# Patient Record
Sex: FEMALE | Race: WHITE | Employment: OTHER | URBAN - METROPOLITAN AREA
[De-identification: names, ages, dates, MRNs, and addresses within clinical notes are randomized per-mention and may not be internally consistent; named-entity substitution may affect disease eponyms.]

---

## 2017-01-06 ENCOUNTER — HOSPITAL ENCOUNTER (OUTPATIENT)
Dept: LAB | Age: 77
Discharge: HOME | End: 2017-01-06
Payer: MEDICARE

## 2017-01-06 ENCOUNTER — OFFICE VISIT (OUTPATIENT)
Dept: FAMILY MEDICINE CLINIC | Age: 77
End: 2017-01-06

## 2017-01-06 VITALS
HEIGHT: 62 IN | SYSTOLIC BLOOD PRESSURE: 139 MMHG | RESPIRATION RATE: 16 BRPM | BODY MASS INDEX: 38.83 KG/M2 | TEMPERATURE: 97.5 F | WEIGHT: 211 LBS | OXYGEN SATURATION: 95 % | DIASTOLIC BLOOD PRESSURE: 65 MMHG | HEART RATE: 66 BPM

## 2017-01-06 DIAGNOSIS — E11.9 TYPE 2 DIABETES MELLITUS WITHOUT COMPLICATION, WITHOUT LONG-TERM CURRENT USE OF INSULIN (HCC): ICD-10-CM

## 2017-01-06 DIAGNOSIS — E78.1 PURE HYPERGLYCERIDEMIA: ICD-10-CM

## 2017-01-06 DIAGNOSIS — K21.9 GASTROESOPHAGEAL REFLUX DISEASE, ESOPHAGITIS PRESENCE NOT SPECIFIED: Primary | ICD-10-CM

## 2017-01-06 DIAGNOSIS — Z86.19 HISTORY OF HELICOBACTER PYLORI INFECTION: ICD-10-CM

## 2017-01-06 DIAGNOSIS — F41.9 ANXIETY: ICD-10-CM

## 2017-01-06 PROCEDURE — 83036 HEMOGLOBIN GLYCOSYLATED A1C: CPT

## 2017-01-06 PROCEDURE — 80053 COMPREHEN METABOLIC PANEL: CPT

## 2017-01-06 PROCEDURE — 80061 LIPID PANEL: CPT

## 2017-01-06 PROCEDURE — 85025 COMPLETE CBC W/AUTO DIFF WBC: CPT

## 2017-01-06 PROCEDURE — 36415 COLL VENOUS BLD VENIPUNCTURE: CPT

## 2017-01-06 PROCEDURE — 87338 HPYLORI STOOL AG IA: CPT

## 2017-01-06 PROCEDURE — 86141 C-REACTIVE PROTEIN HS: CPT

## 2017-01-06 NOTE — MR AVS SNAPSHOT
Visit Information Date & Time Provider Department Dept. Phone Encounter #  
 1/6/2017 10:30 AM Reuben Burr [phone] Follow-up Instructions Return in about 6 months (around 7/17/2017), or if symptoms worsen or fail to improve, for Medicare wellness . Your Appointments 2/20/2017  2:40 PM  
Follow Up with Marisabel Norman MD  
[facility]) Appt Note: 2 mo numbness f/u. ..[facility] [address] [facility] [address] [phone]  
  
   
 [address] [facility] [address]  
  
    
 3/2/2017 11:00 AM  
ESTABLISHED PATIENT with Demetrice Oneal MD  
Arthritis and [address] [facility] [address]) Appt Note: fu 3 mo  
 [address] [facility] [address]  
[phone]  
  
   
 [address] [address] Upcoming Health Maintenance Date Due  
 EYE EXAM RETINAL OR DILATED Q1 10/19/2016 FOOT EXAM Q1 5/31/2017 HEMOGLOBIN A1C Q6M 7/6/2017 MICROALBUMIN Q1 7/15/2017 MEDICARE YEARLY EXAM 7/16/2017 GLAUCOMA SCREENING Q2Y 10/19/2017 LIPID PANEL Q1 1/6/2018 DTaP/Tdap/Td series (2 - Td) 12/5/2023 Allergies as of 1/6/2017  Review Complete On: 1/6/2017 By: Topher Hooks LPN Severity Noted Reaction Type Reactions Cephalosporins High 12/30/2009    Nausea and Vomiting Severe vomiting Current Immunizations  Reviewed on 10/18/2016 Name Date Influenza Vaccine 12/5/2013 Influenza Vaccine Hu Ordonez) 9/6/2016 Influenza Vaccine (Quad) PF 11/10/2014 Influenza Vaccine PF 10/20/2015 Influenza Vaccine Split 10/10/2012  4:30 PM, 11/14/2011, 10/25/2010 Pneumococcal Polysaccharide (PPSV-23) 12/5/2013 Pneumococcal Vaccine (Unspecified Type) 6/5/2008 Tdap 12/5/2013 Not reviewed this visit You Were Diagnosed With   
  
 Codes Comments Gastroesophageal reflux disease, esophagitis presence not specified    -  Primary ICD-10-CM: K21.9 ICD-9-CM: 530.81 Anxiety     ICD-10-CM: F41.9 ICD-9-CM: 300.00 History of Helicobacter pylori infection     ICD-10-CM: Z86.19 ICD-9-CM: V12.09 Type 2 diabetes mellitus without complication, without long-term current use of insulin (HCC)     ICD-10-CM: E11.9 ICD-9-CM: 250.00 Pure hyperglyceridemia     ICD-10-CM: E78.1 ICD-9-CM: 272.1 Vitals BP Pulse Temp Resp Height(growth percentile) Weight(growth percentile) 139/65 (BP 1 Location: Right arm, BP Patient Position: Sitting) 66 97.5 °F (36.4 °C) (Oral) 16 5' 2\" (1.575 m) 211 lb (95.7 kg) SpO2 BMI OB Status Smoking Status 95% 38.59 kg/m2 Postmenopausal Former Smoker Vitals History BMI and BSA Data Body Mass Index Body Surface Area 38.59 kg/m 2 2.05 m 2 Preferred Pharmacy Pharmacy Name Phone 310 [facility] 53 91 [address] ([address] [phone] Your Updated Medication List  
  
   
This list is accurate as of: 1/6/17 11:06 AM.  Always use your most recent med list.  
  
  
  
  
 acetaminophen 650 mg CR tablet Commonly known as:  TYLENOL Take 1,300 mg by mouth daily as needed for Pain. aspirin 81 mg tablet Take 81 mg by mouth daily. atorvastatin 10 mg tablet Commonly known as:  LIPITOR  
TAKE 1 TABLET BY MOUTH EVERY DAY  
  
 B.infantis-B.ani-B.long-B.bifi 10-15 mg Tbec Take 1 Tab by mouth daily. BD TUBERCULIN SYRINGE 1 mL 25 gauge x 5/8\" Syrg Generic drug:  Syringe with Needle (Disp) USE ONCE A WEEK WITH METHOTREXATE Blood-Glucose Meter monitoring kit Commonly known as:  CONTOUR METER Dx: 250. Check blood sugars daily. busPIRone 5 mg tablet Commonly known as:  BUSPAR Take 1 Tab by mouth two (2) times a day for 30 days.  Start with daily x 1 week, then increase to 2 x per day CALCIUM 500+D 500 mg(1,250mg) -200 unit per tablet Generic drug:  calcium-vitamin D Take 3 Tabs by mouth daily. cholecalciferol 1,000 unit tablet Commonly known as:  VITAMIN D3 Take 2,000 Units by mouth daily. cyanocobalamin 1,000 mcg/mL injection Commonly known as:  VITAMIN B-12 Start: 1000 mcg SC/IM qd x1wk, then qwk x1mo, then every month DULoxetine 30 mg capsule Commonly known as:  CYMBALTA TAKE 1 CAPSULE BY MOUTH EVERY DAY  
  
 ESTRACE 0.01 % (0.1 mg/gram) vaginal cream  
Generic drug:  estradiol Take 42.5 mg by mouth once over twenty-four (24) hours. ferrous sulfate 325 mg (65 mg iron) tablet TAKE 1 TABLET BY MOUTH TWICE DAILY  
  
 fluocinoNIDE 0.05 % topical cream  
Commonly known as:  LIDEX Apply  to affected area two (2) times a day. folic acid 1 mg tablet Commonly known as:  Google Take 1 Tab by mouth daily for 90 days. * gabapentin 300 mg capsule Commonly known as:  NEURONTIN  
TAKE 1 CAPSULE BY MOUTH FOUR TIMES DAILY * gabapentin 600 mg tablet Commonly known as:  NEURONTIN Take 1 Tab by mouth two (2) times a day. glipiZIDE SR 2.5 mg CR tablet Commonly known as:  GLUCOTROL XL  
TAKE 1 TABLET BY MOUTH TWICE DAILY  
  
 glucose blood VI test strips strip Commonly known as:  Ascensia CONTOUR Glucometer for 250.00 Pt. test blood sugar once daily (in morning) HYDROcodone-acetaminophen 5-325 mg per tablet Commonly known as:  Iliana Arts Take 1 Tab by mouth every six (6) hours as needed for Pain.  
  
 hydroxychloroquine 200 mg tablet Commonly known as:  PLAQUENIL  
TAKE 1 TABLET BY MOUTH TWICE DAILY OR AS DIRECTED  
  
 metFORMIN 500 mg tablet Commonly known as:  GLUCOPHAGE  
TAKE 1 TABLET BY MOUTH TWICE DAILY WITH MEALS  
  
 metoprolol succinate 25 mg XL tablet Commonly known as:  TOPROL-XL Take 1 Tab by mouth daily for 90 days.   
  
 MULTIVITAMIN PO  
 Take 1 Tab by mouth daily. nitrofurantoin (macrocrystal-monohydrate) 100 mg capsule Commonly known as:  MACROBID Take 1 Cap by mouth two (2) times daily as needed. nortriptyline 10 mg capsule Commonly known as:  PAMELOR  
TAKE 1 CAPSULE BY MOUTH EVERY NIGHT AT BEDTIME  
  
 pantoprazole 40 mg tablet Commonly known as:  PROTONIX Take 1 Tab by mouth daily. PEPCID 20 mg tablet Generic drug:  famotidine Take 20 mg by mouth nightly. Syringe with Needle, Safety 3 mL 25 gauge x 5/8\" Syrg Commonly known as:  3cc Safety Syringe 25Gx5/8\" To be used with B12 injections Chacha Cuevas Rolling walker. Patient with spinal stenosis and stopped posture. Pain with limited ambulation. ZINC PO Take 50 mg by mouth daily. * Notice: This list has 2 medication(s) that are the same as other medications prescribed for you. Read the directions carefully, and ask your doctor or other care provider to review them with you. We Performed the Following CBC WITH AUTOMATED DIFF [83041 CPT(R)] CRP, HIGH SENSITIVITY [05654 CPT(R)] [place], [place] [34687 CPT(R)] HEMOGLOBIN A1C WITH EAG [03719 CPT(R)] LIPID PANEL [63909 CPT(R)] METABOLIC PANEL, COMPREHENSIVE [79590 CPT(R)] REFERRAL TO GASTROENTEROLOGY [RSP34 Custom] Comments:  
 76F with recurrence of GERD and previous h/o H. Pylori. Belching, stomach tightness, frequent gas. Has burning sensation in her stomach. Follow-up Instructions Return in about 6 months (around 7/17/2017), or if symptoms worsen or fail to improve, for Medicare wellness . Referral Information Referral ID Referred By Referred To  
  
 [id number] Tao Chawla, [address] [place] [address], MD   
   [facility] [address] [place] [address], [address] [facility] Phone: [phone] Fax: [phone] Visits Status Start Date End Date 1 New Request 1/6/17 1/6/18 If your referral has a status of pending review or denied, additional information will be sent to support the outcome of this decision. Introducing \[facility] [place] [facility]\"" & [facility] SERVICES! Dear Lisa Mireles: Thank you for requesting a [employer] account. Our records indicate that you have previously registered for a [employer] account but its currently inactive. Please call our [employer] support line at [phone]. Additional Information If you have questions, please visit the Frequently Asked Questions section of the [employer] website at [email or web address] Remember, [employer] is NOT to be used for urgent needs. For medical emergencies, dial 911. Now available from your iPhone and Android! Please provide this summary of care documentation to your next provider. Your primary care clinician is listed as [address]. If you have any questions after today's visit, please call [phone].

## 2017-01-06 NOTE — PROGRESS NOTES
Identified pt with two pt identifiers(name and ). Chief Complaint   Patient presents with    Anxiety     follow up from 16 office visit        Health Maintenance Due   Topic    EYE EXAM RETINAL OR DILATED Q1     HEMOGLOBIN A1C Q6M     LIPID PANEL Q1        Wt Readings from Last 3 Encounters:   17 211 lb (95.7 kg)   16 211 lb 12.8 oz (96.1 kg)   16 205 lb (93 kg)     Temp Readings from Last 3 Encounters:   17 97.5 °F (36.4 °C) (Oral)   16 98.8 °F (37.1 °C) (Oral)   16 98.4 °F (36.9 °C) (Oral)     BP Readings from Last 3 Encounters:   17 139/65   16 110/68   16 118/66     Pulse Readings from Last 3 Encounters:   17 66   16 72   16 60         Learning Assessment:  :     Learning Assessment 2014   PRIMARY LEARNER Patient Patient Patient Patient   HIGHEST LEVEL OF EDUCATION - PRIMARY LEARNER  - - 4 YEARS OF COLLEGE 2 YEARS OF COLLEGE   BARRIERS PRIMARY LEARNER COGNITIVE - NONE NONE   CO-LEARNER CAREGIVER - - No No   PRIMARY LANGUAGE ENGLISH ENGLISH ENGLISH ENGLISH   LEARNER PREFERENCE PRIMARY DEMONSTRATION READING VIDEOS DEMONSTRATION   ANSWERED BY patient Oscar Sanon Patient self   RELATIONSHIP SELF SELF SELF SELF       Depression Screening:  :     PHQ 2 / 9, over the last two weeks 2016   Little interest or pleasure in doing things Not at all   Feeling down, depressed or hopeless Not at all   Total Score PHQ 2 0       Fall Risk Assessment:  :     Fall Risk Assessment, last 12 mths 2016   Able to walk? Yes   Fall in past 12 months? -       Abuse Screening:  :     No flowsheet data found. Coordination of Care Questionnaire:  :     1) Have you been to an emergency room, urgent care clinic since your last visit? no   Hospitalized since your last visit? no             2) Have you seen or consulted any other health care providers outside of [address] [place] [address] since your last visit?  yes the urologist/Cathryn Greene, MS ANP-BC and neurologist    3) Do you have an Advance Directive on file? Yes on file at home  Are you interested in receiving information about Advance Directives? no    Patient is accompanied by self I have received verbal consent from Daniel Morales to discuss any/all medical information while they are present in the room. Reviewed record in preparation for visit and have obtained necessary documentation. Medication reconciliation up to date and corrected with patient at this time.

## 2017-01-06 NOTE — PROGRESS NOTES
CC:  Chief Complaint   Patient presents with    Anxiety     follow up from 12/07/16 office visit     [address] [facility] Anand is a 67 y.o. female. HPI Comments: Who presents today for follow up of anxiety and current use of Buspar. This seems to be working very well for her and her anxiety and mood lability is improved. She also has had follow up with neurology since her last visit. MRI, EEG and other studies were normal. She did have still some neuropathic tingling pain in the legs and so Neurontin was continued at 300 mgs 2 x per day and 600 mgs at supper/bedtime. This seems to be improving her symptoms and they are not as readily concerning. She is now back to driving. Still has some of the struggles with her , but in general, she is handling it better. Pain is under control for the most part. The weather has been a factor. In addition to the above, the patient has a recurrence of GERD symptoms with belching, frequent passing of gas. I recall that years ago, she had been diagnosed and treated for H. Pylori. However, she feels like some of the same symptoms have returned. We discussed that her blood work serology would always be positive, so she does need to see a GI for possible endoscopy and biopsy to determine recurrence or if she has a new gastritis. She is currently on Prolia for osteoporosis. She is not taking any oral bisphosphonates. Anxiety         ROS:  Review of Systems   Gastrointestinal: Positive for heartburn. All other systems reviewed and are negative. OBJECTIVE:  Visit Vitals    /65 (BP 1 Location: Right arm, BP Patient Position: Sitting)    Pulse 66    Temp 97.5 °F (36.4 °C) (Oral)    Resp 16    Ht 5' 2\" (1.575 m)    Wt 211 lb (95.7 kg)    SpO2 95%    BMI 38.59 kg/m2   Physical Exam   Cardiovascular: Normal rate and regular rhythm.     Pulmonary/Chest: Effort normal and breath sounds normal.   Nursing note and vitals reviewed. ASSESSMENT and PLAN    ICD-10-CM ICD-9-CM    1. Gastroesophageal reflux disease, esophagitis presence not specified K21.9 530.81 REFERRAL TO GASTROENTEROLOGY   2. Anxiety F41.9 300.00    3. History of Helicobacter pylori infection Z86.19 V12.09 H PYLORI AG, STOOL   4. Type 2 diabetes mellitus without complication, without long-term current use of insulin (HCC) E11.9 250.00 HEMOGLOBIN A1C WITH EAG   5. Pure hyperglyceridemia E78.1 272.1 LIPID PANEL      CRP, HIGH SENSITIVITY      METABOLIC PANEL, COMPREHENSIVE      CBC WITH AUTOMATED DIFF     Given the ongoing symptoms of GERD, will give referral to GI and also will send stool for h. Pylori antigen to see if the infection is recurrent or cleared. ALso, needs fasting labs today, including HBA1C. Will advise when the results return. No change in medications at this time. Pt verbalizes understanding of plan of care and denies further questions or concerns at this time. Follow-up Disposition:  Return in about 6 months (around 7/17/2017), or if symptoms worsen or fail to improve, for Medicare wellness .

## 2017-01-10 ENCOUNTER — HOSPITAL ENCOUNTER (OUTPATIENT)
Dept: LAB | Age: 77
Discharge: HOME | End: 2017-01-10
Payer: MEDICARE

## 2017-01-10 PROCEDURE — 87338 HPYLORI STOOL AG IA: CPT

## 2017-01-11 LAB
ALBUMIN SERPL-MCNC: 4.1 G/DL (ref 3.5–4.8)
ALBUMIN/GLOB SERPL: 1.6 {RATIO} (ref 1.1–2.5)
ALP SERPL-CCNC: 64 IU/L (ref 39–117)
ALT SERPL-CCNC: 16 IU/L (ref 0–32)
AST SERPL-CCNC: 28 IU/L (ref 0–40)
BASOPHILS # BLD AUTO: 0 X10E3/UL (ref 0–0.2)
BASOPHILS NFR BLD AUTO: 1 %
BILIRUB SERPL-MCNC: 0.2 MG/DL (ref 0–1.2)
BUN SERPL-MCNC: 19 MG/DL (ref 8–27)
BUN/CREAT SERPL: 21 (ref 11–26)
CALCIUM SERPL-MCNC: 8.7 MG/DL (ref 8.7–10.3)
CHLORIDE SERPL-SCNC: 103 MMOL/L (ref 96–106)
CHOLEST SERPL-MCNC: 140 MG/DL (ref 100–199)
CO2 SERPL-SCNC: 24 MMOL/L (ref 18–29)
CREAT SERPL-MCNC: 0.9 MG/DL (ref 0.57–1)
CRP SERPL HS-MCNC: 0.36 MG/L (ref 0–3)
EOSINOPHIL # BLD AUTO: 0.1 X10E3/UL (ref 0–0.4)
EOSINOPHIL NFR BLD AUTO: 2 %
ERYTHROCYTE [DISTWIDTH] IN BLOOD BY AUTOMATED COUNT: 13.2 % (ref 12.3–15.4)
EST. AVERAGE GLUCOSE BLD GHB EST-MCNC: 120 MG/DL
GLOBULIN SER CALC-MCNC: 2.5 G/DL (ref 1.5–4.5)
GLUCOSE SERPL-MCNC: 79 MG/DL (ref 65–99)
H PYLORI AG STL QL IA: NORMAL
HBA1C MFR BLD: 5.8 % (ref 4.8–5.6)
HCT VFR BLD AUTO: 38.9 % (ref 34–46.6)
HDLC SERPL-MCNC: 56 MG/DL
HGB BLD-MCNC: 12.5 G/DL (ref 11.1–15.9)
IMM GRANULOCYTES # BLD: 0 X10E3/UL (ref 0–0.1)
IMM GRANULOCYTES NFR BLD: 0 %
INTERPRETATION, 910389: NORMAL
LDLC SERPL CALC-MCNC: 64 MG/DL (ref 0–99)
LYMPHOCYTES # BLD AUTO: 2.2 X10E3/UL (ref 0.7–3.1)
LYMPHOCYTES NFR BLD AUTO: 38 %
MCH RBC QN AUTO: 31.6 PG (ref 26.6–33)
MCHC RBC AUTO-ENTMCNC: 32.1 G/DL (ref 31.5–35.7)
MCV RBC AUTO: 98 FL (ref 79–97)
MONOCYTES # BLD AUTO: 0.6 X10E3/UL (ref 0.1–0.9)
MONOCYTES NFR BLD AUTO: 10 %
NEUTROPHILS # BLD AUTO: 2.9 X10E3/UL (ref 1.4–7)
NEUTROPHILS NFR BLD AUTO: 49 %
PLATELET # BLD AUTO: 245 X10E3/UL (ref 150–379)
POTASSIUM SERPL-SCNC: 4.8 MMOL/L (ref 3.5–5.2)
PROT SERPL-MCNC: 6.6 G/DL (ref 6–8.5)
RBC # BLD AUTO: 3.96 X10E6/UL (ref 3.77–5.28)
REQUEST PROBLEM, 015255: NORMAL
SODIUM SERPL-SCNC: 142 MMOL/L (ref 134–144)
TRIGL SERPL-MCNC: 99 MG/DL (ref 0–149)
VLDLC SERPL CALC-MCNC: 20 MG/DL (ref 5–40)
WBC # BLD AUTO: 5.9 X10E3/UL (ref 3.4–10.8)

## 2017-01-12 LAB — H PYLORI AG STL QL IA: NEGATIVE

## 2017-01-12 NOTE — PROGRESS NOTES
H. Pylori stool antigen was negative. So she does not seem to have active disease. Lab letter with other results already sent. Ignore the piece about the H. Pylori. Just received results today.

## 2017-01-21 DIAGNOSIS — I10 ESSENTIAL HYPERTENSION WITH GOAL BLOOD PRESSURE LESS THAN 130/80: ICD-10-CM

## 2017-01-21 DIAGNOSIS — D64.89 ANEMIA DUE TO OTHER CAUSE: ICD-10-CM

## 2017-01-23 RX ORDER — METOPROLOL SUCCINATE 25 MG/1
TABLET, EXTENDED RELEASE ORAL
Qty: 90 TAB | Refills: 0 | Status: SHIPPED | OUTPATIENT
Start: 2017-01-23 | End: 2017-07-26 | Stop reason: SDUPTHER

## 2017-01-23 RX ORDER — FOLIC ACID 1 MG/1
TABLET ORAL
Qty: 90 TAB | Refills: 0 | Status: SHIPPED | OUTPATIENT
Start: 2017-01-23 | End: 2017-07-26 | Stop reason: SDUPTHER

## 2017-01-23 RX ORDER — LANOLIN ALCOHOL/MO/W.PET/CERES
CREAM (GRAM) TOPICAL
Qty: 60 TAB | Refills: 0 | Status: SHIPPED | OUTPATIENT
Start: 2017-01-23 | End: 2017-03-01 | Stop reason: SDUPTHER

## 2017-01-31 RX ORDER — GLIPIZIDE 2.5 MG/1
TABLET, EXTENDED RELEASE ORAL
Qty: 180 TAB | Refills: 0 | Status: SHIPPED | OUTPATIENT
Start: 2017-01-31 | End: 2017-04-27 | Stop reason: SDUPTHER

## 2017-01-31 RX ORDER — METFORMIN HYDROCHLORIDE 500 MG/1
TABLET ORAL
Qty: 180 TAB | Refills: 0 | Status: SHIPPED | OUTPATIENT
Start: 2017-01-31 | End: 2017-04-24 | Stop reason: SDUPTHER

## 2017-02-13 RX ORDER — ATORVASTATIN CALCIUM 10 MG/1
TABLET, FILM COATED ORAL
Qty: 90 TAB | Refills: 0 | Status: SHIPPED | OUTPATIENT
Start: 2017-02-13 | End: 2017-05-11 | Stop reason: SDUPTHER

## 2017-02-20 ENCOUNTER — OFFICE VISIT (OUTPATIENT)
Dept: NEUROLOGY | Age: 77
End: 2017-02-20

## 2017-02-20 VITALS
DIASTOLIC BLOOD PRESSURE: 74 MMHG | BODY MASS INDEX: 39.31 KG/M2 | SYSTOLIC BLOOD PRESSURE: 122 MMHG | OXYGEN SATURATION: 98 % | WEIGHT: 213.6 LBS | RESPIRATION RATE: 18 BRPM | HEIGHT: 62 IN | HEART RATE: 75 BPM | TEMPERATURE: 98.5 F

## 2017-02-20 DIAGNOSIS — E11.40 NEUROPATHY DUE TO TYPE 2 DIABETES MELLITUS (HCC): Primary | ICD-10-CM

## 2017-02-20 RX ORDER — NORTRIPTYLINE HYDROCHLORIDE 10 MG/1
10 CAPSULE ORAL
Qty: 30 CAP | Refills: 3 | Status: SHIPPED | OUTPATIENT
Start: 2017-02-20 | End: 2017-06-24 | Stop reason: SDUPTHER

## 2017-02-20 NOTE — MR AVS SNAPSHOT
Visit Information Date & Time Provider Department Dept. Phone Encounter #  
 2/20/2017  2:40 PM Steven Hernandez MD [facility]rology [facility] [phone] [id number] Follow-up Instructions Return in about 6 months (around 8/20/2017). Your Appointments 3/2/2017 11:00 AM  
ESTABLISHED PATIENT with Mariangel Pavon MD  
Arthritis and [facility]) Appt Note: fu 3 mo  
 [address] [facility] [address]  
[phone]  
  
   
 [address] [address]  
  
    
 7/14/2017 10:30 AM  
ROUTINE CARE with Latasha Ibarra MD  
[facility]) Appt Note: 6 [facility] [address] [facility]  
  
   
 [address] [facility] [address] Upcoming Health Maintenance Date Due  
 EYE EXAM RETINAL OR DILATED Q1 10/19/2016 FOOT EXAM Q1 5/31/2017 HEMOGLOBIN A1C Q6M 7/6/2017 MICROALBUMIN Q1 7/15/2017 MEDICARE YEARLY EXAM 7/16/2017 GLAUCOMA SCREENING Q2Y 10/19/2017 LIPID PANEL Q1 1/6/2018 DTaP/Tdap/Td series (2 - Td) 12/5/2023 Allergies as of 2/20/2017  Review Complete On: 2/20/2017 By: Steven Hernandez MD  
  
 Severity Noted Reaction Type Reactions Cephalosporins High 12/30/2009    Nausea and Vomiting Severe vomiting Current Immunizations  Reviewed on 10/18/2016 Name Date Influenza Vaccine 12/5/2013 Influenza Vaccine Radha Lori) 9/6/2016 Influenza Vaccine (Quad) PF 11/10/2014 Influenza Vaccine PF 10/20/2015 Influenza Vaccine Split 10/10/2012  4:30 PM, 11/14/2011, 10/25/2010 Pneumococcal Polysaccharide (PPSV-23) 12/5/2013 Pneumococcal Vaccine (Unspecified Type) 6/5/2008 Tdap 12/5/2013 Not reviewed this visit You Were Diagnosed With   
  
 Codes Comments Neuropathy due to type 2 diabetes mellitus ([facility] [address]    -  Primary ICD-10-CM: E11.40 ICD-9-CM: 250.60, 357.2 Vitals BP Pulse Temp Resp Height(growth percentile) Weight(growth percentile) 122/74 75 98.5 °F (36.9 °C) (Oral) 18 5' 2\" (1.575 m) 213 lb 9.6 oz (96.9 kg) SpO2 BMI OB Status Smoking Status 98% 39.07 kg/m2 Postmenopausal Former Smoker Vitals History BMI and BSA Data Body Mass Index Body Surface Area 39.07 kg/m 2 2.06 m 2 Preferred Pharmacy Pharmacy Name Phone 310 [facility], [facility] Avin[facility] 53 91 [address] ([address] [phone] Your Updated Medication List  
  
   
This list is accurate as of: 2/20/17  3:06 PM.  Always use your most recent med list.  
  
  
  
  
 acetaminophen 650 mg CR tablet Commonly known as:  TYLENOL Take 1,300 mg by mouth daily as needed for Pain. aspirin 81 mg tablet Take 81 mg by mouth daily. atorvastatin 10 mg tablet Commonly known as:  LIPITOR  
TAKE 1 TABLET BY MOUTH EVERY DAY  
  
 B.infantis-B.ani-B.long-B.bifi 10-15 mg Tbec Take 1 Tab by mouth daily. BD TUBERCULIN SYRINGE 1 mL 25 gauge x 5/8\" Syrg Generic drug:  Syringe with Needle (Disp) USE ONCE A WEEK WITH METHOTREXATE Blood-Glucose Meter monitoring kit Commonly known as:  CONTOUR METER Dx: 250. Check blood sugars daily. CALCIUM 500+D 500 mg(1,250mg) -200 unit per tablet Generic drug:  calcium-vitamin D Take 3 Tabs by mouth daily. cholecalciferol 1,000 unit tablet Commonly known as:  VITAMIN D3 Take 2,000 Units by mouth daily. cyanocobalamin 1,000 mcg/mL injection Commonly known as:  VITAMIN B-12 Start: 1000 mcg SC/IM qd x1wk, then qwk x1mo, then every month DULoxetine 30 mg capsule Commonly known as:  CYMBALTA TAKE 1 CAPSULE BY MOUTH EVERY DAY  
  
 ESTRACE 0.01 % (0.1 mg/gram) vaginal cream  
Generic drug:  estradiol Take 42.5 mg by mouth once over twenty-four (24) hours. * ferrous sulfate 325 mg (65 mg iron) tablet TAKE 1 TABLET BY MOUTH TWICE DAILY * ferrous sulfate 325 mg (65 mg iron) tablet TAKE 1 TABLET BY MOUTH TWICE DAILY  
  
 fluocinoNIDE 0.05 % topical cream  
Commonly known as:  LIDEX Apply  to affected area two (2) times a day. folic acid 1 mg tablet Commonly known as:  FOLVITE  
TAKE 1 TABLET BY MOUTH DAILY * gabapentin 300 mg capsule Commonly known as:  NEURONTIN  
TAKE 1 CAPSULE BY MOUTH FOUR TIMES DAILY * gabapentin 600 mg tablet Commonly known as:  NEURONTIN Take 1 Tab by mouth two (2) times a day. glipiZIDE SR 2.5 mg CR tablet Commonly known as:  GLUCOTROL XL  
TAKE 1 TABLET BY MOUTH TWICE DAILY  
  
 glucose blood VI test strips strip Commonly known as:  [employer] CONTOUR Glucometer for 250.00 Pt. test blood sugar once daily (in morning) HYDROcodone-acetaminophen 5-325 mg per tablet Commonly known as:  Tessie Jamie Take 1 Tab by mouth every six (6) hours as needed for Pain.  
  
 hydroxychloroquine 200 mg tablet Commonly known as:  PLAQUENIL  
TAKE 1 TABLET BY MOUTH TWICE DAILY OR AS DIRECTED  
  
 metFORMIN 500 mg tablet Commonly known as:  GLUCOPHAGE  
TAKE 1 TABLET BY MOUTH TWICE DAILY WITH MEALS  
  
 metoprolol succinate 25 mg XL tablet Commonly known as:  TOPROL-XL  
TAKE 1 TABLET BY MOUTH DAILY MULTIVITAMIN PO Take 1 Tab by mouth daily. nitrofurantoin (macrocrystal-monohydrate) 100 mg capsule Commonly known as:  MACROBID Take 1 Cap by mouth two (2) times daily as needed. nortriptyline 10 mg capsule Commonly known as:  PAMELOR Take 1 Cap by mouth nightly for 30 days. pantoprazole 40 mg tablet Commonly known as:  PROTONIX Take 1 Tab by mouth daily. Syringe with Needle, Safety 3 mL 25 gauge x 5/8\" Syrg Commonly known as:  3cc Safety Syringe 25Gx5/8\" To be used with B12 injections Ahsan jones. Patient with spinal stenosis and stopped posture. Pain with limited ambulation. ZINC PO Take 50 mg by mouth daily. * Notice: This list has 4 medication(s) that are the same as other medications prescribed for you. Read the directions carefully, and ask your doctor or other care provider to review them with you. Follow-up Instructions Return in about 6 months (around 8/20/2017). Patient Instructions I am glad to hear that the blood sugars have improved. Continue gabapentin and stay as safely busy as possible. Revisit in 6 months. Introducing [facility] & [facility] SERVICES! Dear [place]: Thank you for requesting a [employer] account. Our records indicate that you have previously registered for a [employer] account but its currently inactive. Please call our [employer] support line at [phone]. Additional Information If you have questions, please visit the Frequently Asked Questions section of the [employer] website at [email or web address] Remember, [employer] is NOT to be used for urgent needs. For medical emergencies, dial 911. Now available from your iPhone and Android! Please provide this summary of care documentation to your next provider. Your primary care clinician is listed as [address]. If you have any questions after today's visit, please call [phone].

## 2017-02-20 NOTE — PATIENT INSTRUCTIONS
I am glad to hear that the blood sugars have improved. Continue gabapentin and stay as safely busy as possible. Revisit in 6 months.

## 2017-02-20 NOTE — PROGRESS NOTES
Neurology Consult      Subjective: Gildardo Rajan is a 67 y.o. female Who returns with type 2 diabetes and affiliated distal sensory neuropathy. Currently pain is checked adequately with gabapentin 600 mg-600 mg-300 mg-300 mg daily. Does notice some fluctuations in the level of tingling and sensory changes and I told her that was a normal feature of peripheral neuropathy including diabetic. Says her hemoglobin A1c has improved and I congratulated her. Is not a smoker and has not had any falls or other inconvenient features. 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 For that particular situation she feels more comfortable when it is cold as opposed to hot. Continue gabapentin and keep blood sugars as reasonably tight as possible and stay as reasonably busy as possible with revisit in 6 months. Current Outpatient Prescriptions   Medication Sig Dispense Refill    nortriptyline (PAMELOR) 10 mg capsule Take 1 Cap by mouth nightly for 30 days. 30 Cap 3    glucose blood VI test strips (ASCENSIA CONTOUR) strip Glucometer for 250.00 Pt. test blood sugar once daily (in morning) 50 Strip 11    atorvastatin (LIPITOR) 10 mg tablet TAKE 1 TABLET BY MOUTH EVERY DAY 90 Tab 0    metFORMIN (GLUCOPHAGE) 500 mg tablet TAKE 1 TABLET BY MOUTH TWICE DAILY WITH MEALS 180 Tab 0    glipiZIDE SR (GLUCOTROL XL) 2.5 mg CR tablet TAKE 1 TABLET BY MOUTH TWICE DAILY 180 Tab 0    DULoxetine (CYMBALTA) 30 mg capsule TAKE 1 CAPSULE BY MOUTH EVERY DAY 30 Cap 3    metoprolol succinate (TOPROL-XL) 25 mg XL tablet TAKE 1 TABLET BY MOUTH DAILY 90 Tab 0    folic acid (FOLVITE) 1 mg tablet TAKE 1 TABLET BY MOUTH DAILY 90 Tab 0    gabapentin (NEURONTIN) 600 mg tablet Take 1 Tab by mouth two (2) times a day. 60 Tab 5    pantoprazole (PROTONIX) 40 mg tablet Take 1 Tab by mouth daily.  30 Tab 3    HYDROcodone-acetaminophen (NORCO) 5-325 mg per tablet Take 1 Tab by mouth every six (6) hours as needed for Pain. 60 Tab 0    gabapentin (NEURONTIN) 300 mg capsule TAKE 1 CAPSULE BY MOUTH FOUR TIMES DAILY (Patient taking differently: TAKE 1 CAPSULE BY MOUTH TWICE DAILY) 120 Cap 6    ESTRACE 0.01 % (0.1 mg/gram) vaginal cream Take 42.5 mg by mouth once over twenty-four (24) hours. 1    nitrofurantoin, macrocrystal-monohydrate, (MACROBID) 100 mg capsule Take 1 Cap by mouth two (2) times daily as needed. 0    hydroxychloroquine (PLAQUENIL) 200 mg tablet TAKE 1 TABLET BY MOUTH TWICE DAILY OR AS DIRECTED 60 Tab 5    ferrous sulfate 325 mg (65 mg iron) tablet TAKE 1 TABLET BY MOUTH TWICE DAILY 60 Tab 0    B.infantis-B.ani-B.long-B.bifi 10-15 mg TbEC Take 1 Tab by mouth daily.  acetaminophen (TYLENOL) 650 mg CR tablet Take 1,300 mg by mouth daily as needed for Pain.  BD TUBERCULIN SYRINGE 1 mL 25 x 5/8\" syrg USE ONCE A WEEK WITH METHOTREXATE 13 Syringe 3    cyanocobalamin (VITAMIN B-12) 1,000 mcg/mL injection Start: 1000 mcg SC/IM qd x1wk, then qwk x1mo, then every month 1 Vial 3    Syringe with Needle, Safety (3CC SAFETY SYRINGE 25GX5/8\") 3 mL 25 x 5/8\" syrg To be used with B12 injections 100 Each 1    Blood-Glucose Meter (CONTOUR METER) monitoring kit Dx: 250. Check blood sugars daily. 1 kit 11    fluocinoNIDE (LIDEX) 0.05 % topical cream Apply  to affected area two (2) times a day. 15 g 0    ZINC PO Take 50 mg by mouth daily.  cholecalciferol, vitamin d3, (VITAMIN D3) 1,000 unit tablet Take 2,000 Units by mouth daily.  calcium-vitamin D (CALCIUM 500+D) 500 mg(1,250mg) -200 unit per tablet Take 3 Tabs by mouth daily.  aspirin 81 mg Tab Take 81 mg by mouth daily.  MULTIVITAMINS (MULTIVITAMIN PO) Take 1 Tab by mouth daily.  ferrous sulfate 325 mg (65 mg iron) tablet TAKE 1 TABLET BY MOUTH TWICE DAILY 60 Tab 0    Walker [employer] walker. Patient with spinal stenosis and stopped posture. Pain with limited ambulation.  1 Each 0      Allergies   Allergen Reactions    Cephalosporins Nausea and Vomiting     Severe vomiting     Past Medical History   Diagnosis Date    Anemia     Arthritis     CAD (coronary artery disease)     Degenerative joint disease of right hip 09/14/2015      [facility]/Dr. Kusum Aguilar    Diabetes ([facility]     Fracture      right shoulder; T6 compression    Gastritis     Hypercholesterolemia      Hyperlipidemia    Hypertension     Lupus ([facility] 1/9/2015    Osteoporosis, post-menopausal       Past Surgical History   Procedure Laterality Date    Hx coronary stent placement      Pr abdomen surgery proc unlisted       gastric bypass. . hernia    Hx cholecystectomy      Hx hernia repair      Hx orthopaedic       bilateral knees    Hx orthopaedic       r shoulder    Hx orthopaedic       Laminectomy      Social History     Social History    Marital status:      Spouse name: N/A    Number of children: N/A    Years of education: N/A     Occupational History    Not on file. Social History Main Topics    Smoking status: Former Smoker     Quit date: 12/30/1989    Smokeless tobacco: Never Used    Alcohol use No    Drug use: No    Sexual activity: Not Currently     Other Topics Concern    Not on file     Social History Narrative      Family History   Problem Relation Age of Onset    Diabetes Mother     Hypertension Mother     Heart Disease Mother     Diabetes Father     Hypertension Father     Heart Disease Father    [facility] Arthritis-rheumatoid Sister     Elevated Lipids Sister     Arthritis-rheumatoid Sister     Hypertension Sister     Diabetes Sister     Thyroid Disease Sister     Arthritis-rheumatoid Other       Visit Vitals    /74    Pulse 75    Temp 98.5 °F (36.9 °C) (Oral)    Resp 18    Ht 5' 2\" (1.575 m)    Wt 96.9 kg (213 lb 9.6 oz)    SpO2 98%    BMI 39.07 kg/m2        Review of Systems:   A comprehensive review of systems was negative except for that written in the HPI. Neuro Exam:     Appearance:   The patient is well developed, well nourished, provides a coherent history and is in no acute distress. Mental Status: Oriented to time, place and person. Mood and affect appropriate. Cranial Nerves:   Intact visual fields. Fundi are benign. GARY, EOM's full, no nystagmus, no ptosis. Facial sensation is normal. Corneal reflexes are intact. Facial movement is symmetric. Hearing is normal bilaterally. Palate is midline with normal sternocleidomastoid and trapezius muscles are normal. Tongue is midline. Motor:  5/5 strength in upper and lower proximal and distal muscles. Normal bulk and tone. No fasciculations. Reflexes:   Deep tendon reflexes trace to 0/4 and symmetrical.   Sensory:    diminished distally to touch, pinprick and vibration. Gait:  Normal gait. Tremor:   No tremor noted. Cerebellar:  No cerebellar signs present. Neurovascular:  Normal heart sounds and regular rhythm, peripheral pulses intact, and no carotid bruits. Has distal color changes of Robert's disease. Assessment:   Peripheral neuropathy due to type 2 diabetes. Exam looks baseline and very happy to hear that blood sugars have improved. Continue gabapentin and revisit in 6 months. Plan:   Revisit 6 months.   Signed by :  Nabil Hall MD

## 2017-02-27 ENCOUNTER — ANESTHESIA EVENT (OUTPATIENT)
Dept: ENDOSCOPY | Age: 77
End: 2017-02-27
Payer: MEDICARE

## 2017-02-27 ENCOUNTER — SURGERY (OUTPATIENT)
Age: 77
End: 2017-02-27

## 2017-02-27 ENCOUNTER — ANESTHESIA (OUTPATIENT)
Dept: ENDOSCOPY | Age: 77
End: 2017-02-27
Payer: MEDICARE

## 2017-02-27 ENCOUNTER — HOSPITAL ENCOUNTER (OUTPATIENT)
Age: 77
Setting detail: OUTPATIENT SURGERY
Discharge: HOME | End: 2017-02-27
Attending: INTERNAL MEDICINE | Admitting: INTERNAL MEDICINE
Payer: MEDICARE

## 2017-02-27 VITALS
WEIGHT: 210 LBS | HEART RATE: 63 BPM | OXYGEN SATURATION: 99 % | HEIGHT: 62 IN | SYSTOLIC BLOOD PRESSURE: 140 MMHG | BODY MASS INDEX: 38.64 KG/M2 | DIASTOLIC BLOOD PRESSURE: 64 MMHG | TEMPERATURE: 97.7 F | RESPIRATION RATE: 17 BRPM

## 2017-02-27 LAB
GLUCOSE BLD STRIP.AUTO-MCNC: 83 MG/DL (ref 65–100)
H PYLORI FROM TISSUE: NEGATIVE
KIT LOT NO., HCLOLOT: NORMAL
NEGATIVE CONTROL: NEGATIVE
POSITIVE CONTROL: POSITIVE
SERVICE CMNT-IMP: NORMAL

## 2017-02-27 PROCEDURE — 74011000250 HC RX REV CODE- 250

## 2017-02-27 PROCEDURE — 76040000019: Performed by: INTERNAL MEDICINE

## 2017-02-27 PROCEDURE — 88305 TISSUE EXAM BY PATHOLOGIST: CPT | Performed by: INTERNAL MEDICINE

## 2017-02-27 PROCEDURE — 87077 CULTURE AEROBIC IDENTIFY: CPT | Performed by: INTERNAL MEDICINE

## 2017-02-27 PROCEDURE — 76060000031 HC ANESTHESIA FIRST 0.5 HR: Performed by: INTERNAL MEDICINE

## 2017-02-27 PROCEDURE — 74011250636 HC RX REV CODE- 250/636: Performed by: INTERNAL MEDICINE

## 2017-02-27 PROCEDURE — 82962 GLUCOSE BLOOD TEST: CPT

## 2017-02-27 PROCEDURE — 74011250636 HC RX REV CODE- 250/636

## 2017-02-27 PROCEDURE — 77030009426 HC FCPS BIOP ENDOSC BSC -B: Performed by: INTERNAL MEDICINE

## 2017-02-27 RX ORDER — LIDOCAINE HYDROCHLORIDE 20 MG/ML
INJECTION, SOLUTION EPIDURAL; INFILTRATION; INTRACAUDAL; PERINEURAL AS NEEDED
Status: DISCONTINUED | OUTPATIENT
Start: 2017-02-27 | End: 2017-02-27 | Stop reason: HOSPADM

## 2017-02-27 RX ORDER — SODIUM CHLORIDE 9 MG/ML
50 INJECTION, SOLUTION INTRAVENOUS CONTINUOUS
Status: DISCONTINUED | OUTPATIENT
Start: 2017-02-27 | End: 2017-02-27 | Stop reason: HOSPADM

## 2017-02-27 RX ORDER — ATROPINE SULFATE 0.1 MG/ML
0.4 INJECTION INTRAVENOUS
Status: DISCONTINUED | OUTPATIENT
Start: 2017-02-27 | End: 2017-02-27 | Stop reason: HOSPADM

## 2017-02-27 RX ORDER — EPINEPHRINE 0.1 MG/ML
1 INJECTION INTRACARDIAC; INTRAVENOUS
Status: DISCONTINUED | OUTPATIENT
Start: 2017-02-27 | End: 2017-02-27 | Stop reason: HOSPADM

## 2017-02-27 RX ORDER — PROPOFOL 10 MG/ML
INJECTION, EMULSION INTRAVENOUS AS NEEDED
Status: DISCONTINUED | OUTPATIENT
Start: 2017-02-27 | End: 2017-02-27 | Stop reason: HOSPADM

## 2017-02-27 RX ORDER — NALOXONE HYDROCHLORIDE 0.4 MG/ML
0.4 INJECTION, SOLUTION INTRAMUSCULAR; INTRAVENOUS; SUBCUTANEOUS
Status: DISCONTINUED | OUTPATIENT
Start: 2017-02-27 | End: 2017-02-27 | Stop reason: HOSPADM

## 2017-02-27 RX ORDER — MIDAZOLAM HYDROCHLORIDE 1 MG/ML
.25-5 INJECTION, SOLUTION INTRAMUSCULAR; INTRAVENOUS
Status: DISCONTINUED | OUTPATIENT
Start: 2017-02-27 | End: 2017-02-27 | Stop reason: HOSPADM

## 2017-02-27 RX ORDER — DEXTROMETHORPHAN/PSEUDOEPHED 2.5-7.5/.8
1.2 DROPS ORAL
Status: DISCONTINUED | OUTPATIENT
Start: 2017-02-27 | End: 2017-02-27 | Stop reason: HOSPADM

## 2017-02-27 RX ORDER — FLUMAZENIL 0.1 MG/ML
0.2 INJECTION INTRAVENOUS
Status: DISCONTINUED | OUTPATIENT
Start: 2017-02-27 | End: 2017-02-27 | Stop reason: HOSPADM

## 2017-02-27 RX ADMIN — PROPOFOL 80 MG: 10 INJECTION, EMULSION INTRAVENOUS at 15:08

## 2017-02-27 RX ADMIN — SODIUM CHLORIDE 50 ML/HR: 900 INJECTION, SOLUTION INTRAVENOUS at 14:39

## 2017-02-27 RX ADMIN — LIDOCAINE HYDROCHLORIDE 80 MG: 20 INJECTION, SOLUTION EPIDURAL; INFILTRATION; INTRACAUDAL; PERINEURAL at 15:08

## 2017-02-27 RX ADMIN — PROPOFOL 40 MG: 10 INJECTION, EMULSION INTRAVENOUS at 15:11

## 2017-02-27 NOTE — ANESTHESIA POSTPROCEDURE EVALUATION
Post-Anesthesia Evaluation and Assessment    Patient: Lindsey Lee MRN: [id number]  SSN: [id number]    YOB: 1940  Age: 67 y.o. Sex: female       Cardiovascular Function/Vital Signs  Visit Vitals    /64    Pulse 63    Temp 36.5 °C (97.7 °F)    Resp 17    Ht 5' 2\" (1.575 m)    Wt 95.3 kg (210 lb)    SpO2 99%    Breastfeeding No    BMI 38.41 kg/m2       Patient is status post MAC anesthesia for Procedure(s):  ESOPHAGOGASTRODUODENOSCOPY (EGD)  ESOPHAGOGASTRODUODENAL (EGD) BIOPSY. Nausea/Vomiting: None    Postoperative hydration reviewed and adequate. Pain:  Pain Scale 1: Numeric (0 - 10) (02/27/17 1548)  Pain Intensity 1: 0 (02/27/17 1548)   Managed    Neurological Status: At baseline    Mental Status and Level of Consciousness: Arousable    Pulmonary Status:   O2 Device: Room air (02/27/17 1548)   Adequate oxygenation and airway patent    Complications related to anesthesia: None    Post-anesthesia assessment completed.  No concerns    Signed By: Merlyn Simon MD     February 27, 2017

## 2017-02-27 NOTE — IP AVS SNAPSHOT
[address] [facility] 104 70 [facility] 
[phone] Patient: Reyes Villa MRN: [id number] DPW:[id number]/79/2550 You are allergic to the following Allergen Reactions Cephalosporins Nausea and Vomiting Severe vomiting Recent Documentation Height  
  
  
  
  
  
 1.575 m Emergency Contacts Name Discharge Info Relation Home Work Mobile Morteza Rueda DISCHARGE CAREGIVER [3] Spouse [3] [phone] DISCHARGE CAREGIVER [3] About your hospitalization You were admitted on:  February 27, 2017 You last received care in the:  [facility] ENDOSCOPY You were discharged on:  February 27, 2017 Unit phone number:  [phone] Why you were hospitalized Your primary diagnosis was:  Not on File Providers Seen During Your Hospitalizations Provider Role Specialty Primary office phone Cassandra Combs MD Attending Provider Gastroenterology [phone] Your Primary Care Physician (PCP) Primary Care Physician Office Phone Office Fax Quentin Martin [phone] Follow-up Information None Your Appointments Thursday March 02, 2017 11:00 AM EST  
ESTABLISHED PATIENT with Josefina Smith MD  
Arthritis and [address]  
[phone] Current Discharge Medication List  
  
CONTINUE these medications which have NOT CHANGED Dose & Instructions Dispensing Information Comments Morning Noon Evening Bedtime  
 acetaminophen 650 mg CR tablet Commonly known as:  TYLENOL Your next dose is: Today, Tomorrow Other:  _________ Dose:  1300 mg Take 1,300 mg by mouth daily as needed for Pain. Refills:  0  
     
   
   
   
  
 aspirin 81 mg tablet Your next dose is: Today, Tomorrow Other:  _________ Dose:  81 mg Take 81 mg by mouth daily. Refills:  0  
     
   
   
   
  
 atorvastatin 10 mg tablet Commonly known as:  LIPITOR Your next dose is: Today, Tomorrow Other:  _________ TAKE 1 TABLET BY MOUTH EVERY DAY Quantity:  90 Tab Refills:  0 B.infantis-B.ani-B.long-B.bifi 10-15 mg Tbec Your next dose is: Today, Tomorrow Other:  _________ Dose:  1 Tab Take 1 Tab by mouth daily. Refills:  0 BD TUBERCULIN SYRINGE 1 mL 25 gauge x 5/8\" Syrg Generic drug:  Syringe with Needle (Disp) Your next dose is: Today, Tomorrow Other:  _________  
   
   
 USE ONCE A WEEK WITH METHOTREXATE Quantity:  13 Syringe Refills:  3 Blood-Glucose Meter monitoring kit Commonly known as:  CONTOUR METER Your next dose is: Today, Tomorrow Other:  _________ Dx: 250. Check blood sugars daily. Quantity:  1 kit Refills:  11 CALCIUM 500+D 500 mg(1,250mg) -200 unit per tablet Generic drug:  calcium-vitamin D Your next dose is: Today, Tomorrow Other:  _________ Dose:  3 Tab Take 3 Tabs by mouth daily. Refills:  0  
     
   
   
   
  
 cholecalciferol 1,000 unit tablet Commonly known as:  VITAMIN D3 Your next dose is: Today, Tomorrow Other:  _________ Dose:  2000 Units Take 2,000 Units by mouth daily. Refills:  0  
     
   
   
   
  
 cyanocobalamin 1,000 mcg/mL injection Commonly known as:  VITAMIN B-12 Your next dose is: Today, Tomorrow Other:  _________ Start: 1000 mcg SC/IM qd x1wk, then qwk x1mo, then every month Quantity:  1 Vial  
Refills:  3 DULoxetine 30 mg capsule Commonly known as:  CYMBALTA Your next dose is: Today, Tomorrow Other:  _________  TAKE 1 CAPSULE BY MOUTH EVERY DAY  
 Quantity:  30 Cap Refills:  3 ESTRACE 0.01 % (0.1 mg/gram) vaginal cream  
Generic drug:  estradiol Your next dose is: Today, Tomorrow Other:  _________ Dose:  42.5 mg Take 42.5 mg by mouth once over twenty-four (24) hours. Refills:  1  
     
   
   
   
  
 * ferrous sulfate 325 mg (65 mg iron) tablet Your next dose is: Today, Tomorrow Other:  _________ TAKE 1 TABLET BY MOUTH TWICE DAILY Quantity:  60 Tab Refills:  0  
     
   
   
   
  
 * ferrous sulfate 325 mg (65 mg iron) tablet Your next dose is: Today, Tomorrow Other:  _________ TAKE 1 TABLET BY MOUTH TWICE DAILY Quantity:  60 Tab Refills:  0  
     
   
   
   
  
 fluocinoNIDE 0.05 % topical cream  
Commonly known as:  LIDEX Your next dose is: Today, Tomorrow Other:  _________ Apply  to affected area two (2) times a day. Quantity:  15 g Refills:  0  
     
   
   
   
  
 folic acid 1 mg tablet Commonly known as:  [employer] Your next dose is: Today, Tomorrow Other:  _________ TAKE 1 TABLET BY MOUTH DAILY Quantity:  90 Tab Refills:  0  
     
   
   
   
  
 * gabapentin 600 mg tablet Commonly known as:  NEURONTIN Your next dose is: Today, Tomorrow Other:  _________ Dose:  600 mg Take 1 Tab by mouth two (2) times a day. Quantity:  60 Tab Refills:  5  
     
   
   
   
  
 * gabapentin 300 mg capsule Commonly known as:  NEURONTIN Your next dose is: Today, Tomorrow Other:  _________ TAKE ONE CAPSULE BY MOUTH FOUR TIMES DAILY Quantity:  120 Cap Refills:  6  
     
   
   
   
  
 glipiZIDE SR 2.5 mg CR tablet Commonly known as:  GLUCOTROL XL Your next dose is: Today, Tomorrow Other:  _________ TAKE 1 TABLET BY MOUTH TWICE DAILY Quantity:  180 Tab Refills:  0 glucose blood VI test strips strip Commonly known as:  Ascensia CONTOUR Your next dose is: Today, Tomorrow Other:  _________ Glucometer for 250.00 Pt. test blood sugar once daily (in morning) Quantity:  50 Strip Refills:  11 HYDROcodone-acetaminophen 5-325 mg per tablet Commonly known as:  Eulogio Ramonita Your next dose is: Today, Tomorrow Other:  _________ Dose:  1 Tab Take 1 Tab by mouth every six (6) hours as needed for Pain. Quantity:  60 Tab Refills:  0  
     
   
   
   
  
 hydroxychloroquine 200 mg tablet Commonly known as:  PLAQUENIL Your next dose is: Today, Tomorrow Other:  _________ TAKE 1 TABLET BY MOUTH TWICE DAILY OR AS DIRECTED Quantity:  60 Tab Refills:  5  
     
   
   
   
  
 metFORMIN 500 mg tablet Commonly known as:  GLUCOPHAGE Your next dose is: Today, Tomorrow Other:  _________ TAKE 1 TABLET BY MOUTH TWICE DAILY WITH MEALS Quantity:  180 Tab Refills:  0  
     
   
   
   
  
 metoprolol succinate 25 mg XL tablet Commonly known as:  TOPROL-XL Your next dose is: Today, Tomorrow Other:  _________ TAKE 1 TABLET BY MOUTH DAILY Quantity:  90 Tab Refills:  0 MULTIVITAMIN PO Your next dose is: Today, Tomorrow Other:  _________ Dose:  1 Tab Take 1 Tab by mouth daily. Refills:  0  
     
   
   
   
  
 nitrofurantoin (macrocrystal-monohydrate) 100 mg capsule Commonly known as:  MACROBID Your next dose is: Today, Tomorrow Other:  _________ Dose:  1 Cap Take 1 Cap by mouth two (2) times daily as needed. Refills:  0  
     
   
   
   
  
 nortriptyline 10 mg capsule Commonly known as:  PAMELOR Your next dose is: Today, Tomorrow Other:  _________ Dose:  10 mg Take 1 Cap by mouth nightly for 30 days. Quantity:  30 Cap Refills:  3  
     
   
   
   
  
 pantoprazole 40 mg tablet Commonly known as:  PROTONIX Your next dose is: Today, Tomorrow Other:  _________ Dose:  40 mg Take 1 Tab by mouth daily. Quantity:  30 Tab Refills:  3 Syringe with Needle, Safety 3 mL 25 gauge x 5/8\" Syrg Commonly known as:  3cc Safety Syringe 25Gx5/8\" Your next dose is: Today, Tomorrow Other:  _________ To be used with B12 injections Quantity:  100 Each Refills:  1 Nova Poplin Your next dose is: Today, Tomorrow Other:  _________ Rolling walker. Patient with spinal stenosis and stopped posture. Pain with limited ambulation. Quantity:  1 Each Refills:  0 ZINC PO Your next dose is: Today, Tomorrow Other:  _________ Dose:  50 mg Take 50 mg by mouth daily. Refills:  0  
     
   
   
   
  
 * Notice: This list has 4 medication(s) that are the same as other medications prescribed for you. Read the directions carefully, and ask your doctor or other care provider to review them with you. Discharge Instructions Melly Montenegro M.D. 
[phone] 
        
2017 Carri Catalan :  1940 [address] [place] [address] Record Number:  [id number] ENDOSCOPY FINDINGS: 
 Your endoscopy showed small size hiatal hernia, evidence of gastric bypass with normal mucosa in the stomach. Small nodule in the jejunum was biopsied. EGD DISCHARGE INSTRUCTIONS DISCOMFORT: 
Sore throat- throat lozenges or warm salt water gargle 
redness at IV site- apply warm compress to area; if redness or soreness persist- contact your physician Gaseous discomfort- walking, belching will help relieve any discomfort You may not operate a vehicle for 12 hours You may not engage in an occupation involving machinery or appliances for rest of today You may not drink alcoholic beverages for at least 12 hours Avoid making any critical decisions for at least 24 hour DIET: 
 You may resume your regular diet. ACTIVITY Spend the remainder of the day resting -  avoid any strenuous activity. Avoid driving or operating machinery. CALL M.D. ANY SIGN OF Increasing pain, nausea, vomiting Abdominal distension (swelling) New increased bleeding (oral or rectal) Fever (chills) Pain in chest area Bloody discharge from nose or mouth Shortness of breath Follow-up Instructions: 
 Call Dr. Enoch Lane for any questions or problems. Telephone # [phone] Biopsies were obtained, the results will be available  in  5 to 7 days. We will call you to notify you of these results. Continue same medications. Follow up in the office. Discharge Orders None Introducing [facility]! Dear Juanita Alfonso: Thank you for requesting a [employer] account. Our records indicate that you have previously registered for a [employer] account but its currently inactive. Please call our [employer] support line at [phone]. Additional Information If you have questions, please visit the Frequently Asked Questions section of the [employer] website at [email or web address] Remember, [employer] is NOT to be used for urgent needs. For medical emergencies, dial 911. Now available from your iPhone and Android! General Information Please provide this summary of care documentation to your next provider. Patient Signature:  ____________________________________________________________ Date:  ____________________________________________________________  
  
Veronica Garcia Provider Signature:  ____________________________________________________________ Date:  ____________________________________________________________

## 2017-02-27 NOTE — PROCEDURES
Rosi Henry M.D.  [phone]           2017                EGD Operative Report  Fito Thomas  :  1940  [facility] Medical Record Number:  [id number]      Indication:  Abdominal pain, RUQ     : Senia Quiles MD    Referring Provider:  Anna Lechuga MD      Anesthesia/Sedation:  MAC anesthesia    Airway assessment: No airway problems anticipated    Pre-Procedural Exam:      Airway: clear, no airway problems anticipated  Heart: RRR, without gallops or rubs  Lungs: clear bilaterally without wheezes, crackles, or rhonchi  Abdomen: soft, nontender, nondistended, bowel sounds present  Mental Status: awake, alert and oriented to person, place and time       Procedure Details     After infomed consent was obtained for the procedure, with all risks and benefits of procedure explained the patient was taken to the endoscopy suite and placed in the left lateral decubitus position. Following sequential administration of sedation as per above, the endoscope was inserted into the mouth and advanced under direct vision to second portion of the duodenum. A careful inspection was made as the gastroscope was withdrawn, including a retroflexed view of the proximal stomach; findings and interventions are described below. Findings:   Esophagus:normal  Stomach: Small size hiatal hernia, evidence of previous gastric bypass surgery. Minimal erythema seen in the gastric pouch. Adequately patent gastro-jejunal anastomosis with one staple seen. Jejunum: Mucosa within normal, one pale nodule about 5 mm in size was seen about 10 cm from the anastomosis. Biopsies were obtained. Therapies:  none    Specimens: Pyloritek and jejunum nodule           Complications:   None; patient tolerated the procedure well. EBL:  None.            Impression:    Small size hiatal hernia seen and S/P gastric bypass surgery                           Small nodule in the proximal jejunum    Recommendations:    -Continue acid suppression.  -Await pathology.  -Await CIPRIANO test result and treat for Helicobacter pylori if positive.  -Follow-up in the office if symptoms are recurrent.     Kaleigh Freedman MD

## 2017-02-27 NOTE — ROUTINE PROCESS
Marielle Hortonl  1940  [id number]    Situation:  Verbal report received from: Spike Hutchinson  Procedure: Procedure(s):  ESOPHAGOGASTRODUODENOSCOPY (EGD)  ESOPHAGOGASTRODUODENAL (EGD) BIOPSY    Background:    Preoperative diagnosis: RIGHT UPPER QUAD PAIN  Postoperative diagnosis: EGD- gastric bypass    :  Dr. Kohler [place]  Assistant(s): Endoscopy Technician-1: Maria C House  Endoscopy RN-1: Phil Tran RN    Specimens:   ID Type Source Tests Collected by Time Destination   1 : Chandan Cody MD 2/27/2017 1513 Pathology     H. Pylori  yes    Assessment:  Intra-procedure medications       Anesthesia gave intra-procedure sedation and medications, see anesthesia flow sheet yes    Intravenous fluids: NS@ KVO     Vital signs stable  yes    Abdominal assessment: round and soft  yes    Recommendation:  Discharge patient per MD order yes.   Return to floor n/a  Family or Friend  yes  Permission to share finding with family or friend yes

## 2017-02-27 NOTE — ANESTHESIA PREPROCEDURE EVALUATION
Anesthetic History   No history of anesthetic complications            Review of Systems / Medical History  Patient summary reviewed    Pulmonary  Within defined limits                 Neuro/Psych   Within defined limits           Cardiovascular              Past MI, CAD and cardiac stents    Exercise tolerance: >4 METS     GI/Hepatic/Renal  Within defined limits              Endo/Other    Diabetes: type 2    Arthritis     Other Findings              Physical Exam    Airway  Mallampati: II  TM Distance: 4 - 6 cm  Neck ROM: normal range of motion   Mouth opening: Normal     Cardiovascular    Rhythm: regular  Rate: normal         Dental    Dentition: Full lower dentures and Full upper dentures     Pulmonary  Breath sounds clear to auscultation               Abdominal         Other Findings            Anesthetic Plan    ASA: 3  Anesthesia type: MAC            Anesthetic plan and risks discussed with: Patient

## 2017-02-27 NOTE — DISCHARGE INSTRUCTIONS
Danette Saint, M.D.  [phone]           2017  Zeny Infante  :  1940  [facility] Medical Record Number:  [id number]        ENDOSCOPY FINDINGS:   Your endoscopy showed small size hiatal hernia, evidence of gastric bypass with normal mucosa in the stomach. Small nodule in the jejunum was biopsied. EGD DISCHARGE INSTRUCTIONS    DISCOMFORT:  Sore throat- throat lozenges or warm salt water gargle  redness at IV site- apply warm compress to area; if redness or soreness persist- contact your physician  Gaseous discomfort- walking, belching will help relieve any discomfort  You may not operate a vehicle for 12 hours  You may not engage in an occupation involving machinery or appliances for rest of today  You may not drink alcoholic beverages for at least 12 hours  Avoid making any critical decisions for at least 24 hour    DIET:   You may resume your regular diet. ACTIVITY  Spend the remainder of the day resting -  avoid any strenuous activity. Avoid driving or operating machinery. CALL M.D. ANY SIGN OF   Increasing pain, nausea, vomiting  Abdominal distension (swelling)  New increased bleeding (oral or rectal)  Fever (chills)  Pain in chest area  Bloody discharge from nose or mouth  Shortness of breath    Follow-up Instructions:   Call Dr. Donnie Fernandez for any questions or problems. Telephone # [phone]  Biopsies were obtained, the results will be available  in  5 to 7 days. We will call you to notify you of these results. Continue same medications. Follow up in the office.

## 2017-02-27 NOTE — H&P
The patient is a 67year old female who presents with a complaint of Abdominal Pain. The patient presents for consultation at the request of Dr. Evelyn Jasmine). The onset of the abdominal pain has been sudden and has been occurring in a persistent pattern for 5 months. The abdominal pain is described as burning .  The patient has been using pantoprazole (Protonix) and PPI is once a day. Current medication use:  compliant with dosing regimen, considered effective by patient and experiencing no side effects. The abdominal pain is characterized as improved. Note for \"Abdominal Pain\": She had two nights of being up with RUQ pain in October and presented to [facility] and underwent CT shiwch showed some fat stranding around duodenal sweep and pancreatic head. She was put on Protonix and got better on it but was on for 30 days and then ran out. She has previously been on Pepcid since her gastric bypass in 2000 in [place]. She saw Dr Jo Dias but was not continued on Protonix at that time, but RUQ pain returned and called back and asked to go back on it in January. At that point, she was referred back to us for further evaluation. She was alternating Aleve and Ibuprofen prn for spinal stenosis and arthritis but she stopped this in October. Denies any melena. She is on iron, calcium, vitamin D, folic acid, V17 injections. She has history of MTX injections but had to stop these due to rash ( Dr Carson Nails). H pylori stool antigen was negative in January. Problem List/Past Medical Judy [place]; 2/16/2017 12:43 PM)  Colonic Polyps   History of colon polyps (V12.72  Z86.010)   Abdominal pain, epigastric (789.06  R10.13)   Bronchitis   Hiatal Hernia   Diabetes Mellitus, Type II   Arthritis   Heart KGLTBM 9505    Past Surgical History Judy [place]; 2/16/2017 12:43 PM)  Hernia Surgery  repair mesh  Polypectomy   Laminectomy   Stent Placement  heart  Shoulder [id number] 8891 Right.   Gastric QAAIPV 1169  Basal-cell removed 2001 nose  Skin Graft 2001 nose  Knee Surgery 2009 Left, Right. replacement  Female Reconstruction 2010    Allergies Pearlene Re; 2/16/2017 12:43 PM)  Oleg Kalyn    Medication History Pearlene Re; 2/16/2017 12:43 PM)  Alyce Loan (500MG Capsule, Oral two times daily) Active. Pantoprazole Sodium  (40MG Tablet DR, Oral daily) Active. Folic Acid  (1MG Tablet, Oral daily) Active. Vitamin E  (1000UNIT Capsule, 2 Oral daily) Active. BuSpar  (5MG Tablet, Oral as needed) Active. GlipiZIDE XL  (2.5MG Tablet ER 24HR, Oral) Active. Glucophage  (500MG Tablet, Oral) Active. Aspirin Childrens  (81MG Tablet Chewable, Oral daily) Active. Lipitor  (10MG Tablet, Oral at bedtime) Active. Calcium-Vitamin D  (Oral daily) Specific dose unknown - Active. Cymbalta  (30MG Capsule DR Part, Oral daily) Active. Multivitamins  (Oral daily) Active. Vitamin D3  (2000UNIT Tablet Chewable, Oral daily) Active. Metoprolol Succinate ER  (25MG Tablet ER 24HR, Oral at bedtime) Active. Zinc Gluconate  (50MG Tablet, Oral daily) Active. Medications Reconciled     Family History Pearlene Re; 2/16/2017 12:43 PM)  Heart attack (410.90  I21.3)  Father. Heart Disease  Mother. Hypertension  Father, Mother. Lupus (710.0  M32.9)  Daughter. Fibromyalgia (729.1  M79.7)  Daughter. Social History Pearlene Re; 2/16/2017 12:43 PM)  Blood Transfusion  No.  Employment status  Retired. Marital status  . Tobacco Use  Never smoker. Never drank Alcohol     Diagnostic Studies History Pearlene Re; 2/16/2017 12:43 PM)  Colonoscopy     Health Maintenance History Pearlene Re; 2/16/2017 12:43 PM)  Pneumovax 10/2013  Flu Vaccine 08/2016    Review of Systems [facility] Records JENNIFER Jones Chol; 2/16/2017 12:43 PM)  General Present- Fatigue, Poor Appetite and Weight Loss. Not Present- Chronic Fatigue and Weight Gain. Skin Not Present- Itching, Rash and Skin Color Changes. HEENT Not Present- Hearing Loss and Vertigo.   Respiratory Not Present- Difficulty Breathing and TB exposure. Cardiovascular Not Present- Chest Pain, Use of Antibiotics before Dental Procedures and Use of Blood Thinners. Gastrointestinal Present- Abdominal Pain, Nausea and Vomiting. Not Present- Black, Tarry Stool, Bloody Stool, Cirrhosis, Colon Cancer, Constipation, Crohns disease, Diarrhea, Difficulty Swallowing, Dysphagia, Esophageal Cancer, Gallbladder Disease, Heartburn, Hepatitis, Hiatal Hernia, History of Previous Colonoscopy, History of Previous Endoscopy, History of Previous GI X-rays, Indigestion, Jaundice, Polyps, Rectal Bleeding, Stomach Cancer, Ulcer and Ulcerative Colitis. Female Genitourinary Present- Urinary Tract Infection. Musculoskeletal Present- Arthritis. Not Present- Hip Replacement Surgery and Knee Replacement Surgery. Neurological Not Present- Weakness. Psychiatric Not Present- Depression. Endocrine Present- Diabetes (II). Not Present- Thyroid Problems. Hematology Present- Bruising. Not Present- Anemia. Vitals Estela Gonzalez; 2/16/2017 12:42 PM)  2/16/2017 12:38 PM  Weight: 212 lb   Height: 61 in    Height was reported by patient. Body Surface Area: 2.03 m²   Body Mass Index: 40.06 kg/m²  Temp.: 98.4° F     BP: 130/64 (Sitting, Left Arm, Standard)        Physical Exam [facility] BYRON Orta Kaylyn FNP; 2/16/2017 2:47 PM)  General  Mental Status - Alert. General Appearance - Cooperative, Pleasant, Not in acute distress. Build & Nutrition - Well nourished and Well developed. Gait - Use of assistive device (walker). Voice - Normal.    Eye  Eyeball - Right - No Exophthalmos. Sclera/Conjunctiva - Right - No Jaundice. Chest and Lung Exam  Chest and lung exam reveals  - normal excursion with symmetric chest walls, quiet, even and easy respiratory effort with no use of accessory muscles and on auscultation, normal breath sounds, no adventitious sounds and normal vocal resonance.     Cardiovascular  Cardiovascular examination reveals  - no digital clubbing, cyanosis, edema, increased warmth or tenderness. Auscultation  Heart Sounds - S1 WNL and S2 WNL. Murmurs & Other Heart Sounds - Auscultation of the heart reveals - No Murmurs. Abdomen  Inspection  Inspection of the abdomen reveals - Non-distended. Incisional scars - No incisional scars. Palpation/Percussion  Tenderness - Non-Tender. Rebound tenderness - No rebound. Liver - Normal size palpable at right costal margin. Spleen - Other Characteristics - Non Palpable. Abdominal Mass Palpable - No masses. Other Characteristics - No Ascites. Organomegally - None. Auscultation  Auscultation of the abdomen reveals - Bowel sounds normal.        Assessment & Plan [facility] BYRON Brandt  FNP; 2/16/2017 2:47 PM)  Right upper quadrant pain (789.01  R10.11)  Impression: History of Gastric bypass. Concern for irritation/ulceration at anastomosis given NSAID use prior to pain starting. Proceed with endoscopy to evaluate further. Continue Protonix for now. Current Plans    Endoscopy (62149) (Discussed risks and benefits with the patient to include: perforation, post polypectomy, or post biopsy bleeding, missed lesions, and sedation reactions.)  Pt Education - How to access health information online: discussed with patient and provided information. Patient is to call me for any questions or concerns. Discussed no NSAIDs  Type 2 diabetes mellitus (250.00  E11.9)  Impression: Instructed to hold diabetic medications the morning of the procedure. History of gastric bypass (V45.86  Z98.890)  Impression: done in 1999 or 2000 in [place].

## 2017-02-28 RX ORDER — NORTRIPTYLINE HYDROCHLORIDE 10 MG/1
CAPSULE ORAL
Qty: 30 CAP | Refills: 0 | Status: SHIPPED | OUTPATIENT
Start: 2017-02-28 | End: 2018-06-14 | Stop reason: SDUPTHER

## 2017-03-02 ENCOUNTER — OFFICE VISIT (OUTPATIENT)
Dept: RHEUMATOLOGY | Age: 77
End: 2017-03-02

## 2017-03-02 VITALS
BODY MASS INDEX: 39.05 KG/M2 | TEMPERATURE: 96.5 F | HEART RATE: 57 BPM | DIASTOLIC BLOOD PRESSURE: 56 MMHG | HEIGHT: 62 IN | RESPIRATION RATE: 16 BRPM | SYSTOLIC BLOOD PRESSURE: 122 MMHG | WEIGHT: 212.2 LBS

## 2017-03-02 DIAGNOSIS — Z79.60 LONG-TERM USE OF IMMUNOSUPPRESSANT MEDICATION: ICD-10-CM

## 2017-03-02 DIAGNOSIS — M32.9 LUPUS (HCC): Primary | ICD-10-CM

## 2017-03-02 DIAGNOSIS — M81.0 OSTEOPOROSIS, POSTMENOPAUSAL: ICD-10-CM

## 2017-03-02 DIAGNOSIS — M79.604 RIGHT LEG PAIN: ICD-10-CM

## 2017-03-02 RX ORDER — PREDNISONE 5 MG/1
TABLET ORAL
Qty: 30 TAB | Refills: 1 | Status: SHIPPED | OUTPATIENT
Start: 2017-03-02 | End: 2017-06-29 | Stop reason: ALTCHOICE

## 2017-03-02 RX ORDER — BUSPIRONE HYDROCHLORIDE 5 MG/1
5 TABLET ORAL 2 TIMES DAILY
Refills: 5 | COMMUNITY
Start: 2017-01-28 | End: 2018-11-20

## 2017-03-02 NOTE — MR AVS SNAPSHOT
Visit Information Date & Time Provider Department Dept. Phone Encounter #  
 3/2/2017 11:00 AM Ainsley Harrington MD [facility] [id number] Follow-up Instructions Return in about 10 weeks (around 5/11/2017). Your Appointments 7/14/2017 10:30 AM  
ROUTINE CARE with Miguelangel Araya MD  
[address] [facility]) Appt Note: 6 Month check [facility] 13 [address]  
  
   
 [facility] [address] [place] [address]  
  
    
 8/21/2017  1:40 PM  
Follow Up with Talon Simon MD  
[facility]) Appt Note: follow  up neuropathy  $0CP  alicia  2/20/17 [place] [address] [facility] [address] [facility] [id number] [phone]  
  
   
 [place] [address] [id number] [address] Upcoming Health Maintenance Date Due  
 EYE EXAM RETINAL OR DILATED Q1 10/19/2016 FOOT EXAM Q1 5/31/2017 HEMOGLOBIN A1C Q6M 7/6/2017 MICROALBUMIN Q1 7/15/2017 MEDICARE YEARLY EXAM 7/16/2017 GLAUCOMA SCREENING Q2Y 10/19/2017 LIPID PANEL Q1 1/6/2018 DTaP/Tdap/Td series (2 - Td) 12/5/2023 Allergies as of 3/2/2017  Review Complete On: 3/2/2017 By: Ainsley Harrington MD  
  
 Severity Noted Reaction Type Reactions Cephalosporins High 12/30/2009    Nausea and Vomiting Severe vomiting Current Immunizations  Reviewed on 10/18/2016 Name Date Influenza Vaccine 12/5/2013 Influenza Vaccine Laura Haley) 9/6/2016 Influenza Vaccine (Quad) PF 11/10/2014 Influenza Vaccine PF 10/20/2015 Influenza Vaccine Split 10/10/2012  4:30 PM, 11/14/2011, 10/25/2010 Pneumococcal Polysaccharide (PPSV-23) 12/5/2013 Pneumococcal Vaccine (Unspecified Type) 6/5/2008 Tdap 12/5/2013 Not reviewed this visit You Were Diagnosed With   
  
 Codes Comments Lupus ([facility] [address]    -  Primary ICD-10-CM: M32.9 ICD-9-CM: 710.0 Osteoporosis, postmenopausal     ICD-10-CM: M81.0 ICD-9-CM: 733.01 Long-term use of immunosuppressant medication     ICD-10-CM: Z79.899 ICD-9-CM: V58.69 Right leg pain     ICD-10-CM: M79.604 ICD-9-CM: 729.5 Vitals BP  
  
  
  
  
  
 122/56 (BP 1 Location: Left arm, BP Patient Position: Sitting) Vitals History BMI and BSA Data Body Mass Index Body Surface Area  
 38.81 kg/m 2 2.05 m 2 Preferred Pharmacy Pharmacy Name Phone 310 [facility] Avinash [facility] [address] [facility] OF [address] [place] [address] ([address] [phone] Your Updated Medication List  
  
   
This list is accurate as of: 3/2/17 11:42 AM.  Always use your most recent med list.  
  
  
  
  
 acetaminophen 650 mg CR tablet Commonly known as:  TYLENOL Take 1,300 mg by mouth daily as needed for Pain. aspirin 81 mg tablet Take 81 mg by mouth daily. atorvastatin 10 mg tablet Commonly known as:  LIPITOR  
TAKE 1 TABLET BY MOUTH EVERY DAY  
  
 B.infantis-B.ani-B.long-B.bifi 10-15 mg Tbec Take 1 Tab by mouth daily. BD TUBERCULIN SYRINGE 1 mL 25 gauge x 5/8\" Syrg Generic drug:  Syringe with Needle (Disp) USE ONCE A WEEK WITH METHOTREXATE Blood-Glucose Meter monitoring kit Commonly known as:  CONTOUR METER Dx: 250. Check blood sugars daily. busPIRone 5 mg tablet Commonly known as:  BUSPAR Take 5 mg by mouth two (2) times a day. CALCIUM 500+D 500 mg(1,250mg) -200 unit per tablet Generic drug:  calcium-vitamin D Take 3 Tabs by mouth daily. cholecalciferol 1,000 unit tablet Commonly known as:  VITAMIN D3 Take 2,000 Units by mouth daily. cyanocobalamin 1,000 mcg/mL injection Commonly known as:  VITAMIN B-12 Start: 1000 mcg SC/IM qd x1wk, then qwk x1mo, then every month DULoxetine 30 mg capsule Commonly known as:  CYMBALTA TAKE 1 CAPSULE BY MOUTH EVERY DAY  
  
 ESTRACE 0.01 % (0.1 mg/gram) vaginal cream  
Generic drug:  estradiol Take 42.5 mg by mouth once over twenty-four (24) hours. * ferrous sulfate 325 mg (65 mg iron) tablet TAKE 1 TABLET BY MOUTH TWICE DAILY * ferrous sulfate 325 mg (65 mg iron) tablet TAKE 1 TABLET BY MOUTH TWICE DAILY  
  
 fluocinoNIDE 0.05 % topical cream  
Commonly known as:  LIDEX Apply  to affected area two (2) times a day. folic acid 1 mg tablet Commonly known as:  FOLVITE  
TAKE 1 TABLET BY MOUTH DAILY * gabapentin 600 mg tablet Commonly known as:  NEURONTIN Take 1 Tab by mouth two (2) times a day. * gabapentin 300 mg capsule Commonly known as:  NEURONTIN  
TAKE ONE CAPSULE BY MOUTH FOUR TIMES DAILY  
  
 glipiZIDE SR 2.5 mg CR tablet Commonly known as:  GLUCOTROL XL  
TAKE 1 TABLET BY MOUTH TWICE DAILY  
  
 glucose blood VI test strips strip Commonly known as:  Swopboardia CONTOUR Glucometer for 250.00 Pt. test blood sugar once daily (in morning) HYDROcodone-acetaminophen 5-325 mg per tablet Commonly known as:  Paula Barrs Take 1 Tab by mouth every six (6) hours as needed for Pain.  
  
 hydroxychloroquine 200 mg tablet Commonly known as:  PLAQUENIL  
TAKE 1 TABLET BY MOUTH TWICE DAILY OR AS DIRECTED  
  
 metFORMIN 500 mg tablet Commonly known as:  GLUCOPHAGE  
TAKE 1 TABLET BY MOUTH TWICE DAILY WITH MEALS  
  
 metoprolol succinate 25 mg XL tablet Commonly known as:  TOPROL-XL  
TAKE 1 TABLET BY MOUTH DAILY MULTIVITAMIN PO Take 1 Tab by mouth daily. nitrofurantoin (macrocrystal-monohydrate) 100 mg capsule Commonly known as:  MACROBID Take 1 Cap by mouth two (2) times daily as needed. * nortriptyline 10 mg capsule Commonly known as:  PAMELOR Take 1 Cap by mouth nightly for 30 days. * nortriptyline 10 mg capsule Commonly known as:  PAMELOR  
TAKE 1 CAPSULE BY MOUTH EVERY NIGHT AT BEDTIME  
  
 pantoprazole 40 mg tablet Commonly known as:  PROTONIX Take 1 Tab by mouth daily. predniSONE 5 mg tablet Commonly known as:  DELTASONE  
10 mg once daily. Taper by 2.5 mg every 3 days Syringe with Needle, Safety 3 mL 25 gauge x 5/8\" Syrg Commonly known as:  3cc Safety Syringe 25Gx5/8\" To be used with B12 injections Lauri Joaquinseng jones. Patient with spinal stenosis and stopped posture. Pain with limited ambulation. ZINC PO Take 50 mg by mouth daily. * Notice: This list has 6 medication(s) that are the same as other medications prescribed for you. Read the directions carefully, and ask your doctor or other care provider to review them with you. Prescriptions Sent to Pharmacy Refills  
 predniSONE (DELTASONE) 5 mg tablet 1 Sig: 10 mg once daily. Taper by 2.5 mg every 3 days Class: Normal  
 Pharmacy: [facility] [address] #: [phone] We Performed the Following REFERRAL TO PHYSICAL THERAPY [SJE Custom] Comments:  
 Right low back and leg pain. Gentle topical analgesia over affected area(s). Gentle passive range of motion maneuvers as tolerated. Advance to active range of motion maneuvers as tolerated. Provide patient with a home education program.  
  
Follow-up Instructions Return in about 10 weeks (around 5/11/2017). Referral Information Referral ID Referred By Referred To  
  
 [id number] Yenni Vital Not Available Visits Status Start Date End Date 12 New Request 3/2/17 3/2/18 If your referral has a status of pending review or denied, additional information will be sent to support the outcome of this decision. Patient Instructions Prednisone taper Call if flare (or not improving) Please forward recent labs PT for back/ legs Introducing [facility]! Dear Lisa Mireles: Thank you for requesting a [employer] account. Our records indicate that you have previously registered for a [employer] account but its currently inactive. Please call our [employer] support line at [phone]. Additional Information If you have questions, please visit the Frequently Asked Questions section of the [employer] website at [email or web address] Remember, [employer] is NOT to be used for urgent needs. For medical emergencies, dial 911. Now available from your iPhone and Android! Please provide this summary of care documentation to your next provider. Your primary care clinician is listed as [address]. If you have any questions after today's visit, please call [phone].

## 2017-03-02 NOTE — PATIENT INSTRUCTIONS
Prednisone taper    Call if flare (or not improving)    Please forward recent labs    PT for back/ legs

## 2017-03-02 NOTE — PROGRESS NOTES
HISTORY OF PRESENT ILLNESS  Belinda Seo is a 67 y.o. female. HPI Patient presents for follow up of lupus. Since last visit, she had to go to the ER for stomach pain and \"dry heaves. \" She had an EGD on Monday and was noted to have some gastritis and a small bowel nodule. Symptoms are improved with Protonix. She has pain in the hands and the right leg. She notes swelling of her hands. She has noted an intermittent malar rash. She has a dry mouth: biotene spray gives some relief. Raynaud's symptoms are noted frequently. She has been taking Cymbalta 30 mg daily. She is taking gabapentin four times daily with some relief of neuropathy. She has continued Plaquenil, 200 mg AM and 100 mg PM. She is taking calcium and D supplements each day. She had her first Prolia on 12-28 and tolerated this. Current Outpatient Prescriptions   Medication Sig Dispense Refill    busPIRone (BUSPAR) 5 mg tablet Take 5 mg by mouth two (2) times a day. 5    ferrous sulfate 325 mg (65 mg iron) tablet TAKE 1 TABLET BY MOUTH TWICE DAILY 60 Tab 5    nortriptyline (PAMELOR) 10 mg capsule TAKE 1 CAPSULE BY MOUTH EVERY NIGHT AT BEDTIME 30 Cap 0    gabapentin (NEURONTIN) 300 mg capsule TAKE ONE CAPSULE BY MOUTH FOUR TIMES DAILY 120 Cap 6    nortriptyline (PAMELOR) 10 mg capsule Take 1 Cap by mouth nightly for 30 days.  30 Cap 3    glucose blood VI test strips (ASCENSIA CONTOUR) strip Glucometer for 250.00 Pt. test blood sugar once daily (in morning) 50 Strip 11    atorvastatin (LIPITOR) 10 mg tablet TAKE 1 TABLET BY MOUTH EVERY DAY 90 Tab 0    metFORMIN (GLUCOPHAGE) 500 mg tablet TAKE 1 TABLET BY MOUTH TWICE DAILY WITH MEALS 180 Tab 0    glipiZIDE SR (GLUCOTROL XL) 2.5 mg CR tablet TAKE 1 TABLET BY MOUTH TWICE DAILY 180 Tab 0    DULoxetine (CYMBALTA) 30 mg capsule TAKE 1 CAPSULE BY MOUTH EVERY DAY 30 Cap 3    metoprolol succinate (TOPROL-XL) 25 mg XL tablet TAKE 1 TABLET BY MOUTH DAILY 90 Tab 0    folic acid (FOLVITE) 1 mg tablet TAKE 1 TABLET BY MOUTH DAILY 90 Tab 0    gabapentin (NEURONTIN) 600 mg tablet Take 1 Tab by mouth two (2) times a day. 60 Tab 5    pantoprazole (PROTONIX) 40 mg tablet Take 1 Tab by mouth daily. 30 Tab 3    HYDROcodone-acetaminophen (NORCO) 5-325 mg per tablet Take 1 Tab by mouth every six (6) hours as needed for Pain. 60 Tab 0    ESTRACE 0.01 % (0.1 mg/gram) vaginal cream Take 42.5 mg by mouth once over twenty-four (24) hours. 1    nitrofurantoin, macrocrystal-monohydrate, (MACROBID) 100 mg capsule Take 1 Cap by mouth two (2) times daily as needed. 0    hydroxychloroquine (PLAQUENIL) 200 mg tablet TAKE 1 TABLET BY MOUTH TWICE DAILY OR AS DIRECTED 60 Tab 5    ferrous sulfate 325 mg (65 mg iron) tablet TAKE 1 TABLET BY MOUTH TWICE DAILY 60 Tab 0    B.infantis-B.ani-B.long-B.bifi 10-15 mg TbEC Take 1 Tab by mouth daily.  acetaminophen (TYLENOL) 650 mg CR tablet Take 1,300 mg by mouth daily as needed for Pain.  BD TUBERCULIN SYRINGE 1 mL 25 x 5/8\" syrg USE ONCE A WEEK WITH METHOTREXATE 13 Syringe 3    cyanocobalamin (VITAMIN B-12) 1,000 mcg/mL injection Start: 1000 mcg SC/IM qd x1wk, then qwk x1mo, then every month 1 Vial 3    Syringe with Needle, Safety (3CC SAFETY SYRINGE 25GX5/8\") 3 mL 25 x 5/8\" syrg To be used with B12 injections 100 Each 1    Blood-Glucose Meter (CONTOUR METER) monitoring kit Dx: 250. Check blood sugars daily. 1 kit 11    fluocinoNIDE (LIDEX) 0.05 % topical cream Apply  to affected area two (2) times a day. 15 g 0    Walker [employer] walker. Patient with spinal stenosis and stopped posture. Pain with limited ambulation. 1 Each 0    ZINC PO Take 50 mg by mouth daily.  cholecalciferol, vitamin d3, (VITAMIN D3) 1,000 unit tablet Take 2,000 Units by mouth daily.  calcium-vitamin D (CALCIUM 500+D) 500 mg(1,250mg) -200 unit per tablet Take 3 Tabs by mouth daily.  aspirin 81 mg Tab Take 81 mg by mouth daily.       MULTIVITAMINS (MULTIVITAMIN PO) Take 1 Tab by mouth daily. Allergies   Allergen Reactions    Cephalosporins Nausea and Vomiting     Severe vomiting         Review of Systems   Constitutional: Negative for fever. Eyes: Negative for blurred vision. Cardiovascular: Positive for leg swelling. Gastrointestinal: Negative for abdominal pain. Endo/Heme/Allergies: Bruises/bleeds easily. Physical Exam   Vitals reviewed. GENERAL: well developed, well nourished, in no apparent distress    O/P: upper dentures; clear; no areas exposed jaw bone; dry mucosa; no mandibular teeth  EYES: conjunctiva without injection    NECK: neck supple; moderately reduced right lateral rotation  RESPIRATORY: lungs clear with BS=B/L. Good air movement  CARDIOVASCULAR: no pedal edema; RR; no cyanosis of fingers    GASTROINTESTINAL: nontender; no organomegaly    MUSCULOSKELETAL: digits/nails: Heberden's nodes (at DIPs); Squaring of each first Aia 16 (R>L); decreased ROM with bilateral knee flexion (90 degrees). Trace synovitis right 2-3 mcps and pips; tenderness right 2nd DIP and 5th PIP; left 2-3 DIPS. LS flexion near full. Neutral and painful extension. Tenderness R>L sacral paraspinals. Tenderness right ITB. Tenderness over lateral right upper leg with flexion and IR of hip.  Gait, antalgic and slightly stooped    DERM: no malar rash;    PSYCHIATRIC: mental status: alert and oriented x 3;   Lab Results   Component Value Date/Time    WBC 5.9 01/06/2017 11:09 AM    WBC 7.8 06/25/2012 10:44 AM    HGB 12.5 01/06/2017 11:09 AM    HCT 38.9 01/06/2017 11:09 AM    PLATELET 061 92/61/8606 11:09 AM    MCV 98 01/06/2017 11:09 AM     Lab Results   Component Value Date/Time    Sodium 142 01/06/2017 11:09 AM    Potassium 4.8 01/06/2017 11:09 AM    Chloride 103 01/06/2017 11:09 AM    CO2 24 01/06/2017 11:09 AM    Anion gap 9 08/03/2010 12:10 PM    Glucose 79 01/06/2017 11:09 AM    BUN 19 01/06/2017 11:09 AM    Creatinine 0.90 01/06/2017 11:09 AM    BUN/Creatinine ratio 21 01/06/2017 11:09 AM GFR est AA 71 01/06/2017 11:09 AM    GFR est non-AA 62 01/06/2017 11:09 AM    Calcium 8.7 01/06/2017 11:09 AM    Bilirubin, total 0.2 01/06/2017 11:09 AM    AST (SGOT) 28 01/06/2017 11:09 AM    Alk. phosphatase 64 01/06/2017 11:09 AM    Protein, total 6.6 01/06/2017 11:09 AM    Albumin 4.1 01/06/2017 11:09 AM    Globulin 2.6 08/03/2010 12:10 PM    A-G Ratio 1.6 01/06/2017 11:09 AM    ALT (SGPT) 16 01/06/2017 11:09 AM     Lab Results   Component Value Date/Time    Sed rate (ESR) 6 11/29/2016 09:19 AM   ASSESSMENT and PLAN    ICD-10-CM ICD-9-CM    1. Lupus ([facility] [address]): DEXTER low titer (malar rash, inflammatory arthritis, and Raynaud's syndrome). RF and CCP are negative. She has had relief with Plaquenil 200 mg AM and 100 mg PM. She had been taking methotrexate 12.5 mg subcutaneous weekly (lowered to 7.5 mg weekly after elevated liver enzymes). She has been off methotrexate since May last year. Mild flare at the hands today. M32.9 710.0 -obtain recent ER labs for review  -Plaquenil 200 mg AM and 100 mg PM  -prednisone 10 mg once daily. Taper by 2.5 mg every 3 days (short term potential adverse effects reviewed). -call if flare off prednisone or persistent symptoms   2. Osteoporosis, postmenopausal: reclast #4 3/2015, DXA 2/16 T-score 1/3 forearm -3.6, with normal hip (though decline from previous). History of compression fractures X2. She is taking calcium and D.  Recent vitamin D 50 with PTH high normal. She tolerated Prolia 60 mg #1 at the end of December. M81.0 733.01 -calcium 1200 mg-1500 mg daily total  -vitamin D3 2000 units daily  -next Prolia injection end of June-early July   3. Long-term use of immunosuppressant medication Z79.899 V58.69 Plaquenil eye monitoring   4. Right leg pain: chronic low back concerns likely contributing with some right ITB related pain as well.  M79.604 729.5 REFERRAL TO PHYSICAL THERAPY-call if not improving

## 2017-03-14 ENCOUNTER — OP HISTORICAL/CONVERTED ENCOUNTER (OUTPATIENT)
Dept: OTHER | Age: 77
End: 2017-03-14

## 2017-03-22 DIAGNOSIS — E53.8 B12 DEFICIENCY: ICD-10-CM

## 2017-03-23 RX ORDER — CYANOCOBALAMIN 1000 UG/ML
INJECTION, SOLUTION INTRAMUSCULAR; SUBCUTANEOUS
Qty: 1 VIAL | Refills: 3
Start: 2017-03-23 | End: 2017-04-03 | Stop reason: SDUPTHER

## 2017-04-03 DIAGNOSIS — E53.8 B12 DEFICIENCY: ICD-10-CM

## 2017-04-03 RX ORDER — CYANOCOBALAMIN 1000 UG/ML
INJECTION, SOLUTION INTRAMUSCULAR; SUBCUTANEOUS
Qty: 1 VIAL | Refills: 3 | Status: SHIPPED | OUTPATIENT
Start: 2017-04-03 | End: 2018-02-07 | Stop reason: SDUPTHER

## 2017-04-04 ENCOUNTER — OP HISTORICAL/CONVERTED ENCOUNTER (OUTPATIENT)
Dept: OTHER | Age: 77
End: 2017-04-04

## 2017-04-12 RX ORDER — PANTOPRAZOLE SODIUM 40 MG/1
TABLET, DELAYED RELEASE ORAL
Qty: 30 TAB | Refills: 0 | Status: SHIPPED | OUTPATIENT
Start: 2017-04-12 | End: 2017-05-11 | Stop reason: SDUPTHER

## 2017-04-25 RX ORDER — METFORMIN HYDROCHLORIDE 500 MG/1
TABLET ORAL
Qty: 180 TAB | Refills: 0 | Status: SHIPPED | OUTPATIENT
Start: 2017-04-25 | End: 2017-07-23 | Stop reason: SDUPTHER

## 2017-04-26 DIAGNOSIS — I10 ESSENTIAL HYPERTENSION WITH GOAL BLOOD PRESSURE LESS THAN 130/80: ICD-10-CM

## 2017-04-26 RX ORDER — FOLIC ACID 1 MG/1
TABLET ORAL
Qty: 90 TAB | Refills: 0 | Status: SHIPPED | OUTPATIENT
Start: 2017-04-26 | End: 2017-06-29 | Stop reason: SDUPTHER

## 2017-04-26 RX ORDER — METOPROLOL SUCCINATE 25 MG/1
TABLET, EXTENDED RELEASE ORAL
Qty: 90 TAB | Refills: 0 | Status: SHIPPED | OUTPATIENT
Start: 2017-04-26 | End: 2017-06-29 | Stop reason: SDUPTHER

## 2017-04-27 RX ORDER — GLIPIZIDE 2.5 MG/1
TABLET, EXTENDED RELEASE ORAL
Qty: 180 TAB | Refills: 0 | Status: SHIPPED | OUTPATIENT
Start: 2017-04-27 | End: 2017-07-29 | Stop reason: SDUPTHER

## 2017-05-11 RX ORDER — ATORVASTATIN CALCIUM 10 MG/1
TABLET, FILM COATED ORAL
Qty: 90 TAB | Refills: 0 | Status: SHIPPED | OUTPATIENT
Start: 2017-05-11 | End: 2017-08-10 | Stop reason: SDUPTHER

## 2017-05-11 RX ORDER — PANTOPRAZOLE SODIUM 40 MG/1
TABLET, DELAYED RELEASE ORAL
Qty: 30 TAB | Refills: 0 | Status: SHIPPED | OUTPATIENT
Start: 2017-05-11 | End: 2017-06-15 | Stop reason: SDUPTHER

## 2017-06-14 ENCOUNTER — TELEPHONE (OUTPATIENT)
Dept: RHEUMATOLOGY | Age: 77
End: 2017-06-14

## 2017-06-14 NOTE — LETTER
6/14/2017 9:35 AM 
 
Ms. Pieter Lagunas [address] [place] [address] Dear Pieter Gallegoses Enclosed you will find a copy of your Prolia insurance investigation. Please review this form. To schedule your Prolia injection after June 29,2017, call our office at [phone]. Please ask the  to have the nurse order the Prolia for your injection. Thank you. Sincerely, Trey Bone MD

## 2017-06-15 RX ORDER — GABAPENTIN 600 MG/1
TABLET ORAL
Qty: 60 TAB | Refills: 0 | Status: SHIPPED | OUTPATIENT
Start: 2017-06-15 | End: 2017-07-14 | Stop reason: SDUPTHER

## 2017-06-19 ENCOUNTER — TELEPHONE (OUTPATIENT)
Dept: RHEUMATOLOGY | Age: 77
End: 2017-06-19

## 2017-06-19 NOTE — TELEPHONE ENCOUNTER
Patient would like a return call she has an appt on June 29th with dr Yasir Colon and to get a prolia injection she has already spoken to nelida but then said the letter states should be after 6/29 would like this clarified [phone]

## 2017-06-29 ENCOUNTER — OFFICE VISIT (OUTPATIENT)
Dept: RHEUMATOLOGY | Age: 77
End: 2017-06-29

## 2017-06-29 VITALS
BODY MASS INDEX: 39.01 KG/M2 | TEMPERATURE: 98.6 F | RESPIRATION RATE: 18 BRPM | SYSTOLIC BLOOD PRESSURE: 128 MMHG | HEIGHT: 62 IN | HEART RATE: 74 BPM | DIASTOLIC BLOOD PRESSURE: 64 MMHG | WEIGHT: 212 LBS | OXYGEN SATURATION: 96 %

## 2017-06-29 DIAGNOSIS — M81.0 OSTEOPOROSIS, POSTMENOPAUSAL: ICD-10-CM

## 2017-06-29 DIAGNOSIS — Z79.899 LONG-TERM USE OF PLAQUENIL: ICD-10-CM

## 2017-06-29 DIAGNOSIS — M32.9 SYSTEMIC LUPUS ERYTHEMATOSUS, UNSPECIFIED SLE TYPE, UNSPECIFIED ORGAN INVOLVEMENT STATUS (HCC): Primary | ICD-10-CM

## 2017-06-29 RX ORDER — PREDNISONE 5 MG/1
TABLET ORAL
Qty: 30 TAB | Refills: 0 | Status: SHIPPED | OUTPATIENT
Start: 2017-06-29 | End: 2018-02-07 | Stop reason: ALTCHOICE

## 2017-06-29 NOTE — MR AVS SNAPSHOT
Visit Information Date & Time Provider Department Dept. Phone Encounter #  
 6/29/2017 10:30 AM Bobo Riley MD [facility] [id number] Follow-up Instructions Return in about 3 months (around 9/29/2017). Your Appointments 7/26/2017 10:30 AM  
Medicare Physical with Leeann Angel MD  
[address] [place] [address]) Appt Note: 6 Month check; Medicare wellness; check up; 6 month check up and is due for medicare wellness visit as well [employer] [address] [place] [email or web address] [address]  
  
   
 [address] [place] [address]  
  
    
 8/21/2017  1:40 PM  
Follow Up with Kendal Watts MD  
[facility]) Appt Note: follow  up neuropathy  $0CP  alicia  2/20/17 [place] [address] [facility] [address] [phone]  
  
   
 [place] [address] [facility] [phone] [id number] [address] Upcoming Health Maintenance Date Due  
 EYE EXAM RETINAL OR DILATED Q1 10/19/2016 FOOT EXAM Q1 5/31/2017 HEMOGLOBIN A1C Q6M 7/6/2017 MICROALBUMIN Q1 7/15/2017 MEDICARE YEARLY EXAM 7/16/2017 INFLUENZA AGE 9 TO ADULT 8/1/2017 GLAUCOMA SCREENING Q2Y 10/19/2017 LIPID PANEL Q1 1/6/2018 DTaP/Tdap/Td series (2 - Td) 12/5/2023 Allergies as of 6/29/2017  Review Complete On: 6/29/2017 By: Bobo Riley MD  
  
 Severity Noted Reaction Type Reactions Cephalosporins High 12/30/2009    Nausea and Vomiting Severe vomiting Current Immunizations  Reviewed on 6/29/2017 Name Date Influenza Vaccine 12/5/2013 Influenza Vaccine Darius Bachelor) 9/6/2016 Influenza Vaccine (Quad) PF 11/10/2014 Influenza Vaccine PF 10/20/2015 Influenza Vaccine Split 10/10/2012  4:30 PM, 11/14/2011, 10/25/2010 Pneumococcal Polysaccharide (PPSV-23) 12/5/2013 Pneumococcal Vaccine (Unspecified Type) 6/5/2008 Tdap 12/5/2013 Reviewed by Espinoza Weiss LPN on 0/06/8143 at 48:58 AM  
 Reviewed by Espinoza Weiss LPN on 0/74/2250 at 49:21 AM  
You Were Diagnosed With   
  
 Codes Comments Systemic lupus erythematosus, unspecified SLE type, unspecified organ involvement status    -  Primary ICD-10-CM: M32.9 ICD-9-CM: 710.0 Osteoporosis, postmenopausal     ICD-10-CM: M81.0 ICD-9-CM: 733.01 Long-term use of Plaquenil     ICD-10-CM: Z79.899 ICD-9-CM: V58.69 Vitals BP Pulse Temp Resp Height(growth percentile) Weight(growth percentile) 128/64 74 98.6 °F (37 °C) (Oral) 18 5' 2\" (1.575 m) 212 lb (96.2 kg) SpO2 BMI OB Status Smoking Status 96% 38.78 kg/m2 Postmenopausal Former Smoker Vitals History BMI and BSA Data Body Mass Index Body Surface Area 38.78 kg/m 2 2.05 m 2 Preferred Pharmacy Pharmacy Name Phone 310 [facility], [facility] [address] ([phone] Your Updated Medication List  
  
   
This list is accurate as of: 6/29/17 11:12 AM.  Always use your most recent med list.  
  
  
  
  
 acetaminophen 650 mg CR tablet Commonly known as:  TYLENOL Take 1,300 mg by mouth daily as needed for Pain. aspirin 81 mg tablet Take 81 mg by mouth daily. atorvastatin 10 mg tablet Commonly known as:  LIPITOR  
TAKE 1 TABLET BY MOUTH EVERY DAY  
  
 B.infantis-B.ani-B.long-B.bifi 10-15 mg Tbec Take 1 Tab by mouth daily. BD TUBERCULIN SYRINGE 1 mL 25 gauge x 5/8\" Syrg Generic drug:  Syringe with Needle (Disp) USE ONCE A WEEK WITH METHOTREXATE Blood-Glucose Meter monitoring kit Commonly known as:  CONTOUR METER Dx: 250. Check blood sugars daily. busPIRone 5 mg tablet Commonly known as:  BUSPAR Take 5 mg by mouth two (2) times a day. CALCIUM 500+D 500 mg(1,250mg) -200 unit per tablet Generic drug:  calcium-vitamin D Take 3 Tabs by mouth daily. cholecalciferol 1,000 unit tablet Commonly known as:  VITAMIN D3 Take 2,000 Units by mouth daily. cyanocobalamin 1,000 mcg/mL injection Commonly known as:  VITAMIN B-12 Start: 1000 mcg SC/IM qd x1wk, then qwk x1mo, then every month DULoxetine 30 mg capsule Commonly known as:  CYMBALTA TAKE 1 CAPSULE BY MOUTH EVERY DAY  
  
 ESTRACE 0.01 % (0.1 mg/gram) vaginal cream  
Generic drug:  estradiol Take 42.5 mg by mouth once over twenty-four (24) hours. ferrous sulfate 325 mg (65 mg iron) tablet TAKE 1 TABLET BY MOUTH TWICE DAILY  
  
 fluocinoNIDE 0.05 % topical cream  
Commonly known as:  LIDEX Apply  to affected area two (2) times a day. folic acid 1 mg tablet Commonly known as:  FOLVITE  
TAKE 1 TABLET BY MOUTH DAILY * gabapentin 300 mg capsule Commonly known as:  NEURONTIN  
TAKE ONE CAPSULE BY MOUTH FOUR TIMES DAILY * gabapentin 600 mg tablet Commonly known as:  NEURONTIN  
TAKE 1 TABLET BY MOUTH TWICE DAILY  
  
 glipiZIDE SR 2.5 mg CR tablet Commonly known as:  GLUCOTROL XL  
TAKE 1 TABLET BY MOUTH TWICE DAILY  
  
 glucose blood VI test strips strip Commonly known as:  [employer] CONTOUR Glucometer for 250.00 Pt. test blood sugar once daily (in morning) HYDROcodone-acetaminophen 5-325 mg per tablet Commonly known as:  Johanna Rook Take 1 Tab by mouth every six (6) hours as needed for Pain.  
  
 hydroxychloroquine 200 mg tablet Commonly known as:  PLAQUENIL  
TAKE 1 TABLET BY MOUTH TWICE DAILY OR AS DIRECTED  
  
 metFORMIN 500 mg tablet Commonly known as:  GLUCOPHAGE  
TAKE 1 TABLET BY MOUTH TWICE DAILY WITH MEALS  
  
 metoprolol succinate 25 mg XL tablet Commonly known as:  TOPROL-XL  
TAKE 1 TABLET BY MOUTH DAILY MULTIVITAMIN PO Take 1 Tab by mouth daily. nortriptyline 10 mg capsule Commonly known as:  PAMELOR  
 TAKE 1 CAPSULE BY MOUTH EVERY NIGHT AT BEDTIME  
  
 pantoprazole 40 mg tablet Commonly known as:  PROTONIX  
TAKE 1 TABLET BY MOUTH DAILY predniSONE 5 mg tablet Commonly known as:  DELTASONE  
10 mg once daily. Lower by 2.5 mg every 4 days Syringe with Needle, Safety 3 mL 25 gauge x 5/8\" Syrg Commonly known as:  3cc Safety Syringe 25Gx5/8\" To be used with B12 injections Christobal Pipes Rolling walker. Patient with spinal stenosis and stopped posture. Pain with limited ambulation. ZINC PO Take 50 mg by mouth daily. * Notice: This list has 2 medication(s) that are the same as other medications prescribed for you. Read the directions carefully, and ask your doctor or other care provider to review them with you. Prescriptions Sent to Pharmacy Refills  
 predniSONE (DELTASONE) 5 mg tablet 0 Sig: 10 mg once daily. Lower by 2.5 mg every 4 days Class: Normal  
 Pharmacy: [facility] [facility] [address] #: [phone] We Performed the Following CBC WITH AUTOMATED DIFF [35100 CPT(R)] CK C108507 CPT(R)] COMPLEMENT, C3 S970554 CPT(R)] COMPLEMENT, C4 M886177 CPT(R)] METABOLIC PANEL, COMPREHENSIVE [26624 CPT(R)] SED RATE (ESR) [id number] CPT(R)] URINALYSIS W/ RFLX MICROSCOPIC [26441 CPT(R)] Follow-up Instructions Return in about 3 months (around 9/29/2017). Patient Instructions Schedule Prolia after review of labs Prednisone taper Introducing [facility]! Dear Elliot Mayo: Thank you for requesting a [employer] account. Our records indicate that you have previously registered for a [employer] account but its currently inactive. Please call our [employer] support line at [phone]. Additional Information If you have questions, please visit the Frequently Asked Questions section of the [employer] website at [email or web address] Remember, [employer] is NOT to be used for urgent needs. For medical emergencies, dial 911. Now available from your iPhone and Android! Please provide this summary of care documentation to your next provider. Your primary care clinician is listed as [address]. If you have any questions after today's visit, please call [phone].

## 2017-06-29 NOTE — PROGRESS NOTES
HISTORY OF PRESENT ILLNESS  Bucky Linares is a 65 y.o. female. HPI Patient presents for follow up of lupus. She went to PT for right leg pain and noted some relief. However, she did note a flare of the malar rash since beginning PT. She has pain in the right shoulder (when driving), the legs (diffuse right leg pain with driving). She notes intermittent swelling of her hands. She has a dry mouth: biotene spray gives some relief. Raynaud's symptoms are noted intermittently. She has been taking Cymbalta 30 mg daily. She is taking gabapentin four times daily with some relief of neuropathy. She has continued Plaquenil, 200 mg AM and 100 mg PM. She is taking calcium and D supplements each day. She had her first Prolia on 12-28 and tolerated this. Regarding her blood pressure, she has not related any recent dizziness. Current Outpatient Prescriptions   Medication Sig Dispense Refill    pantoprazole (PROTONIX) 40 mg tablet TAKE 1 TABLET BY MOUTH DAILY 30 Tab 3    gabapentin (NEURONTIN) 600 mg tablet TAKE 1 TABLET BY MOUTH TWICE DAILY 60 Tab 0    atorvastatin (LIPITOR) 10 mg tablet TAKE 1 TABLET BY MOUTH EVERY DAY 90 Tab 0    glipiZIDE SR (GLUCOTROL XL) 2.5 mg CR tablet TAKE 1 TABLET BY MOUTH TWICE DAILY 180 Tab 0    metFORMIN (GLUCOPHAGE) 500 mg tablet TAKE 1 TABLET BY MOUTH TWICE DAILY WITH MEALS 180 Tab 0    hydroxychloroquine (PLAQUENIL) 200 mg tablet TAKE 1 TABLET BY MOUTH TWICE DAILY OR AS DIRECTED 60 Tab 5    cyanocobalamin (VITAMIN B-12) 1,000 mcg/mL injection Start: 1000 mcg SC/IM qd x1wk, then qwk x1mo, then every month 1 Vial 3    busPIRone (BUSPAR) 5 mg tablet Take 5 mg by mouth two (2) times a day.   5    nortriptyline (PAMELOR) 10 mg capsule TAKE 1 CAPSULE BY MOUTH EVERY NIGHT AT BEDTIME 30 Cap 0    gabapentin (NEURONTIN) 300 mg capsule TAKE ONE CAPSULE BY MOUTH FOUR TIMES DAILY 120 Cap 6    glucose blood VI test strips (ASCENSIA CONTOUR) strip Glucometer for 250.00 Pt. test blood sugar once daily (in morning) 50 Strip 11    DULoxetine (CYMBALTA) 30 mg capsule TAKE 1 CAPSULE BY MOUTH EVERY DAY 30 Cap 3    metoprolol succinate (TOPROL-XL) 25 mg XL tablet TAKE 1 TABLET BY MOUTH DAILY 90 Tab 0    folic acid (FOLVITE) 1 mg tablet TAKE 1 TABLET BY MOUTH DAILY 90 Tab 0    HYDROcodone-acetaminophen (NORCO) 5-325 mg per tablet Take 1 Tab by mouth every six (6) hours as needed for Pain. 60 Tab 0    ESTRACE 0.01 % (0.1 mg/gram) vaginal cream Take 42.5 mg by mouth once over twenty-four (24) hours. 1    ferrous sulfate 325 mg (65 mg iron) tablet TAKE 1 TABLET BY MOUTH TWICE DAILY 60 Tab 0    B.infantis-B.ani-B.long-B.bifi 10-15 mg TbEC Take 1 Tab by mouth daily.  BD TUBERCULIN SYRINGE 1 mL 25 x 5/8\" syrg USE ONCE A WEEK WITH METHOTREXATE 13 Syringe 3    Syringe with Needle, Safety (3CC SAFETY SYRINGE 25GX5/8\") 3 mL 25 x 5/8\" syrg To be used with B12 injections 100 Each 1    Blood-Glucose Meter (CONTOUR METER) monitoring kit Dx: 250. Check blood sugars daily. 1 kit 11    Walker Misc Rolling walker. Patient with spinal stenosis and stopped posture. Pain with limited ambulation. 1 Each 0    ZINC PO Take 50 mg by mouth daily.  cholecalciferol, vitamin d3, (VITAMIN D3) 1,000 unit tablet Take 2,000 Units by mouth daily.  calcium-vitamin D (CALCIUM 500+D) 500 mg(1,250mg) -200 unit per tablet Take 3 Tabs by mouth daily.  aspirin 81 mg Tab Take 81 mg by mouth daily.  MULTIVITAMINS (MULTIVITAMIN PO) Take 1 Tab by mouth daily.  acetaminophen (TYLENOL) 650 mg CR tablet Take 1,300 mg by mouth daily as needed for Pain.  fluocinoNIDE (LIDEX) 0.05 % topical cream Apply  to affected area two (2) times a day. 15 g 0     Allergies   Allergen Reactions    Cephalosporins Nausea and Vomiting     Severe vomiting       Review of Systems   Constitutional: Negative for fever. Eyes: Negative for blurred vision. Cardiovascular: Positive for leg swelling.    Gastrointestinal: Negative for abdominal pain. Endo/Heme/Allergies: Bruises/bleeds easily. Physical Exam   Vitals reviewed. GENERAL: well developed, well nourished, in no apparent distress    O/P: upper dentures; clear; no areas exposed jaw bone; dry mucosa; no mandibular teeth  EYES: conjunctiva without injection    NECK: neck supple; mildly reduced right lateral rotation  RESPIRATORY: lungs clear with BS=B/L. Good air movement  CARDIOVASCULAR: +1 pedal edema; RR; no cyanosis of fingers    GASTROINTESTINAL: nontender; no organomegaly    MUSCULOSKELETAL: digits/nails: Heberden's nodes (at DIPs); Squaring of each first Aia 16 (R>L); decreased ROM with bilateral knee flexion (90 degrees). Right shoulder 90 degrees abduction and 100 degrees flexion (IR L5)  -synovitis left hand 2-4 pips and left hand 3rd DIP  -tenderness over right deltoid and left anserine bursa. Gait, antalgic and slightly stooped    DERM: no malar rash;    PSYCHIATRIC: mental status: alert and oriented x 3    Lab Results   Component Value Date/Time    Sodium 142 01/06/2017 11:09 AM    Potassium 4.8 01/06/2017 11:09 AM    Chloride 103 01/06/2017 11:09 AM    CO2 24 01/06/2017 11:09 AM    Anion gap 9 08/03/2010 12:10 PM    Glucose 79 01/06/2017 11:09 AM    BUN 19 01/06/2017 11:09 AM    Creatinine 0.90 01/06/2017 11:09 AM    BUN/Creatinine ratio 21 01/06/2017 11:09 AM    GFR est AA 71 01/06/2017 11:09 AM    GFR est non-AA 62 01/06/2017 11:09 AM    Calcium 8.7 01/06/2017 11:09 AM    Bilirubin, total 0.2 01/06/2017 11:09 AM    AST (SGOT) 28 01/06/2017 11:09 AM    Alk.  phosphatase 64 01/06/2017 11:09 AM    Protein, total 6.6 01/06/2017 11:09 AM    Albumin 4.1 01/06/2017 11:09 AM    Globulin 2.6 08/03/2010 12:10 PM    A-G Ratio 1.6 01/06/2017 11:09 AM    ALT (SGPT) 16 01/06/2017 11:09 AM     Lab Results   Component Value Date/Time    WBC 5.9 01/06/2017 11:09 AM    WBC 7.8 06/25/2012 10:44 AM    HGB 12.5 01/06/2017 11:09 AM    HCT 38.9 01/06/2017 11:09 AM    PLATELET 876 01/06/2017 11:09 AM    MCV 98 01/06/2017 11:09 AM     Lab Results   Component Value Date/Time    Sed rate (ESR) 6 11/29/2016 09:19 AM         ASSESSMENT and PLAN    ICD-10-CM ICD-9-CM    1. Systemic lupus erythematosus, unspecified SLE type, unspecified organ involvement status: DEXTER low titer (malar rash, inflammatory arthritis, and Raynaud's syndrome). RF and CCP are negative. She has had relief with Plaquenil 200 mg AM and 100 mg PM. She had been taking methotrexate 12.5 mg subcutaneous weekly (lowered to 7.5 mg weekly after elevated liver enzymes). She has been off methotrexate since May 2016. Flare of malar rash since last visit with synovitis in multiple finger joints on exam today. M32.9 710.0 COMPLEMENT, C3  Caution in the sun reviewed  Comfortable, safe, low impact exercise  Plaquenil 200 mg AM and 100 mg PM; if flare off prednisone taper, consider returning to 200 mg BID  Prednisone 10 mg once daily. Lower by 2.5 mg every 4 days      COMPLEMENT, C4      CK      METABOLIC PANEL, COMPREHENSIVE      CBC WITH AUTOMATED DIFF      URINALYSIS W/ RFLX MICROSCOPIC      SED RATE (ESR)   2. Osteoporosis, postmenopausal: reclast #4 3/2015, DXA 2/16 T-score 1/3 forearm -3.6, with normal hip (though decline from previous). History of compression fractures X2. She is taking calcium and D.  Recent vitamin D 50 with PTH high normal. She tolerated Prolia 60 mg #1 at the end of December. M81.0 733.01 -calcium 1200 mg daily total  -vitamin D3 2000 units daily  -as most recent calcium low normal, I will obtain updated calcium prior to next Prolia injection (if normal, schedule Prolia 60 mg injection for sometime in July)   3.  Long-term use of Plaquenil Z79.899 V58.69 Eye monitoring

## 2017-06-30 LAB
ALBUMIN SERPL-MCNC: 4.1 G/DL (ref 3.5–4.8)
ALBUMIN/GLOB SERPL: 1.8 {RATIO} (ref 1.2–2.2)
ALP SERPL-CCNC: 67 IU/L (ref 39–117)
ALT SERPL-CCNC: 17 IU/L (ref 0–32)
APPEARANCE UR: CLEAR
AST SERPL-CCNC: 37 IU/L (ref 0–40)
BACTERIA #/AREA URNS HPF: ABNORMAL /[HPF]
BASOPHILS # BLD AUTO: 0 X10E3/UL (ref 0–0.2)
BASOPHILS NFR BLD AUTO: 1 %
BILIRUB SERPL-MCNC: 0.3 MG/DL (ref 0–1.2)
BILIRUB UR QL STRIP: NEGATIVE
BUN SERPL-MCNC: 15 MG/DL (ref 8–27)
BUN/CREAT SERPL: 19 (ref 12–28)
C3 SERPL-MCNC: 146 MG/DL (ref 82–167)
C4 SERPL-MCNC: 35 MG/DL (ref 14–44)
CALCIUM SERPL-MCNC: 8.7 MG/DL (ref 8.7–10.3)
CASTS URNS QL MICRO: ABNORMAL /LPF
CHLORIDE SERPL-SCNC: 107 MMOL/L (ref 96–106)
CK SERPL-CCNC: 208 U/L (ref 24–173)
CO2 SERPL-SCNC: 21 MMOL/L (ref 18–29)
COLOR UR: YELLOW
CREAT SERPL-MCNC: 0.78 MG/DL (ref 0.57–1)
EOSINOPHIL # BLD AUTO: 0.1 X10E3/UL (ref 0–0.4)
EOSINOPHIL NFR BLD AUTO: 2 %
EPI CELLS #/AREA URNS HPF: ABNORMAL /HPF
ERYTHROCYTE [DISTWIDTH] IN BLOOD BY AUTOMATED COUNT: 13.1 % (ref 12.3–15.4)
ERYTHROCYTE [SEDIMENTATION RATE] IN BLOOD BY WESTERGREN METHOD: 2 MM/HR (ref 0–40)
GLOBULIN SER CALC-MCNC: 2.3 G/DL (ref 1.5–4.5)
GLUCOSE SERPL-MCNC: 65 MG/DL (ref 65–99)
GLUCOSE UR QL: NEGATIVE
HCT VFR BLD AUTO: 40 % (ref 34–46.6)
HGB BLD-MCNC: 12.7 G/DL (ref 11.1–15.9)
HGB UR QL STRIP: NEGATIVE
IMM GRANULOCYTES # BLD: 0.1 X10E3/UL (ref 0–0.1)
IMM GRANULOCYTES NFR BLD: 1 %
KETONES UR QL STRIP: NEGATIVE
LEUKOCYTE ESTERASE UR QL STRIP: ABNORMAL
LYMPHOCYTES # BLD AUTO: 2.1 X10E3/UL (ref 0.7–3.1)
LYMPHOCYTES NFR BLD AUTO: 32 %
MCH RBC QN AUTO: 31.7 PG (ref 26.6–33)
MCHC RBC AUTO-ENTMCNC: 31.8 G/DL (ref 31.5–35.7)
MCV RBC AUTO: 100 FL (ref 79–97)
MICRO URNS: ABNORMAL
MONOCYTES # BLD AUTO: 0.5 X10E3/UL (ref 0.1–0.9)
MONOCYTES NFR BLD AUTO: 8 %
MUCOUS THREADS URNS QL MICRO: PRESENT
NEUTROPHILS # BLD AUTO: 3.8 X10E3/UL (ref 1.4–7)
NEUTROPHILS NFR BLD AUTO: 56 %
NITRITE UR QL STRIP: NEGATIVE
PH UR STRIP: 5.5 [PH] (ref 5–7.5)
PLATELET # BLD AUTO: 249 X10E3/UL (ref 150–379)
POTASSIUM SERPL-SCNC: 4.5 MMOL/L (ref 3.5–5.2)
PROT SERPL-MCNC: 6.4 G/DL (ref 6–8.5)
PROT UR QL STRIP: ABNORMAL
RBC # BLD AUTO: 4.01 X10E6/UL (ref 3.77–5.28)
RBC #/AREA URNS HPF: ABNORMAL /HPF
SODIUM SERPL-SCNC: 147 MMOL/L (ref 134–144)
SP GR UR: 1.02 (ref 1–1.03)
UROBILINOGEN UR STRIP-MCNC: 0.2 MG/DL (ref 0.2–1)
WBC # BLD AUTO: 6.6 X10E3/UL (ref 3.4–10.8)
WBC #/AREA URNS HPF: ABNORMAL /HPF

## 2017-07-03 NOTE — PROGRESS NOTES
Inflammation in urine. If no active symptoms, please repeat urine with culture in 2-4 weeks. If active UTI symptoms, please review with Dr. Ema Ricci as well. Sodium mildly elevated (may be related to collection of blood). Drink plenty of water each day. If otherwise okay, please repeat sodium/potassium with UA.  Mildly increased muscle test (no additional recommendations at present)

## 2017-07-05 DIAGNOSIS — M51.36 DEGENERATIVE LUMBAR DISC: ICD-10-CM

## 2017-07-05 DIAGNOSIS — M79.18 MYOFACIAL MUSCLE PAIN: ICD-10-CM

## 2017-07-05 DIAGNOSIS — M12.9 ARTHROPATHY: ICD-10-CM

## 2017-07-05 NOTE — PROGRESS NOTES
Patient informed of labs per Dr. Darwin Sarmiento note. Patient understood. Patient given instructions per Dr. Darwin Sarmiento note. Patient understood. Patient states she sees her urologist in 2 weeks, and will have repeat urine done, and also sees her PCP,and will get repeat labs.

## 2017-07-06 ENCOUNTER — ANESTHESIA EVENT (OUTPATIENT)
Dept: ENDOSCOPY | Age: 77
End: 2017-07-06
Payer: MEDICARE

## 2017-07-06 ENCOUNTER — ANESTHESIA (OUTPATIENT)
Dept: ENDOSCOPY | Age: 77
End: 2017-07-06
Payer: MEDICARE

## 2017-07-06 ENCOUNTER — HOSPITAL ENCOUNTER (OUTPATIENT)
Age: 77
Setting detail: OUTPATIENT SURGERY
Discharge: HOME | End: 2017-07-06
Attending: INTERNAL MEDICINE | Admitting: INTERNAL MEDICINE
Payer: MEDICARE

## 2017-07-06 VITALS
TEMPERATURE: 97.6 F | DIASTOLIC BLOOD PRESSURE: 63 MMHG | WEIGHT: 210 LBS | HEART RATE: 62 BPM | HEIGHT: 62 IN | RESPIRATION RATE: 15 BRPM | BODY MASS INDEX: 38.64 KG/M2 | SYSTOLIC BLOOD PRESSURE: 115 MMHG | OXYGEN SATURATION: 99 %

## 2017-07-06 LAB
GLUCOSE BLD STRIP.AUTO-MCNC: 106 MG/DL (ref 65–100)
GLUCOSE BLD STRIP.AUTO-MCNC: 69 MG/DL (ref 65–100)
SERVICE CMNT-IMP: ABNORMAL
SERVICE CMNT-IMP: NORMAL

## 2017-07-06 PROCEDURE — 74011000250 HC RX REV CODE- 250

## 2017-07-06 PROCEDURE — 77030011640 HC PAD GRND REM COVD -A: Performed by: INTERNAL MEDICINE

## 2017-07-06 PROCEDURE — 88305 TISSUE EXAM BY PATHOLOGIST: CPT | Performed by: INTERNAL MEDICINE

## 2017-07-06 PROCEDURE — 76060000031 HC ANESTHESIA FIRST 0.5 HR: Performed by: INTERNAL MEDICINE

## 2017-07-06 PROCEDURE — 74011250636 HC RX REV CODE- 250/636

## 2017-07-06 PROCEDURE — 76040000019: Performed by: INTERNAL MEDICINE

## 2017-07-06 PROCEDURE — 77030013992 HC SNR POLYP ENDOSC BSC -B: Performed by: INTERNAL MEDICINE

## 2017-07-06 PROCEDURE — 74011000250 HC RX REV CODE- 250: Performed by: ANESTHESIOLOGY

## 2017-07-06 PROCEDURE — 82962 GLUCOSE BLOOD TEST: CPT

## 2017-07-06 RX ORDER — EPINEPHRINE 0.1 MG/ML
1 INJECTION INTRACARDIAC; INTRAVENOUS
Status: DISCONTINUED | OUTPATIENT
Start: 2017-07-06 | End: 2017-07-06 | Stop reason: HOSPADM

## 2017-07-06 RX ORDER — ATROPINE SULFATE 0.1 MG/ML
0.4 INJECTION INTRAVENOUS
Status: DISCONTINUED | OUTPATIENT
Start: 2017-07-06 | End: 2017-07-06 | Stop reason: HOSPADM

## 2017-07-06 RX ORDER — NALOXONE HYDROCHLORIDE 0.4 MG/ML
0.4 INJECTION, SOLUTION INTRAMUSCULAR; INTRAVENOUS; SUBCUTANEOUS
Status: DISCONTINUED | OUTPATIENT
Start: 2017-07-06 | End: 2017-07-06 | Stop reason: HOSPADM

## 2017-07-06 RX ORDER — MIDAZOLAM HYDROCHLORIDE 1 MG/ML
.25-5 INJECTION, SOLUTION INTRAMUSCULAR; INTRAVENOUS
Status: DISCONTINUED | OUTPATIENT
Start: 2017-07-06 | End: 2017-07-06 | Stop reason: HOSPADM

## 2017-07-06 RX ORDER — SODIUM CHLORIDE 9 MG/ML
INJECTION, SOLUTION INTRAVENOUS
Status: DISCONTINUED | OUTPATIENT
Start: 2017-07-06 | End: 2017-07-06 | Stop reason: HOSPADM

## 2017-07-06 RX ORDER — SODIUM CHLORIDE 9 MG/ML
50 INJECTION, SOLUTION INTRAVENOUS CONTINUOUS
Status: DISCONTINUED | OUTPATIENT
Start: 2017-07-06 | End: 2017-07-06 | Stop reason: HOSPADM

## 2017-07-06 RX ORDER — DEXTROMETHORPHAN/PSEUDOEPHED 2.5-7.5/.8
1.2 DROPS ORAL
Status: DISCONTINUED | OUTPATIENT
Start: 2017-07-06 | End: 2017-07-06 | Stop reason: HOSPADM

## 2017-07-06 RX ORDER — LIDOCAINE HYDROCHLORIDE 20 MG/ML
INJECTION, SOLUTION EPIDURAL; INFILTRATION; INTRACAUDAL; PERINEURAL AS NEEDED
Status: DISCONTINUED | OUTPATIENT
Start: 2017-07-06 | End: 2017-07-06 | Stop reason: HOSPADM

## 2017-07-06 RX ORDER — PROPOFOL 10 MG/ML
INJECTION, EMULSION INTRAVENOUS AS NEEDED
Status: DISCONTINUED | OUTPATIENT
Start: 2017-07-06 | End: 2017-07-06 | Stop reason: HOSPADM

## 2017-07-06 RX ORDER — DEXTROSE 50 % IN WATER (D50W) INTRAVENOUS SYRINGE
25
Status: COMPLETED | OUTPATIENT
Start: 2017-07-06 | End: 2017-07-06

## 2017-07-06 RX ORDER — FLUMAZENIL 0.1 MG/ML
0.2 INJECTION INTRAVENOUS
Status: DISCONTINUED | OUTPATIENT
Start: 2017-07-06 | End: 2017-07-06 | Stop reason: HOSPADM

## 2017-07-06 RX ADMIN — LIDOCAINE HYDROCHLORIDE 30 MG: 20 INJECTION, SOLUTION EPIDURAL; INFILTRATION; INTRACAUDAL; PERINEURAL at 08:47

## 2017-07-06 RX ADMIN — PROPOFOL 70 MG: 10 INJECTION, EMULSION INTRAVENOUS at 08:47

## 2017-07-06 RX ADMIN — DEXTROSE MONOHYDRATE 12.5 G: 25 INJECTION, SOLUTION INTRAVENOUS at 08:09

## 2017-07-06 RX ADMIN — PROPOFOL 30 MG: 10 INJECTION, EMULSION INTRAVENOUS at 08:49

## 2017-07-06 RX ADMIN — SODIUM CHLORIDE: 9 INJECTION, SOLUTION INTRAVENOUS at 07:19

## 2017-07-06 RX ADMIN — PROPOFOL 30 MG: 10 INJECTION, EMULSION INTRAVENOUS at 08:52

## 2017-07-06 NOTE — ROUTINE PROCESS
Baltazar Pedersen  1940  [id number]    Situation:  Verbal report received from: Jennifer Beltrán RN  Procedure: Procedure(s):  ESOPHAGOGASTRODUODENOSCOPY (EGD)  ENDOSCOPIC POLYPECTOMY    Background:    Preoperative diagnosis: HIATAL HERNIA   Postoperative diagnosis: EGD-gastric bypass    :  Dr. Diaz Neely  Assistant(s): Endoscopy Technician-1: Cristy Cueva  Endoscopy RN-1: Link Popper, RN    Specimens:   ID Type Source Tests Collected by Time Destination   1 : polyp jejunum Preservative   Georgina Delvalle MD 7/6/2017 1250 Pathology     H. Pylori  no    Assessment:  Intra-procedure medications     Anesthesia gave intra-procedure sedation and medications, see anesthesia flow sheet yes    Intravenous fluids: NS@ KVO     Vital signs stable     Abdominal assessment: round and soft     Recommendation:  Discharge patient per MD order  Family or Friend   Permission to share finding with family or friend yes    Endoscopy discharge instructions have been reviewed and given to patient and spouse. The patient and spouse verbalized understanding and acceptance of instructions.

## 2017-07-06 NOTE — DISCHARGE INSTRUCTIONS
Joana Zapata M.D.  [phone]           2017  Jorge Marin  :  1940  [facility] Medical Record Number:  [id number]        ENDOSCOPY FINDINGS:   Your endoscopy showed one small polyp that was removed and sent to pathology, otherwise looked good. EGD DISCHARGE INSTRUCTIONS    DISCOMFORT:  Sore throat- throat lozenges or warm salt water gargle  redness at IV site- apply warm compress to area; if redness or soreness persist- contact your physician  Gaseous discomfort- walking, belching will help relieve any discomfort  You may not operate a vehicle for 12 hours  You may not engage in an occupation involving machinery or appliances for rest of today  You may not drink alcoholic beverages for at least 12 hours  Avoid making any critical decisions for at least 24 hour    DIET:   You may resume your regular diet. ACTIVITY  Spend the remainder of the day resting -  avoid any strenuous activity. Avoid driving or operating machinery. CALL M.D. ANY SIGN OF   Increasing pain, nausea, vomiting  Abdominal distension (swelling)  New increased bleeding (oral or rectal)  Fever (chills)  Pain in chest area  Bloody discharge from nose or mouth  Shortness of breath    Follow-up Instructions:   Call Dr. Azucena Perales for any questions or problems. Telephone # [phone]  Biopsies were obtained, the results will be available  in  5 to 7 days. We will call you to notify you of these results. Continue same medications.

## 2017-07-06 NOTE — ANESTHESIA POSTPROCEDURE EVALUATION
Post-Anesthesia Evaluation and Assessment    Patient: Buck Hernandez MRN: [id number]  SSN: [id number]    YOB: 1940  Age: 65 y.o. Sex: female       Cardiovascular Function/Vital Signs  Visit Vitals    /54    Pulse 65    Temp 36.4 °C (97.6 °F)    Resp 18    Ht 5' 2\" (1.575 m)    Wt 95.3 kg (210 lb)    SpO2 100%    Breastfeeding No    BMI 38.41 kg/m2       Patient is status post MAC anesthesia for Procedure(s):  ESOPHAGOGASTRODUODENOSCOPY (EGD)  ENDOSCOPIC POLYPECTOMY. Nausea/Vomiting: None    Postoperative hydration reviewed and adequate. Pain:  Pain Scale 1: Numeric (0 - 10) (07/06/17 0900)  Pain Intensity 1: 0 (07/06/17 0900)   Managed    Neurological Status: At baseline    Mental Status and Level of Consciousness: Arousable    Pulmonary Status:   O2 Device: Nasal cannula (07/06/17 0900)   Adequate oxygenation and airway patent    Complications related to anesthesia: None    Post-anesthesia assessment completed.  No concerns    Signed By: Sanjeev Azevedo MD     July 6, 2017

## 2017-07-06 NOTE — H&P
Manas Whittaker M.D.  [phone]            History and Physical       NAME:  Jovon Quach   :   1940   MRN:   [id number]       Referring Physician:  Dennis Mata    Consult Date: 2017 8:45 AM    Chief Complaint:  Jejunal nodule    History of Present Illness:  Patient is a 65 y.o. 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 Here today for complete resection. Denies any ongoing GI complaints. PMH:  Past Medical History:   Diagnosis Date    Anemia     Arthritis     CAD (coronary artery disease)     MI    Degenerative joint disease of right hip 2015     [facility]/Dr. Aaron Fernandez    Diabetes ([facility] [address]     Fracture     right shoulder; T6 compression    Gastritis     Hypercholesterolemia     Hyperlipidemia    Hypertension     Lupus ([facility] [address] 2015    Osteoporosis, post-menopausal        PSH:  Past Surgical History:   Procedure Laterality Date    ABDOMEN SURGERY PROC UNLISTED      gastric bypass. . hernia    CARDIAC SURG PROCEDURE UNLIST      HX CHOLECYSTECTOMY      HX CORONARY STENT PLACEMENT      HX HERNIA REPAIR      HX ORTHOPAEDIC      bilateral knees    HX ORTHOPAEDIC      r shoulder    HX ORTHOPAEDIC      Laminectomy    DC INC IMPLTJ NEUROSTIMULATOR ELTRD SACRAL NERVE         Allergies: Allergies   Allergen Reactions    Cephalosporins Nausea and Vomiting     Severe vomiting       Home Medications:  Prior to Admission Medications   Prescriptions Last Dose Informant Patient Reported? Taking? B.infantis-B.ani-B.long-B.bifi 10-15 mg TbEC 2017 at Unknown time  Yes Yes   Sig: Take 1 Tab by mouth daily. BD TUBERCULIN SYRINGE 1 mL 25 x 5/8\" syrg   No No   Sig: USE ONCE A WEEK WITH METHOTREXATE   Blood-Glucose Meter (CONTOUR METER) monitoring kit   No No   Sig: Dx: 250. Check blood sugars daily.    DULoxetine (CYMBALTA) 30 mg capsule 2017 at Unknown time  No Yes   Sig: TAKE 1 CAPSULE BY MOUTH EVERY DAY   ESTRACE 0.01 % (0.1 mg/gram) vaginal cream 6/29/2017 at Unknown time  Yes Yes   Sig: Take 42.5 mg by mouth once over twenty-four (24) hours. HYDROcodone-acetaminophen (NORCO) 5-325 mg per tablet 6/29/2017 at Unknown time  No Yes   Sig: Take 1 Tab by mouth every six (6) hours as needed for Pain. MULTIVITAMINS (MULTIVITAMIN PO) 7/5/2017 at Unknown time  Yes Yes   Sig: Take 1 Tab by mouth daily. Syringe with Needle, Safety (3CC SAFETY SYRINGE 25GX5/8\") 3 mL 25 x 5/8\" syrg   No No   Sig: To be used with B12 injections   Walker Misc   No No   Sig: Rolling walker. Patient with spinal stenosis and stopped posture. Pain with limited ambulation. ZINC PO 7/5/2017 at Unknown time  Yes Yes   Sig: Take 50 mg by mouth daily. acetaminophen (TYLENOL) 650 mg CR tablet 6/6/2017 at Unknown time  Yes Yes   Sig: Take 1,300 mg by mouth daily as needed for Pain. aspirin 81 mg Tab 6/30/2017  Yes No   Sig: Take 81 mg by mouth daily. atorvastatin (LIPITOR) 10 mg tablet 7/5/2017 at Unknown time  No Yes   Sig: TAKE 1 TABLET BY MOUTH EVERY DAY   busPIRone (BUSPAR) 5 mg tablet 7/5/2017 at Unknown time  Yes Yes   Sig: Take 5 mg by mouth two (2) times a day. calcium-vitamin D (CALCIUM 500+D) 500 mg(1,250mg) -200 unit per tablet 7/5/2017 at Unknown time  Yes Yes   Sig: Take 3 Tabs by mouth daily. cholecalciferol, vitamin d3, (VITAMIN D3) 1,000 unit tablet 7/5/2017 at Unknown time  Yes Yes   Sig: Take 2,000 Units by mouth daily. cyanocobalamin (VITAMIN B-12) 1,000 mcg/mL injection 6/6/2017 at Unknown time  No Yes   Sig: Start: 1000 mcg SC/IM qd x1wk, then qwk x1mo, then every month   ferrous sulfate 325 mg (65 mg iron) tablet 7/5/2017 at Unknown time  No Yes   Sig: TAKE 1 TABLET BY MOUTH TWICE DAILY   fluocinoNIDE (LIDEX) 0.05 % topical cream Unknown at Unknown time  No No   Sig: Apply  to affected area two (2) times a day.    folic acid (FOLVITE) 1 mg tablet 7/5/2017 at Unknown time  No Yes   Sig: TAKE 1 TABLET BY MOUTH DAILY   gabapentin (NEURONTIN) 300 mg capsule 7/5/2017 at Unknown time  No Yes   Sig: TAKE ONE CAPSULE BY MOUTH FOUR TIMES DAILY   gabapentin (NEURONTIN) 600 mg tablet 7/5/2017 at Unknown time  No Yes   Sig: TAKE 1 TABLET BY MOUTH TWICE DAILY   glipiZIDE SR (GLUCOTROL XL) 2.5 mg CR tablet 7/5/2017 at Unknown time  No Yes   Sig: TAKE 1 TABLET BY MOUTH TWICE DAILY   glucose blood VI test strips (ASCENSIA CONTOUR) strip   No No   Sig: Glucometer for 250.00 Pt. test blood sugar once daily (in morning)   hydroxychloroquine (PLAQUENIL) 200 mg tablet 7/5/2017 at Unknown time  No Yes   Sig: TAKE 1 TABLET BY MOUTH TWICE DAILY OR AS DIRECTED   metFORMIN (GLUCOPHAGE) 500 mg tablet 7/5/2017 at Unknown time  No Yes   Sig: TAKE 1 TABLET BY MOUTH TWICE DAILY WITH MEALS   metoprolol succinate (TOPROL-XL) 25 mg XL tablet 7/5/2017 at Unknown time  No Yes   Sig: TAKE 1 TABLET BY MOUTH DAILY   nortriptyline (PAMELOR) 10 mg capsule 7/5/2017 at Unknown time  No Yes   Sig: TAKE 1 CAPSULE BY MOUTH EVERY NIGHT AT BEDTIME   pantoprazole (PROTONIX) 40 mg tablet 7/5/2017 at Unknown time  No Yes   Sig: TAKE 1 TABLET BY MOUTH DAILY   predniSONE (DELTASONE) 5 mg tablet 7/5/2017 at Unknown time  No Yes   Sig: 10 mg once daily. Lower by 2.5 mg every 4 days      Facility-Administered Medications: None       Hospital Medications:  No current facility-administered medications for this encounter.       Facility-Administered Medications Ordered in Other Encounters   Medication Dose Route Frequency    0.9% sodium chloride infusion   IntraVENous CONTINUOUS       Social History:  Social History   Substance Use Topics    Smoking status: Former Smoker     Quit date: 12/30/1989    Smokeless tobacco: Never Used    Alcohol use No       Family History:  Family History   Problem Relation Age of Onset    Diabetes Mother     Hypertension Mother     Heart Disease Mother     Diabetes Father     Hypertension Father     Heart Disease Father    Kristie Mary Arthritis-rheumatoid Sister     Elevated Lipids Sister     Arthritis-rheumatoid Sister     Hypertension Sister     Diabetes Sister     Thyroid Disease Sister     Arthritis-rheumatoid Other              Review of Systems:      Constitutional: negative fever, negative chills, negative weight loss  Eyes:   negative visual changes  ENT:   negative sore throat, tongue or lip swelling  Respiratory:  negative cough, negative dyspnea  Cards:  negative for chest pain, palpitations, lower extremity edema  GI:   See HPI  :  negative for frequency, dysuria  Integument:  negative for rash and pruritus  Heme:  negative for easy bruising and gum/nose bleeding  Musculoskel: negative for myalgias,  back pain and muscle weakness  Neuro: negative for headaches, dizziness, vertigo  Psych:  negative for feelings of anxiety, depression       Objective:   Patient Vitals for the past 8 hrs:   BP Temp Pulse Resp SpO2 Height Weight   07/06/17 0716 109/64 97.6 °F (36.4 °C) 66 14 99 % 5' 2\" (1.575 m) 95.3 kg (210 lb)             EXAM:     NEURO-a&o   HEENT-wnl   LUNGS-clear    COR-regular rate and rhythym     ABD-soft , no tenderness, no rebound, good bs     EXT-no edema     Data Review     No results for input(s): WBC, HGB, HCT, PLT, HGBEXT, HCTEXT, PLTEXT in the last 72 hours. No results for input(s): NA, K, CL, CO2, BUN, CREA, GLU, PHOS, CA in the last 72 hours. No results for input(s): SGOT, GPT, AP, TBIL, TP, ALB, GLOB, GGT, AML, LPSE in the last 72 hours. No lab exists for component: AMYP, HLPSE  No results for input(s): INR, PTP, APTT in the last 72 hours.     No lab exists for component: INREXT    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    Long-term use of immunosuppressant medication Z79.899    Encounter for long-term (current) use of steroids [id number]    Hypocalcemia E83.51    Low back pain M54.5    Osteoporosis, postmenopausal M81.0    H/O Bell's palsy Z86.69    Diabetes mellitus (HCC) E11.9    Diabetic polyneuropathy (HCC) E11.42    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      Signed By: Timothy Galvan MD     7/6/2017  8:45 AM

## 2017-07-06 NOTE — IP AVS SNAPSHOT
Bryce Phillips 
 
 
 [facility] [address] [facility] 
[phone] Patient: Diana Miguel MRN: [id number] [id number] You are allergic to the following Allergen Reactions Cephalosporins Nausea and Vomiting Severe vomiting Recent Documentation Height Weight Breastfeeding? BMI OB Status Smoking Status 1.575 m 95.3 kg No 38.41 kg/m2 Postmenopausal Former Smoker Emergency Contacts Name Discharge Info Relation Home Work Mobile Morteza Rueda DISCHARGE CAREGIVER [3] Spouse [3] [phone] About your hospitalization You were admitted on:  July 6, 2017 You last received care in the:  [facility] ENDOSCOPY You were discharged on:  July 6, 2017 Unit phone number:  [phone] Why you were hospitalized Your primary diagnosis was:  Not on File Providers Seen During Your Hospitalizations Provider Role Specialty Primary office phone Danny Burger MD Attending Provider Gastroenterology [phone] Your Primary Care Physician (PCP) Primary Care Physician Office Phone Office Fax Jaquanleanna Ort [phone] Follow-up Information None Your Appointments Wednesday July 26, 2017 10:30 AM EDT Medicare Physical with Ana Maria Solano MD  
[address] [facility]) [address] [facility]  
[phone] Current Discharge Medication List  
  
CONTINUE these medications which have NOT CHANGED Dose & Instructions Dispensing Information Comments Morning Noon Evening Bedtime  
 acetaminophen 650 mg CR tablet Commonly known as:  TYLENOL Your last dose was: Your next dose is:    
   
   
 Dose:  1300 mg Take 1,300 mg by mouth daily as needed for Pain. Refills:  0  
     
   
   
   
  
 aspirin 81 mg tablet Your last dose was: Your next dose is: Dose:  81 mg Take 81 mg by mouth daily. Refills:  0  
     
   
   
   
  
 atorvastatin 10 mg tablet Commonly known as:  LIPITOR Your last dose was: Your next dose is: TAKE 1 TABLET BY MOUTH EVERY DAY Quantity:  90 Tab Refills:  0 B.infantis-B.ani-B.long-B.bifi 10-15 mg Tbec Your last dose was: Your next dose is:    
   
   
 Dose:  1 Tab Take 1 Tab by mouth daily. Refills:  0 BD TUBERCULIN SYRINGE 1 mL 25 gauge x 5/8\" Syrg Generic drug:  Syringe with Needle (Disp) Your last dose was: Your next dose is:    
   
   
 USE ONCE A WEEK WITH METHOTREXATE Quantity:  13 Syringe Refills:  3 Blood-Glucose Meter monitoring kit Commonly known as:  CONTOUR METER Your last dose was: Your next dose is: Dx: 250. Check blood sugars daily. Quantity:  1 kit Refills:  11  
     
   
   
   
  
 busPIRone 5 mg tablet Commonly known as:  BUSPAR Your last dose was: Your next dose is:    
   
   
 Dose:  5 mg Take 5 mg by mouth two (2) times a day. Refills:  5 CALCIUM 500+D 500 mg(1,250mg) -200 unit per tablet Generic drug:  calcium-vitamin D Your last dose was: Your next dose is:    
   
   
 Dose:  3 Tab Take 3 Tabs by mouth daily. Refills:  0  
     
   
   
   
  
 cholecalciferol 1,000 unit tablet Commonly known as:  VITAMIN D3 Your last dose was: Your next dose is:    
   
   
 Dose:  2000 Units Take 2,000 Units by mouth daily. Refills:  0  
     
   
   
   
  
 cyanocobalamin 1,000 mcg/mL injection Commonly known as:  VITAMIN B-12 Your last dose was: Your next dose is:    
   
   
 Start: 1000 mcg SC/IM qd x1wk, then qwk x1mo, then every month Quantity:  1 Vial  
Refills:  3 DULoxetine 30 mg capsule Commonly known as:  CYMBALTA Your last dose was: Your next dose is: TAKE 1 CAPSULE BY MOUTH EVERY DAY Quantity:  30 Cap Refills:  3 ESTRACE 0.01 % (0.1 mg/gram) vaginal cream  
Generic drug:  estradiol Your last dose was: Your next dose is:    
   
   
 Dose:  42.5 mg Take 42.5 mg by mouth once over twenty-four (24) hours. Refills:  1  
     
   
   
   
  
 ferrous sulfate 325 mg (65 mg iron) tablet Your last dose was: Your next dose is: TAKE 1 TABLET BY MOUTH TWICE DAILY Quantity:  60 Tab Refills:  0  
     
   
   
   
  
 fluocinoNIDE 0.05 % topical cream  
Commonly known as:  LIDEX Your last dose was: Your next dose is:    
   
   
 Apply  to affected area two (2) times a day. Quantity:  15 g Refills:  0  
     
   
   
   
  
 folic acid 1 mg tablet Commonly known as:  Google Your last dose was: Your next dose is: TAKE 1 TABLET BY MOUTH DAILY Quantity:  90 Tab Refills:  0  
     
   
   
   
  
 * gabapentin 300 mg capsule Commonly known as:  NEURONTIN Your last dose was: Your next dose is: TAKE ONE CAPSULE BY MOUTH FOUR TIMES DAILY Quantity:  120 Cap Refills:  6  
     
   
   
   
  
 * gabapentin 600 mg tablet Commonly known as:  NEURONTIN Your last dose was: Your next dose is: TAKE 1 TABLET BY MOUTH TWICE DAILY Quantity:  60 Tab Refills:  0  
     
   
   
   
  
 glipiZIDE SR 2.5 mg CR tablet Commonly known as:  GLUCOTROL XL Your last dose was: Your next dose is: TAKE 1 TABLET BY MOUTH TWICE DAILY Quantity:  180 Tab Refills:  0  
     
   
   
   
  
 glucose blood VI test strips strip Commonly known as:  Ascensia CONTOUR Your last dose was: Your next dose is: Glucometer for 250.00 Pt. test blood sugar once daily (in morning) Quantity:  50 Strip Refills:  11 HYDROcodone-acetaminophen 5-325 mg per tablet Commonly known as:  Anna Mcmillan Your last dose was: Your next dose is:    
   
   
 Dose:  1 Tab Take 1 Tab by mouth every six (6) hours as needed for Pain. Quantity:  60 Tab Refills:  0  
     
   
   
   
  
 hydroxychloroquine 200 mg tablet Commonly known as:  PLAQUENIL Your last dose was: Your next dose is: TAKE 1 TABLET BY MOUTH TWICE DAILY OR AS DIRECTED Quantity:  60 Tab Refills:  5  
     
   
   
   
  
 metFORMIN 500 mg tablet Commonly known as:  GLUCOPHAGE Your last dose was: Your next dose is: TAKE 1 TABLET BY MOUTH TWICE DAILY WITH MEALS Quantity:  180 Tab Refills:  0  
     
   
   
   
  
 metoprolol succinate 25 mg XL tablet Commonly known as:  TOPROL-XL Your last dose was: Your next dose is: TAKE 1 TABLET BY MOUTH DAILY Quantity:  90 Tab Refills:  0 MULTIVITAMIN PO Your last dose was: Your next dose is:    
   
   
 Dose:  1 Tab Take 1 Tab by mouth daily. Refills:  0  
     
   
   
   
  
 nortriptyline 10 mg capsule Commonly known as:  PAMELOR Your last dose was: Your next dose is: TAKE 1 CAPSULE BY MOUTH EVERY NIGHT AT BEDTIME Quantity:  30 Cap Refills:  0  
     
   
   
   
  
 pantoprazole 40 mg tablet Commonly known as:  PROTONIX Your last dose was: Your next dose is: TAKE 1 TABLET BY MOUTH DAILY Quantity:  30 Tab Refills:  3  
     
   
   
   
  
 predniSONE 5 mg tablet Commonly known as:  Nacho Cooper Your last dose was: Your next dose is:    
   
   
 10 mg once daily. Lower by 2.5 mg every 4 days Quantity:  30 Tab Refills:  0 Syringe with Needle, Safety 3 mL 25 gauge x 5/8\" Syrg Commonly known as:  3cc Safety Syringe 25Gx5/8\" Your last dose was: Your next dose is: To be used with B12 injections Quantity:  100 Each Refills:  1 Enrique Heather Your last dose was: Your next dose is:    
   
   
 Rolling walker. Patient with spinal stenosis and stopped posture. Pain with limited ambulation. Quantity:  1 Each Refills:  0 ZINC PO Your last dose was: Your next dose is:    
   
   
 Dose:  50 mg Take 50 mg by mouth daily. Refills:  0  
     
   
   
   
  
 * Notice: This list has 2 medication(s) that are the same as other medications prescribed for you. Read the directions carefully, and ask your doctor or other care provider to review them with you. Discharge Instructions Melly Conley M.D. 
[phone] 
        
2017 Quincy Pedersen :  1940 [address] [place] [address] Record Number:  [id number] ENDOSCOPY FINDINGS: 
 Your endoscopy showed one small polyp that was removed and sent to pathology, otherwise looked good. EGD DISCHARGE INSTRUCTIONS DISCOMFORT: 
Sore throat- throat lozenges or warm salt water gargle 
redness at IV site- apply warm compress to area; if redness or soreness persist- contact your physician Gaseous discomfort- walking, belching will help relieve any discomfort You may not operate a vehicle for 12 hours You may not engage in an occupation involving machinery or appliances for rest of today You may not drink alcoholic beverages for at least 12 hours Avoid making any critical decisions for at least 24 hour DIET: 
 You may resume your regular diet. ACTIVITY Spend the remainder of the day resting -  avoid any strenuous activity. Avoid driving or operating machinery. CALL M.D.   ANY SIGN OF  
 Increasing pain, nausea, vomiting Abdominal distension (swelling) New increased bleeding (oral or rectal) Fever (chills) Pain in chest area Bloody discharge from nose or mouth Shortness of breath Follow-up Instructions: 
 Call Dr. Azucena Perales for any questions or problems. Telephone # [phone] Biopsies were obtained, the results will be available  in  5 to 7 days. We will call you to notify you of these results. Continue same medications. Discharge Orders None ACO Transitions of Care Introducing [facility] [address] Cindi Graham offers a voluntary care coordination program to provide high quality service and care to [facility] fee-for-service beneficiaries. Trisha Martinez was designed to help you enhance your health and well-being through the following services: ? Transitions of Care  support for individuals who are transitioning from one care setting to another (example: Hospital to home). ? Chronic and Complex Care Coordination  support for individuals and caregivers of those with serious or chronic illnesses or with more than one chronic (ongoing) condition and those who take a number of different medications. If you meet specific medical criteria, a [address] [place] [address] Rd may call you directly to coordinate your care with your primary care physician and your other care providers. For questions about the [facility] programs, please, contact your physicians office. For general questions or additional information about Accountable Care Organizations: 
Please visit www.medicare.gov/acos. html or call 1-800-MEDICARE (6-819.737.9124) TTY users should call 8-768.802.3778. Introducing [facility]! Dear Herschel Moritz: Thank you for requesting a [employer] account.   Our records indicate that you have previously registered for a [employer] account but its currently inactive. Please call our [employer] support line at [phone]. Additional Information If you have questions, please visit the Frequently Asked Questions section of the [employer] website at [email or web address] Remember, MyChart is NOT to be used for urgent needs. For medical emergencies, dial 911. Now available from your iPhone and Android! General Information Please provide this summary of care documentation to your next provider. Patient Signature:  ____________________________________________________________ Date:  ____________________________________________________________  
  
Jaime Ala Provider Signature:  ____________________________________________________________ Date:  ____________________________________________________________

## 2017-07-06 NOTE — PROCEDURES
Marianne Hinds M.D.  [phone]           2017                EGD Operative Report  Toshia Rubio  :  1940  Jorge Perry Medical Record Number:  [id number]      Indication:  Adenomatous polyp of jejunum     : Ye Andre MD    Referring Provider:  Karine Wylie MD      Anesthesia/Sedation:  MAC anesthesia    Airway assessment: No airway problems anticipated    Pre-Procedural Exam:      Airway: clear, no airway problems anticipated  Heart: RRR, without gallops or rubs  Lungs: clear bilaterally without wheezes, crackles, or rhonchi  Abdomen: soft, nontender, nondistended, bowel sounds present  Mental Status: awake, alert and oriented to person, place and time       Procedure Details     After infomed consent was obtained for the procedure, with all risks and benefits of procedure explained the patient was taken to the endoscopy suite and placed in the left lateral decubitus position. Following sequential administration of sedation as per above, the endoscope was inserted into the mouth and advanced under direct vision to second portion of the duodenum. A careful inspection was made as the gastroscope was withdrawn, including a retroflexed view of the proximal stomach; findings and interventions are described below. Findings:   Esophagus:normal  Stomach: Evidence of gastric bypass, adequately patent gastro-jejunal anastomosis  Jejunum: one diminutive polyp about 5 mm seen about 10 cm distal to the anastomosis. It was removed by hot snare polypectomy. Therapies:  1 complete polypectomy were performed using hot snare  and the polyps were  retrieved    Specimens: Jejunum polyp           Complications:   None; patient tolerated the procedure well. EBL:  None.            Impression:    EGD-gastric bypass                             Jejunum polyp    Recommendations:    -Continue acid suppression.  -Await pathology.  -Follow-up in the office if any symptoms.     Juan Murguia MD

## 2017-07-06 NOTE — ANESTHESIA PREPROCEDURE EVALUATION
Anesthetic History   No history of anesthetic complications            Review of Systems / Medical History  Patient summary reviewed    Pulmonary  Within defined limits                 Neuro/Psych   Within defined limits           Cardiovascular              Past MI, CAD, cardiac stents (1997) and hyperlipidemia    Exercise tolerance: >4 METS     GI/Hepatic/Renal     GERD: well controlled           Endo/Other    Diabetes: well controlled, type 2    Arthritis and anemia     Other Findings   Comments: Lupus-on prednisone           Physical Exam    Airway  Mallampati: II  TM Distance: 4 - 6 cm  Neck ROM: normal range of motion   Mouth opening: Normal     Cardiovascular    Rhythm: regular  Rate: normal         Dental    Dentition: Full lower dentures and Full upper dentures     Pulmonary  Breath sounds clear to auscultation               Abdominal         Other Findings            Anesthetic Plan    ASA: 3  Anesthesia type: MAC            Anesthetic plan and risks discussed with: Patient

## 2017-07-10 RX ORDER — HYDROCODONE BITARTRATE AND ACETAMINOPHEN 5; 325 MG/1; MG/1
1 TABLET ORAL
Qty: 60 TAB | Refills: 0 | OUTPATIENT
Start: 2017-07-10

## 2017-07-14 RX ORDER — GABAPENTIN 600 MG/1
TABLET ORAL
Qty: 60 TAB | Refills: 0 | Status: SHIPPED | OUTPATIENT
Start: 2017-07-14 | End: 2017-08-20 | Stop reason: SDUPTHER

## 2017-07-23 DIAGNOSIS — I10 ESSENTIAL HYPERTENSION WITH GOAL BLOOD PRESSURE LESS THAN 130/80: ICD-10-CM

## 2017-07-26 ENCOUNTER — OFFICE VISIT (OUTPATIENT)
Dept: FAMILY MEDICINE CLINIC | Age: 77
End: 2017-07-26

## 2017-07-26 ENCOUNTER — HOSPITAL ENCOUNTER (OUTPATIENT)
Dept: LAB | Age: 77
Discharge: HOME | End: 2017-07-26
Payer: MEDICARE

## 2017-07-26 VITALS
WEIGHT: 211 LBS | TEMPERATURE: 98.5 F | RESPIRATION RATE: 16 BRPM | BODY MASS INDEX: 38.83 KG/M2 | HEART RATE: 75 BPM | DIASTOLIC BLOOD PRESSURE: 56 MMHG | OXYGEN SATURATION: 98 % | SYSTOLIC BLOOD PRESSURE: 115 MMHG | HEIGHT: 62 IN

## 2017-07-26 DIAGNOSIS — M51.36 DEGENERATIVE LUMBAR DISC: ICD-10-CM

## 2017-07-26 DIAGNOSIS — Z00.00 MEDICARE ANNUAL WELLNESS VISIT, SUBSEQUENT: Primary | ICD-10-CM

## 2017-07-26 DIAGNOSIS — R82.90 ABNORMAL FINDING ON URINALYSIS: ICD-10-CM

## 2017-07-26 DIAGNOSIS — M12.9 ARTHROPATHY: ICD-10-CM

## 2017-07-26 DIAGNOSIS — E11.9 TYPE 2 DIABETES MELLITUS WITHOUT COMPLICATION, WITHOUT LONG-TERM CURRENT USE OF INSULIN (HCC): ICD-10-CM

## 2017-07-26 DIAGNOSIS — I10 ESSENTIAL HYPERTENSION WITH GOAL BLOOD PRESSURE LESS THAN 130/80: ICD-10-CM

## 2017-07-26 DIAGNOSIS — Z13.820 SCREENING FOR OSTEOPOROSIS: ICD-10-CM

## 2017-07-26 DIAGNOSIS — E78.00 PURE HYPERCHOLESTEROLEMIA: ICD-10-CM

## 2017-07-26 DIAGNOSIS — E55.9 VITAMIN D DEFICIENCY: ICD-10-CM

## 2017-07-26 DIAGNOSIS — Z78.0 POST-MENOPAUSE: ICD-10-CM

## 2017-07-26 DIAGNOSIS — M79.18 MYOFACIAL MUSCLE PAIN: ICD-10-CM

## 2017-07-26 LAB
ALBUMIN UR QL STRIP: 80 MG/L
BILIRUB UR QL STRIP: NEGATIVE
CREATININE, URINE POC: 200 MG/DL
GLUCOSE UR-MCNC: NEGATIVE MG/DL
KETONES P FAST UR STRIP-MCNC: NEGATIVE MG/DL
MICROALBUMIN/CREAT RATIO POC: ABNORMAL MG/G
PH UR STRIP: 5.5 [PH] (ref 4.6–8)
PROT UR QL STRIP: ABNORMAL MG/DL
SP GR UR STRIP: 1.02 (ref 1–1.03)
UA UROBILINOGEN AMB POC: ABNORMAL (ref 0.2–1)
URINALYSIS CLARITY POC: CLEAR
URINALYSIS COLOR POC: ABNORMAL
URINE BLOOD POC: NEGATIVE
URINE LEUKOCYTES POC: ABNORMAL
URINE NITRITES POC: NEGATIVE

## 2017-07-26 PROCEDURE — 87088 URINE BACTERIA CULTURE: CPT

## 2017-07-26 RX ORDER — HYDROCODONE BITARTRATE AND ACETAMINOPHEN 5; 325 MG/1; MG/1
1 TABLET ORAL
Qty: 60 TAB | Refills: 0 | Status: SHIPPED | OUTPATIENT
Start: 2017-07-26 | End: 2017-12-13 | Stop reason: SDUPTHER

## 2017-07-26 RX ORDER — NITROFURANTOIN 25; 75 MG/1; MG/1
CAPSULE ORAL
Refills: 0 | COMMUNITY
Start: 2017-07-11 | End: 2018-02-07 | Stop reason: ALTCHOICE

## 2017-07-26 RX ORDER — METOPROLOL SUCCINATE 25 MG/1
25 TABLET, EXTENDED RELEASE ORAL DAILY
Qty: 90 TAB | Refills: 1 | Status: SHIPPED | OUTPATIENT
Start: 2017-07-26 | End: 2017-08-16 | Stop reason: SDUPTHER

## 2017-07-26 RX ORDER — FOLIC ACID 1 MG/1
1 TABLET ORAL DAILY
Qty: 90 TAB | Refills: 1 | Status: SHIPPED | OUTPATIENT
Start: 2017-07-26 | End: 2017-08-16 | Stop reason: SDUPTHER

## 2017-07-26 NOTE — PATIENT INSTRUCTIONS

## 2017-07-26 NOTE — PROGRESS NOTES
Sanjay Michael is a 65 y.o. female and presents for annual Medicare Wellness Visit. She continues to see Dr. Faby Alfred every 3-months for SLE and arthritis with joint pain. She has continued to require Percocet intermittently and is not over using the medication. Only uses it when her pain is so severe it prevents her from activities of daily living. Today, in addition to the Medicare wellness, we will review her chronic medical issues. She will need a diabetic foot exam, microalbumin urine and fasting labs. We will give her the fasting lab orders to take to a [facility] near her since she is not fasting today. Problem List: Reviewed with patient and discussed risk factors.     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    H/O Bell's palsy Z86.69    Diabetes mellitus ([address] E11.9    Diabetic polyneuropathy ([address] E11.42    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    Unspecified hereditary and idiopathic peripheral neuropathy G60.9    Type II or unspecified type diabetes mellitus with neurological manifestations, not stated as uncontrolled E11.49    Lupus ([address] M32.9    Raynauds syndrome I73.00    Hypertension I10       Current medical providers:  Patient Care Team:  Serena Weldon MD as PCP - General  Aby Burris MD as Physician (Rheumatology)  Serena Weldon MD as Physician (Pediatrics)  Jossue Rose MD (Neurology)    PSH: Reviewed with patient  Past Surgical History:   Procedure Laterality Date    ABDOMEN SURGERY PROC UNLISTED      gastric bypass. . hernia    CARDIAC SURG PROCEDURE UNLIST      HX CHOLECYSTECTOMY      HX CORONARY STENT PLACEMENT      HX HERNIA REPAIR      HX ORTHOPAEDIC      bilateral knees    HX ORTHOPAEDIC      r shoulder    HX ORTHOPAEDIC      Laminectomy    [employer] INC IMPLTJ NEUROSTIMULATOR ELTRD SACRAL NERVE          SH: Reviewed with patient  Social History   Substance Use Topics    Smoking status: Former Smoker     Quit date: 12/30/1989    Smokeless tobacco: Never Used    Alcohol use No       FH: Reviewed with patient  Family History   Problem Relation Age of Onset    Diabetes Mother     Hypertension Mother     Heart Disease Mother     Diabetes Father     Hypertension Father     Heart Disease Father     Arthritis-rheumatoid Sister     Elevated Lipids Sister    Priya Olson Arthritis-rheumatoid Sister     Hypertension Sister     Diabetes Sister     Thyroid Disease Sister     Arthritis-rheumatoid Other        Medications/Allergies: Reviewed with patient  Current Outpatient Prescriptions on File Prior to Visit   Medication Sig Dispense Refill    gabapentin (NEURONTIN) 600 mg tablet TAKE 1 TABLET BY MOUTH TWICE DAILY 60 Tab 0    pantoprazole (PROTONIX) 40 mg tablet TAKE 1 TABLET BY MOUTH DAILY 30 Tab 3    atorvastatin (LIPITOR) 10 mg tablet TAKE 1 TABLET BY MOUTH EVERY DAY 90 Tab 0    glipiZIDE SR (GLUCOTROL XL) 2.5 mg CR tablet TAKE 1 TABLET BY MOUTH TWICE DAILY 180 Tab 0    metFORMIN (GLUCOPHAGE) 500 mg tablet TAKE 1 TABLET BY MOUTH TWICE DAILY WITH MEALS 180 Tab 0    hydroxychloroquine (PLAQUENIL) 200 mg tablet TAKE 1 TABLET BY MOUTH TWICE DAILY OR AS DIRECTED 60 Tab 5    cyanocobalamin (VITAMIN B-12) 1,000 mcg/mL injection Start: 1000 mcg SC/IM qd x1wk, then qwk x1mo, then every month 1 Vial 3    busPIRone (BUSPAR) 5 mg tablet Take 5 mg by mouth two (2) times a day.   5    nortriptyline (PAMELOR) 10 mg capsule TAKE 1 CAPSULE BY MOUTH EVERY NIGHT AT BEDTIME 30 Cap 0    gabapentin (NEURONTIN) 300 mg capsule TAKE ONE CAPSULE BY MOUTH FOUR TIMES DAILY 120 Cap 6    glucose blood VI test strips (ASCENSIA CONTOUR) strip Glucometer for 250.00 Pt. test blood sugar once daily (in morning) 50 Strip 11    DULoxetine (CYMBALTA) 30 mg capsule TAKE 1 CAPSULE BY MOUTH EVERY DAY 30 Cap 3    metoprolol succinate (TOPROL-XL) 25 mg XL tablet TAKE 1 TABLET BY MOUTH DAILY 90 Tab 0    folic acid (FOLVITE) 1 mg tablet TAKE 1 TABLET BY MOUTH DAILY 90 Tab 0    HYDROcodone-acetaminophen (NORCO) 5-325 mg per tablet Take 1 Tab by mouth every six (6) hours as needed for Pain. 60 Tab 0    ESTRACE 0.01 % (0.1 mg/gram) vaginal cream Take 42.5 mg by mouth once over twenty-four (24) hours. 1    ferrous sulfate 325 mg (65 mg iron) tablet TAKE 1 TABLET BY MOUTH TWICE DAILY 60 Tab 0    B.infantis-B.ani-B.long-B.bifi 10-15 mg TbEC Take 1 Tab by mouth daily.  acetaminophen (TYLENOL) 650 mg CR tablet Take 1,300 mg by mouth daily as needed for Pain.  BD TUBERCULIN SYRINGE 1 mL 25 x 5/8\" syrg USE ONCE A WEEK WITH METHOTREXATE 13 Syringe 3    Syringe with Needle, Safety (3CC SAFETY SYRINGE 25GX5/8\") 3 mL 25 x 5/8\" syrg To be used with B12 injections 100 Each 1    Blood-Glucose Meter (CONTOUR METER) monitoring kit Dx: 250. Check blood sugars daily. 1 kit 11    Walker Misc Rolling walker. Patient with spinal stenosis and stopped posture. Pain with limited ambulation. 1 Each 0    ZINC PO Take 50 mg by mouth daily.  cholecalciferol, vitamin d3, (VITAMIN D3) 1,000 unit tablet Take 2,000 Units by mouth daily.  calcium-vitamin D (CALCIUM 500+D) 500 mg(1,250mg) -200 unit per tablet Take 3 Tabs by mouth daily.  aspirin 81 mg Tab Take 81 mg by mouth daily.  MULTIVITAMINS (MULTIVITAMIN PO) Take 1 Tab by mouth daily.  predniSONE (DELTASONE) 5 mg tablet 10 mg once daily. Lower by 2.5 mg every 4 days 30 Tab 0    fluocinoNIDE (LIDEX) 0.05 % topical cream Apply  to affected area two (2) times a day. 15 g 0     No current facility-administered medications on file prior to visit. Allergies   Allergen Reactions    Cephalosporins Nausea and Vomiting     Severe vomiting       Objective:  Visit Vitals    /56 (BP 1 Location: Right arm, BP Patient Position: Sitting)    Pulse 75    Temp 98.5 °F (36.9 °C) (Oral)    Resp 16    Ht 5' 2\" (1.575 m)    Wt 211 lb (95.7 kg)    SpO2 98%    BMI 38.59 kg/m2     General:  Alert, cooperative, no distress, appears stated age. Eyes:  Conjunctivae/corneas clear. PERRL, EOMs intact. Fundi benign   Ears:  Normal TMs and external ear canals both ears. Nose: Nares normal. Septum midline. Mucosa normal. No drainage or sinus tenderness. Mouth/Throat: Lips, mucosa, and tongue normal. Teeth and gums normal.   Neck: Supple, symmetrical, trachea midline, no adenopathy, thyroid: no enlargement/tenderness/nodules, no carotid bruit and no JVD. Back:   Symmetric, no curvature. ROM normal. No CVA tenderness. Lungs:   Clear to auscultation bilaterally. Heart:  Regular rate and rhythm, S1, S2 normal, no murmur, click, rub or gallop. Abdomen:   Soft, non-tender. Bowel sounds normal. No masses,  No organomegaly. Extremities: Extremities normal, atraumatic, no cyanosis or edema. Examination of the feet reveals warm, good capillary refill, no trophic changes or ulcerative lesions, normal monofilament exam and normal sensory exam.     Pulses: 2+ and symmetric all extremities. Skin: Skin color, texture, turgor normal. No rashes or lesions   Lymph nodes: Cervical, supraclavicular, and axillary nodes normal.   Neurologic: CNII-XII intact. Normal strength, sensation and reflexes throughout. Body mass index is 38.59 kg/(m^2).     Assessment of cognitive impairment: Alert and oriented x 3    Depression Screen:   PHQ over the last two weeks 7/26/2017   Little interest or pleasure in doing things Not at all   Feeling down, depressed or hopeless Not at all   Total Score PHQ 2 0       Fall Risk Assessment:    Fall Risk Assessment, last 12 mths 7/26/2017   Able to walk? Yes   Fall in past 12 months? No   Fall with injury? -   Number of falls in past 12 months -   Fall Risk Score -       Functional Ability:   Does the patient exhibit a steady gait? yes   How long did it take the patient to get up and walk from a sitting position? Few seconds   Is the patient self reliant?  (ie can do own laundry, meals, household chores)  yes     Does the patient handle his/her own medications? yes     Does the patient handle his/her own money? yes     Is the patients home safe (ie good lighting, handrails on stairs and bath, etc.)? yes     Did you notice or did patient express any hearing difficulties? no     Did you notice or did patient express any vision difficulties?   no     Were distance and reading eye charts used? no     Advance Care Planning:   Patient was offered the opportunity to discuss advance care planning:  yes    Does patient have an Advance Directive: yes   If no, did you provide information on [facility]?   N/A       Plan:      Orders Placed This Encounter    nitrofurantoin, macrocrystal-monohydrate, (MACROBID) 100 mg capsule       Health Maintenance   Topic Date Due    EYE EXAM RETINAL OR DILATED Q1  Addressed    INFLUENZA AGE 9 TO ADULT  Addressed    GLAUCOMA SCREENING Q2Y  Addressed    LIPID PANEL Q1  01/06/2018    HEMOGLOBIN A1C Q6M  01/26/2018    FOOT EXAM Q1  07/26/2018    MICROALBUMIN Q1  07/26/2018    MEDICARE YEARLY EXAM  07/27/2018    DTaP/Tdap/Td series (2 - Td) 12/05/2023    OSTEOPOROSIS SCREENING (DEXA)  Completed    ZOSTER VACCINE AGE 60>  Addressed    Pneumococcal 65+ Low/Medium Risk  Completed         LABS:  Results for orders placed or performed in visit on 07/26/17   AMB POC URINALYSIS DIP STICK AUTO W/ MICRO   Result Value Ref Range    Color (UA POC) Dark Yellow     Clarity (UA POC) Clear     Glucose (UA POC) Negative Negative    Bilirubin (UA POC) Negative Negative    Ketones (UA POC) Negative Negative    Specific gravity (UA POC) 1.020 1.001 - 1.035    Blood (UA POC) Negative Negative    pH (UA POC) 5.5 4.6 - 8.0    Protein (UA POC) Trace Negative mg/dL    Urobilinogen (UA POC) 0.2 mg/dL 0.2 - 1    Nitrites (UA POC) Negative Negative    Leukocyte esterase (UA POC) Trace Negative   AMB POC URINE, MICROALBUMIN, SEMIQUANT (3 RESULTS)   Result Value Ref Range    ALBUMIN, URINE POC 80 Negative mg/L    CREATININE, URINE  mg/dL    Microalbumin/creat ratio (POC)  MG/G         ASSESSMENT and PLAN:    ICD-10-CM ICD-9-CM    1. Medicare annual wellness visit, subsequent Z00.00 V70.0 Anticipatory guidance discussed. Immunizations reviewed  HM updated. 2. Essential hypertension with goal blood pressure less than 130/80 I10 401.9 metoprolol succinate (TOPROL-XL) 25 mg XL tablet      METABOLIC PANEL, COMPREHENSIVE   3. Arthropathy M12.9 716.90 HYDROcodone-acetaminophen (NORCO) 5-325 mg per tablet   4. Myofacial muscle pain M79.1 729.1 HYDROcodone-acetaminophen (NORCO) 5-325 mg per tablet   5. Degenerative lumbar disc M51.36 722.52 HYDROcodone-acetaminophen (NORCO) 5-325 mg per tablet   6. Vitamin D deficiency E55.9 268.9 VITAMIN D, 25 HYDROXY   7. Type 2 diabetes mellitus without complication, without long-term current use of insulin (HCC) E11.9 250.00 HEMOGLOBIN A1C WITH EAG       DIABETES FOOT EXAM      AMB POC URINE, MICROALBUMIN, SEMIQUANT (3 RESULTS)   8. Pure hypercholesterolemia E78.00 272.0 LIPID PANEL      CRP, HIGH SENSITIVITY      CBC WITH AUTOMATED DIFF   9. Screening for osteoporosis Z13.820 V82.81 DEXA BONE DENSITY STUDY AXIAL   10. Post-menopause Z78.0 V49.81 DEXA BONE DENSITY STUDY AXIAL   11. Abnormal finding on urinalysis R82.90 791.9 AMB POC URINALYSIS DIP STICK AUTO W/ MICRO      CULTURE, URINE     I have discussed the above diagnosis with the patient and the intended treatment plan as seen in the above orders.  The patient has received an after-visit summary and questions were answered concerning future plans. Asked to return should symptoms worsen or not improve with treatment. Any pending labs and studies will be relayed to patient when they become available. Pt verbalizes understanding of plan of care and denies further questions or concerns at this time. Follow-up Disposition:  Return in about 1 year (around 7/26/2018), or if symptoms worsen or fail to improve, for every 4-6 months for chronic medical problems.

## 2017-07-26 NOTE — MR AVS SNAPSHOT
Visit Information Date & Time Provider Department Dept. Phone Encounter #  
 7/26/2017 10:30 AM Danii MarieReuben [phone] Your Appointments 8/21/2017  1:40 PM  
Follow Up with Cruz Saleem MD  
[facility]) Appt Note: follow  up neuropathy  $0CP  alicia  2/20/17 [place] [address] [facility] [address] [facility] [address] [address] [phone]  
  
   
 [place] [id number] [address] [place] Upcoming Health Maintenance Date Due  
 EYE EXAM RETINAL OR DILATED Q1 10/19/2016 INFLUENZA AGE 9 TO ADULT 8/1/2017 GLAUCOMA SCREENING Q2Y 10/19/2017 LIPID PANEL Q1 1/6/2018 HEMOGLOBIN A1C Q6M 1/26/2018 FOOT EXAM Q1 7/26/2018 MICROALBUMIN Q1 7/26/2018 MEDICARE YEARLY EXAM 7/27/2018 DTaP/Tdap/Td series (2 - Td) 12/5/2023 Allergies as of 7/26/2017  Review Complete On: 7/26/2017 By: Danii Marie MD  
  
 Severity Noted Reaction Type Reactions Cephalosporins High 12/30/2009    Nausea and Vomiting Severe vomiting Current Immunizations  Reviewed on 6/29/2017 Name Date Influenza Vaccine 12/5/2013 Influenza Vaccine Petra Floyd) 9/6/2016 Influenza Vaccine (Quad) PF 11/10/2014 Influenza Vaccine PF 10/20/2015 Influenza Vaccine Split 10/10/2012  4:30 PM, 11/14/2011, 10/25/2010 Pneumococcal Polysaccharide (PPSV-23) 12/5/2013 Tdap 12/5/2013 ZZZ-RETIRED (DO NOT USE) Pneumococcal Vaccine (Unspecified Type) 6/5/2008 Not reviewed this visit You Were Diagnosed With   
  
 Codes Comments Medicare annual wellness visit, subsequent    -  Primary ICD-10-CM: Z00.00 ICD-9-CM: V70.0 Essential hypertension with goal blood pressure less than 130/80     ICD-10-CM: I10 
ICD-9-CM: 401.9 Arthropathy     ICD-10-CM: M12.9 ICD-9-CM: 716.90 Myofacial muscle pain     ICD-10-CM: M79.1 ICD-9-CM: 729.1 Degenerative lumbar disc     ICD-10-CM: M51.36 
ICD-9-CM: 722.52 Vitamin D deficiency     ICD-10-CM: E55.9 ICD-9-CM: 268.9 Type 2 diabetes mellitus without complication, without long-term current use of insulin (HCC)     ICD-10-CM: E11.9 ICD-9-CM: 250.00 Pure hypercholesterolemia     ICD-10-CM: E78.00 ICD-9-CM: 272.0 Screening for osteoporosis     ICD-10-CM: Z13.820 ICD-9-CM: V82.81 Post-menopause     ICD-10-CM: Z78.0 ICD-9-CM: V49.81 Abnormal finding on urinalysis     ICD-10-CM: R82.90 ICD-9-CM: 791.9 Vitals BP Pulse Temp Resp Height(growth percentile) Weight(growth percentile) 115/56 (BP 1 Location: Right arm, BP Patient Position: Sitting) 75 98.5 °F (36.9 °C) (Oral) 16 5' 2\" (1.575 m) 211 lb (95.7 kg) SpO2 BMI OB Status Smoking Status 98% 38.59 kg/m2 Postmenopausal Former Smoker BMI and BSA Data Body Mass Index Body Surface Area 38.59 kg/m 2 2.05 m 2 Preferred Pharmacy Pharmacy Name Phone [address] [facility] 53 [address] ([address] [phone] Your Updated Medication List  
  
   
This list is accurate as of: 7/26/17 11:40 AM.  Always use your most recent med list.  
  
  
  
  
 acetaminophen 650 mg CR tablet Commonly known as:  TYLENOL Take 1,300 mg by mouth daily as needed for Pain. aspirin 81 mg tablet Take 81 mg by mouth daily. atorvastatin 10 mg tablet Commonly known as:  LIPITOR  
TAKE 1 TABLET BY MOUTH EVERY DAY  
  
 B.infantis-B.ani-B.long-B.bifi 10-15 mg Tbec Take 1 Tab by mouth daily. BD TUBERCULIN SYRINGE 1 mL 25 gauge x 5/8\" Syrg Generic drug:  Syringe with Needle (Disp) USE ONCE A WEEK WITH METHOTREXATE Blood-Glucose Meter monitoring kit Commonly known as:  CONTOUR METER Dx: 250. Check blood sugars daily. busPIRone 5 mg tablet Commonly known as:  BUSPAR Take 5 mg by mouth two (2) times a day. CALCIUM 500+D 500 mg(1,250mg) -200 unit per tablet Generic drug:  calcium-vitamin D Take 3 Tabs by mouth daily. cholecalciferol 1,000 unit tablet Commonly known as:  VITAMIN D3 Take 2,000 Units by mouth daily. cyanocobalamin 1,000 mcg/mL injection Commonly known as:  VITAMIN B-12 Start: 1000 mcg SC/IM qd x1wk, then qwk x1mo, then every month DULoxetine 30 mg capsule Commonly known as:  CYMBALTA TAKE 1 CAPSULE BY MOUTH EVERY DAY  
  
 ESTRACE 0.01 % (0.1 mg/gram) vaginal cream  
Generic drug:  estradiol Take 42.5 mg by mouth once over twenty-four (24) hours. ferrous sulfate 325 mg (65 mg iron) tablet TAKE 1 TABLET BY MOUTH TWICE DAILY  
  
 fluocinoNIDE 0.05 % topical cream  
Commonly known as:  LIDEX Apply  to affected area two (2) times a day. folic acid 1 mg tablet Commonly known as:  Google Take 1 Tab by mouth daily for 90 days. * gabapentin 300 mg capsule Commonly known as:  NEURONTIN  
TAKE ONE CAPSULE BY MOUTH FOUR TIMES DAILY * gabapentin 600 mg tablet Commonly known as:  NEURONTIN  
TAKE 1 TABLET BY MOUTH TWICE DAILY  
  
 glipiZIDE SR 2.5 mg CR tablet Commonly known as:  GLUCOTROL XL  
TAKE 1 TABLET BY MOUTH TWICE DAILY  
  
 glucose blood VI test strips strip Commonly known as:  Ascensia CONTOUR Glucometer for 250.00 Pt. test blood sugar once daily (in morning) HYDROcodone-acetaminophen 5-325 mg per tablet Commonly known as:  Ruffus Homme Take 1 Tab by mouth every six (6) hours as needed for Pain.  
  
 hydroxychloroquine 200 mg tablet Commonly known as:  PLAQUENIL  
TAKE 1 TABLET BY MOUTH TWICE DAILY OR AS DIRECTED  
  
 metFORMIN 500 mg tablet Commonly known as:  GLUCOPHAGE  
TAKE 1 TABLET BY MOUTH TWICE DAILY WITH MEALS  
  
 metoprolol succinate 25 mg XL tablet Commonly known as:  TOPROL-XL Take 1 Tab by mouth daily. MULTIVITAMIN PO Take 1 Tab by mouth daily. nitrofurantoin (macrocrystal-monohydrate) 100 mg capsule Commonly known as:  MACROBID TK 1 C PO BID FOR 5 DAYS FOR ACUTE SYMPTOMS  
  
 nortriptyline 10 mg capsule Commonly known as:  PAMELOR  
TAKE 1 CAPSULE BY MOUTH EVERY NIGHT AT BEDTIME  
  
 pantoprazole 40 mg tablet Commonly known as:  PROTONIX  
TAKE 1 TABLET BY MOUTH DAILY predniSONE 5 mg tablet Commonly known as:  DELTASONE  
10 mg once daily. Lower by 2.5 mg every 4 days Syringe with Needle, Safety 3 mL 25 gauge x 5/8\" Syrg Commonly known as:  3cc Safety Syringe 25Gx5/8\" To be used with B12 injections Frutoso [employer] Rolling walker. Patient with spinal stenosis and stopped posture. Pain with limited ambulation. ZINC PO Take 50 mg by mouth daily. * Notice: This list has 2 medication(s) that are the same as other medications prescribed for you. Read the directions carefully, and ask your doctor or other care provider to review them with you. Prescriptions Printed Refills HYDROcodone-acetaminophen (NORCO) 5-325 mg per tablet 0 Sig: Take 1 Tab by mouth every six (6) hours as needed for Pain. Class: Print Route: Oral  
  
Prescriptions Sent to Pharmacy Refills  
 metoprolol succinate (TOPROL-XL) 25 mg XL tablet 1 Sig: Take 1 Tab by mouth daily. Class: Normal  
 Pharmacy: [address] Ph #: [phone] Route: Oral  
 folic acid (FOLVITE) 1 mg tablet 1 Sig: Take 1 Tab by mouth daily for 90 days. Class: Normal  
 Pharmacy: [address] Ph #: [phone] Route: Oral  
  
We Performed the Following AMB POC URINALYSIS DIP STICK AUTO W/ MICRO [35842 CPT(R)] CBC WITH AUTOMATED DIFF [15423 CPT(R)] CRP, HIGH SENSITIVITY [55410 CPT(R)] CULTURE, URINE [id number] CPT(R)] HEMOGLOBIN A1C WITH EAG [72287 CPT(R)]  DIABETES FOOT EXAM [HM7 Custom] LIPID PANEL [44042 CPT(R)] METABOLIC PANEL, COMPREHENSIVE [87888 CPT(R)] VITAMIN D, 25 HYDROXY [id number] CPT(R)] To-Do List   
 08/02/2017 Imaging:  DEXA BONE DENSITY STUDY AXIAL Patient Instructions Well Visit, Over 72: Care Instructions Your Care Instructions Physical exams can help you stay healthy. Your doctor has checked your overall health and may have suggested ways to take good care of yourself. He or she also may have recommended tests. At home, you can help prevent illness with healthy eating, regular exercise, and other steps. Follow-up care is a key part of your treatment and safety. Be sure to make and go to all appointments, and call your doctor if you are having problems. It's also a good idea to know your test results and keep a list of the medicines you take. How can you care for yourself at home? · Reach and stay at a healthy weight. This will lower your risk for many problems, such as obesity, diabetes, heart disease, and high blood pressure. · Get at least 30 minutes of exercise on most days of the week. Walking is a good choice. You also may want to do other activities, such as running, swimming, cycling, or playing tennis or team sports. · Do not smoke. Smoking can make health problems worse. If you need help quitting, talk to your doctor about stop-smoking programs and medicines. These can increase your chances of quitting for good. · Protect your skin from too much sun. When you're outdoors from 10 a.m. to 4 p.m., stay in the shade or cover up with clothing and a hat with a wide brim. Wear sunglasses that block UV rays. Even when it's cloudy, put broad-spectrum sunscreen (SPF 30 or higher) on any exposed skin. · See a dentist one or two times a year for checkups and to have your teeth cleaned. · Wear a seat belt in the car. · Limit alcohol to 2 drinks a day for men and 1 drink a day for women. Too much alcohol can cause health problems. Follow your doctor's advice about when to have certain tests. These tests can spot problems early. For men and women · Cholesterol. Your doctor will tell you how often to have this done based on your overall health and other things that can increase your risk for heart attack and stroke. · Blood pressure. Have your blood pressure checked during a routine doctor visit. Your doctor will tell you how often to check your blood pressure based on your age, your blood pressure results, and other factors. · Diabetes. Ask your doctor whether you should have tests for diabetes. · Vision. Experts recommend that you have yearly exams for glaucoma and other age-related eye problems. · Hearing. Tell your doctor if you notice any change in your hearing. You can have tests to find out how well you hear. · Colon cancer tests. Keep having colon cancer tests as your doctor recommends. You can have one of several types of tests. · Heart attack and stroke risk. At least every 4 to 6 years, you should have your risk for heart attack and stroke assessed. Your doctor uses factors such as your age, blood pressure, cholesterol, and whether you smoke or have diabetes to show what your risk for a heart attack or stroke is over the next 10 years. · Osteoporosis. Talk to your doctor about whether you should have a bone density test to find out whether you have thinning bones. Also ask your doctor about whether you should take calcium and vitamin D supplements. For women · Pap test and pelvic exam. You may no longer need a Pap test. Talk with your doctor about whether to stop or continue to have Pap tests. · Breast exam and mammogram. Ask how often you should have a mammogram, which is an X-ray of your breasts. A mammogram can spot breast cancer before it can be felt and when it is easiest to treat. · Thyroid disease. Talk to your doctor about whether to have your thyroid checked as part of a regular physical exam. Women have an increased chance of a thyroid problem. For men · Prostate exam. Talk to your doctor about whether you should have a blood test (called a PSA test) for prostate cancer. Experts disagree on whether men should have this test. Some experts recommend that you discuss the benefits and risks of the test with your doctor. · Abdominal aortic aneurysm. Ask your doctor whether you should have a test to check for an aneurysm. You may need a test if you ever smoked or if your parent, brother, sister, or child has had an aneurysm. When should you call for help? Watch closely for changes in your health, and be sure to contact your doctor if you have any problems or symptoms that concern you. Where can you learn more? Go to http://jennifer-vincent.info/. Enter D859 in the search box to learn more about \"Well Visit, Over 65: Care Instructions. \" Current as of: July 19, 2016 Content Version: 11.3 © 0226-8427 [employer]. Care instructions adapted under license by [employer] (which disclaims liability or warranty for this information). If you have questions about a medical condition or this instruction, always ask your healthcare professional. [place] [address] any warranty or liability for your use of this information. Introducing [facility]! Dear Gabby Quinteros: Thank you for requesting a [employer] account. Our records indicate that you have previously registered for a [employer] account but its currently inactive. Please call our [employer] support line at [phone]. Additional Information If you have questions, please visit the Frequently Asked Questions section of the [employer] website at [email or web address] Remember, [employer] is NOT to be used for urgent needs. For medical emergencies, dial 911. Now available from your iPhone and Android! Please provide this summary of care documentation to your next provider. Your primary care clinician is listed as [address]. If you have any questions after today's visit, please call [phone].

## 2017-07-27 RX ORDER — FOLIC ACID 1 MG/1
TABLET ORAL
Qty: 90 TAB | Refills: 0 | Status: SHIPPED | OUTPATIENT
Start: 2017-07-27 | End: 2018-02-07 | Stop reason: SDUPTHER

## 2017-07-27 RX ORDER — METOPROLOL SUCCINATE 25 MG/1
TABLET, EXTENDED RELEASE ORAL
Qty: 90 TAB | Refills: 0 | Status: SHIPPED | OUTPATIENT
Start: 2017-07-27 | End: 2018-02-07 | Stop reason: SDUPTHER

## 2017-07-27 RX ORDER — METFORMIN HYDROCHLORIDE 500 MG/1
TABLET ORAL
Qty: 180 TAB | Refills: 0 | Status: SHIPPED | OUTPATIENT
Start: 2017-07-27 | End: 2017-10-23 | Stop reason: SDUPTHER

## 2017-07-27 RX ORDER — METFORMIN HYDROCHLORIDE 500 MG/1
TABLET ORAL
Qty: 180 TAB | Refills: 0 | Status: SHIPPED | OUTPATIENT
Start: 2017-07-27 | End: 2017-08-16 | Stop reason: SDUPTHER

## 2017-07-28 LAB
BACTERIA UR CULT: NO GROWTH
BACTERIA UR CULT: NORMAL

## 2017-08-01 ENCOUNTER — HOSPITAL ENCOUNTER (OUTPATIENT)
Dept: LAB | Age: 77
Discharge: HOME | End: 2017-08-01
Payer: MEDICARE

## 2017-08-01 PROCEDURE — 83036 HEMOGLOBIN GLYCOSYLATED A1C: CPT

## 2017-08-01 PROCEDURE — 80053 COMPREHEN METABOLIC PANEL: CPT

## 2017-08-01 PROCEDURE — 36415 COLL VENOUS BLD VENIPUNCTURE: CPT

## 2017-08-01 PROCEDURE — 80061 LIPID PANEL: CPT

## 2017-08-01 PROCEDURE — 85025 COMPLETE CBC W/AUTO DIFF WBC: CPT

## 2017-08-01 PROCEDURE — 82306 VITAMIN D 25 HYDROXY: CPT

## 2017-08-01 PROCEDURE — 86141 C-REACTIVE PROTEIN HS: CPT

## 2017-08-02 LAB
25(OH)D3+25(OH)D2 SERPL-MCNC: 44.5 NG/ML (ref 30–100)
ALBUMIN SERPL-MCNC: 3.8 G/DL (ref 3.5–4.8)
ALBUMIN/GLOB SERPL: 1.6 {RATIO} (ref 1.2–2.2)
ALP SERPL-CCNC: 53 IU/L (ref 39–117)
ALT SERPL-CCNC: 17 IU/L (ref 0–32)
AST SERPL-CCNC: 32 IU/L (ref 0–40)
BASOPHILS # BLD AUTO: 0 X10E3/UL (ref 0–0.2)
BASOPHILS NFR BLD AUTO: 1 %
BILIRUB SERPL-MCNC: 0.4 MG/DL (ref 0–1.2)
BUN SERPL-MCNC: 19 MG/DL (ref 8–27)
BUN/CREAT SERPL: 24 (ref 12–28)
CALCIUM SERPL-MCNC: 8.7 MG/DL (ref 8.7–10.3)
CHLORIDE SERPL-SCNC: 104 MMOL/L (ref 96–106)
CHOLEST SERPL-MCNC: 136 MG/DL (ref 100–199)
CO2 SERPL-SCNC: 27 MMOL/L (ref 18–29)
CREAT SERPL-MCNC: 0.8 MG/DL (ref 0.57–1)
CRP SERPL HS-MCNC: 0.52 MG/L (ref 0–3)
EOSINOPHIL # BLD AUTO: 0.2 X10E3/UL (ref 0–0.4)
EOSINOPHIL NFR BLD AUTO: 3 %
ERYTHROCYTE [DISTWIDTH] IN BLOOD BY AUTOMATED COUNT: 13.1 % (ref 12.3–15.4)
EST. AVERAGE GLUCOSE BLD GHB EST-MCNC: 123 MG/DL
GLOBULIN SER CALC-MCNC: 2.4 G/DL (ref 1.5–4.5)
GLUCOSE SERPL-MCNC: 81 MG/DL (ref 65–99)
HBA1C MFR BLD: 5.9 % (ref 4.8–5.6)
HCT VFR BLD AUTO: 39.6 % (ref 34–46.6)
HDLC SERPL-MCNC: 48 MG/DL
HGB BLD-MCNC: 13.1 G/DL (ref 11.1–15.9)
IMM GRANULOCYTES # BLD: 0 X10E3/UL (ref 0–0.1)
IMM GRANULOCYTES NFR BLD: 1 %
INTERPRETATION, 910389: NORMAL
LDLC SERPL CALC-MCNC: 65 MG/DL (ref 0–99)
LYMPHOCYTES # BLD AUTO: 2.5 X10E3/UL (ref 0.7–3.1)
LYMPHOCYTES NFR BLD AUTO: 41 %
Lab: NORMAL
MCH RBC QN AUTO: 32.4 PG (ref 26.6–33)
MCHC RBC AUTO-ENTMCNC: 33.1 G/DL (ref 31.5–35.7)
MCV RBC AUTO: 98 FL (ref 79–97)
MONOCYTES # BLD AUTO: 0.6 X10E3/UL (ref 0.1–0.9)
MONOCYTES NFR BLD AUTO: 9 %
NEUTROPHILS # BLD AUTO: 2.8 X10E3/UL (ref 1.4–7)
NEUTROPHILS NFR BLD AUTO: 45 %
PLATELET # BLD AUTO: 220 X10E3/UL (ref 150–379)
POTASSIUM SERPL-SCNC: 4.6 MMOL/L (ref 3.5–5.2)
PROT SERPL-MCNC: 6.2 G/DL (ref 6–8.5)
RBC # BLD AUTO: 4.04 X10E6/UL (ref 3.77–5.28)
SODIUM SERPL-SCNC: 145 MMOL/L (ref 134–144)
TRIGL SERPL-MCNC: 113 MG/DL (ref 0–149)
VLDLC SERPL CALC-MCNC: 23 MG/DL (ref 5–40)
WBC # BLD AUTO: 6.1 X10E3/UL (ref 3.4–10.8)

## 2017-08-02 RX ORDER — GLIPIZIDE 2.5 MG/1
TABLET, EXTENDED RELEASE ORAL
Qty: 180 TAB | Refills: 0 | Status: SHIPPED | OUTPATIENT
Start: 2017-08-02 | End: 2017-10-27 | Stop reason: SDUPTHER

## 2017-08-03 NOTE — PROGRESS NOTES
Labs are stable/improved. Urine culture was negative with recheck. HBA1C down to 5.9  Recheck in 6-months.

## 2017-08-07 ENCOUNTER — TELEPHONE (OUTPATIENT)
Dept: RHEUMATOLOGY | Age: 77
End: 2017-08-07

## 2017-08-07 NOTE — TELEPHONE ENCOUNTER
Returned patients call and she made me aware that  her labs were done at Dr. Mee Larkin office, her calcium levels are at 8.7 mg/dL and she would like to know if you want her to restart the prolia injections.

## 2017-08-07 NOTE — TELEPHONE ENCOUNTER
Please call patient regarding she had her labs done and wanted to know about her prolia injecton and when she can have it.   [phone]

## 2017-08-07 NOTE — TELEPHONE ENCOUNTER
Nurse visit scheduled for 8/16/17 at 1000, patient states that she is already taking vitamin D3 2,000 units twice daily and will continue after injection.

## 2017-08-07 NOTE — TELEPHONE ENCOUNTER
As long as no acute infections/ illnesses, please arrange for Prolia if authorized by insurance.  Please take vitamin D3 2000 units twice daily for now and for 10 days after Prolia (along with current calcium)

## 2017-08-08 ENCOUNTER — HOSPITAL ENCOUNTER (OUTPATIENT)
Dept: MAMMOGRAPHY | Age: 77
Discharge: HOME | End: 2017-08-08
Attending: INTERNAL MEDICINE
Payer: MEDICARE

## 2017-08-08 DIAGNOSIS — Z13.820 SCREENING FOR OSTEOPOROSIS: ICD-10-CM

## 2017-08-08 DIAGNOSIS — Z78.0 POST-MENOPAUSE: ICD-10-CM

## 2017-08-08 PROCEDURE — 77080 DXA BONE DENSITY AXIAL: CPT

## 2017-08-10 RX ORDER — ATORVASTATIN CALCIUM 10 MG/1
TABLET, FILM COATED ORAL
Qty: 90 TAB | Refills: 0 | Status: SHIPPED | OUTPATIENT
Start: 2017-08-10 | End: 2017-11-10 | Stop reason: SDUPTHER

## 2017-08-10 RX ORDER — LANOLIN ALCOHOL/MO/W.PET/CERES
CREAM (GRAM) TOPICAL
Qty: 60 TAB | Refills: 0 | Status: SHIPPED | OUTPATIENT
Start: 2017-08-10 | End: 2017-08-16 | Stop reason: SDUPTHER

## 2017-08-14 NOTE — PROGRESS NOTES
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 Is she on Prolia? Is she seeing an endocrinologist for osteoporosis?     Thanks,   Dr. Brooke Gupta

## 2017-08-16 ENCOUNTER — CLINICAL SUPPORT (OUTPATIENT)
Dept: RHEUMATOLOGY | Age: 77
End: 2017-08-16

## 2017-08-16 VITALS
SYSTOLIC BLOOD PRESSURE: 110 MMHG | BODY MASS INDEX: 41.74 KG/M2 | RESPIRATION RATE: 18 BRPM | DIASTOLIC BLOOD PRESSURE: 67 MMHG | HEIGHT: 60 IN | WEIGHT: 212.6 LBS | TEMPERATURE: 98.2 F | OXYGEN SATURATION: 96 % | HEART RATE: 77 BPM

## 2017-08-16 DIAGNOSIS — E53.8 B12 DEFICIENCY: ICD-10-CM

## 2017-08-16 DIAGNOSIS — M81.0 OSTEOPOROSIS, POSTMENOPAUSAL: Primary | ICD-10-CM

## 2017-08-16 NOTE — PROGRESS NOTES
Chief Complaint   Patient presents with    Injection     Prolia Injection     Mahnaz Rowell is a 65 y.o. female that is here today for her Prolia injection,  Injected 1 mL of Prolia (denosumab) in patients right deltoid,  Patient tolerated well,  Patient waited 15 minutes for observation of injection site,  After 15 minutes no reaction noted at injection site,  Lot # [id number] exp: 10/19 [facility] [id number].

## 2017-08-18 ENCOUNTER — TELEPHONE (OUTPATIENT)
Dept: FAMILY MEDICINE CLINIC | Age: 77
End: 2017-08-18

## 2017-08-18 NOTE — TELEPHONE ENCOUNTER
Pt was advised of her BDT results and verbalized understanding. She reports that she had her 2nd injection of Prolia on Wednesday this week and she is seeing Dr. George Haji who is following her for osteoporosis.

## 2017-08-18 NOTE — PROGRESS NOTES
Pt was advised and verbalized understanding. She reports that she had her 2nd injection of Prolia on Wednesday this week and she is seeing Dr. Katiuska Bee who is following her for osteoporosis.

## 2017-08-21 ENCOUNTER — OFFICE VISIT (OUTPATIENT)
Dept: NEUROLOGY | Age: 77
End: 2017-08-21

## 2017-08-21 VITALS
SYSTOLIC BLOOD PRESSURE: 124 MMHG | DIASTOLIC BLOOD PRESSURE: 62 MMHG | WEIGHT: 212 LBS | HEIGHT: 60 IN | RESPIRATION RATE: 20 BRPM | BODY MASS INDEX: 41.62 KG/M2

## 2017-08-21 DIAGNOSIS — E11.40 NEUROPATHY DUE TO TYPE 2 DIABETES MELLITUS (HCC): Primary | ICD-10-CM

## 2017-08-21 RX ORDER — GABAPENTIN 600 MG/1
TABLET ORAL
Qty: 60 TAB | Refills: 0 | Status: SHIPPED | OUTPATIENT
Start: 2017-08-21 | End: 2017-09-17 | Stop reason: SDUPTHER

## 2017-08-21 NOTE — PROGRESS NOTES
Painful diabetic neuropathy. Neurology Consult      Subjective: Suleman Orozco is a 65 y.o. female who returns with baseline painful diabetic neuropathy. Takes very great pride in managing her sugars and says her more recent hemoglobin A1c was around 5.7. Has no current peripheral claudication history or other complications. Does get occasional cramping and posturing of the feet when she goes to bed but suggested she do some preliminary stretching active and passive to better contain this. This is an obvious feature of her neuropathy. Has numbness which is established but does know that pain registers in her feet if she would accidentally hit them. Currently on gabapentin to her satisfaction and no tolerability issues. Is a remote smoker but think goodness that is no longer the case. Revisit 6 months. Her exam looks baseline for depression of reflexes and length dependent sensory changes. Current Outpatient Prescriptions   Medication Sig Dispense Refill    gabapentin (NEURONTIN) 600 mg tablet TAKE 1 TABLET BY MOUTH TWICE DAILY 60 Tab 0    atorvastatin (LIPITOR) 10 mg tablet TAKE 1 TABLET BY MOUTH EVERY DAY 90 Tab 0    glipiZIDE SR (GLUCOTROL XL) 2.5 mg CR tablet TAKE 1 TABLET BY MOUTH TWICE DAILY 180 Tab 0    metFORMIN (GLUCOPHAGE) 500 mg tablet TAKE 1 TABLET BY MOUTH TWICE DAILY WITH MEALS 180 Tab 0    metoprolol succinate (TOPROL-XL) 25 mg XL tablet TAKE 1 TABLET BY MOUTH DAILY 90 Tab 0    folic acid (FOLVITE) 1 mg tablet TAKE 1 TABLET BY MOUTH DAILY 90 Tab 0    HYDROcodone-acetaminophen (NORCO) 5-325 mg per tablet Take 1 Tab by mouth every six (6) hours as needed for Pain.  60 Tab 0    pantoprazole (PROTONIX) 40 mg tablet TAKE 1 TABLET BY MOUTH DAILY 30 Tab 3    hydroxychloroquine (PLAQUENIL) 200 mg tablet TAKE 1 TABLET BY MOUTH TWICE DAILY OR AS DIRECTED 60 Tab 5    cyanocobalamin (VITAMIN B-12) 1,000 mcg/mL injection Start: 1000 mcg SC/IM qd x1wk, then qwk x1mo, then every month 1 Vial 3    busPIRone (BUSPAR) 5 mg tablet Take 5 mg by mouth two (2) times a day. 5    nortriptyline (PAMELOR) 10 mg capsule TAKE 1 CAPSULE BY MOUTH EVERY NIGHT AT BEDTIME 30 Cap 0    gabapentin (NEURONTIN) 300 mg capsule TAKE ONE CAPSULE BY MOUTH FOUR TIMES DAILY (Patient taking differently: TAKE ONE CAPSULE BY MOUTH twice daily) 120 Cap 6    glucose blood VI test strips (ASCENSIA CONTOUR) strip Glucometer for 250.00 Pt. test blood sugar once daily (in morning) 50 Strip 11    DULoxetine (CYMBALTA) 30 mg capsule TAKE 1 CAPSULE BY MOUTH EVERY DAY 30 Cap 3    ESTRACE 0.01 % (0.1 mg/gram) vaginal cream Take 42.5 mg by mouth once over twenty-four (24) hours. 1    ferrous sulfate 325 mg (65 mg iron) tablet TAKE 1 TABLET BY MOUTH TWICE DAILY 60 Tab 0    B.infantis-B.ani-B.long-B.bifi 10-15 mg TbEC Take 1 Tab by mouth daily.  acetaminophen (TYLENOL) 650 mg CR tablet Take 1,300 mg by mouth daily as needed for Pain.  BD TUBERCULIN SYRINGE 1 mL 25 x 5/8\" syrg USE ONCE A WEEK WITH METHOTREXATE 13 Syringe 3    Syringe with Needle, Safety (3CC SAFETY SYRINGE 25GX5/8\") 3 mL 25 x 5/8\" syrg To be used with B12 injections 100 Each 1    Blood-Glucose Meter (CONTOUR METER) monitoring kit Dx: 250. Check blood sugars daily. 1 kit 11    fluocinoNIDE (LIDEX) 0.05 % topical cream Apply  to affected area two (2) times a day. 15 g 0    Walker [employer] walker. Patient with spinal stenosis and stopped posture. Pain with limited ambulation. 1 Each 0    ZINC PO Take 50 mg by mouth daily.  cholecalciferol, vitamin d3, (VITAMIN D3) 1,000 unit tablet Take 2,000 Units by mouth daily.  calcium-vitamin D (CALCIUM 500+D) 500 mg(1,250mg) -200 unit per tablet Take 2 Tabs by mouth daily.  aspirin 81 mg Tab Take 81 mg by mouth daily.  MULTIVITAMINS (MULTIVITAMIN PO) Take 1 Tab by mouth daily.       nitrofurantoin, macrocrystal-monohydrate, (MACROBID) 100 mg capsule TK 1 C PO BID FOR 5 DAYS FOR ACUTE SYMPTOMS  0    predniSONE (DELTASONE) 5 mg tablet 10 mg once daily. Lower by 2.5 mg every 4 days 30 Tab 0      Allergies   Allergen Reactions    Cephalosporins Nausea and Vomiting     Severe vomiting     Past Medical History:   Diagnosis Date    Anemia     Arthritis     CAD (coronary artery disease) 1997    MI    Degenerative joint disease of right hip 09/14/2015     [facility] [place]/Dr. Bruno Batch    Diabetes [facility] [address]     Fracture     right shoulder; T6 compression    Gastritis     Hypercholesterolemia     Hyperlipidemia    Hypertension     Lupus ([facility] [address] 1/9/2015    Osteoporosis, post-menopausal       Past Surgical History:   Procedure Laterality Date    ABDOMEN SURGERY PROC UNLISTED      gastric bypass. . hernia    CARDIAC SURG PROCEDURE UNLIST      HX CHOLECYSTECTOMY      HX CORONARY STENT PLACEMENT      HX HERNIA REPAIR      HX ORTHOPAEDIC      bilateral knees    HX ORTHOPAEDIC      r shoulder    HX ORTHOPAEDIC      Laminectomy    NC INC IMPLTJ NEUROSTIMULATOR ELTRD SACRAL NERVE        Social History     Social History    Marital status:      Spouse name: N/A    Number of children: N/A    Years of education: N/A     Occupational History    Not on file.      Social History Main Topics    Smoking status: Former Smoker     Quit date: 12/30/1989    Smokeless tobacco: Never Used    Alcohol use No    Drug use: No    Sexual activity: Not Currently     Other Topics Concern    Not on file     Social History Narrative      Family History   Problem Relation Age of Onset    Diabetes Mother     Hypertension Mother     Heart Disease Mother     Diabetes Father     Hypertension Father     Heart Disease Father    Mammthang Muster Arthritis-rheumatoid Sister     Elevated Lipids Sister     Arthritis-rheumatoid Sister     Hypertension Sister     Diabetes Sister     Thyroid Disease Sister     Arthritis-rheumatoid Other       Visit Vitals    /62    Resp 20    Ht 4' 11.5\" (1.511 m)    Wt 96.2 kg (212 lb)    BMI 42.1 kg/m2        Review of Systems:   A comprehensive review of systems was negative except for that written in the HPI. Neuro Exam:     Appearance: The patient is well developed, well nourished, provides a coherent history and is in no acute distress. Mental Status: Oriented to time, place and person. Mood and affect appropriate. Cranial Nerves:   Intact visual fields. Fundi are benign. GARY, EOM's full, no nystagmus, no ptosis. Facial sensation is normal. Corneal reflexes are intact. Facial movement is symmetric. Hearing is normal bilaterally. Palate is midline with normal sternocleidomastoid and trapezius muscles are normal. Tongue is midline. Motor:  5/5 strength in upper and lower proximal and distal muscles. Normal bulk and tone. No fasciculations. Reflexes:   Deep tendon reflexes 0-1+/4 and symmetrical.   Sensory:    Diminished distally to touch, pinprick and vibration. Gait:  Normal gait. Tremor:   No tremor noted. Cerebellar:  No cerebellar signs present. Neurovascular:  Normal heart sounds and regular rhythm, peripheral pulses intact, and no carotid bruits. Assessment:   Painful diabetic neuropathy. Continue gabapentin as needed. Watch blood sugars and try to keep as safely and efficiently tight as possible. Passively stretching the feet and legs at night before retiring may help control some elements of cramping and posturing. Plan:   Revisit 6 months.   Signed by :  Rebeka Valencia MD

## 2017-08-21 NOTE — PATIENT INSTRUCTIONS
Learning About Living [place]  What is a living will? A living will is a legal form you use to write down the kind of care you want at the end of your life. It is used by the health professionals who will treat you if you aren't able to decide for yourself. If you put your wishes in writing, your loved ones and others will know what kind of care you want. They won't need to guess. This can ease your mind and be helpful to others. A living will is not the same as an estate or property will. An estate will explains what you want to happen with your money and property after you die. Is a living will a legal document? A living will is a legal document. Each state has its own laws about living [place]. If you move to another state, make sure that your living will is legal in the state where you now live. Or you might use a universal form that has been approved by many states. This kind of form can sometimes be completed and stored online. Your electronic copy will then be available wherever you have a connection to the Internet. In most cases, doctors will respect your wishes even if you have a form from a different state. · You don't need an  to complete a living will. But legal advice can be helpful if your state's laws are unclear, your health history is complicated, or your family can't agree on what should be in your living will. · You can change your living will at any time. Some people find that their wishes about end-of-life care change as their health changes. · In addition to making a living will, think about completing a medical power of  form. This form lets you name the person you want to make end-of-life treatment decisions for you (your \"health care agent\") if you're not able to. Many hospitals and nursing homes will give you the forms you need to complete a living will and a medical power of .   · Your living will is used only if you can't make or communicate decisions for yourself anymore. If you become able to make decisions again, you can accept or refuse any treatment, no matter what you wrote in your living will. · Your state may offer an online registry. This is a place where you can store your living will online so the doctors and nurses who need to treat you can find it right away. What should you think about when creating a living will? Talk about your end-of-life wishes with your family members and your doctor. Let them know what you want. That way the people making decisions for you won't be surprised by your choices. Think about these questions as you make your living will:  · Do you know enough about life support methods that might be used? If not, talk to your doctor so you know what might be done if you can't breathe on your own, your heart stops, or you're unable to swallow. · What things would you still want to be able to do after you receive life-support methods? Would you want to be able to walk? To speak? To eat on your own? To live without the help of machines? · If you have a choice, where do you want to be cared for? In your home? At a hospital or nursing home? · Do you want certain Restorationism practices performed if you become very ill? · If you have a choice at the end of your life, where would you prefer to die? At home? In a hospital or nursing home? Somewhere else? · Would you prefer to be buried or cremated? · Do you want your organs to be donated after you die? What should you do with your living will? · Make sure that your family members and your health care agent have copies of your living will. · Give your doctor a copy of your living will to keep in your medical record. If you have more than one doctor, make sure that each one has a copy. · You may want to put a copy of your living will where it can be easily found. Where can you learn more? Go to http://jennifer-vincent.info/.   Enter L813 in the search box to learn more about \"Learning About Living [place]. \"  Current as of: August 8, 2016  Content Version: 11.3  © 7897-8795 [employer]. Care instructions adapted under license by [employer] (which disclaims liability or warranty for this information). If you have questions about a medical condition or this instruction, always ask your healthcare professional. [address] any warranty or liability for your use of this information. Advance Directives: Care Instructions  Your Care Instructions  An advance directive is a legal way to state your wishes at the end of your life. It tells your family and your doctor what to do if you can no longer say what you want. There are two main types of advance directives. You can change them any time that your wishes change. · A living will tells your family and your doctor your wishes about life support and other treatment. · A durable power of  for health care lets you name a person to make treatment decisions for you when you can't speak for yourself. This person is called a health care agent. If you do not have an advance directive, decisions about your medical care may be made by a doctor or a  who doesn't know you. It may help to think of an advance directive as a gift to the people who care for you. If you have one, they won't have to make tough decisions by themselves. Follow-up care is a key part of your treatment and safety. Be sure to make and go to all appointments, and call your doctor if you are having problems. It's also a good idea to know your test results and keep a list of the medicines you take. How can you care for yourself at home? · Discuss your wishes with your loved ones and your doctor. This way, there are no surprises. · Many states have a unique form. Or you might use a universal form that has been approved by many states. This kind of form can sometimes be completed and stored online.  Your electronic copy will then be available wherever you have a connection to the Internet. In most cases, doctors will respect your wishes even if you have a form from a different state. · You don't need a  to do an advance directive. But you may want to get legal advice. · Think about these questions when you prepare an advance directive:  ¨ Who do you want to make decisions about your medical care if you are not able to? Many people choose a family member or close friend. ¨ Do you know enough about life support methods that might be used? If not, talk to your doctor so you understand. ¨ What are you most afraid of that might happen? You might be afraid of having pain, losing your independence, or being kept alive by machines. ¨ Where would you prefer to die? Choices include your home, a hospital, or a nursing home. ¨ Would you like to have information about hospice care to support you and your family? ¨ Do you want to donate organs when you die? ¨ Do you want certain Shinto practices performed before you die? If so, put your wishes in the advance directive. · Read your advance directive every year, and make changes as needed. When should you call for help? Be sure to contact your doctor if you have any questions. Where can you learn more? Go to http://jennifer-vincent.info/. Enter R264 in the search box to learn more about \"Advance Directives: Care Instructions. \"  Current as of: November 17, 2016  Content Version: 11.3  © 7754-5682 [employer]. Care instructions adapted under license by [employer] (which disclaims liability or warranty for this information). If you have questions about a medical condition or this instruction, always ask your healthcare professional. [place] [address] any warranty or liability for your use of this information.   [facility]   Statement to Patients  April 1, 2014      In an effort to ensure the large volume of patient prescription refills is processed in the most efficient and expeditious manner, we are asking our patients to assist us by calling your Pharmacy for all prescription refills, this will include also your  Mail Order Pharmacy. The pharmacy will contact our office electronically to continue the refill process. Please do not wait until the last minute to call your pharmacy. We need at least 48 hours (2days) to fill prescriptions. We also encourage you to call your pharmacy before going to  your prescription to make sure it is ready. With regard to controlled substance prescription refill requests (narcotic refills) that need to be picked up at our office, we ask your cooperation by providing us with at least 72 hours (3days) notice that you will need a refill. We will not refill narcotic prescription refill requests after 4:00pm on any weekday, Monday through Thursday, or after 2:00pm on Fridays, or on the weekends. We encourage everyone to explore another way of getting your prescription refill request processed using [facility], our patient web portal through our electronic medical record system. [facility] is an efficient and effective way to communicate your medication request directly to the office and  downloadable as an juli on your smart phone . [facility] also features a review functionality that allows you to view your medication list as well as leave messages for your physician. Are you ready to get connected? If so please review the attatched instructions or speak to any of our staff to get you set up right away! Thank you so much for your cooperation. Should you have any questions please contact our Practice Administrator. The Physicians and Staff,  [facility]     Patient doing well and continue the gabapentin as needed and keep a safe and tight control on blood sugars. Revisit 6 months.

## 2017-08-21 NOTE — MR AVS SNAPSHOT
Visit Information Date & Time Provider Department Dept. Phone Encounter #  
 8/21/2017  1:40 PM Marina Taylor MD [facility] [phone] [id number] Follow-up Instructions Return in about 6 months (around 2/21/2018). Upcoming Health Maintenance Date Due  
 EYE EXAM RETINAL OR DILATED Q1 10/19/2016 INFLUENZA AGE 9 TO ADULT 8/1/2017 GLAUCOMA SCREENING Q2Y 10/19/2017 HEMOGLOBIN A1C Q6M 2/1/2018 FOOT EXAM Q1 7/26/2018 MICROALBUMIN Q1 7/26/2018 MEDICARE YEARLY EXAM 7/27/2018 LIPID PANEL Q1 8/1/2018 DTaP/Tdap/Td series (2 - Td) 12/5/2023 Allergies as of 8/21/2017  Review Complete On: 8/21/2017 By: Marina Taylor MD  
  
 Severity Noted Reaction Type Reactions Cephalosporins High 12/30/2009    Nausea and Vomiting Severe vomiting Current Immunizations  Reviewed on 6/29/2017 Name Date Influenza Vaccine 12/5/2013 Influenza Vaccine Nafisa Vitaly) 9/6/2016 Influenza Vaccine (Quad) PF 11/10/2014 Influenza Vaccine PF 10/20/2015 Influenza Vaccine Split 10/10/2012  4:30 PM, 11/14/2011, 10/25/2010 Pneumococcal Polysaccharide (PPSV-23) 12/5/2013 Tdap 12/5/2013 ZZZ-RETIRED (DO NOT USE) Pneumococcal Vaccine (Unspecified Type) 6/5/2008 Not reviewed this visit You Were Diagnosed With   
  
 Codes Comments Neuropathy due to type 2 diabetes mellitus [facility]    -  Primary ICD-10-CM: E11.40 ICD-9-CM: 250.60, 357.2 Vitals BP Resp Height(growth percentile) Weight(growth percentile) BMI OB Status 124/62 20 4' 11.5\" (1.511 m) 212 lb (96.2 kg) 42.1 kg/m2 Postmenopausal  
 Smoking Status Former Smoker Vitals History BMI and BSA Data Body Mass Index Body Surface Area  
 42.1 kg/m 2 2.01 m 2 Preferred Pharmacy Pharmacy Name Phone [address] [facility] [address] ([address] [phone] Your Updated Medication List  
  
   
This list is accurate as of: 8/21/17  2:01 PM.  Always use your most recent med list.  
  
  
  
  
 acetaminophen 650 mg CR tablet Commonly known as:  TYLENOL Take 1,300 mg by mouth daily as needed for Pain. aspirin 81 mg tablet Take 81 mg by mouth daily. atorvastatin 10 mg tablet Commonly known as:  LIPITOR  
TAKE 1 TABLET BY MOUTH EVERY DAY  
  
 B.infantis-B.ani-B.long-B.bifi 10-15 mg Tbec Take 1 Tab by mouth daily. BD TUBERCULIN SYRINGE 1 mL 25 gauge x 5/8\" Syrg Generic drug:  Syringe with Needle (Disp) USE ONCE A WEEK WITH METHOTREXATE Blood-Glucose Meter monitoring kit Commonly known as:  CONTOUR METER Dx: 250. Check blood sugars daily. busPIRone 5 mg tablet Commonly known as:  BUSPAR Take 5 mg by mouth two (2) times a day. CALCIUM 500+D 500 mg(1,250mg) -200 unit per tablet Generic drug:  calcium-vitamin D Take 2 Tabs by mouth daily. cholecalciferol 1,000 unit tablet Commonly known as:  VITAMIN D3 Take 2,000 Units by mouth daily. cyanocobalamin 1,000 mcg/mL injection Commonly known as:  VITAMIN B-12 Start: 1000 mcg SC/IM qd x1wk, then qwk x1mo, then every month DULoxetine 30 mg capsule Commonly known as:  CYMBALTA TAKE 1 CAPSULE BY MOUTH EVERY DAY  
  
 ESTRACE 0.01 % (0.1 mg/gram) vaginal cream  
Generic drug:  estradiol Take 42.5 mg by mouth once over twenty-four (24) hours. ferrous sulfate 325 mg (65 mg iron) tablet TAKE 1 TABLET BY MOUTH TWICE DAILY  
  
 fluocinoNIDE 0.05 % topical cream  
Commonly known as:  LIDEX Apply  to affected area two (2) times a day. folic acid 1 mg tablet Commonly known as:  FOLVITE  
TAKE 1 TABLET BY MOUTH DAILY * gabapentin 300 mg capsule Commonly known as:  NEURONTIN  
TAKE ONE CAPSULE BY MOUTH FOUR TIMES DAILY * gabapentin 600 mg tablet Commonly known as:  NEURONTIN  
TAKE 1 TABLET BY MOUTH TWICE DAILY glipiZIDE SR 2.5 mg CR tablet Commonly known as:  GLUCOTROL XL  
TAKE 1 TABLET BY MOUTH TWICE DAILY  
  
 glucose blood VI test strips strip Commonly known as:  Ascensia CONTOUR Glucometer for 250.00 Pt. test blood sugar once daily (in morning) HYDROcodone-acetaminophen 5-325 mg per tablet Commonly known as:  Pepe Isak Take 1 Tab by mouth every six (6) hours as needed for Pain.  
  
 hydroxychloroquine 200 mg tablet Commonly known as:  PLAQUENIL  
TAKE 1 TABLET BY MOUTH TWICE DAILY OR AS DIRECTED  
  
 metFORMIN 500 mg tablet Commonly known as:  GLUCOPHAGE  
TAKE 1 TABLET BY MOUTH TWICE DAILY WITH MEALS  
  
 metoprolol succinate 25 mg XL tablet Commonly known as:  TOPROL-XL  
TAKE 1 TABLET BY MOUTH DAILY MULTIVITAMIN PO Take 1 Tab by mouth daily. nitrofurantoin (macrocrystal-monohydrate) 100 mg capsule Commonly known as:  MACROBID TK 1 C PO BID FOR 5 DAYS FOR ACUTE SYMPTOMS  
  
 nortriptyline 10 mg capsule Commonly known as:  PAMELOR  
TAKE 1 CAPSULE BY MOUTH EVERY NIGHT AT BEDTIME  
  
 pantoprazole 40 mg tablet Commonly known as:  PROTONIX  
TAKE 1 TABLET BY MOUTH DAILY predniSONE 5 mg tablet Commonly known as:  DELTASONE  
10 mg once daily. Lower by 2.5 mg every 4 days Syringe with Needle, Safety 3 mL 25 gauge x 5/8\" Syrg Commonly known as:  3cc Safety Syringe 25Gx5/8\" To be used with B12 injections Carolynn jones. Patient with spinal stenosis and stopped posture. Pain with limited ambulation. ZINC PO Take 50 mg by mouth daily. * Notice: This list has 2 medication(s) that are the same as other medications prescribed for you. Read the directions carefully, and ask your doctor or other care provider to review them with you. Follow-up Instructions Return in about 6 months (around 2/21/2018). Patient Instructions [address] What is a living will? A living will is a legal form you use to write down the kind of care you want at the end of your life. It is used by the health professionals who will treat you if you aren't able to decide for yourself. If you put your wishes in writing, your loved ones and others will know what kind of care you want. They won't need to guess. This can ease your mind and be helpful to others. A living will is not the same as an estate or property will. An estate will explains what you want to happen with your money and property after you die. Is a living will a legal document? A living will is a legal document. Each state has its own laws about living [place]. If you move to another state, make sure that your living will is legal in the state where you now live. Or you might use a universal form that has been approved by many states. This kind of form can sometimes be completed and stored online. Your electronic copy will then be available wherever you have a connection to the Internet. In most cases, doctors will respect your wishes even if you have a form from a different state. · You don't need an  to complete a living will. But legal advice can be helpful if your state's laws are unclear, your health history is complicated, or your family can't agree on what should be in your living will. · You can change your living will at any time. Some people find that their wishes about end-of-life care change as their health changes. · In addition to making a living will, think about completing a medical power of  form. This form lets you name the person you want to make end-of-life treatment decisions for you (your \"health care agent\") if you're not able to. Many hospitals and nursing homes will give you the forms you need to complete a living will and a medical power of .  
· Your living will is used only if you can't make or communicate decisions for yourself anymore. If you become able to make decisions again, you can accept or refuse any treatment, no matter what you wrote in your living will. · Your state may offer an online registry. This is a place where you can store your living will online so the doctors and nurses who need to treat you can find it right away. What should you think about when creating a living will? Talk about your end-of-life wishes with your family members and your doctor. Let them know what you want. That way the people making decisions for you won't be surprised by your choices. Think about these questions as you make your living will: · Do you know enough about life support methods that might be used? If not, talk to your doctor so you know what might be done if you can't breathe on your own, your heart stops, or you're unable to swallow. · What things would you still want to be able to do after you receive life-support methods? Would you want to be able to walk? To speak? To eat on your own? To live without the help of machines? · If you have a choice, where do you want to be cared for? In your home? At a hospital or nursing home? · Do you want certain Bahai practices performed if you become very ill? · If you have a choice at the end of your life, where would you prefer to die? At home? In a hospital or nursing home? Somewhere else? · Would you prefer to be buried or cremated? · Do you want your organs to be donated after you die? What should you do with your living will? · Make sure that your family members and your health care agent have copies of your living will. · Give your doctor a copy of your living will to keep in your medical record. If you have more than one doctor, make sure that each one has a copy. · You may want to put a copy of your living will where it can be easily found. Where can you learn more? Go to http://jennifer-vincent.info/. Enter H585 in the search box to learn more about \"Learning About Living Hildred Faster. \" Current as of: August 8, 2016 Content Version: 11.3 © 8252-7453 [employer]. Care instructions adapted under license by [employer] (which disclaims liability or warranty for this information). If you have questions about a medical condition or this instruction, always ask your healthcare professional. [place] [address] any warranty or liability for your use of this information. Advance Directives: Care Instructions Your Care Instructions An advance directive is a legal way to state your wishes at the end of your life. It tells your family and your doctor what to do if you can no longer say what you want. There are two main types of advance directives. You can change them any time that your wishes change. · A living will tells your family and your doctor your wishes about life support and other treatment. · A durable power of  for health care lets you name a person to make treatment decisions for you when you can't speak for yourself. This person is called a health care agent. If you do not have an advance directive, decisions about your medical care may be made by a doctor or a  who doesn't know you. It may help to think of an advance directive as a gift to the people who care for you. If you have one, they won't have to make tough decisions by themselves. Follow-up care is a key part of your treatment and safety. Be sure to make and go to all appointments, and call your doctor if you are having problems. It's also a good idea to know your test results and keep a list of the medicines you take. How can you care for yourself at home? · Discuss your wishes with your loved ones and your doctor. This way, there are no surprises. · Many states have a unique form. Or you might use a universal form that has been approved by many states.  This kind of form can sometimes be completed and stored online. Your electronic copy will then be available wherever you have a connection to the Internet. In most cases, doctors will respect your wishes even if you have a form from a different state. · You don't need a  to do an advance directive. But you may want to get legal advice. · Think about these questions when you prepare an advance directive: ¨ Who do you want to make decisions about your medical care if you are not able to? Many people choose a family member or close friend. ¨ Do you know enough about life support methods that might be used? If not, talk to your doctor so you understand. ¨ What are you most afraid of that might happen? You might be afraid of having pain, losing your independence, or being kept alive by machines. ¨ Where would you prefer to die? Choices include your home, a hospital, or a nursing home. ¨ Would you like to have information about hospice care to support you and your family? ¨ Do you want to donate organs when you die? ¨ Do you want certain Gnosticism practices performed before you die? If so, put your wishes in the advance directive. · Read your advance directive every year, and make changes as needed. When should you call for help? Be sure to contact your doctor if you have any questions. Where can you learn more? Go to http://jennifer-vincent.info/. Enter R264 in the search box to learn more about \"Advance Directives: Care Instructions. \" Current as of: November 17, 2016 Content Version: 11.3 © 5780-3018 [employer]. Care instructions adapted under license by [employer] (which disclaims liability or warranty for this information). If you have questions about a medical condition or this instruction, always ask your healthcare professional. [place] [address] any warranty or liability for your use of this information. PRESCRIPTION REFILL POLICY [facility] Statement to Patients April 1, 2014 In an effort to ensure the large volume of patient prescription refills is processed in the most efficient and expeditious manner, we are asking our patients to assist us by calling your Pharmacy for all prescription refills, this will include also your  Mail Order Pharmacy. The pharmacy will contact our office electronically to continue the refill process. Please do not wait until the last minute to call your pharmacy. We need at least 48 hours (2days) to fill prescriptions. We also encourage you to call your pharmacy before going to  your prescription to make sure it is ready. With regard to controlled substance prescription refill requests (narcotic refills) that need to be picked up at our office, we ask your cooperation by providing us with at least 72 hours (3days) notice that you will need a refill. We will not refill narcotic prescription refill requests after 4:00pm on any weekday, Monday through Thursday, or after 2:00pm on Fridays, or on the weekends. We encourage everyone to explore another way of getting your prescription refill request processed using Sanrad, our patient web portal through our electronic medical record system. Sanrad is an efficient and effective way to communicate your medication request directly to the office and  downloadable as an juli on your smart phone . Sanrad also features a review functionality that allows you to view your medication list as well as leave messages for your physician. Are you ready to get connected? If so please review the attatched instructions or speak to any of our staff to get you set up right away! Thank you so much for your cooperation. Should you have any questions please contact our Practice Administrator. The Physicians and Staff,  [facility] Patient doing well and continue the gabapentin as needed and keep a safe and tight control on blood sugars. Revisit 6 months. Introducing [facility]! Dear Dieter Rivero: Thank you for requesting a dateIITians account. Our records indicate that you already have an active dateIITians account. You can access your account anytime at [email or web address] Did you know that you can access your hospital and ER discharge instructions at any time in dateIITians? You can also review all of your test results from your hospital stay or ER visit. Additional Information If you have questions, please visit the Frequently Asked Questions section of the dateIITians website at [email or web address] Remember, dateIITians is NOT to be used for urgent needs. For medical emergencies, dial 911. Now available from your iPhone and Android! Please provide this summary of care documentation to your next provider. Your primary care clinician is listed as [address]. If you have any questions after today's visit, please call [phone].

## 2017-08-30 DIAGNOSIS — E53.8 B12 DEFICIENCY: ICD-10-CM

## 2017-08-30 RX ORDER — DULOXETIN HYDROCHLORIDE 30 MG/1
30 CAPSULE, DELAYED RELEASE ORAL DAILY
Qty: 30 CAP | Refills: 3 | Status: SHIPPED | OUTPATIENT
Start: 2017-08-30 | End: 2018-02-07

## 2017-08-30 NOTE — TELEPHONE ENCOUNTER
From: Itz Allison  To: Irlanda Head MD  Sent: 8/30/2017 12:51 PM EDT  Subject: Medication Renewal Request    Original authorizing provider: MD Itz Villa would like a refill of the following medications:  Syringe with Needle, Safety ([employer] 25GX5/8\") 3 mL 25 x 5/8\" syrg Irlanda Head MD]    Preferred pharmacy: [address] [place] [address] [facility] AT [address] [place] [address] OF [facility] (E[facility]W) & [facility]    Comment:      Medication renewals requested in this message routed to other providers:  DULoxetine (CYMBALTA) 30 mg capsule Lorna Corea MD]

## 2017-08-30 NOTE — TELEPHONE ENCOUNTER
From: Jessika Tran  To: Pa Carr MD  Sent: 8/30/2017 12:51 PM EDT  Subject: Medication Renewal Request    Original authorizing provider: MD Jessika Pollack would like a refill of the following medications:  DULoxetine (CYMBALTA) 30 mg capsule Pa Carr MD]    Preferred pharmacy: [address] AT [address] [place] [address] Rd OF [facility] (E/W) & [facility]    Comment:      Medication renewals requested in this message routed to other providers:  Syringe with Needle, Safety [facility] SYRINGE 25GX5/8\") 3 mL 25 x 5/8\" syrg Amy Garduno MD]

## 2017-09-14 RX ORDER — LANOLIN ALCOHOL/MO/W.PET/CERES
CREAM (GRAM) TOPICAL
Qty: 60 TAB | Refills: 0 | Status: CANCELLED | OUTPATIENT
Start: 2017-09-14

## 2017-09-14 RX ORDER — LANOLIN ALCOHOL/MO/W.PET/CERES
CREAM (GRAM) TOPICAL
Qty: 60 TAB | Refills: 0 | Status: SHIPPED | OUTPATIENT
Start: 2017-09-14 | End: 2017-11-21 | Stop reason: SDUPTHER

## 2017-09-18 RX ORDER — GABAPENTIN 600 MG/1
TABLET ORAL
Qty: 60 TAB | Refills: 0 | Status: SHIPPED | OUTPATIENT
Start: 2017-09-18 | End: 2017-10-19 | Stop reason: SDUPTHER

## 2017-10-19 RX ORDER — GABAPENTIN 600 MG/1
TABLET ORAL
Qty: 60 TAB | Refills: 0 | Status: SHIPPED | OUTPATIENT
Start: 2017-10-19 | End: 2017-11-21 | Stop reason: SDUPTHER

## 2017-10-23 RX ORDER — METFORMIN HYDROCHLORIDE 500 MG/1
TABLET ORAL
Qty: 180 TAB | Refills: 0 | Status: SHIPPED | OUTPATIENT
Start: 2017-10-23 | End: 2018-02-07 | Stop reason: SDUPTHER

## 2017-10-31 RX ORDER — GLIPIZIDE 2.5 MG/1
TABLET, EXTENDED RELEASE ORAL
Qty: 180 TAB | Refills: 0 | Status: SHIPPED | OUTPATIENT
Start: 2017-10-31 | End: 2018-02-05 | Stop reason: SDUPTHER

## 2017-11-10 RX ORDER — ATORVASTATIN CALCIUM 10 MG/1
TABLET, FILM COATED ORAL
Qty: 90 TAB | Refills: 0 | Status: SHIPPED | OUTPATIENT
Start: 2017-11-10 | End: 2018-02-05 | Stop reason: SDUPTHER

## 2017-11-21 RX ORDER — GABAPENTIN 600 MG/1
TABLET ORAL
Qty: 60 TAB | Refills: 0 | Status: SHIPPED | OUTPATIENT
Start: 2017-11-21 | End: 2018-01-15 | Stop reason: SDUPTHER

## 2017-11-21 RX ORDER — GABAPENTIN 600 MG/1
TABLET ORAL
Qty: 60 TAB | Refills: 0 | Status: SHIPPED | OUTPATIENT
Start: 2017-11-21 | End: 2018-02-07 | Stop reason: SDUPTHER

## 2017-12-13 ENCOUNTER — OFFICE VISIT (OUTPATIENT)
Dept: FAMILY MEDICINE CLINIC | Age: 77
End: 2017-12-13

## 2017-12-13 VITALS
DIASTOLIC BLOOD PRESSURE: 59 MMHG | OXYGEN SATURATION: 99 % | RESPIRATION RATE: 19 BRPM | BODY MASS INDEX: 41.65 KG/M2 | WEIGHT: 206.6 LBS | HEIGHT: 59 IN | SYSTOLIC BLOOD PRESSURE: 108 MMHG | TEMPERATURE: 96.9 F | HEART RATE: 62 BPM

## 2017-12-13 DIAGNOSIS — E11.49 TYPE II OR UNSPECIFIED TYPE DIABETES MELLITUS WITH NEUROLOGICAL MANIFESTATIONS, NOT STATED AS UNCONTROLLED(250.60) (HCC): ICD-10-CM

## 2017-12-13 DIAGNOSIS — M51.36 DEGENERATIVE LUMBAR DISC: ICD-10-CM

## 2017-12-13 DIAGNOSIS — M06.9 RHEUMATOID ARTHRITIS INVOLVING BOTH KNEES, UNSPECIFIED RHEUMATOID FACTOR PRESENCE: ICD-10-CM

## 2017-12-13 DIAGNOSIS — H61.21 CERUMINOSIS, RIGHT: ICD-10-CM

## 2017-12-13 DIAGNOSIS — E66.01 OBESITY, MORBID (HCC): ICD-10-CM

## 2017-12-13 DIAGNOSIS — E11.9 DIABETES MELLITUS TYPE 2 WITHOUT RETINOPATHY (HCC): ICD-10-CM

## 2017-12-13 DIAGNOSIS — E11.9 TYPE 2 DIABETES MELLITUS WITHOUT COMPLICATION, WITHOUT LONG-TERM CURRENT USE OF INSULIN (HCC): ICD-10-CM

## 2017-12-13 DIAGNOSIS — M79.18 MYOFACIAL MUSCLE PAIN: ICD-10-CM

## 2017-12-13 DIAGNOSIS — M12.9 ARTHROPATHY: ICD-10-CM

## 2017-12-13 DIAGNOSIS — H93.13 TINNITUS OF BOTH EARS: Primary | ICD-10-CM

## 2017-12-13 PROBLEM — Z96.659 H/O TOTAL KNEE REPLACEMENT: Status: ACTIVE | Noted: 2017-08-18

## 2017-12-13 PROBLEM — M17.12 PRIMARY LOCALIZED OSTEOARTHRITIS OF LEFT KNEE: Status: ACTIVE | Noted: 2017-08-18

## 2017-12-13 RX ORDER — HYDROCODONE BITARTRATE AND ACETAMINOPHEN 5; 325 MG/1; MG/1
1 TABLET ORAL
Qty: 60 TAB | Refills: 0 | Status: SHIPPED | OUTPATIENT
Start: 2017-12-13 | End: 2018-04-30 | Stop reason: SDUPTHER

## 2017-12-13 NOTE — PROGRESS NOTES
Chief Complaint   Patient presents with   Nicolas Peña in Ear     Pt her for check up with provider. Pt states she first noticed ringing in ears about 2 months ago.

## 2017-12-13 NOTE — MR AVS SNAPSHOT
Visit Information Date & Time Provider Department Dept. Phone Encounter #  
 12/13/2017  1:45 PM Callum OreillyReuben [phone] [id number] Your Appointments 2/13/2018  2:40 PM  
ACUTE CARE with Brant Angelo MD  
[address] ([facility]) Appt Note: dr. Osman Bound patient  
 [facility] [address]  
[phone]  
  
   
 [address] [place] [address]  
  
    
 2/19/2018  2:40 PM  
Follow Up with Bertha Perez MD  
[facility]) Appt Note: neuropathy [place] [address] [facility] [address] [id number] [phone]  
  
   
 [place] [address] [address] [address] Upcoming Health Maintenance Date Due  
 EYE EXAM RETINAL OR DILATED Q1 10/19/2016 GLAUCOMA SCREENING Q2Y 10/19/2017 HEMOGLOBIN A1C Q6M 2/1/2018 FOOT EXAM Q1 7/26/2018 MICROALBUMIN Q1 7/26/2018 MEDICARE YEARLY EXAM 7/27/2018 LIPID PANEL Q1 8/1/2018 DTaP/Tdap/Td series (2 - Td) 12/5/2023 Allergies as of 12/13/2017  Review Complete On: 12/13/2017 By: Callum Oreilly MD  
  
 Severity Noted Reaction Type Reactions Cephalosporins High 12/30/2009    Nausea and Vomiting Severe vomiting Current Immunizations  Reviewed on 12/13/2017 Name Date Influenza High Dose Vaccine PF 9/13/2017 12:00 AM, 8/5/2016 Influenza Vaccine 12/5/2013 Influenza Vaccine [place]) 9/6/2016 Influenza Vaccine (Quad) PF 11/10/2014 Influenza Vaccine PF 10/20/2015 Influenza Vaccine Split 10/10/2012  4:30 PM, 11/14/2011, 10/25/2010 Pneumococcal Conjugate (PCV-13) 9/13/2017 12:00 AM  
 Pneumococcal Polysaccharide (PPSV-23) 12/5/2013 Tdap 12/5/2013 ZZZ-RETIRED (DO NOT USE) Pneumococcal Vaccine (Unspecified Type) 6/5/2008  Reviewed by Callum Oreilly MD on 12/13/2017 at  2:17 PM  
 Reviewed by Jeet Segal MD on 12/13/2017 at  2:17 PM  
You Were Diagnosed With   
  
 Codes Comments Tinnitus of both ears    -  Primary ICD-10-CM: H93.13 
ICD-9-CM: 388.30 Ceruminosis, right     ICD-10-CM: H61.21 ICD-9-CM: 380.4 Arthropathy     ICD-10-CM: M12.9 ICD-9-CM: 716.90 Myofacial muscle pain     ICD-10-CM: M79.1 ICD-9-CM: 729.1 Degenerative lumbar disc     ICD-10-CM: M51.36 
ICD-9-CM: 722.52 Vitals BP Pulse Temp Resp Height(growth percentile) Weight(growth percentile) 108/59 62 96.9 °F (36.1 °C) (Temporal) 19 4' 11\" (1.499 m) 206 lb 9.6 oz (93.7 kg) SpO2 BMI OB Status Smoking Status 99% 41.73 kg/m2 Postmenopausal Former Smoker BMI and BSA Data Body Mass Index Body Surface Area 41.73 kg/m 2 1.97 m 2 Preferred Pharmacy Pharmacy Name Phone [address] [facility] 53 [address] ([address] [phone] Your Updated Medication List  
  
   
This list is accurate as of: 12/13/17  3:08 PM.  Always use your most recent med list.  
  
  
  
  
 acetaminophen 650 mg Tber Commonly known as:  TYLENOL Take 1,300 mg by mouth daily as needed for Pain. aspirin 81 mg tablet Take 81 mg by mouth daily. atorvastatin 10 mg tablet Commonly known as:  LIPITOR  
TAKE 1 TABLET BY MOUTH EVERY DAY  
  
 B.infantis-B.ani-B.long-B.bifi 10-15 mg Tbec Take 1 Tab by mouth daily. BD TUBERCULIN SYRINGE 1 mL 25 gauge x 5/8\" Syrg Generic drug:  Syringe with Needle (Disp) USE ONCE A WEEK WITH METHOTREXATE Blood-Glucose Meter monitoring kit Commonly known as:  CONTOUR METER Dx: 250. Check blood sugars daily. * busPIRone 5 mg tablet Commonly known as:  BUSPAR Take 5 mg by mouth two (2) times a day. * busPIRone 5 mg tablet Commonly known as:  BUSPAR  
TAKE 1 TABLET BY MOUTH TWICE DAILY. START WITH DAILY FOR 1 WEEK, THEN. INCREASE TO 2 TIMES PER DAY  
  
 CALCIUM 500+D 500 mg(1,250mg) -200 unit per tablet Generic drug:  calcium-vitamin D Take 2 Tabs by mouth daily. cholecalciferol 1,000 unit tablet Commonly known as:  VITAMIN D3 Take 2,000 Units by mouth daily. cyanocobalamin 1,000 mcg/mL injection Commonly known as:  VITAMIN B-12 Start: 1000 mcg SC/IM qd x1wk, then qwk x1mo, then every month DULoxetine 30 mg capsule Commonly known as:  CYMBALTA Take 1 Cap by mouth daily. ESTRACE 0.01 % (0.1 mg/gram) vaginal cream  
Generic drug:  estradiol Take 42.5 mg by mouth once over twenty-four (24) hours. * ferrous sulfate 325 mg (65 mg iron) tablet TAKE 1 TABLET BY MOUTH TWICE DAILY * ferrous sulfate 325 mg (65 mg iron) tablet TAKE 1 TABLET BY MOUTH TWICE DAILY  
  
 fluocinoNIDE 0.05 % topical cream  
Commonly known as:  LIDEX Apply  to affected area two (2) times a day. folic acid 1 mg tablet Commonly known as:  FOLVITE  
TAKE 1 TABLET BY MOUTH DAILY * gabapentin 300 mg capsule Commonly known as:  NEURONTIN  
TAKE ONE CAPSULE BY MOUTH FOUR TIMES DAILY * gabapentin 600 mg tablet Commonly known as:  NEURONTIN  
TAKE 1 TABLET BY MOUTH TWICE DAILY * gabapentin 600 mg tablet Commonly known as:  NEURONTIN  
TAKE 1 TABLET BY MOUTH TWICE DAILY  
  
 glipiZIDE SR 2.5 mg CR tablet Commonly known as:  GLUCOTROL XL  
TAKE 1 TABLET BY MOUTH TWICE DAILY  
  
 glucose blood VI test strips strip Commonly known as:  Ascensia CONTOUR Glucometer for 250.00 Pt. test blood sugar once daily (in morning) HYDROcodone-acetaminophen 5-325 mg per tablet Commonly known as:  Benetta Pock Take 1 Tab by mouth every six (6) hours as needed for Pain.  
  
 hydroxychloroquine 200 mg tablet Commonly known as:  PLAQUENIL  
TAKE 1 TABLET BY MOUTH TWICE DAILY OR AS DIRECTED  
  
 metFORMIN 500 mg tablet Commonly known as:  GLUCOPHAGE  
 TAKE 1 TABLET BY MOUTH TWICE DAILY WITH MEALS  
  
 metoprolol succinate 25 mg XL tablet Commonly known as:  TOPROL-XL  
TAKE 1 TABLET BY MOUTH DAILY MULTIVITAMIN PO Take 1 Tab by mouth daily. nitrofurantoin (macrocrystal-monohydrate) 100 mg capsule Commonly known as:  MACROBID TK 1 C PO BID FOR 5 DAYS FOR ACUTE SYMPTOMS  
  
 * nortriptyline 10 mg capsule Commonly known as:  PAMELOR  
TAKE 1 CAPSULE BY MOUTH EVERY NIGHT AT BEDTIME  
  
 * nortriptyline 10 mg capsule Commonly known as:  PAMELOR  
TAKE 1 CAPSULE BY MOUTH EVERY NIGHT  
  
 pantoprazole 40 mg tablet Commonly known as:  PROTONIX  
TAKE 1 TABLET BY MOUTH DAILY predniSONE 5 mg tablet Commonly known as:  DELTASONE  
10 mg once daily. Lower by 2.5 mg every 4 days Syringe with Needle, Safety 3 mL 25 gauge x 5/8\" Syrg Commonly known as:  3cc Safety Syringe 25Gx5/8\" To be used with B12 injections [employer] Rolling walker. Patient with spinal stenosis and stopped posture. Pain with limited ambulation. ZINC PO Take 50 mg by mouth daily. * Notice: This list has 9 medication(s) that are the same as other medications prescribed for you. Read the directions carefully, and ask your doctor or other care provider to review them with you. Prescriptions Printed Refills HYDROcodone-acetaminophen (NORCO) 5-325 mg per tablet 0 Sig: Take 1 Tab by mouth every six (6) hours as needed for Pain. Class: Print Route: Oral  
  
We Performed the Following REMOVE IMPACTED EAR WAX [34462 CPT(R)] Introducing [facility] SERVICES! Dear Nury Hillman: Thank you for requesting a [employer] account. Our records indicate that you already have an active [employer] account. You can access your account anytime at [email or web address] Did you know that you can access your hospital and ER discharge instructions at any time in [employer].?   You can also review all of your test results from your hospital stay or ER visit. Additional Information If you have questions, please visit the Frequently Asked Questions section of the DocDoc website at [email or web address] Remember, DocDoc is NOT to be used for urgent needs. For medical emergencies, dial 911. Now available from your iPhone and Android! Please provide this summary of care documentation to your next provider. Your primary care clinician is listed as [address]. If you have any questions after today's visit, please call [phone].

## 2017-12-13 NOTE — PROGRESS NOTES
CC:  Chief Complaint   Patient presents with   [facility]  Josefa Haley is a 65 y.o. female. HPI Comments: 73F with SLE, severe degenerative joint disease of hip and chronic T6 compression fractures. She also has SLE. Periodically, when she has severe flares of her joint disease, she does take a Hydrocodone-acetaminophen tablet 5-325. I have checked her  and she has a signed contract with us. She is getting this medication every 2-3 months. She does not use it on a daily basis. She is not amenable to any further surgery. In order to just function when her pain flares, she takes a tablet. She needs a refill today. Also, she is c/o a ringing sensation in her ears. Possible cerumen impaction. This has happened in the past.     Ringing in Ear        Past Medical History:  Past Medical History:   Diagnosis Date    Anemia     Arthritis     CAD (coronary artery disease) 1997    [place]    Degenerative joint disease of right hip 09/14/2015     [facility]/Dr. Coretta Grijalva    Diabetes ([facility] [address]     Fracture     right shoulder; T6 compression    Gastritis     Hypercholesterolemia     Hyperlipidemia    Hypertension     Lupus 1/9/2015    Osteoporosis, post-menopausal          Medications:  Current Outpatient Prescriptions   Medication Sig Dispense Refill    HYDROcodone-acetaminophen (NORCO) 5-325 mg per tablet Take 1 Tab by mouth every six (6) hours as needed for Pain.  60 Tab 0    nortriptyline (PAMELOR) 10 mg capsule TAKE 1 CAPSULE BY MOUTH EVERY NIGHT 30 Cap 5    ferrous sulfate 325 mg (65 mg iron) tablet TAKE 1 TABLET BY MOUTH TWICE DAILY 60 Tab 5    gabapentin (NEURONTIN) 600 mg tablet TAKE 1 TABLET BY MOUTH TWICE DAILY 60 Tab 0    gabapentin (NEURONTIN) 600 mg tablet TAKE 1 TABLET BY MOUTH TWICE DAILY 60 Tab 0    atorvastatin (LIPITOR) 10 mg tablet TAKE 1 TABLET BY MOUTH EVERY DAY 90 Tab 0    glipiZIDE SR (GLUCOTROL XL) 2.5 mg CR tablet TAKE 1 TABLET BY MOUTH TWICE DAILY TUBERCULIN SYRINGE 1 mL 25 x 5/8\" syrg USE ONCE A WEEK WITH METHOTREXATE 13 Syringe 3    Blood-Glucose Meter (CONTOUR METER) monitoring kit Dx: 250. Check blood sugars daily. 1 kit 11    fluocinoNIDE (LIDEX) 0.05 % topical cream Apply  to affected area two (2) times a day. 15 g 0    Walker Upplication walker. Patient with spinal stenosis and stopped posture. Pain with limited ambulation. 1 Each 0    ZINC PO Take 50 mg by mouth daily.  cholecalciferol, vitamin d3, (VITAMIN D3) 1,000 unit tablet Take 2,000 Units by mouth daily.  calcium-vitamin D (CALCIUM 500+D) 500 mg(1,250mg) -200 unit per tablet Take 2 Tabs by mouth daily.  aspirin 81 mg Tab Take 81 mg by mouth daily.  MULTIVITAMINS (MULTIVITAMIN PO) Take 1 Tab by mouth daily. ROS:  Review of Systems   HENT: Positive for tinnitus. OBJECTIVE:  /59  Pulse 62  Temp 96.9 °F (36.1 °C) (Temporal)   Resp 19  Ht 4' 11\" (1.499 m)  Wt 206 lb 9.6 oz (93.7 kg)  SpO2 99%  BMI 41.73 kg/m2  Physical Exam   Constitutional:   Obesity. HENT:   Head: Normocephalic and atraumatic. Eyes: Pupils are equal, round, and reactive to light. Neck: Normal range of motion. Cardiovascular: Normal rate and regular rhythm. Pulmonary/Chest: Effort normal and breath sounds normal.   Musculoskeletal:   Stooped posture, limping today. Skin: Skin is warm. Psychiatric: She has a normal mood and affect. Her behavior is normal.   Nursing note and vitals reviewed. ASSESSMENT and PLAN    ICD-10-CM ICD-9-CM    1. Tinnitus of both ears H93.13 388.30 Significant ceruminosis in both ears. Removed. 2. Ceruminosis, right H61.21 380.4 REMOVE IMPACTED EAR WAX   3. Arthropathy M12.9 716.90 HYDROcodone-acetaminophen (NORCO) 5-325 mg per tablet   4. Myofacial muscle pain M79.1 729.1 HYDROcodone-acetaminophen (NORCO) 5-325 mg per tablet   5.  Degenerative lumbar disc M51.36 722.52 HYDROcodone-acetaminophen (NORCO) 5-325 mg per tablet     77F who presents for follow up and needs a refill of Hydrocodone today. She was given #60 tablets which will last her 2-3 months. She does not take this medication on a daily basis. Wax was successfully removed from both ears and her ringing improved. I have discussed the diagnosis with the patient and the intended treatment plan as seen in the above orders. The patient has received an after-visit summary and questions were answered concerning future plans. Asked to return should symptoms worsen or not improve with treatment. Any pending labs and studies will be relayed to patient when they become available. Pt verbalizes understanding of plan of care and denies further questions or concerns at this time. Follow-up Disposition:  Return in about 3 months (around 3/13/2018), or if symptoms worsen or fail to improve. Chronic conditions. Chronic Conditions Addressed Today     1.  RESOLVED: Diabetes mellitus (HCC)     Current Assessment & Plan        Key Antihyperglycemic Medications             glipiZIDE SR (GLUCOTROL XL) 2.5 mg CR tablet  (Taking) TAKE 1 TABLET BY MOUTH TWICE DAILY    metFORMIN (GLUCOPHAGE) 500 mg tablet  (Taking) TAKE 1 TABLET BY MOUTH TWICE DAILY WITH MEALS        Other Key Diabetic Medications             gabapentin (NEURONTIN) 600 mg tablet  (Taking) TAKE 1 TABLET BY MOUTH TWICE DAILY    gabapentin (NEURONTIN) 600 mg tablet  (Taking) TAKE 1 TABLET BY MOUTH TWICE DAILY    atorvastatin (LIPITOR) 10 mg tablet  (Taking) TAKE 1 TABLET BY MOUTH EVERY DAY    gabapentin (NEURONTIN) 300 mg capsule  (Taking) TAKE ONE CAPSULE BY MOUTH FOUR TIMES DAILY        Lab Results   Component Value Date/Time    Hemoglobin A1c 5.9 08/01/2017 09:00 AM    Glucose 81 08/01/2017 09:00 AM    Creatinine 0.80 08/01/2017 09:00 AM    Microalbumin/creat ratio (POC)  07/26/2017 01:07 PM    Microalb/Creat ratio (ug/mg creat.) 10.7 07/15/2016 11:06 AM    Cholesterol, total 136 08/01/2017 09:00 AM    HDL Cholesterol 48 08/01/2017 09:00 AM    LDL, calculated 65 08/01/2017 09:00 AM    Triglyceride 113 08/01/2017 09:00 AM     Diabetic Foot and Eye Exam HM Status   Topic Date Due    Eye Exam  10/19/2016    Diabetic Foot Care  07/26/2018            2. RESOLVED: Diabetes mellitus type 2 without retinopathy (HCC)     Current Assessment & Plan        Key Antihyperglycemic Medications             glipiZIDE SR (GLUCOTROL XL) 2.5 mg CR tablet  (Taking) TAKE 1 TABLET BY MOUTH TWICE DAILY    metFORMIN (GLUCOPHAGE) 500 mg tablet  (Taking) TAKE 1 TABLET BY MOUTH TWICE DAILY WITH MEALS        Other Key Diabetic Medications             gabapentin (NEURONTIN) 600 mg tablet  (Taking) TAKE 1 TABLET BY MOUTH TWICE DAILY    gabapentin (NEURONTIN) 600 mg tablet  (Taking) TAKE 1 TABLET BY MOUTH TWICE DAILY    atorvastatin (LIPITOR) 10 mg tablet  (Taking) TAKE 1 TABLET BY MOUTH EVERY DAY    gabapentin (NEURONTIN) 300 mg capsule  (Taking) TAKE ONE CAPSULE BY MOUTH FOUR TIMES DAILY        Lab Results   Component Value Date/Time    Hemoglobin A1c 5.9 08/01/2017 09:00 AM    Glucose 81 08/01/2017 09:00 AM    Creatinine 0.80 08/01/2017 09:00 AM    Microalbumin/creat ratio (POC)  07/26/2017 01:07 PM    Microalb/Creat ratio (ug/mg creat.) 10.7 07/15/2016 11:06 AM    Cholesterol, total 136 08/01/2017 09:00 AM    HDL Cholesterol 48 08/01/2017 09:00 AM    LDL, calculated 65 08/01/2017 09:00 AM    Triglyceride 113 08/01/2017 09:00 AM     Diabetic Foot and Eye Exam HM Status   Topic Date Due    Eye Exam  10/19/2016    Diabetic Foot Care  07/26/2018            3. Obesity, morbid ([facility] [address]     Current Assessment & Plan      Stable, based on history, physical exam and review of pertinent labs, studies and medications; meds reconciled; lifestyle modifications recommended, medication compliance emphasized.   Key Obesity Meds             metFORMIN (GLUCOPHAGE) 500 mg tablet  (Taking) TAKE 1 TABLET BY MOUTH TWICE DAILY WITH MEALS        Lab Results   Component Value Date/Time    Hemoglobin A1c 5.9 08/01/2017 09:00 AM    Glucose 81 08/01/2017 09:00 AM    Cholesterol, total 136 08/01/2017 09:00 AM    HDL Cholesterol 48 08/01/2017 09:00 AM    LDL, calculated 65 08/01/2017 09:00 AM    Triglyceride 113 08/01/2017 09:00 AM    TSH 2.100 08/09/2016 10:30 AM    Sodium 145 08/01/2017 09:00 AM    Potassium 4.6 08/01/2017 09:00 AM    ALT (SGPT) 17 08/01/2017 09:00 AM    AST (SGOT) 32 08/01/2017 09:00 AM    VITAMIN D, 25-HYDROXY 44.5 08/01/2017 09:00 AM                4. Rheumatoid arthritis ([facility]     This is managed by her specialist.       This condition is managed by the patients Rheumatologists and stable. Lab Results   Component Value Date/Time    WBC 6.1 08/01/2017 09:00 AM    HGB 13.1 08/01/2017 09:00 AM    HCT 39.6 08/01/2017 09:00 AM    PLATELET 608 75/75/4871 09:00 AM    Creatinine 0.80 08/01/2017 09:00 AM    BUN 19 08/01/2017 09:00 AM    Potassium 4.6 08/01/2017 09:00 AM             Relevant Medications     HYDROcodone-acetaminophen (NORCO) 5-325 mg per tablet    5. Type II or unspecified type diabetes mellitus with neurological manifestations, not stated as uncontrolled(250.60) ([facility]     Current Assessment & Plan      Stable, based on history, physical exam and review of pertinent labs, studies and medications; meds reconciled; continue current treatment plan, lifestyle modifications recommended, medication compliance emphasized.   Key Antihyperglycemic Medications             glipiZIDE SR (GLUCOTROL XL) 2.5 mg CR tablet  (Taking) TAKE 1 TABLET BY MOUTH TWICE DAILY    metFORMIN (GLUCOPHAGE) 500 mg tablet  (Taking) TAKE 1 TABLET BY MOUTH TWICE DAILY WITH MEALS        Other Key Diabetic Medications             gabapentin (NEURONTIN) 600 mg tablet  (Taking) TAKE 1 TABLET BY MOUTH TWICE DAILY    gabapentin (NEURONTIN) 600 mg tablet  (Taking) TAKE 1 TABLET BY MOUTH TWICE DAILY    atorvastatin (LIPITOR) 10 mg tablet  (Taking) TAKE 1 TABLET BY MOUTH EVERY DAY    gabapentin (NEURONTIN) 300 mg capsule  (Taking) TAKE ONE CAPSULE BY MOUTH FOUR TIMES DAILY        Lab Results   Component Value Date/Time    Hemoglobin A1c 5.9 08/01/2017 09:00 AM    Glucose 81 08/01/2017 09:00 AM    Creatinine 0.80 08/01/2017 09:00 AM    Microalbumin/creat ratio (POC)  07/26/2017 01:07 PM    Microalb/Creat ratio (ug/mg creat.) 10.7 07/15/2016 11:06 AM    Cholesterol, total 136 08/01/2017 09:00 AM    HDL Cholesterol 48 08/01/2017 09:00 AM    LDL, calculated 65 08/01/2017 09:00 AM    Triglyceride 113 08/01/2017 09:00 AM     Diabetic Foot and Eye Exam HM Status   Topic Date Due    Eye Exam  Addressed    Diabetic Foot Care  07/26/2018

## 2017-12-29 NOTE — ASSESSMENT & PLAN NOTE
Stable, based on history, physical exam and review of pertinent labs, studies and medications; meds reconciled; continue current treatment plan, lifestyle modifications recommended, medication compliance emphasized.   Key Antihyperglycemic Medications             glipiZIDE SR (GLUCOTROL XL) 2.5 mg CR tablet  (Taking) TAKE 1 TABLET BY MOUTH TWICE DAILY    metFORMIN (GLUCOPHAGE) 500 mg tablet  (Taking) TAKE 1 TABLET BY MOUTH TWICE DAILY WITH MEALS        Other Key Diabetic Medications             gabapentin (NEURONTIN) 600 mg tablet  (Taking) TAKE 1 TABLET BY MOUTH TWICE DAILY    gabapentin (NEURONTIN) 600 mg tablet  (Taking) TAKE 1 TABLET BY MOUTH TWICE DAILY    atorvastatin (LIPITOR) 10 mg tablet  (Taking) TAKE 1 TABLET BY MOUTH EVERY DAY    gabapentin (NEURONTIN) 300 mg capsule  (Taking) TAKE ONE CAPSULE BY MOUTH FOUR TIMES DAILY        Lab Results   Component Value Date/Time    Hemoglobin A1c 5.9 08/01/2017 09:00 AM    Glucose 81 08/01/2017 09:00 AM    Creatinine 0.80 08/01/2017 09:00 AM    Microalbumin/creat ratio (POC)  07/26/2017 01:07 PM    Microalb/Creat ratio (ug/mg creat.) 10.7 07/15/2016 11:06 AM    Cholesterol, total 136 08/01/2017 09:00 AM    HDL Cholesterol 48 08/01/2017 09:00 AM    LDL, calculated 65 08/01/2017 09:00 AM    Triglyceride 113 08/01/2017 09:00 AM     Diabetic Foot and Eye Exam HM Status   Topic Date Due    Eye Exam  10/19/2016    Diabetic Foot Care  07/26/2018

## 2017-12-29 NOTE — ASSESSMENT & PLAN NOTE
Lab Results   Component Value Date/Time    WBC 6.1 08/01/2017 09:00 AM    HGB 13.1 08/01/2017 09:00 AM    HCT 39.6 08/01/2017 09:00 AM    PLATELET 926 38/86/5000 09:00 AM    Creatinine 0.80 08/01/2017 09:00 AM    BUN 19 08/01/2017 09:00 AM    Potassium 4.6 08/01/2017 09:00 AM

## 2017-12-29 NOTE — ASSESSMENT & PLAN NOTE
Key Antihyperglycemic Medications             glipiZIDE SR (GLUCOTROL XL) 2.5 mg CR tablet  (Taking) TAKE 1 TABLET BY MOUTH TWICE DAILY    metFORMIN (GLUCOPHAGE) 500 mg tablet  (Taking) TAKE 1 TABLET BY MOUTH TWICE DAILY WITH MEALS        Other Key Diabetic Medications             gabapentin (NEURONTIN) 600 mg tablet  (Taking) TAKE 1 TABLET BY MOUTH TWICE DAILY    gabapentin (NEURONTIN) 600 mg tablet  (Taking) TAKE 1 TABLET BY MOUTH TWICE DAILY    atorvastatin (LIPITOR) 10 mg tablet  (Taking) TAKE 1 TABLET BY MOUTH EVERY DAY    gabapentin (NEURONTIN) 300 mg capsule  (Taking) TAKE ONE CAPSULE BY MOUTH FOUR TIMES DAILY        Lab Results   Component Value Date/Time    Hemoglobin A1c 5.9 08/01/2017 09:00 AM    Glucose 81 08/01/2017 09:00 AM    Creatinine 0.80 08/01/2017 09:00 AM    Microalbumin/creat ratio (POC)  07/26/2017 01:07 PM    Microalb/Creat ratio (ug/mg creat.) 10.7 07/15/2016 11:06 AM    Cholesterol, total 136 08/01/2017 09:00 AM    HDL Cholesterol 48 08/01/2017 09:00 AM    LDL, calculated 65 08/01/2017 09:00 AM    Triglyceride 113 08/01/2017 09:00 AM     Diabetic Foot and Eye Exam HM Status   Topic Date Due    Eye Exam  10/19/2016    Diabetic Foot Care  07/26/2018

## 2017-12-29 NOTE — ASSESSMENT & PLAN NOTE
Stable, based on history, physical exam and review of pertinent labs, studies and medications; meds reconciled; lifestyle modifications recommended, medication compliance emphasized.   Key Obesity Meds             metFORMIN (GLUCOPHAGE) 500 mg tablet  (Taking) TAKE 1 TABLET BY MOUTH TWICE DAILY WITH MEALS        Lab Results   Component Value Date/Time    Hemoglobin A1c 5.9 08/01/2017 09:00 AM    Glucose 81 08/01/2017 09:00 AM    Cholesterol, total 136 08/01/2017 09:00 AM    HDL Cholesterol 48 08/01/2017 09:00 AM    LDL, calculated 65 08/01/2017 09:00 AM    Triglyceride 113 08/01/2017 09:00 AM    TSH 2.100 08/09/2016 10:30 AM    Sodium 145 08/01/2017 09:00 AM    Potassium 4.6 08/01/2017 09:00 AM    ALT (SGPT) 17 08/01/2017 09:00 AM    AST (SGOT) 32 08/01/2017 09:00 AM    VITAMIN D, 25-HYDROXY 44.5 08/01/2017 09:00 AM

## 2018-01-15 RX ORDER — GABAPENTIN 600 MG/1
TABLET ORAL
Qty: 60 TAB | Refills: 0 | Status: SHIPPED | OUTPATIENT
Start: 2018-01-15 | End: 2018-02-20 | Stop reason: SDUPTHER

## 2018-01-20 DIAGNOSIS — I10 ESSENTIAL HYPERTENSION WITH GOAL BLOOD PRESSURE LESS THAN 130/80: ICD-10-CM

## 2018-01-22 RX ORDER — METFORMIN HYDROCHLORIDE 500 MG/1
TABLET ORAL
Qty: 180 TAB | Refills: 0 | Status: SHIPPED | OUTPATIENT
Start: 2018-01-22 | End: 2018-04-23 | Stop reason: SDUPTHER

## 2018-01-22 RX ORDER — FOLIC ACID 1 MG/1
TABLET ORAL
Qty: 90 TAB | Refills: 0 | Status: SHIPPED | OUTPATIENT
Start: 2018-01-22 | End: 2018-03-27 | Stop reason: ALTCHOICE

## 2018-01-22 RX ORDER — METOPROLOL SUCCINATE 25 MG/1
TABLET, EXTENDED RELEASE ORAL
Qty: 90 TAB | Refills: 0 | Status: SHIPPED | OUTPATIENT
Start: 2018-01-22 | End: 2018-02-07 | Stop reason: SDUPTHER

## 2018-02-06 NOTE — TELEPHONE ENCOUNTER
From: Sarah Mendez  To: Benn Eisenmenger, MD  Sent: 2/6/2018 10:54 AM EST  Subject: Medication Renewal Request    Original authorizing provider: Benn Eisenmenger, MD Verba Caddy would like a refill of the following medications:  glipiZIDE SR (GLUCOTROL XL) 2.5 mg CR tablet Benn Eisenmenger, MD]  atorvastatin (LIPITOR) 10 mg tablet Benn Eisenmenger, MD]  ferrous sulfate 325 mg (65 mg iron) tablet Benn Eisenmenger, MD]    Preferred pharmacy: [address] [place], [place] [id number] [facility]    Comment:

## 2018-02-07 ENCOUNTER — OFFICE VISIT (OUTPATIENT)
Dept: FAMILY MEDICINE CLINIC | Age: 78
End: 2018-02-07

## 2018-02-07 VITALS
HEART RATE: 79 BPM | TEMPERATURE: 98.7 F | HEIGHT: 59 IN | DIASTOLIC BLOOD PRESSURE: 60 MMHG | WEIGHT: 209 LBS | BODY MASS INDEX: 42.13 KG/M2 | RESPIRATION RATE: 18 BRPM | SYSTOLIC BLOOD PRESSURE: 107 MMHG | OXYGEN SATURATION: 96 %

## 2018-02-07 DIAGNOSIS — I25.10 CORONARY ARTERY DISEASE INVOLVING NATIVE HEART WITHOUT ANGINA PECTORIS, UNSPECIFIED VESSEL OR LESION TYPE: Primary | ICD-10-CM

## 2018-02-07 DIAGNOSIS — E78.00 HYPERCHOLESTEROLEMIA: ICD-10-CM

## 2018-02-07 DIAGNOSIS — I73.00 RAYNAUD'S DISEASE WITHOUT GANGRENE: ICD-10-CM

## 2018-02-07 DIAGNOSIS — E53.8 B12 DEFICIENCY: ICD-10-CM

## 2018-02-07 DIAGNOSIS — N95.2 VAGINAL ATROPHY: ICD-10-CM

## 2018-02-07 DIAGNOSIS — M06.9 RHEUMATOID ARTHRITIS INVOLVING BOTH KNEES, UNSPECIFIED RHEUMATOID FACTOR PRESENCE: ICD-10-CM

## 2018-02-07 DIAGNOSIS — D51.0 PERNICIOUS ANEMIA: ICD-10-CM

## 2018-02-07 DIAGNOSIS — M81.0 OSTEOPOROSIS, POSTMENOPAUSAL: ICD-10-CM

## 2018-02-07 DIAGNOSIS — E11.42 DIABETIC POLYNEUROPATHY ASSOCIATED WITH TYPE 2 DIABETES MELLITUS (HCC): ICD-10-CM

## 2018-02-07 RX ORDER — LANOLIN ALCOHOL/MO/W.PET/CERES
325 CREAM (GRAM) TOPICAL 2 TIMES DAILY
Qty: 30 TAB | Refills: 5
Start: 2018-02-07 | End: 2018-04-16 | Stop reason: SDUPTHER

## 2018-02-07 RX ORDER — CYCLOBENZAPRINE HCL 10 MG
10 TABLET ORAL
COMMUNITY
Start: 2018-01-12 | End: 2018-05-08 | Stop reason: SDUPTHER

## 2018-02-07 RX ORDER — METOPROLOL SUCCINATE 25 MG/1
25 TABLET, EXTENDED RELEASE ORAL
Qty: 90 TAB | Refills: 0
Start: 2018-02-07 | End: 2018-04-23 | Stop reason: SDUPTHER

## 2018-02-07 RX ORDER — GLIPIZIDE 2.5 MG/1
2.5 TABLET, EXTENDED RELEASE ORAL DAILY
Qty: 180 TAB | Refills: 0 | Status: SHIPPED | OUTPATIENT
Start: 2018-02-07 | End: 2018-02-07 | Stop reason: SDUPTHER

## 2018-02-07 RX ORDER — LANOLIN ALCOHOL/MO/W.PET/CERES
325 CREAM (GRAM) TOPICAL
Qty: 30 TAB | Refills: 5 | Status: SHIPPED | OUTPATIENT
Start: 2018-02-07 | End: 2018-02-07 | Stop reason: SDUPTHER

## 2018-02-07 RX ORDER — GLIPIZIDE 2.5 MG/1
TABLET, EXTENDED RELEASE ORAL
Qty: 180 TAB | Refills: 0 | Status: SHIPPED | OUTPATIENT
Start: 2018-02-07 | End: 2018-05-07 | Stop reason: SDUPTHER

## 2018-02-07 RX ORDER — ESTRADIOL 0.1 MG/G
0.04 CREAM VAGINAL
Qty: 42.5 G | Refills: 1 | COMMUNITY
Start: 2018-02-07 | End: 2018-11-20

## 2018-02-07 RX ORDER — GABAPENTIN 300 MG/1
300 CAPSULE ORAL 2 TIMES DAILY
Qty: 120 CAP | Refills: 6
Start: 2018-02-07 | End: 2018-09-27 | Stop reason: DRUGHIGH

## 2018-02-07 RX ORDER — ATORVASTATIN CALCIUM 10 MG/1
TABLET, FILM COATED ORAL
Qty: 90 TAB | Refills: 0 | Status: SHIPPED | OUTPATIENT
Start: 2018-02-07 | End: 2018-02-07 | Stop reason: SDUPTHER

## 2018-02-07 RX ORDER — CYANOCOBALAMIN 1000 UG/ML
INJECTION, SOLUTION INTRAMUSCULAR; SUBCUTANEOUS
Qty: 1 VIAL | Refills: 3
Start: 2018-02-07 | End: 2018-05-29

## 2018-02-07 RX ORDER — ATORVASTATIN CALCIUM 10 MG/1
10 TABLET, FILM COATED ORAL DAILY
Qty: 90 TAB | Refills: 0 | Status: SHIPPED | OUTPATIENT
Start: 2018-02-07 | End: 2018-11-09 | Stop reason: SDUPTHER

## 2018-02-07 NOTE — PROGRESS NOTES
1. Have you been to the ER, urgent care clinic, or been hospitalized since your last visit? No     2. Have you seen or consulted any other health care providers outside of the [address] [place] [address] since your last visit?   No     Reviewed record in preparation for visit and have necessary documentation  opportunity was given for questions  Goals that were addressed and/or need to be completed during or after this appointment include     Health Maintenance Due   Topic Date Due    EYE EXAM RETINAL OR DILATED Q1  10/19/2016    GLAUCOMA SCREENING Q2Y  10/19/2017    HEMOGLOBIN A1C Q6M  02/01/2018

## 2018-02-07 NOTE — PATIENT INSTRUCTIONS

## 2018-02-07 NOTE — MR AVS SNAPSHOT
[address] [address] [address] [address] 
[phone] Patient: Krissy Scanlon MRN: [id number] ARX:[birth year] Visit Information Date & Time Provider Department Dept. Phone Encounter #  
 2/7/2018 11:00 AM Joseph Becerra  [facility] [phone] [id number] Follow-up Instructions Return in about 3 months (around 5/7/2018) for Routine. .  
  
Your Appointments 2/13/2018  2:40 PM  
ACUTE CARE with Rebeka Stout MD  
[address] ([facility]) Appt Note: dr. Gabriel Kelly patient  
 [address] [id number]  
[phone]  
  
   
 [address] [facility] [address]  
  
    
 2/19/2018  2:40 PM  
Follow Up with Prachi Carvajal MD  
[facility]) Appt Note: neuropathy [place] [address] [facility] [id number] [phone]  
  
   
 [place] [address] [address] [address] Upcoming Health Maintenance Date Due  
 EYE EXAM RETINAL OR DILATED Q1 10/19/2016 GLAUCOMA SCREENING Q2Y 10/19/2017 HEMOGLOBIN A1C Q6M 2/1/2018 FOOT EXAM Q1 7/26/2018 MICROALBUMIN Q1 7/26/2018 MEDICARE YEARLY EXAM 7/27/2018 LIPID PANEL Q1 8/1/2018 DTaP/Tdap/Td series (2 - Td) 12/5/2023 Allergies as of 2/7/2018  Review Complete On: 2/7/2018 By: Mago Quach LPN Severity Noted Reaction Type Reactions Cephalosporins High 12/30/2009    Nausea and Vomiting Severe vomiting Current Immunizations  Reviewed on 12/13/2017 Name Date Influenza High Dose Vaccine PF 9/13/2017 12:00 AM, 8/5/2016 Influenza Vaccine 12/5/2013 Influenza Vaccine Karen Pillow) 9/6/2016 Influenza Vaccine (Quad) PF 11/10/2014 Influenza Vaccine PF 10/20/2015 Influenza Vaccine Split 10/10/2012  4:30 PM, 11/14/2011, 10/25/2010 Pneumococcal Conjugate (PCV-13) 9/13/2017 12:00 AM  
 Pneumococcal Polysaccharide (PPSV-23) 12/5/2013 Tdap 12/5/2013 ZZZ-RETIRED (DO NOT USE) Pneumococcal Vaccine (Unspecified Type) 6/5/2008 Not reviewed this visit You Were Diagnosed With   
  
 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 
ICD-9-CM: 250.60, 357.2 Osteoporosis, postmenopausal     ICD-10-CM: M81.0 ICD-9-CM: 733.01 Raynaud's disease without gangrene     ICD-10-CM: I73.00 ICD-9-CM: 443.0 Vitals BP Pulse Temp Resp Height(growth percentile) Weight(growth percentile) 107/60 79 98.7 °F (37.1 °C) (Oral) 18 4' 11\" (1.499 m) 209 lb (94.8 kg) SpO2 BMI OB Status Smoking Status 96% 42.21 kg/m2 Postmenopausal Former Smoker Vitals History BMI and BSA Data Body Mass Index Body Surface Area  
 42.21 kg/m 2 1.99 m 2 Preferred Pharmacy Pharmacy Name Phone [address] [facility] [address] ([address] [phone] Your Updated Medication List  
  
   
This list is accurate as of: 2/7/18 11:45 AM.  Always use your most recent med list.  
  
  
  
  
 acetaminophen 650 mg Tber Commonly known as:  TYLENOL Take 1,300 mg by mouth daily as needed for Pain. aspirin 81 mg tablet Take 81 mg by mouth daily. atorvastatin 10 mg tablet Commonly known as:  LIPITOR Take 1 Tab by mouth daily. Blood-Glucose Meter monitoring kit Commonly known as:  CONTOUR METER Dx: 250. Check blood sugars daily. busPIRone 5 mg tablet Commonly known as:  BUSPAR Take 5 mg by mouth two (2) times a day. CALCIUM 500+D 500 mg(1,250mg) -200 unit per tablet Generic drug:  calcium-vitamin D Take 2 Tabs by mouth daily. cholecalciferol 1,000 unit tablet Commonly known as:  VITAMIN D3 Take 2,000 Units by mouth daily. cyanocobalamin 1,000 mcg/mL injection Commonly known as:  VITAMIN B-12  
1000 mcg SC/IM  every month  
  
 cyclobenzaprine 10 mg tablet Commonly known as:  FLEXERIL Take 10 mg by mouth nightly. denosumab 60 mg/mL injection Commonly known as:  Gerry People 1 mL by SubCUTAneous route every 6 months. ESTRACE 0.01 % (0.1 mg/gram) vaginal cream  
Generic drug:  estradiol Insert 0.1 g into vagina every Monday, Wednesday, Friday. ferrous sulfate 325 mg (65 mg iron) tablet Take 1 Tab by mouth two (2) times a day. Indications: Iron Deficiency Anemia  
  
 folic acid 1 mg tablet Commonly known as:  FOLVITE  
TAKE 1 TABLET BY MOUTH DAILY * gabapentin 600 mg tablet Commonly known as:  NEURONTIN  
TAKE 1 TABLET BY MOUTH TWICE DAILY * gabapentin 300 mg capsule Commonly known as:  NEURONTIN Take 1 Cap by mouth two (2) times a day. glipiZIDE SR 2.5 mg CR tablet Commonly known as:  GLUCOTROL XL  
TAKE 1 TABLET BY MOUTH TWICE DAILY  
  
 glucose blood VI test strips strip Commonly known as:  AscGozentia CONTOUR Glucometer for 250.00 Pt. test blood sugar once daily (in morning) HYDROcodone-acetaminophen 5-325 mg per tablet Commonly known as:  Mertie Fajardo Take 1 Tab by mouth every six (6) hours as needed for Pain.  
  
 hydroxychloroquine 200 mg tablet Commonly known as:  PLAQUENIL  
TAKE 1 TABLET BY MOUTH TWICE DAILY OR AS DIRECTED  
  
 metFORMIN 500 mg tablet Commonly known as:  GLUCOPHAGE  
TAKE 1 TABLET BY MOUTH TWICE DAILY WITH MEALS  
  
 metoprolol succinate 25 mg XL tablet Commonly known as:  TOPROL-XL Take 1 Tab by mouth nightly. MULTIVITAMIN PO Take 1 Tab by mouth daily. nortriptyline 10 mg capsule Commonly known as:  PAMELOR  
TAKE 1 CAPSULE BY MOUTH EVERY NIGHT AT BEDTIME Syringe with Needle, Safety 3 mL 25 gauge x 5/8\" Syrg Commonly known as:  3cc Safety Syringe 25Gx5/8\" To be used with B12 injections Lesly Holes Rolling walker. Patient with spinal stenosis and stopped posture. Pain with limited ambulation. ZINC PO Take 50 mg by mouth daily. * Notice: This list has 2 medication(s) that are the same as other medications prescribed for you. Read the directions carefully, and ask your doctor or other care provider to review them with you. We Performed the Following CBC WITH AUTOMATED DIFF [81069 CPT(R)] HEMOGLOBIN A1C WITH EAG [60737 CPT(R)] IRON PROFILE [id number] CPT(R)] LIPID PANEL [11107 CPT(R)] METABOLIC PANEL, BASIC [38948 CPT(R)] VITAMIN B12 [id number] CPT(R)] Follow-up Instructions Return in about 3 months (around 5/7/2018) for Routine. .  
  
  
Patient Instructions Learning About Diabetes Food Guidelines Your Care Instructions Meal planning is important to manage diabetes. It helps keep your blood sugar at a target level (which you set with your doctor). You don't have to eat special foods. You can eat what your family eats, including sweets once in a while. But you do have to pay attention to how often you eat and how much you eat of certain foods. You may want to work with a dietitian or a certified diabetes educator (CDE) to help you plan meals and snacks. A dietitian or CDE can also help you lose weight if that is one of your goals. What should you know about eating carbs?  
Managing the amount of carbohydrate (carbs) you eat is an important part of healthy meals when you have diabetes. Carbohydrate is found in many foods. · Learn which foods have carbs. And learn the amounts of carbs in different foods. ¨ Bread, cereal, pasta, and rice have about 15 grams of carbs in a serving. A serving is 1 slice of bread (1 ounce), ½ cup of cooked cereal, or 1/3 cup of cooked pasta or rice. ¨ Fruits have 15 grams of carbs in a serving. A serving is 1 small fresh fruit, such as an apple or orange; ½ of a banana; ½ cup of cooked or canned fruit; ½ cup of fruit juice; 1 cup of melon or raspberries; or 2 tablespoons of dried fruit. ¨ Milk and no-sugar-added yogurt have 15 grams of carbs in a serving. A serving is 1 cup of milk or 2/3 cup of no-sugar-added yogurt. ¨ Starchy vegetables have 15 grams of carbs in a serving. A serving is ½ cup of mashed potatoes or sweet potato; 1 cup winter squash; ½ of a small baked potato; ½ cup of cooked beans; or ½ cup cooked corn or green peas. · Learn how much carbs to eat each day and at each meal. A dietitian or CDE can teach you how to keep track of the amount of carbs you eat. This is called carbohydrate counting. · If you are not sure how to count carbohydrate grams, use the Plate Method to plan meals. It is a good, quick way to make sure that you have a balanced meal. It also helps you spread carbs throughout the day. ¨ Divide your plate by types of foods. Put non-starchy vegetables on half the plate, meat or other protein food on one-quarter of the plate, and a grain or starchy vegetable in the final quarter of the plate. To this you can add a small piece of fruit and 1 cup of milk or yogurt, depending on how many carbs you are supposed to eat at a meal. 
· Try to eat about the same amount of carbs at each meal. Do not \"save up\" your daily allowance of carbs to eat at one meal. 
· Proteins have very little or no carbs per serving.  Examples of proteins are beef, chicken, turkey, fish, eggs, tofu, cheese, cottage cheese, and peanut butter. A serving size of meat is 3 ounces, which is about the size of a deck of cards. Examples of meat substitute serving sizes (equal to 1 ounce of meat) are 1/4 cup of cottage cheese, 1 egg, 1 tablespoon of peanut butter, and ½ cup of tofu. How can you eat out and still eat healthy? · Learn to estimate the serving sizes of foods that have carbohydrate. If you measure food at home, it will be easier to estimate the amount in a serving of restaurant food. · If the meal you order has too much carbohydrate (such as potatoes, corn, or baked beans), ask to have a low-carbohydrate food instead. Ask for a salad or green vegetables. · If you use insulin, check your blood sugar before and after eating out to help you plan how much to eat in the future. · If you eat more carbohydrate at a meal than you had planned, take a walk or do other exercise. This will help lower your blood sugar. What else should you know? · Limit saturated fat, such as the fat from meat and dairy products. This is a healthy choice because people who have diabetes are at higher risk of heart disease. So choose lean cuts of meat and nonfat or low-fat dairy products. Use olive or canola oil instead of butter or shortening when cooking. · Don't skip meals. Your blood sugar may drop too low if you skip meals and take insulin or certain medicines for diabetes. · Check with your doctor before you drink alcohol. Alcohol can cause your blood sugar to drop too low. Alcohol can also cause a bad reaction if you take certain diabetes medicines. Follow-up care is a key part of your treatment and safety. Be sure to make and go to all appointments, and call your doctor if you are having problems. It's also a good idea to know your test results and keep a list of the medicines you take. Where can you learn more? Go to http://jennifer-vincetn.info/.  
Enter W781 in the search box to learn more about \"Learning About Diabetes Food Guidelines. \" Current as of: March 13, 2017 Content Version: 11.4 © 8913-2113 Healthwise, [employer]. Care instructions adapted under license by [employer] (which disclaims liability or warranty for this information). If you have questions about a medical condition or this instruction, always ask your healthcare professional. [address] any warranty or liability for your use of this information. Introducing [facility]! Dear Duong Ross: Thank you for requesting a [employer] account. Our records indicate that you already have an active [employer] account. You can access your account anytime at [email or web address] Did you know that you can access your hospital and ER discharge instructions at any time in [employer]? You can also review all of your test results from your hospital stay or ER visit. Additional Information If you have questions, please visit the Frequently Asked Questions section of the [employer] website at [email or web address] Remember, Me[employer] is NOT to be used for urgent needs. For medical emergencies, dial 911. Now available from your iPhone and Android! Please provide this summary of care documentation to your next provider. Your primary care clinician is listed as [address]. If you have any questions after today's visit, please call [phone].

## 2018-02-07 NOTE — PROGRESS NOTES
Adrian Manley  65 y.o. female  1940  [address] [place] [id number]  [id number]     [id number]: Progress Note       Encounter Date: 2/7/2018    Chief Complaint   Patient presents with    New Patient       History provided by patient and   History of Present Illness   Adrian Manley is a 65 y.o. female who presents to clinic today for    Adrian Manley is a 65 y.o. female new patient who presents to establish PCP care; she decided to move due to distance from clinic from home. I personally reviewed and updated the patient's medical, surgical, family and social history. - she states that she is currently feeling well with no complaints. Reports that she has an upcoming appt with rheum as her prior doctor has retired and she needs to establish for her lupus/RA and osteoporosis. PREVIOUS PRIMARY CARE PROVIDER and SPECIALISTS  Dr. Carlitos Brothers of [facility]  Provided by patient: in EMR only    SPECIALISTS  Patient Care Team:  Jackson Quinteros MD as PCP - General Lianna Kaur MD as Physician (Rheumatology)  Jackson Quinteros MD as Physician (Pediatrics)  Ximena Miramontes MD (Neurology)      Health Maintenance  Will request records of eye exam and do lab work today. Health Maintenance Due   Topic Date Due    EYE EXAM RETINAL OR DILATED Q1  10/19/2016    GLAUCOMA SCREENING Q2Y  10/19/2017    HEMOGLOBIN A1C Q6M  02/01/2018     Review of Systems   Review of Systems   Constitutional: Negative for chills and fever. Respiratory: Negative for cough, shortness of breath and wheezing. Cardiovascular: Negative for chest pain, palpitations and leg swelling. Gastrointestinal: Negative for abdominal pain, blood in stool, constipation, diarrhea, nausea and vomiting. Genitourinary: Negative for dysuria, frequency, hematuria and urgency. Musculoskeletal: Positive for back pain. Negative for falls, joint pain, myalgias and neck pain.    Skin: Negative for itching and rash.   Neurological: Negative for dizziness and headaches. Vitals/Objective:     Vitals:    02/07/18 1104   BP: 107/60   Pulse: 79   Resp: 18   Temp: 98.7 °F (37.1 °C)   TempSrc: Oral   SpO2: 96%   Weight: 209 lb (94.8 kg)   Height: 4' 11\" (1.499 m)     Body mass index is 42.21 kg/(m^2). Wt Readings from Last 3 Encounters:   02/07/18 209 lb (94.8 kg)   12/13/17 206 lb 9.6 oz (93.7 kg)   08/21/17 212 lb (96.2 kg)       Physical Exam   Constitutional: She is oriented to person, place, and time. She appears well-developed and well-nourished. No distress. HENT:   Head: Normocephalic and atraumatic. Right Ear: External ear normal.   Left Ear: External ear normal.   Eyes: Conjunctivae are normal. Pupils are equal, round, and reactive to light. Right eye exhibits no discharge. Left eye exhibits no discharge. Cardiovascular: Normal rate and regular rhythm. No murmur heard. Pulmonary/Chest: Effort normal and breath sounds normal. No respiratory distress. She has no wheezes. Abdominal: Soft. She exhibits no distension. There is no tenderness. Musculoskeletal: She exhibits no edema or tenderness. Neurological: She is alert and oriented to person, place, and time. Skin: Skin is warm and dry. She is not diaphoretic. No results found for this or any previous visit (from the past 24 hour(s)). Assessment and Plan:     Encounter Diagnoses     ICD-10-CM ICD-9-CM   1. Coronary artery disease involving native heart without angina pectoris, unspecified vessel or lesion type I25.10 414.01   2. Hypercholesterolemia E78.00 272.0   3. Vaginal atrophy N95.2 627.3   4. Pernicious anemia D51.0 281.0   5. B12 deficiency E53.8 266.2   6. Rheumatoid arthritis involving both knees, unspecified rheumatoid factor presence (HCC) M06.9 714.0   7. Diabetic polyneuropathy associated with type 2 diabetes mellitus (HCC) E11.42 250.60     357.2   8. Osteoporosis, postmenopausal M81.0 733.01   9.  Raynaud's disease without gangrene I73.00 443.0       1. Coronary artery disease involving native heart without angina pectoris, unspecified vessel or lesion type  No symptoms with exertion. Continue bb. Monitor lipids. - metoprolol succinate (TOPROL-XL) 25 mg XL tablet; Take 1 Tab by mouth nightly. Dispense: 90 Tab; Refill: 0    2. Hypercholesterolemia  - LIPID PANEL    3. Vaginal atrophy  Filled by urology.   - ESTRACE 0.01 % (0.1 mg/gram) vaginal cream; Insert 0.1 g into vagina every Monday, Wednesday, Friday. Dispense: 42.5 g; Refill: 1    4. Pernicious anemia  5. B12 deficiency  S/p gastric bypass. Will recheck levels. - cyanocobalamin (VITAMIN B-12) 1,000 mcg/mL injection; 1000 mcg SC/IM  every month  Dispense: 1 Vial; Refill: 3  - ferrous sulfate 325 mg (65 mg iron) tablet; Take 1 Tab by mouth two (2) times a day. Indications: Iron Deficiency Anemia  Dispense: 30 Tab; Refill: 5  - CBC WITH AUTOMATED DIFF  - IRON PROFILE    6. Rheumatoid arthritis involving both knees, unspecified rheumatoid factor presence ([facility] [address]  Is currently at baseline. Has appt with new rheumatologist later this month. 7. Diabetic polyneuropathy associated with type 2 diabetes mellitus (HCC)  - gabapentin (NEURONTIN) 300 mg capsule; Take 1 Cap by mouth two (2) times a day. Dispense: 120 Cap; Refill: 6  - METABOLIC PANEL, BASIC  - HEMOGLOBIN A1C WITH EAG    8. Osteoporosis, postmenopausal  Last DEXA 8/8/17. Followed by Rheumatology and receives Prolia injections at their office.   - denosumab (PROLIA) 60 mg/mL injection; 1 mL by SubCUTAneous route every 6 months. 9. Raynaud's disease without gangrene  No symptoms at encounter today. I have discussed the diagnosis with the patient and the intended plan as seen in the above orders. she has expressed understanding. The patient has received an after-visit summary and questions were answered concerning future plans.   I have discussed medication side effects and warnings with the patient as well.    Electronically Signed: Savana Chandra MD     History/Allergies   Patients past medical, surgical and family histories were reviewed and updated. Past Medical History:   Diagnosis Date    Anemia     Arthritis     CAD (coronary artery disease) 1997    MI    Degenerative joint disease of right hip 09/14/2015     Virgil Hernandez/Dr. Ramona Villagran    Diabetes ([facility] [address]     Fracture     right shoulder; T6 compression    Gastritis     Hypercholesterolemia     Hyperlipidemia    Lupus 1/9/2015    Osteoporosis, post-menopausal     vertebral fracture      Past Surgical History:   Procedure Laterality Date    CARDIAC SURG PROCEDURE UNLIST      HX CHOLECYSTECTOMY      HX CORONARY STENT PLACEMENT  02/19/1997    LAD    HX GASTRIC BYPASS  2000    HX HERNIA REPAIR  2005    HX KYPHOPLASTY      t5    HX KYPHOPLASTY      L4    HX ORTHOPAEDIC      bilateral knees    HX ORTHOPAEDIC      r shoulder    HX ORTHOPAEDIC      Laminectomy    FL INC IMPLTJ NEUROSTIMULATOR ELTRD SACRAL NERVE       Family History   Problem Relation Age of Onset    Diabetes Mother     Hypertension Mother     Heart Disease Mother     Diabetes Father     Hypertension Father     Heart Disease Father    [facility] Arthritis-rheumatoid Sister     Elevated Lipids Sister    [facility] Arthritis-rheumatoid Sister     Hypertension Sister     Diabetes Sister     Thyroid Disease Sister     Arthritis-rheumatoid Other      Social History     Social History    Marital status:      Spouse name: N/A    Number of children: N/A    Years of education: N/A     Occupational History    Not on file.      Social History Main Topics    Smoking status: Former Smoker     Quit date: 12/30/1989    Smokeless tobacco: Never Used    Alcohol use No    Drug use: No    Sexual activity: Not Currently     Other Topics Concern    Not on file     Social History Narrative         Allergies   Allergen Reactions    Cephalosporins Nausea and Vomiting     Severe vomiting Disposition     Follow-up Disposition:  Return in about 3 months (around 5/7/2018) for Routine. .    Future Appointments  Date Time Provider Yocasta Cisneros   2/13/2018 2:40 PM Pepe Centeno MD [address]   2/19/2018 2:40 PM Srinivas Hanna MD [address]            Current Medications after this visit     Current Outpatient Prescriptions   Medication Sig    glipiZIDE SR (GLUCOTROL XL) 2.5 mg CR tablet TAKE 1 TABLET BY MOUTH TWICE DAILY    atorvastatin (LIPITOR) 10 mg tablet Take 1 Tab by mouth daily.  cyclobenzaprine (FLEXERIL) 10 mg tablet Take 10 mg by mouth nightly.  cyanocobalamin (VITAMIN B-12) 1,000 mcg/mL injection 1000 mcg SC/IM  every month    ESTRACE 0.01 % (0.1 mg/gram) vaginal cream Insert 0.1 g into vagina every Monday, Wednesday, Friday.  ferrous sulfate 325 mg (65 mg iron) tablet Take 1 Tab by mouth two (2) times a day. Indications: Iron Deficiency Anemia    gabapentin (NEURONTIN) 300 mg capsule Take 1 Cap by mouth two (2) times a day.  metoprolol succinate (TOPROL-XL) 25 mg XL tablet Take 1 Tab by mouth nightly.  denosumab (PROLIA) 60 mg/mL injection 1 mL by SubCUTAneous route every 6 months.  folic acid (FOLVITE) 1 mg tablet TAKE 1 TABLET BY MOUTH DAILY    metFORMIN (GLUCOPHAGE) 500 mg tablet TAKE 1 TABLET BY MOUTH TWICE DAILY WITH MEALS    gabapentin (NEURONTIN) 600 mg tablet TAKE 1 TABLET BY MOUTH TWICE DAILY    HYDROcodone-acetaminophen (NORCO) 5-325 mg per tablet Take 1 Tab by mouth every six (6) hours as needed for Pain.  glucose blood VI test strips (ASCENSIA CONTOUR) strip Glucometer for 250.00 Pt. test blood sugar once daily (in morning)    Syringe with Needle, Safety (3CC SAFETY SYRINGE 25GX5/8\") 3 mL 25 gauge x 5/8\" syrg To be used with B12 injections    hydroxychloroquine (PLAQUENIL) 200 mg tablet TAKE 1 TABLET BY MOUTH TWICE DAILY OR AS DIRECTED    busPIRone (BUSPAR) 5 mg tablet Take 5 mg by mouth two (2) times a day.     nortriptyline (PAMELOR) 10 mg capsule TAKE 1 CAPSULE BY MOUTH EVERY NIGHT AT BEDTIME    acetaminophen (TYLENOL) 650 mg CR tablet Take 1,300 mg by mouth daily as needed for Pain.  Blood-Glucose Meter (CONTOUR METER) monitoring kit Dx: 250. Check blood sugars daily.  Walker [employer] walker. Patient with spinal stenosis and stopped posture. Pain with limited ambulation.  ZINC PO Take 50 mg by mouth daily.  cholecalciferol, vitamin d3, (VITAMIN D3) 1,000 unit tablet Take 2,000 Units by mouth daily.  calcium-vitamin D (CALCIUM 500+D) 500 mg(1,250mg) -200 unit per tablet Take 2 Tabs by mouth daily.  aspirin 81 mg Tab Take 81 mg by mouth daily.  MULTIVITAMINS (MULTIVITAMIN PO) Take 1 Tab by mouth daily. No current facility-administered medications for this visit.       Medications Discontinued During This Encounter   Medication Reason    metFORMIN (GLUCOPHAGE) 025 mg tablet Duplicate Order    metoprolol succinate (TOPROL-XL) 25 mg XL tablet Duplicate Order    nitrofurantoin, macrocrystal-monohydrate, (MACROBID) 100 mg capsule Therapy Completed    nortriptyline (PAMELOR) 10 mg capsule Duplicate Order    predniSONE (DELTASONE) 5 mg tablet Therapy Completed    pantoprazole (PROTONIX) 40 mg tablet Not A Current Medication    gabapentin (NEURONTIN) 861 mg tablet Duplicate Order    atorvastatin (LIPITOR) 10 mg tablet Duplicate Order    busPIRone (BUSPAR) 5 mg tablet Duplicate Order    DULoxetine (CYMBALTA) 30 mg capsule Not A Current Medication    ferrous sulfate 325 mg (65 mg iron) tablet Duplicate Order    folic acid (FOLVITE) 1 mg tablet Duplicate Order    glipiZIDE SR (GLUCOTROL XL) 2.5 mg CR tablet Duplicate Order    B.infantis-B.ani-B.long-B.bifi 10-15 mg TbEC Not A Current Medication    BD TUBERCULIN SYRINGE 1 mL 25 x 5/8\" syrg Not A Current Medication    fluocinoNIDE (LIDEX) 0.05 % topical cream Not A Current Medication    cyanocobalamin (VITAMIN B-12) 1,000 mcg/mL injection Reorder    ESTRACE 0.01 % (0.1 mg/gram) vaginal cream Reorder    ferrous sulfate 325 mg (65 mg iron) tablet Reorder    gabapentin (NEURONTIN) 300 mg capsule Dose Adjustment    metoprolol succinate (TOPROL-XL) 25 mg XL tablet Reorder

## 2018-02-09 LAB
BASOPHILS # BLD AUTO: 0 X10E3/UL (ref 0–0.2)
BASOPHILS NFR BLD AUTO: 1 %
BUN SERPL-MCNC: 20 MG/DL (ref 8–27)
BUN/CREAT SERPL: 23 (ref 12–28)
CALCIUM SERPL-MCNC: 9.1 MG/DL (ref 8.7–10.3)
CHLORIDE SERPL-SCNC: 101 MMOL/L (ref 96–106)
CHOLEST SERPL-MCNC: 125 MG/DL (ref 100–199)
CO2 SERPL-SCNC: 25 MMOL/L (ref 18–29)
CREAT SERPL-MCNC: 0.88 MG/DL (ref 0.57–1)
EOSINOPHIL # BLD AUTO: 0.1 X10E3/UL (ref 0–0.4)
EOSINOPHIL NFR BLD AUTO: 2 %
ERYTHROCYTE [DISTWIDTH] IN BLOOD BY AUTOMATED COUNT: 13.4 % (ref 12.3–15.4)
EST. AVERAGE GLUCOSE BLD GHB EST-MCNC: 120 MG/DL
GFR SERPLBLD CREATININE-BSD FMLA CKD-EPI: 63 ML/MIN/1.73
GFR SERPLBLD CREATININE-BSD FMLA CKD-EPI: 73 ML/MIN/1.73
GLUCOSE SERPL-MCNC: 87 MG/DL (ref 65–99)
HBA1C MFR BLD: 5.8 % (ref 4.8–5.6)
HCT VFR BLD AUTO: 38.2 % (ref 34–46.6)
HDLC SERPL-MCNC: 53 MG/DL
HGB BLD-MCNC: 12.4 G/DL (ref 11.1–15.9)
IMM GRANULOCYTES # BLD: 0 X10E3/UL (ref 0–0.1)
IMM GRANULOCYTES NFR BLD: 0 %
IRON SATN MFR SERPL: 24 % (ref 15–55)
IRON SERPL-MCNC: 81 UG/DL (ref 27–139)
LDLC SERPL CALC-MCNC: 52 MG/DL (ref 0–99)
LYMPHOCYTES # BLD AUTO: 2.2 X10E3/UL (ref 0.7–3.1)
LYMPHOCYTES NFR BLD AUTO: 29 %
MCH RBC QN AUTO: 31.4 PG (ref 26.6–33)
MCHC RBC AUTO-ENTMCNC: 32.5 G/DL (ref 31.5–35.7)
MCV RBC AUTO: 97 FL (ref 79–97)
MONOCYTES # BLD AUTO: 0.6 X10E3/UL (ref 0.1–0.9)
MONOCYTES NFR BLD AUTO: 9 %
NEUTROPHILS # BLD AUTO: 4.4 X10E3/UL (ref 1.4–7)
NEUTROPHILS NFR BLD AUTO: 59 %
PLATELET # BLD AUTO: 237 X10E3/UL (ref 150–379)
POTASSIUM SERPL-SCNC: 4.8 MMOL/L (ref 3.5–5.2)
RBC # BLD AUTO: 3.95 X10E6/UL (ref 3.77–5.28)
SODIUM SERPL-SCNC: 142 MMOL/L (ref 134–144)
TIBC SERPL-MCNC: 338 UG/DL (ref 250–450)
TRIGL SERPL-MCNC: 100 MG/DL (ref 0–149)
UIBC SERPL-MCNC: 257 UG/DL (ref 118–369)
VIT B12 SERPL-MCNC: 676 PG/ML (ref 232–1245)
VLDLC SERPL CALC-MCNC: 20 MG/DL (ref 5–40)
WBC # BLD AUTO: 7.4 X10E3/UL (ref 3.4–10.8)

## 2018-02-12 NOTE — PROGRESS NOTES
REASON FOR VISIT    This is a Dr. Rosario Innocent follow-up visit for Ms. Rueda for Seronegative Rheumatoid Arthritis and Osteoporosis.      Inflammatory arthritis phenotype includes:  Anti-CCP positive: no  Rheumatoid factor positive: no  Erosive disease: pending  Extra-articular manifestations include: Raynaud's    Immunosuppression Screening:  Quantiferon TB: pending  PPD:  Not performed  Hepatitis B: negative (9/17/2013)  Hepatitis C: negative (9/17/2013)    Therapy History includes:  Current DMARD therapy include: hydroxychloroquine 300 mg daily  Prior DMARD therapy include: methotrexate 12.5 mg subcutaneous    Discontinued DMARDs because of inefficacy: None  Discontinued DMARDs because of side effects: methotrexate (LFTs)    Osteoporosis Historical Synopsis    Height loss since age 26 (at least two inches): 5  Fracture history includes: yes (T5-T6)  Family history of hip fracture: no  Fall Risk: no    Daily calcium intake is 1800 mg  Daily vitamin D intake is 2000 IU    Smoking history: no  Alcohol consumption: no  Prednisone history: no    Exercise: no    Previous work-up for osteoporosis includes the following:  DEXA Scan: 8/08/2017  Vitamin 25OH D level: 44.5 (8/01/2017)  PTH: 49 (11/29/2016)  TSH: 2.100 (8/09/2016)    Therapy History includes:    Current osteoporosis therapy includes: Prolia  Prior osteoporosis therapy includes: Reclast (2 years)  The following osteoporosis therapy have been ineffective: Reclast  The following osteoporosis therapy were stopped because of side effects: none    Immunizations:   Immunization History   Administered Date(s) Administered    Influenza High Dose Vaccine PF 08/05/2016, 09/13/2017    Influenza Vaccine 12/05/2013    Influenza Vaccine (Quad) 09/06/2016    Influenza Vaccine (Quad) PF 11/10/2014    Influenza Vaccine PF 10/20/2015    Influenza Vaccine Split 10/25/2010, 11/14/2011, 10/10/2012    Pneumococcal Conjugate (PCV-13) 09/13/2017    Pneumococcal Polysaccharide (PPSV-23) 12/05/2013    Tdap 12/05/2013    ZZZ-RETIRED (DO NOT USE) Pneumococcal Vaccine (Unspecified Type) 06/05/2008       Active problems include:    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    Long-term use of immunosuppressant medication Z79.899    Long-term use of Plaquenil Z79.899    Hypocalcemia E83.51    Low back pain M54.5    Age-related osteoporosis without current pathological fracture M81.0    H/O Bell's palsy Z86.69    Diabetic polyneuropathy ([facility]) E11.42    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    Unspecified hereditary and idiopathic peripheral neuropathy G60.9    Type II or unspecified type diabetes mellitus with neurological manifestations, not stated as uncontrolled(250.60) ([facility]) E11.49    Raynauds syndrome I73.00    Blepharoconjunctivitis H10.509    Rheumatoid arthritis ([facility]) M06.9    Nuclear sclerotic cataract H25.10    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    Obesity, morbid ([facility]) E66.01    H/O total knee replacement Z96.659    Primary localized osteoarthritis of left knee M17.12    Hypercholesterolemia E78.00    Primary Raynaud's phenomenon I73.00    CKD (chronic kidney disease) stage 2, GFR 60-89 ml/min N18.2       HISTORY OF PRESENT ILLNESS    Ms. Joyce Almaguer returns for a follow-up. I reviewed her medical record, including Dr. Garcia Poster office notes, laboratories and imaging and summarized them in this note. On her visit with Dr. Shannon Theodore on 6/29/2017 \"for follow up of lupus. She went to PT for right leg pain and noted some relief. However, she did note a flare of the malar rash since beginning PT.  She has pain in the right shoulder (when driving), the legs (diffuse right leg pain with driving). She notes intermittent swelling of her hands. She has a dry mouth: biotene spray gives some relief. Raynaud's symptoms are noted intermittently. She has been taking Cymbalta 30 mg daily. She is taking gabapentin four times daily with some relief of neuropathy. She has continued Plaquenil, 200 mg AM and 100 mg PM. She is taking calcium and D supplements each day. She had her first Prolia on 12-28 and tolerated this. Regarding her blood pressure, she has not related any recent dizziness. \" Under assessment, he wrote \"DEXTER low titer (malar rash, inflammatory arthritis, and Raynaud's syndrome). RF and CCP are negative. She has had relief with Plaquenil 200 mg AM and 100 mg PM. She had been taking methotrexate 12.5 mg subcutaneous weekly (lowered to 7.5 mg weekly after elevated liver enzymes). She has been off methotrexate since [facility] 1978. Flare of malar rash since last visit with synovitis in multiple finger joints on exam today. \"    She is on hydroxychloroquine 300 mg daily. Her last ophthalmic exam was in 10/2017. There was no dryness noted. Today, she complains of hand pain, swelling and stiffness for 2 hours, which improves with use. methotrexate had helped her joints. She endorses fatigue, dry mouth, hair loss, Raynaud's (cyanotic, white, red since her 20's), rash and sun sensitivity. She reports flat facial and cheek redness especially in the morning that lasts until that is worse when she is hot and sun exposure. She has had this most of her life. She does not use amlodipine. She has dentures. She is edentulous due to breaking of her teeth at the gum. She did not need water to swallow food.      She denies fever, weight loss, blurred vision, vision loss, oral ulcers, ankle swelling, dry eyes, dry cough, dyspnea, nausea, vomiting, dysphagia, abdominal pain, black or bloody stool, fall since last visit, rash, easy bruising and increased thirst.    Ms. Geo Skinner has continued her medications for arthritis and reports good tolerance without significant side effects. Last toxicity monitoring by blood work was done on 2/07/2018 and did not reveal any significant adverse effects, except eGFR 63. LFTs on 8/01/2017 were normal.    I reviewed C3 and C4 in her chart as far back as 6/25/2012 which were all normal.    Most recent inflammatory markers from N/A revealed a ESR N/A mm/hr (previously N/A mm/hr) and CRP N/A mg/L (previously N/A mg/L). The patient has not had any interval hospital admissions, infections, or surgeries. REVIEW OF SYSTEMS    A comprehensive review of systems was performed and pertinent results are documented in the HPI, review of systems is otherwise non-contributory. PAST MEDICAL HISTORY    She has a past medical history of Anemia; Arthritis; CAD (coronary artery disease) (1997); Degenerative joint disease of right hip (09/14/2015 ); Diabetes ([address] Fracture; Gastritis; Hypercholesterolemia; Lupus (1/9/2015); and Osteoporosis, post-menopausal. She also has no past medical history of Abuse; Anemia NEC; Arrhythmia; Asthma; Calculus of kidney; Cancer ([address] Chronic kidney disease; Chronic obstructive pulmonary disease ([address] Chronic pain; Congestive heart failure, unspecified; Contact dermatitis and other eczema, due to unspecified cause; COPD; Depression; GERD (gastroesophageal reflux disease); Headache(784.0); Liver disease; Malignant hyperthermia due to anesthesia; Psychotic disorder; PUD (peptic ulcer disease); Seizures ([address] Sleep apnea; Stroke [facility]); Thromboembolus ([address] Thyroid disease; or Trauma. FAMILY HISTORY    Her family history includes Arthritis-rheumatoid in her sister, sister and another family member; Diabetes in her father, mother, and sister; Elevated Lipids in her sister; Heart Disease in her father and mother; Hypertension in her father, mother, and sister; Thyroid Disease in her sister. SOCIAL HISTORY    She reports that she quit smoking about 28 years ago.  She has never used smokeless tobacco. She reports that she does not drink alcohol or use illicit drugs. MEDICATIONS    Current Outpatient Prescriptions   Medication Sig Dispense Refill    leflunomide (ARAVA) 20 mg tablet Take 1 Tab by mouth daily for 90 days. Take half tab daily for 7 days and then one tab if tolerated 90 Tab 0    amLODIPine (NORVASC) 10 mg tablet Take 0.5 Tabs by mouth daily for 30 days. 15 Tab 1    glipiZIDE SR (GLUCOTROL XL) 2.5 mg CR tablet TAKE 1 TABLET BY MOUTH TWICE DAILY 180 Tab 0    atorvastatin (LIPITOR) 10 mg tablet Take 1 Tab by mouth daily. 90 Tab 0    cyclobenzaprine (FLEXERIL) 10 mg tablet Take 10 mg by mouth nightly.  cyanocobalamin (VITAMIN B-12) 1,000 mcg/mL injection 1000 mcg SC/IM  every month 1 Vial 3    ESTRACE 0.01 % (0.1 mg/gram) vaginal cream Insert 0.1 g into vagina every Monday, Wednesday, Friday. 42.5 g 1    ferrous sulfate 325 mg (65 mg iron) tablet Take 1 Tab by mouth two (2) times a day. Indications: Iron Deficiency Anemia 30 Tab 5    gabapentin (NEURONTIN) 300 mg capsule Take 1 Cap by mouth two (2) times a day. 120 Cap 6    metoprolol succinate (TOPROL-XL) 25 mg XL tablet Take 1 Tab by mouth nightly. 90 Tab 0    denosumab (PROLIA) 60 mg/mL injection 1 mL by SubCUTAneous route every 6 months.  folic acid (FOLVITE) 1 mg tablet TAKE 1 TABLET BY MOUTH DAILY 90 Tab 0    metFORMIN (GLUCOPHAGE) 500 mg tablet TAKE 1 TABLET BY MOUTH TWICE DAILY WITH MEALS 180 Tab 0    gabapentin (NEURONTIN) 600 mg tablet TAKE 1 TABLET BY MOUTH TWICE DAILY 60 Tab 0    HYDROcodone-acetaminophen (NORCO) 5-325 mg per tablet Take 1 Tab by mouth every six (6) hours as needed for Pain.  60 Tab 0    glucose blood VI test strips (ASCENSIA CONTOUR) strip Glucometer for 250.00 Pt. test blood sugar once daily (in morning) 50 Strip 11    Syringe with Needle, Safety (3CC SAFETY SYRINGE 25GX5/8\") 3 mL 25 gauge x 5/8\" syrg To be used with B12 injections 100 Pen Needle 11    hydroxychloroquine (PLAQUENIL) 200 mg tablet TAKE 1 TABLET BY MOUTH TWICE DAILY OR AS DIRECTED (Patient taking differently: TAKE 1 TABLET BY MOUTH IN MORNING AND HALF A TAB AT BEDTIME) 60 Tab 5    busPIRone (BUSPAR) 5 mg tablet Take 5 mg by mouth two (2) times a day. 5    nortriptyline (PAMELOR) 10 mg capsule TAKE 1 CAPSULE BY MOUTH EVERY NIGHT AT BEDTIME 30 Cap 0    acetaminophen (TYLENOL) 650 mg CR tablet Take 1,300 mg by mouth daily as needed for Pain.  Blood-Glucose Meter (CONTOUR METER) monitoring kit Dx: 250. Check blood sugars daily. 1 kit 11    Walker Misc Rolling walker. Patient with spinal stenosis and stopped posture. Pain with limited ambulation. 1 Each 0    ZINC PO Take 50 mg by mouth daily.  cholecalciferol, vitamin d3, (VITAMIN D3) 1,000 unit tablet Take 2,000 Units by mouth daily.  calcium-vitamin D (CALCIUM 500+D) 500 mg(1,250mg) -200 unit per tablet Take 2 Tabs by mouth daily.  aspirin 81 mg Tab Take 81 mg by mouth daily.  MULTIVITAMINS (MULTIVITAMIN PO) Take 1 Tab by mouth daily. ALLERGIES    Allergies   Allergen Reactions    Cephalosporins Nausea and Vomiting     Severe vomiting       PHYSICAL EXAMINATION    Visit Vitals    /76 (BP 1 Location: Right arm, BP Patient Position: Sitting)    Pulse 78    Temp 97.9 °F (36.6 °C)    Resp 18    Ht 4' 11\" (1.499 m)    Wt 204 lb (92.5 kg)    BMI 41.2 kg/m2     Body mass index is 41.2 kg/(m^2). General: Patient is alert, oriented x 3, not in acute distress    HEENT:   Sclerae are not injected and appear moist.  Oral mucous membranes are moist, there are no ulcers present. There is no alopecia. Neck is supple     Cardiovascular:  Heart is regular rate and rhythm, no murmurs. Chest:  Lungs are clear to auscultation bilaterally. No rhonchi, wheezes, or crackles.     Extremities:  Free of clubbing, cyanosis +1 nonpitting lower extremity edema with tenderness    Neurological exam:  No focal sensory deficits, muscle strength is full in upper and lower extremities     Skin exam:    Psoriasis:     no  Nail Pitting:     no  Onycholysis:     no  Palmoplantar pustulosis:   no  Acne fulminans:    no  Acne conglobata:    no  Hidradenitis Suppurativa:   no  Dissecting cellulitis of the scalp:  no  Pilonidal sinus:    no  Erythema nodosum:    no  Non-Scarring Alopecia:  no  Discoid Lupus:   no  Subacute Cutaneous Lupus:   no  Heliotrope Rash:   no  Upper Arm Erythema:   no  Shawl Sign:    no  V-sign:     no  Holster sign:    no  Gottron's papules:   no  Gottron's sign:    no  Calcinosis:    no  Raynaud's Phenomenon:  no  's Hands:   no  Periungual erythema:   no  Abnormal Nailfold Capillaries:  no  Livedo Reticularis:   no  Scalp Erythema:   no  Facial Rosacea:    yes  Musculoskeletal:  A comprehensive musculoskeletal exam was performed for all joints of each upper and lower extremity and assessed for swelling, tenderness and range of motion.       Bilateral Sharla and Heberden nods  Bilateral knee crepitus without effusion      Joint Count 2/13/2018   Left wrist- Tender 1   Left wrist- Swollen 1   Left 1st MCP - Tender 1   Left 1st MCP - Swollen 1   Left 2nd MCP - Tender 1   Left 2nd MCP - Swollen 1   Left 3rd MCP - Tender 1   Left 3rd MCP - Swollen 1   Left 4th MCP - Tender 1   Left 4th MCP - Swollen 1   Left 2nd PIP - Tender 1   Left 2nd PIP - Swollen 1   Left 3rd PIP - Tender 1   Left 4th PIP - Tender 1   Left 4th PIP - Swollen 1   Right wrist- Tender 1   Right wrist- Swollen 1   Right 2nd MCP - Tender 1   Right 2nd MCP - Swollen 1   Right 3rd MCP - Tender 1   Right 3rd MCP - Swollen 1   Right 4th MCP - Tender 1   Right 4th MCP - Swollen 1   Right 2nd PIP - Tender 1   Right 2nd PIP - Swollen 1   Right 3rd PIP - Tender 1   Right 4th PIP - Tender 1   Tender Joint Count (Total) 15   Swollen Joint Count (Total) 12       DATA REVIEW    Laboratory     Recent laboratory results were reviewed, summarized, and discussed with the patient. Imaging    Musculoskeletal Ultrasound    None    Radiographs    Thoracic Spine 11/03/2016: The posterior battery pack is again seen within the intrathecal catheter in stable position. Kyphoplasty material is present in a midthoracic vertebral body is stable mild chronic compression of the adjacent inferior vertebral body. The remaining vertebral body heights are maintained. Anterior osteophytes are noted. There is no abnormality in alignment. Lumbar Spine 10/19/2016: significant disc space narrowing at L3-L4 with 14 mm anterolisthesis. There is facet arthropathy. The vertebral body heights are maintained. Spinal catheters are seen. There are atherosclerotic vascular calcifications. Bilateral Hips 8/31/2015: no fracture, dislocation or other acute abnormality. There osteoarthritic pattern change of both hips, more severe on the right. There is nonuniform joint space narrowing and marginal osteophytes. There is remodeling of the right femur head with subchondral cystic formation. Mild sacroiliac osteoarthritic change. Lower lumbar spondylosis is noted. Wire  leads are seen in the left lower lumbar spine likely reflective of spinal stimulator device. Bilateral Hand 3/01/2011: RIGHT: mild osteopenia. There are degenerative changes of the distal and proximal interphalangeal joints and the base of the thumb. There is no fracture or other acute abnormality. Scattered calcifications are noted in the radial and ulnar arteries. LEFT: mild osteopenia. There are degenerative changes of the distal and proximal interphalangeal joints and  at the base of the thumb. There is no fracture or acute abnormality. There are vascular calcifications of the radial and ulnar arteries. CT Imaging    None    MR Imaging    MRI Brain with without contrast 12/19/2016: There is sulcal and ventricular prominence.  Confluent periventricular and scattered foci of increased T2 signal intensity in the corona radiata and centrum semiovale. Tiny remote lacunar infarction in the right cerebellum. There is no intracranial mass, hemorrhage or evidence of acute infarction. There is no Chiari or syrinx. The pituitary and infundibulum are grossly unremarkable. There is no skull base mass. Cerebellopontine angles are grossly unremarkable. The major intracranial vascular flow-voids are unremarkable. No evidence of demyelinating process. There is no abnormal parenchymal enhancement. There is no abnormal meningeal enhancement. The cavernous sinuses are symmetric. Optic chiasm and infundibulum grossly unremarkable. Orbits are grossly symmetric. Dural venous sinuses are grossly patent. The brain architecture is normal. There is no evidence of midline shift or mass-effect. There are no extra-axial fluid collections. The mastoid air cells and paranasal sinuses are well pneumatized and clear. MRI Lumbar Spine with and without contrast 8/07/2013: There is transitional lumbosacral anatomy. In keeping with prior studies, lowest fully formed disc is labeled L5-S1. There has been prior  laminectomies at L3-5 with no change in grade 1 anterolisthesis at L3-4 which measures 5 mm. There is grade 1 anterolisthesis of L4-5 of approximately 2 mm, unchanged. Vertebral body heights are maintained. There is no marrow edema or compression fracture. There are reactive endplate changes at W8-0.  The conus medullaris terminates at L1-2. There is no abnormal intraspinal enhancement. L1/2:  The spinal canal and foramina are widely patent. L2/3:  Diffuse disc bulge, facet hypertrophy and ligamentum flavum thickening cause mild spinal canal and moderate right foraminal stenosis. Left foramen is patent. L3/4: There is uncovering of disc material with facet hypertrophy resulting in mild thecal sac narrowing. There is severe bilateral foraminal stenosis similar to the prior study.  L4/5: Villanue[employer]man is diffuse disc bulge and facet hypertrophy, without significant spinal canal stenosis. There is mild right foraminal stenosis. L5/S1:  The spinal canal and foramina are widely patent. DXA     DXA 8/08/2017: (excluded Lumbar spine because of overlying wires and degenerative change and kyphoplasty of L4) showed left femoral neck T score: -1.4 (0.837 g/cm2), left total hip T score: -1.4 (0.830 g/cm2), right femoral neck T score: -0.9 (0.919 g/cm2), right total hip T score: -0.9 (0.900 g/cm2), and distal one third left radius T score -3.00 (BMD 0.615 g/cm2). FRAX score 16.8 % probability in 10 years for major osteoporotic fracture and 3.2 % 10 year probability of hip fracture. Nuclear Medicine    Nuclear Medicine Bone Scan 1/22/2013: There is intense radiotracer uptake in the upper thoracic spine, likely T5. There is uptake in the region of the sternoclavicular joints and left shoulder, likely degenerative. ASSESSMENT AND PLAN    This is a follow-up visit for Ms. Gilmer Gates. 1) Seronegative Rheumatoid Arthritis. Based on her history and exam, I believe she has Rheumatoid Arthritis. She has little effectiveness from hydroxychloroquine. She had a better response from methotrexate which was stopped due to elevated liver function tests. She has CKD stage 2 with an eGFR of 61in a 65year old, so I am cautious about methotrexate and recommend leflunomide. I discussed initiation with the DMARD Arava (leflunomide). I informed the patient the potential adverse effects, which may include: nausea, vomiting, dyspepsia, diarrhea, oral ulcers, infection, liver function abnormalities, blood count abnormalities, and rarely melanoma and non-melanoma skin cancer. The patient understood these possible adverse effects. I informed the patient of the need for routine CBC and CMP as a measure for long term use of immunosuppressants. I also instructed the patient to avoid ill contacts and the needs for annual influenza vaccines and the pneumonia vaccines.  In childbearing patients, pregnancy is contra-indicated on leflunomide due to risk for birth defects. I informed the patient that leflunomide may take 4 to 12 weeks to be effective. I prescribed the patient Arava (leflunomide) 20 mg, and instructed to start with half a tablet (10 mg) daily for 7 days and then increase to a full tablet (20 mg) if she has no side effects, such as diarrhea or upset stomach. The patient will follow up in 4 weeks for laboratory monitoring. I ordered labs and radiographs today. 2) History of Lupus. This was based on DEXTER 1:40 (speckled), malar rash, inflammatory arthritis, and Raynaud's syndrome. She does not have a malar rash. She has facial rosacea. She has had Raynaud's since her 18's which is likely primary/idiopathic Raynaud's. I agree that she has inflammatory arthritis but this is likely Rheumatoid Arthritis. 3) Age-Related Osteoporosis. (multiple vertebral fractures) I recommend at least 1200 mg of elemental calcium daily (one 600 mg tablet in the morning and one in the evening) and/or consume dietary calcium in addition to oral to at least 1200 mg. I recommend at least 800 IU of vitamin D daily. I will check her vitamin D level today, and if it is low, I will prescribe her a weekly supplement called ergocalciferol 50,000. 4) Raynauds phenomenon. Raynauds phenomenon is an exaggerated vascular response to cold temperature or emotional stress, manifested clinically by sharply demarcated color changes of the skin of the digits, due to abnormal vasoconstriction of digital arteries and cutaneous arterioles. (Lola DOMINGO. N Engl J Med. 2002;347(13):1001.) Treatment is based on keeping hands and feet warm using Hot Hands and Hot Socks, respectively, which last for 6 hours. We also recommend maintaining a warm core body temperature with thermal under clothing.  In patients with persistent or severe Raynauds, we recommend starting a calcium channel blocker, such as amlodipine or nifedipine, which have a 50 to 66 percent reduction in the frequency of attacks and a significant reduction in the severity of attacks Cristy Bro SE et al. Arthritis Rheum 6220;96:6502-1129). I prescribed her amlodipine 10 mg but to take 5 mg daily and if tolerated to increase to 10 mg if needed during the cold weather. 5) Long Term Use of Hydroxychloroquine (Plaquenil). She is up to date with her ophthalmic exam.     6) Lower Extremity Edema. I asked her to wear compression hose. 7) Chronic Kidney Disease Stage 2. The patient's eGFR was 63. The patient voiced understanding of the aforementioned assessment and plan. Summary of plan was provided in the After Visit Summary patient instructions.      TODAY'S ORDERS    Orders Placed This Encounter    QUANTIFERON TB GOLD    XR FOOT LT MIN 3 V    XR FOOT RT MIN 3 V    XR HAND LT MIN 3 V    XR HAND RT MIN 3 V    CYCLIC CITRUL PEPTIDE AB, IGG    CHRONIC HEPATITIS PANEL    C REACTIVE PROTEIN, QT    SED RATE (ESR)    RHEUMATOID FACTOR, QL    PROTEIN ELECTROPHORESIS W/ REFLX ALFA    VITAMIN D, 25 HYDROXY    HEPATIC FUNCTION PANEL    PTH INTACT    TSH REFLEX TO T4    leflunomide (ARAVA) 20 mg tablet    amLODIPine (NORVASC) 10 mg tablet       Future Appointments  Date Time Provider Department Center   2/19/2018 2:40 PM Gordo Knowles MD [place] [id number]   3/15/2018 9:40 AM MD Sarwat Hughes MD, [address]    Adult Rheumatology   Musculoskeletal Ultrasound Certified  [address] [place], [place] [address]renetta BedyCasaWhitesburg ARH HospitalPrometheus Energy[address]   Phone [phone]  Fax [phone]

## 2018-02-13 ENCOUNTER — OFFICE VISIT (OUTPATIENT)
Dept: RHEUMATOLOGY | Age: 78
End: 2018-02-13

## 2018-02-13 VITALS
BODY MASS INDEX: 41.12 KG/M2 | HEIGHT: 59 IN | DIASTOLIC BLOOD PRESSURE: 76 MMHG | HEART RATE: 78 BPM | SYSTOLIC BLOOD PRESSURE: 128 MMHG | RESPIRATION RATE: 18 BRPM | TEMPERATURE: 97.9 F | WEIGHT: 204 LBS

## 2018-02-13 DIAGNOSIS — M81.0 AGE-RELATED OSTEOPOROSIS WITHOUT CURRENT PATHOLOGICAL FRACTURE: ICD-10-CM

## 2018-02-13 DIAGNOSIS — E55.9 VITAMIN D DEFICIENCY: ICD-10-CM

## 2018-02-13 DIAGNOSIS — I73.00 PRIMARY RAYNAUD'S PHENOMENON: ICD-10-CM

## 2018-02-13 DIAGNOSIS — R93.89 ABNORMAL FINDINGS ON DIAGNOSTIC IMAGING OF OTHER SPECIFIED BODY STRUCTURES: ICD-10-CM

## 2018-02-13 DIAGNOSIS — N18.2 CKD (CHRONIC KIDNEY DISEASE) STAGE 2, GFR 60-89 ML/MIN: ICD-10-CM

## 2018-02-13 DIAGNOSIS — M06.9 RHEUMATOID ARTHRITIS WITH UNKNOWN RHEUMATOID FACTOR STATUS (HCC): Primary | ICD-10-CM

## 2018-02-13 DIAGNOSIS — Z79.899 LONG-TERM USE OF PLAQUENIL: ICD-10-CM

## 2018-02-13 RX ORDER — AMLODIPINE BESYLATE 10 MG/1
5 TABLET ORAL DAILY
Qty: 15 TAB | Refills: 1 | Status: SHIPPED | OUTPATIENT
Start: 2018-02-13 | End: 2018-04-06 | Stop reason: SDUPTHER

## 2018-02-13 RX ORDER — LEFLUNOMIDE 20 MG/1
20 TABLET ORAL DAILY
Qty: 90 TAB | Refills: 0 | Status: SHIPPED | OUTPATIENT
Start: 2018-02-13 | End: 2018-05-10 | Stop reason: SDUPTHER

## 2018-02-13 NOTE — PATIENT INSTRUCTIONS
ARAVA (leflunomide)    I prescribed you Arava (leflunomide) 20 mg tablets. Please start with half a tablet (10 mg) daily for 7 days and if you have no side effects, such as diarrhea or upset stomach, please increase to one full tablet daily (20 mg). Potential Adverse Affects    - Nausea  - Vomiting  - Upset stomach  - Diarrhea  - Oral ulcers  - Hair thinning  - Infection  - Liver function abnormalities  - Blood count abnormalities    Medication Monitoring    You will need routine blood counts and kidney and liver testing as a measure of monitoring for long term use of immunosuppressants. Things You Should Do    You should avoid ill contacts     You should get annual influenza vaccines and the pneumonia vaccines. You should apply SPF 50 sunscreen when out in the sun. You should avoid alcohol as it may hasten hepatotoxicity. You should avoid pregnancy due to risk for birth defects. Leflunomide may take 4 to 12 weeks to be effective. If you have any problems or side effects with this medication, please contact me immediately to inform me. Osteoporosis Management    I recommend you take at least 1200 mg of elemental calcium daily (one 600 mg tablet in the morning and one in the afternoon/evening) and/or consume dietary calcium in addition to oral to at least 1200 mg.    I recommend at least 800 IU of vitamin D daily. I will check your vitamin D level today, and if it is low, I will prescribe you a weekly supplement called ergocalciferol 50,000. I recommend that you perform physical exercise daily for at least 30 minutes with low intensity resistance training with lightweights or rubber tension ropes (Thera-Band).

## 2018-02-13 NOTE — MR AVS SNAPSHOT
[address] [place] 
[phone] Patient: Cherelle Harley MRN: [id number] FEK:[birth year]/00/[birth year] Visit Information Date & Time Provider Department Dept. Phone Encounter #  
 2/13/2018  2:40 PM Cresencio Bernal, [address] [facility] [id number] Follow-up Instructions Return in about 4 weeks (around 3/13/2018). Your Appointments 2/19/2018  2:40 PM  
Follow Up with Luz Barnes MD  
[facility]) Appt Note: neuropathy [place] [address] [facility] 99 [id number] [phone]  
  
   
 [place] [address] [id number] [address] Upcoming Health Maintenance Date Due  
 EYE EXAM RETINAL OR DILATED Q1 10/19/2016 GLAUCOMA SCREENING Q2Y 10/19/2017 FOOT EXAM Q1 7/26/2018 MICROALBUMIN Q1 7/26/2018 MEDICARE YEARLY EXAM 7/27/2018 HEMOGLOBIN A1C Q6M 8/8/2018 LIPID PANEL Q1 2/8/2019 DTaP/Tdap/Td series (2 - Td) 12/5/2023 Allergies as of 2/13/2018  Review Complete On: 2/13/2018 By: Cresencio Bernal MD  
  
 Severity Noted Reaction Type Reactions Cephalosporins High 12/30/2009    Nausea and Vomiting Severe vomiting Current Immunizations  Reviewed on 12/13/2017 Name Date Influenza High Dose Vaccine PF 9/13/2017 12:00 AM, 8/5/2016 Influenza Vaccine 12/5/2013 Influenza Vaccine [place]) 9/6/2016 Influenza Vaccine (Quad) PF 11/10/2014 Influenza Vaccine PF 10/20/2015 Influenza Vaccine Split 10/10/2012  4:30 PM, 11/14/2011, 10/25/2010 Pneumococcal Conjugate (PCV-13) 9/13/2017 12:00 AM  
 Pneumococcal Polysaccharide (PPSV-23) 12/5/2013 Tdap 12/5/2013 ZZZ-RETIRED (DO NOT USE) Pneumococcal Vaccine (Unspecified Type) 6/5/2008 Not reviewed this visit You Were Diagnosed With   
  
 Codes Comments Rheumatoid arthritis with unknown rheumatoid factor status ([facility] [address]    -  Primary ICD-10-CM: M06.9 ICD-9-CM: 714.0 Age-related osteoporosis without current pathological fracture     ICD-10-CM: M81.0 ICD-9-CM: 733.01 Vitamin D deficiency     ICD-10-CM: E55.9 ICD-9-CM: 268.9 Long-term use of Plaquenil     ICD-10-CM: Z79.899 ICD-9-CM: V58.69 History of lupus     ICD-10-CM: Z87.39 
ICD-9-CM: V12.29 Primary Raynaud's phenomenon     ICD-10-CM: I73.00 ICD-9-CM: 443.0 CKD (chronic kidney disease) stage 2, GFR 60-89 ml/min     ICD-10-CM: N18.2 ICD-9-CM: 487. 2 Abnormal findings on diagnostic imaging of other specified body structures     ICD-10-CM: R93.8 ICD-9-CM: 793.99 Vitals BP Pulse Temp Resp Height(growth percentile) Weight(growth percentile) 128/76 (BP 1 Location: Right arm, BP Patient Position: Sitting) 78 97.9 °F (36.6 °C) 18 4' 11\" (1.499 m) 204 lb (92.5 kg) BMI OB Status Smoking Status 41.2 kg/m2 Postmenopausal Former Smoker BMI and BSA Data Body Mass Index Body Surface Area  
 41.2 kg/m 2 1.96 m 2 Preferred Pharmacy Pharmacy Name Phone [facility], [address] ([address] [phone] Your Updated Medication List  
  
   
This list is accurate as of: 2/13/18  3:09 PM.  Always use your most recent med list.  
  
  
  
  
 acetaminophen 650 mg Tber Commonly known as:  TYLENOL Take 1,300 mg by mouth daily as needed for Pain. amLODIPine 10 mg tablet Commonly known as:  Marilou Glatter Take 0.5 Tabs by mouth daily for 30 days. aspirin 81 mg tablet Take 81 mg by mouth daily. atorvastatin 10 mg tablet Commonly known as:  LIPITOR Take 1 Tab by mouth daily. Blood-Glucose Meter monitoring kit Commonly known as:  CONTOUR METER Dx: 250. Check blood sugars daily. busPIRone 5 mg tablet Commonly known as:  BUSPAR  
 Take 5 mg by mouth two (2) times a day. CALCIUM 500+D 500 mg(1,250mg) -200 unit per tablet Generic drug:  calcium-vitamin D Take 2 Tabs by mouth daily. cholecalciferol 1,000 unit tablet Commonly known as:  VITAMIN D3 Take 2,000 Units by mouth daily. cyanocobalamin 1,000 mcg/mL injection Commonly known as:  VITAMIN B-12  
1000 mcg SC/IM  every month  
  
 cyclobenzaprine 10 mg tablet Commonly known as:  FLEXERIL Take 10 mg by mouth nightly. denosumab 60 mg/mL injection Commonly known as:  Hedda Alex 1 mL by SubCUTAneous route every 6 months. ESTRACE 0.01 % (0.1 mg/gram) vaginal cream  
Generic drug:  estradiol Insert 0.1 g into vagina every Monday, Wednesday, Friday. ferrous sulfate 325 mg (65 mg iron) tablet Take 1 Tab by mouth two (2) times a day. Indications: Iron Deficiency Anemia  
  
 folic acid 1 mg tablet Commonly known as:  FOLVITE  
TAKE 1 TABLET BY MOUTH DAILY * gabapentin 600 mg tablet Commonly known as:  NEURONTIN  
TAKE 1 TABLET BY MOUTH TWICE DAILY * gabapentin 300 mg capsule Commonly known as:  NEURONTIN Take 1 Cap by mouth two (2) times a day. glipiZIDE SR 2.5 mg CR tablet Commonly known as:  GLUCOTROL XL  
TAKE 1 TABLET BY MOUTH TWICE DAILY  
  
 glucose blood VI test strips strip Commonly known as:  [employer] CONTOUR Glucometer for 250.00 Pt. test blood sugar once daily (in morning) HYDROcodone-acetaminophen 5-325 mg per tablet Commonly known as:  Simon Ravens Take 1 Tab by mouth every six (6) hours as needed for Pain.  
  
 hydroxychloroquine 200 mg tablet Commonly known as:  PLAQUENIL  
TAKE 1 TABLET BY MOUTH TWICE DAILY OR AS DIRECTED  
  
 leflunomide 20 mg tablet Commonly known as:  [place] Take 1 Tab by mouth daily for 90 days. Take half tab daily for 7 days and then one tab if tolerated  
  
 metFORMIN 500 mg tablet Commonly known as:  GLUCOPHAGE  
TAKE 1 TABLET BY MOUTH TWICE DAILY WITH MEALS  
  
 metoprolol succinate 25 mg XL tablet Commonly known as:  TOPROL-XL Take 1 Tab by mouth nightly. MULTIVITAMIN PO Take 1 Tab by mouth daily. nortriptyline 10 mg capsule Commonly known as:  PAMELOR  
TAKE 1 CAPSULE BY MOUTH EVERY NIGHT AT BEDTIME Syringe with Needle, Safety 3 mL 25 gauge x 5/8\" Syrg Commonly known as:  3cc Safety Syringe 25Gx5/8\" To be used with B12 injections Everlina Milder Rolling walker. Patient with spinal stenosis and stopped posture. Pain with limited ambulation. ZINC PO Take 50 mg by mouth daily. * Notice: This list has 2 medication(s) that are the same as other medications prescribed for you. Read the directions carefully, and ask your doctor or other care provider to review them with you. Prescriptions Sent to Pharmacy Refills  
 leflunomide (ARAVA) 20 mg tablet 0 Sig: Take 1 Tab by mouth daily for 90 days. Take half tab daily for 7 days and then one tab if tolerated Class: Normal  
 Pharmacy: [address] Ph #: [phone] Route: Oral  
 amLODIPine (NORVASC) 10 mg tablet 1 Sig: Take 0.5 Tabs by mouth daily for 30 days. Class: Normal  
 Pharmacy: [address] Ph #: [phone] Route: Oral  
  
We Performed the Following C REACTIVE PROTEIN, QT [17790 CPT(R)] CHRONIC HEPATITIS PANEL [RPX4175 Custom] [address], IGG [id number] CPT(R)] HEPATIC FUNCTION PANEL [07604 CPT(R)] PROTEIN ELECTROPHORESIS W/ REFLX ALFA [[id number] Custom] PTH INTACT [89171 CPT(R)] QUANTIFERON TB GOLD [[id number] Custom] RHEUMATOID FACTOR, QL P826587 CPT(R)] SED RATE (ESR) C4259305 CPT(R)] TSH REFLEX TO T4 [58193 CPT(R)] VITAMIN D, 25 HYDROXY [id number] CPT(R)] Follow-up Instructions Return in about 4 weeks (around 3/13/2018). Patient Instructions ARAVA (leflunomide) I prescribed you Arava (leflunomide) 20 mg tablets. Please start with half a tablet (10 mg) daily for 7 days and if you have no side effects, such as diarrhea or upset stomach, please increase to one full tablet daily (20 mg). Potential Adverse Affects 
 
- Nausea - Vomiting - Upset stomach 
- Diarrhea 
- Oral ulcers 
- Hair thinning - Infection - Liver function abnormalities - Blood count abnormalities Medication Monitoring You will need routine blood counts and kidney and liver testing as a measure of monitoring for long term use of immunosuppressants. Things You Should Do You should avoid ill contacts You should get annual influenza vaccines and the pneumonia vaccines. You should apply SPF 50 sunscreen when out in the sun. You should avoid alcohol as it may hasten hepatotoxicity. You should avoid pregnancy due to risk for birth defects. Leflunomide may take 4 to 12 weeks to be effective. If you have any problems or side effects with this medication, please contact me immediately to inform me. Osteoporosis Management I recommend you take at least 1200 mg of elemental calcium daily (one 600 mg tablet in the morning and one in the afternoon/evening) and/or consume dietary calcium in addition to oral to at least 1200 mg. 
 
I recommend at least 800 IU of vitamin D daily. I will check your vitamin D level today, and if it is low, I will prescribe you a weekly supplement called ergocalciferol 50,000. I recommend that you perform physical exercise daily for at least 30 minutes with low intensity resistance training with lightweights or rubber tension ropes (Thera-Band). Introducing [facility]! Dear Sharda Sheikh: Thank you for requesting a [employer] account. Our records indicate that you already have an active [employer] account.   You can access your account anytime at [email or web address] Did you know that you can access your hospital and ER discharge instructions at any time in [employer]? You can also review all of your test results from your hospital stay or ER visit. Additional Information If you have questions, please visit the Frequently Asked Questions section of the [employer] website at [email or web address] Remember, [employer] is NOT to be used for urgent needs. For medical emergencies, dial 911. Now available from your iPhone and Android! Please provide this summary of care documentation to your next provider. Your primary care clinician is listed as Ruth Ann Choi. If you have any questions after today's visit, please call [phone].

## 2018-02-16 LAB
25(OH)D3+25(OH)D2 SERPL-MCNC: 46.6 NG/ML (ref 30–100)
ALBUMIN SERPL ELPH-MCNC: 3.5 G/DL (ref 2.9–4.4)
ALBUMIN SERPL-MCNC: 4.4 G/DL (ref 3.5–4.8)
ALBUMIN/GLOB SERPL: 1.1 {RATIO} (ref 0.7–1.7)
ALP SERPL-CCNC: 57 IU/L (ref 39–117)
ALPHA1 GLOB SERPL ELPH-MCNC: 0.3 G/DL (ref 0–0.4)
ALPHA2 GLOB SERPL ELPH-MCNC: 0.9 G/DL (ref 0.4–1)
ALT SERPL-CCNC: 19 IU/L (ref 0–32)
ANNOTATION COMMENT IMP: NORMAL
AST SERPL-CCNC: 32 IU/L (ref 0–40)
B-GLOBULIN SERPL ELPH-MCNC: 1.1 G/DL (ref 0.7–1.3)
BILIRUB DIRECT SERPL-MCNC: 0.1 MG/DL (ref 0–0.4)
BILIRUB SERPL-MCNC: 0.3 MG/DL (ref 0–1.2)
CCP IGA+IGG SERPL IA-ACNC: 8 UNITS (ref 0–19)
COMMENT, 144067: NORMAL
CRP SERPL-MCNC: <0.3 MG/L (ref 0–4.9)
ERYTHROCYTE [SEDIMENTATION RATE] IN BLOOD BY WESTERGREN METHOD: 4 MM/HR (ref 0–40)
GAMMA GLOB SERPL ELPH-MCNC: 0.8 G/DL (ref 0.4–1.8)
GAMMA INTERFERON BACKGROUND BLD IA-ACNC: 0.04 IU/ML
GLOBULIN SER CALC-MCNC: 3.1 G/DL (ref 2.2–3.9)
HBV CORE AB SERPL QL IA: NEGATIVE
HBV CORE IGM SERPL QL IA: NEGATIVE
HBV E AB SERPL QL IA: NEGATIVE
HBV E AG SERPL QL IA: NEGATIVE
HBV SURFACE AB SER QL: NON REACTIVE
HBV SURFACE AG SERPL QL IA: NEGATIVE
HCV AB S/CO SERPL IA: <0.1 S/CO RATIO (ref 0–0.9)
M PROTEIN SERPL ELPH-MCNC: NORMAL G/DL
M TB IFN-G BLD-IMP: NEGATIVE
M TB IFN-G CD4+ BCKGRND COR BLD-ACNC: 0.01 IU/ML
M TB IFN-G CD4+ T-CELLS BLD-ACNC: 0.05 IU/ML
MITOGEN IGNF BLD-ACNC: 4.98 IU/ML
PLEASE NOTE, 011150: NORMAL
PROT PATTERN SERPL ELPH-IMP: NORMAL
PROT SERPL-MCNC: 6.6 G/DL (ref 6–8.5)
PTH-INTACT SERPL-MCNC: 21 PG/ML (ref 15–65)
QUANTIFERON INCUBATION: NORMAL
RHEUMATOID FACT SERPL-ACNC: <10 IU/ML (ref 0–13.9)
SERVICE CMNT-IMP: NORMAL
TSH SERPL DL<=0.005 MIU/L-ACNC: 2.57 UIU/ML (ref 0.45–4.5)

## 2018-02-19 ENCOUNTER — OFFICE VISIT (OUTPATIENT)
Dept: NEUROLOGY | Age: 78
End: 2018-02-19

## 2018-02-19 VITALS
DIASTOLIC BLOOD PRESSURE: 60 MMHG | BODY MASS INDEX: 41.99 KG/M2 | HEIGHT: 59 IN | OXYGEN SATURATION: 97 % | SYSTOLIC BLOOD PRESSURE: 110 MMHG | WEIGHT: 208.3 LBS | RESPIRATION RATE: 18 BRPM | TEMPERATURE: 98.5 F | HEART RATE: 94 BPM

## 2018-02-19 DIAGNOSIS — E11.40 NEUROPATHY DUE TO TYPE 2 DIABETES MELLITUS (HCC): Primary | ICD-10-CM

## 2018-02-19 NOTE — PROGRESS NOTES
Neurology Consult      Subjective: Juliano Albrecht is a 65 y.o. female  who comes in with long-established painful diabetic neuropathy. Is on gabapentin 600 mg twice daily. Is comfortable with this tolerates it well. Understand rheumatoid arthritis may be in her picture from rheumatology although I do not know the details. Reminds me that she suffers with Raynaud's phenomena and I could see the erythematous color change in her feet. Has had no falls and nothing that suggest peripheral claudication. Reminded her there is an over-the-counter spray of natural ingredients that comes out of [place] called that stuff for pain. She is welcome to check it out but if it is too expensive just ignore it. Exam looked baseline today and I will see her back in 6 months. Best diabetic management always recommended. Current Outpatient Prescriptions   Medication Sig Dispense Refill    leflunomide (ARAVA) 20 mg tablet Take 1 Tab by mouth daily for 90 days. Take half tab daily for 7 days and then one tab if tolerated 90 Tab 0    amLODIPine (NORVASC) 10 mg tablet Take 0.5 Tabs by mouth daily for 30 days. 15 Tab 1    glipiZIDE SR (GLUCOTROL XL) 2.5 mg CR tablet TAKE 1 TABLET BY MOUTH TWICE DAILY 180 Tab 0    atorvastatin (LIPITOR) 10 mg tablet Take 1 Tab by mouth daily. 90 Tab 0    cyclobenzaprine (FLEXERIL) 10 mg tablet Take 10 mg by mouth nightly.  cyanocobalamin (VITAMIN B-12) 1,000 mcg/mL injection 1000 mcg SC/IM  every month 1 Vial 3    ESTRACE 0.01 % (0.1 mg/gram) vaginal cream Insert 0.1 g into vagina every Monday, Wednesday, Friday. 42.5 g 1    ferrous sulfate 325 mg (65 mg iron) tablet Take 1 Tab by mouth two (2) times a day. Indications: Iron Deficiency Anemia 30 Tab 5    gabapentin (NEURONTIN) 300 mg capsule Take 1 Cap by mouth two (2) times a day. 120 Cap 6    metoprolol succinate (TOPROL-XL) 25 mg XL tablet Take 1 Tab by mouth nightly.  90 Tab 0    denosumab (PROLIA) 60 mg/mL injection 1 mL by SubCUTAneous route every 6 months.  folic acid (FOLVITE) 1 mg tablet TAKE 1 TABLET BY MOUTH DAILY 90 Tab 0    metFORMIN (GLUCOPHAGE) 500 mg tablet TAKE 1 TABLET BY MOUTH TWICE DAILY WITH MEALS 180 Tab 0    gabapentin (NEURONTIN) 600 mg tablet TAKE 1 TABLET BY MOUTH TWICE DAILY 60 Tab 0    HYDROcodone-acetaminophen (NORCO) 5-325 mg per tablet Take 1 Tab by mouth every six (6) hours as needed for Pain. 60 Tab 0    glucose blood VI test strips (ASCENSIA CONTOUR) strip Glucometer for 250.00 Pt. test blood sugar once daily (in morning) 50 Strip 11    Syringe with Needle, Safety (3CC SAFETY SYRINGE 25GX5/8\") 3 mL 25 gauge x 5/8\" syrg To be used with B12 injections 100 Pen Needle 11    hydroxychloroquine (PLAQUENIL) 200 mg tablet TAKE 1 TABLET BY MOUTH TWICE DAILY OR AS DIRECTED (Patient taking differently: TAKE 1 TABLET BY MOUTH IN MORNING AND HALF A TAB AT BEDTIME) 60 Tab 5    busPIRone (BUSPAR) 5 mg tablet Take 5 mg by mouth two (2) times a day. 5    nortriptyline (PAMELOR) 10 mg capsule TAKE 1 CAPSULE BY MOUTH EVERY NIGHT AT BEDTIME 30 Cap 0    acetaminophen (TYLENOL) 650 mg CR tablet Take 1,300 mg by mouth daily as needed for Pain.  Blood-Glucose Meter (CONTOUR METER) monitoring kit Dx: 250. Check blood sugars daily. 1 kit 11    ZINC PO Take 50 mg by mouth daily.  cholecalciferol, vitamin d3, (VITAMIN D3) 1,000 unit tablet Take 2,000 Units by mouth daily.  calcium-vitamin D (CALCIUM 500+D) 500 mg(1,250mg) -200 unit per tablet Take 2 Tabs by mouth daily.  aspirin 81 mg Tab Take 81 mg by mouth daily.  MULTIVITAMINS (MULTIVITAMIN PO) Take 1 Tab by mouth daily.  Walker [employer] walker. Patient with spinal stenosis and stopped posture. Pain with limited ambulation.  1 Each 0      Allergies   Allergen Reactions    Cephalosporins Nausea and Vomiting     Severe vomiting     Past Medical History:   Diagnosis Date    Anemia     Arthritis     CAD (coronary artery disease) 1997    MI    Degenerative joint disease of right hip 09/14/2015     [facility] [place]/Dr. Fred Koenig    Diabetes [facility])     Fracture     right shoulder; T6 compression    Gastritis     Hypercholesterolemia     Hyperlipidemia    Lupus 1/9/2015    Osteoporosis, post-menopausal     vertebral fracture      Past Surgical History:   Procedure Laterality Date    CARDIAC SURG PROCEDURE UNLIST      HX CHOLECYSTECTOMY      HX CORONARY STENT PLACEMENT  02/19/1997    LAD    HX GASTRIC BYPASS  2000    HX HERNIA REPAIR  2005    HX KYPHOPLASTY      t5    HX KYPHOPLASTY      L4    HX ORTHOPAEDIC      bilateral knees    HX ORTHOPAEDIC      r shoulder    HX ORTHOPAEDIC      Laminectomy    MN INC IMPLTJ NEUROSTIMULATOR ELTRD SACRAL NERVE        Social History     Social History    Marital status:      Spouse name: N/A    Number of children: N/A    Years of education: N/A     Occupational History    Not on file. Social History Main Topics    Smoking status: Former Smoker     Quit date: 12/30/1989    Smokeless tobacco: Never Used    Alcohol use No    Drug use: No    Sexual activity: Not Currently     Other Topics Concern    Not on file     Social History Narrative      Family History   Problem Relation Age of Onset    Diabetes Mother     Hypertension Mother     Heart Disease Mother     Diabetes Father     Hypertension Father     Heart Disease Father    [employer] Arthritis-rheumatoid Sister     Elevated Lipids Sister     Arthritis-rheumatoid Sister     Hypertension Sister     Diabetes Sister     Thyroid Disease Sister     Arthritis-rheumatoid Other       Visit Vitals    /60    Pulse 94    Temp 98.5 °F (36.9 °C) (Oral)    Resp 18    Ht 4' 11\" (1.499 m)    Wt 94.5 kg (208 lb 4.8 oz)    SpO2 97%    BMI 42.07 kg/m2        Review of Systems:   A comprehensive review of systems was negative except for that written in the HPI. Neuro Exam:     Appearance:   The patient is well developed, well nourished, provides a coherent history and is in no acute distress. Mental Status: Oriented to time, place and person. Mood and affect appropriate. Cranial Nerves:   Intact visual fields. Fundi are benign. GARY, EOM's full, no nystagmus, no ptosis. Facial sensation is normal. Corneal reflexes are intact. Facial movement is symmetric. Hearing is normal bilaterally. Palate is midline with normal sternocleidomastoid and trapezius muscles are normal. Tongue is midline. Motor:  5/5 strength in upper and lower proximal and distal muscles. Normal bulk and tone. No fasciculations. Reflexes:   Deep tendon reflexes 0/4 and symmetrical.   Sensory:    Diminished distally to touch, pinprick and vibration. Gait:  Normal gait. Tremor:   No tremor noted. Cerebellar:  No cerebellar signs present. Neurovascular:  Normal heart sounds and regular rhythm, peripheral pulses intact, and no carotid bruits. Distal feet have erythema that is exaggerated in the cold. Assessment:   Painful diabetic neuropathy. Is comfortable with gabapentin as issued. Best diabetic control always recommended. Is welcome to check out the over-the-counter that stuff for pain spray but if it is too expensive just ignore it. Revisit 6 months. Exam looks baseline. Has Raynaud's phenomena as well. Plan:   Revisit 6 months.   Signed by :  Temple Jeans MD

## 2018-02-19 NOTE — PATIENT INSTRUCTIONS
10 [facility]   Statement to Patients  April 1, 2014      In an effort to ensure the large volume of patient prescription refills is processed in the most efficient and expeditious manner, we are asking our patients to assist us by calling your Pharmacy for all prescription refills, this will include also your  Mail Order Pharmacy. The pharmacy will contact our office electronically to continue the refill process. Please do not wait until the last minute to call your pharmacy. We need at least 48 hours (2days) to fill prescriptions. We also encourage you to call your pharmacy before going to  your prescription to make sure it is ready. With regard to controlled substance prescription refill requests (narcotic refills) that need to be picked up at our office, we ask your cooperation by providing us with at least 72 hours (3days) notice that you will need a refill. We will not refill narcotic prescription refill requests after 4:00pm on any weekday, Monday through Thursday, or after 2:00pm on Fridays, or on the weekends. We encourage everyone to explore another way of getting your prescription refill request processed using [employer], our patient web portal through our electronic medical record system. [employer] is an efficient and effective way to communicate your medication request directly to the office and  downloadable as an juli on your smart phone . [employer] also features a review functionality that allows you to view your medication list as well as leave messages for your physician. Are you ready to get connected? If so please review the attatched instructions or speak to any of our staff to get you set up right away! Thank you so much for your cooperation. Should you have any questions please contact our Practice Administrator.     The Physicians and Staff,  [facility] [address]  What is a living will?    A living will is a legal form you use to write down the kind of care you want at the end of your life. It is used by the health professionals who will treat you if you aren't able to decide for yourself. If you put your wishes in writing, your loved ones and others will know what kind of care you want. They won't need to guess. This can ease your mind and be helpful to others. A living will is not the same as an estate or property will. An estate will explains what you want to happen with your money and property after you die. Is a living will a legal document? A living will is a legal document. Each state has its own laws about living [place]. If you move to another state, make sure that your living will is legal in the state where you now live. Or you might use a universal form that has been approved by many states. This kind of form can sometimes be completed and stored online. Your electronic copy will then be available wherever you have a connection to the Internet. In most cases, doctors will respect your wishes even if you have a form from a different state. · You don't need an  to complete a living will. But legal advice can be helpful if your state's laws are unclear, your health history is complicated, or your family can't agree on what should be in your living will. · You can change your living will at any time. Some people find that their wishes about end-of-life care change as their health changes. · In addition to making a living will, think about completing a medical power of  form. This form lets you name the person you want to make end-of-life treatment decisions for you (your \"health care agent\") if you're not able to. Many hospitals and nursing homes will give you the forms you need to complete a living will and a medical power of . · Your living will is used only if you can't make or communicate decisions for yourself anymore.  If you become able to make decisions again, you can accept or refuse any treatment, no matter what you wrote in your living will. · Your state may offer an online registry. This is a place where you can store your living will online so the doctors and nurses who need to treat you can find it right away. What should you think about when creating a living will? Talk about your end-of-life wishes with your family members and your doctor. Let them know what you want. That way the people making decisions for you won't be surprised by your choices. Think about these questions as you make your living will:  · Do you know enough about life support methods that might be used? If not, talk to your doctor so you know what might be done if you can't breathe on your own, your heart stops, or you're unable to swallow. · What things would you still want to be able to do after you receive life-support methods? Would you want to be able to walk? To speak? To eat on your own? To live without the help of machines? · If you have a choice, where do you want to be cared for? In your home? At a hospital or nursing home? · Do you want certain Yarsani practices performed if you become very ill? · If you have a choice at the end of your life, where would you prefer to die? At home? In a hospital or nursing home? Somewhere else? · Would you prefer to be buried or cremated? · Do you want your organs to be donated after you die? What should you do with your living will? · Make sure that your family members and your health care agent have copies of your living will. · Give your doctor a copy of your living will to keep in your medical record. If you have more than one doctor, make sure that each one has a copy. · You may want to put a copy of your living will where it can be easily found. Where can you learn more? Go to http://jennifer-vincent.info/. Enter V962 in the search box to learn more about \"Learning About Living [place]. \"  Current as of: September 24, 2016  Content Version: 11.4  © 1645-5348 Healthwise, [employer]. Care instructions adapted under license by [employer] (which disclaims liability or warranty for this information). If you have questions about a medical condition or this instruction, always ask your healthcare professional. [address] any warranty or liability for your use of this information. Patient history reviewed and patient examined. Patient doing status quo I am glad she is comfortable. Will not change the gabapentin as issued and she is welcome to check out the over-the-counter that stuff for pain as a spray. If it is too expensive forget about it. Best diabetic management always recommended.

## 2018-02-19 NOTE — MR AVS SNAPSHOT
[address] [facility] [address] [id number] [phone] Patient: Adrian Peer MRN: [id number] Visit Information Date & Time Provider Department Dept. Phone Encounter #  
 2/19/2018  2:40 PM Ximena Miramontes MD [facility] [phone] [id number] Follow-up Instructions Return in about 6 months (around 8/19/2018). Follow-up and Disposition History Your Appointments 3/15/2018  9:40 AM  
ESTABLISHED PATIENT with Herbert Ledesma MD  
[address] ([address]) Appt Note: 4 week f/up  
 [facility] [address]  
[phone]  
  
   
 [facility] [phone] Upcoming Health Maintenance Date Due  
 EYE EXAM RETINAL OR DILATED Q1 10/19/2016 GLAUCOMA SCREENING Q2Y 10/19/2017 FOOT EXAM Q1 7/26/2018 MICROALBUMIN Q1 7/26/2018 MEDICARE YEARLY EXAM 7/27/2018 HEMOGLOBIN A1C Q6M 8/8/2018 LIPID PANEL Q1 2/8/2019 DTaP/Tdap/Td series (2 - Td) 12/5/2023 Allergies as of 2/19/2018  Review Complete On: 2/19/2018 By: Ximena Miramontes MD  
  
 Severity Noted Reaction Type Reactions Cephalosporins High 12/30/2009    Nausea and Vomiting Severe vomiting Current Immunizations  Reviewed on 12/13/2017 Name Date Influenza High Dose Vaccine PF 9/13/2017 12:00 AM, 8/5/2016 Influenza Vaccine 12/5/2013 Influenza Vaccine Shari Card) 9/6/2016 Influenza Vaccine (Quad) PF 11/10/2014 Influenza Vaccine PF 10/20/2015 Influenza Vaccine Split 10/10/2012  4:30 PM, 11/14/2011, 10/25/2010 Pneumococcal Conjugate (PCV-13) 9/13/2017 12:00 AM  
 Pneumococcal Polysaccharide (PPSV-23) 12/5/2013 Tdap 12/5/2013 ZZZ-RETIRED (DO NOT USE) Pneumococcal Vaccine (Unspecified Type) 6/5/2008 Not reviewed this visit You Were Diagnosed With   
  
 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 [phone] Your Updated Medication List  
  
   
This list is accurate as of: 2/19/18  3:35 PM.  Always use your most recent med list.  
  
  
  
  
 acetaminophen 650 mg Tber Commonly known as:  TYLENOL Take 1,300 mg by mouth daily as needed for Pain. amLODIPine 10 mg tablet Commonly known as:  Tylene Juan Take 0.5 Tabs by mouth daily for 30 days. aspirin 81 mg tablet Take 81 mg by mouth daily. atorvastatin 10 mg tablet Commonly known as:  LIPITOR Take 1 Tab by mouth daily. Blood-Glucose Meter monitoring kit Commonly known as:  CONTOUR METER Dx: 250. Check blood sugars daily. busPIRone 5 mg tablet Commonly known as:  BUSPAR Take 5 mg by mouth two (2) times a day. CALCIUM 500+D 500 mg(1,250mg) -200 unit per tablet Generic drug:  calcium-vitamin D Take 2 Tabs by mouth daily. cholecalciferol 1,000 unit tablet Commonly known as:  VITAMIN D3 Take 2,000 Units by mouth daily. cyanocobalamin 1,000 mcg/mL injection Commonly known as:  VITAMIN B-12  
1000 mcg SC/IM  every month  
  
 cyclobenzaprine 10 mg tablet Commonly known as:  FLEXERIL Take 10 mg by mouth nightly. denosumab 60 mg/mL injection Commonly known as:  [place] 1 mL by SubCUTAneous route every 6 months. ESTRACE 0.01 % (0.1 mg/gram) vaginal cream  
Generic drug:  estradiol Insert 0.1 g into vagina every Monday, Wednesday, Friday. ferrous sulfate 325 mg (65 mg iron) tablet Take 1 Tab by mouth two (2) times a day. Indications: Iron Deficiency Anemia  
  
 folic acid 1 mg tablet Commonly known as:  FOLVITE  
TAKE 1 TABLET BY MOUTH DAILY * gabapentin 600 mg tablet Commonly known as:  NEURONTIN  
TAKE 1 TABLET BY MOUTH TWICE DAILY * gabapentin 300 mg capsule Commonly known as:  NEURONTIN Take 1 Cap by mouth two (2) times a day. glipiZIDE SR 2.5 mg CR tablet Commonly known as:  GLUCOTROL XL  
TAKE 1 TABLET BY MOUTH TWICE DAILY  
  
 glucose blood VI test strips strip Commonly known as:  [employer] CONTOUR Glucometer for 250.00 Pt. test blood sugar once daily (in morning) HYDROcodone-acetaminophen 5-325 mg per tablet Commonly known as:  Guadlupe [place]ms Take 1 Tab by mouth every six (6) hours as needed for Pain.  
  
 hydroxychloroquine 200 mg tablet Commonly known as:  PLAQUENIL  
TAKE 1 TABLET BY MOUTH TWICE DAILY OR AS DIRECTED  
  
 leflunomide 20 mg tablet Commonly known as:  Bernard Neve Take 1 Tab by mouth daily for 90 days. Take half tab daily for 7 days and then one tab if tolerated  
  
 metFORMIN 500 mg tablet Commonly known as:  GLUCOPHAGE  
TAKE 1 TABLET BY MOUTH TWICE DAILY WITH MEALS  
  
 metoprolol succinate 25 mg XL tablet Commonly known as:  TOPROL-XL Take 1 Tab by mouth nightly. MULTIVITAMIN PO Take 1 Tab by mouth daily. nortriptyline 10 mg capsule Commonly known as:  PAMELOR  
TAKE 1 CAPSULE BY MOUTH EVERY NIGHT AT BEDTIME Syringe with Needle, Safety 3 mL 25 gauge x 5/8\" Syrg Commonly known as:  3cc Safety Syringe 25Gx5/8\" To be used with B12 injections Shanna jones. Patient with spinal stenosis and stopped posture. Pain with limited ambulation. ZINC PO Take 50 mg by mouth daily. * Notice: This list has 2 medication(s) that are the same as other medications prescribed for you. Read the directions carefully, and ask your doctor or other care provider to review them with you. Follow-up Instructions Return in about 6 months (around 8/19/2018). Patient Instructions PRESCRIPTION REFILL POLICY [facility] Statement to Patients April 1, 2014 In an effort to ensure the large volume of patient prescription refills is processed in the most efficient and expeditious manner, we are asking our patients to assist us by calling your Pharmacy for all prescription refills, this will include also your  Mail Order Pharmacy. The pharmacy will contact our office electronically to continue the refill process. Please do not wait until the last minute to call your pharmacy. We need at least 48 hours (2days) to fill prescriptions. We also encourage you to call your pharmacy before going to  your prescription to make sure it is ready. With regard to controlled substance prescription refill requests (narcotic refills) that need to be picked up at our office, we ask your cooperation by providing us with at least 72 hours (3days) notice that you will need a refill. We will not refill narcotic prescription refill requests after 4:00pm on any weekday, Monday through Thursday, or after 2:00pm on Fridays, or on the weekends. We encourage everyone to explore another way of getting your prescription refill request processed using [employer], our patient web portal through our electronic medical record system. [employer] is an efficient and effective way to communicate your medication request directly to the office and  downloadable as an juli on your smart phone . [employer] also features a review functionality that allows you to view your medication list as well as leave messages for your physician. Are you ready to get connected?  If so please review the attatched instructions or speak to any of our staff to get you set up right away! Thank you so much for your cooperation. Should you have any questions please contact our Practice Administrator. The Physicians and Staff,  [facility] [address] What is a living will? A living will is a legal form you use to write down the kind of care you want at the end of your life. It is used by the health professionals who will treat you if you aren't able to decide for yourself. If you put your wishes in writing, your loved ones and others will know what kind of care you want. They won't need to guess. This can ease your mind and be helpful to others. A living will is not the same as an estate or property will. An estate will explains what you want to happen with your money and property after you die. Is a living will a legal document? A living will is a legal document. Each state has its own laws about living [place]. If you move to another state, make sure that your living will is legal in the state where you now live. Or you might use a universal form that has been approved by many states. This kind of form can sometimes be completed and stored online. Your electronic copy will then be available wherever you have a connection to the Internet. In most cases, doctors will respect your wishes even if you have a form from a different state. · You don't need an  to complete a living will. But legal advice can be helpful if your state's laws are unclear, your health history is complicated, or your family can't agree on what should be in your living will. · You can change your living will at any time. Some people find that their wishes about end-of-life care change as their health changes. · In addition to making a living will, think about completing a medical power of  form.  This form lets you name the person you want to make end-of-life treatment decisions for you (your \"health care agent\") if you're not able to. Many hospitals and nursing homes will give you the forms you need to complete a living will and a medical power of . · Your living will is used only if you can't make or communicate decisions for yourself anymore. If you become able to make decisions again, you can accept or refuse any treatment, no matter what you wrote in your living will. · Your state may offer an online registry. This is a place where you can store your living will online so the doctors and nurses who need to treat you can find it right away. What should you think about when creating a living will? Talk about your end-of-life wishes with your family members and your doctor. Let them know what you want. That way the people making decisions for you won't be surprised by your choices. Think about these questions as you make your living will: · Do you know enough about life support methods that might be used? If not, talk to your doctor so you know what might be done if you can't breathe on your own, your heart stops, or you're unable to swallow. · What things would you still want to be able to do after you receive life-support methods? Would you want to be able to walk? To speak? To eat on your own? To live without the help of machines? · If you have a choice, where do you want to be cared for? In your home? At a hospital or nursing home? · Do you want certain Cheondoism practices performed if you become very ill? · If you have a choice at the end of your life, where would you prefer to die? At home? In a hospital or nursing home? Somewhere else? · Would you prefer to be buried or cremated? · Do you want your organs to be donated after you die? What should you do with your living will? · Make sure that your family members and your health care agent have copies of your living will. · Give your doctor a copy of your living will to keep in your medical record. If you have more than one doctor, make sure that each one has a copy. · You may want to put a copy of your living will where it can be easily found. Where can you learn more? Go to http://jennifer-vincent.info/. Enter E662 in the search box to learn more about \"Learning About Living [place]. \" Current as of: September 24, 2016 Content Version: 11.4 © 6643-6560 [employer]. Care instructions adapted under license by [employer] (which disclaims liability or warranty for this information). If you have questions about a medical condition or this instruction, always ask your healthcare professional. [address] any warranty or liability for your use of this information. Patient history reviewed and patient examined. Patient doing status quo I am glad she is comfortable. Will not change the gabapentin as issued and she is welcome to check out the over-the-counter that stuff for pain as a spray. If it is too expensive forget about it. Best diabetic management always recommended. Patient Instructions History Introducing \[facility] [place] [facility]\"" & HEALTH SERVICES! Dear Consuelo Zambrano: Thank you for requesting a [employer] account. Our records indicate that you already have an active [employer] account. You can access your account anytime at [email or web address] Did you know that you can access your hospital and ER discharge instructions at any time in [employer]? You can also review all of your test results from your hospital stay or ER visit. Additional Information If you have questions, please visit the Frequently Asked Questions section of the [employer] website at [email or web address] Remember, [employer] is NOT to be used for urgent needs. For medical emergencies, dial 911. Now available from your iPhone and Android! Please provide this summary of care documentation to your next provider. Your primary care clinician is listed as Marlene Sommer. If you have any questions after today's visit, please call [phone].

## 2018-02-20 RX ORDER — GABAPENTIN 600 MG/1
TABLET ORAL
Qty: 60 TAB | Refills: 0 | Status: SHIPPED | OUTPATIENT
Start: 2018-02-20 | End: 2018-03-27 | Stop reason: SDUPTHER

## 2018-03-01 ENCOUNTER — OFFICE VISIT (OUTPATIENT)
Dept: FAMILY MEDICINE CLINIC | Age: 78
End: 2018-03-01

## 2018-03-01 VITALS
HEIGHT: 59 IN | SYSTOLIC BLOOD PRESSURE: 109 MMHG | RESPIRATION RATE: 18 BRPM | DIASTOLIC BLOOD PRESSURE: 69 MMHG | HEART RATE: 73 BPM | BODY MASS INDEX: 42.13 KG/M2 | TEMPERATURE: 98.1 F | OXYGEN SATURATION: 98 % | WEIGHT: 209 LBS

## 2018-03-01 DIAGNOSIS — E11.21 TYPE 2 DIABETES WITH NEPHROPATHY (HCC): ICD-10-CM

## 2018-03-01 DIAGNOSIS — M06.9 RHEUMATOID ARTHRITIS INVOLVING BOTH KNEES, UNSPECIFIED RHEUMATOID FACTOR PRESENCE: ICD-10-CM

## 2018-03-01 DIAGNOSIS — N30.01 ACUTE CYSTITIS WITH HEMATURIA: Primary | ICD-10-CM

## 2018-03-01 DIAGNOSIS — E11.42 DIABETIC POLYNEUROPATHY ASSOCIATED WITH TYPE 2 DIABETES MELLITUS (HCC): ICD-10-CM

## 2018-03-01 DIAGNOSIS — E66.01 OBESITY, MORBID (HCC): ICD-10-CM

## 2018-03-01 DIAGNOSIS — R30.0 DYSURIA: ICD-10-CM

## 2018-03-01 LAB
BILIRUB UR QL STRIP: NEGATIVE
GLUCOSE UR-MCNC: NEGATIVE MG/DL
KETONES P FAST UR STRIP-MCNC: NORMAL MG/DL
PH UR STRIP: 5.5 [PH] (ref 4.6–8)
PROT UR QL STRIP: NORMAL
SP GR UR STRIP: 1.03 (ref 1–1.03)
UA UROBILINOGEN AMB POC: NORMAL (ref 0.2–1)
URINALYSIS CLARITY POC: NORMAL
URINALYSIS COLOR POC: YELLOW
URINE BLOOD POC: NORMAL
URINE LEUKOCYTES POC: NORMAL
URINE NITRITES POC: NEGATIVE

## 2018-03-01 RX ORDER — CIPROFLOXACIN 500 MG/1
500 TABLET ORAL 2 TIMES DAILY
Qty: 10 TAB | Refills: 0 | Status: SHIPPED | OUTPATIENT
Start: 2018-03-01 | End: 2018-03-06

## 2018-03-01 NOTE — PATIENT INSTRUCTIONS

## 2018-03-01 NOTE — MR AVS SNAPSHOT
[address] [address] 
[phone] Patient: Ismael Estevez MRN: [id number] PBY:[birth year] Visit Information Date & Time Provider Department Dept. Phone Encounter #  
 3/1/2018  2:30 PM Mee Whitman  [facility] [phone] [id number] Follow-up Instructions Return in about 4 weeks (around 3/29/2018) for recheck urine . Nataliia Patel Your Appointments 3/15/2018  9:40 AM  
ESTABLISHED PATIENT with Araceli Caban MD  
[address] ([facility] Appt Note: 4 week f/up  
 [address] [facility] [address]  
[phone]  
  
   
 [address] [place] [address]  
  
    
 8/20/2018  1:40 PM  
Follow Up with Kiya Cervantes MD  
[facility]) Appt Note: follow up neuropathy  $0CP  alicia  2/19/18 [place] [address] [facility] [address] [facility] [id number] [phone]  
  
   
 [place] [address] [address] [address] Upcoming Health Maintenance Date Due  
 EYE EXAM RETINAL OR DILATED Q1 10/19/2016 GLAUCOMA SCREENING Q2Y 10/19/2017 FOOT EXAM Q1 7/26/2018 MICROALBUMIN Q1 7/26/2018 MEDICARE YEARLY EXAM 7/27/2018 HEMOGLOBIN A1C Q6M 8/8/2018 LIPID PANEL Q1 2/8/2019 DTaP/Tdap/Td series (2 - Td) 12/5/2023 Allergies as of 3/1/2018  Review Complete On: 3/1/2018 By: Ramin Childress LPN Severity Noted Reaction Type Reactions Cephalosporins High 12/30/2009    Nausea and Vomiting Severe vomiting Current Immunizations  Reviewed on 12/13/2017 Name Date Influenza High Dose Vaccine PF 9/13/2017 12:00 AM, 8/5/2016 Influenza Vaccine 12/5/2013 Influenza Vaccine Illene Martha) 9/6/2016 Influenza Vaccine (Quad) PF 11/10/2014 Influenza Vaccine PF 10/20/2015 Influenza Vaccine Split 10/10/2012  4:30 PM, 11/14/2011, 10/25/2010 Pneumococcal Conjugate (PCV-13) 9/13/2017 12:00 AM  
 Pneumococcal Polysaccharide (PPSV-23) 12/5/2013 Tdap 12/5/2013 ZZZ-RETIRED (DO NOT USE) Pneumococcal Vaccine (Unspecified Type) 6/5/2008 Not reviewed this visit You Were Diagnosed With   
  
 Codes Comments Acute cystitis with hematuria    -  Primary ICD-10-CM: N30.01 
ICD-9-CM: 595.0 Dysuria     ICD-10-CM: R30.0 ICD-9-CM: 788.1 Diabetic polyneuropathy associated with type 2 diabetes mellitus ([facility]     ICD-10-CM: E11.42 
ICD-9-CM: 250.60, 357.2 Rheumatoid arthritis involving both knees, unspecified rheumatoid factor presence ([facility]     ICD-10-CM: M06.9 ICD-9-CM: 714.0 Obesity, morbid ([facility]     ICD-10-CM: E66.01 
ICD-9-CM: 278.01 Type 2 diabetes with nephropathy (HCC)     ICD-10-CM: E11.21 
ICD-9-CM: 250.40, 583.81 Vitals BP Pulse Temp Resp Height(growth percentile) Weight(growth percentile) 109/69 73 98.1 °F (36.7 °C) (Oral) 18 4' 11\" (1.499 m) 209 lb (94.8 kg) SpO2 BMI OB Status Smoking Status 98% 42.21 kg/m2 Postmenopausal Former Smoker Vitals History BMI and BSA Data Body Mass Index Body Surface Area  
 42.21 kg/m 2 1.99 m 2 Preferred Pharmacy Pharmacy Name Phone [facility], [facility] [address] ([address] [phone] Your Updated Medication List  
  
   
This list is accurate as of 3/1/18  2:55 PM.  Always use your most recent med list.  
  
  
  
  
 acetaminophen 650 mg Tber Commonly known as:  TYLENOL Take 1,300 mg by mouth daily as needed for Pain. amLODIPine 10 mg tablet Commonly known as:  Narciso Nearing Take 0.5 Tabs by mouth daily for 30 days. aspirin 81 mg tablet Take 81 mg by mouth daily. atorvastatin 10 mg tablet Commonly known as:  LIPITOR Take 1 Tab by mouth daily. Blood-Glucose Meter monitoring kit Commonly known as:  CONTOUR METER Dx: 250. Check blood sugars daily. busPIRone 5 mg tablet Commonly known as:  BUSPAR Take 5 mg by mouth two (2) times a day. CALCIUM 500+D 500 mg(1,250mg) -200 unit per tablet Generic drug:  calcium-vitamin D Take 2 Tabs by mouth daily. cholecalciferol 1,000 unit tablet Commonly known as:  VITAMIN D3 Take 2,000 Units by mouth daily. ciprofloxacin HCl 500 mg tablet Commonly known as:  CIPRO Take 1 Tab by mouth two (2) times a day for 5 days. cyanocobalamin 1,000 mcg/mL injection Commonly known as:  VITAMIN B-12  
1000 mcg SC/IM  every month  
  
 cyclobenzaprine 10 mg tablet Commonly known as:  FLEXERIL Take 10 mg by mouth nightly. denosumab 60 mg/mL injection Commonly known as:  Deon Lacks 1 mL by SubCUTAneous route every 6 months. ESTRACE 0.01 % (0.1 mg/gram) vaginal cream  
Generic drug:  estradiol Insert 0.1 g into vagina every Monday, Wednesday, Friday. ferrous sulfate 325 mg (65 mg iron) tablet Take 1 Tab by mouth two (2) times a day. Indications: Iron Deficiency Anemia  
  
 folic acid 1 mg tablet Commonly known as:  FOLVITE  
TAKE 1 TABLET BY MOUTH DAILY * gabapentin 300 mg capsule Commonly known as:  NEURONTIN Take 1 Cap by mouth two (2) times a day. * gabapentin 600 mg tablet Commonly known as:  NEURONTIN  
TAKE 1 TABLET BY MOUTH TWICE DAILY  
  
 glipiZIDE SR 2.5 mg CR tablet Commonly known as:  GLUCOTROL XL  
TAKE 1 TABLET BY MOUTH TWICE DAILY  
  
 glucose blood VI test strips strip Commonly known as:  [employer] CONTOUR Glucometer for 250.00 Pt. test blood sugar once daily (in morning) HYDROcodone-acetaminophen 5-325 mg per tablet Commonly known as:  Luster Payor Take 1 Tab by mouth every six (6) hours as needed for Pain.  
  
 hydroxychloroquine 200 mg tablet Commonly known as:  PLAQUENIL  
 TAKE 1 TABLET BY MOUTH TWICE DAILY OR AS DIRECTED  
  
 leflunomide 20 mg tablet Commonly known as:  Kandi Dire Take 1 Tab by mouth daily for 90 days. Take half tab daily for 7 days and then one tab if tolerated  
  
 metFORMIN 500 mg tablet Commonly known as:  GLUCOPHAGE  
TAKE 1 TABLET BY MOUTH TWICE DAILY WITH MEALS  
  
 metoprolol succinate 25 mg XL tablet Commonly known as:  TOPROL-XL Take 1 Tab by mouth nightly. MULTIVITAMIN PO Take 1 Tab by mouth daily. nortriptyline 10 mg capsule Commonly known as:  PAMELOR  
TAKE 1 CAPSULE BY MOUTH EVERY NIGHT AT BEDTIME Syringe with Needle, Safety 3 mL 25 gauge x 5/8\" Syrg Commonly known as:  3cc Safety Syringe 25Gx5/8\" To be used with B12 injections Janay Harris walker. Patient with spinal stenosis and stopped posture. Pain with limited ambulation. ZINC PO Take 50 mg by mouth daily. * Notice: This list has 2 medication(s) that are the same as other medications prescribed for you. Read the directions carefully, and ask your doctor or other care provider to review them with you. Prescriptions Sent to Pharmacy Refills  
 ciprofloxacin HCl (CIPRO) 500 mg tablet 0 Sig: Take 1 Tab by mouth two (2) times a day for 5 days. Class: Normal  
 Pharmacy: [facility] [address] #: [phone] Route: Oral  
  
We Performed the Following AMB POC URINALYSIS DIP STICK AUTO W/O MICRO [07816 CPT(R)] CULTURE, URINE [id number] CPT(R)] Follow-up Instructions Return in about 4 weeks (around 3/29/2018) for recheck urine . Stephanie Gone Patient Instructions Urinary Tract Infection in Women: Care Instructions Your Care Instructions A urinary tract infection, or UTI, is a general term for an infection anywhere between the kidneys and the urethra (where urine comes out).  Most UTIs are bladder infections. They often cause pain or burning when you urinate. UTIs are caused by bacteria and can be cured with antibiotics. Be sure to complete your treatment so that the infection goes away. Follow-up care is a key part of your treatment and safety. Be sure to make and go to all appointments, and call your doctor if you are having problems. It's also a good idea to know your test results and keep a list of the medicines you take. How can you care for yourself at home? · Take your antibiotics as directed. Do not stop taking them just because you feel better. You need to take the full course of antibiotics. · Drink extra water and other fluids for the next day or two. This may help wash out the bacteria that are causing the infection. (If you have kidney, heart, or liver disease and have to limit fluids, talk with your doctor before you increase your fluid intake.) · Avoid drinks that are carbonated or have caffeine. They can irritate the bladder. · Urinate often. Try to empty your bladder each time. · To relieve pain, take a hot bath or lay a heating pad set on low over your lower belly or genital area. Never go to sleep with a heating pad in place. To prevent UTIs · Drink plenty of water each day. This helps you urinate often, which clears bacteria from your system. (If you have kidney, heart, or liver disease and have to limit fluids, talk with your doctor before you increase your fluid intake.) · Urinate when you need to. · Urinate right after you have sex. · Change sanitary pads often. · Avoid douches, bubble baths, feminine hygiene sprays, and other feminine hygiene products that have deodorants. · After going to the bathroom, wipe from front to back. When should you call for help? Call your doctor now or seek immediate medical care if: 
? · Symptoms such as fever, chills, nausea, or vomiting get worse or appear for the first time. ? · You have new pain in your back just below your rib cage. This is called flank pain. ? · There is new blood or pus in your urine. ? · You have any problems with your antibiotic medicine. ? Watch closely for changes in your health, and be sure to contact your doctor if: 
? · You are not getting better after taking an antibiotic for 2 days. ? · Your symptoms go away but then come back. Where can you learn more? Go to http://jennifer-vincent.info/. Enter A614 in the search box to learn more about \"Urinary Tract Infection in Women: Care Instructions. \" Current as of: May 12, 2017 Content Version: 11.4 © 8793-1481 [facility]. Care instructions adapted under license by [employer] (which disclaims liability or warranty for this information). If you have questions about a medical condition or this instruction, always ask your healthcare professional. [address] any warranty or liability for your use of this information. Introducing [facility] [place] [facility]! Dear Brunilda Moore: Thank you for requesting a [employer] account. Our records indicate that you already have an active [employer] account. You can access your account anytime at [email or web address] Did you know that you can access your hospital and ER discharge instructions at any time in [employer]? You can also review all of your test results from your hospital stay or ER visit. Additional Information If you have questions, please visit the Frequently Asked Questions section of the [employer] website at [email or web address] Remember, [employer] is NOT to be used for urgent needs. For medical emergencies, dial 911. Now available from your iPhone and Android! Please provide this summary of care documentation to your next provider. Your primary care clinician is listed as Ainsley Glover.  If you have any questions after today's visit, please call [phone].

## 2018-03-01 NOTE — PROGRESS NOTES
Charles Kendall  65 y.o. female  1940  [address] [place], [place] [address]  [id number]     [id number]: Progress Note       Encounter Date: 3/1/2018    Chief Complaint   Patient presents with    Urinary Pain     spasm       History provided by patient and mother  History of Present Illness   Charles Kendall is a 65 y.o. female who presents to clinic today for:    Dysuria  Patient present with cc of pressure and dysuria x last night. Associated with blood in urine and suprapubic pain. Denies burning sensation, flank pain. Patient reports not taking any medication. States that she had been seen in the past by [facility] who had given her a prescription of furadantin to take as needed for urinary odor?/chronic UTI; has not taken prescription in several weeks. Review of Systems   Review of Systems   Constitutional: Negative for chills and fever. Genitourinary: Positive for dysuria, frequency, hematuria and urgency. Negative for flank pain. Skin: Negative for itching and rash. Neurological: Negative for dizziness and headaches. Vitals/Objective:     Vitals:    03/01/18 1427   BP: 109/69   Pulse: 73   Resp: 18   Temp: 98.1 °F (36.7 °C)   TempSrc: Oral   SpO2: 98%   Weight: 209 lb (94.8 kg)   Height: 4' 11\" (1.499 m)     Body mass index is 42.21 kg/(m^2). Wt Readings from Last 3 Encounters:   03/01/18 209 lb (94.8 kg)   02/19/18 208 lb 4.8 oz (94.5 kg)   02/13/18 204 lb (92.5 kg)       Physical Exam   Constitutional: She appears well-developed and well-nourished. Cardiovascular: Normal rate and regular rhythm. Pulmonary/Chest: Effort normal and breath sounds normal.   Abdominal: Soft. Bowel sounds are normal. She exhibits no distension. There is tenderness. There is no rebound and no guarding. No results found for this or any previous visit (from the past 24 hour(s)). Assessment and Plan:     Encounter Diagnoses     ICD-10-CM ICD-9-CM   1.  Acute cystitis with hematuria N30.01 595.0   2. Dysuria R30.0 788.1   3. Diabetic polyneuropathy associated with type 2 diabetes mellitus ([facility]) E11.42 250.60     357.2   4. Rheumatoid arthritis involving both knees, unspecified rheumatoid factor presence ([facility]) M06.9 714.0   5. Obesity, morbid ([facility] E66.01 278.01   6. Type 2 diabetes with nephropathy ([facility]) E11.21 250.40     583.81       1. Acute cystitis with hematuria  - CULTURE, URINE  - ciprofloxacin HCl (CIPRO) 500 mg tablet; Take 1 Tab by mouth two (2) times a day for 5 days. Dispense: 10 Tab; Refill: 0    2. Dysuria  - AMB POC URINALYSIS DIP STICK AUTO W/O MICRO    3. Diabetic polyneuropathy associated with type 2 diabetes mellitus ([facility]    4. Rheumatoid arthritis involving both knees, unspecified rheumatoid factor presence ([facility])      5. Obesity, morbid ([facility]    6. Type 2 diabetes with nephropathy ([facility]        I have discussed the diagnosis with the patient and the intended plan as seen in the above orders. she has expressed understanding. The patient has received an after-visit summary and questions were answered concerning future plans. I have discussed medication side effects and warnings with the patient as well. Electronically Signed: Mee Whitman MD     History/Allergies   Patients past medical, surgical and family histories were reviewed and updated.     Past Medical History:   Diagnosis Date    Anemia     Arthritis     CAD (coronary artery disease) 1997    MI    Degenerative joint disease of right hip 09/14/2015     Virgil Hernandez/Dr. Daniel Zuniga    Diabetes ([facility]     Fracture     right shoulder; T6 compression    Gastritis     Hypercholesterolemia     Hyperlipidemia    Lupus 1/9/2015    Osteoporosis, post-menopausal     vertebral fracture      Past Surgical History:   Procedure Laterality Date    CARDIAC SURG PROCEDURE UNLIST      HX CHOLECYSTECTOMY      HX CORONARY STENT PLACEMENT  02/19/1997    LAD    HX GASTRIC BYPASS  2000    HX HERNIA REPAIR 2005    HX KYPHOPLASTY      t5    HX KYPHOPLASTY      L4    HX ORTHOPAEDIC      bilateral knees    HX ORTHOPAEDIC      r shoulder    HX ORTHOPAEDIC      Laminectomy    IN INC IMPLTJ NEUROSTIMULATOR ELTRD SACRAL NERVE       Family History   Problem Relation Age of Onset    Diabetes Mother     Hypertension Mother     Heart Disease Mother     Diabetes Father     Hypertension Father     Heart Disease Father    Sydnie Madison Arthritis-rheumatoid Sister     Elevated Lipids Sister    Sydnie Brands Arthritis-rheumatoid Sister     Hypertension Sister     Diabetes Sister     Thyroid Disease Sister     Arthritis-rheumatoid Other      Social History     Social History    Marital status:      Spouse name: N/A    Number of children: N/A    Years of education: N/A     Occupational History    Not on file. Social History Main Topics    Smoking status: Former Smoker     Quit date: 12/30/1989    Smokeless tobacco: Never Used    Alcohol use No    Drug use: No    Sexual activity: Not Currently     Other Topics Concern    Not on file     Social History Narrative         Allergies   Allergen Reactions    Cephalosporins Nausea and Vomiting     Severe vomiting       Disposition     Follow-up Disposition: Not on File    Future Appointments  Date Time Provider [facility] [place]   3/15/2018 9:40 AM Venus Haney MD [facility] [address]   8/20/2018 1:40 PM Maureen Cuevas MD [place] [address]            Current Medications after this visit     Current Outpatient Prescriptions   Medication Sig    ciprofloxacin HCl (CIPRO) 500 mg tablet Take 1 Tab by mouth two (2) times a day for 5 days.  gabapentin (NEURONTIN) 600 mg tablet TAKE 1 TABLET BY MOUTH TWICE DAILY    leflunomide (ARAVA) 20 mg tablet Take 1 Tab by mouth daily for 90 days. Take half tab daily for 7 days and then one tab if tolerated    amLODIPine (NORVASC) 10 mg tablet Take 0.5 Tabs by mouth daily for 30 days.     glipiZIDE SR (GLUCOTROL XL) 2.5 mg CR tablet TAKE 1 TABLET BY MOUTH TWICE DAILY    atorvastatin (LIPITOR) 10 mg tablet Take 1 Tab by mouth daily.  cyclobenzaprine (FLEXERIL) 10 mg tablet Take 10 mg by mouth nightly.  cyanocobalamin (VITAMIN B-12) 1,000 mcg/mL injection 1000 mcg SC/IM  every month    ESTRACE 0.01 % (0.1 mg/gram) vaginal cream Insert 0.1 g into vagina every Monday, Wednesday, Friday.  ferrous sulfate 325 mg (65 mg iron) tablet Take 1 Tab by mouth two (2) times a day. Indications: Iron Deficiency Anemia    gabapentin (NEURONTIN) 300 mg capsule Take 1 Cap by mouth two (2) times a day.  metoprolol succinate (TOPROL-XL) 25 mg XL tablet Take 1 Tab by mouth nightly.  denosumab (PROLIA) 60 mg/mL injection 1 mL by SubCUTAneous route every 6 months.  folic acid (FOLVITE) 1 mg tablet TAKE 1 TABLET BY MOUTH DAILY    metFORMIN (GLUCOPHAGE) 500 mg tablet TAKE 1 TABLET BY MOUTH TWICE DAILY WITH MEALS    HYDROcodone-acetaminophen (NORCO) 5-325 mg per tablet Take 1 Tab by mouth every six (6) hours as needed for Pain.  glucose blood VI test strips (ASCENSIA CONTOUR) strip Glucometer for 250.00 Pt. test blood sugar once daily (in morning)    Syringe with Needle, Safety ([facility] SAFETY SYRINGE 25GX5/8\") 3 mL 25 gauge x 5/8\" syrg To be used with B12 injections    hydroxychloroquine (PLAQUENIL) 200 mg tablet TAKE 1 TABLET BY MOUTH TWICE DAILY OR AS DIRECTED (Patient taking differently: TAKE 1 TABLET BY MOUTH IN MORNING AND HALF A TAB AT BEDTIME)    busPIRone (BUSPAR) 5 mg tablet Take 5 mg by mouth two (2) times a day.  nortriptyline (PAMELOR) 10 mg capsule TAKE 1 CAPSULE BY MOUTH EVERY NIGHT AT BEDTIME    acetaminophen (TYLENOL) 650 mg CR tablet Take 1,300 mg by mouth daily as needed for Pain.  Blood-Glucose Meter (CONTOUR METER) monitoring kit Dx: 250. Check blood sugars daily.  Walker [employer] walker. Patient with spinal stenosis and stopped posture. Pain with limited ambulation.  ZINC PO Take 50 mg by mouth daily.     cholecalciferol, vitamin d3, (VITAMIN D3) 1,000 unit tablet Take 2,000 Units by mouth daily.  calcium-vitamin D (CALCIUM 500+D) 500 mg(1,250mg) -200 unit per tablet Take 2 Tabs by mouth daily.  aspirin 81 mg Tab Take 81 mg by mouth daily.  MULTIVITAMINS (MULTIVITAMIN PO) Take 1 Tab by mouth daily. No current facility-administered medications for this visit. There are no discontinued medications.

## 2018-03-01 NOTE — PROGRESS NOTES
1. Have you been to the ER, urgent care clinic, or been hospitalized since your last visit? No     2. Have you seen or consulted any other health care providers outside of the [address] [place] [address] since your last visit?   NO     Reviewed record in preparation for visit and have necessary documentation  opportunity was given for questions  Goals that were addressed and/or need to be completed during or after this appointment include       Health Maintenance Due   Topic Date Due    EYE EXAM RETINAL OR DILATED Q1  10/19/2016    GLAUCOMA SCREENING Q2Y  10/19/2017

## 2018-03-07 ENCOUNTER — TELEPHONE (OUTPATIENT)
Dept: FAMILY MEDICINE CLINIC | Age: 78
End: 2018-03-07

## 2018-03-07 NOTE — TELEPHONE ENCOUNTER
Mei Pérez from [employer] called requesting a copy of pt visit where salmonella was found in urine culture.  Fax number is [phone]

## 2018-03-07 NOTE — TELEPHONE ENCOUNTER
Patient not home. Spoke with patient daughter. On HIPAA. Advised of urine results of salmonella and to expect a call from health department regarding bacteria. Advised final sensitivity results are not available and if patient will need another ABX we will call and advise. Verbalized understanding.

## 2018-03-08 ENCOUNTER — DOCUMENTATION ONLY (OUTPATIENT)
Dept: FAMILY MEDICINE CLINIC | Age: 78
End: 2018-03-08

## 2018-03-08 LAB — BACTERIA UR CULT: ABNORMAL

## 2018-03-15 ENCOUNTER — OFFICE VISIT (OUTPATIENT)
Dept: RHEUMATOLOGY | Age: 78
End: 2018-03-15

## 2018-03-15 VITALS
TEMPERATURE: 97.3 F | DIASTOLIC BLOOD PRESSURE: 84 MMHG | HEIGHT: 59 IN | RESPIRATION RATE: 18 BRPM | HEART RATE: 123 BPM | WEIGHT: 205 LBS | BODY MASS INDEX: 41.33 KG/M2 | SYSTOLIC BLOOD PRESSURE: 127 MMHG

## 2018-03-15 DIAGNOSIS — M06.09 SERONEGATIVE RHEUMATOID ARTHRITIS OF MULTIPLE SITES (HCC): Primary | ICD-10-CM

## 2018-03-15 DIAGNOSIS — Z79.60 LONG-TERM USE OF IMMUNOSUPPRESSANT MEDICATION: ICD-10-CM

## 2018-03-15 DIAGNOSIS — M81.0 AGE-RELATED OSTEOPOROSIS WITHOUT CURRENT PATHOLOGICAL FRACTURE: ICD-10-CM

## 2018-03-15 DIAGNOSIS — I73.00 RAYNAUD'S DISEASE WITHOUT GANGRENE: ICD-10-CM

## 2018-03-15 DIAGNOSIS — N18.2 CKD (CHRONIC KIDNEY DISEASE) STAGE 2, GFR 60-89 ML/MIN: ICD-10-CM

## 2018-03-15 NOTE — MR AVS SNAPSHOT
[address] [place], [place] [phone] Patient: John Aldridge MRN: [id number] GTO:[birth year] Visit Information Date & Time Provider Department Dept. Phone Encounter #  
 3/15/2018  9:40 AM Mell Carranza MD [address] [facility] [address] [facility] [id number] Follow-up Instructions Return in about 2 months (around 5/15/2018). Your Appointments 3/27/2018  1:00 PM  
ROUTINE CARE with Debora Thakkar MD  
[address] [place] [address] ([facility]) Appt Note: 1 mnth fu /est pt/refills/recheck [address] [address]  
[phone]  
  
   
 [address] [facility] [address]  
  
    
 8/20/2018  1:40 PM  
Follow Up with Cruz Bronson MD  
[facility]) Appt Note: follow up neuropathy  $0CP  alicia  2/19/18 [address] [facility] [address] [facility] 99 [id number] [phone]  
  
   
 [address] [facility] [phone] [address] Upcoming Health Maintenance Date Due  
 EYE EXAM RETINAL OR DILATED Q1 10/19/2016 GLAUCOMA SCREENING Q2Y 10/19/2017 FOOT EXAM Q1 7/26/2018 MICROALBUMIN Q1 7/26/2018 MEDICARE YEARLY EXAM 7/27/2018 HEMOGLOBIN A1C Q6M 8/8/2018 LIPID PANEL Q1 2/8/2019 DTaP/Tdap/Td series (2 - Td) 12/5/2023 Allergies as of 3/15/2018  Review Complete On: 3/15/2018 By: Sorin Ge, VIVIANA Severity Noted Reaction Type Reactions Cephalosporins High 12/30/2009    Nausea and Vomiting Severe vomiting Current Immunizations  Reviewed on 12/13/2017 Name Date Influenza High Dose Vaccine PF 9/13/2017 12:00 AM, 8/5/2016 Influenza Vaccine 12/5/2013 Influenza Vaccine Eugene Raker) 9/6/2016 Influenza Vaccine (Quad) PF 11/10/2014 Influenza Vaccine PF 10/20/2015 Influenza Vaccine Split 10/10/2012  4:30 PM, 11/14/2011, 10/25/2010 Pneumococcal Conjugate (PCV-13) 9/13/2017 12:00 AM  
 Pneumococcal Polysaccharide (PPSV-23) 12/5/2013 Tdap 12/5/2013 ZZZ-RETIRED (DO NOT USE) Pneumococcal Vaccine (Unspecified Type) 6/5/2008 Not reviewed this visit You Were Diagnosed With   
  
 Codes Comments Seronegative rheumatoid arthritis of multiple sites [facility])    -  Primary ICD-10-CM: M06.09 
ICD-9-CM: 714.0 Long-term use of immunosuppressant medication     ICD-10-CM: Z79.899 ICD-9-CM: V58.69 Raynaud's disease without gangrene     ICD-10-CM: I73.00 ICD-9-CM: 443.0 Age-related osteoporosis without current pathological fracture     ICD-10-CM: M81.0 ICD-9-CM: 733.01 Vitals BP Pulse Temp Resp Height(growth percentile) Weight(growth percentile) 127/84 (!) 123 97.3 °F (36.3 °C) 18 4' 11\" (1.499 m) 205 lb (93 kg) BMI OB Status Smoking Status 41.4 kg/m2 Postmenopausal Former Smoker BMI and BSA Data Body Mass Index Body Surface Area  
 41.4 kg/m 2 1.97 m 2 Preferred Pharmacy Pharmacy Name [address] [phone] Your Updated Medication List  
  
   
This list is accurate as of 3/15/18  9:53 AM.  Always use your most recent med list.  
  
  
  
  
 acetaminophen 650 mg Tber Commonly known as:  TYLENOL Take 1,300 mg by mouth daily as needed for Pain. amLODIPine 10 mg tablet Commonly known as:  Resendez Meaghan Take 0.5 Tabs by mouth daily for 30 days. aspirin 81 mg tablet Take 81 mg by mouth daily. atorvastatin 10 mg tablet Commonly known as:  LIPITOR Take 1 Tab by mouth daily. Blood-Glucose Meter monitoring kit Commonly known as:  CONTOUR METER Dx: 250. Check blood sugars daily. busPIRone 5 mg tablet Commonly known as:  BUSPAR  
 Take 5 mg by mouth two (2) times a day. CALCIUM 500+D 500 mg(1,250mg) -200 unit per tablet Generic drug:  calcium-vitamin D Take 2 Tabs by mouth daily. cholecalciferol 1,000 unit tablet Commonly known as:  VITAMIN D3 Take 2,000 Units by mouth daily. cyanocobalamin 1,000 mcg/mL injection Commonly known as:  VITAMIN B-12  
1000 mcg SC/IM  every month  
  
 cyclobenzaprine 10 mg tablet Commonly known as:  FLEXERIL Take 10 mg by mouth nightly. denosumab 60 mg/mL injection Commonly known as:  Alease Keshia 1 mL by SubCUTAneous route every 6 months. ESTRACE 0.01 % (0.1 mg/gram) vaginal cream  
Generic drug:  estradiol Insert 0.1 g into vagina every Monday, Wednesday, Friday. ferrous sulfate 325 mg (65 mg iron) tablet Take 1 Tab by mouth two (2) times a day. Indications: Iron Deficiency Anemia  
  
 folic acid 1 mg tablet Commonly known as:  FOLVITE  
TAKE 1 TABLET BY MOUTH DAILY * gabapentin 300 mg capsule Commonly known as:  NEURONTIN Take 1 Cap by mouth two (2) times a day. * gabapentin 600 mg tablet Commonly known as:  NEURONTIN  
TAKE 1 TABLET BY MOUTH TWICE DAILY  
  
 glipiZIDE SR 2.5 mg CR tablet Commonly known as:  GLUCOTROL XL  
TAKE 1 TABLET BY MOUTH TWICE DAILY  
  
 glucose blood VI test strips strip Commonly known as:  [employer] CONTOUR Glucometer for 250.00 Pt. test blood sugar once daily (in morning) HYDROcodone-acetaminophen 5-325 mg per tablet Commonly known as:  [address] Take 1 Tab by mouth every six (6) hours as needed for Pain.  
  
 hydroxychloroquine 200 mg tablet Commonly known as:  PLAQUENIL  
TAKE 1 TABLET BY MOUTH TWICE DAILY OR AS DIRECTED  
  
 leflunomide 20 mg tablet Commonly known as:  Arby [place] Take 1 Tab by mouth daily for 90 days. Take half tab daily for 7 days and then one tab if tolerated  
  
 metFORMIN 500 mg tablet Commonly known as:  GLUCOPHAGE  
TAKE 1 TABLET BY MOUTH TWICE DAILY WITH MEALS  
  
 metoprolol succinate 25 mg XL tablet Commonly known as:  TOPROL-XL Take 1 Tab by mouth nightly. MULTIVITAMIN PO Take 1 Tab by mouth daily. nortriptyline 10 mg capsule Commonly known as:  PAMELOR  
TAKE 1 CAPSULE BY MOUTH EVERY NIGHT AT BEDTIME Syringe with Needle, Safety 3 mL 25 gauge x 5/8\" Syrg Commonly known as:  3cc Safety Syringe 25Gx5/8\" To be used with B12 injections Julian Shearing Rolling walker. Patient with spinal stenosis and stopped posture. Pain with limited ambulation. ZINC PO Take 50 mg by mouth daily. * Notice: This list has 2 medication(s) that are the same as other medications prescribed for you. Read the directions carefully, and ask your doctor or other care provider to review them with you. We Performed the Following C REACTIVE PROTEIN, QT [02371 CPT(R)] CBC WITH AUTOMATED DIFF [18411 CPT(R)] METABOLIC PANEL, COMPREHENSIVE [14666 CPT(R)] SED RATE (ESR) [id number] CPT(R)] Follow-up Instructions Return in about 2 months (around 5/15/2018). Introducing [facility]! Dear Silvana Mathew: Thank you for requesting a [employer] account. Our records indicate that you already have an active [employer] account. You can access your account anytime at [email or web address] Did you know that you can access your hospital and ER discharge instructions at any time in [employer]? You can also review all of your test results from your hospital stay or ER visit. Additional Information If you have questions, please visit the Frequently Asked Questions section of the Imm[employer] website at [email or web address] Remember, [employer] is NOT to be used for urgent needs. For medical emergencies, dial 911. Now available from your iPhone and Android! Please provide this summary of care documentation to your next provider. Your primary care clinician is listed as Ruth Ann Choi. If you have any questions after today's visit, please call [phone].

## 2018-03-15 NOTE — PROGRESS NOTES
REASON FOR VISIT    This is a follow-up visit for Ms. Rueda for Seronegative Erosive Rheumatoid Arthritis and Osteoporosis. Inflammatory arthritis phenotype includes:  Anti-CCP positive: no  Rheumatoid factor positive: no  Erosive disease: yes  Extra-articular manifestations include: Raynaud's    Immunosuppression Screening (2/13/2018):   Quantiferon TB: negative   PPD:  Not performed  Hepatitis B: negative    Hepatitis C: negative     Therapy History includes:  Current DMARD therapy include: hydroxychloroquine 300 mg daily, leflunomide 20 mg daily  Prior DMARD therapy include: methotrexate 12.5 mg subcutaneous    Discontinued DMARDs because of inefficacy: None  Discontinued DMARDs because of side effects: methotrexate (LFTs)    Osteoporosis Historical Synopsis    Height loss since age 26 (at least two inches): 5  Fracture history includes: yes (T5-T6)  Family history of hip fracture: no  Fall Risk: no    Daily calcium intake is 1800 mg  Daily vitamin D intake is 2000 IU    Smoking history: no  Alcohol consumption: no  Prednisone history: no    Exercise: no    Previous work-up for osteoporosis includes the following:  DEXA Scan: 8/08/2017  Vitamin 25OH D level: 46.6 (21/3/2018)  PTH: 21 (21/3/2018)  TSH: 2.570 (21/3/2018)    Therapy History includes:    Current osteoporosis therapy includes: Prolia  Prior osteoporosis therapy includes: Reclast (2 years)  The following osteoporosis therapy have been ineffective: Reclast  The following osteoporosis therapy were stopped because of side effects: none    Immunizations:   Immunization History   Administered Date(s) Administered    Influenza High Dose Vaccine PF 08/05/2016, 09/13/2017    Influenza Vaccine 12/05/2013    Influenza Vaccine (Quad) 09/06/2016    Influenza Vaccine (Quad) PF 11/10/2014    Influenza Vaccine PF 10/20/2015    Influenza Vaccine Split 10/25/2010, 11/14/2011, 10/10/2012    Pneumococcal Conjugate (PCV-13) 09/13/2017    Pneumococcal Polysaccharide (PPSV-23) 12/05/2013    Tdap 12/05/2013    ZZZ-RETIRED (DO NOT USE) Pneumococcal Vaccine (Unspecified Type) 06/05/2008       Active problems include:    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    Long-term use of immunosuppressant medication Z79.899    Long-term use of Plaquenil Z79.899    Hypocalcemia E83.51    Low back pain M54.5    Age-related osteoporosis without current pathological fracture M81.0    H/O Bell's palsy Z86.69    Diabetic polyneuropathy ([facility] E11.42    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    Unspecified hereditary and idiopathic peripheral neuropathy G60.9    Raynauds syndrome I73.00    Blepharoconjunctivitis H10.509    Seronegative rheumatoid arthritis of multiple sites [facility] M06.09    Nuclear sclerotic cataract H25.10    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    Obesity, morbid (HCC) E66.01    H/O total knee replacement Z96.659    Primary localized osteoarthritis of left knee M17.12    Hypercholesterolemia E78.00    Primary Raynaud's phenomenon I73.00    CKD (chronic kidney disease) stage 2, GFR 60-89 ml/min N18.2    Type 2 diabetes with nephropathy (HCC) E11.21       HISTORY OF PRESENT ILLNESS    Ms. Elli Martinez returns for a follow-up. On her last visit, I ordered labs and radiographs and prescribed her amlodipine 10 mg but to take 5 mg daily and if tolerated to increase to 10 mg if needed during the cold weather. I did not feel she had systemic lupus erythematosus but that she had Rheumatoid Arthritis. Today, she feels better. She less pain in her hands. Her hands are not numb as they used to be or blue. She is on 5 mg on amlodipine.  She continues to have pain, swelling, and stiffness lasting an hour which improves with movement and shower. She is due for Prolia. She is on hydroxychloroquine 300 mg daily. Her last ophthalmic exam was in 10/2017. There was no dryness noted. Ms. Cordell Seals has continued her medications for arthritis and reports good tolerance without significant side effects. Last toxicity monitoring by blood work was done on 2/07/2018 and did not reveal any significant adverse effects, except eGFR 63. LFTs on 8/01/2017 were normal.    Most recent inflammatory markers from 2/13/2018 revealed a ESR 4 mm/hr (previously N/A mm/hr) and CRP 0. 3mg/L (previously N/A mg/L). The patient has not had any interval hospital admissions, infections, or surgeries. REVIEW OF SYSTEMS    A comprehensive review of systems was performed and pertinent results are documented in the HPI, review of systems is otherwise non-contributory. PAST MEDICAL HISTORY    She has a past medical history of Anemia; Arthritis; CAD (coronary artery disease) (1997); Degenerative joint disease of right hip (09/14/2015 ); Diabetes ([address] Fracture; Gastritis; Hypercholesterolemia; Lupus (1/9/2015); and Osteoporosis, post-menopausal. She also has no past medical history of Abuse; Anemia NEC; Arrhythmia; Asthma; Calculus of kidney; Cancer ([address] Chronic kidney disease; Chronic obstructive pulmonary disease ([address] Chronic pain; Congestive heart failure, unspecified; Contact dermatitis and other eczema, due to unspecified cause; COPD; Depression; GERD (gastroesophageal reflux disease); Headache(784.0); Liver disease; Malignant hyperthermia due to anesthesia; Psychotic disorder; PUD (peptic ulcer disease); Seizures ([address] Sleep apnea; Stroke [facility]); Thromboembolus ([address] Thyroid disease; or Trauma. FAMILY HISTORY    Her family history includes Arthritis-rheumatoid in her sister, sister and another family member; Diabetes in her father, mother, and sister; Elevated Lipids in her sister;  Heart Disease in her father and mother; Hypertension in her father, mother, and sister; Thyroid Disease in her sister. SOCIAL HISTORY    She reports that she quit smoking about 28 years ago. She has never used smokeless tobacco. She reports that she does not drink alcohol or use illicit drugs. MEDICATIONS    Current Outpatient Prescriptions   Medication Sig Dispense Refill    gabapentin (NEURONTIN) 600 mg tablet TAKE 1 TABLET BY MOUTH TWICE DAILY 60 Tab 0    leflunomide (ARAVA) 20 mg tablet Take 1 Tab by mouth daily for 90 days. Take half tab daily for 7 days and then one tab if tolerated 90 Tab 0    amLODIPine (NORVASC) 10 mg tablet Take 0.5 Tabs by mouth daily for 30 days. 15 Tab 1    glipiZIDE SR (GLUCOTROL XL) 2.5 mg CR tablet TAKE 1 TABLET BY MOUTH TWICE DAILY 180 Tab 0    atorvastatin (LIPITOR) 10 mg tablet Take 1 Tab by mouth daily. 90 Tab 0    cyclobenzaprine (FLEXERIL) 10 mg tablet Take 10 mg by mouth nightly.  cyanocobalamin (VITAMIN B-12) 1,000 mcg/mL injection 1000 mcg SC/IM  every month 1 Vial 3    ESTRACE 0.01 % (0.1 mg/gram) vaginal cream Insert 0.1 g into vagina every Monday, Wednesday, Friday. 42.5 g 1    ferrous sulfate 325 mg (65 mg iron) tablet Take 1 Tab by mouth two (2) times a day. Indications: Iron Deficiency Anemia 30 Tab 5    gabapentin (NEURONTIN) 300 mg capsule Take 1 Cap by mouth two (2) times a day. 120 Cap 6    metoprolol succinate (TOPROL-XL) 25 mg XL tablet Take 1 Tab by mouth nightly. 90 Tab 0    folic acid (FOLVITE) 1 mg tablet TAKE 1 TABLET BY MOUTH DAILY 90 Tab 0    metFORMIN (GLUCOPHAGE) 500 mg tablet TAKE 1 TABLET BY MOUTH TWICE DAILY WITH MEALS 180 Tab 0    HYDROcodone-acetaminophen (NORCO) 5-325 mg per tablet Take 1 Tab by mouth every six (6) hours as needed for Pain.  60 Tab 0    glucose blood VI test strips (ASCENSIA CONTOUR) strip Glucometer for 250.00 Pt. test blood sugar once daily (in morning) 50 Strip 11    Syringe with Needle, Safety (3CC SAFETY SYRINGE 25GX5/8\") 3 mL 25 gauge x 5/8\" syrg To be used with B12 injections 100 Pen Needle 11    busPIRone (BUSPAR) 5 mg tablet Take 5 mg by mouth two (2) times a day. 5    nortriptyline (PAMELOR) 10 mg capsule TAKE 1 CAPSULE BY MOUTH EVERY NIGHT AT BEDTIME 30 Cap 0    acetaminophen (TYLENOL) 650 mg CR tablet Take 1,300 mg by mouth daily as needed for Pain.  Blood-Glucose Meter (CONTOUR METER) monitoring kit Dx: 250. Check blood sugars daily. 1 kit 11    Walker Misc Rolling walker. Patient with spinal stenosis and stopped posture. Pain with limited ambulation. 1 Each 0    ZINC PO Take 50 mg by mouth daily.  cholecalciferol, vitamin d3, (VITAMIN D3) 1,000 unit tablet Take 2,000 Units by mouth daily.  calcium-vitamin D (CALCIUM 500+D) 500 mg(1,250mg) -200 unit per tablet Take 2 Tabs by mouth daily.  aspirin 81 mg Tab Take 81 mg by mouth daily.  MULTIVITAMINS (MULTIVITAMIN PO) Take 1 Tab by mouth daily.  denosumab (PROLIA) 60 mg/mL injection 1 mL by SubCUTAneous route every 6 months. ALLERGIES    Allergies   Allergen Reactions    Cephalosporins Nausea and Vomiting     Severe vomiting       PHYSICAL EXAMINATION    Visit Vitals    /84    Pulse (!) 123    Temp 97.3 °F (36.3 °C)    Resp 18    Ht 4' 11\" (1.499 m)    Wt 205 lb (93 kg)    BMI 41.4 kg/m2     Body mass index is 41.4 kg/(m^2). General: Patient is alert, oriented x 3, not in acute distress    HEENT:   Sclerae are not injected and appear moist.  Oral mucous membranes are moist, there are no ulcers present. There is no alopecia. Neck is supple     Cardiovascular:  Heart is regular rate and rhythm, no murmurs. Chest:  Lungs are clear to auscultation bilaterally. No rhonchi, wheezes, or crackles.     Extremities:  Free of clubbing, cyanosis +1 nonpitting lower extremity edema with tenderness    Neurological exam:  No focal sensory deficits, muscle strength is full in upper and lower extremities     Skin exam:    Psoriasis:     no  Nail Pitting:     no  Onycholysis:     no  Palmoplantar pustulosis:   no  Acne fulminans:    no  Acne conglobata:    no  Hidradenitis Suppurativa:   no  Dissecting cellulitis of the scalp:  no  Pilonidal sinus:    no  Erythema nodosum:    no  Non-Scarring Alopecia:  no  Discoid Lupus:   no  Subacute Cutaneous Lupus:   no  Heliotrope Rash:   no  Upper Arm Erythema:   no  Shawl Sign:    no  V-sign:     no  Holster sign:    no  Gottron's papules:   no  Gottron's sign:    no  Calcinosis:    no  Raynaud's Phenomenon:  no  's Hands:   no  Periungual erythema:   no  Abnormal Nailfold Capillaries:  no  Livedo Reticularis:   no  Scalp Erythema:   no  Facial Rosacea:    yes  Musculoskeletal:  A comprehensive musculoskeletal exam was performed for all joints of each upper and lower extremity and assessed for swelling, tenderness and range of motion.       Bilateral Sharla and Heberden nods  Bilateral knee crepitus without effusion      Joint Count 3/15/2018 2/13/2018   Patient pain (0-100) 10 -   MHAQ 0 -   Left wrist- Tender - 1   Left wrist- Swollen - 1   Left 1st MCP - Tender - 1   Left 1st MCP - Swollen - 1   Left 2nd MCP - Tender 1 1   Left 2nd MCP - Swollen 1 1   Left 3rd MCP - Tender 1 1   Left 3rd MCP - Swollen 1 1   Left 4th MCP - Tender 1 1   Left 4th MCP - Swollen - 1   Left 2nd PIP - Tender - 1   Left 2nd PIP - Swollen - 1   Left 3rd PIP - Tender - 1   Left 4th PIP - Tender 1 1   Left 4th PIP - Swollen 1 1   Right wrist- Tender 1 1   Right wrist- Swollen 1 1   Right 2nd MCP - Tender 1 1   Right 2nd MCP - Swollen 1 1   Right 3rd MCP - Tender 1 1   Right 3rd MCP - Swollen 1 1   Right 4th MCP - Tender 1 1   Right 4th MCP - Swollen 1 1   Right 5th MCP - Swollen 1 -   Right 2nd PIP - Tender - 1   Right 2nd PIP - Swollen - 1   Right 3rd PIP - Tender 1 1   Right 3rd PIP - Swollen 1 -   Right 4th PIP - Tender 1 1   Right 5th PIP - Tender 1 -   Tender Joint Count (Total) 11 15   Swollen Joint Count (Total) 9 12   Physician Assessment (0-10) 3 -   Patient Assessment (0-10) 1.5 -   CDAI Total (calculated) 24.5 -       DATA REVIEW    Laboratory     Recent laboratory results were reviewed, summarized, and discussed with the patient. Imaging    Musculoskeletal Ultrasound    None    Radiographs    Bilateral Hand 2/13/2018: RIGHT: severe osteopenia without fracture. There are scattered degenerative changes. Progressive first Aia 16 joint. No new erosive changes. .  Vascular calcifications. LEFT: severe osteopenia. Scattered degenerative changes as noted previously with progression of the index finger and middle finger DIP joints. There is a new erosion at the index finger proximal phalanx ulnar base without adjacent joint space loss. There are vascular calcifications. Calcification overlying the DRUJ is favored represent overlying vascular calcifications.      Bilateral Foot 2/13/2018: RIGHT: no acute fracture or dislocation. Alignment is anatomic. Mild degenerative changes are seen in the left first MTP joint. Minimal degenerative changes are seen in the right first MTP joint. A possible erosion is seen in the head of the left first metatarsal. No erosive changes are seen in the right foot. Plantar calcaneal heel spurs are noted in both feet, as well as enthesophyte formation at the Achilles insertion site. Arterial vascular calcifications are also noted bilaterally. Mild soft tissue swelling surrounds the left first MTP joint. LEFT: no acute fracture or dislocation. Alignment is anatomic. Mild degenerative changes are seen in the left first MTP joint. Minimal degenerative changes are seen in the right first MTP joint. A possible erosion is seen in the head of the left first metatarsal. No erosive changes are seen in the right foot. Plantar calcaneal heel spurs are noted in both feet, as well as enthesophyte formation at the Achilles insertion site.  Arterial vascular calcifications are also noted bilaterally. Mild soft tissue swelling surrounds the left first MTP joint. Thoracic Spine 11/03/2016: The posterior battery pack is again seen within the intrathecal catheter in stable position. Kyphoplasty material is present in a midthoracic vertebral body is stable mild chronic compression of the adjacent inferior vertebral body. The remaining vertebral body heights are maintained. Anterior osteophytes are noted. There is no abnormality in alignment. Lumbar Spine 10/19/2016: significant disc space narrowing at L3-L4 with 14 mm anterolisthesis. There is facet arthropathy. The vertebral body heights are maintained. Spinal catheters are seen. There are atherosclerotic vascular calcifications. Bilateral Hips 8/31/2015: no fracture, dislocation or other acute abnormality. There osteoarthritic pattern change of both hips, more severe on the right. There is nonuniform joint space narrowing and marginal osteophytes. There is remodeling of the right femur head with subchondral cystic formation. Mild sacroiliac osteoarthritic change. Lower lumbar spondylosis is noted. Wire  leads are seen in the left lower lumbar spine likely reflective of spinal stimulator device. Bilateral Hand 3/01/2011: RIGHT: mild osteopenia. There are degenerative changes of the distal and proximal interphalangeal joints and the base of the thumb. There is no fracture or other acute abnormality. Scattered calcifications are noted in the radial and ulnar arteries. LEFT: mild osteopenia. There are degenerative changes of the distal and proximal interphalangeal joints and  at the base of the thumb. There is no fracture or acute abnormality. There are vascular calcifications of the radial and ulnar arteries. CT Imaging    None    MR Imaging    MRI Brain with without contrast 12/19/2016: There is sulcal and ventricular prominence.  Confluent periventricular and scattered foci of increased T2 signal intensity in the corona radiata and centrum semiovale. Tiny remote lacunar infarction in the right cerebellum. There is no intracranial mass, hemorrhage or evidence of acute infarction. There is no Chiari or syrinx. The pituitary and infundibulum are grossly unremarkable. There is no skull base mass. Cerebellopontine angles are grossly unremarkable. The major intracranial vascular flow-voids are unremarkable. No evidence of demyelinating process. There is no abnormal parenchymal enhancement. There is no abnormal meningeal enhancement. The cavernous sinuses are symmetric. Optic chiasm and infundibulum grossly unremarkable. Orbits are grossly symmetric. Dural venous sinuses are grossly patent. The brain architecture is normal. There is no evidence of midline shift or mass-effect. There are no extra-axial fluid collections. The mastoid air cells and paranasal sinuses are well pneumatized and clear. MRI Lumbar Spine with and without contrast 8/07/2013: There is transitional lumbosacral anatomy. In keeping with prior studies, lowest fully formed disc is labeled L5-S1. There has been prior  laminectomies at L3-5 with no change in grade 1 anterolisthesis at L3-4 which measures 5 mm. There is grade 1 anterolisthesis of L4-5 of approximately 2 mm, unchanged. Vertebral body heights are maintained. There is no marrow edema or compression fracture. There are reactive endplate changes at N7-7.  The conus medullaris terminates at L1-2. There is no abnormal intraspinal enhancement. L1/2:  The spinal canal and foramina are widely patent. L2/3:  Diffuse disc bulge, facet hypertrophy and ligamentum flavum thickening cause mild spinal canal and moderate right foraminal stenosis. Left foramen is patent. L3/4: There is uncovering of disc material with facet hypertrophy resulting in mild thecal sac narrowing. There is severe bilateral foraminal stenosis similar to the prior study.  L4/5: Justin  is diffuse disc bulge and facet hypertrophy, without significant spinal canal stenosis. There is mild right foraminal stenosis. L5/S1:  The spinal canal and foramina are widely patent. DXA     DXA 8/08/2017: (excluded Lumbar spine because of overlying wires and degenerative change and kyphoplasty of L4) showed left femoral neck T score: -1.4 (0.837 g/cm2), left total hip T score: -1.4 (0.830 g/cm2), right femoral neck T score: -0.9 (0.919 g/cm2), right total hip T score: -0.9 (0.900 g/cm2), and distal one third left radius T score -3.00 (BMD 0.615 g/cm2). FRAX score 16.8 % probability in 10 years for major osteoporotic fracture and 3.2 % 10 year probability of hip fracture. Nuclear Medicine    Nuclear Medicine Bone Scan 1/22/2013: There is intense radiotracer uptake in the upper thoracic spine, likely T5. There is uptake in the region of the sternoclavicular joints and left shoulder, likely degenerative. ASSESSMENT AND PLAN    This is a follow-up visit for Ms. Joyce Almaguer. 1) Seronegative Erosive Rheumatoid Arthritis. She feels better on leflunomide 20 mg daily although she has only been on it for one month, with good tolerance. Her radiographs showed diffuse osteopenia suggestive of an inflammatory process. There was an erosion noted. Her CDAI was 24.5 with 11 tender and 9 swollen joints (previously 15 and 12, respectively). I will continue treatment. Labs today. 2) Long Term Use of Immunosuppressants. The patient remains on immunomodulatory medications (leflunomide) and requires frequent toxicity monitoring by blood work. Respective labs were ordered (CBC and CMP). 3) Age-Related Osteoporosis. (multiple vertebral fractures) She is due for her Prolia. An [facility] order was submitted today. 4) Primary Raynauds phenomenon. This improved with amlodipine 5 mg daily. I explained that she may take 10 mg if tolerated and take it as needed for symptom relief. 5) Long Term Use of Hydroxychloroquine (Plaquenil). She is up to date with her ophthalmic exam. I will discontinue today. 6) Lower Extremity Edema. I asked her to wear compression hose. 7) Chronic Kidney Disease Stage 2. The patient's eGFR was 63. 8) History of Lupus. This was based on DEXTER 1:40 (speckled), malar rash, inflammatory arthritis, and Raynaud's syndrome. She does not have a malar rash. She has facial rosacea. She has had Raynaud's since her 18's which is likely primary/idiopathic Raynaud's. I agree that she has inflammatory arthritis but this is likely Rheumatoid Arthritis. The patient voiced understanding of the aforementioned assessment and plan. Summary of plan was provided in the After Visit Summary patient instructions.      TODAY'S ORDERS    Orders Placed This Encounter    CBC WITH AUTOMATED DIFF    METABOLIC PANEL, COMPREHENSIVE    C REACTIVE PROTEIN, QT    SED RATE (ESR)     Future Appointments  Date Time Provider Yocasta Cisneros   3/27/2018 1:00 PM Mariusz Boss MD [facility] [place] SCHED   5/10/2018 3:40 PM Yovana Tao MD [address]   8/20/2018 1:40 PM Carolina Colindres MD [address] Fito Fu MD, [address] [place] [address]    Adult Rheumatology   Musculoskeletal Ultrasound Certified  [address]   Phone [phone]  Fax [phone]

## 2018-03-16 LAB
ALBUMIN SERPL-MCNC: 4.4 G/DL (ref 3.5–4.8)
ALBUMIN/GLOB SERPL: 2.2 {RATIO} (ref 1.2–2.2)
ALP SERPL-CCNC: 59 IU/L (ref 39–117)
ALT SERPL-CCNC: 23 IU/L (ref 0–32)
AST SERPL-CCNC: 36 IU/L (ref 0–40)
BASOPHILS # BLD AUTO: 0 X10E3/UL (ref 0–0.2)
BASOPHILS NFR BLD AUTO: 0 %
BILIRUB SERPL-MCNC: 0.3 MG/DL (ref 0–1.2)
BUN SERPL-MCNC: 24 MG/DL (ref 8–27)
BUN/CREAT SERPL: 25 (ref 12–28)
CALCIUM SERPL-MCNC: 9.4 MG/DL (ref 8.7–10.3)
CHLORIDE SERPL-SCNC: 100 MMOL/L (ref 96–106)
CO2 SERPL-SCNC: 25 MMOL/L (ref 18–29)
CREAT SERPL-MCNC: 0.96 MG/DL (ref 0.57–1)
CRP SERPL-MCNC: 0.5 MG/L (ref 0–4.9)
EOSINOPHIL # BLD AUTO: 0.2 X10E3/UL (ref 0–0.4)
EOSINOPHIL NFR BLD AUTO: 2 %
ERYTHROCYTE [DISTWIDTH] IN BLOOD BY AUTOMATED COUNT: 13 % (ref 12.3–15.4)
ERYTHROCYTE [SEDIMENTATION RATE] IN BLOOD BY WESTERGREN METHOD: 3 MM/HR (ref 0–40)
GFR SERPLBLD CREATININE-BSD FMLA CKD-EPI: 57 ML/MIN/1.73
GFR SERPLBLD CREATININE-BSD FMLA CKD-EPI: 66 ML/MIN/1.73
GLOBULIN SER CALC-MCNC: 2 G/DL (ref 1.5–4.5)
GLUCOSE SERPL-MCNC: 119 MG/DL (ref 65–99)
HCT VFR BLD AUTO: 37.7 % (ref 34–46.6)
HGB BLD-MCNC: 12.9 G/DL (ref 11.1–15.9)
IMM GRANULOCYTES # BLD: 0 X10E3/UL (ref 0–0.1)
IMM GRANULOCYTES NFR BLD: 1 %
LYMPHOCYTES # BLD AUTO: 1.9 X10E3/UL (ref 0.7–3.1)
LYMPHOCYTES NFR BLD AUTO: 23 %
MCH RBC QN AUTO: 32.4 PG (ref 26.6–33)
MCHC RBC AUTO-ENTMCNC: 34.2 G/DL (ref 31.5–35.7)
MCV RBC AUTO: 95 FL (ref 79–97)
MONOCYTES # BLD AUTO: 0.6 X10E3/UL (ref 0.1–0.9)
MONOCYTES NFR BLD AUTO: 7 %
NEUTROPHILS # BLD AUTO: 5.5 X10E3/UL (ref 1.4–7)
NEUTROPHILS NFR BLD AUTO: 67 %
PLATELET # BLD AUTO: 240 X10E3/UL (ref 150–379)
POTASSIUM SERPL-SCNC: 4.6 MMOL/L (ref 3.5–5.2)
PROT SERPL-MCNC: 6.4 G/DL (ref 6–8.5)
RBC # BLD AUTO: 3.98 X10E6/UL (ref 3.77–5.28)
SODIUM SERPL-SCNC: 141 MMOL/L (ref 134–144)
WBC # BLD AUTO: 8.2 X10E3/UL (ref 3.4–10.8)

## 2018-03-26 ENCOUNTER — OP HISTORICAL/CONVERTED ENCOUNTER (OUTPATIENT)
Dept: OTHER | Age: 78
End: 2018-03-26

## 2018-03-27 ENCOUNTER — OFFICE VISIT (OUTPATIENT)
Dept: FAMILY MEDICINE CLINIC | Age: 78
End: 2018-03-27

## 2018-03-27 VITALS
DIASTOLIC BLOOD PRESSURE: 64 MMHG | TEMPERATURE: 97.8 F | BODY MASS INDEX: 41.12 KG/M2 | SYSTOLIC BLOOD PRESSURE: 106 MMHG | HEART RATE: 69 BPM | WEIGHT: 204 LBS | RESPIRATION RATE: 18 BRPM | HEIGHT: 59 IN

## 2018-03-27 DIAGNOSIS — R82.90 ABNORMAL URINALYSIS: ICD-10-CM

## 2018-03-27 DIAGNOSIS — Z87.440 HISTORY OF UTI: Primary | ICD-10-CM

## 2018-03-27 LAB
BILIRUB UR QL STRIP: NEGATIVE
GLUCOSE UR-MCNC: NEGATIVE MG/DL
KETONES P FAST UR STRIP-MCNC: NEGATIVE MG/DL
PH UR STRIP: 5.5 [PH] (ref 4.6–8)
PROT UR QL STRIP: NORMAL
SP GR UR STRIP: 1.02 (ref 1–1.03)
UA UROBILINOGEN AMB POC: NORMAL (ref 0.2–1)
URINALYSIS CLARITY POC: CLEAR
URINALYSIS COLOR POC: YELLOW
URINE BLOOD POC: NEGATIVE
URINE LEUKOCYTES POC: NORMAL
URINE NITRITES POC: NEGATIVE

## 2018-03-27 NOTE — PROGRESS NOTES
1. Have you been to the ER, urgent care clinic, or been hospitalized since your last visit? No     2. Have you seen or consulted any other health care providers outside of the [address] [place] [address] since your last visit?   No     Reviewed record in preparation for visit and have necessary documentation  opportunity was given for questions  Goals that were addressed and/or need to be completed during or after this appointment include     Health Maintenance Due   Topic Date Due    EYE EXAM RETINAL OR DILATED Q1  10/19/2016    GLAUCOMA SCREENING Q2Y  10/19/2017

## 2018-03-27 NOTE — MR AVS SNAPSHOT
Kylah Cordova 
 
 
 [address] [address] 
[phone] Patient: Lc Kyle MRN: [id number] Visit Information Date & Time Provider Department Dept. Phone Encounter #  
 3/27/2018  1:00 PM Mariusz Boss  [facility] [phone] [id number] Your Appointments 5/10/2018  3:40 PM  
ESTABLISHED PATIENT with Yovana Tao MD  
[address] ([address]) Appt Note: 2 month f/up  
 [address] [address]  
[phone]  
  
   
 [address] [address]  
  
    
 8/20/2018  1:40 PM  
Follow Up with Carolina Colindres MD  
[facility]) Appt Note: follow up neuropathy  $0CP  alicia  2/19/18 [place] [address] [facility] [address] [facility] [address] [phone]  
  
   
 [place] [address] [address] [address] Upcoming Health Maintenance Date Due  
 EYE EXAM RETINAL OR DILATED Q1 10/19/2016 GLAUCOMA SCREENING Q2Y 10/19/2017 FOOT EXAM Q1 7/26/2018 MICROALBUMIN Q1 7/26/2018 MEDICARE YEARLY EXAM 7/27/2018 HEMOGLOBIN A1C Q6M 8/8/2018 LIPID PANEL Q1 2/8/2019 DTaP/Tdap/Td series (2 - Td) 12/5/2023 Allergies as of 3/27/2018  Review Complete On: 3/27/2018 By: Mariusz Boss MD  
  
 Severity Noted Reaction Type Reactions Cephalosporins High 12/30/2009    Nausea and Vomiting Severe vomiting Current Immunizations  Reviewed on 12/13/2017 Name Date Influenza High Dose Vaccine PF 9/13/2017 12:00 AM, 8/5/2016 Influenza Vaccine 12/5/2013 Influenza Vaccine Peggi Shukri) 9/6/2016 Influenza Vaccine (Quad) PF 11/10/2014 Influenza Vaccine PF 10/20/2015 Influenza Vaccine Split 10/10/2012  4:30 PM, 11/14/2011, 10/25/2010  Pneumococcal Conjugate (PCV-13) 9/13/2017 12:00 AM  
 Pneumococcal Polysaccharide (PPSV-23) 12/5/2013 Tdap 12/5/2013 ZZZ-RETIRED (DO NOT USE) Pneumococcal Vaccine (Unspecified Type) 6/5/2008 Not reviewed this visit You Were Diagnosed With   
  
 Codes Comments History of UTI    -  Primary ICD-10-CM: Z87.440 ICD-9-CM: V13.02 Abnormal urinalysis     ICD-10-CM: R82.90 ICD-9-CM: 791.9 Vitals BP Pulse Temp Resp Height(growth percentile) Weight(growth percentile) 106/64 69 97.8 °F (36.6 °C) (Oral) 18 4' 11\" (1.499 m) 204 lb (92.5 kg) BMI OB Status Smoking Status 41.2 kg/m2 Postmenopausal Former Smoker Vitals History BMI and BSA Data Body Mass Index Body Surface Area  
 41.2 kg/m 2 1.96 m 2 Preferred Pharmacy Pharmacy Name Phone [address] [facility], [facility] [address] ([address] [phone] Your Updated Medication List  
  
   
This list is accurate as of 3/27/18  1:34 PM.  Always use your most recent med list.  
  
  
  
  
 acetaminophen 650 mg Tber Commonly known as:  TYLENOL Take 1,300 mg by mouth daily as needed for Pain. aspirin 81 mg tablet Take 81 mg by mouth daily. atorvastatin 10 mg tablet Commonly known as:  LIPITOR Take 1 Tab by mouth daily. Blood-Glucose Meter monitoring kit Commonly known as:  CONTOUR METER Dx: 250. Check blood sugars daily. busPIRone 5 mg tablet Commonly known as:  BUSPAR Take 5 mg by mouth two (2) times a day. CALCIUM 500+D 500 mg(1,250mg) -200 unit per tablet Generic drug:  calcium-vitamin D Take 2 Tabs by mouth daily. cholecalciferol 1,000 unit tablet Commonly known as:  VITAMIN D3 Take 2,000 Units by mouth daily. cyanocobalamin 1,000 mcg/mL injection Commonly known as:  VITAMIN B-12  
1000 mcg SC/IM  every month  
  
 cyclobenzaprine 10 mg tablet Commonly known as:  FLEXERIL Take 10 mg by mouth nightly. denosumab 60 mg/mL injection Commonly known as:  Janny Brunner 1 mL by SubCUTAneous route every 6 months. ESTRACE 0.01 % (0.1 mg/gram) vaginal cream  
Generic drug:  estradiol Insert 0.1 g into vagina every Monday, Wednesday, Friday. ferrous sulfate 325 mg (65 mg iron) tablet Take 1 Tab by mouth two (2) times a day. Indications: Iron Deficiency Anemia * gabapentin 300 mg capsule Commonly known as:  NEURONTIN Take 1 Cap by mouth two (2) times a day. * gabapentin 600 mg tablet Commonly known as:  NEURONTIN  
TAKE 1 TABLET BY MOUTH TWICE DAILY  
  
 glipiZIDE SR 2.5 mg CR tablet Commonly known as:  GLUCOTROL XL  
TAKE 1 TABLET BY MOUTH TWICE DAILY  
  
 glucose blood VI test strips strip Commonly known as:  [employer] CONTOUR Glucometer for 250.00 Pt. test blood sugar once daily (in morning) HYDROcodone-acetaminophen 5-325 mg per tablet Commonly known as:  Clearnce Hinckley Take 1 Tab by mouth every six (6) hours as needed for Pain.  
  
 leflunomide 20 mg tablet Commonly known as:  Hasmukh [place]in Take 1 Tab by mouth daily for 90 days. Take half tab daily for 7 days and then one tab if tolerated  
  
 metFORMIN 500 mg tablet Commonly known as:  GLUCOPHAGE  
TAKE 1 TABLET BY MOUTH TWICE DAILY WITH MEALS  
  
 metoprolol succinate 25 mg XL tablet Commonly known as:  TOPROL-XL Take 1 Tab by mouth nightly. MULTIVITAMIN PO Take 1 Tab by mouth daily. nortriptyline 10 mg capsule Commonly known as:  PAMELOR  
TAKE 1 CAPSULE BY MOUTH EVERY NIGHT AT BEDTIME Syringe with Needle, Safety 3 mL 25 gauge x 5/8\" Syrg Commonly known as:  3cc Safety Syringe 25Gx5/8\" To be used with B12 injections Ken jones. Patient with spinal stenosis and stopped posture. Pain with limited ambulation. ZINC PO Take 50 mg by mouth daily. * Notice:   This list has 2 medication(s) that are the same as other medications prescribed for you. Read the directions carefully, and ask your doctor or other care provider to review them with you. We Performed the Following AMB POC URINALYSIS DIP STICK AUTO W/O MICRO [43533 CPT(R)] CULTURE, URINE [id number] CPT(R)] To-Do List   
 03/29/2018  2:00 PM  
  Appointment with Gracie Combs [address] at [place] [address] ([phone])  
  
 09/27/2018 2:00 PM  
  Appointment with Gracie Combs [address] at [place] [address] ([phone]) [facility] SERVICES! Dear Renee Matson: Thank you for requesting a [employer] account. Our records indicate that you already have an active [employer] account. You can access your account anytime at [email or web address] Did you know that you can access your hospital and ER discharge instructions at any time in [employer]? You can also review all of your test results from your hospital stay or ER visit. Additional Information If you have questions, please visit the Frequently Asked Questions section of the [employer] website at [email or web address] Remember, [employer] is NOT to be used for urgent needs. For medical emergencies, dial 911. Now available from your iPhone and Android! Please provide this summary of care documentation to your next provider. Your primary care clinician is listed as Katie Bone. If you have any questions after today's visit, please call [phone].

## 2018-03-27 NOTE — PROGRESS NOTES
Nakita Seen  65 y.o. female  1940  [address] [place] [id number]  [id number]     [id number]: Progress Note       Encounter Date: 3/27/2018    Chief Complaint   Patient presents with    Bladder Infection     follow up       History provided by patient  History of Present Illness   Nakita Seen is a 65 y.o. female who presents to clinic today for:    Bladder infection f/u  Patient present with cc of follow up from Salmonella UTI. She reports that she is doing well. Denies fever, chills. She completed therapy with cipro. Reports that she has intermittent diarrhea but symptoms are mild and she attributes to coffee intake which is usual for her. Raynaud's  Patient reports that she had been started on norvasc for raynaud's which is helping greatly with the symptoms in her hands. However her BP rafy the lower side. Denies any dizziness, syncope, SOB. Review of Systems   Review of Systems   Constitutional: Negative for chills and fever. Gastrointestinal: Negative for abdominal pain, constipation, diarrhea, nausea and vomiting. Genitourinary: Negative for dysuria, frequency and hematuria. Skin: Negative for itching and rash. Neurological: Negative for dizziness and headaches. Psychiatric/Behavioral: Negative for depression and suicidal ideas. Vitals/Objective:     Vitals:    03/27/18 1309   BP: 106/64   Pulse: 69   Resp: 18   Temp: 97.8 °F (36.6 °C)   TempSrc: Oral   Weight: 204 lb (92.5 kg)   Height: 4' 11\" (1.499 m)     Body mass index is 41.2 kg/(m^2). Wt Readings from Last 3 Encounters:   03/27/18 204 lb (92.5 kg)   03/15/18 205 lb (93 kg)   03/01/18 209 lb (94.8 kg)       Physical Exam   Constitutional: She is oriented to person, place, and time. She appears well-developed and well-nourished. HENT:   Head: Normocephalic and atraumatic. Cardiovascular: Normal rate and regular rhythm.     Pulmonary/Chest: Effort normal and breath sounds normal. Abdominal: Soft. Bowel sounds are normal. She exhibits no distension. There is no tenderness. There is no rebound and no guarding. Neurological: She is alert and oriented to person, place, and time. Recent Results (from the past 24 hour(s))   AMB POC URINALYSIS DIP STICK AUTO W/O MICRO    Collection Time: 03/27/18  1:20 PM   Result Value Ref Range    Color (UA POC) Yellow     Clarity (UA POC) Clear     Glucose (UA POC) Negative Negative    Bilirubin (UA POC) Negative Negative    Ketones (UA POC) Negative Negative    Specific gravity (UA POC) 1.025 1.001 - 1.035    Blood (UA POC) Negative Negative    pH (UA POC) 5.5 4.6 - 8.0    Protein (UA POC) Trace Negative    Urobilinogen (UA POC) 0.2 mg/dL 0.2 - 1    Nitrites (UA POC) Negative Negative    Leukocyte esterase (UA POC) 2+ Negative     Assessment and Plan:     Encounter Diagnoses     ICD-10-CM ICD-9-CM   1. History of UTI Z87.440 V13.02   2. Abnormal urinalysis R82.90 791.9       1. History of UTI  2. Abnormal urinalysis  Will recheck UCx though patient is asymptomatic at present. - AMB POC URINALYSIS DIP STICK AUTO W/O MICRO  - CULTURE, URINE  I have discussed the diagnosis with the patient and the intended plan as seen in the above orders. she has expressed understanding. The patient has received an after-visit summary and questions were answered concerning future plans. I have discussed medication side effects and warnings with the patient as well. Electronically Signed: Criss Mustafa MD     History/Allergies   Patients past medical, surgical and family histories were reviewed and updated.     Past Medical History:   Diagnosis Date    Anemia     Arthritis     CAD (coronary artery disease) 1997    MI    Degenerative joint disease of right hip 09/14/2015     Virgil Hernandez/Dr. Daniel Lea    Diabetes ([facility] [address]     Fracture     right shoulder; T6 compression    Gastritis     Hypercholesterolemia     Hyperlipidemia    Lupus 1/9/2015    Osteoporosis, post-menopausal     vertebral fracture      Past Surgical History:   Procedure Laterality Date    CARDIAC SURG PROCEDURE UNLIST      HX CHOLECYSTECTOMY      HX CORONARY STENT PLACEMENT  02/19/1997    LAD    HX GASTRIC BYPASS  2000    HX HERNIA REPAIR  2005    HX KYPHOPLASTY      t5    HX KYPHOPLASTY      L4    HX ORTHOPAEDIC      bilateral knees    HX ORTHOPAEDIC      r shoulder    HX ORTHOPAEDIC      Laminectomy    GA INC IMPLTJ NEUROSTIMULATOR ELTRD SACRAL NERVE       Family History   Problem Relation Age of Onset    Diabetes Mother     Hypertension Mother     Heart Disease Mother     Diabetes Father     Hypertension Father     Heart Disease Father    [employer] Arthritis-rheumatoid Sister     Elevated Lipids Sister    [employer] Arthritis-rheumatoid Sister     Hypertension Sister     Diabetes Sister     Thyroid Disease Sister     Arthritis-rheumatoid Other      Social History     Social History    Marital status:      Spouse name: N/A    Number of children: N/A    Years of education: N/A     Occupational History    Not on file. Social History Main Topics    Smoking status: Former Smoker     Quit date: 12/30/1989    Smokeless tobacco: Never Used    Alcohol use No    Drug use: No    Sexual activity: Not Currently     Other Topics Concern    Not on file     Social History Narrative         Allergies   Allergen Reactions    Cephalosporins Nausea and Vomiting     Severe vomiting       Disposition     Follow-up Disposition:  Return in about 3 months (around 6/27/2018) for routine.  .    Future Appointments  Date Time Provider Yocasta Cisneros   3/29/2018 2:00 PM [place] FT CHAIR 1 [address]   5/10/2018 3:40 PM Zac Ureña MD [address]   6/27/2018 1:00 PM Tracy Borjas MD [facility] SCHED   8/20/2018 1:40 PM [place] [address] [place] [address]   9/27/2018 2:00 PM Hendrum FT CHAIR 1 [address]            Current Medications after this visit     Current Outpatient Prescriptions   Medication Sig    gabapentin (NEURONTIN) 600 mg tablet TAKE 1 TABLET BY MOUTH TWICE DAILY    leflunomide (ARAVA) 20 mg tablet Take 1 Tab by mouth daily for 90 days. Take half tab daily for 7 days and then one tab if tolerated    glipiZIDE SR (GLUCOTROL XL) 2.5 mg CR tablet TAKE 1 TABLET BY MOUTH TWICE DAILY    atorvastatin (LIPITOR) 10 mg tablet Take 1 Tab by mouth daily.  cyclobenzaprine (FLEXERIL) 10 mg tablet Take 10 mg by mouth nightly.  cyanocobalamin (VITAMIN B-12) 1,000 mcg/mL injection 1000 mcg SC/IM  every month    ESTRACE 0.01 % (0.1 mg/gram) vaginal cream Insert 0.1 g into vagina every Monday, Wednesday, Friday.  ferrous sulfate 325 mg (65 mg iron) tablet Take 1 Tab by mouth two (2) times a day. Indications: Iron Deficiency Anemia    gabapentin (NEURONTIN) 300 mg capsule Take 1 Cap by mouth two (2) times a day.  metoprolol succinate (TOPROL-XL) 25 mg XL tablet Take 1 Tab by mouth nightly.  denosumab (PROLIA) 60 mg/mL injection 1 mL by SubCUTAneous route every 6 months.  metFORMIN (GLUCOPHAGE) 500 mg tablet TAKE 1 TABLET BY MOUTH TWICE DAILY WITH MEALS    HYDROcodone-acetaminophen (NORCO) 5-325 mg per tablet Take 1 Tab by mouth every six (6) hours as needed for Pain.  glucose blood VI test strips (ASCENSIA CONTOUR) strip Glucometer for 250.00 Pt. test blood sugar once daily (in morning)    Syringe with Needle, Safety ([facility] SAFETY SYRINGE 25GX5/8\") 3 mL 25 gauge x 5/8\" syrg To be used with B12 injections    busPIRone (BUSPAR) 5 mg tablet Take 5 mg by mouth two (2) times a day.  nortriptyline (PAMELOR) 10 mg capsule TAKE 1 CAPSULE BY MOUTH EVERY NIGHT AT BEDTIME    acetaminophen (TYLENOL) 650 mg CR tablet Take 1,300 mg by mouth daily as needed for Pain.  Blood-Glucose Meter (CONTOUR METER) monitoring kit Dx: 250. Check blood sugars daily.  Walker [employer] walker. Patient with spinal stenosis and stopped posture.  Pain with limited ambulation.  ZINC PO Take 50 mg by mouth daily.  cholecalciferol, vitamin d3, (VITAMIN D3) 1,000 unit tablet Take 2,000 Units by mouth daily.  calcium-vitamin D (CALCIUM 500+D) 500 mg(1,250mg) -200 unit per tablet Take 2 Tabs by mouth daily.  aspirin 81 mg Tab Take 81 mg by mouth daily.  MULTIVITAMINS (MULTIVITAMIN PO) Take 1 Tab by mouth daily. No current facility-administered medications for this visit.       Facility-Administered Medications Ordered in Other Visits   Medication Dose Route Frequency    [START ON 3/29/2018] denosumab (PROLIA) injection 60 mg  60 mg SubCUTAneous ONCE     Medications Discontinued During This Encounter   Medication Reason    folic acid (FOLVITE) 1 mg tablet Discontinued by Another Clinician

## 2018-03-29 ENCOUNTER — HOSPITAL ENCOUNTER (OUTPATIENT)
Dept: INFUSION THERAPY | Age: 78
Discharge: HOME | End: 2018-03-29
Payer: MEDICARE

## 2018-03-29 VITALS
RESPIRATION RATE: 16 BRPM | SYSTOLIC BLOOD PRESSURE: 125 MMHG | HEART RATE: 87 BPM | DIASTOLIC BLOOD PRESSURE: 85 MMHG | TEMPERATURE: 97.5 F

## 2018-03-29 LAB
ALBUMIN SERPL-MCNC: 3.6 G/DL (ref 3.5–5)
ANION GAP BLD CALC-SCNC: 14 MMOL/L (ref 10–20)
ANION GAP SERPL CALC-SCNC: 11 MMOL/L (ref 5–15)
BUN BLD-MCNC: 24 MG/DL (ref 9–20)
BUN SERPL-MCNC: 24 MG/DL (ref 6–20)
BUN/CREAT SERPL: 28 (ref 12–20)
CA-I BLD-MCNC: 1.03 MMOL/L (ref 1.12–1.32)
CALCIUM SERPL-MCNC: 8.6 MG/DL (ref 8.5–10.1)
CHLORIDE BLD-SCNC: 103 MMOL/L (ref 98–107)
CHLORIDE SERPL-SCNC: 104 MMOL/L (ref 97–108)
CO2 BLD-SCNC: 28 MMOL/L (ref 21–32)
CO2 SERPL-SCNC: 27 MMOL/L (ref 21–32)
CREAT BLD-MCNC: 0.8 MG/DL (ref 0.6–1.3)
CREAT SERPL-MCNC: 0.86 MG/DL (ref 0.55–1.02)
GLUCOSE BLD-MCNC: 165 MG/DL (ref 65–100)
GLUCOSE SERPL-MCNC: 160 MG/DL (ref 65–100)
HCT VFR BLD CALC: 36 % (ref 35–47)
MAGNESIUM SERPL-MCNC: 2 MG/DL (ref 1.6–2.4)
PHOSPHATE SERPL-MCNC: 3 MG/DL (ref 2.6–4.7)
POTASSIUM BLD-SCNC: 4.1 MMOL/L (ref 3.5–5.1)
POTASSIUM SERPL-SCNC: 4.2 MMOL/L (ref 3.5–5.1)
SERVICE CMNT-IMP: ABNORMAL
SODIUM BLD-SCNC: 140 MMOL/L (ref 136–145)
SODIUM SERPL-SCNC: 142 MMOL/L (ref 136–145)

## 2018-03-29 PROCEDURE — 80069 RENAL FUNCTION PANEL: CPT | Performed by: INTERNAL MEDICINE

## 2018-03-29 PROCEDURE — 36415 COLL VENOUS BLD VENIPUNCTURE: CPT | Performed by: INTERNAL MEDICINE

## 2018-03-29 PROCEDURE — 80047 BASIC METABLC PNL IONIZED CA: CPT

## 2018-03-29 PROCEDURE — 83735 ASSAY OF MAGNESIUM: CPT | Performed by: INTERNAL MEDICINE

## 2018-03-29 PROCEDURE — 96372 THER/PROPH/DIAG INJ SC/IM: CPT

## 2018-03-29 PROCEDURE — 74011250636 HC RX REV CODE- 250/636: Performed by: INTERNAL MEDICINE

## 2018-03-29 RX ADMIN — DENOSUMAB 60 MG: 60 INJECTION SUBCUTANEOUS at 16:34

## 2018-03-29 NOTE — PROGRESS NOTES
[facility] VISIT NOTE    1400  Pt arrived at [facility] ambulatory and in no distress for Prolia. Assessment completed, pt denies any complaints today. She said she received Prolia a few times at Dr. Cosmo Christine office. Blood pressure 125/85, pulse 87, temperature 97.5 °F (36.4 °C), resp. rate 16. Labs drawn peripherally. Ionized calcium is low (1.03). Wesley Brody PharmD contacted Dr. Carter Bailey who would like to check the iSTAT results with a renal function panel. Calcium on the renal panel is normal (8.6)  OK to give Prolia today. Recent Results (from the past 12 hour(s))   RENAL FUNCTION PANEL    Collection Time: 03/29/18  2:10 PM   Result Value Ref Range    Sodium 142 136 - 145 mmol/L    Potassium 4.2 3.5 - 5.1 mmol/L    Chloride 104 97 - 108 mmol/L    CO2 27 21 - 32 mmol/L    Anion gap 11 5 - 15 mmol/L    Glucose 160 (H) 65 - 100 mg/dL    BUN 24 (H) 6 - 20 MG/DL    Creatinine 0.86 0.55 - 1.02 MG/DL    BUN/Creatinine ratio 28 (H) 12 - 20      GFR est AA >60 >60 ml/min/1.73m2    GFR est non-AA >60 >60 ml/min/1.73m2    Calcium 8.6 8.5 - 10.1 MG/DL    Phosphorus 3.0 2.6 - 4.7 MG/DL    Albumin 3.6 3.5 - 5.0 g/dL   MAGNESIUM    Collection Time: 03/29/18  2:10 PM   Result Value Ref Range    Magnesium 2.0 1.6 - 2.4 mg/dL   POC CHEM8    Collection Time: 03/29/18  2:14 PM   Result Value Ref Range    Calcium, ionized (POC) 1.03 (L) 1.12 - 1.32 mmol/L    Sodium (POC) 140 136 - 145 mmol/L    Potassium (POC) 4.1 3.5 - 5.1 mmol/L    Chloride (POC) 103 98 - 107 mmol/L    CO2 (POC) 28 21 - 32 mmol/L    Anion gap (POC) 14 10 - 20 mmol/L    Glucose (POC) 165 (H) 65 - 100 mg/dL    BUN (POC) 24 (H) 9 - 20 mg/dL    Creatinine (POC) 0.8 0.6 - 1.3 mg/dL    GFRAA, POC >60 >60 ml/min/1.73m2    GFRNA, POC >60 >60 ml/min/1.73m2    Hematocrit (POC) 36 35.0 - 47.0 %    Comment Notified RN or MD immediately by        Medications received:  Prolia SQ in right upper arm    Tolerated treatment well, no adverse reaction noted. 1640  D/C'd from [facility] ambulatory and in no distress accompanied by her daughter. Next appointment is 9/27/18 at 1400.

## 2018-03-29 NOTE — DISCHARGE INSTRUCTIONS
Denosumab (By injection)   Denosumab ([id number]-[id number])  Treats osteoporosis, bone cancer, hypercalcemia, and other bone problems in patients who have cancer. Brand Name(s): Prolia, Xgeva   There may be other brand names for this medicine. When This Medicine Should Not Be Used: This medicine is not right for everyone. You should not receive it if you had an allergic reaction to denosumab or if you are pregnant. How to Use This Medicine:   Injectable  · A doctor or other health professional will give you this medicine. This medicine is usually given as a shot under the skin of your upper arm, upper thigh, or stomach. · This medicine should come with a Medication Guide. Ask your pharmacist for a copy if you do not have one. · Missed dose: Call your doctor or pharmacist for instructions. Drugs and Foods to Avoid:   Ask your doctor or pharmacist before using any other medicine, including over-the-counter medicines, vitamins, and herbal products. · Do not use Prolia® and Xgeva® together. They contain the same medicine. · Some medicines can affect how denosumab works. Tell your doctor if you are also using medicine that weakens your immune system, including a steroid or cancer medicine. Warnings While Using This Medicine:   · This medicine may cause birth defects if either partner is using it during conception or pregnancy. Tell your doctor right away if you or your partner becomes pregnant. Use an effective form of birth control. Women who are being treated with Renato Litter should continue using birth control for at least 5 months after the last dose. · Tell your doctor if you are breastfeeding, or if you have kidney disease, diabetes, gum disease, or an allergy to latex. Tell your doctor if you have problems with your thyroid, parathyroid, or digestive system.   · This medicine may cause the following problems:  ¨ Low calcium levels in your blood  ¨ Increased risk of broken thigh bone  ¨ Increased risk of infections  ¨ Serious skin reactions  ¨ Severe bone, joint, or muscle pain  · This medicine can cause jaw problems. You must have regular dental exams while you are being treated with this medicine. Tell your dentist that you are using this medicine. Practice good oral hygiene. · Do not suddenly stop using Prolia® without checking first with your doctor. Doing so may increase risk for more fractures. Talk to your doctor about other medicine that you can take. · Your doctor will do lab tests at regular visits to check on the effects of this medicine. Keep all appointments. Possible Side Effects While Using This Medicine:   Call your doctor right away if you notice any of these side effects:  · Allergic reaction: Itching or hives, swelling in your face or hands, swelling or tingling in your mouth or throat, chest tightness, trouble breathing  · Blistering, peeling, red skin rash  · Chest pain, fast or uneven heartbeat, trouble breathing  · Fever, chills, cough, sore throat, body aches  · Lightheadedness, dizziness, fainting  · Muscle spasms or twitching, numbness or tingling in your fingers, toes, or lips  · Pain or burning during urination, change in how much or how often you urinate  · Pain, swelling, heavy feeling, or numbness in your mouth or jaw, loose teeth or other teeth problems  · Severe bone, joint, or muscle pain  · Unusual pain in your thigh, groin, or hip  If you notice these less serious side effects, talk with your doctor:   · Diarrhea, nausea  · Redness, pain, itching, burning, swelling, or a lump under your skin where the shot was given  · Tiredness or weakness  If you notice other side effects that you think are caused by this medicine, tell your doctor. Call your doctor for medical advice about side effects.  You may report side effects to FDA at 4-750-FDA-6937  © 2017 [address] Information is for End User's use only and may not be sold, redistributed or otherwise used for commercial purposes. The above information is an  only. It is not intended as medical advice for individual conditions or treatments. Talk to your doctor, nurse or pharmacist before following any medical regimen to see if it is safe and effective for you.

## 2018-03-30 ENCOUNTER — APPOINTMENT (OUTPATIENT)
Dept: INFUSION THERAPY | Age: 78
End: 2018-03-30

## 2018-03-30 ENCOUNTER — TELEPHONE (OUTPATIENT)
Dept: FAMILY MEDICINE CLINIC | Age: 78
End: 2018-03-30

## 2018-03-30 DIAGNOSIS — A49.1 GROUP B STREPTOCOCCAL INFECTION: Primary | ICD-10-CM

## 2018-03-30 LAB — BACTERIA UR CULT: ABNORMAL

## 2018-03-30 RX ORDER — AMOXICILLIN 500 MG/1
500 CAPSULE ORAL 3 TIMES DAILY
Qty: 9 CAP | Refills: 0 | Status: SHIPPED | OUTPATIENT
Start: 2018-03-30 | End: 2018-04-02

## 2018-04-04 ENCOUNTER — OP HISTORICAL/CONVERTED ENCOUNTER (OUTPATIENT)
Dept: OTHER | Age: 78
End: 2018-04-04

## 2018-04-06 DIAGNOSIS — N18.2 CKD (CHRONIC KIDNEY DISEASE) STAGE 2, GFR 60-89 ML/MIN: ICD-10-CM

## 2018-04-06 DIAGNOSIS — I73.00 PRIMARY RAYNAUD'S PHENOMENON: ICD-10-CM

## 2018-04-06 DIAGNOSIS — M81.0 AGE-RELATED OSTEOPOROSIS WITHOUT CURRENT PATHOLOGICAL FRACTURE: ICD-10-CM

## 2018-04-06 DIAGNOSIS — E55.9 VITAMIN D DEFICIENCY: ICD-10-CM

## 2018-04-06 DIAGNOSIS — M06.9 RHEUMATOID ARTHRITIS WITH UNKNOWN RHEUMATOID FACTOR STATUS (HCC): ICD-10-CM

## 2018-04-06 RX ORDER — AMLODIPINE BESYLATE 10 MG/1
TABLET ORAL
Qty: 15 TAB | Refills: 0 | Status: SHIPPED | OUTPATIENT
Start: 2018-04-06 | End: 2018-05-08 | Stop reason: SDUPTHER

## 2018-04-16 ENCOUNTER — OFFICE VISIT (OUTPATIENT)
Dept: FAMILY MEDICINE CLINIC | Age: 78
End: 2018-04-16

## 2018-04-16 VITALS
TEMPERATURE: 98.3 F | BODY MASS INDEX: 41.12 KG/M2 | HEIGHT: 59 IN | SYSTOLIC BLOOD PRESSURE: 107 MMHG | WEIGHT: 204 LBS | HEART RATE: 86 BPM | DIASTOLIC BLOOD PRESSURE: 54 MMHG | RESPIRATION RATE: 20 BRPM | OXYGEN SATURATION: 97 %

## 2018-04-16 DIAGNOSIS — N30.01 ACUTE CYSTITIS WITH HEMATURIA: Primary | ICD-10-CM

## 2018-04-16 DIAGNOSIS — D51.0 PERNICIOUS ANEMIA: ICD-10-CM

## 2018-04-16 DIAGNOSIS — Z87.440 HISTORY OF RECURRENT UTI (URINARY TRACT INFECTION): ICD-10-CM

## 2018-04-16 LAB
BILIRUB UR QL STRIP: NEGATIVE
GLUCOSE UR-MCNC: NEGATIVE MG/DL
KETONES P FAST UR STRIP-MCNC: NEGATIVE MG/DL
PH UR STRIP: 5.5 [PH] (ref 4.6–8)
PROT UR QL STRIP: NORMAL
SP GR UR STRIP: 1.02 (ref 1–1.03)
UA UROBILINOGEN AMB POC: NORMAL (ref 0.2–1)
URINALYSIS CLARITY POC: CLEAR
URINALYSIS COLOR POC: YELLOW
URINE BLOOD POC: NORMAL
URINE LEUKOCYTES POC: NORMAL
URINE NITRITES POC: NEGATIVE

## 2018-04-16 RX ORDER — SULFAMETHOXAZOLE AND TRIMETHOPRIM 800; 160 MG/1; MG/1
1 TABLET ORAL 2 TIMES DAILY
Qty: 10 TAB | Refills: 0 | Status: SHIPPED | OUTPATIENT
Start: 2018-04-16 | End: 2018-04-21

## 2018-04-16 RX ORDER — PANTOPRAZOLE SODIUM 40 MG/1
TABLET, DELAYED RELEASE ORAL
Refills: 11 | COMMUNITY
Start: 2018-04-10 | End: 2018-11-20 | Stop reason: SDUPTHER

## 2018-04-16 RX ORDER — TRIMETHOPRIM 100 MG/1
100 TABLET ORAL 2 TIMES DAILY
Qty: 30 TAB | Refills: 5 | Status: SHIPPED | OUTPATIENT
Start: 2018-04-21 | End: 2018-04-16 | Stop reason: SDUPTHER

## 2018-04-16 RX ORDER — LANOLIN ALCOHOL/MO/W.PET/CERES
325 CREAM (GRAM) TOPICAL 2 TIMES DAILY
Qty: 30 TAB | Refills: 5 | Status: SHIPPED | OUTPATIENT
Start: 2018-04-16 | End: 2018-04-30 | Stop reason: SDUPTHER

## 2018-04-16 RX ORDER — TRIMETHOPRIM 100 MG/1
100 TABLET ORAL DAILY
Qty: 30 TAB | Refills: 5 | Status: SHIPPED | OUTPATIENT
Start: 2018-04-21 | End: 2018-05-29 | Stop reason: SDUPTHER

## 2018-04-16 NOTE — PATIENT INSTRUCTIONS
Pt BIBA from street, c/o syncopal episode and weakness. FSG with , FSG in triage 110. PT awake, A&Ox3, denies physical injury. Urinary Tract Infection in Women: Care Instructions  Your Care Instructions    A urinary tract infection, or UTI, is a general term for an infection anywhere between the kidneys and the urethra (where urine comes out). Most UTIs are bladder infections. They often cause pain or burning when you urinate. UTIs are caused by bacteria and can be cured with antibiotics. Be sure to complete your treatment so that the infection goes away. Follow-up care is a key part of your treatment and safety. Be sure to make and go to all appointments, and call your doctor if you are having problems. It's also a good idea to know your test results and keep a list of the medicines you take. How can you care for yourself at home? · Take your antibiotics as directed. Do not stop taking them just because you feel better. You need to take the full course of antibiotics. · Drink extra water and other fluids for the next day or two. This may help wash out the bacteria that are causing the infection. (If you have kidney, heart, or liver disease and have to limit fluids, talk with your doctor before you increase your fluid intake.)  · Avoid drinks that are carbonated or have caffeine. They can irritate the bladder. · Urinate often. Try to empty your bladder each time. · To relieve pain, take a hot bath or lay a heating pad set on low over your lower belly or genital area. Never go to sleep with a heating pad in place. To prevent UTIs  · Drink plenty of water each day. This helps you urinate often, which clears bacteria from your system. (If you have kidney, heart, or liver disease and have to limit fluids, talk with your doctor before you increase your fluid intake.)  · Urinate when you need to. · Urinate right after you have sex. · Change sanitary pads often. · Avoid douches, bubble baths, feminine hygiene sprays, and other feminine hygiene products that have deodorants.   · After going to the bathroom, wipe from front to back.  When should you call for help? Call your doctor now or seek immediate medical care if:  ? · Symptoms such as fever, chills, nausea, or vomiting get worse or appear for the first time. ? · You have new pain in your back just below your rib cage. This is called flank pain. ? · There is new blood or pus in your urine. ? · You have any problems with your antibiotic medicine. ? Watch closely for changes in your health, and be sure to contact your doctor if:  ? · You are not getting better after taking an antibiotic for 2 days. ? · Your symptoms go away but then come back. Where can you learn more? Go to http://jennifer-vincent.info/. Enter J144 in the search box to learn more about \"Urinary Tract Infection in Women: Care Instructions. \"  Current as of: May 12, 2017  Content Version: 11.4  © 1878-8886 [employer]. Care instructions adapted under license by [employer] (which disclaims liability or warranty for this information). If you have questions about a medical condition or this instruction, always ask your healthcare professional. [address] any warranty or liability for your use of this information.

## 2018-04-16 NOTE — PROGRESS NOTES
1. Have you been to the ER, urgent care clinic since your last visit? Hospitalized since your last visit? No    2. Have you seen or consulted any other health care providers outside of the [address] [place] [address] since your last visit? Include any pap smears or colon screening. No  Reviewed record in preparation for visit and have necessary documentation  Pt did not bring medication to office visit for review    Goals that were addressed and/or need to be completed during or after this appointment include   There are no preventive care reminders to display for this patient.

## 2018-04-16 NOTE — MR AVS SNAPSHOT
[facility] 
 
 
 [address] [place] [id number] 
[phone] Patient: Diana Miguel MRN: [id number] Visit Information Date & Time Provider Department Dept. Phone Encounter #  
 4/16/2018  9:30 AM Sunni Gandara  [facility] [phone] [id number] Follow-up Instructions Return for keep current appt. .  
  
Your Appointments 5/10/2018  3:40 PM  
ESTABLISHED PATIENT with Yamileth Alanis MD  
[address] ([address]) Appt Note: 2 month f/up  
 [address] [facility] [address]  
[phone]  
  
   
 [address] [facility] [address]  
  
    
 6/27/2018  1:00 PM  
ROUTINE CARE with Sunni Gandara MD  
[facility] ([address]) Appt Note: 3 mnth fu /est pt /refills/labs/HX UTi  
 [address] [place] [id number]  
[phone]  
  
   
 [address] [facility] [address] [address]  
  
    
 8/20/2018  1:40 PM  
Follow Up with Davy Sandoval MD  
[facility]) Appt Note: follow up neuropathy  $0CP  alicia  2/19/18 [place] [address] [facility] [address] [place] [id number] [phone]  
  
   
 [place] [address] [facility] [address] [id number] [address] Upcoming Health Maintenance Date Due  
 FOOT EXAM Q1 7/26/2018 MICROALBUMIN Q1 7/26/2018 MEDICARE YEARLY EXAM 7/27/2018 HEMOGLOBIN A1C Q6M 8/8/2018 EYE EXAM RETINAL OR DILATED Q1 10/26/2018 LIPID PANEL Q1 2/8/2019 GLAUCOMA SCREENING Q2Y 10/26/2019 DTaP/Tdap/Td series (2 - Td) 12/5/2023 Allergies as of 4/16/2018  Review Complete On: 4/16/2018 By: Angy Bowman LPN Severity Noted Reaction Type Reactions Cephalosporins High 12/30/2009    Nausea and Vomiting Severe vomiting Current Immunizations  Reviewed on 3/29/2018 Name Date Influenza High Dose Vaccine PF 9/13/2017 12:00 AM, 8/5/2016 Influenza Vaccine 12/5/2013 Influenza Vaccine Gerderrek Fox) 9/6/2016 Influenza Vaccine (Quad) PF 11/10/2014 Influenza Vaccine PF 10/20/2015 Influenza Vaccine Split 10/10/2012  4:30 PM, 11/14/2011, 10/25/2010 Pneumococcal Conjugate (PCV-13) 9/13/2017 12:00 AM  
 Pneumococcal Polysaccharide (PPSV-23) 12/5/2013 Tdap 12/5/2013 ZZZ-RETIRED (DO NOT USE) Pneumococcal Vaccine (Unspecified Type) 6/5/2008 Not reviewed this visit You Were Diagnosed With   
  
 Codes Comments History of recurrent UTI (urinary tract infection)    -  Primary ICD-10-CM: T15.520 ICD-9-CM: V13.02 Acute cystitis with hematuria     ICD-10-CM: N30.01 
ICD-9-CM: 595.0 Vitals BP Pulse Temp Resp Height(growth percentile) Weight(growth percentile) 107/54 (BP 1 Location: Left arm, BP Patient Position: Sitting) 86 98.3 °F (36.8 °C) (Oral) 20 4' 11\" (1.499 m) 204 lb (92.5 kg) SpO2 BMI OB Status Smoking Status 97% 41.2 kg/m2 Postmenopausal Former Smoker Vitals History BMI and BSA Data Body Mass Index Body Surface Area  
 41.2 kg/m 2 1.96 m 2 Preferred Pharmacy Pharmacy Name Phone [facility], [address] ([phone] Your Updated Medication List  
  
   
This list is accurate as of 4/16/18  9:48 AM.  Always use your most recent med list.  
  
  
  
  
 acetaminophen 650 mg Tber Commonly known as:  TYLENOL Take 1,300 mg by mouth daily as needed for Pain. amLODIPine 10 mg tablet Commonly known as:  Katie Cordial TAKE 1/2 TABLET BY MOUTH EVERY DAY FOR 30 DAYS  
  
 aspirin 81 mg tablet Take 81 mg by mouth daily. atorvastatin 10 mg tablet Commonly known as:  LIPITOR Take 1 Tab by mouth daily. Blood-Glucose Meter monitoring kit Commonly known as:  CONTOUR METER Dx: 250. Check blood sugars daily. busPIRone 5 mg tablet Commonly known as:  BUSPAR Take 5 mg by mouth two (2) times a day. CALCIUM 500+D 500 mg(1,250mg) -200 unit per tablet Generic drug:  calcium-vitamin D Take 2 Tabs by mouth daily. cholecalciferol 1,000 unit tablet Commonly known as:  VITAMIN D3 Take 2,000 Units by mouth daily. cyanocobalamin 1,000 mcg/mL injection Commonly known as:  VITAMIN B-12  
1000 mcg SC/IM  every month  
  
 cyclobenzaprine 10 mg tablet Commonly known as:  FLEXERIL Take 10 mg by mouth nightly. denosumab 60 mg/mL injection Commonly known as:  Raynaldo Bilberry 1 mL by SubCUTAneous route every 6 months. ESTRACE 0.01 % (0.1 mg/gram) vaginal cream  
Generic drug:  estradiol Insert 0.1 g into vagina every Monday, Wednesday, Friday. ferrous sulfate 325 mg (65 mg iron) tablet Take 1 Tab by mouth two (2) times a day. Indications: Iron Deficiency Anemia * gabapentin 300 mg capsule Commonly known as:  NEURONTIN Take 1 Cap by mouth two (2) times a day. * gabapentin 600 mg tablet Commonly known as:  NEURONTIN  
TAKE 1 TABLET BY MOUTH TWICE DAILY  
  
 glipiZIDE SR 2.5 mg CR tablet Commonly known as:  GLUCOTROL XL  
TAKE 1 TABLET BY MOUTH TWICE DAILY  
  
 glucose blood VI test strips strip Commonly known as:  Ascensia CONTOUR Glucometer for 250.00 Pt. test blood sugar once daily (in morning) HYDROcodone-acetaminophen 5-325 mg per tablet Commonly known as:  Fredrica Judd Take 1 Tab by mouth every six (6) hours as needed for Pain.  
  
 leflunomide 20 mg tablet Commonly known as:  Eliecer Doctor Take 1 Tab by mouth daily for 90 days. Take half tab daily for 7 days and then one tab if tolerated  
  
 metFORMIN 500 mg tablet Commonly known as:  GLUCOPHAGE  
TAKE 1 TABLET BY MOUTH TWICE DAILY WITH MEALS  
  
 metoprolol succinate 25 mg XL tablet Commonly known as:  TOPROL-XL  
 Take 1 Tab by mouth nightly. MULTIVITAMIN PO Take 1 Tab by mouth daily. nortriptyline 10 mg capsule Commonly known as:  PAMELOR  
TAKE 1 CAPSULE BY MOUTH EVERY NIGHT AT BEDTIME  
  
 pantoprazole 40 mg tablet Commonly known as:  PROTONIX TK 1 T PO D Syringe with Needle, Safety 3 mL 25 gauge x 5/8\" Syrg Commonly known as:  3cc Safety Syringe 25Gx5/8\" To be used with B12 injections  
  
 trimethoprim 100 mg tablet Commonly known as:  TRIMPEX Take 1 Tab by mouth two (2) times a day. Indications: PREVENTION OF BACTERIAL URINARY TRACT INFECTION Start taking on:  4/21/2018  
  
 trimethoprim-sulfamethoxazole 160-800 mg per tablet Commonly known as:  BACTRIM DS, SEPTRA DS Take 1 Tab by mouth two (2) times a day for 5 days. Frutoso Schwab Rolling walker. Patient with spinal stenosis and stopped posture. Pain with limited ambulation. ZINC PO Take 50 mg by mouth daily. * Notice: This list has 2 medication(s) that are the same as other medications prescribed for you. Read the directions carefully, and ask your doctor or other care provider to review them with you. Prescriptions Sent to Pharmacy Refills  
 trimethoprim-sulfamethoxazole (BACTRIM DS, SEPTRA DS) 160-800 mg per tablet 0 Sig: Take 1 Tab by mouth two (2) times a day for 5 days. Class: Normal  
 Pharmacy: [address] Ph #: [phone] Route: Oral  
 trimethoprim (TRIMPEX) 100 mg tablet 5 Starting on: 4/21/2018 Sig: Take 1 Tab by mouth two (2) times a day. Indications: PREVENTION OF BACTERIAL URINARY TRACT INFECTION Class: Normal  
 Pharmacy: [address] Ph #: [phone] Route: Oral  
  
We Performed the Following AMB POC URINALYSIS DIP STICK AUTO W/O MICRO [46702 CPT(R)] CULTURE, URINE [id number] CPT(R)] Follow-up Instructions Return for keep current appt. Maritza Mercado To-Do List   
 09/27/2018 2:00 PM  
  Appointment with Yojana Gilmore 1 at [place] [address] ([phone]) Patient Instructions Urinary Tract Infection in Women: Care Instructions Your Care Instructions A urinary tract infection, or UTI, is a general term for an infection anywhere between the kidneys and the urethra (where urine comes out). Most UTIs are bladder infections. They often cause pain or burning when you urinate. UTIs are caused by bacteria and can be cured with antibiotics. Be sure to complete your treatment so that the infection goes away. Follow-up care is a key part of your treatment and safety. Be sure to make and go to all appointments, and call your doctor if you are having problems. It's also a good idea to know your test results and keep a list of the medicines you take. How can you care for yourself at home? · Take your antibiotics as directed. Do not stop taking them just because you feel better. You need to take the full course of antibiotics. · Drink extra water and other fluids for the next day or two. This may help wash out the bacteria that are causing the infection. (If you have kidney, heart, or liver disease and have to limit fluids, talk with your doctor before you increase your fluid intake.) · Avoid drinks that are carbonated or have caffeine. They can irritate the bladder. · Urinate often. Try to empty your bladder each time. · To relieve pain, take a hot bath or lay a heating pad set on low over your lower belly or genital area. Never go to sleep with a heating pad in place. To prevent UTIs · Drink plenty of water each day. This helps you urinate often, which clears bacteria from your system. (If you have kidney, heart, or liver disease and have to limit fluids, talk with your doctor before you increase your fluid intake.) · Urinate when you need to. · Urinate right after you have sex. · Change sanitary pads often. · Avoid douches, bubble baths, feminine hygiene sprays, and other feminine hygiene products that have deodorants. · After going to the bathroom, wipe from front to back. When should you call for help? Call your doctor now or seek immediate medical care if: 
? · Symptoms such as fever, chills, nausea, or vomiting get worse or appear for the first time. ? · You have new pain in your back just below your rib cage. This is called flank pain. ? · There is new blood or pus in your urine. ? · You have any problems with your antibiotic medicine. ? Watch closely for changes in your health, and be sure to contact your doctor if: 
? · You are not getting better after taking an antibiotic for 2 days. ? · Your symptoms go away but then come back. Where can you learn more? Go to http://jennifer-vincent.info/. Enter Q858 in the search box to learn more about \"Urinary Tract Infection in Women: Care Instructions. \" Current as of: May 12, 2017 Content Version: 11.4 © 7254-0534 [employer]. Care instructions adapted under license by [employer] (which disclaims liability or warranty for this information). If you have questions about a medical condition or this instruction, always ask your healthcare professional. [address] any warranty or liability for your use of this information. Introducing [facility]! Dear Dhaval Garcia: Thank you for requesting a OpenTable account. Our records indicate that you already have an active OpenTable account. You can access your account anytime at [email or web address] Did you know that you can access your hospital and ER discharge instructions at any time in OpenTable? You can also review all of your test results from your hospital stay or ER visit. Additional Information If you have questions, please visit the Frequently Asked Questions section of the [employer] website at [email or web address] Remember, [employer] is NOT to be used for urgent needs. For medical emergencies, dial 911. Now available from your iPhone and Android! Please provide this summary of care documentation to your next provider. Your primary care clinician is listed as Anjum Devi. If you have any questions after today's visit, please call [phone].

## 2018-04-17 LAB — STATE LABORATORY REPORT: NORMAL

## 2018-04-18 LAB — BACTERIA UR CULT: ABNORMAL

## 2018-04-21 DIAGNOSIS — I10 ESSENTIAL HYPERTENSION WITH GOAL BLOOD PRESSURE LESS THAN 130/80: ICD-10-CM

## 2018-04-23 RX ORDER — METFORMIN HYDROCHLORIDE 500 MG/1
TABLET ORAL
Qty: 180 TAB | Refills: 0 | Status: SHIPPED | OUTPATIENT
Start: 2018-04-23 | End: 2018-07-21 | Stop reason: SDUPTHER

## 2018-04-23 RX ORDER — FOLIC ACID 1 MG/1
TABLET ORAL
Qty: 90 TAB | Refills: 0 | Status: SHIPPED | OUTPATIENT
Start: 2018-04-23 | End: 2018-11-20

## 2018-04-23 RX ORDER — METOPROLOL SUCCINATE 25 MG/1
TABLET, EXTENDED RELEASE ORAL
Qty: 90 TAB | Refills: 0 | Status: SHIPPED | OUTPATIENT
Start: 2018-04-23 | End: 2018-07-21 | Stop reason: SDUPTHER

## 2018-04-23 NOTE — TELEPHONE ENCOUNTER
Pt has changed to Dr. Micheal David at the [facility] family practice d/t the distance to/from the [facility] office being to far.

## 2018-04-24 ENCOUNTER — ANESTHESIA EVENT (OUTPATIENT)
Dept: ENDOSCOPY | Age: 78
End: 2018-04-24
Payer: MEDICARE

## 2018-04-24 ENCOUNTER — HOSPITAL ENCOUNTER (OUTPATIENT)
Age: 78
Setting detail: OUTPATIENT SURGERY
Discharge: HOME | End: 2018-04-24
Attending: INTERNAL MEDICINE | Admitting: INTERNAL MEDICINE
Payer: MEDICARE

## 2018-04-24 ENCOUNTER — ANESTHESIA (OUTPATIENT)
Dept: ENDOSCOPY | Age: 78
End: 2018-04-24
Payer: MEDICARE

## 2018-04-24 VITALS
HEART RATE: 72 BPM | HEIGHT: 59 IN | RESPIRATION RATE: 22 BRPM | DIASTOLIC BLOOD PRESSURE: 55 MMHG | SYSTOLIC BLOOD PRESSURE: 119 MMHG | TEMPERATURE: 98.1 F | BODY MASS INDEX: 41.33 KG/M2 | OXYGEN SATURATION: 99 % | WEIGHT: 205 LBS

## 2018-04-24 LAB
GLUCOSE BLD STRIP.AUTO-MCNC: 87 MG/DL (ref 65–100)
SERVICE CMNT-IMP: NORMAL

## 2018-04-24 PROCEDURE — 74011000250 HC RX REV CODE- 250

## 2018-04-24 PROCEDURE — 76040000019: Performed by: INTERNAL MEDICINE

## 2018-04-24 PROCEDURE — 74011250636 HC RX REV CODE- 250/636

## 2018-04-24 PROCEDURE — 82962 GLUCOSE BLOOD TEST: CPT

## 2018-04-24 PROCEDURE — 76060000031 HC ANESTHESIA FIRST 0.5 HR: Performed by: INTERNAL MEDICINE

## 2018-04-24 RX ORDER — NALOXONE HYDROCHLORIDE 0.4 MG/ML
0.4 INJECTION, SOLUTION INTRAMUSCULAR; INTRAVENOUS; SUBCUTANEOUS
Status: DISCONTINUED | OUTPATIENT
Start: 2018-04-24 | End: 2018-04-24 | Stop reason: HOSPADM

## 2018-04-24 RX ORDER — EPINEPHRINE 0.1 MG/ML
1 INJECTION INTRACARDIAC; INTRAVENOUS
Status: DISCONTINUED | OUTPATIENT
Start: 2018-04-24 | End: 2018-04-24 | Stop reason: HOSPADM

## 2018-04-24 RX ORDER — SODIUM CHLORIDE 9 MG/ML
50 INJECTION, SOLUTION INTRAVENOUS CONTINUOUS
Status: DISCONTINUED | OUTPATIENT
Start: 2018-04-24 | End: 2018-04-24 | Stop reason: HOSPADM

## 2018-04-24 RX ORDER — LIDOCAINE HYDROCHLORIDE 20 MG/ML
INJECTION, SOLUTION EPIDURAL; INFILTRATION; INTRACAUDAL; PERINEURAL AS NEEDED
Status: DISCONTINUED | OUTPATIENT
Start: 2018-04-24 | End: 2018-04-24 | Stop reason: HOSPADM

## 2018-04-24 RX ORDER — SODIUM CHLORIDE 9 MG/ML
INJECTION, SOLUTION INTRAVENOUS
Status: DISCONTINUED | OUTPATIENT
Start: 2018-04-24 | End: 2018-04-24 | Stop reason: HOSPADM

## 2018-04-24 RX ORDER — PROPOFOL 10 MG/ML
INJECTION, EMULSION INTRAVENOUS AS NEEDED
Status: DISCONTINUED | OUTPATIENT
Start: 2018-04-24 | End: 2018-04-24 | Stop reason: HOSPADM

## 2018-04-24 RX ORDER — DEXTROMETHORPHAN/PSEUDOEPHED 2.5-7.5/.8
1.2 DROPS ORAL
Status: DISCONTINUED | OUTPATIENT
Start: 2018-04-24 | End: 2018-04-24 | Stop reason: HOSPADM

## 2018-04-24 RX ORDER — ATROPINE SULFATE 0.1 MG/ML
0.4 INJECTION INTRAVENOUS
Status: DISCONTINUED | OUTPATIENT
Start: 2018-04-24 | End: 2018-04-24 | Stop reason: HOSPADM

## 2018-04-24 RX ORDER — FLUMAZENIL 0.1 MG/ML
0.2 INJECTION INTRAVENOUS
Status: DISCONTINUED | OUTPATIENT
Start: 2018-04-24 | End: 2018-04-24 | Stop reason: HOSPADM

## 2018-04-24 RX ORDER — MIDAZOLAM HYDROCHLORIDE 1 MG/ML
.25-5 INJECTION, SOLUTION INTRAMUSCULAR; INTRAVENOUS
Status: DISCONTINUED | OUTPATIENT
Start: 2018-04-24 | End: 2018-04-24 | Stop reason: HOSPADM

## 2018-04-24 RX ADMIN — PROPOFOL 30 MG: 10 INJECTION, EMULSION INTRAVENOUS at 12:10

## 2018-04-24 RX ADMIN — PROPOFOL 30 MG: 10 INJECTION, EMULSION INTRAVENOUS at 12:12

## 2018-04-24 RX ADMIN — LIDOCAINE HYDROCHLORIDE 10 MG: 20 INJECTION, SOLUTION EPIDURAL; INFILTRATION; INTRACAUDAL; PERINEURAL at 12:10

## 2018-04-24 RX ADMIN — PROPOFOL 10 MG: 10 INJECTION, EMULSION INTRAVENOUS at 12:14

## 2018-04-24 RX ADMIN — SODIUM CHLORIDE: 9 INJECTION, SOLUTION INTRAVENOUS at 12:00

## 2018-04-24 RX ADMIN — PROPOFOL 10 MG: 10 INJECTION, EMULSION INTRAVENOUS at 12:13

## 2018-04-24 NOTE — H&P
Paul Atkins M.D.  [phone]            History and Physical       NAME:  Abbey Saldana   :   1940   MRN:   [id number]           Consult Date: 2018 12:09 PM    Chief Complaint:  Jejunal polyps    History of Present Illness:  Patient is a 65 y.o. 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 She reports mild episodes of epigastric pain. PMH:  Past Medical History:   Diagnosis Date    Anemia     Arthritis     CAD (coronary artery disease)     MI    Degenerative joint disease of right hip 2015     Virgil Hernandez/Dr. Bryon Roberts    [facility]     Fracture     right shoulder; T6 compression    Gastritis     Hypercholesterolemia     Hyperlipidemia    Lupus 2015    Osteoporosis, post-menopausal     vertebral fracture       PSH:  Past Surgical History:   Procedure Laterality Date    CARDIAC SURG PROCEDURE UNLIST      HX CHOLECYSTECTOMY      HX CORONARY STENT PLACEMENT  1997    LAD    HX GASTRIC BYPASS      HX HERNIA REPAIR      HX KYPHOPLASTY      t5    HX KYPHOPLASTY      L4    HX ORTHOPAEDIC      bilateral knees    HX ORTHOPAEDIC      r shoulder    HX ORTHOPAEDIC      Laminectomy    WI INC IMPLTJ NEUROSTIMULATOR ELTRD SACRAL NERVE         Allergies: Allergies   Allergen Reactions    Cephalosporins Nausea and Vomiting     Severe vomiting       Home Medications:  Prior to Admission Medications   Prescriptions Last Dose Informant Patient Reported? Taking? Blood-Glucose Meter (CONTOUR METER) monitoring kit 2018 at Unknown time  No Yes   Sig: Dx: 250. Check blood sugars daily. ESTRACE 0.01 % (0.1 mg/gram) vaginal cream Unknown at Unknown time  Yes No   Sig: Insert 0.1 g into vagina every Monday, Wednesday, Friday. HYDROcodone-acetaminophen (NORCO) 5-325 mg per tablet Unknown at Unknown time  No No   Sig: Take 1 Tab by mouth every six (6) hours as needed for Pain.    MULTIVITAMINS (MULTIVITAMIN PO) 2018 at Unknown time  Yes Yes   Sig: Take 1 Tab by mouth daily. Syringe with Needle, Safety [facility] SAFETY SYRINGE 25GX5/8\") 3 mL 25 gauge x 5/8\" syrg 3/24/2018 at Unknown time  No Yes   Sig: To be used with B12 injections   Walker Misc 2018 at Unknown time  No Yes   Sig: Rolling walker. Patient with spinal stenosis and stopped posture. Pain with limited ambulation. ZINC PO 2018 at Unknown time  Yes Yes   Sig: Take 50 mg by mouth daily. acetaminophen (TYLENOL) 650 mg CR tablet Unknown at Unknown time  Yes No   Sig: Take 1,300 mg by mouth daily as needed for Pain. amLODIPine (NORVASC) 10 mg tablet 2018 at Unknown time  No Yes   Sig: TAKE 1/2 TABLET BY MOUTH EVERY DAY FOR 30 DAYS   aspirin 81 mg Tab 2018 at Unknown time  Yes Yes   Sig: Take 81 mg by mouth daily. atorvastatin (LIPITOR) 10 mg tablet 2018 at Unknown time  No Yes   Sig: Take 1 Tab by mouth daily. busPIRone (BUSPAR) 5 mg tablet 2018 at Unknown time  Yes Yes   Sig: Take 5 mg by mouth two (2) times a day. calcium-vitamin D (CALCIUM 500+D) 500 mg(1,250mg) -200 unit per tablet 2018 at Unknown time  Yes Yes   Sig: Take 2 Tabs by mouth daily. cholecalciferol, vitamin d3, (VITAMIN D3) 1,000 unit tablet 2018 at Unknown time  Yes Yes   Sig: Take 2,000 Units by mouth daily. cyanocobalamin (VITAMIN B-12) 1,000 mcg/mL injection 2018 at Unknown time  No Yes   Si mcg SC/IM  every month   cyclobenzaprine (FLEXERIL) 10 mg tablet Unknown at Unknown time  Yes No   Sig: Take 10 mg by mouth nightly. denosumab (PROLIA) 60 mg/mL injection 3/24/2018 at Unknown time  Yes Yes   Si mL by SubCUTAneous route every 6 months. ferrous sulfate 325 mg (65 mg iron) tablet 2018 at Unknown time  No Yes   Sig: Take 1 Tab by mouth two (2) times a day.  Indications: Iron Deficiency Anemia   folic acid (FOLVITE) 1 mg tablet 2018 at Unknown time  No Yes   Sig: TAKE 1 TABLET BY MOUTH DAILY   gabapentin (NEURONTIN) 300 mg capsule 4/23/2018 at Unknown time  No Yes   Sig: Take 1 Cap by mouth two (2) times a day.   gabapentin (NEURONTIN) 600 mg tablet 4/23/2018 at Unknown time  No Yes   Sig: TAKE 1 TABLET BY MOUTH TWICE DAILY   glipiZIDE SR (GLUCOTROL XL) 2.5 mg CR tablet 4/23/2018 at Unknown time  No Yes   Sig: TAKE 1 TABLET BY MOUTH TWICE DAILY   glucose blood VI test strips (ASCENSIA CONTOUR) strip 4/24/2018 at Unknown time  No Yes   Sig: Glucometer for 250.00 Pt. test blood sugar once daily (in morning)   leflunomide (ARAVA) 20 mg tablet Not Taking at Unknown time  No No   Sig: Take 1 Tab by mouth daily for 90 days. Take half tab daily for 7 days and then one tab if tolerated   metFORMIN (GLUCOPHAGE) 500 mg tablet 4/23/2018 at Unknown time  No Yes   Sig: TAKE 1 TABLET BY MOUTH TWICE DAILY WITH MEALS   metoprolol succinate (TOPROL-XL) 25 mg XL tablet 4/23/2018 at Unknown time  No Yes   Sig: TAKE 1 TABLET BY MOUTH DAILY   nortriptyline (PAMELOR) 10 mg capsule 4/23/2018 at Unknown time  No Yes   Sig: TAKE 1 CAPSULE BY MOUTH EVERY NIGHT AT BEDTIME   pantoprazole (PROTONIX) 40 mg tablet Not Taking at Unknown time  Yes No   Sig: TK 1 T PO D   trimethoprim (TRIMPEX) 100 mg tablet 4/23/2018 at Unknown time  No Yes   Sig: Take 1 Tab by mouth daily.  Indications: PREVENTION OF BACTERIAL URINARY TRACT INFECTION      Facility-Administered Medications: None       Hospital Medications:  Current Facility-Administered Medications   Medication Dose Route Frequency    0.9% sodium chloride infusion  50 mL/hr IntraVENous CONTINUOUS    midazolam (VERSED) injection 0.25-5 mg  0.25-5 mg IntraVENous Multiple    naloxone (NARCAN) injection 0.4 mg  0.4 mg IntraVENous Multiple    flumazenil (ROMAZICON) 0.1 mg/mL injection 0.2 mg  0.2 mg IntraVENous Multiple    simethicone (MYLICON) 02RG/1.8WY oral drops 80 mg  1.2 mL Oral Multiple    atropine injection 0.4 mg  0.4 mg IntraVENous ONCE PRN    EPINEPHrine (ADRENALIN) 0.1 mg/mL syringe 1 mg  1 mg Endoscopically ONCE PRN     Facility-Administered Medications Ordered in Other Encounters   Medication Dose Route Frequency    0.9% sodium chloride infusion   IntraVENous CONTINUOUS       Social History:  Social History   Substance Use Topics    Smoking status: Former Smoker     Quit date: 12/30/1989    Smokeless tobacco: Never Used    Alcohol use No       Family History:  Family History   Problem Relation Age of Onset    Diabetes Mother     Hypertension Mother     Heart Disease Mother     Diabetes Father     Hypertension Father     Heart Disease Father    [facility] Arthritis-rheumatoid Sister     Elevated Lipids Sister    Bedelia Justice Sister     Hypertension Sister     Diabetes Sister     Thyroid Disease Sister     Arthritis-rheumatoid Other              Review of Systems:      Constitutional: negative fever, negative chills, negative weight loss  Eyes:   negative visual changes  ENT:   negative sore throat, tongue or lip swelling  Respiratory:  negative cough, negative dyspnea  Cards:  negative for chest pain, palpitations, lower extremity edema  GI:   See HPI  :  negative for frequency, dysuria  Integument:  negative for rash and pruritus  Heme:  negative for easy bruising and gum/nose bleeding  Musculoskel: negative for myalgias,  back pain and muscle weakness  Neuro: negative for headaches, dizziness, vertigo  Psych:  negative for feelings of anxiety, depression       Objective:   Patient Vitals for the past 8 hrs:   BP Temp Pulse Resp SpO2 Height Weight   04/24/18 1119 121/66 98.1 °F (36.7 °C) 72 18 100 % 4' 11\" (1.499 m) 93 kg (205 lb)     04/24 0701 - 04/24 1900  In: 250 [I.V.:250]  Out: -        EXAM:     NEURO-a&o   HEENT-wnl   LUNGS-clear    COR-regular rate and rhythym     ABD-soft , no tenderness, no rebound, good bs     EXT-no edema     Data Review     No results for input(s): WBC, HGB, HCT, PLT, HGBEXT, HCTEXT, PLTEXT in the last 72 hours.   No results for input(s): NA, K, CL, CO2, BUN, CREA, GLU, PHOS, CA in the last 72 hours. No results for input(s): SGOT, GPT, AP, TBIL, TP, ALB, GLOB, GGT, AML, LPSE in the last 72 hours. No lab exists for component: AMYP, HLPSE  No results for input(s): INR, PTP, APTT in the last 72 hours. No lab exists for component: INREXT    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    Long-term use of immunosuppressant medication Z79.899    Long-term use of Plaquenil Z79.899    Hypocalcemia E83.51    Low back pain M54.5    Age-related osteoporosis without current pathological fracture M81.0    H/O Bell's palsy Z86.69    [facility] E11.42    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    Unspecified hereditary and idiopathic peripheral neuropathy G60.9    Raynauds syndrome I73.00    Blepharoconjunctivitis H10.509    Seronegative rheumatoid arthritis of multiple sites ([facility] M06.09    Nuclear sclerotic cataract H25.10    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    Obesity, morbid (HCC) E66.01    H/O total knee replacement Z96.659    Primary localized osteoarthritis of left knee M17.12    Hypercholesterolemia E78.00    Primary Raynaud's phenomenon I73.00    CKD (chronic kidney disease) stage 2, GFR 60-89 ml/min N18.2    Type 2 diabetes with nephropathy (HCC) E11.21      Assessment:   · Jejunal polyps   Plan:   · EGD today.      Signed By: Elle Willson MD     4/24/2018  12:09 PM

## 2018-04-24 NOTE — ROUTINE PROCESS
Sanjay Alec  1940  [id number]    Situation:  Verbal report received from: Timothy Alvarado RN  Procedure: Procedure(s):  ESOPHAGOGASTRODUODENOSCOPY (EGD)    Background:    Preoperative diagnosis: GASTRIC POLYP  Postoperative diagnosis: S/P gastric bypass    :  Dr. Elke Servin  Assistant(s): Endoscopy Technician-1: Grace Prieto  Endoscopy RN-1: Vernon Lieberman RN    Specimens: * No specimens in log *  H. Pylori  no    Assessment:  Intra-procedure medications     Anesthesia gave intra-procedure sedation and medications, see anesthesia flow sheet yes    Intravenous fluids: NS@ KVO     Vital signs stable     Abdominal assessment: round and soft     Recommendation:  Discharge patient per MD order. Family or Friend   Permission to share finding with family or friend yes    Endoscopy discharge instructions have been reviewed and given to patient, spouse and daughter. The patient, spouse and daughter verbalized understanding and acceptance of instructions.

## 2018-04-24 NOTE — DISCHARGE INSTRUCTIONS
Vickie Luciano M.D.  [phone]           2018  Cain Dorado  :  1940  [facility] Hern[facility]dez Medical Record Number:  [id number]        ENDOSCOPY FINDINGS:   Your endoscopy showed normal mucosa, evidence of previous gastric bypass. No new polyps or lesions seen. EGD DISCHARGE INSTRUCTIONS    DISCOMFORT:  Sore throat- throat lozenges or warm salt water gargle  redness at IV site- apply warm compress to area; if redness or soreness persist- contact your physician  Gaseous discomfort- walking, belching will help relieve any discomfort  You may not operate a vehicle for 12 hours  You may not engage in an occupation involving machinery or appliances for rest of today  You may not drink alcoholic beverages for at least 12 hours  Avoid making any critical decisions for at least 24 hour    DIET:   You may resume your regular diet. ACTIVITY  Spend the remainder of the day resting -  avoid any strenuous activity. Avoid driving or operating machinery. CALL M.D. ANY SIGN OF   Increasing pain, nausea, vomiting  Abdominal distension (swelling)  New increased bleeding (oral or rectal)  Fever (chills)  Pain in chest area  Bloody discharge from nose or mouth  Shortness of breath    Follow-up Instructions:   Call Dr. Michel Bronson for any questions or problems. Telephone # [phone]              Take ranitidine 150 mg as needed if any epigastric pain and follow-up in the office if any recurrent symptoms. Will need repeat endoscopy in 2 years. Ranitidine (By mouth)   Ranitidine Hydrochloride (xt-WA-st-stefanie concepcion-droe-KLOR-tg)  Treats and prevents heartburn. Also treats stomach ulcers, gastroesophageal reflux disease (GERD), and conditions that cause too much stomach acid.    Brand Name(s): Acid Reducer, DermaSilkRx Anodynexa Nii, DermacinRx Inflammatral Nii, [facility] Acid Control 150, [facility] Acid Reducer, [employer] Sense Acid Reducer, Leader Acid Control, Leader raNITIdine HCl, [employer] Acid Reducer, [employer] Acid Reducer, [employer] Acid Reducer, TopCare Heartburn Relief 150, TopCare Heartburn Relief 75, Zantac, Zantac 150   There may be other brand names for this medicine. When This Medicine Should Not Be Used: This medicine is not right for everyone. Do not use it if you had an allergic reaction to ranitidine. How to Use This Medicine:   Capsule, Tablet, Liquid, Fizzy Tablet, Liquid Filled Capsule, Granule  · Your doctor will tell you how much medicine to use. Do not use more than directed. · Follow the instructions on the medicine label if you are using this medicine without a prescription. · Measure the oral liquid medicine with a marked measuring spoon, oral syringe, or medicine cup. · The effervescent tablet should not be chewed, swallowed whole, or dissolved on the tongue. The Zantac 25 EFFERdose Tablet should be mixed in 1 teaspoon (or more) of water. Do not drink the liquid until the tablet is completely dissolved. · If you are using this medicine to prevent heartburn, take it 30 to 60 minutes before you eat or drink anything that causes you to have heartburn. · Missed dose: Take a dose as soon as you remember. If it is almost time for your next dose, wait until then and take a regular dose. Do not take extra medicine to make up for a missed dose. · Store the medicine in a closed container at room temperature, away from heat, moisture, and direct light. You may store the oral liquid in the refrigerator. Drugs and Foods to Avoid:   Ask your doctor or pharmacist before using any other medicine, including over-the-counter medicines, vitamins, and herbal products. · Some medicines can affect how ranitidine works.  Tell your doctor if you are using the following:   ¨ Atazanavir  ¨ Delavirdine  ¨ Gefitinib  ¨ Glipizide  ¨ Ketoconazole  ¨ Midazolam  ¨ Triazolam  ¨ Warfarin  Warnings While Using This Medicine:   · Tell your doctor if you are pregnant or breastfeeding, or if you have kidney disease, liver disease, or a history of acute porphyria. · EFFERdose® tablets contain phenylalanine. If you have phenylketonuria (PKU), talk to your doctor before you use this medicine. · Tell any doctor or dentist who treats you that you are using this medicine. This medicine may affect certain medical test results. · Keep all medicine out of the reach of children. Never share your medicine with anyone. Possible Side Effects While Using This Medicine:   Call your doctor right away if you notice any of these side effects:  · Allergic reaction: Itching or hives, swelling in your face or hands, swelling or tingling in your mouth or throat, chest tightness, trouble breathing  · Blistering, peeling, red skin rash  · Dark urine or pale stools, nausea, vomiting, loss of appetite, stomach pain, yellow skin or eyes  · Fast, slow, or uneven heartbeat  · Unusual bleeding, bruising, or weakness  If you notice these less serious side effects, talk with your doctor:   · Constipation or diarrhea  · Headache  · Mild nausea, vomiting, or stomach pain  If you notice other side effects that you think are caused by this medicine, tell your doctor. Call your doctor for medical advice about side effects. You may report side effects to FDA at 1-680-FDA-4838  © 2017 [address] [employer]  Information is for End User's use only and may not be sold, redistributed or otherwise used for commercial purposes. The above information is an  only. It is not intended as medical advice for individual conditions or treatments. Talk to your doctor, nurse or pharmacist before following any medical regimen to see if it is safe and effective for you.

## 2018-04-24 NOTE — IP AVS SNAPSHOT
Sapna Rutledge 
 
 
 [address] 
[phone] Patient: Jovon Quach MRN: [id number] SAQ:4/24/1359 About your hospitalization You were admitted on:  April 24, 2018 You last received care in the:  [facility] ENDOSCOPY You were discharged on:  April 24, 2018 Why you were hospitalized Your primary diagnosis was:  Not on File Follow-up Information None Your Scheduled Appointments Monday April 30, 2018  9:30 AM EDT ROUTINE CARE with Anjum Devi MD  
704 [facility] ([address] [place] [address]) [address] [place] [address]  
[phone] Thursday May 10, 2018  3:40 PM EDT  
ESTABLISHED PATIENT with Amor Galaviz MD  
3571 [facility] ([address] [place] [address]) [address] [facility] [address] [place] [address]  
[phone] Discharge Orders None A check norris indicates which time of day the medication should be taken. My Medications CONTINUE taking these medications Instructions Each Dose to Equal  
 Morning Noon Evening Bedtime  
 acetaminophen 650 mg Tber Commonly known as:  TYLENOL Your last dose was: Your next dose is: Take 1,300 mg by mouth daily as needed for Pain. 1300 mg  
    
   
   
   
  
 amLODIPine 10 mg tablet Commonly known as:  Christin Jadiel Your last dose was: Your next dose is: TAKE 1/2 TABLET BY MOUTH EVERY DAY FOR 30 DAYS  
     
   
   
   
  
 aspirin 81 mg tablet Your last dose was: Your next dose is: Take 81 mg by mouth daily. 81 mg  
    
   
   
   
  
 atorvastatin 10 mg tablet Commonly known as:  LIPITOR Your last dose was: Your next dose is: Take 1 Tab by mouth daily. 10 mg Blood-Glucose Meter monitoring kit Commonly known as:  CONTOUR METER Your last dose was: Your next dose is: Dx: 250. Check blood sugars daily. busPIRone 5 mg tablet Commonly known as:  BUSPAR Your last dose was: Your next dose is: Take 5 mg by mouth two (2) times a day. 5 mg CALCIUM 500+D 500 mg(1,250mg) -200 unit per tablet Generic drug:  calcium-vitamin D Your last dose was: Your next dose is: Take 2 Tabs by mouth daily. 2 Tab  
    
   
   
   
  
 cholecalciferol 1,000 unit tablet Commonly known as:  VITAMIN D3 Your last dose was: Your next dose is: Take 2,000 Units by mouth daily. 2000 Units  
    
   
   
   
  
 cyanocobalamin 1,000 mcg/mL injection Commonly known as:  VITAMIN B-12 Your last dose was: Your next dose is:    
   
   
 1000 mcg SC/IM  every month  
     
   
   
   
  
 cyclobenzaprine 10 mg tablet Commonly known as:  FLEXERIL Your last dose was: Your next dose is: Take 10 mg by mouth nightly. 10 mg  
    
   
   
   
  
 denosumab 60 mg/mL injection Commonly known as:  Herlene Relic Your last dose was: Your next dose is:    
   
   
 1 mL by SubCUTAneous route every 6 months. 60 mg  
    
   
   
   
  
 ESTRACE 0.01 % (0.1 mg/gram) vaginal cream  
Generic drug:  estradiol Your last dose was: Your next dose is: Insert 0.1 g into vagina every Monday, Wednesday, Friday. 0.0425 g  
    
   
   
   
  
 ferrous sulfate 325 mg (65 mg iron) tablet Your last dose was: Your next dose is: Take 1 Tab by mouth two (2) times a day. Indications: Iron Deficiency Anemia 325 mg  
    
   
   
   
  
 folic acid 1 mg tablet Commonly known as:  Marcie Your last dose was: Your next dose is: TAKE 1 TABLET BY MOUTH DAILY * gabapentin 300 mg capsule Commonly known as:  NEURONTIN Your last dose was: Your next dose is: Take 1 Cap by mouth two (2) times a day. 300 mg  
    
   
   
   
  
 * gabapentin 600 mg tablet Commonly known as:  NEURONTIN Your last dose was: Your next dose is: TAKE 1 TABLET BY MOUTH TWICE DAILY  
     
   
   
   
  
 glipiZIDE SR 2.5 mg CR tablet Commonly known as:  GLUCOTROL XL Your last dose was: Your next dose is: TAKE 1 TABLET BY MOUTH TWICE DAILY  
     
   
   
   
  
 glucose blood VI test strips strip Commonly known as:  Ascensia CONTOUR Your last dose was: Your next dose is:    
   
   
 Glucometer for 250.00 Pt. test blood sugar once daily (in morning) HYDROcodone-acetaminophen 5-325 mg per tablet Commonly known as:  Alex Sola Your last dose was: Your next dose is: Take 1 Tab by mouth every six (6) hours as needed for Pain. 1 Tab  
    
   
   
   
  
 leflunomide 20 mg tablet Commonly known as:  Mahogany Appl Your last dose was: Your next dose is: Take 1 Tab by mouth daily for 90 days. Take half tab daily for 7 days and then one tab if tolerated 20 mg  
    
   
   
   
  
 metFORMIN 500 mg tablet Commonly known as:  GLUCOPHAGE Your last dose was: Your next dose is: TAKE 1 TABLET BY MOUTH TWICE DAILY WITH MEALS  
     
   
   
   
  
 metoprolol succinate 25 mg XL tablet Commonly known as:  TOPROL-XL Your last dose was: Your next dose is: TAKE 1 TABLET BY MOUTH DAILY MULTIVITAMIN PO Your last dose was: Your next dose is: Take 1 Tab by mouth daily. 1 Tab  
    
   
   
   
  
 nortriptyline 10 mg capsule Commonly known as:  PAMELOR Your last dose was: Your next dose is: TAKE 1 CAPSULE BY MOUTH EVERY NIGHT AT BEDTIME  
     
   
   
   
  
 pantoprazole 40 mg tablet Commonly known as:  PROTONIX Your last dose was: Your next dose is:    
   
   
 TK 1 T PO D Syringe with Needle, Safety 3 mL 25 gauge x 5/8\" Syrg Commonly known as:  3cc Safety Syringe 25Gx5/8\" Your last dose was: Your next dose is: To be used with B12 injections  
     
   
   
   
  
 trimethoprim 100 mg tablet Commonly known as:  TRIMPEX Your last dose was: Your next dose is: Take 1 Tab by mouth daily. Indications: PREVENTION OF BACTERIAL URINARY TRACT INFECTION  
 100 mg Rain [place] Your last dose was: Your next dose is:    
   
   
 Rolling walker. Patient with spinal stenosis and stopped posture. Pain with limited ambulation. ZINC PO Your last dose was: Your next dose is: Take 50 mg by mouth daily. 50 mg  
    
   
   
   
  
 * Notice: This list has 2 medication(s) that are the same as other medications prescribed for you. Read the directions carefully, and ask your doctor or other care provider to review them with you. Opioid Education Prescription Opioids: What You Need to Know: 
 
        
2018 [facility] :  1940 [facility] Record Number:  [id number] ENDOSCOPY FINDINGS: 
 Your endoscopy showed normal mucosa, evidence of previous gastric bypass. No new polyps or lesions seen. EGD DISCHARGE INSTRUCTIONS DISCOMFORT: 
Sore throat- throat lozenges or warm salt water gargle redness at IV site- apply warm compress to area; if redness or soreness persist- contact your physician Gaseous discomfort- walking, belching will help relieve any discomfort You may not operate a vehicle for 12 hours You may not engage in an occupation involving machinery or appliances for rest of today You may not drink alcoholic beverages for at least 12 hours Avoid making any critical decisions for at least 24 hour DIET: 
 You may resume your regular diet. ACTIVITY Spend the remainder of the day resting -  avoid any strenuous activity. Avoid driving or operating machinery. CALL M.D. ANY SIGN OF Increasing pain, nausea, vomiting Abdominal distension (swelling) New increased bleeding (oral or rectal) Fever (chills) Pain in chest area Bloody discharge from nose or mouth Shortness of breath Follow-up Instructions: 
 Call Dr. Ag Scott for any questions or problems. Telephone # [phone] Take ranitidine 150 mg as needed if any epigastric pain and follow-up in the office if any recurrent symptoms. Will need repeat endoscopy in 2 years. Ranitidine (By mouth) Ranitidine Hydrochloride (vt-PF-uw-stefanie concepcion-droe-KLOR-tg) Treats and prevents heartburn. Also treats stomach ulcers, gastroesophageal reflux disease (GERD), and conditions that cause too much stomach acid. Brand Name(s): Acid Reducer, DermaSilkRx Anodynexa Nii, DermacinRx Inflammatral Nii, [facility] Acid Control 150, [facility] Acid Reducer, Good Sense Acid Reducer, Leader Acid Control, Leader raNITIdine HCl, [facility] Acid Reducer, [facility] Acid Reducer, [employer] Acid Reducer, TopCare Heartburn Relief 150, TopCare Heartburn Relief 75, Zantac, Zantac 150 There may be other brand names for this medicine. When This Medicine Should Not Be Used: This medicine is not right for everyone. Do not use it if you had an allergic reaction to ranitidine. How to Use This Medicine: Capsule, Tablet, Liquid, Fizzy Tablet, Liquid Filled Capsule, Granule · Your doctor will tell you how much medicine to use. Do not use more than directed. · Follow the instructions on the medicine label if you are using this medicine without a prescription. · Measure the oral liquid medicine with a marked measuring spoon, oral syringe, or medicine cup. · The effervescent tablet should not be chewed, swallowed whole, or dissolved on the tongue. The Zantac 25 EFFERdose Tablet should be mixed in 1 teaspoon (or more) of water. Do not drink the liquid until the tablet is completely dissolved. · If you are using this medicine to prevent heartburn, take it 30 to 60 minutes before you eat or drink anything that causes you to have heartburn. · Missed dose: Take a dose as soon as you remember. If it is almost time for your next dose, wait until then and take a regular dose. Do not take extra medicine to make up for a missed dose. · Store the medicine in a closed container at room temperature, away from heat, moisture, and direct light. You may store the oral liquid in the refrigerator. Drugs and Foods to Avoid: Ask your doctor or pharmacist before using any other medicine, including over-the-counter medicines, vitamins, and herbal products. · Some medicines can affect how ranitidine works. Tell your doctor if you are using the following: ¨ Atazanavir ¨ Delavirdine ¨ Gefitinib ¨ Glipizide ¨ Ketoconazole ¨ Midazolam 
¨ Triazolam 
¨ Warfarin Warnings While Using This Medicine: · Tell your doctor if you are pregnant or breastfeeding, or if you have kidney disease, liver disease, or a history of acute porphyria. · EFFERdose® tablets contain phenylalanine. If you have phenylketonuria (PKU), talk to your doctor before you use this medicine. · Tell any doctor or dentist who treats you that you are using this medicine. This medicine may affect certain medical test results. · Keep all medicine out of the reach of children. Never share your medicine with anyone. Possible Side Effects While Using This Medicine:  
Call your doctor right away if you notice any of these side effects: · Allergic reaction: Itching or hives, swelling in your face or hands, swelling or tingling in your mouth or throat, chest tightness, trouble breathing · Blistering, peeling, red skin rash · Dark urine or pale stools, nausea, vomiting, loss of appetite, stomach pain, yellow skin or eyes · Fast, slow, or uneven heartbeat · Unusual bleeding, bruising, or weakness If you notice these less serious side effects, talk with your doctor: · Constipation or diarrhea 
· Headache · Mild nausea, vomiting, or stomach pain If you notice other side effects that you think are caused by this medicine, tell your doctor. Call your doctor for medical advice about side effects. You may report side effects to FDA at 3-493-FDA-8507 © 2017 [facility] Information is for End User's use only and may not be sold, redistributed or otherwise used for commercial purposes. The above information is an  only. It is not intended as medical advice for individual conditions or treatments. Talk to your doctor, nurse or pharmacist before following any medical regimen to see if it is safe and effective for you. ACO Transitions of Care Introducing [employer] [address] Cindi Graham offers a voluntary care coordination program to provide high quality service and care to [facility] fee-for-service beneficiaries. Sachin Ontiveros was designed to help you enhance your health and well-being through the following services: ? Transitions of Care  support for individuals who are transitioning from one care setting to another (example: Hospital to home). ?  Chronic and Complex Care Coordination  support for individuals and caregivers of those with serious or chronic illnesses or with more than one chronic (ongoing) condition and those who take a number of different medications. If you meet specific medical criteria, a [address] [place] [address] Rd may call you directly to coordinate your care with your primary care physician and your other care providers. For questions about the [facility] programs, please, contact your physicians office. For general questions or additional information about Accountable Care Organizations: 
Please visit www.medicare.gov/acos. html or call 1-800-MEDICARE (0-555.890.3120) TTY users should call 4-257.527.6471. Introducing [facility] & HEALTH SERVICES! Dear Andreas Reddy: Thank you for requesting a [employer] account. Our records indicate that you already have an active [employer] account. You can access your account anytime at [email or web address] Did you know that you can access your hospital and ER discharge instructions at any time in [employer]? You can also review all of your test results from your hospital stay or ER visit. Additional Information If you have questions, please visit the Frequently Asked Questions section of the [employer] website at [email or web address] Remember, [employer] is NOT to be used for urgent needs. For medical emergencies, dial 911. Now available from your iPhone and Android! Introducing Doug Malik As a Gael Platjoanie patient, I wanted to make you aware of our electronic visit tool called Doug Malik. [facility] Wil[facility] 24/7 allows you to connect within minutes with a medical provider 24 hours a day, seven days a week via a mobile device or tablet or logging into a secure website from your computer. You can access Doug Malik from anywhere in the [place].  
 
A virtual visit might be right for you when you have a simple condition and feel like you just dont want to get out of bed, or cant get away from work for an appointment, when your regular [facility] provider is not available (evenings, weekends or holidays), or when youre out of town and need minor care. Electronic visits cost only $49 and if the [facility] provider determines a prescription is needed to treat your condition, one can be electronically transmitted to a nearby pharmacy*. Please take a moment to enroll today if you have not already done so. The enrollment process is free and takes just a few minutes. To enroll, please download the [facility] 24/7 juli to your tablet or phone, or visit [email or web address] to enroll on your computer. And, as an [address] [place] [address] patient with a [employer] account, the results of your visits will be scanned into your electronic medical record and your primary care provider will be able to view the scanned results. We urge you to continue to see your regular [facility] provider for your ongoing medical care. And while your primary care provider may not be the one available when you seek a [employer] virtual visit, the peace of mind you get from getting a real diagnosis real time can be priceless. For more information on [facility], view our Frequently Asked Questions (FAQs) at [email or web address]. Sincerely, 
 
Kavya Medrano MD 
Chief Medical Officer [employer] *:  certain medications cannot be prescribed via [facility] Providers Seen During Your Hospitalization Provider Specialty Primary office phone Sedrick Power MD Gastroenterology [phone] Your Primary Care Physician (PCP) Primary Care Physician Office Phone Office Fax [address] [facility] [address], [facility] [phone] You are allergic to the following Allergen Reactions Cephalosporins Nausea and Vomiting Severe vomiting Recent Documentation Height Weight BMI OB Status Smoking Status 1.499 m 93 kg 41.4 kg/m2 Postmenopausal Former Smoker Emergency Contacts Name Discharge Info Relation Home Work Mobile Morteza Rueda DISCHARGE CAREGIVER [3] Spouse [3] [phone] Patient Belongings The following personal items are in your possession at time of discharge: 
  Dental Appliances: Uppers  Visual Aid: Glasses Please provide this summary of care documentation to your next provider. Signatures-by signing, you are acknowledging that this After Visit Summary has been reviewed with you and you have received a copy. Patient Signature:  ____________________________________________________________ Date:  ____________________________________________________________  
  
[facility] Provider Signature:  ____________________________________________________________ Date:  ____________________________________________________________

## 2018-04-24 NOTE — ANESTHESIA POSTPROCEDURE EVALUATION
Post-Anesthesia Evaluation and Assessment    Patient: Baljinder Gupta MRN: [id number]  SSN: [id number]    YOB: 1940  Age: 65 y.o. Sex: female       Cardiovascular Function/Vital Signs  Visit Vitals    /54    Pulse 70    Temp 36.7 °C (98.1 °F)    Resp 20    Ht 4' 11\" (1.499 m)    Wt 93 kg (205 lb)    SpO2 97%    BMI 41.4 kg/m2       Patient is status post MAC anesthesia for Procedure(s):  ESOPHAGOGASTRODUODENOSCOPY (EGD). Nausea/Vomiting: None    Postoperative hydration reviewed and adequate. Pain:  Pain Scale 1: Numeric (0 - 10) (04/24/18 1226)  Pain Intensity 1: 0 (04/24/18 1226)   Managed    Neurological Status: At baseline    Mental Status and Level of Consciousness: Arousable    Pulmonary Status:   O2 Device: Room air (04/24/18 1226)   Adequate oxygenation and airway patent    Complications related to anesthesia: None    Post-anesthesia assessment completed.  No concerns    Signed By: Kip Kaiser MD     April 24, 2018

## 2018-04-24 NOTE — PERIOP NOTES
Patient resting comfortably on stretcher HOB up VSS call bell within reach  and daughter at bedside awaiting MD for procedure will continue to monitor pt.

## 2018-04-24 NOTE — PROCEDURES
Diana Haley M.D.  [phone]           2018                EGD Operative Report  Suleman Orozco  :  1940  [facility] Medical Record Number:  [id number]      Indication:  Abdominal pain, epigastric, Jejunal polyp     : Rose Dennison MD    Referring Provider:  Boris Laughlin MD      Anesthesia/Sedation:  MAC anesthesia    Airway assessment: No airway problems anticipated    Pre-Procedural Exam:      Airway: clear, no airway problems anticipated  Heart: RRR, without gallops or rubs  Lungs: clear bilaterally without wheezes, crackles, or rhonchi  Abdomen: soft, nontender, nondistended, bowel sounds present  Mental Status: awake, alert and oriented to person, place and time       Procedure Details     After infomed consent was obtained for the procedure, with all risks and benefits of procedure explained the patient was taken to the endoscopy suite and placed in the left lateral decubitus position. Following sequential administration of sedation as per above, the endoscope was inserted into the mouth and advanced under direct vision to second portion of the duodenum. A careful inspection was made as the gastroscope was withdrawn, including a retroflexed view of the proximal stomach; findings and interventions are described below. Findings:   Esophagus:normal  Stomach: S/P gastric bypass surgery, mucosa within normal in the gastric pouch, adequately patent anastomosis. Jejunum: Mucosa within normal, evaluated at least 25 to 30 cm from the anastomosis. No polyps seen. Therapies:  none    Specimens: none           Complications:   None; patient tolerated the procedure well. EBL:  None. 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 Recommendations:    - Ranitidine 150 mg PRN for occasional pain. - Follow-up in the office if symptoms are persistent.   - Consider repeat endoscopy in 2 years for jejunal polyp surveillance    Lyle Corrales MD

## 2018-04-24 NOTE — ANESTHESIA PREPROCEDURE EVALUATION
Anesthetic History   No history of anesthetic complications            Review of Systems / Medical History  Patient summary reviewed    Pulmonary  Within defined limits                 Neuro/Psych   Within defined limits           Cardiovascular              Past MI, CAD, cardiac stents (1997) and hyperlipidemia    Exercise tolerance: >4 METS     GI/Hepatic/Renal     GERD: well controlled           Endo/Other    Diabetes: well controlled, type 2    Arthritis and anemia     Other Findings   Comments: Lupus-on prednisone         Physical Exam    Airway  Mallampati: II  TM Distance: 4 - 6 cm  Neck ROM: normal range of motion   Mouth opening: Normal     Cardiovascular    Rhythm: regular  Rate: normal         Dental    Dentition: Full upper dentures     Pulmonary  Breath sounds clear to auscultation               Abdominal         Other Findings            Anesthetic Plan    ASA: 3  Anesthesia type: MAC          Induction: Intravenous  Anesthetic plan and risks discussed with: Patient

## 2018-04-30 ENCOUNTER — OFFICE VISIT (OUTPATIENT)
Dept: FAMILY MEDICINE CLINIC | Age: 78
End: 2018-04-30

## 2018-04-30 VITALS
DIASTOLIC BLOOD PRESSURE: 70 MMHG | HEART RATE: 92 BPM | WEIGHT: 199.8 LBS | HEIGHT: 59 IN | RESPIRATION RATE: 18 BRPM | BODY MASS INDEX: 40.28 KG/M2 | SYSTOLIC BLOOD PRESSURE: 135 MMHG | TEMPERATURE: 98.3 F | OXYGEN SATURATION: 97 %

## 2018-04-30 DIAGNOSIS — M79.18 MYOFACIAL MUSCLE PAIN: ICD-10-CM

## 2018-04-30 DIAGNOSIS — D51.0 PERNICIOUS ANEMIA: ICD-10-CM

## 2018-04-30 DIAGNOSIS — M12.9 ARTHROPATHY: ICD-10-CM

## 2018-04-30 DIAGNOSIS — M51.36 DEGENERATIVE LUMBAR DISC: ICD-10-CM

## 2018-04-30 RX ORDER — HYDROCODONE BITARTRATE AND ACETAMINOPHEN 5; 325 MG/1; MG/1
1 TABLET ORAL
Qty: 60 TAB | Refills: 0 | Status: SHIPPED | OUTPATIENT
Start: 2018-04-30 | End: 2018-10-29 | Stop reason: SDUPTHER

## 2018-04-30 RX ORDER — LANOLIN ALCOHOL/MO/W.PET/CERES
325 CREAM (GRAM) TOPICAL 2 TIMES DAILY
Qty: 30 TAB | Refills: 5 | Status: SHIPPED | OUTPATIENT
Start: 2018-04-30 | End: 2018-06-27 | Stop reason: SDUPTHER

## 2018-04-30 RX ORDER — CYANOCOBALAMIN 1000 UG/ML
1000 INJECTION, SOLUTION INTRAMUSCULAR; SUBCUTANEOUS ONCE
Qty: 1 VIAL | Refills: 0 | Status: SHIPPED | COMMUNITY
Start: 2018-04-30 | End: 2018-04-30 | Stop reason: SDUPTHER

## 2018-04-30 NOTE — MR AVS SNAPSHOT
[facility] 
 
 
 [address] [place] [address] 
[phone] Patient: Leo Escobedo MRN: [id number] SRC:[id number] Visit Information Date & Time Provider Department Dept. Phone Encounter #  
 4/30/2018  9:30 AM Nichelle Jim  [facility] [phone] [id number] Follow-up Instructions Return in about 4 weeks (around 5/28/2018) for Vit b12 injection. .  
  
Your Appointments 5/10/2018  3:40 PM  
ESTABLISHED PATIENT with Alejandra Salgado MD  
[address] ([facility]) Appt Note: 2 month f/up  
 [facility] [address]  
[phone]  
  
   
 [facility] [address]  
  
    
 6/27/2018  1:00 PM  
ROUTINE CARE with Nichelle Jim MD  
[facility] ([facility]) Appt Note: 3 mnth fu /est pt /refills/labs/HX UTi  
 [address] [facility] [address] [place] [address]  
[phone]  
  
   
 [address] [facility] [address] [place] [address]  
  
    
 8/20/2018  1:40 PM  
Follow Up with Daina Piña MD  
[facility]) Appt Note: follow up neuropathy  $0CP  alicia  2/19/18 [address] [place] [id number] [phone]  
  
   
 [address] [place] [address] [address] [address] Upcoming Health Maintenance Date Due  
 FOOT EXAM Q1 7/26/2018 MICROALBUMIN Q1 7/26/2018 MEDICARE YEARLY EXAM 7/27/2018 Influenza Age 5 to Adult 8/1/2018 HEMOGLOBIN A1C Q6M 8/8/2018 EYE EXAM RETINAL OR DILATED Q1 10/26/2018 LIPID PANEL Q1 2/8/2019 GLAUCOMA SCREENING Q2Y 10/26/2019 DTaP/Tdap/Td series (2 - Td) 12/5/2023 Allergies as of 4/30/2018  Review Complete On: 4/30/2018 By: lAexis Avendaño LPN Severity Noted Reaction Type Reactions Cephalosporins High 12/30/2009    Nausea and Vomiting Severe vomiting Current Immunizations  Reviewed on 3/29/2018 Name Date Influenza High Dose Vaccine PF 9/13/2017 12:00 AM, 8/5/2016 Influenza Vaccine 12/5/2013 Influenza Vaccine Scipio Sharper) 9/6/2016 Influenza Vaccine (Quad) PF 11/10/2014 Influenza Vaccine PF 10/20/2015 Influenza Vaccine Split 10/10/2012  4:30 PM, 11/14/2011, 10/25/2010 Pneumococcal Conjugate (PCV-13) 9/13/2017 12:00 AM  
 Pneumococcal Polysaccharide (PPSV-23) 12/5/2013 Tdap 12/5/2013 ZZZ-RETIRED (DO NOT USE) Pneumococcal Vaccine (Unspecified Type) 6/5/2008 Not reviewed this visit You Were Diagnosed With   
  
 Codes Comments Pernicious anemia     ICD-10-CM: D51.0 ICD-9-CM: 281.0 Arthropathy     ICD-10-CM: M12.9 ICD-9-CM: 716.90 Myofacial muscle pain     ICD-10-CM: M79.1 ICD-9-CM: 729.1 Degenerative lumbar disc     ICD-10-CM: M51.36 
ICD-9-CM: 722.52 Vitals BP Pulse Temp Resp Height(growth percentile) Weight(growth percentile) 135/70 92 98.3 °F (36.8 °C) (Oral) 18 4' 11\" (1.499 m) 199 lb 12.8 oz (90.6 kg) SpO2 BMI OB Status Smoking Status 97% 40.35 kg/m2 Postmenopausal Former Smoker Vitals History BMI and BSA Data Body Mass Index Body Surface Area  
 40.35 kg/m 2 1.94 m 2 Preferred Pharmacy Pharmacy Name Phone [facility], [facility] [address] ([address] [phone] Your Updated Medication List  
  
   
This list is accurate as of 4/30/18  9:55 AM.  Always use your most recent med list.  
  
  
  
  
 acetaminophen 650 mg Tber Commonly known as:  TYLENOL Take 1,300 mg by mouth daily as needed for Pain. amLODIPine 10 mg tablet Commonly known as:  Sloane Schultze TAKE 1/2 TABLET BY MOUTH EVERY DAY FOR 30 DAYS  
  
 aspirin 81 mg tablet Take 81 mg by mouth daily. atorvastatin 10 mg tablet Commonly known as:  LIPITOR Take 1 Tab by mouth daily. Blood-Glucose Meter monitoring kit Commonly known as:  CONTOUR METER Dx: 250. Check blood sugars daily. busPIRone 5 mg tablet Commonly known as:  BUSPAR Take 5 mg by mouth two (2) times a day. CALCIUM 500+D 500 mg(1,250mg) -200 unit per tablet Generic drug:  calcium-vitamin D Take 2 Tabs by mouth daily. cholecalciferol 1,000 unit tablet Commonly known as:  VITAMIN D3 Take 2,000 Units by mouth daily. cyanocobalamin 1,000 mcg/mL injection Commonly known as:  VITAMIN B-12  
1000 mcg SC/IM  every month  
  
 cyclobenzaprine 10 mg tablet Commonly known as:  FLEXERIL Take 10 mg by mouth nightly. denosumab 60 mg/mL injection Commonly known as:  Brainard Palm 1 mL by SubCUTAneous route every 6 months. ESTRACE 0.01 % (0.1 mg/gram) vaginal cream  
Generic drug:  estradiol Insert 0.1 g into vagina every Monday, Wednesday, Friday. ferrous sulfate 325 mg (65 mg iron) tablet Take 1 Tab by mouth two (2) times a day. Indications: Iron Deficiency Anemia  
  
 folic acid 1 mg tablet Commonly known as:  FOLVITE  
TAKE 1 TABLET BY MOUTH DAILY * gabapentin 300 mg capsule Commonly known as:  NEURONTIN Take 1 Cap by mouth two (2) times a day. * gabapentin 600 mg tablet Commonly known as:  NEURONTIN  
TAKE 1 TABLET BY MOUTH TWICE DAILY  
  
 glipiZIDE SR 2.5 mg CR tablet Commonly known as:  GLUCOTROL XL  
TAKE 1 TABLET BY MOUTH TWICE DAILY  
  
 glucose blood VI test strips strip Commonly known as:  Ascensia CONTOUR Glucometer for 250.00 Pt. test blood sugar once daily (in morning) HYDROcodone-acetaminophen 5-325 mg per tablet Commonly known as:  Froilan Carranza Take 1 Tab by mouth every six (6) hours as needed for Pain.  
  
 leflunomide 20 mg tablet Commonly known as:  Chai Vázquez Take 1 Tab by mouth daily for 90 days. Take half tab daily for 7 days and then one tab if tolerated metFORMIN 500 mg tablet Commonly known as:  GLUCOPHAGE  
TAKE 1 TABLET BY MOUTH TWICE DAILY WITH MEALS  
  
 metoprolol succinate 25 mg XL tablet Commonly known as:  TOPROL-XL  
TAKE 1 TABLET BY MOUTH DAILY MULTIVITAMIN PO Take 1 Tab by mouth daily. nortriptyline 10 mg capsule Commonly known as:  PAMELOR  
TAKE 1 CAPSULE BY MOUTH EVERY NIGHT AT BEDTIME  
  
 pantoprazole 40 mg tablet Commonly known as:  PROTONIX TK 1 T PO D Syringe with Needle, Safety 3 mL 25 gauge x 5/8\" Syrg Commonly known as:  3cc Safety Syringe 25Gx5/8\" To be used with B12 injections  
  
 trimethoprim 100 mg tablet Commonly known as:  TRIMPEX Take 1 Tab by mouth daily. Indications: PREVENTION OF BACTERIAL URINARY TRACT INFECTION Devi Norse Rolling walker. Patient with spinal stenosis and stopped posture. Pain with limited ambulation. ZINC PO Take 50 mg by mouth daily. * Notice: This list has 2 medication(s) that are the same as other medications prescribed for you. Read the directions carefully, and ask your doctor or other care provider to review them with you. Prescriptions Printed Refills HYDROcodone-acetaminophen (NORCO) 5-325 mg per tablet 0 Sig: Take 1 Tab by mouth every six (6) hours as needed for Pain. Class: Print Route: Oral  
  
Prescriptions Sent to Pharmacy Refills  
 ferrous sulfate 325 mg (65 mg iron) tablet 5 Sig: Take 1 Tab by mouth two (2) times a day. Indications: Iron Deficiency Anemia Class: Normal  
 Pharmacy: [facility] [address] #: [phone] Route: Oral  
  
We Performed the Following THER/PROPH/DIAG INJECTION, SUBCUT/IM [id number] CPT(R)] Cynthia Woodruff 13 (MW) [[id number] Custom] VITAMIN B12 INJECTION [ [facility]] Follow-up Instructions Return in about 4 weeks (around 5/28/2018) for Vit b12 injection. Ruben Alston To-Do List   
 09/27/2018 2:00 PM  
  Appointment with Jason Gustafson Marcos at [place] [address] ([phone]) Patient Instructions Pernicious Anemia: Care Instructions Your Care Instructions Pernicious anemia means that you do not have enough red blood cells. It happens when your body can't absorb vitamin B12 from food. Your body needs vitamin B12 to make red blood cells. Red blood cells carry oxygen around your body. Vitamin B12 also helps your nerves work well. Your doctor can treat this problem with vitamin B12 shots. You may also take vitamin B12 by pill or nasal spray. With treatment, most anemia gets better in a few days. But if you have severe anemia, you may need a blood transfusion to give you red blood cells as quickly as possible. Follow-up care is a key part of your treatment and safety. Be sure to make and go to all appointments, and call your doctor if you are having problems. It's also a good idea to know your test results and keep a list of the medicines you take. How can you care for yourself at home? · Be safe with medicines. Take your medicines exactly as prescribed. Call your doctor if you think you are having a problem with your medicine. · Follow your doctor's instructions about vitamin B12 shots, vitamin B12 pills, or a vitamin B12 nasal spray. · Eat a varied diet. Include foods with a lot of vitamin B12, such as eggs, milk, and meat. · Do not drink alcohol while you are being treated. Alcohol can prevent the body from absorbing vitamin B12. · Eat foods that have folate (also called folic acid). This is another type of B vitamin. Foods with folate include leafy green vegetables, citrus fruits, and fortified cereals. When should you call for help? Call 911 anytime you think you may need emergency care. For example, call if: 
? · You passed out (lost consciousness). ?Call your doctor now or seek immediate medical care if: ? · You are dizzy or light-headed, or you feel like you may faint. ? · You have new or worse bleeding. ? · You are short of breath. ? Watch closely for changes in your health, and be sure to contact your doctor if: 
? · You have numbness or tingling in your hands or feet. ? · You feel weaker or more tired. ? · You have trouble with balance or coordination. ? · You do not get better as expected. Where can you learn more? Go to http://jennifer-vincent.info/. Enter N470 in the search box to learn more about \"Pernicious Anemia: Care Instructions. \" Current as of: October 13, 2016 Content Version: 11.4 © 6773-7093 Preventes.fr. Care instructions adapted under license by [employer] (which disclaims liability or warranty for this information). If you have questions about a medical condition or this instruction, always ask your healthcare professional. [place] [address] any warranty or liability for your use of this information. Introducing [facility]! Dear Zoltan Valladares: Thank you for requesting a Fly[employer] account. Our records indicate that you already have an active Flypeeps account. You can access your account anytime at [email or web address] Did you know that you can access your hospital and ER discharge instructions at any time in Flypeeps? You can also review all of your test results from your hospital stay or ER visit. Additional Information If you have questions, please visit the Frequently Asked Questions section of the Flypeeps website at [email or web address] Remember, Fly[employer]eps is NOT to be used for urgent needs. For medical emergencies, dial 911. Now available from your iPhone and Android! Please provide this summary of care documentation to your next provider. Your primary care clinician is listed as Roxanna Jaime.  If you have any questions after today's visit, please call [phone].

## 2018-04-30 NOTE — PATIENT INSTRUCTIONS
Pernicious Anemia: Care Instructions  Your Care Instructions    Pernicious anemia means that you do not have enough red blood cells. It happens when your body can't absorb vitamin B12 from food. Your body needs vitamin B12 to make red blood cells. Red blood cells carry oxygen around your body. Vitamin B12 also helps your nerves work well. Your doctor can treat this problem with vitamin B12 shots. You may also take vitamin B12 by pill or nasal spray. With treatment, most anemia gets better in a few days. But if you have severe anemia, you may need a blood transfusion to give you red blood cells as quickly as possible. Follow-up care is a key part of your treatment and safety. Be sure to make and go to all appointments, and call your doctor if you are having problems. It's also a good idea to know your test results and keep a list of the medicines you take. How can you care for yourself at home? · Be safe with medicines. Take your medicines exactly as prescribed. Call your doctor if you think you are having a problem with your medicine. · Follow your doctor's instructions about vitamin B12 shots, vitamin B12 pills, or a vitamin B12 nasal spray. · Eat a varied diet. Include foods with a lot of vitamin B12, such as eggs, milk, and meat. · Do not drink alcohol while you are being treated. Alcohol can prevent the body from absorbing vitamin B12. · Eat foods that have folate (also called folic acid). This is another type of B vitamin. Foods with folate include leafy green vegetables, citrus fruits, and fortified cereals. When should you call for help? Call 911 anytime you think you may need emergency care. For example, call if:  ? · You passed out (lost consciousness). ?Call your doctor now or seek immediate medical care if:  ? · You are dizzy or light-headed, or you feel like you may faint. ? · You have new or worse bleeding. ? · You are short of breath. ? Watch closely for changes in your health, and be sure to contact your doctor if:  ? · You have numbness or tingling in your hands or feet. ? · You feel weaker or more tired. ? · You have trouble with balance or coordination. ? · You do not get better as expected. Where can you learn more? Go to http://jennifer-vincent.info/. Enter S821 in the search box to learn more about \"Pernicious Anemia: Care Instructions. \"  Current as of: October 13, 2016  Content Version: 11.4  © 0258-7028 [facility]. Care instructions adapted under license by [employer] (which disclaims liability or warranty for this information). If you have questions about a medical condition or this instruction, always ask your healthcare professional. [address] any warranty or liability for your use of this information.

## 2018-04-30 NOTE — PROGRESS NOTES
1. Have you been to the ER, urgent care clinic, or been hospitalized since your last visit? No     2. Have you seen or consulted any other health care providers outside of the [facility] since your last visit? No     Reviewed record in preparation for visit and have necessary documentation  opportunity was given for questions  Goals that were addressed and/or need to be completed during or after this appointment include     There are no preventive care reminders to display for this patient.

## 2018-04-30 NOTE — PROGRESS NOTES
Boo Record  65 y.o. female  1940  [address] [place] [id number]  [id number]     [id number]: Progress Note       Encounter Date: 4/30/2018    Chief Complaint   Patient presents with    Injection B12    Medication Refill       History provided by patient  History of Present Illness   Boo Sanchez is a 65 y.o. 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 Current medications: monthly B12 injection, daily ferrous sulfate. HGB   Date Value Ref Range Status   03/15/2018 12.9 11.1 - 15.9 g/dL Final   02/08/2018 12.4 11.1 - 15.9 g/dL Final   08/01/2017 13.1 11.1 - 15.9 g/dL Final     HCT   Date Value Ref Range Status   03/15/2018 37.7 34.0 - 46.6 % Final   02/08/2018 38.2 34.0 - 46.6 % Final   08/01/2017 39.6 34.0 - 46.6 % Final     MCV   Date Value Ref Range Status   03/15/2018 95 79 - 97 fL Final   02/08/2018 97 79 - 97 fL Final   08/01/2017 98 (H) 79 - 97 fL Final     Iron   Date Value Ref Range Status   02/08/2018 81 27 - 139 ug/dL Final     TIBC   Date Value Ref Range Status   02/08/2018 338 250 - 450 ug/dL Final     UIBC   Date Value Ref Range Status   02/08/2018 257 118 - 369 ug/dL Final     Iron % saturation   Date Value Ref Range Status   02/08/2018 24 15 - 55 % Final     Ferritin   Date Value Ref Range Status   08/31/2015 13 (L) 15 - 150 ng/mL Final     Vitamin B12   Date Value Ref Range Status   02/08/2018 676 232 - 1245 pg/mL Final     Folate   Date Value Ref Range Status   05/31/2016 4.1 >3.0 ng/mL Final     Comment:     A serum folate concentration of less than 3.1 ng/mL is  considered to represent clinical deficiency. Chronic pain  Patient has hx of chronic pain from arthritis (rheumtoid and osteo.) last fill of hydro in 12/2017  Pain Contract on file?: Yes: Comment: 4/30/2018  Provider: Dr. Pilar Johnson  Date last pill taken: 1 week ago.  reviewed and appropriate?: YES  Date of last UDS: None. Health Maintenance  There are no preventive care reminders to display for this patient. Review of Systems   Review of Systems   Constitutional: Negative for chills and fever. HENT: Negative for congestion and sinus pain. Respiratory: Negative for cough, sputum production and shortness of breath. Cardiovascular: Negative for chest pain, palpitations and leg swelling. Gastrointestinal: Negative for abdominal pain, constipation, diarrhea, nausea and vomiting. Neurological: Negative for dizziness and headaches. Vitals/Objective:     Vitals:    04/30/18 0929   BP: 135/70   Pulse: 92   Resp: 18   Temp: 98.3 °F (36.8 °C)   TempSrc: Oral   SpO2: 97%   Weight: 199 lb 12.8 oz (90.6 kg)   Height: 4' 11\" (1.499 m)     Body mass index is 40.35 kg/(m^2). Wt Readings from Last 3 Encounters:   04/30/18 199 lb 12.8 oz (90.6 kg)   04/24/18 205 lb (93 kg)   04/16/18 204 lb (92.5 kg)       Physical Exam   Constitutional: She is oriented to person, place, and time. She appears well-developed and well-nourished. HENT:   Head: Normocephalic and atraumatic. Cardiovascular: Normal rate and regular rhythm. No murmur heard. Pulmonary/Chest: Effort normal and breath sounds normal. No respiratory distress. She has no wheezes. She has no rales. Abdominal: Soft. Bowel sounds are normal. She exhibits no distension. There is no tenderness. There is no rebound and no guarding. Neurological: She is alert and oriented to person, place, and time. No results found for this or any previous visit (from the past 24 hour(s)). Assessment and Plan:     Encounter Diagnoses     ICD-10-CM ICD-9-CM   1. Pernicious anemia D51.0 281.0   2. Arthropathy M12.9 716.90   3. Myofacial muscle pain M79.1 729.1   4. Degenerative lumbar disc M51.36 722.52       1. Pernicious anemia  - ferrous sulfate 325 mg (65 mg iron) tablet; Take 1 Tab by mouth two (2) times a day. Indications: Iron Deficiency Anemia  Dispense: 30 Tab;  Refill: 5  - VITAMIN B12 INJECTION  - THER/PROPH/DIAG INJECTION, SUBCUT/IM    2. Arthropathy  3. Myofacial muscle pain  4. Degenerative lumbar disc   reviewed. Contract signed. - HYDROcodone-acetaminophen (NORCO) 5-325 mg per tablet; Take 1 Tab by mouth every six (6) hours as needed for Pain. Dispense: 60 Tab; Refill: 0  - Gennylyssa Arteaga 13 (MW)      I have discussed the diagnosis with the patient and the intended plan as seen in the above orders. she has expressed understanding. The patient has received an after-visit summary and questions were answered concerning future plans. I have discussed medication side effects and warnings with the patient as well. Electronically Signed: Jose Sam MD     History/Allergies   Patients past medical, surgical and family histories were reviewed and updated.     Past Medical History:   Diagnosis Date    Anemia     Arthritis     CAD (coronary artery disease) 1997    MI    Degenerative joint disease of right hip 09/14/2015     Virgil Hernandez/Dr. Kailyn Chowdhury    Diabetes ([facility] [address]     Fracture     right shoulder; T6 compression    Gastritis     Hypercholesterolemia     Hyperlipidemia    Lupus 1/9/2015    Osteoporosis, post-menopausal     vertebral fracture    Past Surgical History:   Procedure Laterality Date    CARDIAC SURG PROCEDURE UNLIST      HX CHOLECYSTECTOMY      HX CORONARY STENT PLACEMENT  02/19/1997    LAD    HX GASTRIC BYPASS  2000    HX HERNIA REPAIR  2005    HX KYPHOPLASTY      t5    HX KYPHOPLASTY      L4    HX ORTHOPAEDIC      bilateral knees    HX ORTHOPAEDIC      r shoulder    HX ORTHOPAEDIC      Laminectomy    [employer] INC IMPLTJ NEUROSTIMULATOR ELTRD SACRAL NERVE       Family History   Problem Relation Age of Onset    Diabetes Mother     Hypertension Mother     Heart Disease Mother     Diabetes Father     Hypertension Father     Heart Disease Father     Arthritis-rheumatoid Sister     Elevated Lipids Sister     Arthritis-rheumatoid Sister     Hypertension Sister     Diabetes Sister     Thyroid Disease Sister     Arthritis-rheumatoid Other      Social History     Social History    Marital status:      Spouse name: N/A    Number of children: N/A    Years of education: N/A     Occupational History    Not on file. Social History Main Topics    Smoking status: Former Smoker     Quit date: 12/30/1989    Smokeless tobacco: Never Used    Alcohol use No    Drug use: No    Sexual activity: Not Currently     Other Topics Concern    Not on file     Social History Narrative         Allergies   Allergen Reactions    Cephalosporins Nausea and Vomiting     Severe vomiting       Disposition     Follow-up Disposition: Not on File    Future Appointments  Date Time Provider Yocasta Cisneros   5/10/2018 3:40 PM Noel Muhammad MD [address]   6/27/2018 1:00 PM Mesha Munoz MD [facility] SCHED   8/20/2018 1:40 PM Janessa Dumont MD \[facility] [place] [facility]"" 27   9/27/2018 2:00 PM [facility] CHAIR [address] [place]            Current Medications after this visit     Current Outpatient Prescriptions   Medication Sig    folic acid (FOLVITE) 1 mg tablet TAKE 1 TABLET BY MOUTH DAILY    metoprolol succinate (TOPROL-XL) 25 mg XL tablet TAKE 1 TABLET BY MOUTH DAILY    metFORMIN (GLUCOPHAGE) 500 mg tablet TAKE 1 TABLET BY MOUTH TWICE DAILY WITH MEALS    pantoprazole (PROTONIX) 40 mg tablet TK 1 T PO D    ferrous sulfate 325 mg (65 mg iron) tablet Take 1 Tab by mouth two (2) times a day. Indications: Iron Deficiency Anemia    trimethoprim (TRIMPEX) 100 mg tablet Take 1 Tab by mouth daily. Indications: PREVENTION OF BACTERIAL URINARY TRACT INFECTION    amLODIPine (NORVASC) 10 mg tablet TAKE 1/2 TABLET BY MOUTH EVERY DAY FOR 30 DAYS    gabapentin (NEURONTIN) 600 mg tablet TAKE 1 TABLET BY MOUTH TWICE DAILY    leflunomide (ARAVA) 20 mg tablet Take 1 Tab by mouth daily for 90 days.  Take half tab daily for 7 days and then one tab if tolerated    glipiZIDE SR (GLUCOTROL XL) 2.5 mg CR tablet TAKE 1 TABLET BY MOUTH TWICE DAILY    atorvastatin (LIPITOR) 10 mg tablet Take 1 Tab by mouth daily.  cyclobenzaprine (FLEXERIL) 10 mg tablet Take 10 mg by mouth nightly.  cyanocobalamin (VITAMIN B-12) 1,000 mcg/mL injection 1000 mcg SC/IM  every month    ESTRACE 0.01 % (0.1 mg/gram) vaginal cream Insert 0.1 g into vagina every Monday, Wednesday, Friday.  gabapentin (NEURONTIN) 300 mg capsule Take 1 Cap by mouth two (2) times a day.  denosumab (PROLIA) 60 mg/mL injection 1 mL by SubCUTAneous route every 6 months.  HYDROcodone-acetaminophen (NORCO) 5-325 mg per tablet Take 1 Tab by mouth every six (6) hours as needed for Pain.  glucose blood VI test strips (ASCENSIA CONTOUR) strip Glucometer for 250.00 Pt. test blood sugar once daily (in morning)    Syringe with Needle, Safety (3CC SAFETY SYRINGE 25GX5/8\") 3 mL 25 gauge x 5/8\" syrg To be used with B12 injections    busPIRone (BUSPAR) 5 mg tablet Take 5 mg by mouth two (2) times a day.  nortriptyline (PAMELOR) 10 mg capsule TAKE 1 CAPSULE BY MOUTH EVERY NIGHT AT BEDTIME    acetaminophen (TYLENOL) 650 mg CR tablet Take 1,300 mg by mouth daily as needed for Pain.  Blood-Glucose Meter (CONTOUR METER) monitoring kit Dx: 250. Check blood sugars daily.  Walker [employer] walker. Patient with spinal stenosis and stopped posture. Pain with limited ambulation.  ZINC PO Take 50 mg by mouth daily.  cholecalciferol, vitamin d3, (VITAMIN D3) 1,000 unit tablet Take 2,000 Units by mouth daily.  calcium-vitamin D (CALCIUM 500+D) 500 mg(1,250mg) -200 unit per tablet Take 2 Tabs by mouth daily.  aspirin 81 mg Tab Take 81 mg by mouth daily.  MULTIVITAMINS (MULTIVITAMIN PO) Take 1 Tab by mouth daily. No current facility-administered medications for this visit. There are no discontinued medications.

## 2018-05-01 ENCOUNTER — OP HISTORICAL/CONVERTED ENCOUNTER (OUTPATIENT)
Dept: OTHER | Age: 78
End: 2018-05-01

## 2018-05-05 LAB — DRUGS UR: NORMAL

## 2018-05-07 RX ORDER — GLIPIZIDE 2.5 MG/1
TABLET, EXTENDED RELEASE ORAL
Qty: 180 TAB | Refills: 0 | Status: SHIPPED | OUTPATIENT
Start: 2018-05-07 | End: 2018-12-21 | Stop reason: SDUPTHER

## 2018-05-07 RX ORDER — GLIPIZIDE 2.5 MG/1
TABLET, EXTENDED RELEASE ORAL
Qty: 180 TAB | Refills: 0 | Status: SHIPPED | OUTPATIENT
Start: 2018-05-07 | End: 2018-05-10 | Stop reason: SDUPTHER

## 2018-05-07 RX ORDER — ATORVASTATIN CALCIUM 10 MG/1
TABLET, FILM COATED ORAL
Qty: 90 TAB | Refills: 0 | Status: SHIPPED | OUTPATIENT
Start: 2018-05-07 | End: 2018-05-10 | Stop reason: SDUPTHER

## 2018-05-08 DIAGNOSIS — I73.00 PRIMARY RAYNAUD'S PHENOMENON: ICD-10-CM

## 2018-05-08 DIAGNOSIS — E55.9 VITAMIN D DEFICIENCY: ICD-10-CM

## 2018-05-08 DIAGNOSIS — N18.2 CKD (CHRONIC KIDNEY DISEASE) STAGE 2, GFR 60-89 ML/MIN: ICD-10-CM

## 2018-05-08 DIAGNOSIS — M81.0 AGE-RELATED OSTEOPOROSIS WITHOUT CURRENT PATHOLOGICAL FRACTURE: ICD-10-CM

## 2018-05-08 DIAGNOSIS — M06.9 RHEUMATOID ARTHRITIS WITH UNKNOWN RHEUMATOID FACTOR STATUS (HCC): ICD-10-CM

## 2018-05-08 RX ORDER — AMLODIPINE BESYLATE 10 MG/1
TABLET ORAL
Qty: 15 TAB | Refills: 0 | Status: SHIPPED | OUTPATIENT
Start: 2018-05-08 | End: 2018-06-04 | Stop reason: SDUPTHER

## 2018-05-08 RX ORDER — CYCLOBENZAPRINE HCL 10 MG
10 TABLET ORAL
Qty: 90 TAB | Refills: 1 | Status: SHIPPED | OUTPATIENT
Start: 2018-05-08 | End: 2018-11-07 | Stop reason: SDUPTHER

## 2018-05-08 NOTE — TELEPHONE ENCOUNTER
----- Message from Janes Samuel LPN sent at 2/5/7587  9:14 AM EDT -----  Regarding: FW: Prescription Question  Contact: [phone]      ----- Message -----     From: Buck Hernandez     Sent: 5/8/2018   9:13 AM       To:  [facility] Nurse Pool  Subject: Prescription Question                            Good Morning, also need cyclobenzaprine 10mg qhs    Thank you James Joya

## 2018-05-10 ENCOUNTER — OFFICE VISIT (OUTPATIENT)
Dept: RHEUMATOLOGY | Age: 78
End: 2018-05-10

## 2018-05-10 VITALS
WEIGHT: 199 LBS | TEMPERATURE: 97.9 F | DIASTOLIC BLOOD PRESSURE: 71 MMHG | HEIGHT: 59 IN | HEART RATE: 101 BPM | BODY MASS INDEX: 40.12 KG/M2 | RESPIRATION RATE: 18 BRPM | SYSTOLIC BLOOD PRESSURE: 113 MMHG

## 2018-05-10 DIAGNOSIS — M06.9 RHEUMATOID ARTHRITIS WITH UNKNOWN RHEUMATOID FACTOR STATUS (HCC): ICD-10-CM

## 2018-05-10 DIAGNOSIS — Z79.60 LONG-TERM USE OF IMMUNOSUPPRESSANT MEDICATION: ICD-10-CM

## 2018-05-10 DIAGNOSIS — E55.9 VITAMIN D DEFICIENCY: ICD-10-CM

## 2018-05-10 DIAGNOSIS — M81.0 AGE-RELATED OSTEOPOROSIS WITHOUT CURRENT PATHOLOGICAL FRACTURE: ICD-10-CM

## 2018-05-10 DIAGNOSIS — N18.2 CKD (CHRONIC KIDNEY DISEASE) STAGE 2, GFR 60-89 ML/MIN: ICD-10-CM

## 2018-05-10 DIAGNOSIS — M06.09 SERONEGATIVE RHEUMATOID ARTHRITIS OF MULTIPLE SITES (HCC): Primary | ICD-10-CM

## 2018-05-10 DIAGNOSIS — N18.30 CKD (CHRONIC KIDNEY DISEASE) STAGE 3, GFR 30-59 ML/MIN (HCC): ICD-10-CM

## 2018-05-10 RX ORDER — LEFLUNOMIDE 20 MG/1
20 TABLET ORAL DAILY
Qty: 90 TAB | Refills: 0 | Status: SHIPPED | OUTPATIENT
Start: 2018-05-10 | End: 2018-08-08

## 2018-05-10 NOTE — PROGRESS NOTES
REASON FOR VISIT    This is a follow-up visit for Ms. Rueda for Seronegative Erosive Rheumatoid Arthritis and Age-Related Osteoporosis. Inflammatory arthritis phenotype includes:  Anti-CCP positive: no  Rheumatoid factor positive: no  Erosive disease: yes  Extra-articular manifestations include: Raynaud's    Immunosuppression Screening (2/13/2018):   Quantiferon TB: negative   PPD:  Not performed  Hepatitis B: negative    Hepatitis C: negative     Therapy History includes:  Current DMARD therapy include:leflunomide 20 mg daily, Humira (5/10/2018)  Prior DMARD therapy include: methotrexate 12.5 mg subcutaneous,  hydroxychloroquine 300 mg daily  Discontinued DMARDs because of inefficacy: None  Discontinued DMARDs because of side effects: methotrexate (LFTs)  Contra-Indicated DMARDs because of chronic kidney disease: methotrexate     Osteoporosis Historical Synopsis    Height loss since age 26 (at least two inches): 5  Fracture history includes: yes (T5-T6)  Family history of hip fracture: no  Fall Risk: no    Daily calcium intake is 1800 mg  Daily vitamin D intake is 2000 IU    Smoking history: no  Alcohol consumption: no  Prednisone history: no    Exercise: no    Previous work-up for osteoporosis includes the following:  DEXA Scan: 8/08/2017  Vitamin 25OH D level: 46.6 (21/3/2018)  PTH: 21 (21/3/2018)  TSH: 2.570 (21/3/2018)    Therapy History includes:    Current osteoporosis therapy includes: Prolia (3/29/2018)  Prior osteoporosis therapy includes: Reclast (2 years)  The following osteoporosis therapy have been ineffective: Reclast  The following osteoporosis therapy were stopped because of side effects: none    Immunizations:   Immunization History   Administered Date(s) Administered    Influenza High Dose Vaccine PF 08/05/2016, 09/13/2017    Influenza Vaccine 12/05/2013    Influenza Vaccine (Quad) 09/06/2016    Influenza Vaccine (Quad) PF 11/10/2014    Influenza Vaccine PF 10/20/2015    Influenza Vaccine Split 10/25/2010, 11/14/2011, 10/10/2012    Pneumococcal Conjugate (PCV-13) 09/13/2017    Pneumococcal Polysaccharide (PPSV-23) 12/05/2013    Tdap 12/05/2013    ZZZ-RETIRED (DO NOT USE) Pneumococcal Vaccine (Unspecified Type) 06/05/2008       Active problems include:    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    Long-term use of immunosuppressant medication Z79.899    Long-term use of Plaquenil Z79.899    Hypocalcemia E83.51    Low back pain M54.5    Age-related osteoporosis without current pathological fracture M81.0    H/O Bell's palsy Z86.69    Diabetic polyneuropathy ([facility] [address] E11.42    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    Unspecified hereditary and idiopathic peripheral neuropathy G60.9    Raynauds syndrome I73.00    Blepharoconjunctivitis H10.509    Seronegative rheumatoid arthritis of multiple sites ([facility] [address] M06.09    Nuclear sclerotic cataract H25.10    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    Obesity, morbid (HCC) E66.01    H/O total knee replacement Z96.659    Primary localized osteoarthritis of left knee M17.12    Hypercholesterolemia E78.00    Primary Raynaud's phenomenon I73.00    CKD (chronic kidney disease) stage 2, GFR 60-89 ml/min N18.2    Type 2 diabetes with nephropathy (HCC) E11.21       HISTORY OF PRESENT ILLNESS    Ms. Devan Gonzalez returns for a follow-up. On her last visit, I continued leflunomide 20 mg daily and stopped hydroxychloroquine    Today, she reports intermittent pain in her hands at night and ain and swelling in some of her fingers. She feels a little worse after stopping hydroxychloroquine. She had her Prolia on 3/29/2018 with good tolerance.  She denies interval falls or fractures. Ms. Kamille Platt has continued her medications for arthritis and reports good tolerance without significant side effects. Last toxicity monitoring by blood work was done on 3/15/2018 and did not reveal any significant adverse effects, except eGFR 57. Most recent inflammatory markers from 3/15/2018 revealed a ESR 3 mm/hr (previously 4 mm/hr) and CRP 0. 5 mg/L (previously 0.3 mg/L). The patient has not had any interval hospital admissions, infections, or surgeries. REVIEW OF SYSTEMS    A comprehensive review of systems was performed and pertinent results are documented in the HPI, review of systems is otherwise non-contributory. PAST MEDICAL HISTORY    She has a past medical history of Anemia; Arthritis; CAD (coronary artery disease) (1997); Degenerative joint disease of right hip (09/14/2015 ); Diabetes ([address] Fracture; Gastritis; Hypercholesterolemia; Lupus (1/9/2015); and Osteoporosis, post-menopausal. She also has no past medical history of Abuse; Anemia NEC; Arrhythmia; Asthma; Calculus of kidney; Cancer ([address] Chronic kidney disease; Chronic obstructive pulmonary disease ([address] Chronic pain; Congestive heart failure, unspecified; Contact dermatitis and other eczema, due to unspecified cause; COPD; Depression; GERD (gastroesophageal reflux disease); Headache(784.0); Liver disease; Malignant hyperthermia due to anesthesia; Psychotic disorder; PUD (peptic ulcer disease); Seizures ([address] Sleep apnea; Stroke [facility]); Thromboembolus ([address] Thyroid disease; or Trauma. FAMILY HISTORY    Her family history includes Arthritis-rheumatoid in her sister, sister and another family member; Diabetes in her father, mother, and sister; Elevated Lipids in her sister; Heart Disease in her father and mother; Hypertension in her father, mother, and sister; Thyroid Disease in her sister. SOCIAL HISTORY    She reports that she quit smoking about 28 years ago.  She has never used smokeless tobacco. She reports that she does not drink alcohol or use illicit drugs. MEDICATIONS    Current Outpatient Prescriptions   Medication Sig Dispense Refill    adalimumab (HUMIRA) 40 mg/0.8 mL injection 0.8 mL by SubCUTAneous route every fourteen (14) days for 90 doses. Indications: Rheumatoid Arthritis 12 Kit 11    adalimumab (HUMIRA PEN) 40 mg/0.8 mL injection pen 0.8 mL by SubCUTAneous route every fourteen (14) days for 3 doses. 3 Kit 0    amLODIPine (NORVASC) 10 mg tablet TAKE 1/2 TABLET BY MOUTH EVERY DAY FOR 30 DAYS 15 Tab 0    cyclobenzaprine (FLEXERIL) 10 mg tablet Take 1 Tab by mouth nightly. 90 Tab 1    glipiZIDE SR (GLUCOTROL XL) 2.5 mg CR tablet TAKE 1 TABLET BY MOUTH TWICE DAILY 180 Tab 0    ferrous sulfate 325 mg (65 mg iron) tablet Take 1 Tab by mouth two (2) times a day. Indications: Iron Deficiency Anemia 30 Tab 5    HYDROcodone-acetaminophen (NORCO) 5-325 mg per tablet Take 1 Tab by mouth every six (6) hours as needed for Pain. 60 Tab 0    folic acid (FOLVITE) 1 mg tablet TAKE 1 TABLET BY MOUTH DAILY 90 Tab 0    metoprolol succinate (TOPROL-XL) 25 mg XL tablet TAKE 1 TABLET BY MOUTH DAILY 90 Tab 0    metFORMIN (GLUCOPHAGE) 500 mg tablet TAKE 1 TABLET BY MOUTH TWICE DAILY WITH MEALS 180 Tab 0    pantoprazole (PROTONIX) 40 mg tablet TK 1 T PO D  11    trimethoprim (TRIMPEX) 100 mg tablet Take 1 Tab by mouth daily. Indications: PREVENTION OF BACTERIAL URINARY TRACT INFECTION 30 Tab 5    gabapentin (NEURONTIN) 600 mg tablet TAKE 1 TABLET BY MOUTH TWICE DAILY 60 Tab 5    leflunomide (ARAVA) 20 mg tablet Take 1 Tab by mouth daily for 90 days. Take half tab daily for 7 days and then one tab if tolerated 90 Tab 0    atorvastatin (LIPITOR) 10 mg tablet Take 1 Tab by mouth daily. 90 Tab 0    cyanocobalamin (VITAMIN B-12) 1,000 mcg/mL injection 1000 mcg SC/IM  every month 1 Vial 3    ESTRACE 0.01 % (0.1 mg/gram) vaginal cream Insert 0.1 g into vagina every Monday, Wednesday, Friday.  42.5 g 1    gabapentin (NEURONTIN) 300 mg capsule Take 1 Cap by mouth two (2) times a day. 120 Cap 6    denosumab (PROLIA) 60 mg/mL injection 1 mL by SubCUTAneous route every 6 months.  glucose blood VI test strips (ASCENSIA CONTOUR) strip Glucometer for 250.00 Pt. test blood sugar once daily (in morning) 50 Strip 11    Syringe with Needle, Safety (3CC SAFETY SYRINGE 25GX5/8\") 3 mL 25 gauge x 5/8\" syrg To be used with B12 injections 100 Pen Needle 11    nortriptyline (PAMELOR) 10 mg capsule TAKE 1 CAPSULE BY MOUTH EVERY NIGHT AT BEDTIME 30 Cap 0    acetaminophen (TYLENOL) 650 mg CR tablet Take 1,300 mg by mouth daily as needed for Pain.  Blood-Glucose Meter (CONTOUR METER) monitoring kit Dx: 250. Check blood sugars daily. 1 kit 11    Walker Misc Rolling walker. Patient with spinal stenosis and stopped posture. Pain with limited ambulation. 1 Each 0    ZINC PO Take 50 mg by mouth daily.  cholecalciferol, vitamin d3, (VITAMIN D3) 1,000 unit tablet Take 2,000 Units by mouth daily.  calcium-vitamin D (CALCIUM 500+D) 500 mg(1,250mg) -200 unit per tablet Take 2 Tabs by mouth daily.  aspirin 81 mg Tab Take 81 mg by mouth daily.  MULTIVITAMINS (MULTIVITAMIN PO) Take 1 Tab by mouth daily.  busPIRone (BUSPAR) 5 mg tablet Take 5 mg by mouth two (2) times a day. 5        ALLERGIES    Allergies   Allergen Reactions    Cephalosporins Nausea and Vomiting     Severe vomiting       PHYSICAL EXAMINATION    Visit Vitals    /71    Pulse (!) 101    Temp 97.9 °F (36.6 °C)    Resp 18    Ht 4' 11\" (1.499 m)    Wt 199 lb (90.3 kg)    BMI 40.19 kg/m2     Body mass index is 40.19 kg/(m^2). General: Patient is alert, oriented x 3, not in acute distress    HEENT:   Sclerae are not injected and appear moist.  Oral mucous membranes are moist, there are no ulcers present. There is no alopecia. Neck is supple     Cardiovascular:  Heart is regular rate and rhythm, no murmurs.     Chest:  Lungs are clear to auscultation bilaterally. No rhonchi, wheezes, or crackles. Extremities:  Free of clubbing, cyanosis +1 nonpitting lower extremity edema with tenderness    Neurological exam:  No focal sensory deficits, muscle strength is full in upper and lower extremities     Skin exam:    Psoriasis:     no  Nail Pitting:     no  Onycholysis:     no  Palmoplantar pustulosis:   no  Acne fulminans:    no  Acne conglobata:    no  Hidradenitis Suppurativa:   no  Dissecting cellulitis of the scalp:  no  Pilonidal sinus:    no  Erythema nodosum:    no  Non-Scarring Alopecia:  no  Discoid Lupus:   no  Subacute Cutaneous Lupus:   no  Heliotrope Rash:   no  Upper Arm Erythema:   no  Shawl Sign:    no  V-sign:     no  Holster sign:    no  Gottron's papules:   no  Gottron's sign:    no  Calcinosis:    no  Raynaud's Phenomenon:  no  's Hands:   no  Periungual erythema:   no  Abnormal Nailfold Capillaries:  no  Livedo Reticularis:   no  Scalp Erythema:   no  Facial Rosacea: Yes    Musculoskeletal:  A comprehensive musculoskeletal exam was performed for all joints of each upper and lower extremity and assessed for swelling, tenderness and range of motion.       Bilateral Sharla and Heberden nods  Bilateral knee crepitus without effusion    Joint Count 5/10/2018 3/15/2018 2/13/2018   Patient pain (0-100) 25 10 -   MHAQ 0.25 0 -   Left wrist- Tender 1 - 1   Left wrist- Swollen 1 - 1   Left 1st MCP - Tender - - 1   Left 1st MCP - Swollen - - 1   Left 2nd MCP - Tender 1 1 1   Left 2nd MCP - Swollen 1 1 1   Left 3rd MCP - Tender 1 1 1   Left 3rd MCP - Swollen 1 1 1   Left 4th MCP - Tender 1 1 1   Left 4th MCP - Swollen - - 1   Left 5th MCP - Swollen 1 - -   Left thumb IP - Tender 1 - -   Left 2nd PIP - Tender 1 - 1   Left 2nd PIP - Swollen - - 1   Left 3rd PIP - Tender 1 - 1   Left 3rd PIP - Swollen 1 - -   Left 4th PIP - Tender 1 1 1   Left 4th PIP - Swollen - 1 1   Left 5th PIP - Tender 1 - -   Right wrist- Tender - 1 1   Right wrist- Swollen - 1 1   Right 2nd MCP - Tender 1 1 1   Right 2nd MCP - Swollen 1 1 1   Right 3rd MCP - Tender 1 1 1   Right 3rd MCP - Swollen 1 1 1   Right 4th MCP - Tender - 1 1   Right 4th MCP - Swollen - 1 1   Right 5th MCP - Swollen 1 1 -   Right 2nd PIP - Tender 1 - 1   Right 2nd PIP - Swollen - - 1   Right 3rd PIP - Tender 1 1 1   Right 3rd PIP - Swollen - 1 -   Right 4th PIP - Tender 1 1 1   Right 5th PIP - Tender 1 1 -   Tender Joint Count (Total) 15 11 15   Swollen Joint Count (Total) 8 9 12   Physician Assessment (0-10) 3 3 -   Patient Assessment (0-10) 5 1.5 -   CDAI Total (calculated) 31 24.5 -       DATA REVIEW    Laboratory     Recent laboratory results were reviewed, summarized, and discussed with the patient. Imaging    Musculoskeletal Ultrasound    None    Radiographs    Bilateral Hand 2/13/2018: RIGHT: severe osteopenia without fracture. There are scattered degenerative changes. Progressive first [facility] joint. No new erosive changes. .  Vascular calcifications. LEFT: severe osteopenia. Scattered degenerative changes as noted previously with progression of the index finger and middle finger DIP joints. There is a new erosion at the index finger proximal phalanx ulnar base without adjacent joint space loss. There are vascular calcifications. Calcification overlying the DRUJ is favored represent overlying vascular calcifications.      Bilateral Foot 2/13/2018: RIGHT: no acute fracture or dislocation. Alignment is anatomic. Mild degenerative changes are seen in the left first MTP joint. Minimal degenerative changes are seen in the right first MTP joint. A possible erosion is seen in the head of the left first metatarsal. No erosive changes are seen in the right foot. Plantar calcaneal heel spurs are noted in both feet, as well as enthesophyte formation at the Achilles insertion site. Arterial vascular calcifications are also noted bilaterally. Mild soft tissue swelling surrounds the left first MTP joint. LEFT: no acute fracture or dislocation. Alignment is anatomic. Mild degenerative changes are seen in the left first MTP joint. Minimal degenerative changes are seen in the right first MTP joint. A possible erosion is seen in the head of the left first metatarsal. No erosive changes are seen in the right foot. Plantar calcaneal heel spurs are noted in both feet, as well as enthesophyte formation at the Achilles insertion site. Arterial vascular calcifications are also noted bilaterally. Mild soft tissue swelling surrounds the left first MTP joint. Thoracic Spine 11/03/2016: The posterior battery pack is again seen within the intrathecal catheter in stable position. Kyphoplasty material is present in a midthoracic vertebral body is stable mild chronic compression of the adjacent inferior vertebral body. The remaining vertebral body heights are maintained. Anterior osteophytes are noted. There is no abnormality in alignment. Lumbar Spine 10/19/2016: significant disc space narrowing at L3-L4 with 14 mm anterolisthesis. There is facet arthropathy. The vertebral body heights are maintained. Spinal catheters are seen. There are atherosclerotic vascular calcifications. Bilateral Hips 8/31/2015: no fracture, dislocation or other acute abnormality. There osteoarthritic pattern change of both hips, more severe on the right. There is nonuniform joint space narrowing and marginal osteophytes. There is remodeling of the right femur head with subchondral cystic formation. Mild sacroiliac osteoarthritic change. Lower lumbar spondylosis is noted. Wire  leads are seen in the left lower lumbar spine likely reflective of spinal stimulator device. Bilateral Hand 3/01/2011: RIGHT: mild osteopenia. There are degenerative changes of the distal and proximal interphalangeal joints and the base of the thumb. There is no fracture or other acute abnormality. Scattered calcifications are noted in the radial and ulnar arteries.  LEFT: mild osteopenia. There are degenerative changes of the distal and proximal interphalangeal joints and  at the base of the thumb. There is no fracture or acute abnormality. There are vascular calcifications of the radial and ulnar arteries. CT Imaging    None    MR Imaging    MRI Brain with without contrast 12/19/2016: There is sulcal and ventricular prominence. Confluent periventricular and scattered foci of increased T2 signal intensity in the corona radiata and centrum semiovale. Tiny remote lacunar infarction in the right cerebellum. There is no intracranial mass, hemorrhage or evidence of acute infarction. There is no Chiari or syrinx. The pituitary and infundibulum are grossly unremarkable. There is no skull base mass. Cerebellopontine angles are grossly unremarkable. The major intracranial vascular flow-voids are unremarkable. No evidence of demyelinating process. There is no abnormal parenchymal enhancement. There is no abnormal meningeal enhancement. The cavernous sinuses are symmetric. Optic chiasm and infundibulum grossly unremarkable. Orbits are grossly symmetric. Dural venous sinuses are grossly patent. The brain architecture is normal. There is no evidence of midline shift or mass-effect. There are no extra-axial fluid collections. The mastoid air cells and paranasal sinuses are well pneumatized and clear. MRI Lumbar Spine with and without contrast 8/07/2013: There is transitional lumbosacral anatomy. In keeping with prior studies, lowest fully formed disc is labeled L5-S1. There has been prior  laminectomies at L3-5 with no change in grade 1 anterolisthesis at L3-4 which measures 5 mm. There is grade 1 anterolisthesis of L4-5 of approximately 2 mm, unchanged. Vertebral body heights are maintained. There is no marrow edema or compression fracture. There are reactive endplate changes at F6-2.  The conus medullaris terminates at L1-2. There is no abnormal intraspinal enhancement.  L1/2:  The spinal canal and foramina are widely patent. L2/3:  Diffuse disc bulge, facet hypertrophy and ligamentum flavum thickening cause mild spinal canal and moderate right foraminal stenosis. Left foramen is patent. L3/4: There is uncovering of disc material with facet hypertrophy resulting in mild thecal sac narrowing. There is severe bilateral foraminal stenosis similar to the prior study. L4/5: [place] is diffuse disc bulge and facet hypertrophy, without significant spinal canal stenosis. There is mild right foraminal stenosis. L5/S1:  The spinal canal and foramina are widely patent. DXA     DXA 8/08/2017: (excluded Lumbar spine because of overlying wires and degenerative change and kyphoplasty of L4) showed left femoral neck T score: -1.4 (0.837 g/cm2), left total hip T score: -1.4 (0.830 g/cm2), right femoral neck T score: -0.9 (0.919 g/cm2), right total hip T score: -0.9 (0.900 g/cm2), and distal one third left radius T score -3.00 (BMD 0.615 g/cm2). FRAX score 16.8 % probability in 10 years for major osteoporotic fracture and 3.2 % 10 year probability of hip fracture. Nuclear Medicine    Nuclear Medicine Bone Scan 1/22/2013: There is intense radiotracer uptake in the upper thoracic spine, likely T5. There is uptake in the region of the sternoclavicular joints and left shoulder, likely degenerative. ASSESSMENT AND PLAN    This is a follow-up visit for Ms. Shelley Bond. 1) Seronegative Erosive Rheumatoid Arthritis. She is maintained on leflunomide 20 mg daily with good tolerance but she complains of pain. She is worse after coming off hydroxychloroquine. Her CDAI was 31 (previously 24.5) with 15 tender and 8 swollen joints, consistent with high disease activity. She has chronic kidney disease stage 3, so will avoid methotrexate. I discussed with her about advancing therapy to the biologic (small particle, anti-TNF).  We discussed the potential adverse effects, which include infections, side effects, and routes of administration (oral versus infusion versus subcutaneous). The patient was informed these medications co-pay are subject to the patient's insurance coverage and we will not know until it has been submitted to the insurance company. She preferred injections. An order will be submitted today [facility] for Humira. We gave her 3 samples and one was administered today as a teaching. Labs today. 2) Long Term Use of Immunosuppressants. The patient remains on immunomodulatory medications (leflunomide, Humira) and requires frequent toxicity monitoring by blood work. Respective labs were ordered (CBC and CMP). 3) Age-Related Osteoporosis. (multiple vertebral fractures) She is due for her Prolia. An [facility] order was submitted today. 4) Primary Raynauds phenomenon. This improved with amlodipine 5 mg daily. I explained that she may take 10 mg if tolerated and take it as needed for symptom relief. 5) Long Term Use of Hydroxychloroquine (Plaquenil). She is up to date with her ophthalmic exam. She is no longer on it. 6) Chronic Kidney Disease Stage 3. The patient's eGFR was 57 (previously 63). 7) History of Lupus. This was based on DEXTER 1:40 (speckled), malar rash, inflammatory arthritis, and Raynaud's syndrome. She does not have a malar rash, she has facial rosacea. She has had Raynaud's since her 18's which is likely primary/idiopathic Raynaud's. I agree that she has inflammatory arthritis but this is likely Rheumatoid Arthritis. She does not meet the [place] criteria for systemic lupus erythematosus. The patient voiced understanding of the aforementioned assessment and plan. Summary of plan was provided in the After Visit Summary patient instructions.      TODAY'S ORDERS    Orders Placed This Encounter    CBC WITH AUTOMATED DIFF    METABOLIC PANEL, COMPREHENSIVE    C REACTIVE PROTEIN, QT    SED RATE (ESR)    adalimumab (HUMIRA) 40 mg/0.8 mL injection    adalimumab (HUMIRA PEN) 40 mg/0.8 mL injection pen  leflunomide (ARAVA) 20 mg tablet     Future Appointments  Date Time Provider Yocasta Cisneros   5/29/2018 9:30 AM Fernando Ramirez MD [facility] [place] [facility]ED   6/27/2018 1:00 PM [facility]   8/20/2018 10:00 AM Mimi Villa MD [facility] [address]   8/20/2018 1:40 PM Ranjit Leslie MD [place] [id number]   9/27/2018 2:00 PM [place] FT CHAIR 1 [id number] Jitendra Sutton MD, [address] [place] [address]    Adult Rheumatology   Musculoskeletal Ultrasound Certified  [address] [place] [address], [place], [address] [place] [address] Road   Phone [phone]  Fax [phone]

## 2018-05-10 NOTE — PATIENT INSTRUCTIONS
Adalimumab (By injection)   Adalimumab (rt-mq-MVQ-ue-mab)  Treats arthritis, plaque psoriasis, ankylosing spondylitis, Crohn disease, ulcerative colitis, hidradenitis suppurativa, and uveitis. Brand Name(s): Humira   There may be other brand names for this medicine. When This Medicine Should Not Be Used: This medicine is not right for everyone. Do not use it if you had an allergic reaction to adalimumab. How to Use This Medicine:   Injectable  · Your doctor will prescribe your exact dose and tell you how often it should be given. This medicine is given as a shot under your skin. · A nurse or other health provider will give you this medicine. · You may be taught how to give your medicine at home. Make sure you understand all instructions before giving yourself an injection. Do not use more medicine or use it more often than your doctor tells you to. · You will be shown the body areas where this shot can be given. Use a different body area each time you give yourself a shot. Keep track of where you give each shot to make sure you rotate body areas. Do not inject into skin areas that are red, bruised, tender, or hard. If you have psoriasis, do not inject into a raised, thick, red, or scaly skin patch or into skin lesions. · This medicine should come with a Medication Guide. Ask your pharmacist for a copy if you do not have one. · Missed dose: Take a dose as soon as you remember. If it is almost time for your next dose, wait until then and take a regular dose. Do not take extra medicine to make up for a missed dose. · If you store this medicine at home, keep it in the refrigerator. Do not freeze. Protect the medicine from light. Keep your medicine and supplies in the original packages until you are ready to use them. Drugs and Foods to Avoid:   Ask your doctor or pharmacist before using any other medicine, including over-the-counter medicines, vitamins, and herbal products.   · Some foods and medicines can affect how adalimumab works. Tell your doctor if you are using any of the following:   ¨ Abatacept, anakinra, azathioprine, cyclosporine, mercaptopurine, rituximab, theophylline  ¨ A blood thinner (including warfarin)  ¨ Medicine that weakens the immune system (including a steroid or cancer medicine)  · This medicine may interfere with vaccines. Ask your doctor before you get a flu shot or any other vaccines. Warnings While Using This Medicine:   · Tell your doctor if you are pregnant or breastfeeding, or if you have liver disease, a history of cancer, COPD, heart failure, diabetes, psoriasis, multiple sclerosis, optic neuritis, problems with your immune system, or a history of Guillain-Barré syndrome. Tell your doctor if you have any type of infection (such as hepatitis B or tuberculosis) or an infection that keeps coming back. · This medicine may cause the following problems:   ¨ Increased risk for infection  ¨ Increased risk of certain cancers, such as lymphoma or leukemia  ¨ New or worsening heart failure  · Tell your doctor if you have a latex allergy. The needle cover of the syringe contains latex and may cause allergic reactions. · You will need to have a skin test for tuberculosis (TB) before you start this medicine. Tell your doctor if you or anyone in your home has ever had a positive TB skin test or been exposed to TB. · This medicine may make you bleed, bruise, or get infections more easily. Take precautions to prevent illness and injury. Wash your hands often. · Your doctor will do lab tests at regular visits to check on the effects of this medicine. Keep all appointments. · Throw away used needles in a hard, closed container that the needles cannot poke through. Keep this container away from children and pets. · Keep all medicine out of the reach of children. Never share your medicine with anyone.   Possible Side Effects While Using This Medicine:   Call your doctor right away if you notice any of these side effects:  · Allergic reaction: Itching or hives, swelling in your face or hands, swelling or tingling in your mouth or throat, chest tightness, trouble breathing  · Blistering, peeling, red skin rash, or red, scaly patches on the skin  · Change in how much or how often you urinate, painful urination  · Changes in vision  · Chest pain, uneven heartbeat, trouble breathing  · Cough, fever, chills, runny or stuffy nose, sore throat, and body aches  · Dark urine or pale stools, nausea, vomiting, loss of appetite, stomach pain, yellow skin or eyes  · Numbness, tingling, or burning pain in your hands, arms, legs, or feet, or joint pain  · Rapid weight gain, swelling in your hands, ankles, lower legs, or feet  · Sores or white patches on your lips, mouth, or throat  · Swollen glands in your neck, underarms, or groin  · Unusual bleeding, bruising, tiredness, weakness, or weight loss  If you notice these less serious side effects, talk with your doctor:   · Back pain  · Headache  · Redness, itching, bruising, bleeding, pain, or swelling where the shot was given  If you notice other side effects that you think are caused by this medicine, tell your doctor. Call your doctor for medical advice about side effects. You may report side effects to FDA at 2-451-FDA-8579  © 2017 [address] Information is for End User's use only and may not be sold, redistributed or otherwise used for commercial purposes. The above information is an  only. It is not intended as medical advice for individual conditions or treatments. Talk to your doctor, nurse or pharmacist before following any medical regimen to see if it is safe and effective for you.

## 2018-05-10 NOTE — MR AVS SNAPSHOT
[address]  Cynthia Moffett [address] 
[phone] Patient: Vahid Patel MRN: [id number] [facility]:[birth year] Visit Information Date & Time Provider Department Dept. Phone Encounter #  
 5/10/2018  3:40 PM Carlito Joe, [address] [facility] and [facility] [facility] [id number] Follow-up Instructions Return in about 3 months (around 8/10/2018). Your Appointments 5/29/2018  9:30 AM  
ROUTINE CARE with Chalino Rondon MD  
[address] [place] [address] ([address]) Appt Note: 4 week fu /Vit B 12 Injection 2005 A [facility] [address]  
  
   
 2005 A [facility] [address] [address]  
  
    
 6/27/2018  1:00 PM  
ROUTINE CARE with Chalino Rondon MD  
[address] [place] [address] ([address] [place] [address]) Appt Note: 3 mnth fu /est pt /refills/labs/HX UTi  
 2005 A [facility] [address] [facility]   
[phone]  
  
    
 8/20/2018  1:40 PM  
Follow Up with Erica Jarrell MD  
[facility]) Appt Note: follow up neuropathy  $0CP  alicia  2/19/18 [place] [address] [place] [phone]  
  
   
 [place] [address] [id number] [address] Upcoming Health Maintenance Date Due  
 FOOT EXAM Q1 7/26/2018 MICROALBUMIN Q1 7/26/2018 MEDICARE YEARLY EXAM 7/27/2018 Influenza Age 5 to Adult 8/1/2018 HEMOGLOBIN A1C Q6M 8/8/2018 EYE EXAM RETINAL OR DILATED Q1 10/26/2018 LIPID PANEL Q1 2/8/2019 GLAUCOMA SCREENING Q2Y 10/26/2019 DTaP/Tdap/Td series (2 - Td) 12/5/2023 Allergies as of 5/10/2018  Review Complete On: 5/10/2018 By: Ritchie Monterroso RN Severity Noted Reaction Type Reactions Cephalosporins High 12/30/2009    Nausea and Vomiting Severe vomiting Current Immunizations  Reviewed on 3/29/2018 Name Date Influenza High Dose Vaccine PF 9/13/2017 12:00 AM, 8/5/2016 Influenza Vaccine 12/5/2013 Influenza Vaccine [place]) 9/6/2016 Influenza Vaccine (Quad) PF 11/10/2014 Influenza Vaccine PF 10/20/2015 Influenza Vaccine Split 10/10/2012  4:30 PM, 11/14/2011, 10/25/2010 Pneumococcal Conjugate (PCV-13) 9/13/2017 12:00 AM  
 Pneumococcal Polysaccharide (PPSV-23) 12/5/2013 Tdap 12/5/2013 ZZZ-RETIRED (DO NOT USE) Pneumococcal Vaccine (Unspecified Type) 6/5/2008 Not reviewed this visit You Were Diagnosed With   
  
 Codes Comments Seronegative rheumatoid arthritis of multiple sites [facility])    -  Primary ICD-10-CM: M06.09 
ICD-9-CM: 714.0 Long-term use of immunosuppressant medication     ICD-10-CM: Z79.899 ICD-9-CM: V58.69 Vitals BP Pulse Temp Resp Height(growth percentile) Weight(growth percentile) 113/71 (!) 101 97.9 °F (36.6 °C) 18 4' 11\" (1.499 m) 199 lb (90.3 kg) BMI OB Status Smoking Status 40.19 kg/m2 Postmenopausal Former Smoker BMI and BSA Data Body Mass Index Body Surface Area  
 40.19 kg/m 2 1.94 m 2 Preferred Pharmacy Pharmacy Name Phone [facility] [address] [place] [address] [phone] Your Updated Medication List  
  
   
This list is accurate as of 5/10/18  4:09 PM.  Always use your most recent med list.  
  
  
  
  
 acetaminophen 650 mg Tber Commonly known as:  TYLENOL Take 1,300 mg by mouth daily as needed for Pain. * adalimumab 40 mg/0.8 mL injection Commonly known as:  HUMIRA  
0.8 mL by SubCUTAneous route every fourteen (14) days for 90 doses. Indications: Rheumatoid Arthritis * adalimumab 40 mg/0.8 mL injection pen Commonly known as:  HUMIRA PEN  
0.8 mL by SubCUTAneous route every fourteen (14) days for 3 doses. amLODIPine 10 mg tablet Commonly known as:  Shelby Damon TAKE 1/2 TABLET BY MOUTH EVERY DAY FOR 30 DAYS  
  
 aspirin 81 mg tablet Take 81 mg by mouth daily. atorvastatin 10 mg tablet Commonly known as:  LIPITOR Take 1 Tab by mouth daily. Blood-Glucose Meter monitoring kit Commonly known as:  CONTOUR METER Dx: 250. Check blood sugars daily. busPIRone 5 mg tablet Commonly known as:  BUSPAR Take 5 mg by mouth two (2) times a day. CALCIUM 500+D 500 mg(1,250mg) -200 unit per tablet Generic drug:  calcium-vitamin D Take 2 Tabs by mouth daily. cholecalciferol 1,000 unit tablet Commonly known as:  VITAMIN D3 Take 2,000 Units by mouth daily. cyanocobalamin 1,000 mcg/mL injection Commonly known as:  VITAMIN B-12  
1000 mcg SC/IM  every month  
  
 cyclobenzaprine 10 mg tablet Commonly known as:  FLEXERIL Take 1 Tab by mouth nightly. denosumab 60 mg/mL injection Commonly known as:  Mallissa Noe 1 mL by SubCUTAneous route every 6 months. ESTRACE 0.01 % (0.1 mg/gram) vaginal cream  
Generic drug:  estradiol Insert 0.1 g into vagina every Monday, Wednesday, Friday. ferrous sulfate 325 mg (65 mg iron) tablet Take 1 Tab by mouth two (2) times a day. Indications: Iron Deficiency Anemia  
  
 folic acid 1 mg tablet Commonly known as:  FOLVITE  
TAKE 1 TABLET BY MOUTH DAILY * gabapentin 300 mg capsule Commonly known as:  NEURONTIN Take 1 Cap by mouth two (2) times a day. * gabapentin 600 mg tablet Commonly known as:  NEURONTIN  
TAKE 1 TABLET BY MOUTH TWICE DAILY  
  
 glipiZIDE SR 2.5 mg CR tablet Commonly known as:  GLUCOTROL XL  
TAKE 1 TABLET BY MOUTH TWICE DAILY  
  
 glucose blood VI test strips strip Commonly known as:  [employer] CONTOUR Glucometer for 250.00 Pt. test blood sugar once daily (in morning) HYDROcodone-acetaminophen 5-325 mg per tablet Commonly known as:  Verlee Shack Take 1 Tab by mouth every six (6) hours as needed for Pain.  
  
 leflunomide 20 mg tablet Commonly known as:  Tamara Bernstein Take 1 Tab by mouth daily for 90 days. Take half tab daily for 7 days and then one tab if tolerated  
  
 metFORMIN 500 mg tablet Commonly known as:  GLUCOPHAGE  
TAKE 1 TABLET BY MOUTH TWICE DAILY WITH MEALS  
  
 metoprolol succinate 25 mg XL tablet Commonly known as:  TOPROL-XL  
TAKE 1 TABLET BY MOUTH DAILY MULTIVITAMIN PO Take 1 Tab by mouth daily. nortriptyline 10 mg capsule Commonly known as:  PAMELOR  
TAKE 1 CAPSULE BY MOUTH EVERY NIGHT AT BEDTIME  
  
 pantoprazole 40 mg tablet Commonly known as:  PROTONIX TK 1 T PO D Syringe with Needle, Safety 3 mL 25 gauge x 5/8\" Syrg Commonly known as:  3cc Safety Syringe 25Gx5/8\" To be used with B12 injections  
  
 trimethoprim 100 mg tablet Commonly known as:  TRIMPEX Take 1 Tab by mouth daily. Indications: PREVENTION OF BACTERIAL URINARY TRACT INFECTION Odette Ply Rolling walker. Patient with spinal stenosis and stopped posture. Pain with limited ambulation. ZINC PO Take 50 mg by mouth daily. * Notice: This list has 4 medication(s) that are the same as other medications prescribed for you. Read the directions carefully, and ask your doctor or other care provider to review them with you. We Performed the Following C REACTIVE PROTEIN, QT [17858 CPT(R)] CBC WITH AUTOMATED DIFF [94412 CPT(R)] METABOLIC PANEL, COMPREHENSIVE [86195 CPT(R)] SED RATE (ESR) [id number] CPT(R)] Follow-up Instructions Return in about 3 months (around 8/10/2018). To-Do List   
 09/27/2018 2:00 PM  
  Appointment with Arielle Rodríguez at [place] [address] ([phone]) Patient Instructions Adalimumab (By injection) Adalimumab (bc-[id number]-THD-[id number]-mab) Treats arthritis, plaque psoriasis, ankylosing spondylitis, Crohn disease, ulcerative colitis, hidradenitis suppurativa, and uveitis. Brand Name(s): Humira There may be other brand names for this medicine. When This Medicine Should Not Be Used: This medicine is not right for everyone. Do not use it if you had an allergic reaction to adalimumab. How to Use This Medicine:  
Injectable · Your doctor will prescribe your exact dose and tell you how often it should be given. This medicine is given as a shot under your skin. · A nurse or other health provider will give you this medicine. · You may be taught how to give your medicine at home. Make sure you understand all instructions before giving yourself an injection. Do not use more medicine or use it more often than your doctor tells you to. · You will be shown the body areas where this shot can be given. Use a different body area each time you give yourself a shot. Keep track of where you give each shot to make sure you rotate body areas. Do not inject into skin areas that are red, bruised, tender, or hard. If you have psoriasis, do not inject into a raised, thick, red, or scaly skin patch or into skin lesions. · This medicine should come with a Medication Guide. Ask your pharmacist for a copy if you do not have one. · Missed dose: Take a dose as soon as you remember. If it is almost time for your next dose, wait until then and take a regular dose. Do not take extra medicine to make up for a missed dose. · If you store this medicine at home, keep it in the refrigerator. Do not freeze. Protect the medicine from light. Keep your medicine and supplies in the original packages until you are ready to use them. Drugs and Foods to Avoid: Ask your doctor or pharmacist before using any other medicine, including over-the-counter medicines, vitamins, and herbal products. · Some foods and medicines can affect how adalimumab works. Tell your doctor if you are using any of the following: ¨ Abatacept, anakinra, azathioprine, cyclosporine, mercaptopurine, rituximab, theophylline ¨ A blood thinner (including warfarin) ¨ Medicine that weakens the immune system (including a steroid or cancer medicine) · This medicine may interfere with vaccines. Ask your doctor before you get a flu shot or any other vaccines. Warnings While Using This Medicine: · Tell your doctor if you are pregnant or breastfeeding, or if you have liver disease, a history of cancer, COPD, heart failure, diabetes, psoriasis, multiple sclerosis, optic neuritis, problems with your immune system, or a history of Guillain-Barré syndrome. Tell your doctor if you have any type of infection (such as hepatitis B or tuberculosis) or an infection that keeps coming back. · This medicine may cause the following problems:  
¨ Increased risk for infection ¨ Increased risk of certain cancers, such as lymphoma or leukemia ¨ New or worsening heart failure · Tell your doctor if you have a latex allergy. The needle cover of the syringe contains latex and may cause allergic reactions. · You will need to have a skin test for tuberculosis (TB) before you start this medicine. Tell your doctor if you or anyone in your home has ever had a positive TB skin test or been exposed to TB. · This medicine may make you bleed, bruise, or get infections more easily. Take precautions to prevent illness and injury. Wash your hands often. · Your doctor will do lab tests at regular visits to check on the effects of this medicine. Keep all appointments. · Throw away used needles in a hard, closed container that the needles cannot poke through. Keep this container away from children and pets. · Keep all medicine out of the reach of children. Never share your medicine with anyone. Possible Side Effects While Using This Medicine:  
Call your doctor right away if you notice any of these side effects: · Allergic reaction: Itching or hives, swelling in your face or hands, swelling or tingling in your mouth or throat, chest tightness, trouble breathing · Blistering, peeling, red skin rash, or red, scaly patches on the skin · Change in how much or how often you urinate, painful urination · Changes in vision · Chest pain, uneven heartbeat, trouble breathing · Cough, fever, chills, runny or stuffy nose, sore throat, and body aches · Dark urine or pale stools, nausea, vomiting, loss of appetite, stomach pain, yellow skin or eyes · Numbness, tingling, or burning pain in your hands, arms, legs, or feet, or joint pain · Rapid weight gain, swelling in your hands, ankles, lower legs, or feet · Sores or white patches on your lips, mouth, or throat · Swollen glands in your neck, underarms, or groin · Unusual bleeding, bruising, tiredness, weakness, or weight loss If you notice these less serious side effects, talk with your doctor: · Back pain · Headache · Redness, itching, bruising, bleeding, pain, or swelling where the shot was given If you notice other side effects that you think are caused by this medicine, tell your doctor. Call your doctor for medical advice about side effects. You may report side effects to FDA at 1-882-FDA-5551 © 2017 [address] Information is for End User's use only and may not be sold, redistributed or otherwise used for commercial purposes. The above information is an  only. It is not intended as medical advice for individual conditions or treatments. Talk to your doctor, nurse or pharmacist before following any medical regimen to see if it is safe and effective for you. Introducing [facility]! Dear Usman Barreto: Thank you for requesting a [employer] account. Our records indicate that you already have an active [employer] account. You can access your account anytime at [email or web address] Did you know that you can access your hospital and ER discharge instructions at any time in [employer]?   You can also review all of your test results from your hospital stay or ER visit. Additional Information If you have questions, please visit the Frequently Asked Questions section of the [employer] website at [email or web address] Remember, [employer] is NOT to be used for urgent needs. For medical emergencies, dial 911. Now available from your iPhone and Android! Please provide this summary of care documentation to your next provider. Your primary care clinician is listed as Fernando Ramirez. If you have any questions after today's visit, please call [phone].

## 2018-05-11 DIAGNOSIS — M81.0 AGE-RELATED OSTEOPOROSIS WITHOUT CURRENT PATHOLOGICAL FRACTURE: ICD-10-CM

## 2018-05-11 DIAGNOSIS — M06.9 RHEUMATOID ARTHRITIS WITH UNKNOWN RHEUMATOID FACTOR STATUS (HCC): ICD-10-CM

## 2018-05-11 DIAGNOSIS — E55.9 VITAMIN D DEFICIENCY: ICD-10-CM

## 2018-05-11 DIAGNOSIS — N18.2 CKD (CHRONIC KIDNEY DISEASE) STAGE 2, GFR 60-89 ML/MIN: ICD-10-CM

## 2018-05-11 LAB
ALBUMIN SERPL-MCNC: 4.2 G/DL (ref 3.5–4.8)
ALBUMIN/GLOB SERPL: 1.7 {RATIO} (ref 1.2–2.2)
ALP SERPL-CCNC: 80 IU/L (ref 39–117)
ALT SERPL-CCNC: 32 IU/L (ref 0–32)
AST SERPL-CCNC: 39 IU/L (ref 0–40)
BASOPHILS # BLD AUTO: 0 X10E3/UL (ref 0–0.2)
BASOPHILS NFR BLD AUTO: 0 %
BILIRUB SERPL-MCNC: 0.2 MG/DL (ref 0–1.2)
BUN SERPL-MCNC: 26 MG/DL (ref 8–27)
BUN/CREAT SERPL: 30 (ref 12–28)
CALCIUM SERPL-MCNC: 8.9 MG/DL (ref 8.7–10.3)
CHLORIDE SERPL-SCNC: 103 MMOL/L (ref 96–106)
CO2 SERPL-SCNC: 24 MMOL/L (ref 18–29)
CREAT SERPL-MCNC: 0.86 MG/DL (ref 0.57–1)
CRP SERPL-MCNC: 0.3 MG/L (ref 0–4.9)
EOSINOPHIL # BLD AUTO: 0.2 X10E3/UL (ref 0–0.4)
EOSINOPHIL NFR BLD AUTO: 3 %
ERYTHROCYTE [DISTWIDTH] IN BLOOD BY AUTOMATED COUNT: 13 % (ref 12.3–15.4)
ERYTHROCYTE [SEDIMENTATION RATE] IN BLOOD BY WESTERGREN METHOD: 2 MM/HR (ref 0–40)
GFR SERPLBLD CREATININE-BSD FMLA CKD-EPI: 65 ML/MIN/1.73
GFR SERPLBLD CREATININE-BSD FMLA CKD-EPI: 75 ML/MIN/1.73
GLOBULIN SER CALC-MCNC: 2.5 G/DL (ref 1.5–4.5)
GLUCOSE SERPL-MCNC: 164 MG/DL (ref 65–99)
HCT VFR BLD AUTO: 40.2 % (ref 34–46.6)
HGB BLD-MCNC: 13 G/DL (ref 11.1–15.9)
IMM GRANULOCYTES # BLD: 0.1 X10E3/UL (ref 0–0.1)
IMM GRANULOCYTES NFR BLD: 1 %
LYMPHOCYTES # BLD AUTO: 2.4 X10E3/UL (ref 0.7–3.1)
LYMPHOCYTES NFR BLD AUTO: 29 %
MCH RBC QN AUTO: 31.9 PG (ref 26.6–33)
MCHC RBC AUTO-ENTMCNC: 32.3 G/DL (ref 31.5–35.7)
MCV RBC AUTO: 99 FL (ref 79–97)
MONOCYTES # BLD AUTO: 0.7 X10E3/UL (ref 0.1–0.9)
MONOCYTES NFR BLD AUTO: 8 %
NEUTROPHILS # BLD AUTO: 4.8 X10E3/UL (ref 1.4–7)
NEUTROPHILS NFR BLD AUTO: 59 %
PLATELET # BLD AUTO: 232 X10E3/UL (ref 150–379)
POTASSIUM SERPL-SCNC: 5.4 MMOL/L (ref 3.5–5.2)
PROT SERPL-MCNC: 6.7 G/DL (ref 6–8.5)
RBC # BLD AUTO: 4.07 X10E6/UL (ref 3.77–5.28)
SODIUM SERPL-SCNC: 143 MMOL/L (ref 134–144)
WBC # BLD AUTO: 8.2 X10E3/UL (ref 3.4–10.8)

## 2018-05-11 RX ORDER — LEFLUNOMIDE 20 MG/1
TABLET ORAL
Qty: 90 TAB | Refills: 0 | Status: SHIPPED | OUTPATIENT
Start: 2018-05-11 | End: 2018-12-12 | Stop reason: SDUPTHER

## 2018-05-14 ENCOUNTER — TELEPHONE (OUTPATIENT)
Dept: RHEUMATOLOGY | Age: 78
End: 2018-05-14

## 2018-05-14 NOTE — TELEPHONE ENCOUNTER
Tried to return call to [facility], but not answer, after transfer. Will do prior auth on Cover MyMeds for Humira.

## 2018-05-14 NOTE — TELEPHONE ENCOUNTER
----- Message from Trey Garg sent at 5/14/2018  8:58 AM EDT -----  Regarding: /telephone  Lydia Perdue pharmacist , would like to get a pre-auth on Humira for the pt.  Pharmacy callback: [phone] reference :[id number] store #[id number]

## 2018-05-16 ENCOUNTER — TELEPHONE (OUTPATIENT)
Dept: FAMILY MEDICINE CLINIC | Age: 78
End: 2018-05-16

## 2018-05-16 NOTE — TELEPHONE ENCOUNTER
----- Message from [facility] sent at 5/16/2018  9:25 AM EDT -----  Regarding: Dr. Eduardo Nguyen is returning a call from the office and would like a call back.   Pt best contact [phone]

## 2018-05-18 RX ORDER — GABAPENTIN 600 MG/1
TABLET ORAL
Qty: 60 TAB | Refills: 0 | Status: SHIPPED | OUTPATIENT
Start: 2018-05-18 | End: 2018-09-27 | Stop reason: DRUGHIGH

## 2018-05-29 ENCOUNTER — OFFICE VISIT (OUTPATIENT)
Dept: FAMILY MEDICINE CLINIC | Age: 78
End: 2018-05-29

## 2018-05-29 VITALS
DIASTOLIC BLOOD PRESSURE: 51 MMHG | BODY MASS INDEX: 40.48 KG/M2 | HEIGHT: 59 IN | HEART RATE: 86 BPM | SYSTOLIC BLOOD PRESSURE: 97 MMHG | RESPIRATION RATE: 20 BRPM | TEMPERATURE: 98.2 F | WEIGHT: 200.8 LBS

## 2018-05-29 DIAGNOSIS — D51.0 PERNICIOUS ANEMIA: Primary | ICD-10-CM

## 2018-05-29 DIAGNOSIS — Z87.440 HISTORY OF RECURRENT UTI (URINARY TRACT INFECTION): ICD-10-CM

## 2018-05-29 RX ORDER — CYANOCOBALAMIN 1000 UG/ML
1000 INJECTION, SOLUTION INTRAMUSCULAR; SUBCUTANEOUS ONCE
Qty: 1 ML | Refills: 0
Start: 2018-05-29 | End: 2018-05-29

## 2018-05-29 RX ORDER — TRIMETHOPRIM 100 MG/1
100 TABLET ORAL DAILY
Qty: 30 TAB | Refills: 5 | Status: SHIPPED | OUTPATIENT
Start: 2018-05-29 | End: 2019-05-06 | Stop reason: SDUPTHER

## 2018-05-29 NOTE — PROGRESS NOTES
Diana Miguel  65 y.o. female  1940  [address] [place] [id number]  [id number]     [id number]: Progress Note       Encounter Date: 5/29/2018    Chief Complaint   Patient presents with    Injection B12    Medication Problem     Trimethorim        History provided by patient  History of Present Illness   Diana Miguel is a 65 y.o. 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 Current medications: monthly B12 injection, daily ferrous sulfate. HGB   Date Value Ref Range Status   05/10/2018 13.0 11.1 - 15.9 g/dL Final   03/15/2018 12.9 11.1 - 15.9 g/dL Final   02/08/2018 12.4 11.1 - 15.9 g/dL Final     HCT   Date Value Ref Range Status   05/10/2018 40.2 34.0 - 46.6 % Final   03/15/2018 37.7 34.0 - 46.6 % Final   02/08/2018 38.2 34.0 - 46.6 % Final     MCV   Date Value Ref Range Status   05/10/2018 99 (H) 79 - 97 fL Final   03/15/2018 95 79 - 97 fL Final   02/08/2018 97 79 - 97 fL Final     Iron   Date Value Ref Range Status   02/08/2018 81 27 - 139 ug/dL Final     TIBC   Date Value Ref Range Status   02/08/2018 338 250 - 450 ug/dL Final     UIBC   Date Value Ref Range Status   02/08/2018 257 118 - 369 ug/dL Final     Iron % saturation   Date Value Ref Range Status   02/08/2018 24 15 - 55 % Final     Ferritin   Date Value Ref Range Status   08/31/2015 13 (L) 15 - 150 ng/mL Final     Vitamin B12   Date Value Ref Range Status   02/08/2018 676 232 - 1245 pg/mL Final     Folate   Date Value Ref Range Status   05/31/2016 4.1 >3.0 ng/mL Final     Comment:     A serum folate concentration of less than 3.1 ng/mL is  considered to represent clinical deficiency. Health Maintenance  There are no preventive care reminders to display for this patient. Review of Systems   Review of Systems   Constitutional: Negative for chills and fever. HENT: Negative for congestion and sinus pain.     Respiratory: Negative for cough, sputum production and shortness of breath. Cardiovascular: Negative for chest pain, palpitations and leg swelling. Gastrointestinal: Negative for abdominal pain, constipation, diarrhea, nausea and vomiting. Genitourinary: Negative for dysuria and urgency. Neurological: Negative for dizziness and headaches. Vitals/Objective:     Vitals:    05/29/18 0923   BP: 97/51   Pulse: 86   Resp: 20   Temp: 98.2 °F (36.8 °C)   TempSrc: Oral   Weight: 200 lb 12.8 oz (91.1 kg)   Height: 4' 11\" (1.499 m)     Body mass index is 40.56 kg/(m^2). Wt Readings from Last 3 Encounters:   05/29/18 200 lb 12.8 oz (91.1 kg)   05/10/18 199 lb (90.3 kg)   04/30/18 199 lb 12.8 oz (90.6 kg)       Physical Exam   Constitutional: She is oriented to person, place, and time. She appears well-developed. Cardiovascular: Normal rate and regular rhythm. Neurological: She is alert and oriented to person, place, and time. No results found for this or any previous visit (from the past 24 hour(s)). Assessment and Plan:     Encounter Diagnoses     ICD-10-CM ICD-9-CM   1. Pernicious anemia D51.0 281.0   2. History of recurrent UTI (urinary tract infection) Z87.440 V13.02       1. Pernicious anemia  Continue routine dosing.   - THER/PROPH/DIAG INJ, SC/IM ([id number])  - cyanocobalamin (VITAMIN B-12) 1,000 mcg/mL injection; 1 mL by IntraMUSCular route once for 1 dose. Dispense: 1 mL; Refill: 0  - VITAMIN B12 INJECTION ()    2. History of recurrent UTI (urinary tract infection)  Clarified dose. - trimethoprim (TRIMPEX) 100 mg tablet; Take 1 Tab by mouth daily. Indications: PREVENTION OF BACTERIAL URINARY TRACT INFECTION  Dispense: 30 Tab; Refill: 5    I have discussed the diagnosis with the patient and the intended plan as seen in the above orders. she has expressed understanding. The patient has received an after-visit summary and questions were answered concerning future plans.   I have discussed medication side effects and warnings with the patient as well. Electronically Signed: Sheila Newsome MD     History/Allergies   Patients past medical, surgical and family histories were reviewed and updated. Past Medical History:   Diagnosis Date    Anemia     Arthritis     CAD (coronary artery disease) 1997    MI    Degenerative joint disease of right hip 09/14/2015     Virgil Hernandez/Dr. Bethany Myers    Diabetes ([facility] [address]     Fracture     right shoulder; T6 compression    Gastritis     Hypercholesterolemia     Hyperlipidemia    Lupus 1/9/2015    Osteoporosis, post-menopausal     vertebral fracture      Past Surgical History:   Procedure Laterality Date    CARDIAC SURG PROCEDURE UNLIST      HX CHOLECYSTECTOMY      HX CORONARY STENT PLACEMENT  02/19/1997    LAD    HX GASTRIC BYPASS  2000    HX HERNIA REPAIR  2005    HX KYPHOPLASTY      t5    HX KYPHOPLASTY      L4    HX ORTHOPAEDIC      bilateral knees    HX ORTHOPAEDIC      r shoulder    HX ORTHOPAEDIC      Laminectomy    TN INC IMPLTJ NEUROSTIMULATOR ELTRD SACRAL NERVE       Family History   Problem Relation Age of Onset    Diabetes Mother     Hypertension Mother     Heart Disease Mother     Diabetes Father     Hypertension Father     Heart Disease Father    Krystal Munoz Arthritis-rheumatoid Sister     Elevated Lipids Sister    Krystalkeyla Munoz Arthritis-rheumatoid Sister     Hypertension Sister     Diabetes Sister     Thyroid Disease Sister     Arthritis-rheumatoid Other      Social History     Social History    Marital status:      Spouse name: N/A    Number of children: N/A    Years of education: N/A     Occupational History    Not on file.      Social History Main Topics    Smoking status: Former Smoker     Quit date: 12/30/1989    Smokeless tobacco: Never Used    Alcohol use No    Drug use: No    Sexual activity: Not Currently     Other Topics Concern    Not on file     Social History Narrative         Allergies   Allergen Reactions    Cephalosporins Nausea and Vomiting     Severe vomiting       Disposition     Follow-up Disposition:  Return in about 4 weeks (around 6/26/2018) for vitamin B12. Future Appointments  Date Time Provider Yocasta Cisneros   6/27/2018 1:00 PM Fernando Ramirez MD [facility]   8/20/2018 10:00 AM Mimi Villa MD [facility]   8/20/2018 1:40 PM Ranjit Leslie MD [address]   9/27/2018 2:00 PM [facility] CHAIR 1 [facility]            Current Medications after this visit     Current Outpatient Prescriptions   Medication Sig    trimethoprim (TRIMPEX) 100 mg tablet Take 1 Tab by mouth daily. Indications: PREVENTION OF BACTERIAL URINARY TRACT INFECTION    cyanocobalamin (VITAMIN B-12) 1,000 mcg/mL injection 1 mL by IntraMUSCular route once for 1 dose.  gabapentin (NEURONTIN) 600 mg tablet TAKE 1 TABLET BY MOUTH TWICE DAILY    leflunomide (ARAVA) 20 mg tablet TAKE 1/2 TABLET BY MOUTH DAILY FOR 7 DAYS THEN 1 TABLET BY MOUTH EVERY DAY IF TOLERATED    adalimumab (HUMIRA) 40 mg/0.8 mL injection 0.8 mL by SubCUTAneous route every fourteen (14) days for 90 doses. Indications: Rheumatoid Arthritis    adalimumab (HUMIRA PEN) 40 mg/0.8 mL injection pen 0.8 mL by SubCUTAneous route every fourteen (14) days for 3 doses.  leflunomide (ARAVA) 20 mg tablet Take 1 Tab by mouth daily for 90 days. Take half tab daily for 7 days and then one tab if tolerated    amLODIPine (NORVASC) 10 mg tablet TAKE 1/2 TABLET BY MOUTH EVERY DAY FOR 30 DAYS    cyclobenzaprine (FLEXERIL) 10 mg tablet Take 1 Tab by mouth nightly.  glipiZIDE SR (GLUCOTROL XL) 2.5 mg CR tablet TAKE 1 TABLET BY MOUTH TWICE DAILY    ferrous sulfate 325 mg (65 mg iron) tablet Take 1 Tab by mouth two (2) times a day. Indications: Iron Deficiency Anemia    HYDROcodone-acetaminophen (NORCO) 5-325 mg per tablet Take 1 Tab by mouth every six (6) hours as needed for Pain.     folic acid (FOLVITE) 1 mg tablet TAKE 1 TABLET BY MOUTH DAILY    metoprolol succinate (TOPROL-XL) 25 mg XL tablet TAKE 1 TABLET BY MOUTH DAILY    metFORMIN (GLUCOPHAGE) 500 mg tablet TAKE 1 TABLET BY MOUTH TWICE DAILY WITH MEALS    pantoprazole (PROTONIX) 40 mg tablet TK 1 T PO D    gabapentin (NEURONTIN) 600 mg tablet TAKE 1 TABLET BY MOUTH TWICE DAILY    atorvastatin (LIPITOR) 10 mg tablet Take 1 Tab by mouth daily.  ESTRACE 0.01 % (0.1 mg/gram) vaginal cream Insert 0.1 g into vagina every Monday, Wednesday, Friday.  gabapentin (NEURONTIN) 300 mg capsule Take 1 Cap by mouth two (2) times a day.  denosumab (PROLIA) 60 mg/mL injection 1 mL by SubCUTAneous route every 6 months.  glucose blood VI test strips (ASCENSIA CONTOUR) strip Glucometer for 250.00 Pt. test blood sugar once daily (in morning)    Syringe with Needle, Safety (3CC SAFETY SYRINGE 25GX5/8\") 3 mL 25 gauge x 5/8\" syrg To be used with B12 injections    busPIRone (BUSPAR) 5 mg tablet Take 5 mg by mouth two (2) times a day.  nortriptyline (PAMELOR) 10 mg capsule TAKE 1 CAPSULE BY MOUTH EVERY NIGHT AT BEDTIME    acetaminophen (TYLENOL) 650 mg CR tablet Take 1,300 mg by mouth daily as needed for Pain.  Blood-Glucose Meter (CONTOUR METER) monitoring kit Dx: 250. Check blood sugars daily.  Walker [employer] walker. Patient with spinal stenosis and stopped posture. Pain with limited ambulation.  ZINC PO Take 50 mg by mouth daily.  cholecalciferol, vitamin d3, (VITAMIN D3) 1,000 unit tablet Take 2,000 Units by mouth daily.  calcium-vitamin D (CALCIUM 500+D) 500 mg(1,250mg) -200 unit per tablet Take 2 Tabs by mouth daily.  aspirin 81 mg Tab Take 81 mg by mouth daily.  MULTIVITAMINS (MULTIVITAMIN PO) Take 1 Tab by mouth daily. No current facility-administered medications for this visit.       Medications Discontinued During This Encounter   Medication Reason    cyanocobalamin (VITAMIN B-12) 1,000 mcg/mL injection Other    trimethoprim (TRIMPEX) 100 mg tablet Reorder

## 2018-05-29 NOTE — PROGRESS NOTES
Chief Complaint   Patient presents with    Injection B12    Medication Problem     Robeloriralph      Body mass index is 40.56 kg/(m^2). 1. Have you been to the ER, urgent care clinic since your last visit? Hospitalized since your last visit?no    2. Have you seen or consulted any other health care providers outside of the [facility] since your last visit? Include any pap smears or colon screening. No    Reviewed record in preparation for visit and have necessary documentation  Pt did not bring medication to office visit for review  Information was given to pt on Advanced Directives, Living Will  Information was given on Shingles Vaccine  Opportunity was given for questions  Goals that were addressed and/or need to be completed after this appointment include: There are no preventive care reminders to display for this patient. Injection ordered by Dr. María Rebolledo given 5/29/2018 by Lyle Rojo LPN as follows:    Dose amount: 1 ml  Injection site:  Left Deltoid  Route:  IM    Patient tolerated injection well.

## 2018-05-29 NOTE — MR AVS SNAPSHOT
[address] [facility] [place] 
 
 
 [address] [place] [address] 
[phone] Patient: Enrique Pillai MRN: [id number] QDS:[id number]/61/[id number] Visit Information Date & Time Provider Department Dept. Phone Encounter #  
 5/29/2018  9:30 AM Case Ham  [facility] [phone] [id number] Follow-up Instructions Return in about 4 weeks (around 6/26/2018) for vitamin B12. Your Appointments 6/27/2018  1:00 PM  
ROUTINE CARE with Case Ham MD  
[address] [facility] ([address]) Appt Note: 3 mnth fu /est pt /refills/labs/HX UTi  
 [address] [place] [address] [address]  
  
   
 [address] [address]  
  
    
 8/20/2018 10:00 AM  
ESTABLISHED PATIENT with Lore Carvajal MD  
[address] [place] [address] ([facility]) Appt Note: 3 month f/up  
 [facility] [address]  
[phone]  
  
   
 [address] [facility] [address]  
  
    
 8/20/2018  1:40 PM  
Follow Up with Som Kearns MD  
[facility]) Appt Note: follow up neuropathy  $0CP  alicia  2/19/18 [address] [address] [phone]  
  
   
 [address] [facility] [address] [address] [address] Upcoming Health Maintenance Date Due  
 FOOT EXAM Q1 7/26/2018 MICROALBUMIN Q1 7/26/2018 MEDICARE YEARLY EXAM 7/27/2018 Influenza Age 5 to Adult 8/1/2018 HEMOGLOBIN A1C Q6M 8/8/2018 EYE EXAM RETINAL OR DILATED Q1 10/26/2018 LIPID PANEL Q1 2/8/2019 GLAUCOMA SCREENING Q2Y 10/26/2019 DTaP/Tdap/Td series (2 - Td) 12/5/2023 Allergies as of 5/29/2018  Review Complete On: 5/29/2018 By: Case Ham MD  
  
 Severity Noted Reaction Type Reactions Cephalosporins High 12/30/2009    Nausea and Vomiting Severe vomiting Current Immunizations  Reviewed on 3/29/2018 Name Date Influenza High Dose Vaccine PF 9/13/2017 12:00 AM, 8/5/2016 Influenza Vaccine 12/5/2013 Influenza Vaccine Odella Dragon) 9/6/2016 Influenza Vaccine (Quad) PF 11/10/2014 Influenza Vaccine PF 10/20/2015 Influenza Vaccine Split 10/10/2012  4:30 PM, 11/14/2011, 10/25/2010 Pneumococcal Conjugate (PCV-13) 9/13/2017 12:00 AM  
 Pneumococcal Polysaccharide (PPSV-23) 12/5/2013 Tdap 12/5/2013 ZZZ-RETIRED (DO NOT USE) Pneumococcal Vaccine (Unspecified Type) 6/5/2008 Not reviewed this visit You Were Diagnosed With   
  
 Codes Comments Pernicious anemia    -  Primary ICD-10-CM: D51.0 ICD-9-CM: 281.0 History of recurrent UTI (urinary tract infection)     ICD-10-CM: Z87.440 ICD-9-CM: V13.02 Vitals BP Pulse Temp Resp Height(growth percentile) Weight(growth percentile) 97/51 (BP 1 Location: Left arm, BP Patient Position: Sitting) 86 98.2 °F (36.8 °C) (Oral) 20 4' 11\" (1.499 m) 200 lb 12.8 oz (91.1 kg) BMI OB Status Smoking Status 40.56 kg/m2 Postmenopausal Former Smoker Vitals History BMI and BSA Data Body Mass Index Body Surface Area 40.56 kg/m 2 1.95 m 2 Preferred Pharmacy Pharmacy Name Phone [facility], [address] ([address] [phone] Your Updated Medication List  
  
   
This list is accurate as of 5/29/18 10:06 AM.  Always use your most recent med list.  
  
  
  
  
 acetaminophen 650 mg Tber Commonly known as:  TYLENOL Take 1,300 mg by mouth daily as needed for Pain. * adalimumab 40 mg/0.8 mL injection Commonly known as:  HUMIRA  
0.8 mL by SubCUTAneous route every fourteen (14) days for 90 doses. Indications: Rheumatoid Arthritis * adalimumab 40 mg/0.8 mL injection pen Commonly known as:  HUMIRA PEN  
0.8 mL by SubCUTAneous route every fourteen (14) days for 3 doses. amLODIPine 10 mg tablet Commonly known as:  Erenest Mesha TAKE 1/2 TABLET BY MOUTH EVERY DAY FOR 30 DAYS  
  
 aspirin 81 mg tablet Take 81 mg by mouth daily. atorvastatin 10 mg tablet Commonly known as:  LIPITOR Take 1 Tab by mouth daily. Blood-Glucose Meter monitoring kit Commonly known as:  CONTOUR METER Dx: 250. Check blood sugars daily. busPIRone 5 mg tablet Commonly known as:  BUSPAR Take 5 mg by mouth two (2) times a day. CALCIUM 500+D 500 mg(1,250mg) -200 unit per tablet Generic drug:  calcium-vitamin D Take 2 Tabs by mouth daily. cholecalciferol 1,000 unit tablet Commonly known as:  VITAMIN D3 Take 2,000 Units by mouth daily. cyanocobalamin 1,000 mcg/mL injection Commonly known as:  VITAMIN B-12  
1 mL by IntraMUSCular route once for 1 dose. cyclobenzaprine 10 mg tablet Commonly known as:  FLEXERIL Take 1 Tab by mouth nightly. denosumab 60 mg/mL injection Commonly known as:  Alma Rosa Hang 1 mL by SubCUTAneous route every 6 months. ESTRACE 0.01 % (0.1 mg/gram) vaginal cream  
Generic drug:  estradiol Insert 0.1 g into vagina every Monday, Wednesday, Friday. ferrous sulfate 325 mg (65 mg iron) tablet Take 1 Tab by mouth two (2) times a day. Indications: Iron Deficiency Anemia  
  
 folic acid 1 mg tablet Commonly known as:  FOLVITE  
TAKE 1 TABLET BY MOUTH DAILY * gabapentin 300 mg capsule Commonly known as:  NEURONTIN Take 1 Cap by mouth two (2) times a day. * gabapentin 600 mg tablet Commonly known as:  NEURONTIN  
TAKE 1 TABLET BY MOUTH TWICE DAILY * gabapentin 600 mg tablet Commonly known as:  NEURONTIN  
TAKE 1 TABLET BY MOUTH TWICE DAILY  
  
 glipiZIDE SR 2.5 mg CR tablet Commonly known as:  GLUCOTROL XL  
TAKE 1 TABLET BY MOUTH TWICE DAILY  
  
 glucose blood VI test strips strip Commonly known as:  [employer] [place] CONTOUR Glucometer for 250.00 Pt. test blood sugar once daily (in morning) HYDROcodone-acetaminophen 5-325 mg per tablet Commonly known as:  [address] Graham Take 1 Tab by mouth every six (6) hours as needed for Pain. * leflunomide 20 mg tablet Commonly known as:  Tamara Bernstein Take 1 Tab by mouth daily for 90 days. Take half tab daily for 7 days and then one tab if tolerated * leflunomide 20 mg tablet Commonly known as:  Tamara Ruths TAKE 1/2 TABLET BY MOUTH DAILY FOR 7 DAYS THEN 1 TABLET BY MOUTH EVERY DAY IF TOLERATED  
  
 metFORMIN 500 mg tablet Commonly known as:  GLUCOPHAGE  
TAKE 1 TABLET BY MOUTH TWICE DAILY WITH MEALS  
  
 metoprolol succinate 25 mg XL tablet Commonly known as:  TOPROL-XL  
TAKE 1 TABLET BY MOUTH DAILY MULTIVITAMIN PO Take 1 Tab by mouth daily. nortriptyline 10 mg capsule Commonly known as:  PAMELOR  
TAKE 1 CAPSULE BY MOUTH EVERY NIGHT AT BEDTIME  
  
 pantoprazole 40 mg tablet Commonly known as:  PROTONIX TK 1 T PO D Syringe with Needle, Safety 3 mL 25 gauge x 5/8\" Syrg Commonly known as:  3cc Safety Syringe 25Gx5/8\" To be used with B12 injections  
  
 trimethoprim 100 mg tablet Commonly known as:  TRIMPEX Take 1 Tab by mouth daily. Indications: PREVENTION OF BACTERIAL URINARY TRACT INFECTION Odette Harris walker. Patient with spinal stenosis and stopped posture. Pain with limited ambulation. ZINC PO Take 50 mg by mouth daily. * Notice: This list has 7 medication(s) that are the same as other medications prescribed for you. Read the directions carefully, and ask your doctor or other care provider to review them with you. Prescriptions Sent to Pharmacy Refills  
 trimethoprim (TRIMPEX) 100 mg tablet 5 Sig: Take 1 Tab by mouth daily. Indications: PREVENTION OF BACTERIAL URINARY TRACT INFECTION  Class: Normal  
 [facility] [address] #: [phone] Route: Oral  
  
We Performed the Following THER/PROPH/DIAG INJECTION, SUBCUT/IM [id number] CPT(R)] Follow-up Instructions Return in about 4 weeks (around 6/26/2018) for vitamin B12. To-Do List   
 09/27/2018 2:00 PM  
  Appointment with Hever Rodríguez at [place] [address] ([phone]) [facility]! Dear Daniel Bashir: Thank you for requesting a [employer] account. Our records indicate that you already have an active [employer] account. You can access your account anytime at [email or web address] Did you know that you can access your hospital and ER discharge instructions at any time in [employer]? You can also review all of your test results from your hospital stay or ER visit. Additional Information If you have questions, please visit the Frequently Asked Questions section of the [employer] website at [email or web address] Remember, [employer] is NOT to be used for urgent needs. For medical emergencies, dial 911. Now available from your iPhone and Android! Please provide this summary of care documentation to your next provider. Your primary care clinician is listed as Gilberto Figueroa. If you have any questions after today's visit, please call [phone].

## 2018-06-04 DIAGNOSIS — M06.9 RHEUMATOID ARTHRITIS WITH UNKNOWN RHEUMATOID FACTOR STATUS (HCC): ICD-10-CM

## 2018-06-04 DIAGNOSIS — I73.00 PRIMARY RAYNAUD'S PHENOMENON: ICD-10-CM

## 2018-06-04 DIAGNOSIS — M81.0 AGE-RELATED OSTEOPOROSIS WITHOUT CURRENT PATHOLOGICAL FRACTURE: ICD-10-CM

## 2018-06-04 DIAGNOSIS — E55.9 VITAMIN D DEFICIENCY: ICD-10-CM

## 2018-06-04 DIAGNOSIS — N18.2 CKD (CHRONIC KIDNEY DISEASE) STAGE 2, GFR 60-89 ML/MIN: ICD-10-CM

## 2018-06-04 RX ORDER — AMLODIPINE BESYLATE 10 MG/1
TABLET ORAL
Qty: 15 TAB | Refills: 0 | Status: SHIPPED | OUTPATIENT
Start: 2018-06-04 | End: 2018-07-02 | Stop reason: SDUPTHER

## 2018-06-27 ENCOUNTER — OFFICE VISIT (OUTPATIENT)
Dept: FAMILY MEDICINE CLINIC | Age: 78
End: 2018-06-27

## 2018-06-27 VITALS
HEIGHT: 59 IN | RESPIRATION RATE: 18 BRPM | WEIGHT: 198 LBS | TEMPERATURE: 97.8 F | BODY MASS INDEX: 39.92 KG/M2 | DIASTOLIC BLOOD PRESSURE: 60 MMHG | SYSTOLIC BLOOD PRESSURE: 108 MMHG | HEART RATE: 64 BPM

## 2018-06-27 DIAGNOSIS — E11.42 DIABETIC POLYNEUROPATHY ASSOCIATED WITH TYPE 2 DIABETES MELLITUS (HCC): ICD-10-CM

## 2018-06-27 DIAGNOSIS — D51.0 PERNICIOUS ANEMIA: Primary | ICD-10-CM

## 2018-06-27 DIAGNOSIS — E11.21 TYPE 2 DIABETES WITH NEPHROPATHY (HCC): ICD-10-CM

## 2018-06-27 DIAGNOSIS — Z98.84 STATUS POST GASTRIC BYPASS FOR OBESITY: ICD-10-CM

## 2018-06-27 RX ORDER — CYANOCOBALAMIN 1000 UG/ML
1000 INJECTION, SOLUTION INTRAMUSCULAR; SUBCUTANEOUS ONCE
Qty: 1 ML | Refills: 0
Start: 2018-06-27 | End: 2018-06-27

## 2018-06-27 RX ORDER — LANOLIN ALCOHOL/MO/W.PET/CERES
325 CREAM (GRAM) TOPICAL 2 TIMES DAILY
Qty: 60 TAB | Refills: 5 | Status: SHIPPED | OUTPATIENT
Start: 2018-06-27 | End: 2019-05-06 | Stop reason: SDUPTHER

## 2018-06-27 NOTE — MR AVS SNAPSHOT
[address] [place] [address] [address] [address] 
[phone] Patient: Stuart Manuel MRN: [id number] MBB:[id number] Visit Information Date & Time Provider Department Dept. Phone Encounter #  
 6/27/2018  1:00 PM iMlly Arrington MD 7 [facility] [id number] Follow-up Instructions Return in about 4 months (around 10/27/2018) for Routine. Monthly for b12 inj. .  
 Routing History Your Appointments 8/20/2018 10:00 AM  
ESTABLISHED PATIENT with Balaji Miller MD  
[address] ([address]) Appt Note: 3 month f/up  
 [place] [address]  
[phone]  
  
   
 [address] [place] [address]  
  
    
 8/20/2018  1:40 PM  
Follow Up with Corey Millan MD  
[facility]) Appt Note: follow up neuropathy  $0CP  alicia  2/19/18 [place] [address] [facility] [address] [id number] [phone]  
  
   
 [place] [address] [address] [address] Upcoming Health Maintenance Date Due  
 FOOT EXAM Q1 7/26/2018 MICROALBUMIN Q1 7/26/2018 MEDICARE YEARLY EXAM 7/27/2018 Influenza Age 5 to Adult 8/1/2018 HEMOGLOBIN A1C Q6M 8/8/2018 EYE EXAM RETINAL OR DILATED Q1 10/26/2018 LIPID PANEL Q1 2/8/2019 GLAUCOMA SCREENING Q2Y 10/26/2019 DTaP/Tdap/Td series (2 - Td) 12/5/2023 Allergies as of 6/27/2018  Review Complete On: 6/27/2018 By: Milly Arrington MD  
  
 Severity Noted Reaction Type Reactions Cephalosporins High 12/30/2009    Nausea and Vomiting Severe vomiting Current Immunizations  Reviewed on 3/29/2018 Name Date Influenza High Dose Vaccine PF 9/13/2017 12:00 AM, 8/5/2016 Influenza Vaccine 12/5/2013 Influenza Vaccine Phil Cagey) 9/6/2016 Influenza Vaccine (Quad) PF 11/10/2014 Influenza Vaccine PF 10/20/2015 Influenza Vaccine Split 10/10/2012  4:30 PM, 11/14/2011, 10/25/2010 Pneumococcal Conjugate (PCV-13) 9/13/2017 12:00 AM  
 Pneumococcal Polysaccharide (PPSV-23) 12/5/2013 Tdap 12/5/2013 ZZZ-RETIRED (DO NOT USE) Pneumococcal Vaccine (Unspecified Type) 6/5/2008 Not reviewed this visit You Were Diagnosed With   
  
 Codes Comments Pernicious anemia    -  Primary ICD-10-CM: D51.0 ICD-9-CM: 281.0 Status post gastric bypass for obesity     ICD-10-CM: Z98.84 ICD-9-CM: V45.86 Type 2 diabetes with nephropathy (HCC)     ICD-10-CM: E11.21 
ICD-9-CM: 250.40, 583.81 Diabetic polyneuropathy associated with type 2 diabetes mellitus ([facility]     ICD-10-CM: E11.42 
ICD-9-CM: 250.60, 357.2 Vitals BP Pulse Temp Resp Height(growth percentile) Weight(growth percentile) 108/60 64 97.8 °F (36.6 °C) (Oral) 18 4' 11\" (1.499 m) 198 lb (89.8 kg) BMI OB Status Smoking Status 39.99 kg/m2 Postmenopausal Former Smoker Vitals History BMI and BSA Data Body Mass Index Body Surface Area  
 39.99 kg/m 2 1.93 m 2 Preferred Pharmacy Pharmacy Name Phone [address] ([address] [phone] Your Updated Medication List  
  
   
This list is accurate as of 6/27/18  1:38 PM.  Always use your most recent med list.  
  
  
  
  
 acetaminophen 650 mg Tber Commonly known as:  TYLENOL Take 1,300 mg by mouth daily as needed for Pain. amLODIPine 10 mg tablet Commonly known as:  Froylan Pauline TAKE 1/2 TABLET BY MOUTH EVERY DAY FOR 30 DAYS  
  
 aspirin 81 mg tablet Take 81 mg by mouth daily. atorvastatin 10 mg tablet Commonly known as:  LIPITOR Take 1 Tab by mouth daily. Blood-Glucose Meter monitoring kit Commonly known as:  CONTOUR METER Dx: 250. Check blood sugars daily. busPIRone 5 mg tablet Commonly known as:  BUSPAR Take 5 mg by mouth two (2) times a day. CALCIUM 500+D 500 mg(1,250mg) -200 unit per tablet Generic drug:  calcium-vitamin D Take 2 Tabs by mouth daily. cholecalciferol 1,000 unit tablet Commonly known as:  VITAMIN D3 Take 2,000 Units by mouth daily. cyanocobalamin 1,000 mcg/mL injection Commonly known as:  VITAMIN B-12  
1 mL by IntraMUSCular route once for 1 dose. cyclobenzaprine 10 mg tablet Commonly known as:  FLEXERIL Take 1 Tab by mouth nightly. denosumab 60 mg/mL injection Commonly known as:  Pearlene Alex 1 mL by SubCUTAneous route every 6 months. ESTRACE 0.01 % (0.1 mg/gram) vaginal cream  
Generic drug:  estradiol Insert 0.1 g into vagina every Monday, Wednesday, Friday. ferrous sulfate 325 mg (65 mg iron) tablet Take 1 Tab by mouth two (2) times a day. Indications: Iron Deficiency Anemia  
  
 folic acid 1 mg tablet Commonly known as:  FOLVITE  
TAKE 1 TABLET BY MOUTH DAILY * gabapentin 300 mg capsule Commonly known as:  NEURONTIN Take 1 Cap by mouth two (2) times a day. * gabapentin 600 mg tablet Commonly known as:  NEURONTIN  
TAKE 1 TABLET BY MOUTH TWICE DAILY * gabapentin 600 mg tablet Commonly known as:  NEURONTIN  
TAKE 1 TABLET BY MOUTH TWICE DAILY  
  
 glipiZIDE SR 2.5 mg CR tablet Commonly known as:  GLUCOTROL XL  
TAKE 1 TABLET BY MOUTH TWICE DAILY  
  
 glucose blood VI test strips strip Commonly known as:  Ascensia CONTOUR Glucometer for 250.00 Pt. test blood sugar once daily (in morning) HYDROcodone-acetaminophen 5-325 mg per tablet Commonly known as:  Allyson Real Take 1 Tab by mouth every six (6) hours as needed for Pain. * leflunomide 20 mg tablet Commonly known as:  Stephanie Nikolai Take 1 Tab by mouth daily for 90 days. Take half tab daily for 7 days and then one tab if tolerated * leflunomide 20 mg tablet Commonly known as:  Stephanie Nikolai TAKE 1/2 TABLET BY MOUTH DAILY FOR 7 DAYS THEN 1 TABLET BY MOUTH EVERY DAY IF TOLERATED  
  
 metFORMIN 500 mg tablet Commonly known as:  GLUCOPHAGE  
TAKE 1 TABLET BY MOUTH TWICE DAILY WITH MEALS  
  
 metoprolol succinate 25 mg XL tablet Commonly known as:  TOPROL-XL  
TAKE 1 TABLET BY MOUTH DAILY MULTIVITAMIN PO Take 1 Tab by mouth daily. nortriptyline 10 mg capsule Commonly known as:  PAMELOR  
TAKE 1 CAPSULE BY MOUTH EVERY NIGHT  
  
 pantoprazole 40 mg tablet Commonly known as:  PROTONIX TK 1 T PO D Syringe with Needle, Safety 3 mL 25 gauge x 5/8\" Syrg Commonly known as:  3cc Safety Syringe 25Gx5/8\" To be used with B12 injections  
  
 trimethoprim 100 mg tablet Commonly known as:  TRIMPEX Take 1 Tab by mouth daily. Indications: PREVENTION OF BACTERIAL URINARY TRACT INFECTION Martha Reus Rolling walker. Patient with spinal stenosis and stopped posture. Pain with limited ambulation. ZINC PO Take 50 mg by mouth daily. * Notice: This list has 5 medication(s) that are the same as other medications prescribed for you. Read the directions carefully, and ask your doctor or other care provider to review them with you. Prescriptions Sent to Pharmacy Refills  
 ferrous sulfate 325 mg (65 mg iron) tablet 5 Sig: Take 1 Tab by mouth two (2) times a day. Indications: Iron Deficiency Anemia Class: Normal  
 Pharmacy: [email or web address] [address] #: [phone] Route: Oral  
  
We Performed the Following HEMOGLOBIN A1C WITH EAG [25933 CPT(R)]  DIABETES FOOT EXAM [7 Custom] LIPID PANEL [81369 CPT(R)] METABOLIC PANEL, BASIC [78296 CPT(R)] MICROALBUMIN, UR, RAND W/ MICROALB/CREAT RATIO [id number] CPT(R)] THER/PROPH/DIAG INJECTION, SUBCUT/IM [id number] CPT(R)] VITAMIN B12 INJECTION [ [facility]] Follow-up Instructions Return in about 4 months (around 10/27/2018) for Routine. Monthly for b12 inj. Siobhan Anthony To-Do List   
 09/27/2018 2:00 PM  
  Appointment with Mirta Rodríguez at [place] [address] ([phone]) Patient Instructions Diabetes Foot Health: Care Instructions Your Care Instructions When you have diabetes, your feet need extra care and attention. Diabetes can damage the nerve endings and blood vessels in your feet, making you less likely to notice when your feet are injured. Diabetes also limits your body's ability to fight infection and get blood to areas that need it. If you get a minor foot injury, it could become an ulcer or a serious infection. With good foot care, you can prevent most of these problems. Caring for your feet can be quick and easy. Most of the care can be done when you are bathing or getting ready for bed. Follow-up care is a key part of your treatment and safety. Be sure to make and go to all appointments, and call your doctor if you are having problems. It's also a good idea to know your test results and keep a list of the medicines you take. How can you care for yourself at home? · Keep your blood sugar close to normal by watching what and how much you eat, monitoring blood sugar, taking medicines if prescribed, and getting regular exercise. · Do not smoke. Smoking affects blood flow and can make foot problems worse. If you need help quitting, talk to your doctor about stop-smoking programs and medicines. These can increase your chances of quitting for good. · Eat a diet that is low in fats. High fat intake can cause fat to build up in your blood vessels and decrease blood flow. · Inspect your feet daily for blisters, cuts, cracks, or sores. If you cannot see well, use a mirror or have someone help you. · Take care of your feet: 
[facility] your feet every day. Use warm (not hot) water.  Check the water temperature with your wrists or other part of your body, not your feet. ¨ Dry your feet well. Pat them dry. Do not rub the skin on your feet too hard. Dry well between your toes. If the skin on your feet stays moist, bacteria or a fungus can grow, which can lead to infection. ¨ Keep your skin soft. Use moisturizing skin cream to keep the skin on your feet soft and prevent calluses and cracks. But do not put the cream between your toes, and stop using any cream that causes a rash. ¨ Clean underneath your toenails carefully. Do not use a sharp object to clean underneath your toenails. Use the blunt end of a nail file or other rounded tool. ¨ Trim and file your toenails straight across to prevent ingrown toenails. Use a nail clipper, not scissors. Use an emery board to smooth the edges. · Change socks daily. Socks without seams are best, because seams often rub the feet. You can find socks for people with diabetes from specialty catalogs. · Look inside your shoes every day for things like gravel or torn linings, which could cause blisters or sores. · Buy shoes that fit well: 
¨ Look for shoes that have plenty of space around the toes. This helps prevent bunions and blisters. ¨ Try on shoes while wearing the kind of socks you will usually wear with the shoes. ¨ Avoid plastic shoes. They may rub your feet and cause blisters. Good shoes should be made of materials that are flexible and breathable, such as leather or cloth. ¨ Break in new shoes slowly by wearing them for no more than an hour a day for several days. Take extra time to check your feet for red areas, blisters, or other problems after you wear new shoes. · Do not go barefoot. Do not wear sandals, and do not wear shoes with very thin soles. Thin soles are easy to puncture. They also do not protect your feet from hot pavement or cold weather. · Have your doctor check your feet during each visit.  If you have a foot problem, see your doctor. Do not try to treat an early foot problem at home. Home remedies or treatments that you can buy without a prescription (such as corn removers) can be harmful. · Always get early treatment for foot problems. A minor irritation can lead to a major problem if not properly cared for early. When should you call for help? Call your doctor now or seek immediate medical care if: 
? · You have a foot sore, an ulcer or break in the skin that is not healing after 4 days, bleeding corns or calluses, or an ingrown toenail. ? · You have blue or black areas, which can mean bruising or blood flow problems. ? · You have peeling skin or tiny blisters between your toes or cracking or oozing of the skin. ? · You have a fever for more than 24 hours and a foot sore. ? · You have new numbness or tingling in your feet that does not go away after you move your feet or change positions. ? · You have unexplained or unusual swelling of the foot or ankle. ? Watch closely for changes in your health, and be sure to contact your doctor if: 
? · You cannot do proper foot care. Where can you learn more? Go to http://jennifer-vincent.info/. Enter A739 in the search box to learn more about \"Diabetes Foot Health: Care Instructions. \" Current as of: March 13, 2017 Content Version: 11.4 © 2776-2703 [employer]. Care instructions adapted under license by [employer] (which disclaims liability or warranty for this information). If you have questions about a medical condition or this instruction, always ask your healthcare professional. [place] [address] any warranty or liability for your use of this information. Introducing [facility]! Dear Daniel Bashir: Thank you for requesting a X[employer] account. Our records indicate that you already have an active [employer] account. You can access your account anytime at [email or web address] Did you know that you can access your hospital and ER discharge instructions at any time in Wikipixel? You can also review all of your test results from your hospital stay or ER visit. Additional Information If you have questions, please visit the Frequently Asked Questions section of the [employer] website at [email or web address] Remember, Wikipixel is NOT to be used for urgent needs. For medical emergencies, dial 911. Now available from your iPhone and Android! Please provide this summary of care documentation to your next provider. Your primary care clinician is listed as Gilberto Figueroa. If you have any questions after today's visit, please call [phone].

## 2018-06-27 NOTE — PROGRESS NOTES
Enedelia Prakash  77 y.o. female  1940  [id number]  [facility] [address]  [id number]     [facility]: Progress Note       Encounter Date: 6/27/2018    Chief Complaint   Patient presents with    Injection B12    Diabetes    Cholesterol Problem    Chronic Kidney Disease       History provided by patient  History of Present Illness   Enedelia Prakash is a 77 y.o. female who presents to clinic today for:    Diabetes Follow up: Controlled   Overall the patient feels she is well.     Diabetic Consultants:Endocrinologist - N/A   Therapy:oral agents (dual therapy): glipizide (generic), metformin (generic)   Medication compliance:Good   Frequency of home glucose testing: N/A   Low blood sugar symptoms: No   Dietary compliance: Good   On ASA: Yes   Pertinent Labs:      Hemoglobin A1c   Date Value Ref Range Status   02/08/2018 5.8 (H) 4.8 - 5.6 % Final     Comment:              Pre-diabetes: 5.7 - 6.4           Diabetes: >6.4           Glycemic control for adults with diabetes: <7.0     08/01/2017 5.9 (H) 4.8 - 5.6 % Final     Comment:              Pre-diabetes: 5.7 - 6.4           Diabetes: >6.4           Glycemic control for adults with diabetes: <7.0     01/06/2017 5.8 (H) 4.8 - 5.6 % Final     Comment:              Pre-diabetes: 5.7 - 6.4           Diabetes: >6.4           Glycemic control for adults with diabetes: <7.0       Estimated average glucose   Date Value Ref Range Status   02/08/2018 120 mg/dL Final   08/01/2017 123 mg/dL Final   01/06/2017 120 mg/dL Final     Cholesterol, total   Date Value Ref Range Status   02/08/2018 125 100 - 199 mg/dL Final     HDL Cholesterol   Date Value Ref Range Status   02/08/2018 53 >39 mg/dL Final        Wt Readings from Last 3 Encounters:   06/27/18 198 lb (89.8 kg)   05/29/18 200 lb 12.8 oz (91.1 kg)   05/10/18 199 lb (90.3 kg)        History   Smoking Status    Former Smoker    Quit date: 12/30/1989   Smokeless Tobacco    Never Used Anemia due to gastric bypass/pernicous anemia: Stable  Symptoms: none  Current medications: vitamin b12  HGB   Date Value Ref Range Status   05/10/2018 13.0 11.1 - 15.9 g/dL Final   03/15/2018 12.9 11.1 - 15.9 g/dL Final   02/08/2018 12.4 11.1 - 15.9 g/dL Final     HCT   Date Value Ref Range Status   05/10/2018 40.2 34.0 - 46.6 % Final   03/15/2018 37.7 34.0 - 46.6 % Final   02/08/2018 38.2 34.0 - 46.6 % Final     MCV   Date Value Ref Range Status   05/10/2018 99 (H) 79 - 97 fL Final   03/15/2018 95 79 - 97 fL Final   02/08/2018 97 79 - 97 fL Final     Iron   Date Value Ref Range Status   02/08/2018 81 27 - 139 ug/dL Final     TIBC   Date Value Ref Range Status   02/08/2018 338 250 - 450 ug/dL Final     UIBC   Date Value Ref Range Status   02/08/2018 257 118 - 369 ug/dL Final     Iron % saturation   Date Value Ref Range Status   02/08/2018 24 15 - 55 % Final     Ferritin   Date Value Ref Range Status   08/31/2015 13 (L) 15 - 150 ng/mL Final     Vitamin B12   Date Value Ref Range Status   02/08/2018 676 232 - 1245 pg/mL Final     Folate   Date Value Ref Range Status   05/31/2016 4.1 >3.0 ng/mL Final     Comment:     A serum folate concentration of less than 3.1 ng/mL is  considered to represent clinical deficiency. Health Maintenance  Ordered. Health Maintenance Due   Topic Date Due    FOOT EXAM Q1  07/26/2018    MICROALBUMIN Q1  07/26/2018     Review of Systems   Review of Systems   Constitutional: Negative for chills and fever. HENT: Negative for congestion and sinus pain. Respiratory: Negative for cough, sputum production and shortness of breath. Cardiovascular: Negative for chest pain, palpitations and leg swelling. Gastrointestinal: Negative for abdominal pain, constipation, diarrhea, nausea and vomiting. Genitourinary: Negative for dysuria and urgency. Neurological: Negative for dizziness and headaches.        Vitals/Objective:     Vitals:    06/27/18 1303   BP: 108/60 Pulse: 64   Resp: 18   Temp: 97.8 °F (36.6 °C)   TempSrc: Oral   Weight: 198 lb (89.8 kg)   Height: 4' 11\" (1.499 m)     Body mass index is 39.99 kg/(m^2). Wt Readings from Last 3 Encounters:   06/27/18 198 lb (89.8 kg)   05/29/18 200 lb 12.8 oz (91.1 kg)   05/10/18 199 lb (90.3 kg)       Physical Exam   Constitutional: She is oriented to person, place, and time. She appears well-developed. HENT:   Head: Normocephalic and atraumatic. Eyes: Pupils are equal, round, and reactive to light. Right eye exhibits no discharge. Left eye exhibits no discharge. Cardiovascular: Normal rate and regular rhythm. Pulmonary/Chest: Effort normal and breath sounds normal. No respiratory distress. She has no wheezes. Neurological: She is alert and oriented to person, place, and time. Skin: Skin is warm. No erythema. Diabetic foot exam:     Left Foot:   Visual Exam: normal    Pulse DP: 1+ (weak)   Filament test: reduced sensation    Vibratory sensation: diminished      Right Foot:   Visual Exam: normal    Pulse DP: 2+ (normal)   Filament test: normal sensation    Vibratory sensation: normal        No results found for this or any previous visit (from the past 24 hour(s)). Assessment and Plan:     Encounter Diagnoses     ICD-10-CM ICD-9-CM   1. Pernicious anemia D51.0 281.0   2. Status post gastric bypass for obesity Z98.84 V45.86   3. Type 2 diabetes with nephropathy (HCC) E11.21 250.40     583.81   4. Diabetic polyneuropathy associated with type 2 diabetes mellitus (HCC) E11.42 250.60     357.2       1. Pernicious anemia  2. Status post gastric bypass for obesity  Continue monthly injection. - VITAMIN B12 INJECTION ()  - THER/PROPH/DIAG INJ, SC/IM ([id number])  - cyanocobalamin (VITAMIN B-12) 1,000 mcg/mL injection; 1 mL by IntraMUSCular route once for 1 dose. Dispense: 1 mL; Refill: 0  - ferrous sulfate 325 mg (65 mg iron) tablet; Take 1 Tab by mouth two (2) times a day.  Indications: Iron Deficiency Anemia  Dispense: 60 Tab; Refill: 5    3. Type 2 diabetes with nephropathy ([facility] [address]  4. Diabetic polyneuropathy associated with type 2 diabetes mellitus (HCC)  - HEMOGLOBIN A1C WITH EAG  - MICROALBUMIN, UR, RAND W/ MICROALB/CREAT RATIO  -  DIABETES FOOT EXAM  - METABOLIC PANEL, BASIC  - LIPID PANEL    I have discussed the diagnosis with the patient and the intended plan as seen in the above orders. she has expressed understanding. The patient has received an after-visit summary and questions were answered concerning future plans. I have discussed medication side effects and warnings with the patient as well. Electronically Signed: Coco Lopez MD     History/Allergies   Patients past medical, surgical and family histories were reviewed and updated.     Past Medical History:   Diagnosis Date    Anemia     Arthritis     CAD (coronary artery disease) 1997    MI    Degenerative joint disease of right hip 09/14/2015     Virgil Hernandez/Dr. Makayla Rob    Diabetes ([facility] [address]     Fracture     right shoulder; T6 compression    Gastritis     Hypercholesterolemia     Hyperlipidemia    Lupus 1/9/2015    Osteoporosis, post-menopausal     vertebral fracture    Past Surgical History:   Procedure Laterality Date    CARDIAC SURG PROCEDURE UNLIST      HX CHOLECYSTECTOMY      HX CORONARY STENT PLACEMENT  02/19/1997    LAD    HX GASTRIC BYPASS  2000    HX HERNIA REPAIR  2005    HX KYPHOPLASTY      t5    HX KYPHOPLASTY      L4    HX ORTHOPAEDIC      bilateral knees    HX ORTHOPAEDIC      r shoulder    HX ORTHOPAEDIC      Laminectomy    MA INC IMPLTJ NEUROSTIMULATOR ELTRD SACRAL NERVE       Family History   Problem Relation Age of Onset    Diabetes Mother     Hypertension Mother     Heart Disease Mother     Diabetes Father     Hypertension Father     Heart Disease Father    [facility] Arthritis-rheumatoid Sister     Elevated Lipids Sister    [facility] Arthritis-rheumatoid Sister     Hypertension Sister     Diabetes Sister  Thyroid Disease Sister     Arthritis-rheumatoid Other      Social History     Social History    Marital status:      Spouse name: N/A    Number of children: N/A    Years of education: N/A     Occupational History    Not on file. Social History Main Topics    Smoking status: Former Smoker     Quit date: 12/30/1989    Smokeless tobacco: Never Used    Alcohol use No    Drug use: No    Sexual activity: Not Currently     Other Topics Concern    Not on file     Social History Narrative         Allergies   Allergen Reactions    Cephalosporins Nausea and Vomiting     Severe vomiting       Disposition     Follow-up Disposition: Not on File    Future Appointments  Date Time Provider Yocasta Amelia   8/20/2018 10:00 AM Mimi Villa MD [address]   8/20/2018 1:40 PM Ranjit Leslie MD [address]   9/27/2018 2:00 PM [facility] CHAIR 1 [facility]            Current Medications after this visit     Current Outpatient Prescriptions   Medication Sig    nortriptyline (PAMELOR) 10 mg capsule TAKE 1 CAPSULE BY MOUTH EVERY NIGHT    amLODIPine (NORVASC) 10 mg tablet TAKE 1/2 TABLET BY MOUTH EVERY DAY FOR 30 DAYS    trimethoprim (TRIMPEX) 100 mg tablet Take 1 Tab by mouth daily. Indications: PREVENTION OF BACTERIAL URINARY TRACT INFECTION    gabapentin (NEURONTIN) 600 mg tablet TAKE 1 TABLET BY MOUTH TWICE DAILY    leflunomide (ARAVA) 20 mg tablet TAKE 1/2 TABLET BY MOUTH DAILY FOR 7 DAYS THEN 1 TABLET BY MOUTH EVERY DAY IF TOLERATED    leflunomide (ARAVA) 20 mg tablet Take 1 Tab by mouth daily for 90 days. Take half tab daily for 7 days and then one tab if tolerated    cyclobenzaprine (FLEXERIL) 10 mg tablet Take 1 Tab by mouth nightly.  glipiZIDE SR (GLUCOTROL XL) 2.5 mg CR tablet TAKE 1 TABLET BY MOUTH TWICE DAILY    ferrous sulfate 325 mg (65 mg iron) tablet Take 1 Tab by mouth two (2) times a day.  Indications: Iron Deficiency Anemia    HYDROcodone-acetaminophen (NORCO) 5-325 mg per tablet Take 1 Tab by mouth every six (6) hours as needed for Pain.  folic acid (FOLVITE) 1 mg tablet TAKE 1 TABLET BY MOUTH DAILY    metoprolol succinate (TOPROL-XL) 25 mg XL tablet TAKE 1 TABLET BY MOUTH DAILY    metFORMIN (GLUCOPHAGE) 500 mg tablet TAKE 1 TABLET BY MOUTH TWICE DAILY WITH MEALS    pantoprazole (PROTONIX) 40 mg tablet TK 1 T PO D    gabapentin (NEURONTIN) 600 mg tablet TAKE 1 TABLET BY MOUTH TWICE DAILY    atorvastatin (LIPITOR) 10 mg tablet Take 1 Tab by mouth daily.  ESTRACE 0.01 % (0.1 mg/gram) vaginal cream Insert 0.1 g into vagina every Monday, Wednesday, Friday.  gabapentin (NEURONTIN) 300 mg capsule Take 1 Cap by mouth two (2) times a day.  denosumab (PROLIA) 60 mg/mL injection 1 mL by SubCUTAneous route every 6 months.  glucose blood VI test strips (ASCENSIA CONTOUR) strip Glucometer for 250.00 Pt. test blood sugar once daily (in morning)    Syringe with Needle, Safety (3CC SAFETY SYRINGE 25GX5/8\") 3 mL 25 gauge x 5/8\" syrg To be used with B12 injections    busPIRone (BUSPAR) 5 mg tablet Take 5 mg by mouth two (2) times a day.  acetaminophen (TYLENOL) 650 mg CR tablet Take 1,300 mg by mouth daily as needed for Pain.  Blood-Glucose Meter (CONTOUR METER) monitoring kit Dx: 250. Check blood sugars daily.  Walker [employer] walker. Patient with spinal stenosis and stopped posture. Pain with limited ambulation.  ZINC PO Take 50 mg by mouth daily.  cholecalciferol, vitamin d3, (VITAMIN D3) 1,000 unit tablet Take 2,000 Units by mouth daily.  calcium-vitamin D (CALCIUM 500+D) 500 mg(1,250mg) -200 unit per tablet Take 2 Tabs by mouth daily.  aspirin 81 mg Tab Take 81 mg by mouth daily.  MULTIVITAMINS (MULTIVITAMIN PO) Take 1 Tab by mouth daily.  adalimumab (HUMIRA) 40 mg/0.8 mL injection 0.8 mL by SubCUTAneous route every fourteen (14) days for 90 doses.  Indications: Rheumatoid Arthritis     No current facility-administered medications for this visit. There are no discontinued medications.

## 2018-06-27 NOTE — PATIENT INSTRUCTIONS
Diabetes Foot Health: Care Instructions  Your Care Instructions    When you have diabetes, your feet need extra care and attention. Diabetes can damage the nerve endings and blood vessels in your feet, making you less likely to notice when your feet are injured. Diabetes also limits your body's ability to fight infection and get blood to areas that need it. If you get a minor foot injury, it could become an ulcer or a serious infection. With good foot care, you can prevent most of these problems. Caring for your feet can be quick and easy. Most of the care can be done when you are bathing or getting ready for bed. Follow-up care is a key part of your treatment and safety. Be sure to make and go to all appointments, and call your doctor if you are having problems. It's also a good idea to know your test results and keep a list of the medicines you take. How can you care for yourself at home? · Keep your blood sugar close to normal by watching what and how much you eat, monitoring blood sugar, taking medicines if prescribed, and getting regular exercise. · Do not smoke. Smoking affects blood flow and can make foot problems worse. If you need help quitting, talk to your doctor about stop-smoking programs and medicines. These can increase your chances of quitting for good. · Eat a diet that is low in fats. High fat intake can cause fat to build up in your blood vessels and decrease blood flow. · Inspect your feet daily for blisters, cuts, cracks, or sores. If you cannot see well, use a mirror or have someone help you. · Take care of your feet:  [facility] your feet every day. Use warm (not hot) water. Check the water temperature with your wrists or other part of your body, not your feet. ¨ Dry your feet well. Pat them dry. Do not rub the skin on your feet too hard. Dry well between your toes. If the skin on your feet stays moist, bacteria or a fungus can grow, which can lead to infection.   ¨ Keep your skin soft. Use moisturizing skin cream to keep the skin on your feet soft and prevent calluses and cracks. But do not put the cream between your toes, and stop using any cream that causes a rash. ¨ Clean underneath your toenails carefully. Do not use a sharp object to clean underneath your toenails. Use the blunt end of a nail file or other rounded tool. ¨ Trim and file your toenails straight across to prevent ingrown toenails. Use a nail clipper, not scissors. Use an emery board to smooth the edges. · Change socks daily. Socks without seams are best, because seams often rub the feet. You can find socks for people with diabetes from specialty catalogs. · Look inside your shoes every day for things like gravel or torn linings, which could cause blisters or sores. · Buy shoes that fit well:  ¨ Look for shoes that have plenty of space around the toes. This helps prevent bunions and blisters. ¨ Try on shoes while wearing the kind of socks you will usually wear with the shoes. ¨ Avoid plastic shoes. They may rub your feet and cause blisters. Good shoes should be made of materials that are flexible and breathable, such as leather or cloth. ¨ Break in new shoes slowly by wearing them for no more than an hour a day for several days. Take extra time to check your feet for red areas, blisters, or other problems after you wear new shoes. · Do not go barefoot. Do not wear sandals, and do not wear shoes with very thin soles. Thin soles are easy to puncture. They also do not protect your feet from hot pavement or cold weather. · Have your doctor check your feet during each visit. If you have a foot problem, see your doctor. Do not try to treat an early foot problem at home. Home remedies or treatments that you can buy without a prescription (such as corn removers) can be harmful. · Always get early treatment for foot problems. A minor irritation can lead to a major problem if not properly cared for early.   When should you call for help? Call your doctor now or seek immediate medical care if:  ? · You have a foot sore, an ulcer or break in the skin that is not healing after 4 days, bleeding corns or calluses, or an ingrown toenail. ? · You have blue or black areas, which can mean bruising or blood flow problems. ? · You have peeling skin or tiny blisters between your toes or cracking or oozing of the skin. ? · You have a fever for more than 24 hours and a foot sore. ? · You have new numbness or tingling in your feet that does not go away after you move your feet or change positions. ? · You have unexplained or unusual swelling of the foot or ankle. ? Watch closely for changes in your health, and be sure to contact your doctor if:  ? · You cannot do proper foot care. Where can you learn more? Go to http://jennifer-vincent.info/. Enter A739 in the search box to learn more about \"Diabetes Foot Health: Care Instructions. \"  Current as of: March 13, 2017  Content Version: 11.4  © 4146-7197 [employer]. Care instructions adapted under license by [employer] (which disclaims liability or warranty for this information). If you have questions about a medical condition or this instruction, always ask your healthcare professional. [address] any warranty or liability for your use of this information.

## 2018-06-27 NOTE — PROGRESS NOTES
1. Have you been to the ER, urgent care clinic, or been hospitalized since your last visit? No     2. Have you seen or consulted any other health care providers outside of the [facility] since your last visit?  No     opportunity was given for questions  Goals that were addressed and/or need to be completed during or after this appointment include     Health Maintenance Due   Topic Date Due    FOOT EXAM Q1  07/26/2018    MICROALBUMIN Q1  07/26/2018

## 2018-06-28 ENCOUNTER — TELEPHONE (OUTPATIENT)
Dept: RHEUMATOLOGY | Age: 78
End: 2018-06-28

## 2018-07-02 DIAGNOSIS — M06.9 RHEUMATOID ARTHRITIS WITH UNKNOWN RHEUMATOID FACTOR STATUS (HCC): ICD-10-CM

## 2018-07-02 DIAGNOSIS — M81.0 AGE-RELATED OSTEOPOROSIS WITHOUT CURRENT PATHOLOGICAL FRACTURE: ICD-10-CM

## 2018-07-02 DIAGNOSIS — I73.00 PRIMARY RAYNAUD'S PHENOMENON: ICD-10-CM

## 2018-07-02 DIAGNOSIS — N18.2 CKD (CHRONIC KIDNEY DISEASE) STAGE 2, GFR 60-89 ML/MIN: ICD-10-CM

## 2018-07-02 DIAGNOSIS — E55.9 VITAMIN D DEFICIENCY: ICD-10-CM

## 2018-07-02 RX ORDER — AMLODIPINE BESYLATE 10 MG/1
TABLET ORAL
Qty: 15 TAB | Refills: 0 | Status: SHIPPED | OUTPATIENT
Start: 2018-07-02 | End: 2018-07-30 | Stop reason: SDUPTHER

## 2018-07-21 DIAGNOSIS — I10 ESSENTIAL HYPERTENSION WITH GOAL BLOOD PRESSURE LESS THAN 130/80: ICD-10-CM

## 2018-07-23 RX ORDER — METOPROLOL SUCCINATE 25 MG/1
TABLET, EXTENDED RELEASE ORAL
Qty: 90 TAB | Refills: 0 | Status: SHIPPED | OUTPATIENT
Start: 2018-07-23 | End: 2018-10-18 | Stop reason: SDUPTHER

## 2018-07-23 RX ORDER — METFORMIN HYDROCHLORIDE 500 MG/1
TABLET ORAL
Qty: 180 TAB | Refills: 0 | Status: SHIPPED | OUTPATIENT
Start: 2018-07-23 | End: 2018-10-19 | Stop reason: SDUPTHER

## 2018-07-27 ENCOUNTER — OFFICE VISIT (OUTPATIENT)
Dept: FAMILY MEDICINE CLINIC | Age: 78
End: 2018-07-27

## 2018-07-27 VITALS
WEIGHT: 198 LBS | SYSTOLIC BLOOD PRESSURE: 95 MMHG | OXYGEN SATURATION: 95 % | DIASTOLIC BLOOD PRESSURE: 56 MMHG | BODY MASS INDEX: 39.92 KG/M2 | RESPIRATION RATE: 18 BRPM | HEIGHT: 59 IN | HEART RATE: 70 BPM | TEMPERATURE: 97.9 F

## 2018-07-27 DIAGNOSIS — E53.8 B12 DEFICIENCY: Primary | ICD-10-CM

## 2018-07-27 RX ORDER — CYANOCOBALAMIN 1000 UG/ML
1000 INJECTION, SOLUTION INTRAMUSCULAR; SUBCUTANEOUS ONCE
Qty: 1 ML | Refills: 0
Start: 2018-07-27 | End: 2018-07-27

## 2018-07-27 NOTE — PROGRESS NOTES
Vitamin B12 1000mcg  injection ordered by Dr. Karen Haynes given 7/27/2018 by Rosina Rosales LPN as follows:    Dose amount:  1.0 ml  Injection site:  upper arm ( right)  Route:  IM      Patient tolerated injection well.

## 2018-08-04 DIAGNOSIS — D51.0 PERNICIOUS ANEMIA: ICD-10-CM

## 2018-08-07 RX ORDER — LANOLIN ALCOHOL/MO/W.PET/CERES
CREAM (GRAM) TOPICAL
Qty: 30 TAB | Refills: 0 | OUTPATIENT
Start: 2018-08-07

## 2018-08-20 ENCOUNTER — DOCUMENTATION ONLY (OUTPATIENT)
Dept: RHEUMATOLOGY | Age: 78
End: 2018-08-20

## 2018-08-20 ENCOUNTER — OFFICE VISIT (OUTPATIENT)
Dept: RHEUMATOLOGY | Age: 78
End: 2018-08-20

## 2018-08-20 VITALS
BODY MASS INDEX: 38.87 KG/M2 | DIASTOLIC BLOOD PRESSURE: 57 MMHG | WEIGHT: 192.8 LBS | HEIGHT: 59 IN | OXYGEN SATURATION: 95 % | HEART RATE: 84 BPM | TEMPERATURE: 98 F | RESPIRATION RATE: 18 BRPM | SYSTOLIC BLOOD PRESSURE: 104 MMHG

## 2018-08-20 DIAGNOSIS — Z00.00 HEALTHCARE MAINTENANCE: ICD-10-CM

## 2018-08-20 DIAGNOSIS — M06.09 SERONEGATIVE RHEUMATOID ARTHRITIS OF MULTIPLE SITES (HCC): Primary | ICD-10-CM

## 2018-08-20 DIAGNOSIS — M81.0 AGE-RELATED OSTEOPOROSIS WITHOUT CURRENT PATHOLOGICAL FRACTURE: ICD-10-CM

## 2018-08-20 DIAGNOSIS — Z79.60 LONG-TERM USE OF IMMUNOSUPPRESSANT MEDICATION: ICD-10-CM

## 2018-08-20 DIAGNOSIS — R79.9 ABNORMAL FINDING OF BLOOD CHEMISTRY: ICD-10-CM

## 2018-08-20 DIAGNOSIS — N18.2 CKD (CHRONIC KIDNEY DISEASE) STAGE 2, GFR 60-89 ML/MIN: ICD-10-CM

## 2018-08-20 DIAGNOSIS — I73.00 PRIMARY RAYNAUD'S PHENOMENON: ICD-10-CM

## 2018-08-20 NOTE — PROGRESS NOTES
REASON FOR VISIT    This is a follow-up visit for Ms. Rueda for Seronegative Erosive Rheumatoid Arthritis and Age-Related Osteoporosis. Inflammatory arthritis phenotype includes:  Anti-CCP positive: no  Rheumatoid factor positive: no  Erosive disease: yes  Extra-articular manifestations include: Raynaud's    Immunosuppression Screening (2/13/2018):   Quantiferon TB: negative   PPD:  Not performed  Hepatitis B: negative    Hepatitis C: negative     Therapy History includes:  Current DMARD therapy include:leflunomide 20 mg daily, Humira 40 mg every 14 days (5/10/2018, 8/20/2018 to present)  Prior DMARD therapy include: methotrexate 12.5 mg subcutaneous,  hydroxychloroquine 300 mg daily, Humira (5/10/2018: 3 samples)  Discontinued DMARDs because of inefficacy: None  Discontinued DMARDs because of side effects: methotrexate (LFTs)  Contra-Indicated DMARDs because of chronic kidney disease: methotrexate     Osteoporosis Historical Synopsis    Height loss since age 26 (at least two inches): 5  Fracture history includes: yes (T5-T6)  Family history of hip fracture: no  Fall Risk: no    Daily calcium intake is 1800 mg  Daily vitamin D intake is 2000 IU    Smoking history: no  Alcohol consumption: no  Prednisone history: no    Exercise: no    Previous work-up for osteoporosis includes the following:  DEXA Scan: 8/08/2017  Vitamin 25OH D level: 46.6 (21/3/2018)  PTH: 21 (21/3/2018)  TSH: 2.570 (21/3/2018)    Therapy History includes:    Current osteoporosis therapy includes: Prolia (3/29/2018)  Prior osteoporosis therapy includes: Reclast (2 years)  The following osteoporosis therapy have been ineffective: Reclast  The following osteoporosis therapy were stopped because of side effects: none    Immunizations:   Immunization History   Administered Date(s) Administered    Influenza High Dose Vaccine PF 08/05/2016, 09/13/2017    Influenza Vaccine 12/05/2013    Influenza Vaccine (Quad) 09/06/2016    Influenza Vaccine (Quad) PF 11/10/2014    Influenza Vaccine PF 10/20/2015    Influenza Vaccine Split 10/25/2010, 11/14/2011, 10/10/2012    Pneumococcal Conjugate (PCV-13) 09/13/2017    Pneumococcal Polysaccharide (PPSV-23) 12/05/2013    Tdap 12/05/2013    ZZZ-RETIRED (DO NOT USE) Pneumococcal Vaccine (Unspecified Type) 06/05/2008       Active problems include:    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    Long-term use of immunosuppressant medication Z79.899    Long-term use of Plaquenil Z79.899    Hypocalcemia E83.51    Low back pain M54.5    Age-related osteoporosis without current pathological fracture M81.0    H/O Bell's palsy Z86.69    Diabetic polyneuropathy ([facility] [address] E11.42    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    Unspecified hereditary and idiopathic peripheral neuropathy G60.9    Raynauds syndrome I73.00    Blepharoconjunctivitis H10.509    Seronegative rheumatoid arthritis of multiple sites [facility] M06.09    Nuclear sclerotic cataract H25.10    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    Obesity, morbid (HCC) E66.01    H/O total knee replacement Z96.659    Primary localized osteoarthritis of left knee M17.12    Hypercholesterolemia E78.00    Primary Raynaud's phenomenon I73.00    CKD (chronic kidney disease) stage 2, GFR 60-89 ml/min N18.2    Type 2 diabetes with nephropathy (HCC) E11.21    CKD (chronic kidney disease) stage 3, GFR 30-59 ml/min N18.3       HISTORY OF PRESENT ILLNESS    Ms. Charlotte Clifton returns for a follow-up. On her last visit, I continued leflunomide 20 mg daily and started Humira, and gave her 3 samples. She was approved but could not afford her copay.  She needed to fill out the [employer]. She felt improvement in Humira by lessening her pain but only had the 3 doses given to her on her last visit. Today, she has left aching pain in her left hand that is worse in the evening, but is swollen in the morning associated with stiffness lasting hours. Her skin is sensitive and is painful to touch. She tried to exercise her hands which helps lessen her pain. Warm water also help. Not using her hands, makes her pain worse. She had her Prolia on 3/29/2018 with good tolerance. She denies interval falls or fractures. Ms. Anitha Pack has continued her medications for arthritis and reports good tolerance without significant side effects. Last toxicity monitoring by blood work was done on 5/10/2018 and did not reveal any significant adverse effects, except eGFR 65. Most recent inflammatory markers from 5/10/2018 revealed a ESR 2 mm/hr (previously 3, 4 mm/hr) and CRP 0.3 mg/L (previously 0.5, 0.3 mg/L). The patient has not had any interval hospital admissions, infections, or surgeries. REVIEW OF SYSTEMS    A comprehensive review of systems was performed and pertinent results are documented in the HPI, review of systems is otherwise non-contributory. PAST MEDICAL HISTORY    She has a past medical history of Anemia; Arthritis; CAD (coronary artery disease) (1997); Degenerative joint disease of right hip (09/14/2015 ); Diabetes ([address] Fracture; Gastritis; Hypercholesterolemia; Lupus (1/9/2015); and Osteoporosis, post-menopausal. She also has no past medical history of Abuse; Anemia NEC; Arrhythmia; Asthma; Calculus of kidney; Cancer ([address] Chronic kidney disease; Chronic obstructive pulmonary disease ([address] Chronic pain; Congestive heart failure, unspecified; Contact dermatitis and other eczema, due to unspecified cause; COPD; Depression; GERD (gastroesophageal reflux disease); Headache(784.0);  Liver disease; Malignant hyperthermia due to anesthesia; Psychotic disorder; PUD (peptic ulcer disease); Seizures ([facility] Sleep apnea; Stroke [facility]); Thromboembolus ([facility] [facility] Thyroid disease; or Trauma. FAMILY HISTORY    Her family history includes Arthritis-rheumatoid in her sister, sister and another family member; Diabetes in her father, mother, and sister; Elevated Lipids in her sister; Heart Disease in her father and mother; Hypertension in her father, mother, and sister; Thyroid Disease in her sister. SOCIAL HISTORY    She reports that she quit smoking about 28 years ago. She has never used smokeless tobacco. She reports that she does not drink alcohol or use illicit drugs. MEDICATIONS    Current Outpatient Prescriptions   Medication Sig Dispense Refill    adalimumab (HUMIRA PEN) 40 mg/0.8 mL injection pen 0.8 mL by SubCUTAneous route every fourteen (14) days. Indications: Rheumatoid Arthritis 2 Kit 0    amLODIPine (NORVASC) 5 mg tablet Take 1 Tab by mouth daily. Take for Raynaud's 90 Tab 0    metoprolol succinate (TOPROL-XL) 25 mg XL tablet TAKE 1 TABLET BY MOUTH EVERY DAY 90 Tab 0    metFORMIN (GLUCOPHAGE) 500 mg tablet TAKE 1 TABLET BY MOUTH TWICE DAILY 180 Tab 0    ferrous sulfate 325 mg (65 mg iron) tablet Take 1 Tab by mouth two (2) times a day. Indications: Iron Deficiency Anemia 60 Tab 5    nortriptyline (PAMELOR) 10 mg capsule TAKE 1 CAPSULE BY MOUTH EVERY NIGHT 30 Cap 2    trimethoprim (TRIMPEX) 100 mg tablet Take 1 Tab by mouth daily. Indications: PREVENTION OF BACTERIAL URINARY TRACT INFECTION 30 Tab 5    leflunomide (ARAVA) 20 mg tablet TAKE 1/2 TABLET BY MOUTH DAILY FOR 7 DAYS THEN 1 TABLET BY MOUTH EVERY DAY IF TOLERATED 90 Tab 0    cyclobenzaprine (FLEXERIL) 10 mg tablet Take 1 Tab by mouth nightly. 90 Tab 1    glipiZIDE SR (GLUCOTROL XL) 2.5 mg CR tablet TAKE 1 TABLET BY MOUTH TWICE DAILY 180 Tab 0    HYDROcodone-acetaminophen (NORCO) 5-325 mg per tablet Take 1 Tab by mouth every six (6) hours as needed for Pain.  60 Tab 0    gabapentin (NEURONTIN) 600 mg tablet TAKE 1 TABLET BY MOUTH TWICE DAILY 60 Tab 5    atorvastatin (LIPITOR) 10 mg tablet Take 1 Tab by mouth daily. 90 Tab 0    ESTRACE 0.01 % (0.1 mg/gram) vaginal cream Insert 0.1 g into vagina every Monday, Wednesday, Friday. 42.5 g 1    gabapentin (NEURONTIN) 300 mg capsule Take 1 Cap by mouth two (2) times a day. 120 Cap 6    denosumab (PROLIA) 60 mg/mL injection 1 mL by SubCUTAneous route every 6 months.  glucose blood VI test strips (ASCENSIA CONTOUR) strip Glucometer for 250.00 Pt. test blood sugar once daily (in morning) 50 Strip 11    Syringe with Needle, Safety (3CC SAFETY SYRINGE 25GX5/8\") 3 mL 25 gauge x 5/8\" syrg To be used with B12 injections 100 Pen Needle 11    busPIRone (BUSPAR) 5 mg tablet Take 5 mg by mouth two (2) times a day. 5    acetaminophen (TYLENOL) 650 mg CR tablet Take 1,300 mg by mouth daily as needed for Pain.  Blood-Glucose Meter (CONTOUR METER) monitoring kit Dx: 250. Check blood sugars daily. 1 kit 11    Walker Misc Rolling walker. Patient with spinal stenosis and stopped posture. Pain with limited ambulation. 1 Each 0    cholecalciferol, vitamin d3, (VITAMIN D3) 1,000 unit tablet Take 2,000 Units by mouth daily.  calcium-vitamin D (CALCIUM 500+D) 500 mg(1,250mg) -200 unit per tablet Take 2 Tabs by mouth daily.  aspirin 81 mg Tab Take 81 mg by mouth daily.  MULTIVITAMINS (MULTIVITAMIN PO) Take 1 Tab by mouth daily.  gabapentin (NEURONTIN) 600 mg tablet TAKE 1 TABLET BY MOUTH TWICE DAILY 60 Tab 0    folic acid (FOLVITE) 1 mg tablet TAKE 1 TABLET BY MOUTH DAILY 90 Tab 0    pantoprazole (PROTONIX) 40 mg tablet TK 1 T PO D  11    ZINC PO Take 50 mg by mouth daily.           ALLERGIES    Allergies   Allergen Reactions    Cephalosporins Nausea and Vomiting     Severe vomiting       PHYSICAL EXAMINATION    Visit Vitals    /57 (BP 1 Location: Left arm, BP Patient Position: Sitting)    Pulse 84    Temp 98 °F (36.7 °C)    Resp 18    Ht 4' 11\" (1.499 m)    Wt 192 lb 12.8 oz (87.5 kg)    SpO2 95%    BMI 38.94 kg/m2     Body mass index is 38.94 kg/(m^2). General: Patient is alert, oriented x 3, not in acute distress    HEENT:   Sclerae are not injected and appear moist.  There is no alopecia. Neck is supple     Cardiovascular:  Heart is regular rate and rhythm, no murmurs. Chest:  Lungs are clear to auscultation bilaterally. No rhonchi, wheezes, or crackles. Extremities:  Free of clubbing, cyanosis +1 nonpitting lower extremity edema with tenderness on the RLE    Neurological exam:  No focal sensory deficits, muscle strength is full in upper and lower extremities     Skin exam:    Psoriasis:     no  Nail Pitting:     no  Onycholysis:     no  Palmoplantar pustulosis:   no  Acne fulminans:    no  Acne conglobata:    no  Hidradenitis Suppurativa:   no  Dissecting cellulitis of the scalp:  no  Pilonidal sinus:    no  Erythema nodosum:    no  Non-Scarring Alopecia:  no  Discoid Lupus:   no  Subacute Cutaneous Lupus:   no  Heliotrope Rash:   no  Upper Arm Erythema:   no  Shawl Sign:    no  V-sign:     no  Holster sign:    no  Gottron's papules:   no  Gottron's sign:    no  Calcinosis:    no  Raynaud's Phenomenon:  no  's Hands:   no  Periungual erythema:   no  Abnormal Nailfold Capillaries:  no  Livedo Reticularis:   no  Scalp Erythema:   no  Facial Rosacea: Yes    Musculoskeletal:  A comprehensive musculoskeletal exam was performed for all joints of each upper and lower extremity and assessed for swelling, tenderness and range of motion.       Bilateral Sharla and Heberden nods  Bilateral knee crepitus without effusion    Joint Count 8/20/2018 5/10/2018 3/15/2018 2/13/2018   Patient pain (0-100) 80 25 10 -   MHAQ 0.125 0.25 0 -   Left wrist- Tender 1 1 - 1   Left wrist- Swollen 1 1 - 1   Left 1st MCP - Tender 1 - - 1   Left 1st MCP - Swollen 1 - - 1   Left 2nd MCP - Tender 1 1 1 1   Left 2nd MCP - Swollen 1 1 1 1   Left 3rd MCP - Tender 1 1 1 1   Left 3rd MCP - Swollen 1 1 1 1   Left 4th MCP - Tender 1 1 1 1   Left 4th MCP - Swollen 1 - - 1   Left 5th MCP - Swollen - 1 - -   Left thumb IP - Tender 1 1 - -   Left 2nd PIP - Tender 1 1 - 1   Left 2nd PIP - Swollen 1 - - 1   Left 3rd PIP - Tender 1 1 - 1   Left 3rd PIP - Swollen 1 1 - -   Left 4th PIP - Tender 1 1 1 1   Left 4th PIP - Swollen 1 - 1 1   Left 5th PIP - Tender 1 1 - -   Left 5th PIP - Swollen 1 - - -   Right wrist- Tender - - 1 1   Right wrist- Swollen - - 1 1   Right 2nd MCP - Tender 1 1 1 1   Right 2nd MCP - Swollen 1 1 1 1   Right 3rd MCP - Tender 1 1 1 1   Right 3rd MCP - Swollen 1 1 1 1   Right 4th MCP - Tender 1 - 1 1   Right 4th MCP - Swollen 1 - 1 1   Right 5th MCP - Tender 1 - - -   Right 5th MCP - Swollen - 1 1 -   Right 2nd PIP - Tender 1 1 - 1   Right 2nd PIP - Swollen 1 - - 1   Right 3rd PIP - Tender 1 1 1 1   Right 3rd PIP - Swollen - - 1 -   Right 4th PIP - Tender - 1 1 1   Right 5th PIP - Tender - 1 1 -   Tender Joint Count (Total) 16 15 11 15   Swollen Joint Count (Total) 13 8 9 12   Physician Assessment (0-10) 4 3 3 -   Patient Assessment (0-10) 5 5 1.5 -   CDAI Total (calculated) 38 31 24.5 -       DATA REVIEW    Laboratory     Recent laboratory results were reviewed, summarized, and discussed with the patient. Imaging    Musculoskeletal Ultrasound    None    Radiographs    Bilateral Hand 2/13/2018: RIGHT: severe osteopenia without fracture. There are scattered degenerative changes. Progressive first Aia 16 joint. No new erosive changes. .  Vascular calcifications. LEFT: severe osteopenia. Scattered degenerative changes as noted previously with progression of the index finger and middle finger DIP joints. There is a new erosion at the index finger proximal phalanx ulnar base without adjacent joint space loss. There are vascular calcifications.  Calcification overlying the DRUJ is favored represent overlying vascular calcifications.      Bilateral Foot 2/13/2018: RIGHT: no acute fracture or dislocation. Alignment is anatomic. Mild degenerative changes are seen in the left first MTP joint. Minimal degenerative changes are seen in the right first MTP joint. A possible erosion is seen in the head of the left first metatarsal. No erosive changes are seen in the right foot. Plantar calcaneal heel spurs are noted in both feet, as well as enthesophyte formation at the Achilles insertion site. Arterial vascular calcifications are also noted bilaterally. Mild soft tissue swelling surrounds the left first MTP joint. LEFT: no acute fracture or dislocation. Alignment is anatomic. Mild degenerative changes are seen in the left first MTP joint. Minimal degenerative changes are seen in the right first MTP joint. A possible erosion is seen in the head of the left first metatarsal. No erosive changes are seen in the right foot. Plantar calcaneal heel spurs are noted in both feet, as well as enthesophyte formation at the Achilles insertion site. Arterial vascular calcifications are also noted bilaterally. Mild soft tissue swelling surrounds the left first MTP joint. Thoracic Spine 11/03/2016: The posterior battery pack is again seen within the intrathecal catheter in stable position. Kyphoplasty material is present in a midthoracic vertebral body is stable mild chronic compression of the adjacent inferior vertebral body. The remaining vertebral body heights are maintained. Anterior osteophytes are noted. There is no abnormality in alignment. Lumbar Spine 10/19/2016: significant disc space narrowing at L3-L4 with 14 mm anterolisthesis. There is facet arthropathy. The vertebral body heights are maintained. Spinal catheters are seen. There are atherosclerotic vascular calcifications. Bilateral Hips 8/31/2015: no fracture, dislocation or other acute abnormality. There osteoarthritic pattern change of both hips, more severe on the right. There is nonuniform joint space narrowing and marginal osteophytes.  There is remodeling of the right femur head with subchondral cystic formation. Mild sacroiliac osteoarthritic change. Lower lumbar spondylosis is noted. Wire  leads are seen in the left lower lumbar spine likely reflective of spinal stimulator device. Bilateral Hand 3/01/2011: RIGHT: mild osteopenia. There are degenerative changes of the distal and proximal interphalangeal joints and the base of the thumb. There is no fracture or other acute abnormality. Scattered calcifications are noted in the radial and ulnar arteries. LEFT: mild osteopenia. There are degenerative changes of the distal and proximal interphalangeal joints and  at the base of the thumb. There is no fracture or acute abnormality. There are vascular calcifications of the radial and ulnar arteries. CT Imaging    None    MR Imaging    MRI Brain with without contrast 12/19/2016: There is sulcal and ventricular prominence. Confluent periventricular and scattered foci of increased T2 signal intensity in the corona radiata and centrum semiovale. Tiny remote lacunar infarction in the right cerebellum. There is no intracranial mass, hemorrhage or evidence of acute infarction. There is no Chiari or syrinx. The pituitary and infundibulum are grossly unremarkable. There is no skull base mass. Cerebellopontine angles are grossly unremarkable. The major intracranial vascular flow-voids are unremarkable. No evidence of demyelinating process. There is no abnormal parenchymal enhancement. There is no abnormal meningeal enhancement. The cavernous sinuses are symmetric. Optic chiasm and infundibulum grossly unremarkable. Orbits are grossly symmetric. Dural venous sinuses are grossly patent. The brain architecture is normal. There is no evidence of midline shift or mass-effect. There are no extra-axial fluid collections. The mastoid air cells and paranasal sinuses are well pneumatized and clear. MRI Lumbar Spine with and without contrast 8/07/2013:  There is transitional lumbosacral anatomy. In keeping with prior studies, lowest fully formed disc is labeled L5-S1. There has been prior  laminectomies at L3-5 with no change in grade 1 anterolisthesis at L3-4 which measures 5 mm. There is grade 1 anterolisthesis of L4-5 of approximately 2 mm, unchanged. Vertebral body heights are maintained. There is no marrow edema or compression fracture. There are reactive endplate changes at H7-4.  The conus medullaris terminates at L1-2. There is no abnormal intraspinal enhancement. L1/2:  The spinal canal and foramina are widely patent. L2/3:  Diffuse disc bulge, facet hypertrophy and ligamentum flavum thickening cause mild spinal canal and moderate right foraminal stenosis. Left foramen is patent. L3/4: There is uncovering of disc material with facet hypertrophy resulting in mild thecal sac narrowing. There is severe bilateral foraminal stenosis similar to the prior study. L4/5: Ronnald Sovereign is diffuse disc bulge and facet hypertrophy, without significant spinal canal stenosis. There is mild right foraminal stenosis. L5/S1:  The spinal canal and foramina are widely patent. DXA     DXA 8/08/2017: (excluded Lumbar spine because of overlying wires and degenerative change and kyphoplasty of L4) showed left femoral neck T score: -1.4 (0.837 g/cm2), left total hip T score: -1.4 (0.830 g/cm2), right femoral neck T score: -0.9 (0.919 g/cm2), right total hip T score: -0.9 (0.900 g/cm2), and distal one third left radius T score -3.00 (BMD 0.615 g/cm2). FRAX score 16.8 % probability in 10 years for major osteoporotic fracture and 3.2 % 10 year probability of hip fracture. Nuclear Medicine    Nuclear Medicine Bone Scan 1/22/2013: There is intense radiotracer uptake in the upper thoracic spine, likely T5. There is uptake in the region of the sternoclavicular joints and left shoulder, likely degenerative. ASSESSMENT AND PLAN    This is a follow-up visit for Ms. Nunez Prudent.     1) Seronegative Erosive Rheumatoid Arthritis. She is maintained on leflunomide 20 mg daily with good tolerance. She received 3 samples of Humira from us with improvement and approval by her insurance but her copay was too high. We submitted the [employer] program today and I gave her two samples to hold her off. Her CDAI was 38 (previously 31, 24.5) with 16 tender and 13 swollen joints, consistent with high disease activity. I will continue leflunomide. Labs today. 2) Long Term Use of Immunosuppressants. The patient remains on immunomodulatory medications (leflunomide, Humira) and requires frequent toxicity monitoring by blood work. Respective labs were ordered (CBC and CMP). 3) Age-Related Osteoporosis. (multiple vertebral fractures) She is on Prolia. She denies interval falls or fractures. Her next dose in 9/27/2018.    4) Primary Raynauds phenomenon. She is on amlodipine 5 mg daily. This was not an active issue today. I explained that she may take 10 mg if tolerated and take it as needed for symptom relief. 5) Chronic Kidney Disease Stage 2-3. The patient's eGFR was 65 (previously 57, 63). I will continue to monitor. Labs today. 6) Health Maintenance. I ordered labs per her PCP's request.    The patient voiced understanding of the aforementioned assessment and plan. Summary of plan was provided in the After Visit Summary patient instructions.      TODAY'S ORDERS    Orders Placed This Encounter    CBC WITH AUTOMATED DIFF    METABOLIC PANEL, COMPREHENSIVE    C REACTIVE PROTEIN, QT    SED RATE (ESR)    HEMOGLOBIN A1C W/O EAG    LIPID PANEL    MICROALBUMIN, UR, RAND W/ MICROALB/CREAT RATIO    adalimumab (HUMIRA PEN) 40 mg/0.8 mL injection pen     Future Appointments  Date Time Provider Yocasta Cisneros   8/27/2018 10:20 AM MD ANNA Alegre [facility]   9/27/2018 2:00 PM [facility] [place]   10/29/2018 9:10 AM MD ANNA Alegre [facility]   11/20/2018 1:40 PM MD Emily Mckenna MD, [address] [facility] [address] [facility] [address]    Adult Rheumatology   Rheumatology Ultrasound Certified  [facility]  [address] [facility], [address] [place] [address] Road   Phone [phone]  Fax [phone]

## 2018-08-20 NOTE — MR AVS SNAPSHOT
[address] [place] Morales [address] 
[phone] Patient: Buck Holloway MRN: [id number] QC Visit Information Date & Time Provider Department Dept. Phone Encounter #  
 2018 10:00 AM [facility] [phone] [id number] Follow-up Instructions Return in about 3 months (around 2018). Your Appointments 2018 10:20 AM  
ROUTINE CARE with Devendra Jaffe MD  
[address] [place] [address] ([facility]) Appt Note: b12 injection 2005 [facility] [address] [facility]  
[phone]  
  
   
 [facility] [id number]  
  
    
 10/29/2018  9:10 AM  
ROUTINE CARE with Devendra Jaffe MD  
[address] [place] [address] [facility] Appt Note: 4 mnth fu est pt routine  A [facility] [address] [address]  
[phone] Upcoming Health Maintenance Date Due MICROALBUMIN Q1 2018 MEDICARE YEARLY EXAM 2018 Influenza Age 5 to Adult 2018 HEMOGLOBIN A1C Q6M 2018 EYE EXAM RETINAL OR DILATED Q1 10/26/2018 LIPID PANEL Q1 2019 FOOT EXAM Q1 2019 GLAUCOMA SCREENING Q2Y 10/26/2019 DTaP/Tdap/Td series (2 - Td) 2023 Allergies as of 2018  Review Complete On: 2018 By: Ivana Lea LPN Severity Noted Reaction Type Reactions Cephalosporins High 2009    Nausea and Vomiting Severe vomiting Current Immunizations  Reviewed on 3/29/2018 Name Date Influenza High Dose Vaccine PF 2017 12:00 AM, 2016 Influenza Vaccine 2013 Influenza Vaccine Elvira Smith) 2016 Influenza Vaccine (Quad) PF 11/10/2014 Influenza Vaccine PF 10/20/2015 Influenza Vaccine Split 10/10/2012  4:30 PM, 2011, 10/25/2010  Pneumococcal Conjugate (PCV-13) 2017 12:00 AM  
 Pneumococcal Polysaccharide (PPSV-23) 12/5/2013 Tdap 12/5/2013 ZZZ-RETIRED (DO NOT USE) Pneumococcal Vaccine (Unspecified Type) 6/5/2008 Not reviewed this visit You Were Diagnosed With   
  
 Codes Comments Seronegative rheumatoid arthritis of multiple sites [facility])    -  Primary ICD-10-CM: M06.09 
ICD-9-CM: 714.0 Long-term use of immunosuppressant medication     ICD-10-CM: Z79.899 ICD-9-CM: V58.69 Vitals BP Pulse Temp Resp Height(growth percentile) Weight(growth percentile) 104/57 (BP 1 Location: Left arm, BP Patient Position: Sitting) 84 98 °F (36.7 °C) 18 4' 11\" (1.499 m) 192 lb 12.8 oz (87.5 kg) SpO2 BMI OB Status Smoking Status 95% 38.94 kg/m2 Postmenopausal Former Smoker Vitals History BMI and BSA Data Body Mass Index Body Surface Area 38.94 kg/m 2 1.91 m 2 Preferred Pharmacy Pharmacy Name Phone [address] [facility], [facility] [address] ([address] [phone] Your Updated Medication List  
  
   
This list is accurate as of 8/20/18 10:14 AM.  Always use your most recent med list.  
  
  
  
  
 acetaminophen 650 mg Tber Commonly known as:  TYLENOL Take 1,300 mg by mouth daily as needed for Pain. adalimumab 40 mg/0.8 mL injection pen Commonly known as:  HUMIRA PEN  
0.8 mL by SubCUTAneous route every fourteen (14) days. Indications: Rheumatoid Arthritis  
  
 amLODIPine 5 mg tablet Commonly known as:  Canela Del Take 1 Tab by mouth daily. Take for Raynaud's  
  
 aspirin 81 mg tablet Take 81 mg by mouth daily. atorvastatin 10 mg tablet Commonly known as:  LIPITOR Take 1 Tab by mouth daily. Blood-Glucose Meter monitoring kit Commonly known as:  CONTOUR METER Dx: 250. Check blood sugars daily. busPIRone 5 mg tablet Commonly known as:  BUSPAR Take 5 mg by mouth two (2) times a day. CALCIUM 500+D 500 mg(1,250mg) -200 unit per tablet Generic drug:  calcium-vitamin D Take 2 Tabs by mouth daily. cholecalciferol 1,000 unit tablet Commonly known as:  VITAMIN D3 Take 2,000 Units by mouth daily. cyclobenzaprine 10 mg tablet Commonly known as:  FLEXERIL Take 1 Tab by mouth nightly. denosumab 60 mg/mL injection Commonly known as:  Brittany Keepers 1 mL by SubCUTAneous route every 6 months. ESTRACE 0.01 % (0.1 mg/gram) vaginal cream  
Generic drug:  estradiol Insert 0.1 g into vagina every Monday, Wednesday, Friday. ferrous sulfate 325 mg (65 mg iron) tablet Take 1 Tab by mouth two (2) times a day. Indications: Iron Deficiency Anemia  
  
 folic acid 1 mg tablet Commonly known as:  FOLVITE  
TAKE 1 TABLET BY MOUTH DAILY * gabapentin 300 mg capsule Commonly known as:  NEURONTIN Take 1 Cap by mouth two (2) times a day. * gabapentin 600 mg tablet Commonly known as:  NEURONTIN  
TAKE 1 TABLET BY MOUTH TWICE DAILY * gabapentin 600 mg tablet Commonly known as:  NEURONTIN  
TAKE 1 TABLET BY MOUTH TWICE DAILY  
  
 glipiZIDE SR 2.5 mg CR tablet Commonly known as:  GLUCOTROL XL  
TAKE 1 TABLET BY MOUTH TWICE DAILY  
  
 glucose blood VI test strips strip Commonly known as:  Ascensia CONTOUR Glucometer for 250.00 Pt. test blood sugar once daily (in morning) HYDROcodone-acetaminophen 5-325 mg per tablet Commonly known as:  Marta Clifton Take 1 Tab by mouth every six (6) hours as needed for Pain.  
  
 leflunomide 20 mg tablet Commonly known as:  Mammie Feil TAKE 1/2 TABLET BY MOUTH DAILY FOR 7 DAYS THEN 1 TABLET BY MOUTH EVERY DAY IF TOLERATED  
  
 metFORMIN 500 mg tablet Commonly known as:  GLUCOPHAGE  
TAKE 1 TABLET BY MOUTH TWICE DAILY  
  
 metoprolol succinate 25 mg XL tablet Commonly known as:  TOPROL-XL  
TAKE 1 TABLET BY MOUTH EVERY DAY  
  
 MULTIVITAMIN PO Take 1 Tab by mouth daily. nortriptyline 10 mg capsule Commonly known as:  PAMELOR  
TAKE 1 CAPSULE BY MOUTH EVERY NIGHT  
  
 pantoprazole 40 mg tablet Commonly known as:  PROTONIX TK 1 T PO D Syringe with Needle, Safety 3 mL 25 gauge x 5/8\" Syrg Commonly known as:  3cc Safety Syringe 25Gx5/8\" To be used with B12 injections  
  
 trimethoprim 100 mg tablet Commonly known as:  TRIMPEX Take 1 Tab by mouth daily. Indications: PREVENTION OF BACTERIAL URINARY TRACT INFECTION [place] Rolling walker. Patient with spinal stenosis and stopped posture. Pain with limited ambulation. ZINC PO Take 50 mg by mouth daily. * Notice: This list has 3 medication(s) that are the same as other medications prescribed for you. Read the directions carefully, and ask your doctor or other care provider to review them with you. We Performed the Following C REACTIVE PROTEIN, QT [41274 CPT(R)] CBC WITH AUTOMATED DIFF [28468 CPT(R)] METABOLIC PANEL, COMPREHENSIVE [22607 CPT(R)] SED RATE (ESR) [id number] CPT(R)] Follow-up Instructions Return in about 3 months (around 11/20/2018). To-Do List   
 09/27/2018 2:00 PM  
  Appointment with SS INF2 CH3 <4H at [facility] 73 ([phone]) Introducing [facility]! Dear [facility]: Thank you for requesting a [employer] account. Our records indicate that you already have an active [employer] account. You can access your account anytime at [email or web address] Did you know that you can access your hospital and ER discharge instructions at any time in [employer]? You can also review all of your test results from your hospital stay or ER visit. Additional Information If you have questions, please visit the Frequently Asked Questions section of the [employer] website at [email or web address] Remember, [employer] is NOT to be used for urgent needs. For medical emergencies, dial 911. Now available from your iPhone and Android! Please provide this summary of care documentation to your next provider. Your primary care clinician is listed as Timothy Wallace. If you have any questions after today's visit, please call [phone].

## 2018-08-21 LAB
ALBUMIN SERPL-MCNC: 4.3 G/DL (ref 3.5–4.8)
ALBUMIN/CREAT UR: 12.3 MG/G CREAT (ref 0–30)
ALBUMIN/GLOB SERPL: 1.9 {RATIO} (ref 1.2–2.2)
ALP SERPL-CCNC: 73 IU/L (ref 39–117)
ALT SERPL-CCNC: 24 IU/L (ref 0–32)
AST SERPL-CCNC: 36 IU/L (ref 0–40)
BASOPHILS # BLD AUTO: 0.1 X10E3/UL (ref 0–0.2)
BASOPHILS NFR BLD AUTO: 1 %
BILIRUB SERPL-MCNC: 0.3 MG/DL (ref 0–1.2)
BUN SERPL-MCNC: 18 MG/DL (ref 8–27)
BUN/CREAT SERPL: 20 (ref 12–28)
CALCIUM SERPL-MCNC: 8.4 MG/DL (ref 8.7–10.3)
CHLORIDE SERPL-SCNC: 103 MMOL/L (ref 96–106)
CHOLEST SERPL-MCNC: 167 MG/DL (ref 100–199)
CO2 SERPL-SCNC: 22 MMOL/L (ref 20–29)
CREAT SERPL-MCNC: 0.88 MG/DL (ref 0.57–1)
CREAT UR-MCNC: 119.3 MG/DL
CRP SERPL-MCNC: <0.3 MG/L (ref 0–4.9)
EOSINOPHIL # BLD AUTO: 0.5 X10E3/UL (ref 0–0.4)
EOSINOPHIL NFR BLD AUTO: 6 %
ERYTHROCYTE [DISTWIDTH] IN BLOOD BY AUTOMATED COUNT: 12.9 % (ref 12.3–15.4)
ERYTHROCYTE [SEDIMENTATION RATE] IN BLOOD BY WESTERGREN METHOD: 2 MM/HR (ref 0–40)
GLOBULIN SER CALC-MCNC: 2.3 G/DL (ref 1.5–4.5)
GLUCOSE SERPL-MCNC: 93 MG/DL (ref 65–99)
HBA1C MFR BLD: 5.8 % (ref 4.8–5.6)
HCT VFR BLD AUTO: 39.4 % (ref 34–46.6)
HDLC SERPL-MCNC: 48 MG/DL
HGB BLD-MCNC: 12.8 G/DL (ref 11.1–15.9)
IMM GRANULOCYTES # BLD: 0.1 X10E3/UL (ref 0–0.1)
IMM GRANULOCYTES NFR BLD: 1 %
LDLC SERPL CALC-MCNC: 83 MG/DL (ref 0–99)
LYMPHOCYTES # BLD AUTO: 3.2 X10E3/UL (ref 0.7–3.1)
LYMPHOCYTES NFR BLD AUTO: 41 %
MCH RBC QN AUTO: 32.3 PG (ref 26.6–33)
MCHC RBC AUTO-ENTMCNC: 32.5 G/DL (ref 31.5–35.7)
MCV RBC AUTO: 100 FL (ref 79–97)
MICROALBUMIN UR-MCNC: 14.7 UG/ML
MONOCYTES # BLD AUTO: 0.8 X10E3/UL (ref 0.1–0.9)
MONOCYTES NFR BLD AUTO: 11 %
NEUTROPHILS # BLD AUTO: 3 X10E3/UL (ref 1.4–7)
NEUTROPHILS NFR BLD AUTO: 40 %
PLATELET # BLD AUTO: 247 X10E3/UL (ref 150–379)
POTASSIUM SERPL-SCNC: 4.5 MMOL/L (ref 3.5–5.2)
PROT SERPL-MCNC: 6.6 G/DL (ref 6–8.5)
RBC # BLD AUTO: 3.96 X10E6/UL (ref 3.77–5.28)
SODIUM SERPL-SCNC: 143 MMOL/L (ref 134–144)
TRIGL SERPL-MCNC: 178 MG/DL (ref 0–149)
VLDLC SERPL CALC-MCNC: 36 MG/DL (ref 5–40)
WBC # BLD AUTO: 7.6 X10E3/UL (ref 3.4–10.8)

## 2018-08-27 ENCOUNTER — OFFICE VISIT (OUTPATIENT)
Dept: FAMILY MEDICINE CLINIC | Age: 78
End: 2018-08-27

## 2018-08-27 VITALS
DIASTOLIC BLOOD PRESSURE: 55 MMHG | HEIGHT: 59 IN | SYSTOLIC BLOOD PRESSURE: 112 MMHG | RESPIRATION RATE: 20 BRPM | HEART RATE: 69 BPM | BODY MASS INDEX: 39.43 KG/M2 | WEIGHT: 195.6 LBS | TEMPERATURE: 98.2 F

## 2018-08-27 DIAGNOSIS — Z98.84 STATUS POST GASTRIC BYPASS FOR OBESITY: Primary | ICD-10-CM

## 2018-08-27 DIAGNOSIS — Z71.89 ADVANCED DIRECTIVES, COUNSELING/DISCUSSION: ICD-10-CM

## 2018-08-27 DIAGNOSIS — Z00.00 MEDICARE ANNUAL WELLNESS VISIT, SUBSEQUENT: ICD-10-CM

## 2018-08-27 DIAGNOSIS — E53.8 VITAMIN B12 DEFICIENCY: ICD-10-CM

## 2018-08-27 DIAGNOSIS — Z13.31 SCREENING FOR DEPRESSION: ICD-10-CM

## 2018-08-27 DIAGNOSIS — Z13.39 SCREENING FOR ALCOHOLISM: ICD-10-CM

## 2018-08-27 RX ORDER — CYANOCOBALAMIN 1000 UG/ML
1000 INJECTION, SOLUTION INTRAMUSCULAR; SUBCUTANEOUS ONCE
Qty: 1 ML | Refills: 0
Start: 2018-08-27 | End: 2018-08-27

## 2018-08-27 NOTE — PROGRESS NOTES
1. Have you been to the ER, urgent care clinic since your last visit? Hospitalized since your last visit? No    2. Have you seen or consulted any other health care providers outside of the [address] [place] [address] since your last visit? Include any pap smears or colon screening.  No  Reviewed record in preparation for visit and have necessary documentation  Pt did not bring medication to office visit for review  Goals that were addressed and/or need to be completed during or after this appointment include     Health Maintenance Due   Topic Date Due    MEDICARE YEARLY EXAM  07/27/2018    Influenza Age 5 to Adult  08/01/2018

## 2018-08-27 NOTE — PROGRESS NOTES
This is the Subsequent Medicare Annual Wellness Exam, performed 12 months or more after the Initial AWV or the last Subsequent AWV    I have reviewed the patient's medical history in detail and updated the computerized patient record. History     Past Medical History:   Diagnosis Date    Anemia     Arthritis     CAD (coronary artery disease) 1997    MI    Degenerative joint disease of right hip 09/14/2015     Virgil Hernandez/Dr. Naida Estrada    Diabetes ([facility] [address]     Fracture     right shoulder; T6 compression    Gastritis     Hypercholesterolemia     Hyperlipidemia    Lupus 1/9/2015    Osteoporosis, post-menopausal     vertebral fracture      Past Surgical History:   Procedure Laterality Date    CARDIAC SURG PROCEDURE UNLIST      HX CHOLECYSTECTOMY      HX CORONARY STENT PLACEMENT  02/19/1997    LAD    HX GASTRIC BYPASS  2000    HX HERNIA REPAIR  2005    HX KYPHOPLASTY      t5    HX KYPHOPLASTY      L4    HX ORTHOPAEDIC      bilateral knees    HX ORTHOPAEDIC      r shoulder    HX ORTHOPAEDIC      Laminectomy    TX INC IMPLTJ NEUROSTIMULATOR ELTRD SACRAL NERVE       Current Outpatient Prescriptions   Medication Sig Dispense Refill    cyanocobalamin (VITAMIN B-12) 1,000 mcg/mL injection 1 mL by IntraMUSCular route once for 1 dose. 1 mL 0    adalimumab (HUMIRA PEN) 40 mg/0.8 mL injection pen 0.8 mL by SubCUTAneous route every fourteen (14) days. Indications: Rheumatoid Arthritis 2 Kit 0    amLODIPine (NORVASC) 5 mg tablet Take 1 Tab by mouth daily. Take for Raynaud's 90 Tab 0    metoprolol succinate (TOPROL-XL) 25 mg XL tablet TAKE 1 TABLET BY MOUTH EVERY DAY 90 Tab 0    metFORMIN (GLUCOPHAGE) 500 mg tablet TAKE 1 TABLET BY MOUTH TWICE DAILY 180 Tab 0    ferrous sulfate 325 mg (65 mg iron) tablet Take 1 Tab by mouth two (2) times a day.  Indications: Iron Deficiency Anemia 60 Tab 5    nortriptyline (PAMELOR) 10 mg capsule TAKE 1 CAPSULE BY MOUTH EVERY NIGHT 30 Cap 2    trimethoprim (TRIMPEX) 100 mg tablet Take 1 Tab by mouth daily. Indications: PREVENTION OF BACTERIAL URINARY TRACT INFECTION 30 Tab 5    gabapentin (NEURONTIN) 600 mg tablet TAKE 1 TABLET BY MOUTH TWICE DAILY 60 Tab 0    leflunomide (ARAVA) 20 mg tablet TAKE 1/2 TABLET BY MOUTH DAILY FOR 7 DAYS THEN 1 TABLET BY MOUTH EVERY DAY IF TOLERATED 90 Tab 0    cyclobenzaprine (FLEXERIL) 10 mg tablet Take 1 Tab by mouth nightly. 90 Tab 1    glipiZIDE SR (GLUCOTROL XL) 2.5 mg CR tablet TAKE 1 TABLET BY MOUTH TWICE DAILY 180 Tab 0    HYDROcodone-acetaminophen (NORCO) 5-325 mg per tablet Take 1 Tab by mouth every six (6) hours as needed for Pain. 60 Tab 0    folic acid (FOLVITE) 1 mg tablet TAKE 1 TABLET BY MOUTH DAILY 90 Tab 0    pantoprazole (PROTONIX) 40 mg tablet TK 1 T PO D  11    gabapentin (NEURONTIN) 600 mg tablet TAKE 1 TABLET BY MOUTH TWICE DAILY 60 Tab 5    atorvastatin (LIPITOR) 10 mg tablet Take 1 Tab by mouth daily. 90 Tab 0    ESTRACE 0.01 % (0.1 mg/gram) vaginal cream Insert 0.1 g into vagina every Monday, Wednesday, Friday. 42.5 g 1    gabapentin (NEURONTIN) 300 mg capsule Take 1 Cap by mouth two (2) times a day. 120 Cap 6    denosumab (PROLIA) 60 mg/mL injection 1 mL by SubCUTAneous route every 6 months.  glucose blood VI test strips (ASCENSIA CONTOUR) strip Glucometer for 250.00 Pt. test blood sugar once daily (in morning) 50 Strip 11    Syringe with Needle, Safety (3CC SAFETY SYRINGE 25GX5/8\") 3 mL 25 gauge x 5/8\" syrg To be used with B12 injections 100 Pen Needle 11    busPIRone (BUSPAR) 5 mg tablet Take 5 mg by mouth two (2) times a day. 5    acetaminophen (TYLENOL) 650 mg CR tablet Take 1,300 mg by mouth daily as needed for Pain.  Blood-Glucose Meter (CONTOUR METER) monitoring kit Dx: 250. Check blood sugars daily. 1 kit 11    Walker Misc Rolling walker. Patient with spinal stenosis and stopped posture. Pain with limited ambulation. 1 Each 0    ZINC PO Take 50 mg by mouth daily.       cholecalciferol, vitamin d3, (VITAMIN D3) 1,000 unit tablet Take 2,000 Units by mouth daily.  calcium-vitamin D (CALCIUM 500+D) 500 mg(1,250mg) -200 unit per tablet Take 2 Tabs by mouth daily.  aspirin 81 mg Tab Take 81 mg by mouth daily.  MULTIVITAMINS (MULTIVITAMIN PO) Take 1 Tab by mouth daily.        Allergies   Allergen Reactions    Cephalosporins Nausea and Vomiting     Severe vomiting     Family History   Problem Relation Age of Onset    Diabetes Mother     Hypertension Mother     Heart Disease Mother     Diabetes Father     Hypertension Father     Heart Disease Father    [facility] Arthritis-rheumatoid Sister     Elevated Lipids Sister    [facility] Arthritis-rheumatoid Sister     Hypertension Sister     Diabetes Sister     Thyroid Disease Sister     Arthritis-rheumatoid Other      Social History   Substance Use Topics    Smoking status: Former Smoker     Quit date: 12/30/1989    Smokeless tobacco: Never Used    Alcohol use No     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    Long-term use of immunosuppressant medication Z79.899    Long-term use of Plaquenil Z79.899    Hypocalcemia E83.51    Low back pain M54.5    Age-related osteoporosis without current pathological fracture M81.0    H/O Bell's palsy Z86.69    Diabetic polyneuropathy ([facility] [address] E11.42    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    Unspecified hereditary and idiopathic peripheral neuropathy G60.9    Raynauds syndrome I73.00    Blepharoconjunctivitis H10.509    Seronegative rheumatoid arthritis of multiple sites ([facility] [address] M06.09    Nuclear sclerotic cataract H25.10    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    Obesity, morbid ([facility] [address] E66.01    H/O total knee replacement I60.005    Primary localized osteoarthritis of left knee M17.12    Hypercholesterolemia E78.00    Primary Raynaud's phenomenon I73.00    CKD (chronic kidney disease) stage 2, GFR 60-89 ml/min N18.2    Type 2 diabetes with nephropathy (HCC) E11.21    CKD (chronic kidney disease) stage 3, GFR 30-59 ml/min N18.3       Depression Risk Factor Screening:     PHQ over the last two weeks 8/27/2018   Little interest or pleasure in doing things Not at all   Feeling down, depressed, irritable, or hopeless Not at all   Total Score PHQ 2 0     Alcohol Risk Factor Screening: You do not drink alcohol or very rarely. Functional Ability and Level of Safety:   Hearing Loss  Hearing is good. Activities of Daily Living  The home contains: grab bars  Patient does total self care    Fall Risk  Fall Risk Assessment, last 12 mths 8/27/2018   Able to walk? Yes   Fall in past 12 months? No   Fall with injury? -   Number of falls in past 12 months -   Fall Risk Score -       Abuse Screen  Patient is not abused    Cognitive Screening   Evaluation of Cognitive Function:  Has your family/caregiver stated any concerns about your memory: n/a  Normal    Patient Care Team   Patient Care Team:  Sunil Soto MD as PCP - General (Family Practice)  Ayan Ellis MD as Physician (Rheumatology)  Charito Raya MD (Neurology)    Assessment/Plan   Education and counseling provided:  Are appropriate based on today's review and evaluation  End-of-Life planning (with patient's consent)    Diagnoses and all orders for this visit:    1. Status post gastric bypass for obesity  -     VITAMIN B12 INJECTION ()  -     THER/PROPH/DIAG INJ, SC/IM ([id number])  -     cyanocobalamin (VITAMIN B-12) 1,000 mcg/mL injection; 1 mL by IntraMUSCular route once for 1 dose.     2. Vitamin B12 deficiency  -     VITAMIN B12 INJECTION ()  -     THER/PROPH/DIAG INJ, SC/IM ([id number])  -     cyanocobalamin (VITAMIN B-12) 1,000 mcg/mL injection; 1 mL by IntraMUSCular route once for 1 dose. 3. Medicare annual wellness visit, subsequent    4. Advanced directives, counseling/discussion    5. Screening for depression  -     Depression Screen Annual    6.  Screening for alcoholism  -     Annual  Alcohol Screen 15 min ()        Health Maintenance Due   Topic Date Due    MEDICARE YEARLY EXAM  07/27/2018    Influenza Age 5 to Adult  08/01/2018

## 2018-08-27 NOTE — ACP (ADVANCE CARE PLANNING)
Advance Care Planning    Advance Care Planning (ACP) Provider Conversation Snapshot    Date of ACP Conversation: 08/27/18  Persons included in Conversation:  patient  Length of ACP Conversation in minutes:  <16 minutes (Non-Billable)    Authorized Decision Maker (if patient is incapable of making informed decisions): This person is:   Healthcare Agent/Medical Power of  under Advance Directive It will be Goldy (boris)        For Patients with Decision Making Capacity:   Values/Goals: Exploration of values, goals, and preferences if recovery is not expected, even with continued medical treatment in the event of:  Imminent death  Severe, permanent brain injury  \"In these circumstances, what matters most to you? \"  Care focused more on comfort or quality of life.     Conversation Outcomes / Follow-Up Plan:   Recommended completion of Advance Directive form after review of ACP materials and conversation with prospective healthcare agent

## 2018-08-27 NOTE — MR AVS SNAPSHOT
[facility] 
 
 
 [address] [address] 
[phone] Patient: Nereyda Steele MRN: [id number] SHN:[id number] Visit Information Date & Time Provider Department Dept. Phone Encounter #  
 8/27/2018 10:20 AM Bobo Davies MD [address] [id number] Your Appointments 10/29/2018  9:10 AM  
ROUTINE CARE with Bobo Davies MD  
704 [facility] ([address]) Appt Note: 4 mnth fu est pt routine [address]  
  
   
 [address] [facility] [address] [address]  
  
    
 11/20/2018  1:40 PM  
ESTABLISHED PATIENT with Elaina Diallo MD  
[address]) Appt Note: 3 month fu  
 [place] [address]  
[address] [place], [place] [id number] Upcoming Health Maintenance Date Due  
 MEDICARE YEARLY EXAM 7/27/2018 Influenza Age 5 to Adult 8/1/2018 EYE EXAM RETINAL OR DILATED Q1 10/26/2018 HEMOGLOBIN A1C Q6M 2/20/2019 FOOT EXAM Q1 6/27/2019 MICROALBUMIN Q1 8/20/2019 LIPID PANEL Q1 8/20/2019 GLAUCOMA SCREENING Q2Y 10/26/2019 DTaP/Tdap/Td series (2 - Td) 12/5/2023 Allergies as of 8/27/2018  Review Complete On: 8/27/2018 By: Bobo Davies MD  
  
 Severity Noted Reaction Type Reactions Cephalosporins High 12/30/2009    Nausea and Vomiting Severe vomiting Current Immunizations  Reviewed on 3/29/2018 Name Date Influenza High Dose Vaccine PF 9/13/2017 12:00 AM, 8/5/2016 Influenza Vaccine 12/5/2013 Influenza Vaccine Karolina Michael) 9/6/2016 Influenza Vaccine (Quad) PF 11/10/2014 Influenza Vaccine PF 10/20/2015 Influenza Vaccine Split 10/10/2012  4:30 PM, 11/14/2011, 10/25/2010  Pneumococcal Conjugate (PCV-13) 9/13/2017 12:00 AM  
 Pneumococcal Polysaccharide (PPSV-23) 12/5/2013 Tdap 12/5/2013 ZZZ-RETIRED (DO NOT USE) Pneumococcal Vaccine (Unspecified Type) 6/5/2008 Not reviewed this visit You Were Diagnosed With   
  
 Codes Comments Status post gastric bypass for obesity    -  Primary ICD-10-CM: X49.78 ICD-9-CM: V45.86 Vitamin B12 deficiency     ICD-10-CM: E53.8 ICD-9-CM: 266.2 Medicare annual wellness visit, subsequent     ICD-10-CM: Z00.00 ICD-9-CM: V70.0 Advanced directives, counseling/discussion     ICD-10-CM: Z71.89 ICD-9-CM: V65.49 Screening for depression     ICD-10-CM: Z13.89 ICD-9-CM: V79.0 Screening for alcoholism     ICD-10-CM: Z13.89 ICD-9-CM: V79.1 Vitals BP Pulse Temp Resp Height(growth percentile) Weight(growth percentile) 112/55 (BP 1 Location: Right arm, BP Patient Position: Sitting) 69 98.2 °F (36.8 °C) (Oral) 20 4' 11\" (1.499 m) 195 lb 9.6 oz (88.7 kg) BMI OB Status Smoking Status 39.51 kg/m2 Postmenopausal Former Smoker Vitals History BMI and BSA Data Body Mass Index Body Surface Area  
 39.51 kg/m 2 1.92 m 2 Preferred Pharmacy Pharmacy Name Phone [facility] 53 [address] ([address] [phone] Your Updated Medication List  
  
   
This list is accurate as of 8/27/18 10:33 AM.  Always use your most recent med list.  
  
  
  
  
 acetaminophen 650 mg Tber Commonly known as:  TYLENOL Take 1,300 mg by mouth daily as needed for Pain. adalimumab 40 mg/0.8 mL injection pen Commonly known as:  HUMIRA PEN  
0.8 mL by SubCUTAneous route every fourteen (14) days. Indications: Rheumatoid Arthritis  
  
 amLODIPine 5 mg tablet Commonly known as:  Kingston Faraz Take 1 Tab by mouth daily. Take for Raynaud's  
  
 aspirin 81 mg tablet Take 81 mg by mouth daily. atorvastatin 10 mg tablet Commonly known as:  LIPITOR Take 1 Tab by mouth daily. Blood-Glucose Meter monitoring kit Commonly known as:  CONTOUR METER Dx: 250. Check blood sugars daily. busPIRone 5 mg tablet Commonly known as:  BUSPAR Take 5 mg by mouth two (2) times a day. CALCIUM 500+D 500 mg(1,250mg) -200 unit per tablet Generic drug:  calcium-vitamin D Take 2 Tabs by mouth daily. cholecalciferol 1,000 unit tablet Commonly known as:  VITAMIN D3 Take 2,000 Units by mouth daily. cyanocobalamin 1,000 mcg/mL injection Commonly known as:  VITAMIN B-12  
1 mL by IntraMUSCular route once for 1 dose. cyclobenzaprine 10 mg tablet Commonly known as:  FLEXERIL Take 1 Tab by mouth nightly. denosumab 60 mg/mL injection Commonly known as:  Vivek Raw 1 mL by SubCUTAneous route every 6 months. ESTRACE 0.01 % (0.1 mg/gram) vaginal cream  
Generic drug:  estradiol Insert 0.1 g into vagina every Monday, Wednesday, Friday. ferrous sulfate 325 mg (65 mg iron) tablet Take 1 Tab by mouth two (2) times a day. Indications: Iron Deficiency Anemia  
  
 folic acid 1 mg tablet Commonly known as:  FOLVITE  
TAKE 1 TABLET BY MOUTH DAILY * gabapentin 300 mg capsule Commonly known as:  NEURONTIN Take 1 Cap by mouth two (2) times a day. * gabapentin 600 mg tablet Commonly known as:  NEURONTIN  
TAKE 1 TABLET BY MOUTH TWICE DAILY * gabapentin 600 mg tablet Commonly known as:  NEURONTIN  
TAKE 1 TABLET BY MOUTH TWICE DAILY  
  
 glipiZIDE SR 2.5 mg CR tablet Commonly known as:  GLUCOTROL XL  
TAKE 1 TABLET BY MOUTH TWICE DAILY  
  
 glucose blood VI test strips strip Commonly known as:  Ascensia CONTOUR Glucometer for 250.00 Pt. test blood sugar once daily (in morning) HYDROcodone-acetaminophen 5-325 mg per tablet Commonly known as:  Jose Songster Take 1 Tab by mouth every six (6) hours as needed for Pain.  
  
 leflunomide 20 mg tablet Commonly known as:  Randy Medina TAKE 1/2 TABLET BY MOUTH DAILY FOR 7 DAYS THEN 1 TABLET BY MOUTH EVERY DAY IF TOLERATED  
  
 metFORMIN 500 mg tablet Commonly known as:  GLUCOPHAGE  
TAKE 1 TABLET BY MOUTH TWICE DAILY  
  
 metoprolol succinate 25 mg XL tablet Commonly known as:  TOPROL-XL  
TAKE 1 TABLET BY MOUTH EVERY DAY  
  
 MULTIVITAMIN PO Take 1 Tab by mouth daily. nortriptyline 10 mg capsule Commonly known as:  PAMELOR  
TAKE 1 CAPSULE BY MOUTH EVERY NIGHT  
  
 pantoprazole 40 mg tablet Commonly known as:  PROTONIX TK 1 T PO D Syringe with Needle, Safety 3 mL 25 gauge x 5/8\" Syrg Commonly known as:  3cc Safety Syringe 25Gx5/8\" To be used with B12 injections  
  
 trimethoprim 100 mg tablet Commonly known as:  TRIMPEX Take 1 Tab by mouth daily. Indications: PREVENTION OF BACTERIAL URINARY TRACT INFECTION Catia Lukas Rolling walker. Patient with spinal stenosis and stopped posture. Pain with limited ambulation. ZINC PO Take 50 mg by mouth daily. * Notice: This list has 3 medication(s) that are the same as other medications prescribed for you. Read the directions carefully, and ask your doctor or other care provider to review them with you. We Performed the Following [facility] [[id number] [facility]] DE ANNUAL ALCOHOL SCREEN 15 MIN [id number] [facility]] THER/PROPH/DIAG INJECTION, SUBCUT/IM [id number] CPT(R)] VITAMIN B12 INJECTION [ [facility]] To-Do List   
 09/27/2018 2:00 PM  
  Appointment with SS INF2 CH3 <4H at [facility] ([phone]) Patient Instructions Medicare Wellness Visit, Female The best way to live healthy is to have a lifestyle where you eat a well-balanced diet, exercise regularly, limit alcohol use, and quit all forms of tobacco/nicotine, if applicable. Regular preventive services are another way to keep healthy.  Preventive services (vaccines, screening tests, monitoring & exams) can help personalize your care plan, which helps you manage your own care. Screening tests can find health problems at the earliest stages, when they are easiest to treat. Jorge Perry follows the current, evidence-based guidelines published by the [facility] Quentin Alvarado ([facility]) when recommending preventive services for our patients. Because we follow these guidelines, sometimes recommendations change over time as research supports it. (For example, mammograms used to be recommended annually. Even though Medicare will still pay for an annual mammogram, the newer guidelines recommend a mammogram every two years for women of average risk.) Of course, you and your doctor may decide to screen more often for some diseases, based on your risk and your health status. Preventive services for you include: - Medicare offers their members a free annual wellness visit, which is time for you and your primary care provider to discuss and plan for your preventive service needs. Take advantage of this benefit every year! 
-All adults over the age of 72 should receive the recommended pneumonia vaccines. Current USPSTF guidelines recommend a series of two vaccines for the best pneumonia protection.  
-All adults should have a flu vaccine yearly and a tetanus vaccine every 10 years. All adults age 61 and older should receive a shingles vaccine once in their lifetime.   
-A bone mass density test is recommended when a woman turns 65 to screen for osteoporosis. This test is only recommended one time, as a screening. Some providers will use this same test as a disease monitoring tool if you already have osteoporosis.  
-All adults age 38-68 who are overweight should have a diabetes screening test once every three years.  
-Other screening tests and preventive services for persons with diabetes include: an eye exam to screen for diabetic retinopathy, a kidney function test, a foot exam, and stricter control over your cholesterol.  
-Cardiovascular screening for adults with routine risk involves an electrocardiogram (ECG) at intervals determined by your doctor.  
-Colorectal cancer screenings should be done for adults age 54-65 with no increased risk factors for colorectal cancer. There are a number of acceptable methods of screening for this type of cancer. Each test has its own benefits and drawbacks. Discuss with your doctor what is most appropriate for you during your annual wellness visit. The different tests include: colonoscopy (considered the best screening method), a fecal occult blood test, a fecal DNA test, and sigmoidoscopy. -Breast cancer screenings are recommended every other year for women of normal risk, age 54-69. 
-Cervical cancer screenings for women over age 72 are only recommended with certain risk factors.  
-All adults born between [facility] should be screened once for Hepatitis C. Here is a list of your current Health Maintenance items (your personalized list of preventive services) with a due date: 
Health Maintenance Due Topic Date Due  
 Annual Well Visit  07/27/2018  Flu Vaccine  08/01/2018 Introducing [facility]! Dear Gabriele Ledesma: Thank you for requesting a [employer] account. Our records indicate that you already have an active [employer] account. You can access your account anytime at [email or web address] Did you know that you can access your hospital and ER discharge instructions at any time in [employer]? You can also review all of your test results from your hospital stay or ER visit. Additional Information If you have questions, please visit the Frequently Asked Questions section of the [employer] website at [email or web address] Remember, [employer] is NOT to be used for urgent needs. For medical emergencies, dial 911. Now available from your iPhone and Android! Please provide this summary of care documentation to your next provider. Your primary care clinician is listed as Мария Levine. If you have any questions after today's visit, please call [phone].

## 2018-08-27 NOTE — PATIENT INSTRUCTIONS
Medicare Wellness Visit, Female     The best way to live healthy is to have a lifestyle where you eat a well-balanced diet, exercise regularly, limit alcohol use, and quit all forms of tobacco/nicotine, if applicable. Regular preventive services are another way to keep healthy. Preventive services (vaccines, screening tests, monitoring & exams) can help personalize your care plan, which helps you manage your own care. Screening tests can find health problems at the earliest stages, when they are easiest to treat. Jorge Perry follows the current, evidence-based guidelines published by the [facility] Quentin Jenny ([facility]) when recommending preventive services for our patients. Because we follow these guidelines, sometimes recommendations change over time as research supports it. (For example, mammograms used to be recommended annually. Even though Medicare will still pay for an annual mammogram, the newer guidelines recommend a mammogram every two years for women of average risk.)  Of course, you and your doctor may decide to screen more often for some diseases, based on your risk and your health status. Preventive services for you include:  - Medicare offers their members a free annual wellness visit, which is time for you and your primary care provider to discuss and plan for your preventive service needs. Take advantage of this benefit every year!  -All adults over the age of 72 should receive the recommended pneumonia vaccines. Current USPSTF guidelines recommend a series of two vaccines for the best pneumonia protection.   -All adults should have a flu vaccine yearly and a tetanus vaccine every 10 years. All adults age 61 and older should receive a shingles vaccine once in their lifetime.    -A bone mass density test is recommended when a woman turns 65 to screen for osteoporosis. This test is only recommended one time, as a screening.  Some providers will use this same test as a disease monitoring tool if you already have osteoporosis. -All adults age 38-68 who are overweight should have a diabetes screening test once every three years.   -Other screening tests and preventive services for persons with diabetes include: an eye exam to screen for diabetic retinopathy, a kidney function test, a foot exam, and stricter control over your cholesterol.   -Cardiovascular screening for adults with routine risk involves an electrocardiogram (ECG) at intervals determined by your doctor.   -Colorectal cancer screenings should be done for adults age 54-65 with no increased risk factors for colorectal cancer. There are a number of acceptable methods of screening for this type of cancer. Each test has its own benefits and drawbacks. Discuss with your doctor what is most appropriate for you during your annual wellness visit. The different tests include: colonoscopy (considered the best screening method), a fecal occult blood test, a fecal DNA test, and sigmoidoscopy. -Breast cancer screenings are recommended every other year for women of normal risk, age 54-69.  -Cervical cancer screenings for women over age 72 are only recommended with certain risk factors.   -All adults born between [facility] should be screened once for Hepatitis C.      Here is a list of your current Health Maintenance items (your personalized list of preventive services) with a due date:  Health Maintenance Due   Topic Date Due    Annual Well Visit  07/27/2018    Flu Vaccine  08/01/2018

## 2018-09-04 ENCOUNTER — TELEPHONE (OUTPATIENT)
Dept: RHEUMATOLOGY | Age: 78
End: 2018-09-04

## 2018-09-04 NOTE — TELEPHONE ENCOUNTER
----- Message from Victor Hugo Corona sent at 9/4/2018  1:46 PM EDT -----  Regarding: Dr. Hoda Chacon / telephone  Contact: [phone]  Pt requested a return call regarding paperwork for Humira medication.

## 2018-09-04 NOTE — TELEPHONE ENCOUNTER
Returned call to the patient, and she will bring in her paperwork for Patient Assistance for Humira.

## 2018-09-05 ENCOUNTER — DOCUMENTATION ONLY (OUTPATIENT)
Dept: RHEUMATOLOGY | Age: 78
End: 2018-09-05

## 2018-09-05 NOTE — PROGRESS NOTES
Faxed [employer] Patient assistance forms for Humira to [employer] with prescription form for Humira.

## 2018-09-10 RX ORDER — NORTRIPTYLINE HYDROCHLORIDE 10 MG/1
CAPSULE ORAL
Qty: 30 CAP | Refills: 0 | Status: SHIPPED | OUTPATIENT
Start: 2018-09-10 | End: 2018-10-15 | Stop reason: SDUPTHER

## 2018-09-27 ENCOUNTER — HOSPITAL ENCOUNTER (OUTPATIENT)
Dept: INFUSION THERAPY | Age: 78
Discharge: HOME | End: 2018-09-27
Payer: MEDICARE

## 2018-09-27 ENCOUNTER — OFFICE VISIT (OUTPATIENT)
Dept: FAMILY MEDICINE CLINIC | Age: 78
End: 2018-09-27

## 2018-09-27 VITALS
DIASTOLIC BLOOD PRESSURE: 72 MMHG | TEMPERATURE: 97.7 F | RESPIRATION RATE: 16 BRPM | HEART RATE: 86 BPM | SYSTOLIC BLOOD PRESSURE: 115 MMHG

## 2018-09-27 VITALS
RESPIRATION RATE: 16 BRPM | WEIGHT: 194 LBS | SYSTOLIC BLOOD PRESSURE: 117 MMHG | DIASTOLIC BLOOD PRESSURE: 61 MMHG | BODY MASS INDEX: 39.11 KG/M2 | HEART RATE: 89 BPM | TEMPERATURE: 98 F | HEIGHT: 59 IN

## 2018-09-27 DIAGNOSIS — E11.42 DIABETIC POLYNEUROPATHY ASSOCIATED WITH TYPE 2 DIABETES MELLITUS (HCC): Primary | ICD-10-CM

## 2018-09-27 DIAGNOSIS — E53.8 VITAMIN B 12 DEFICIENCY: ICD-10-CM

## 2018-09-27 DIAGNOSIS — Z23 ENCOUNTER FOR IMMUNIZATION: ICD-10-CM

## 2018-09-27 LAB
ALBUMIN SERPL-MCNC: 3.4 G/DL (ref 3.5–5)
ANION GAP SERPL CALC-SCNC: 7 MMOL/L (ref 5–15)
BUN SERPL-MCNC: 21 MG/DL (ref 6–20)
BUN/CREAT SERPL: 23 (ref 12–20)
CALCIUM SERPL-MCNC: 8.2 MG/DL (ref 8.5–10.1)
CHLORIDE SERPL-SCNC: 106 MMOL/L (ref 97–108)
CO2 SERPL-SCNC: 28 MMOL/L (ref 21–32)
CREAT SERPL-MCNC: 0.91 MG/DL (ref 0.55–1.02)
GLUCOSE SERPL-MCNC: 104 MG/DL (ref 65–100)
MAGNESIUM SERPL-MCNC: 2.1 MG/DL (ref 1.6–2.4)
PHOSPHATE SERPL-MCNC: 3.5 MG/DL (ref 2.6–4.7)
POTASSIUM SERPL-SCNC: 4.4 MMOL/L (ref 3.5–5.1)
SODIUM SERPL-SCNC: 141 MMOL/L (ref 136–145)

## 2018-09-27 PROCEDURE — 80069 RENAL FUNCTION PANEL: CPT | Performed by: INTERNAL MEDICINE

## 2018-09-27 PROCEDURE — 83735 ASSAY OF MAGNESIUM: CPT | Performed by: INTERNAL MEDICINE

## 2018-09-27 PROCEDURE — 74011250636 HC RX REV CODE- 250/636: Performed by: INTERNAL MEDICINE

## 2018-09-27 PROCEDURE — 96372 THER/PROPH/DIAG INJ SC/IM: CPT

## 2018-09-27 PROCEDURE — 36415 COLL VENOUS BLD VENIPUNCTURE: CPT | Performed by: INTERNAL MEDICINE

## 2018-09-27 RX ORDER — CYANOCOBALAMIN 1000 UG/ML
1000 INJECTION, SOLUTION INTRAMUSCULAR; SUBCUTANEOUS ONCE
Qty: 1 ML | Refills: 0
Start: 2018-09-27 | End: 2018-09-27

## 2018-09-27 RX ORDER — GABAPENTIN 600 MG/1
600 TABLET ORAL 3 TIMES DAILY
Qty: 90 TAB | Refills: 5 | Status: SHIPPED | OUTPATIENT
Start: 2018-09-27 | End: 2019-03-21 | Stop reason: SDUPTHER

## 2018-09-27 RX ADMIN — DENOSUMAB 60 MG: 60 INJECTION SUBCUTANEOUS at 14:44

## 2018-09-27 NOTE — PROGRESS NOTES
1. Have you been to the ER, urgent care clinic, or been hospitalized since your last visit? No     2. Have you seen or consulted any other health care providers outside of the [address] [place] [address] since your last visit?   No     Opportunity was given for questions  Goals that were addressed and/or need to be completed during or after this appointment include     Health Maintenance Due   Topic Date Due    Shingrix Vaccine Age 49> (1 of 2) 06/21/1989    Influenza Age 5 to Adult  08/01/2018    EYE EXAM RETINAL OR DILATED Q1  10/26/2018

## 2018-09-27 NOTE — PROGRESS NOTES
Zeny Infante  77 y.o. female  1940  [address] [place] [id number]  [id number]     [id number]: Progress Note       Encounter Date: 9/27/2018    Chief Complaint   Patient presents with    Peripheral Neuropathy       History provided by patient and spouse  History of Present Illness   Zeny Infante is a 77 y.o. female who presents to clinic today for:    Peripheral neuropathy  Patient present with cc of peripheral neuropathy. She is currently 300 mg AM and afternoon and 600 mg at supper of gabapentin for peripheral neuropathy. She reports that after 600 mg at night the pain is relieved down to 2-3/10 while during the day it can worsen greatly. She is requesting an increase in gabapentin if possible. Health Maintenance  Flu today. Rx for singles vaccine. Health Maintenance Due   Topic Date Due    Shingrix Vaccine Age 49> (1 of 2) 06/21/1989    Influenza Age 5 to Adult  08/01/2018    EYE EXAM RETINAL OR DILATED Q1  10/26/2018     Review of Systems   Review of Systems   Constitutional: Negative for chills and fever. Gastrointestinal: Negative for abdominal pain, constipation, diarrhea, nausea and vomiting. Skin: Negative for itching and rash. Neurological: Positive for tingling. Negative for dizziness, focal weakness, seizures, loss of consciousness and headaches. Vitals/Objective:     Vitals:    09/27/18 0842   BP: 117/61   Pulse: 89   Resp: 16   Temp: 98 °F (36.7 °C)   TempSrc: Oral   Weight: 194 lb (88 kg)   Height: 4' 11\" (1.499 m)     Body mass index is 39.18 kg/(m^2). Wt Readings from Last 3 Encounters:   09/27/18 194 lb (88 kg)   08/27/18 195 lb 9.6 oz (88.7 kg)   08/20/18 192 lb 12.8 oz (87.5 kg)       Physical Exam   Constitutional: She is oriented to person, place, and time. She appears well-developed and well-nourished. Cardiovascular: Normal rate and regular rhythm.     Pulmonary/Chest: Effort normal and breath sounds normal.   Neurological: She is alert and oriented to person, place, and time. Skin: Skin is warm and dry. No results found for this or any previous visit (from the past 24 hour(s)). Assessment and Plan:     Encounter Diagnoses     ICD-10-CM ICD-9-CM   1. Diabetic polyneuropathy associated with type 2 diabetes mellitus (HCC) E11.42 250.60     357.2   2. Vitamin B 12 deficiency E53.8 266.2   3. Encounter for immunization Z23 V03.89       1. Diabetic polyneuropathy associated with type 2 diabetes mellitus (HCC)  Increase gabapentin to 600 mg TID. - gabapentin (NEURONTIN) 600 mg tablet; Take 1 Tab by mouth three (3) times daily. Dispense: 90 Tab; Refill: 5    2. Vitamin B 12 deficiency  - VITAMIN B12 INJECTION ()  - THER/PROPH/DIAG INJ, SC/IM ([id number])    3. Encounter for immunization  - ADMIN INFLUENZA VIRUS VAC  - INFLUENZA VACCINE INACTIVATED (IIV), SUBUNIT, ADJUVANTED, IM  - varicella-zoster recombinant, PF, (SHINGRIX, PF,) 50 mcg/0.5 mL susr injection; 0.5 mL by IntraMuscular for 2 doses at least 2 months apart. Indications: PREVENTION OF HERPES ZOSTER  Dispense: 0.5 mL; Refill: 1      I have discussed the diagnosis with the patient and the intended plan as seen in the above orders. she has expressed understanding. The patient has received an after-visit summary and questions were answered concerning future plans. I have discussed medication side effects and warnings with the patient as well. Electronically Signed: Kylah Newsome MD     History/Allergies   Patients past medical, surgical and family histories were reviewed and updated.     Past Medical History:   Diagnosis Date    Anemia     Arthritis     CAD (coronary artery disease) 1997    MI    Degenerative joint disease of right hip 09/14/2015     Virgil Hernandez/Dr. Anjelica Patiño    Diabetes ([facility]     Fracture     right shoulder; T6 compression    Gastritis     Hypercholesterolemia     Hyperlipidemia    Lupus 1/9/2015    Osteoporosis, post-menopausal vertebral fracture      Past Surgical History:   Procedure Laterality Date    CARDIAC SURG PROCEDURE UNLIST      HX CHOLECYSTECTOMY      HX CORONARY STENT PLACEMENT  02/19/1997    LAD    HX GASTRIC BYPASS  2000    HX HERNIA REPAIR  2005    HX KYPHOPLASTY      t5    HX KYPHOPLASTY      L4    HX ORTHOPAEDIC      bilateral knees    HX ORTHOPAEDIC      r shoulder    HX ORTHOPAEDIC      Laminectomy    MO INC IMPLTJ NEUROSTIMULATOR ELTRD SACRAL NERVE       Family History   Problem Relation Age of Onset    Diabetes Mother     Hypertension Mother     Heart Disease Mother     Diabetes Father     Hypertension Father     Heart Disease Father    Rishi Malave Arthritis-rheumatoid Sister     Elevated Lipids Sister    Blackwell Ananda Arthritis-rheumatoid Sister     Hypertension Sister     Diabetes Sister     Thyroid Disease Sister     Arthritis-rheumatoid Other      Social History     Social History    Marital status:      Spouse name: N/A    Number of children: N/A    Years of education: N/A     Occupational History    Not on file. Social History Main Topics    Smoking status: Former Smoker     Quit date: 12/30/1989    Smokeless tobacco: Never Used    Alcohol use No    Drug use: No    Sexual activity: Not Currently     Other Topics Concern    Not on file     Social History Narrative         Allergies   Allergen Reactions    Cephalosporins Nausea and Vomiting     Severe vomiting       Disposition     Follow-up Disposition:  Return in about 4 weeks (around 10/25/2018) for Vit B12 injection.  .    Future Appointments  Date Time Provider Yocasta Cisneros   9/27/2018 2:00 PM SS INF2 CH3 <4H [facility] . TAN   10/29/2018 9:10 AM [facility]   11/20/2018 1:40 PM Bishop Davila MD [address]            Current Medications after this visit     Current Outpatient Prescriptions   Medication Sig    cyanocobalamin (VITAMIN B-12) 1,000 mcg/mL injection 1 mL by IntraMUSCular route once for 1 dose.  gabapentin (NEURONTIN) 600 mg tablet Take 1 Tab by mouth three (3) times daily.  varicella-zoster recombinant, PF, (SHINGRIX, PF,) 50 mcg/0.5 mL susr injection 0.5 mL by IntraMuscular for 2 doses at least 2 months apart. Indications: PREVENTION OF HERPES ZOSTER    nortriptyline (PAMELOR) 10 mg capsule TAKE 1 CAPSULE BY MOUTH EVERY NIGHT    adalimumab (HUMIRA PEN) 40 mg/0.8 mL injection pen 0.8 mL by SubCUTAneous route every fourteen (14) days. Indications: Rheumatoid Arthritis    amLODIPine (NORVASC) 5 mg tablet Take 1 Tab by mouth daily. Take for Raynaud's    metoprolol succinate (TOPROL-XL) 25 mg XL tablet TAKE 1 TABLET BY MOUTH EVERY DAY    metFORMIN (GLUCOPHAGE) 500 mg tablet TAKE 1 TABLET BY MOUTH TWICE DAILY    ferrous sulfate 325 mg (65 mg iron) tablet Take 1 Tab by mouth two (2) times a day. Indications: Iron Deficiency Anemia    trimethoprim (TRIMPEX) 100 mg tablet Take 1 Tab by mouth daily. Indications: PREVENTION OF BACTERIAL URINARY TRACT INFECTION    leflunomide (ARAVA) 20 mg tablet TAKE 1/2 TABLET BY MOUTH DAILY FOR 7 DAYS THEN 1 TABLET BY MOUTH EVERY DAY IF TOLERATED    cyclobenzaprine (FLEXERIL) 10 mg tablet Take 1 Tab by mouth nightly.  glipiZIDE SR (GLUCOTROL XL) 2.5 mg CR tablet TAKE 1 TABLET BY MOUTH TWICE DAILY    HYDROcodone-acetaminophen (NORCO) 5-325 mg per tablet Take 1 Tab by mouth every six (6) hours as needed for Pain.  folic acid (FOLVITE) 1 mg tablet TAKE 1 TABLET BY MOUTH DAILY    pantoprazole (PROTONIX) 40 mg tablet TK 1 T PO D    atorvastatin (LIPITOR) 10 mg tablet Take 1 Tab by mouth daily.  ESTRACE 0.01 % (0.1 mg/gram) vaginal cream Insert 0.1 g into vagina every Monday, Wednesday, Friday.  denosumab (PROLIA) 60 mg/mL injection 1 mL by SubCUTAneous route every 6 months.     glucose blood VI test strips (ASCENSIA CONTOUR) strip Glucometer for 250.00 Pt. test blood sugar once daily (in morning)    Syringe with Needle, Safety (3CC SAFETY SYRINGE 25GX5/8\") 3 mL 25 gauge x 5/8\" syrg To be used with B12 injections    busPIRone (BUSPAR) 5 mg tablet Take 5 mg by mouth two (2) times a day.  acetaminophen (TYLENOL) 650 mg CR tablet Take 1,300 mg by mouth daily as needed for Pain.  Blood-Glucose Meter (CONTOUR METER) monitoring kit Dx: 250. Check blood sugars daily.  Walker [employer] walker. Patient with spinal stenosis and stopped posture. Pain with limited ambulation.  ZINC PO Take 50 mg by mouth daily.  cholecalciferol, vitamin d3, (VITAMIN D3) 1,000 unit tablet Take 2,000 Units by mouth daily.  calcium-vitamin D (CALCIUM 500+D) 500 mg(1,250mg) -200 unit per tablet Take 2 Tabs by mouth daily.  aspirin 81 mg Tab Take 81 mg by mouth daily.  MULTIVITAMINS (MULTIVITAMIN PO) Take 1 Tab by mouth daily. No current facility-administered medications for this visit.       Facility-Administered Medications Ordered in Other Visits   Medication Dose Route Frequency    denosumab (PROLIA) injection 60 mg  60 mg SubCUTAneous ONCE     Medications Discontinued During This Encounter   Medication Reason    gabapentin (NEURONTIN) 300 mg capsule Dose Adjustment    gabapentin (NEURONTIN) 600 mg tablet Dose Adjustment    gabapentin (NEURONTIN) 600 mg tablet Dose Adjustment

## 2018-09-27 NOTE — MR AVS SNAPSHOT
[facility] 
 
 
 [address] [address] 
[phone] Patient: Jaylin Villarreal MRN: [id number] IRX:[id number] Visit Information Date & Time Provider Department Dept. Phone Encounter #  
 9/27/2018  8:50 AM Alejandra Oakes  [facility] [phone] [id number] Follow-up Instructions Return in about 4 weeks (around 10/25/2018) for Vit B12 injection. .  
  
Your Appointments 10/29/2018  9:10 AM  
ROUTINE CARE with Alejandra Oakes MD  
[facility] ([facility] Appt Note: 4 mnth fu est pt routine [address] [facility] [address] [facility] [address]  
  
   
 [address] [facility] [address] [facility] [address] [address]  
  
    
 11/20/2018  1:40 PM  
ESTABLISHED PATIENT with Brandi Rice MD  
[facility] Appt Note: 3 month fu  
 [place] [address]  
[address] [place] [phone] Upcoming Health Maintenance Date Due Shingrix Vaccine Age 50> (1 of 2) 6/21/1989 Influenza Age 5 to Adult 8/1/2018 EYE EXAM RETINAL OR DILATED Q1 10/26/2018 HEMOGLOBIN A1C Q6M 2/20/2019 FOOT EXAM Q1 6/27/2019 MICROALBUMIN Q1 8/20/2019 LIPID PANEL Q1 8/20/2019 MEDICARE YEARLY EXAM 8/28/2019 GLAUCOMA SCREENING Q2Y 10/26/2019 DTaP/Tdap/Td series (2 - Td) 12/5/2023 Allergies as of 9/27/2018  Review Complete On: 9/27/2018 By: Mercedes Penny LPN Severity Noted Reaction Type Reactions Cephalosporins High 12/30/2009    Nausea and Vomiting Severe vomiting Current Immunizations  Reviewed on 3/29/2018 Name Date Influenza High Dose Vaccine PF 9/13/2017 12:00 AM, 8/5/2016 Influenza Vaccine 12/5/2013 Influenza Vaccine Janlydia Underwood) 9/6/2016 Influenza Vaccine (Quad) PF 11/10/2014 Influenza Vaccine (Tri) Adjuvanted  Incomplete Influenza Vaccine PF 10/20/2015 Influenza Vaccine Split 10/10/2012  4:30 PM, 11/14/2011, 10/25/2010 Pneumococcal Conjugate (PCV-13) 9/13/2017 12:00 AM  
 Pneumococcal Polysaccharide (PPSV-23) 12/5/2013 Tdap 12/5/2013 ZZZ-RETIRED (DO NOT USE) Pneumococcal Vaccine (Unspecified Type) 6/5/2008 Not reviewed this visit You Were Diagnosed With   
  
 Codes Comments Diabetic polyneuropathy associated with type 2 diabetes mellitus ([facility]    -  Primary ICD-10-CM: E11.42 
ICD-9-CM: 250.60, 357.2 Vitamin B 12 deficiency     ICD-10-CM: E53.8 ICD-9-CM: 266.2 Encounter for immunization     ICD-10-CM: D52 ICD-9-CM: V03.89 Vitals BP Pulse Temp Resp Height(growth percentile) Weight(growth percentile)  
 117/61 89 98 °F (36.7 °C) (Oral) 16 4' 11\" (1.499 m) 194 lb (88 kg) BMI OB Status Smoking Status 39.18 kg/m2 Postmenopausal Former Smoker BMI and BSA Data Body Mass Index Body Surface Area  
 39.18 kg/m 2 1.91 m 2 Preferred Pharmacy Pharmacy Name Phone [facility] [place] [address] ([address] [phone] Your Updated Medication List  
  
   
This list is accurate as of 9/27/18  9:02 AM.  Always use your most recent med list.  
  
  
  
  
 acetaminophen 650 mg Tber Commonly known as:  TYLENOL Take 1,300 mg by mouth daily as needed for Pain. adalimumab 40 mg/0.8 mL injection pen Commonly known as:  HUMIRA PEN  
0.8 mL by SubCUTAneous route every fourteen (14) days. Indications: Rheumatoid Arthritis  
  
 amLODIPine 5 mg tablet Commonly known as:  Carli Croon Take 1 Tab by mouth daily. Take for Raynaud's  
  
 aspirin 81 mg tablet Take 81 mg by mouth daily. atorvastatin 10 mg tablet Commonly known as:  LIPITOR Take 1 Tab by mouth daily. Blood-Glucose Meter monitoring kit Commonly known as:  CONTOUR METER Dx: 250. Check blood sugars daily. busPIRone 5 mg tablet Commonly known as:  BUSPAR Take 5 mg by mouth two (2) times a day. CALCIUM 500+D 500 mg(1,250mg) -200 unit per tablet Generic drug:  calcium-vitamin D Take 2 Tabs by mouth daily. cholecalciferol 1,000 unit tablet Commonly known as:  VITAMIN D3 Take 2,000 Units by mouth daily. cyanocobalamin 1,000 mcg/mL injection Commonly known as:  VITAMIN B-12  
1 mL by IntraMUSCular route once for 1 dose. cyclobenzaprine 10 mg tablet Commonly known as:  FLEXERIL Take 1 Tab by mouth nightly. denosumab 60 mg/mL injection Commonly known as:  [place]onna [place] 1 mL by SubCUTAneous route every 6 months. ESTRACE 0.01 % (0.1 mg/gram) vaginal cream  
Generic drug:  estradiol Insert 0.1 g into vagina every Monday, Wednesday, Friday. ferrous sulfate 325 mg (65 mg iron) tablet Take 1 Tab by mouth two (2) times a day. Indications: Iron Deficiency Anemia  
  
 folic acid 1 mg tablet Commonly known as:  FOLVITE  
TAKE 1 TABLET BY MOUTH DAILY  
  
 gabapentin 600 mg tablet Commonly known as:  NEURONTIN Take 1 Tab by mouth three (3) times daily. glipiZIDE SR 2.5 mg CR tablet Commonly known as:  GLUCOTROL XL  
TAKE 1 TABLET BY MOUTH TWICE DAILY  
  
 glucose blood VI test strips strip Commonly known as:  Ascensia CONTOUR Glucometer for 250.00 Pt. test blood sugar once daily (in morning) HYDROcodone-acetaminophen 5-325 mg per tablet Commonly known as:  Encarnacion Minor Take 1 Tab by mouth every six (6) hours as needed for Pain.  
  
 leflunomide 20 mg tablet Commonly known as:  Wesley Achille TAKE 1/2 TABLET BY MOUTH DAILY FOR 7 DAYS THEN 1 TABLET BY MOUTH EVERY DAY IF TOLERATED  
  
 metFORMIN 500 mg tablet Commonly known as:  GLUCOPHAGE  
TAKE 1 TABLET BY MOUTH TWICE DAILY  
  
 metoprolol succinate 25 mg XL tablet Commonly known as:  TOPROL-XL  
TAKE 1 TABLET BY MOUTH EVERY DAY  
  
 MULTIVITAMIN PO Take 1 Tab by mouth daily. nortriptyline 10 mg capsule Commonly known as:  PAMELOR  
TAKE 1 CAPSULE BY MOUTH EVERY NIGHT  
  
 pantoprazole 40 mg tablet Commonly known as:  PROTONIX TK 1 T PO D Syringe with Needle, Safety 3 mL 25 gauge x 5/8\" Syrg Commonly known as:  3cc Safety Syringe 25Gx5/8\" To be used with B12 injections  
  
 trimethoprim 100 mg tablet Commonly known as:  TRIMPEX Take 1 Tab by mouth daily. Indications: PREVENTION OF BACTERIAL URINARY TRACT INFECTION  
  
 varicella-zoster recombinant (PF) 50 mcg/0.5 mL Susr injection Commonly known as:  SHINGRIX (PF)  
0.5 mL by IntraMuscular for 2 doses at least 2 months apart. Indications: PREVENTION OF HERPES ZOSTER Charlaine Latin Rolling walker. Patient with spinal stenosis and stopped posture. Pain with limited ambulation. ZINC PO Take 50 mg by mouth daily. Prescriptions Printed Refills  
 varicella-zoster recombinant, PF, (SHINGRIX, PF,) 50 mcg/0.5 mL susr injection 1 Si.5 mL by IntraMuscular for 2 doses at least 2 months apart. Indications: PREVENTION OF HERPES ZOSTER Class: Print Prescriptions Sent to Pharmacy Refills  
 gabapentin (NEURONTIN) 600 mg tablet 5 Sig: Take 1 Tab by mouth three (3) times daily. Class: Normal  
 Pharmacy: [facility] [address] #: [phone] Route: Oral  
  
We Performed the Following ADMIN INFLUENZA VIRUS VAC [ HCP] INFLUENZA VACCINE INACTIVATED (IIV), SUBUNIT, ADJUVANTED, IM [id number] CPT(R)] THER/PROPH/DIAG INJECTION, SUBCUT/IM [id number] CPT(R)] VITAMIN B12 INJECTION [ [facility]] Follow-up Instructions Return in about 4 weeks (around 10/25/2018) for Vit B12 injection. Zenia Hinds To-Do List   
 2018  2:00 PM  
  Appointment with CARMELO INF2 CH3 <4H at [place] [address] ([phone]) Patient Instructions Recombinant Zoster (Shingles) Vaccine, RZV: What you need to know Why get vaccinated? Shingles (also called herpes zoster, or just zoster) is a painful skin rash, often with blisters. Shingles is caused by the varicella zoster virus, the same virus that causes chickenpox. After you have chickenpox, the virus stays in your body and can cause shingles later in life. You can't catch shingles from another person. However, a person who has never had chickenpox (or chickenpox vaccine) could get chickenpox from someone with shingles. A shingles rash usually appears on one side of the face or body and heals within 2 to 4 weeks. Its main symptom is pain, which can be severe. Other symptoms can include fever, headache, chills, and upset stomach. Very rarely, a shingles infection can lead to pneumonia, hearing problems, blindness, brain inflammation (encephalitis), or death. For about 1 person in 5, severe pain can continue even long after the rash has cleared up. This long-lasting pain is called post-herpetic neuralgia (PHN). Shingles is far more common in people 48years of age and older than in younger people, and the risk increases with age. It is also more common in people whose immune system is weakened because of a disease such as cancer, or by drugs such as steroids or chemotherapy. At least 1 million people a year in the [facility] get shingles. Shingles vaccine (recombinant) Recombinant shingles vaccine was approved by FDA in 2017 for the prevention of shingles. In clinical trials, it was more than 90% effective in preventing shingles. It can also reduce the likelihood of PHN. Two doses, 2 to 6 months apart, are recommended for adults 48 and older. This vaccine is also recommended for people who have already gotten the live shingles vaccine (Zostavax). There is no live virus in this vaccine. Some people should not get this vaccine Tell your vaccine provider if you: · Have any severe, life-threatening allergies. A person who has ever had a life-threatening allergic reaction after a dose of recombinant shingles vaccine, or has a severe allergy to any component of this vaccine, may be advised not to be vaccinated. Ask your health care provider if you want information about vaccine components. · Are pregnant or breastfeeding. There is not much information about use of recombinant shingles vaccine in pregnant or nursing women. Your healthcare provider might recommend delaying vaccination. · Are not feeling well. If you have a mild illness, such as a cold, you can probably get the vaccine today. If you are moderately or severely ill, you should probably wait until you recover. Your doctor can advise you. Risks of a vaccine reaction With any medicine, including vaccines, there is a chance of reactions. After recombinant shingles vaccination, a person might experience: 
· Pain, redness, soreness, or swelling at the site of the injection · Headache, muscle aches, fever, shivering, fatigue In clinical trials, most people got a sore arm with mild or moderate pain after vaccination, and some also had redness and swelling where they got the shot. Some people felt tired, had muscle pain, a headache, shivering, fever, stomach pain, or nausea. About 1 out of 6 people who got recombinant zoster vaccine experienced side effects that prevented them from doing regular activities. Symptoms went away on their own in about 2 to 3 days. Side effects were more common in younger people. You should still get the second dose of recombinant zoster vaccine even if you had one of these reactions after the first dose. Other things that could happen after this vaccine: · People sometimes faint after medical procedures, including vaccination. Sitting or lying down for about 15 minutes can help prevent fainting and injuries caused by a fall.  Tell your provider if you feel dizzy or have vision changes or ringing in the ears. · Some people get shoulder pain that can be more severe and longer-lasting than routine soreness that can follow injections. This happens very rarely. · Any medication can cause a severe allergic reaction. Such reactions to a vaccine are estimated at about 1 in a million doses, and would happen within a few minutes to a few hours after the vaccination. As with any medicine, there is a very remote chance of a vaccine causing a serious injury or death. The safety of vaccines is always being monitored. For more information, visit: www.cdc.gov/vaccinesafety/ What if there is a serious problem? What should I look for? · Look for anything that concerns you, such as signs of a severe allergic reaction, very high fever, or unusual behavior. Signs of a severe allergic reaction can include hives, swelling of the face and throat, difficulty breathing, a fast heartbeat, dizziness, and weakness. These would usually start a few minutes to a few hours after the vaccination. What should I do? · If you think it is a severe allergic reaction or other emergency that can't wait, call 9-1-1 or get to the nearest hospital. Otherwise, call your health care provider. · Afterward, the reaction should be reported to the Vaccine Adverse Event Reporting System (VAERS). Your doctor should file this report, or you can do it yourself through the VAERS website at www.vaers. hhs.gov, or by calling 3-761.197.1090. VAERS does not give medical advice. [place] How can I learn more? · Ask your health care provider. He or she can give you the vaccine package insert or suggest other sources of information. · Call your local or state health department. · Contact the Centers for Disease Control and Prevention (CDC): 
¨ Call 3-772.606.8794 (1-800-CDC-INFO) or ¨ Visit CDC's website at www.cdc.gov/vaccines Vaccine Information Statement (Interim) Recombinant Zoster Vaccine 2/12/2018 Department of Health and zintin Centers for Disease Control and Prevention Many Vaccine Information Statements are available in [place] and other languages. See www.immunize.org/vis. Hojas de Información Sobre Vacunas están disponibles en Español y en muchos otros idiomas. Visite [employer]si Care instructions adapted under license by [employer] (which disclaims liability or warranty for this information). If you have questions about a medical condition or this instruction, always ask your healthcare professional. [address] any warranty or liability for your use of this information. Introducing [facility] [place] [facility]! Dear Joann Goldberg: Thank you for requesting a [employer] account. Our records indicate that you already have an active [employer] account. You can access your account anytime at [email or web address] Did you know that you can access your hospital and ER discharge instructions at any time in [employer]? You can also review all of your test results from your hospital stay or ER visit. Additional Information If you have questions, please visit the Frequently Asked Questions section of the [employer] website at [email or web address] Remember, [employer] is NOT to be used for urgent needs. For medical emergencies, dial 911. Now available from your iPhone and Android! Please provide this summary of care documentation to your next provider. Your primary care clinician is listed as Alejandra Oakes. If you have any questions after today's visit, please call [phone].

## 2018-09-27 NOTE — PATIENT INSTRUCTIONS
Recombinant Zoster (Shingles) Vaccine, RZV: What you need to know  Why get vaccinated? Shingles (also called herpes zoster, or just zoster) is a painful skin rash, often with blisters. Shingles is caused by the varicella zoster virus, the same virus that causes chickenpox. After you have chickenpox, the virus stays in your body and can cause shingles later in life. You can't catch shingles from another person. However, a person who has never had chickenpox (or chickenpox vaccine) could get chickenpox from someone with shingles. A shingles rash usually appears on one side of the face or body and heals within 2 to 4 weeks. Its main symptom is pain, which can be severe. Other symptoms can include fever, headache, chills, and upset stomach. Very rarely, a shingles infection can lead to pneumonia, hearing problems, blindness, brain inflammation (encephalitis), or death. For about 1 person in 5, severe pain can continue even long after the rash has cleared up. This long-lasting pain is called post-herpetic neuralgia (PHN). Shingles is far more common in people 48years of age and older than in younger people, and the risk increases with age. It is also more common in people whose immune system is weakened because of a disease such as cancer, or by drugs such as steroids or chemotherapy. At least 1 million people a year in the [facility] get shingles. Shingles vaccine (recombinant)  Recombinant shingles vaccine was approved by FDA in 2017 for the prevention of shingles. In clinical trials, it was more than 90% effective in preventing shingles. It can also reduce the likelihood of PHN. Two doses, 2 to 6 months apart, are recommended for adults 48 and older. This vaccine is also recommended for people who have already gotten the live shingles vaccine (Zostavax). There is no live virus in this vaccine.   Some people should not get this vaccine  Tell your vaccine provider if you:  · Have any severe, life-threatening allergies. A person who has ever had a life-threatening allergic reaction after a dose of recombinant shingles vaccine, or has a severe allergy to any component of this vaccine, may be advised not to be vaccinated. Ask your health care provider if you want information about vaccine components. · Are pregnant or breastfeeding. There is not much information about use of recombinant shingles vaccine in pregnant or nursing women. Your healthcare provider might recommend delaying vaccination. · Are not feeling well. If you have a mild illness, such as a cold, you can probably get the vaccine today. If you are moderately or severely ill, you should probably wait until you recover. Your doctor can advise you. Risks of a vaccine reaction  With any medicine, including vaccines, there is a chance of reactions. After recombinant shingles vaccination, a person might experience:  · Pain, redness, soreness, or swelling at the site of the injection  · Headache, muscle aches, fever, shivering, fatigue  In clinical trials, most people got a sore arm with mild or moderate pain after vaccination, and some also had redness and swelling where they got the shot. Some people felt tired, had muscle pain, a headache, shivering, fever, stomach pain, or nausea. About 1 out of 6 people who got recombinant zoster vaccine experienced side effects that prevented them from doing regular activities. Symptoms went away on their own in about 2 to 3 days. Side effects were more common in younger people. You should still get the second dose of recombinant zoster vaccine even if you had one of these reactions after the first dose. Other things that could happen after this vaccine:  · People sometimes faint after medical procedures, including vaccination. Sitting or lying down for about 15 minutes can help prevent fainting and injuries caused by a fall.  Tell your provider if you feel dizzy or have vision changes or ringing in the ears.  · Some people get shoulder pain that can be more severe and longer-lasting than routine soreness that can follow injections. This happens very rarely. · Any medication can cause a severe allergic reaction. Such reactions to a vaccine are estimated at about 1 in a million doses, and would happen within a few minutes to a few hours after the vaccination. As with any medicine, there is a very remote chance of a vaccine causing a serious injury or death. The safety of vaccines is always being monitored. For more information, visit: www.cdc.gov/vaccinesafety/  What if there is a serious problem? What should I look for? · Look for anything that concerns you, such as signs of a severe allergic reaction, very high fever, or unusual behavior. Signs of a severe allergic reaction can include hives, swelling of the face and throat, difficulty breathing, a fast heartbeat, dizziness, and weakness. These would usually start a few minutes to a few hours after the vaccination. What should I do? · If you think it is a severe allergic reaction or other emergency that can't wait, call 9-1-1 or get to the nearest hospital. Otherwise, call your health care provider. · Afterward, the reaction should be reported to the Vaccine Adverse Event Reporting System (VAERS). Your doctor should file this report, or you can do it yourself through the VAERS website at www.vaers. hhs.gov, or by calling 6-136.267.3315. VAERS does not give medical advice. .  How can I learn more? · Ask your health care provider. He or she can give you the vaccine package insert or suggest other sources of information. · Call your local or state health department.   · Contact the Centers for Disease Control and Prevention (CDC):  ¨ Call 3-315.647.5390 (1-800-CDC-INFO) or  ¨ Visit CDC's website at www.cdc.gov/vaccines  Vaccine Information Statement (Interim)  Recombinant Zoster Vaccine  2/12/2018  [facility] Control and Prevention  Many Vaccine Information Statements are available in [place]mese and other languages. See www.immunize.org/vis. Hojas de Información Sobre Vacunas están disponibles en Español y en muchos otros idiomas. Visite Michael.si   Care instructions adapted under license by [employer] (which disclaims liability or warranty for this information). If you have questions about a medical condition or this instruction, always ask your healthcare professional. [address] any warranty or liability for your use of this information.

## 2018-09-27 NOTE — PROGRESS NOTES
Problem: Patient Education:  Go to Education Activity Goal: Patient/Family Education Outcome: Progressing Towards Goal 
Pt receiving Prolia

## 2018-09-27 NOTE — PROGRESS NOTES
[facility] VISIT NOTE 
 
1325 Pt arrived at [facility] ambulatory and in no distress for Prolia  Assessment completed, pt c/o of noemí hand pain Medications received: 
Prolia SC Tolerated treatment well, no adverse reaction noted. Recent Results (from the past 12 hour(s)) RENAL FUNCTION PANEL Collection Time: 09/27/18  1:37 PM  
Result Value Ref Range Sodium 141 136 - 145 mmol/L Potassium 4.4 3.5 - 5.1 mmol/L Chloride 106 97 - 108 mmol/L  
 CO2 28 21 - 32 mmol/L Anion gap 7 5 - 15 mmol/L Glucose 104 (H) 65 - 100 mg/dL BUN 21 (H) 6 - 20 MG/DL Creatinine 0.91 0.55 - 1.02 MG/DL  
 BUN/Creatinine ratio 23 (H) 12 - 20 GFR est AA >60 >60 ml/min/1.73m2 GFR est non-AA 60 (L) >60 ml/min/1.73m2 Calcium 8.2 (L) 8.5 - 10.1 MG/DL Phosphorus 3.5 2.6 - 4.7 MG/DL Albumin 3.4 (L) 3.5 - 5.0 g/dL MAGNESIUM Collection Time: 09/27/18  1:37 PM  
Result Value Ref Range Magnesium 2.1 1.6 - 2.4 mg/dL Patient Vitals for the past 12 hrs: 
 Temp Pulse Resp BP  
09/27/18 1329 97.7 °F (36.5 °C) 86 16 115/72  
 
1445 D/C'd from [facility] ambulatory and in no distress accompanied by . Next appointment pt will call to schedule.

## 2018-10-01 RX ORDER — PANTOPRAZOLE SODIUM 40 MG/1
TABLET, DELAYED RELEASE ORAL
Qty: 30 TAB | Refills: 0 | Status: SHIPPED | OUTPATIENT
Start: 2018-10-01 | End: 2018-12-13 | Stop reason: SDUPTHER

## 2018-10-09 ENCOUNTER — HOSPITAL ENCOUNTER (OUTPATIENT)
Age: 78
Setting detail: OUTPATIENT SURGERY
Discharge: HOME | End: 2018-10-09
Attending: INTERNAL MEDICINE | Admitting: INTERNAL MEDICINE
Payer: MEDICARE

## 2018-10-09 ENCOUNTER — ANESTHESIA (OUTPATIENT)
Dept: ENDOSCOPY | Age: 78
End: 2018-10-09
Payer: MEDICARE

## 2018-10-09 ENCOUNTER — ANESTHESIA EVENT (OUTPATIENT)
Dept: ENDOSCOPY | Age: 78
End: 2018-10-09
Payer: MEDICARE

## 2018-10-09 VITALS
RESPIRATION RATE: 17 BRPM | WEIGHT: 190 LBS | HEART RATE: 76 BPM | DIASTOLIC BLOOD PRESSURE: 56 MMHG | BODY MASS INDEX: 34.96 KG/M2 | OXYGEN SATURATION: 100 % | TEMPERATURE: 97.8 F | SYSTOLIC BLOOD PRESSURE: 130 MMHG | HEIGHT: 62 IN

## 2018-10-09 LAB
GLUCOSE BLD STRIP.AUTO-MCNC: 91 MG/DL (ref 65–100)
SERVICE CMNT-IMP: NORMAL

## 2018-10-09 PROCEDURE — 74011250636 HC RX REV CODE- 250/636

## 2018-10-09 PROCEDURE — 76040000019: Performed by: INTERNAL MEDICINE

## 2018-10-09 PROCEDURE — 76060000031 HC ANESTHESIA FIRST 0.5 HR: Performed by: INTERNAL MEDICINE

## 2018-10-09 PROCEDURE — 74011000250 HC RX REV CODE- 250: Performed by: ANESTHESIOLOGY

## 2018-10-09 PROCEDURE — 82962 GLUCOSE BLOOD TEST: CPT

## 2018-10-09 RX ORDER — NALOXONE HYDROCHLORIDE 0.4 MG/ML
0.4 INJECTION, SOLUTION INTRAMUSCULAR; INTRAVENOUS; SUBCUTANEOUS
Status: DISCONTINUED | OUTPATIENT
Start: 2018-10-09 | End: 2018-10-09 | Stop reason: HOSPADM

## 2018-10-09 RX ORDER — ATROPINE SULFATE 0.1 MG/ML
0.4 INJECTION INTRAVENOUS
Status: DISCONTINUED | OUTPATIENT
Start: 2018-10-09 | End: 2018-10-09 | Stop reason: HOSPADM

## 2018-10-09 RX ORDER — SODIUM CHLORIDE 9 MG/ML
50 INJECTION, SOLUTION INTRAVENOUS CONTINUOUS
Status: DISCONTINUED | OUTPATIENT
Start: 2018-10-09 | End: 2018-10-09 | Stop reason: HOSPADM

## 2018-10-09 RX ORDER — SODIUM CHLORIDE 9 MG/ML
INJECTION, SOLUTION INTRAVENOUS
Status: DISCONTINUED | OUTPATIENT
Start: 2018-10-09 | End: 2018-10-09 | Stop reason: HOSPADM

## 2018-10-09 RX ORDER — DEXTROMETHORPHAN/PSEUDOEPHED 2.5-7.5/.8
1.2 DROPS ORAL
Status: DISCONTINUED | OUTPATIENT
Start: 2018-10-09 | End: 2018-10-09 | Stop reason: HOSPADM

## 2018-10-09 RX ORDER — KETOROLAC TROMETHAMINE 5 MG/ML
1 SOLUTION OPHTHALMIC 4 TIMES DAILY
Status: DISCONTINUED | OUTPATIENT
Start: 2018-10-09 | End: 2018-10-09 | Stop reason: HOSPADM

## 2018-10-09 RX ORDER — EPINEPHRINE 0.1 MG/ML
1 INJECTION INTRACARDIAC; INTRAVENOUS
Status: DISCONTINUED | OUTPATIENT
Start: 2018-10-09 | End: 2018-10-09 | Stop reason: HOSPADM

## 2018-10-09 RX ORDER — PROPOFOL 10 MG/ML
INJECTION, EMULSION INTRAVENOUS AS NEEDED
Status: DISCONTINUED | OUTPATIENT
Start: 2018-10-09 | End: 2018-10-09 | Stop reason: HOSPADM

## 2018-10-09 RX ORDER — LIDOCAINE HYDROCHLORIDE 20 MG/ML
INJECTION, SOLUTION EPIDURAL; INFILTRATION; INTRACAUDAL; PERINEURAL AS NEEDED
Status: DISCONTINUED | OUTPATIENT
Start: 2018-10-09 | End: 2018-10-09 | Stop reason: HOSPADM

## 2018-10-09 RX ORDER — PROPOFOL 10 MG/ML
INJECTION, EMULSION INTRAVENOUS
Status: DISCONTINUED | OUTPATIENT
Start: 2018-10-09 | End: 2018-10-09 | Stop reason: HOSPADM

## 2018-10-09 RX ORDER — OFLOXACIN 3 MG/ML
1 SOLUTION/ DROPS OPHTHALMIC 4 TIMES DAILY
Status: DISCONTINUED | OUTPATIENT
Start: 2018-10-09 | End: 2018-10-09 | Stop reason: HOSPADM

## 2018-10-09 RX ORDER — FLUMAZENIL 0.1 MG/ML
0.2 INJECTION INTRAVENOUS
Status: DISCONTINUED | OUTPATIENT
Start: 2018-10-09 | End: 2018-10-09 | Stop reason: HOSPADM

## 2018-10-09 RX ORDER — MIDAZOLAM HYDROCHLORIDE 1 MG/ML
.25-5 INJECTION, SOLUTION INTRAMUSCULAR; INTRAVENOUS
Status: DISCONTINUED | OUTPATIENT
Start: 2018-10-09 | End: 2018-10-09 | Stop reason: HOSPADM

## 2018-10-09 RX ADMIN — PROPOFOL 140 MCG/KG/MIN: 10 INJECTION, EMULSION INTRAVENOUS at 13:42

## 2018-10-09 RX ADMIN — KETOROLAC TROMETHAMINE 1 DROP: 5 SOLUTION/ DROPS OPHTHALMIC at 14:48

## 2018-10-09 RX ADMIN — OFLOXACIN 1 DROP: 3 SOLUTION OPHTHALMIC at 14:54

## 2018-10-09 RX ADMIN — PROPOFOL 50 MG: 10 INJECTION, EMULSION INTRAVENOUS at 13:42

## 2018-10-09 RX ADMIN — LIDOCAINE HYDROCHLORIDE 60 MG: 20 INJECTION, SOLUTION EPIDURAL; INFILTRATION; INTRACAUDAL; PERINEURAL at 13:42

## 2018-10-09 RX ADMIN — SODIUM CHLORIDE: 9 INJECTION, SOLUTION INTRAVENOUS at 13:15

## 2018-10-09 NOTE — DISCHARGE INSTRUCTIONS
[address] [facility]. Mirella Briggs M.D.  [phone]            COLON DISCHARGE INSTRUCTIONS       10/9/2018    Yessica Rascon  :  1940  [facility] Medical Record Number:  [id number]      COLONOSCOPY FINDINGS:  Your colonoscopy showed mild diverticulosis and small internal hemorrhoids, otherwise within normal.    DISCOMFORT:  Redness at IV site- apply warm compress to area; if redness or soreness persist- contact your physician  There may be a slight amount of blood passed from the rectum  Gaseous discomfort- walking, belching will help relieve any discomfort  You may not operate a vehicle for 12 hours  You may not engage in an occupation involving machinery or appliances for rest of today  You may not drink alcoholic beverages for at least 12 hours  Avoid making any critical decisions for at least 24 hour  DIET:   High fiber diet. - however -  remember your colon is empty and a heavy meal will produce gas. Avoid these foods:  vegetables, fried / greasy foods, carbonated drinks for today     ACTIVITY:  You may resume your normal daily activities it is recommended that you spend the remainder of the day resting -  avoid any strenuous activity. CALL M.D. ANY SIGN OF:   Increasing pain, nausea, vomiting  Abdominal distension (swelling)  New increased bleeding (oral or rectal)  Fever (chills)  Pain in chest area  Bloody discharge from nose or mouth   Shortness of breath    Follow-up Instructions:   Call Dr. Rachel Sears if any questions or problems. Telephone # [phone]  No need for repeat colonoscopy for polyp surveillance based on your age, however it still can be done if any new symptoms or bleeding occur.

## 2018-10-09 NOTE — ROUTINE PROCESS
Abdulaziz Hills 
1940 
[id number] Situation: 
Verbal report received from: Celia Lassiter RN Procedure: Procedure(s): 
COLONOSCOPY Background: 
 
Preoperative diagnosis: HISTORY OF POLYPS Postoperative diagnosis: Diverticulosis :  Dr. Venkata Chaney Assistant(s): Endoscopy Technician-1: Bonita Simon Endoscopy RN-1: Kraely Jansen RN Specimens: * No specimens in log * H. Pylori  no Assessment: 
Intra-procedure medications Anesthesia gave intra-procedure sedation and medications, see anesthesia flow sheet yes Intravenous fluids: NS@ Myah Anthony Vital signs stable Abdominal assessment: round and soft Recommendation: 
Discharge patient per MD order. Family or Friend Permission to share finding with family or friend yes Endoscopy discharge instructions have been reviewed and given to patient, spouse and daughter. The patient, spouse and daughter verbalized understanding and acceptance of instructions.

## 2018-10-09 NOTE — PERIOP NOTES
Eye drops instilled as ordered with some relief, sent drops home with patient with instructions to instill four times daily and to call physician if no improvement.

## 2018-10-09 NOTE — ANESTHESIA POSTPROCEDURE EVALUATION
Post-Anesthesia Evaluation and Assessment Patient: Saray Mullen MRN: [id number]  SSN: [id number] YOB: 1940  Age: 77 y.o. Sex: female Cardiovascular Function/Vital Signs Visit Vitals  /61  Pulse 78  Temp 36.6 °C (97.8 °F)  Resp 19  
 Ht 5' 1.5\" (1.562 m)  Wt 86.2 kg (190 lb)  SpO2 100%  Breastfeeding No  
 BMI 35.32 kg/m2 Patient is status post MAC anesthesia for Procedure(s): 
COLONOSCOPY. Nausea/Vomiting: None Postoperative hydration reviewed and adequate. Pain: 
Pain Scale 1: Numeric (0 - 10) (10/09/18 1424) Pain Intensity 1: 0 (10/09/18 1424) Managed Neurological Status: At baseline Mental Status and Level of Consciousness: Arousable Pulmonary Status:  
O2 Device: Room air (10/09/18 1424) Adequate oxygenation and airway patent Complications related to anesthesia: None Post-anesthesia assessment completed. No concerns Signed By: North Adams MD   
 October 9, 2018

## 2018-10-09 NOTE — H&P
[address] Christian Pinto M.D. 
[phone] History and Physical    
 
NAME:  Virgil Ochoa :   1940 MRN:   [id number] Consult Date: 10/9/2018 12:20 PM 
 
Chief Complaint:  Colon cancer screening History of Present Illness:  Patient is a 77 y.o. who is seen for colon cancer screening and colon polyp surveillance. Denies any ongoing GI complaints. PMH: 
Past Medical History:  
Diagnosis Date  Anemia  Arthritis  CAD (coronary artery disease)  MI  
 Degenerative joint disease of right hip [facility]  Diabetes ([address]  Fracture   
 right shoulder; T6 compression  Gastritis  Hypercholesterolemia Hyperlipidemia  Lupus 2015  Osteoporosis, post-menopausal   
 vertebral fracture PSH: 
Past Surgical History:  
Procedure Laterality Date  CARDIAC SURG PROCEDURE UNLIST Patient Denies  HX CHOLECYSTECTOMY  HX CORONARY STENT PLACEMENT  1997 LAD  HX GASTRIC BYPASS    HX HERNIA REPAIR    HX KYPHOPLASTY    
 t5  
 HX KYPHOPLASTY L4  
 HX ORTHOPAEDIC    
 bilateral knees  HX ORTHOPAEDIC    
 r shoulder  HX ORTHOPAEDIC Laminectomy  TX INC IMPLTJ NEUROSTIMULATOR ELTRD SACRAL NERVE Allergies: Allergies Allergen Reactions  Cephalosporins Nausea and Vomiting Severe vomiting Home Medications: 
Prior to Admission Medications Prescriptions Last Dose Informant Patient Reported? Taking? Blood-Glucose Meter (CONTOUR METER) monitoring kit 10/8/2018 at Unknown time  No Yes Sig: Dx: 250. Check blood sugars daily. ESTRACE 0.01 % (0.1 mg/gram) vaginal cream Unknown at Unknown time  Yes No  
Sig: Insert 0.1 g into vagina every Monday, Wednesday, Friday. HYDROcodone-acetaminophen (NORCO) 5-325 mg per tablet 2018 at Unknown time  No Yes Sig: Take 1 Tab by mouth every six (6) hours as needed for Pain. MULTIVITAMINS (MULTIVITAMIN PO) 10/8/2018 at Unknown time  Yes Yes Sig: Take 1 Tab by mouth daily. Syringe with Needle, Safety [facility]G\") 3 mL 25 gauge x \" syrg 10/8/2018 at Unknown time  No Yes Sig: To be used with B12 injections Walker Misc 10/8/2018 at Unknown time  No Yes Sig: Rolling walker. Patient with spinal stenosis and stopped posture. Pain with limited ambulation. ZINC PO 10/8/2018 at Unknown time  Yes Yes Sig: Take 50 mg by mouth daily. acetaminophen (TYLENOL) 650 mg CR tablet 10/8/2018 at Unknown time  Yes Yes Sig: Take 1,300 mg by mouth daily as needed for Pain. adalimumab (HUMIRA PEN) 40 mg/0.8 mL injection pen Not Taking at Unknown time  No No  
Si.8 mL by SubCUTAneous route every fourteen (14) days. Indications: Rheumatoid Arthritis  
amLODIPine (NORVASC) 5 mg tablet 10/8/2018 at Unknown time  No Yes Sig: Take 1 Tab by mouth daily. Take for Raynaud's  
aspirin 81 mg Tab 10/8/2018 at Unknown time  Yes Yes Sig: Take 81 mg by mouth daily. atorvastatin (LIPITOR) 10 mg tablet 10/8/2018 at Unknown time  No Yes Sig: Take 1 Tab by mouth daily. busPIRone (BUSPAR) 5 mg tablet 10/8/2018 at Unknown time  Yes Yes Sig: Take 5 mg by mouth two (2) times a day. calcium-vitamin D (CALCIUM 500+D) 500 mg(1,250mg) -200 unit per tablet 10/8/2018 at Unknown time  Yes Yes Sig: Take 2 Tabs by mouth daily. cholecalciferol, vitamin d3, (VITAMIN D3) 1,000 unit tablet 10/8/2018 at Unknown time  Yes Yes Sig: Take 2,000 Units by mouth daily. cyclobenzaprine (FLEXERIL) 10 mg tablet 10/8/2018 at Unknown time  No Yes Sig: Take 1 Tab by mouth nightly. denosumab (PROLIA) 60 mg/mL injection 2018 at Unknown time  Yes Yes Si mL by SubCUTAneous route every 6 months. ferrous sulfate 325 mg (65 mg iron) tablet 2018  No No  
Sig: Take 1 Tab by mouth two (2) times a day. Indications: Iron Deficiency Anemia folic acid (FOLVITE) 1 mg tablet Not Taking at Unknown time  No No  
Sig: TAKE 1 TABLET BY MOUTH DAILY  
gabapentin (NEURONTIN) 600 mg tablet 10/8/2018 at Unknown time  No Yes Sig: Take 1 Tab by mouth three (3) times daily. glipiZIDE SR (GLUCOTROL XL) 2.5 mg CR tablet 10/8/2018 at Unknown time  No Yes Sig: TAKE 1 TABLET BY MOUTH TWICE DAILY  
glucose blood VI test strips (ASCENSIA CONTOUR) strip 10/8/2018 at Unknown time  No Yes Sig: Glucometer for 250.00 Pt. test blood sugar once daily (in morning)  
leflunomide (ARAVA) 20 mg tablet 10/8/2018 at Unknown time  No Yes Sig: TAKE 1/2 TABLET BY MOUTH DAILY FOR 7 DAYS THEN 1 TABLET BY MOUTH EVERY DAY IF TOLERATED  
metFORMIN (GLUCOPHAGE) 500 mg tablet 10/8/2018 at Unknown time  No Yes Sig: TAKE 1 TABLET BY MOUTH TWICE DAILY  
metoprolol succinate (TOPROL-XL) 25 mg XL tablet 10/8/2018 at Unknown time  No Yes Sig: TAKE 1 TABLET BY MOUTH EVERY DAY  
nortriptyline (PAMELOR) 10 mg capsule 10/8/2018 at Unknown time  No Yes Sig: TAKE 1 CAPSULE BY MOUTH EVERY NIGHT  
pantoprazole (PROTONIX) 40 mg tablet Not Taking at Unknown time  Yes No  
Sig: TK 1 T PO D  
pantoprazole (PROTONIX) 40 mg tablet Not Taking at Unknown time  No No  
Sig: TAKE 1 TABLET BY MOUTH DAILY  
trimethoprim (TRIMPEX) 100 mg tablet 10/8/2018 at Unknown time  No Yes Sig: Take 1 Tab by mouth daily. Indications: PREVENTION OF BACTERIAL URINARY TRACT INFECTION  
varicella-zoster recombinant, PF, (SHINGRIX, PF,) 50 mcg/0.5 mL susr injection Unknown at Unknown time  No No  
Si.5 mL by IntraMuscular for 2 doses at least 2 months apart. Indications: PREVENTION OF HERPES ZOSTER Facility-Administered Medications: None Hospital Medications: No current facility-administered medications for this encounter. Social History: 
Social History Substance Use Topics  Smoking status: Former Smoker Quit date: 1989  Smokeless tobacco: Never Used  Alcohol use No  
 Comment: Occasional  
 
 
Family History: 
Family History Problem Relation Age of Onset  Diabetes Mother  Hypertension Mother  Heart Disease Mother  Diabetes Father  Hypertension Father  Heart Disease Father 24 [facility] Arthritis-rheumatoid Sister  Elevated Lipids Sister [address] [facility] Arthritis-rheumatoid Sister  Hypertension Sister  Diabetes Sister  Thyroid Disease Sister  Arthritis-rheumatoid Other Review of Systems: 
 
 
Constitutional: negative fever, negative chills, negative weight loss Eyes:   negative visual changes ENT:   negative sore throat, tongue or lip swelling Respiratory:  negative cough, negative dyspnea Cards:  negative for chest pain, palpitations, lower extremity edema GI:   See HPI 
:  negative for frequency, dysuria Integument:  negative for rash and pruritus Heme:  negative for easy bruising and gum/nose bleeding Musculoskel: negative for myalgias,  back pain and muscle weakness Neuro: negative for headaches, dizziness, vertigo Psych:  negative for feelings of anxiety, depression Objective:  
Patient Vitals for the past 8 hrs: 
 BP Temp Pulse Resp SpO2 Height Weight 10/09/18 1131 147/63 98.3 °F (36.8 °C) 75 16 100 % 5' 1.5\" (1.562 m) 86.2 kg (190 lb) EXAM:   
 NEURO-a&o HEENT-wnl LUNGS-clear COR-regular rate and rhythym ABD-soft , no tenderness, no rebound, good bs EXT-no edema Data Review No results for input(s): WBC, HGB, HCT, PLT, HGBEXT, HCTEXT, PLTEXT in the last 72 hours. No results for input(s): NA, K, CL, CO2, BUN, CREA, GLU, PHOS, CA in the last 72 hours. No results for input(s): SGOT, GPT, AP, TBIL, TP, ALB, GLOB, GGT, AML, LPSE in the last 72 hours. No lab exists for component: AMYP, HLPSE No results for input(s): INR, PTP, APTT in the last 72 hours. No lab exists for component: INREXT Patient Active Problem List  
Diagnosis Code  CAD (coronary artery disease) I25.10  Hyperlipidemia E78.5  Gastritis K29.70  Vitamin D deficiency E55.9  Status post gastric bypass for obesity Z98.84  
 Arthritis M19.90  
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  
 H/O Bell's palsy Z86.69  
 Diabetic polyneuropathy (HCC) E11.42  
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  
 H/O total knee replacement Z96.659  Primary localized osteoarthritis of left knee M17.12  
 Hypercholesterolemia E78.00  
 Primary Raynaud's phenomenon I73.00  
 CKD (chronic kidney disease) stage 2, GFR 60-89 ml/min N18.2  Type 2 diabetes with nephropathy (HCC) E11.21  
 CKD (chronic kidney disease) stage 3, GFR 30-59 ml/min (HCC) N18.3 Assessment:  
· Colon cancer screening and colon polyp surveillance Plan:  
· Colonoscopy today.   
 
Signed By: Helga Keller MD   
 10/9/2018  12:20 PM

## 2018-10-09 NOTE — IP AVS SNAPSHOT
[address] [place] [address] 
[phone] Patient: Terrence Rossi MRN: [id number] UBW:[id number] About your hospitalization You were admitted on:  October 9, 2018 You last received care in the:  [facility] ENDOSCOPY You were discharged on:  October 9, 2018 Why you were hospitalized Your primary diagnosis was:  Not on File Follow-up Information None Your Scheduled Appointments Monday October 29, 2018  9:10 AM EDT ROUTINE CARE with Michelle Muhammad MD  
[facility] (Felix Rivera) [address] [place] [id number]  
[phone] Tuesday November 20, 2018  1:40 PM EST  
ESTABLISHED PATIENT with Wilfredo Hernandez MD  
[facility] Felix Rivera [address] Lois Lose [id number] [phone] Discharge Orders None A check norris indicates which time of day the medication should be taken. My Medications CONTINUE taking these medications Instructions Each Dose to Equal  
 Morning Noon Evening Bedtime  
 acetaminophen 650 mg Tber Commonly known as:  TYLENOL Your last dose was: Your next dose is: Take 1,300 mg by mouth daily as needed for Pain. 1300 mg  
    
   
   
   
  
 adalimumab 40 mg/0.8 mL injection pen Commonly known as:  HUMIRA PEN Your last dose was: Your next dose is: 0.8 mL by SubCUTAneous route every fourteen (14) days. Indications: Rheumatoid Arthritis 40 mg  
    
   
   
   
  
 amLODIPine 5 mg tablet Commonly known as:  Mynor May Your last dose was: Your next dose is: Take 1 Tab by mouth daily. Take for Raynaud's  
 5 mg  
    
   
   
   
  
 aspirin 81 mg tablet Your last dose was: Your next dose is: Take 81 mg by mouth daily.   
 81 mg  
    
   
   
   
 atorvastatin 10 mg tablet Commonly known as:  LIPITOR Your last dose was: Your next dose is: Take 1 Tab by mouth daily. 10 mg Blood-Glucose Meter monitoring kit Commonly known as:  CONTOUR METER Your last dose was: Your next dose is: Dx: 250. Check blood sugars daily. busPIRone 5 mg tablet Commonly known as:  BUSPAR Your last dose was: Your next dose is: Take 5 mg by mouth two (2) times a day. 5 mg CALCIUM 500+D 500 mg(1,250mg) -200 unit per tablet Generic drug:  calcium-vitamin D Your last dose was: Your next dose is: Take 2 Tabs by mouth daily. 2 Tab  
    
   
   
   
  
 cholecalciferol 1,000 unit tablet Commonly known as:  VITAMIN D3 Your last dose was: Your next dose is: Take 2,000 Units by mouth daily. 2000 Units  
    
   
   
   
  
 cyclobenzaprine 10 mg tablet Commonly known as:  FLEXERIL Your last dose was: Your next dose is: Take 1 Tab by mouth nightly. 10 mg  
    
   
   
   
  
 denosumab 60 mg/mL injection Commonly known as:  Loraine Bun Your last dose was: Your next dose is:    
   
   
 1 mL by SubCUTAneous route every 6 months. 60 mg  
    
   
   
   
  
 ESTRACE 0.01 % (0.1 mg/gram) vaginal cream  
Generic drug:  estradiol Your last dose was: Your next dose is: Insert 0.1 g into vagina every Monday, Wednesday, Friday. 0.0425 g  
    
   
   
   
  
 ferrous sulfate 325 mg (65 mg iron) tablet Your last dose was: Your next dose is: Take 1 Tab by mouth two (2) times a day. Indications: Iron Deficiency Anemia 325 mg  
    
   
   
   
  
 folic acid 1 mg tablet Commonly known as:  Marcie Your last dose was: Your next dose is: TAKE 1 TABLET BY MOUTH DAILY  
     
   
   
   
  
 gabapentin 600 mg tablet Commonly known as:  NEURONTIN Your last dose was: Your next dose is: Take 1 Tab by mouth three (3) times daily. 600 mg  
    
   
   
   
  
 glipiZIDE SR 2.5 mg CR tablet Commonly known as:  GLUCOTROL XL Your last dose was: Your next dose is: TAKE 1 TABLET BY MOUTH TWICE DAILY  
     
   
   
   
  
 glucose blood VI test strips strip Commonly known as:  Ascensia CONTOUR Your last dose was: Your next dose is:    
   
   
 Glucometer for 250.00 Pt. test blood sugar once daily (in morning) HYDROcodone-acetaminophen 5-325 mg per tablet Commonly known as:  Nataliya Juárez Your last dose was: Your next dose is: Take 1 Tab by mouth every six (6) hours as needed for Pain. 1 Tab  
    
   
   
   
  
 leflunomide 20 mg tablet Commonly known as:  Ronnie Chisholm Your last dose was: Your next dose is: TAKE 1/2 TABLET BY MOUTH DAILY FOR 7 DAYS THEN 1 TABLET BY MOUTH EVERY DAY IF TOLERATED  
     
   
   
   
  
 metFORMIN 500 mg tablet Commonly known as:  GLUCOPHAGE Your last dose was: Your next dose is: TAKE 1 TABLET BY MOUTH TWICE DAILY  
     
   
   
   
  
 metoprolol succinate 25 mg XL tablet Commonly known as:  TOPROL-XL Your last dose was: Your next dose is: TAKE 1 TABLET BY MOUTH EVERY DAY  
     
   
   
   
  
 MULTIVITAMIN PO Your last dose was: Your next dose is: Take 1 Tab by mouth daily. 1 Tab  
    
   
   
   
  
 nortriptyline 10 mg capsule Commonly known as:  PAMELOR Your last dose was: Your next dose is: TAKE 1 CAPSULE BY MOUTH EVERY NIGHT * pantoprazole 40 mg tablet Commonly known as:  PROTONIX Your last dose was: Your next dose is:    
   
   
 TK 1 T PO D  
     
   
   
   
  
 * pantoprazole 40 mg tablet Commonly known as:  PROTONIX Your last dose was: Your next dose is: TAKE 1 TABLET BY MOUTH DAILY Syringe with Needle, Safety 3 mL 25 gauge x 5/8\" Syrg Commonly known as:  3cc Safety Syringe 25Gx5/8\" Your last dose was: Your next dose is: To be used with B12 injections  
     
   
   
   
  
 trimethoprim 100 mg tablet Commonly known as:  TRIMPEX Your last dose was: Your next dose is: Take 1 Tab by mouth daily. Indications: PREVENTION OF BACTERIAL URINARY TRACT INFECTION  
 100 mg  
    
   
   
   
  
 varicella-zoster recombinant (PF) 50 mcg/0.5 mL Susr injection Commonly known as:  SHINGRIX (PF) Your last dose was: Your next dose is: 0.5 mL by IntraMuscular for 2 doses at least 2 months apart. Indications: PREVENTION OF HERPES ZOSTER Keshia Sukumar Your last dose was: Your next dose is:    
   
   
 Rolling walker. Patient with spinal stenosis and stopped posture. Pain with limited ambulation. ZINC PO Your last dose was: Your next dose is: Take 50 mg by mouth daily. 50 mg  
    
   
   
   
  
 * Notice: This list has 2 medication(s) that are the same as other medications prescribed for you. Read the directions carefully, and ask your doctor or other care provider to review them with you. Opioid Education Prescription Opioids: What You Need to Know: 
 
    
10/9/2018 Krish Prieto :  1940 [address] Medical Record Number:  [id number] COLONOSCOPY FINDINGS: 
Your colonoscopy showed mild diverticulosis and small internal hemorrhoids, otherwise within normal. 
 
DISCOMFORT: 
Redness at IV site- apply warm compress to area; if redness or soreness persist- contact your physician There may be a slight amount of blood passed from the rectum Gaseous discomfort- walking, belching will help relieve any discomfort You may not operate a vehicle for 12 hours You may not engage in an occupation involving machinery or appliances for rest of today You may not drink alcoholic beverages for at least 12 hours Avoid making any critical decisions for at least 24 hour DIET: 
 High fiber diet.  however -  remember your colon is empty and a heavy meal will produce gas. Avoid these foods:  vegetables, fried / greasy foods, carbonated drinks for today ACTIVITY: 
You may resume your normal daily activities it is recommended that you spend the remainder of the day resting -  avoid any strenuous activity. CALL M.D. ANY SIGN OF: Increasing pain, nausea, vomiting Abdominal distension (swelling) New increased bleeding (oral or rectal) Fever (chills) Pain in chest area Bloody discharge from nose or mouth Shortness of breath Follow-up Instructions: 
 Call Dr. Charlotte Cisse if any questions or problems. Telephone # [phone] No need for repeat colonoscopy for polyp surveillance based on your age, however it still can be done if any new symptoms or bleeding occur. ACO Transitions of Care Introducing [facility] [address] offers a voluntary care coordination program to provide high quality service and care to [facility] fee-for-service beneficiaries. Tre Gregg was designed to help you enhance your health and well-being through the following services: ? Transitions of Care  support for individuals who are transitioning from one care setting to another (example: Hospital to home). ? Chronic and Complex Care Coordination  support for individuals and caregivers of those with serious or chronic illnesses or with more than one chronic (ongoing) condition and those who take a number of different medications. If you meet specific medical criteria, a [address] [place] [address] Rd may call you directly to coordinate your care with your primary care physician and your other care providers. For questions about the [facility] programs, please, contact your physicians office. For general questions or additional information about Accountable Care Organizations: 
Please visit www.medicare.gov/acos. html or call 1-800-MEDICARE (3-215.905.4383) TTY users should call 7-738.980.4453. Introducing [facility] & HEALTH SERVICES! Dear Yohannes Stokes: Thank you for requesting a [employer] account. Our records indicate that you already have an active [employer] account. You can access your account anytime at [email or web address] Did you know that you can access your hospital and ER discharge instructions at any time in [employer]? You can also review all of your test results from your hospital stay or ER visit. Additional Information If you have questions, please visit the Frequently Asked Questions section of the [employer] website at [email or web address] Remember, The [employer] Reader is NOT to be used for urgent needs. For medical emergencies, dial 911. Now available from your iPhone and Android! Introducing Doug Malik As a [employer] patient, I wanted to make you aware of our electronic visit tool called Doug Malik. [employer] 24/7 allows you to connect within minutes with a medical provider 24 hours a day, seven days a week via a mobile device or tablet or logging into a secure website from your computer. You can access [employer] from anywhere in the [place]. A virtual visit might be right for you when you have a simple condition and feel like you just dont want to get out of bed, or cant get away from work for an appointment, when your regular Alfie Roe provider is not available (evenings, weekends or holidays), or when youre out of town and need minor care. Electronic visits cost only $49 and if the [facility] 24/7 provider determines a prescription is needed to treat your condition, one can be electronically transmitted to a nearby pharmacy*. Please take a moment to enroll today if you have not already done so. The enrollment process is free and takes just a few minutes. To enroll, please download the [employer] 24/Educanon jlui to your tablet or phone, or visit [email or web address] to enroll on your computer. And, as an [address] [place] [address] patient with a [employer] account, the results of your visits will be scanned into your electronic medical record and your primary care provider will be able to view the scanned results. We urge you to continue to see your regular Alfie Roe provider for your ongoing medical care. And while your primary care provider may not be the one available when you seek a Doug Galeanofin virtual visit, the peace of mind you get from getting a real diagnosis real time can be priceless. For more information on [employer], view our Frequently Asked Questions (FAQs) at [email or web address]. Sincerely, 
 
Justyna Sy MD 
Chief Medical Officer [facility] Cindi Graham *:  certain medications cannot be prescribed via [employer] Providers Seen During Your Hospitalization Provider Specialty Primary office phone Li Salcido MD Gastroenterology [phone] Your Primary Care Physician (PCP) Primary Care Physician Office Phone Office Fax [address] [facility] [address], [address] [phone] You are allergic to the following Allergen Reactions Cephalosporins Nausea and Vomiting Severe vomiting Recent Documentation Height Weight Breastfeeding? BMI OB Status Smoking Status 1.562 m 86.2 kg No 35.32 kg/m2 Postmenopausal Former Smoker Emergency Contacts Name Discharge Info Relation Home Work Mobile Morteza Rueda DISCHARGE CAREGIVER [3] Spouse [3] [phone] Patient Belongings The following personal items are in your possession at time of discharge: 
  Dental Appliances: Uppers, Lowers  Visual Aid: Glasses Please provide this summary of care documentation to your next provider. Signatures-by signing, you are acknowledging that this After Visit Summary has been reviewed with you and you have received a copy. Patient Signature:  ____________________________________________________________ Date:  ____________________________________________________________  
  
Heidy Bone Provider Signature:  ____________________________________________________________ Date:  ____________________________________________________________

## 2018-10-09 NOTE — PROGRESS NOTES
Report received from [facility] ([facility]). See anesthesia note. ABD remains soft and non-tender post procedure. Pt has no complaints at this time and tolerated the procedure well. Endoscope was pre-cleaned at bedside immediately following procedure by Liseth Li

## 2018-10-09 NOTE — ANESTHESIA PREPROCEDURE EVALUATION
Anesthetic History No history of anesthetic complications Review of Systems / Medical History Patient summary reviewed and pertinent labs reviewed Pulmonary Pertinent negatives: No sleep apnea Comments: Remote tobacco history Neuro/Psych Comments: Diabetic neuropathy Raynaud's Walks with a rollator Cardiovascular Past MI, CAD and hyperlipidemia Exercise tolerance: >4 METS Comments: History of MI, stents 1997 (LAD), last took asa yesterday Last took metop last night, as scheduled GI/Hepatic/Renal 
  
GERD: well controlled Comments: S/p gastric bypass Endo/Other Diabetes: type 2 Arthritis Other Findings Comments: BG 94 Physical Exam 
 
Airway Mallampati: II 
TM Distance: < 4 cm Neck ROM: normal range of motion Mouth opening: Normal 
 
 Cardiovascular Regular rate and rhythm,  S1 and S2 normal,  no murmur, click, rub, or gallop Rhythm: regular Rate: normal 
 
 
 
 Dental 
 
Dentition: Full lower dentures and Full upper dentures Pulmonary Breath sounds clear to auscultation Abdominal 
 
 
 
 Other Findings Anesthetic Plan ASA: 3 Anesthesia type: MAC Induction: Intravenous Anesthetic plan and risks discussed with: Patient

## 2018-10-18 DIAGNOSIS — I10 ESSENTIAL HYPERTENSION WITH GOAL BLOOD PRESSURE LESS THAN 130/80: ICD-10-CM

## 2018-10-18 RX ORDER — METOPROLOL SUCCINATE 25 MG/1
TABLET, EXTENDED RELEASE ORAL
Qty: 90 TAB | Refills: 0 | Status: SHIPPED | OUTPATIENT
Start: 2018-10-18 | End: 2019-01-25 | Stop reason: SDUPTHER

## 2018-10-19 RX ORDER — METFORMIN HYDROCHLORIDE 500 MG/1
500 TABLET ORAL 2 TIMES DAILY WITH MEALS
Qty: 180 TAB | Refills: 0 | Status: SHIPPED | OUTPATIENT
Start: 2018-10-19 | End: 2019-01-27 | Stop reason: SDUPTHER

## 2018-10-26 DIAGNOSIS — N18.2 CKD (CHRONIC KIDNEY DISEASE) STAGE 2, GFR 60-89 ML/MIN: ICD-10-CM

## 2018-10-26 DIAGNOSIS — M06.9 RHEUMATOID ARTHRITIS WITH UNKNOWN RHEUMATOID FACTOR STATUS (HCC): ICD-10-CM

## 2018-10-26 DIAGNOSIS — M81.0 AGE-RELATED OSTEOPOROSIS WITHOUT CURRENT PATHOLOGICAL FRACTURE: ICD-10-CM

## 2018-10-26 DIAGNOSIS — E55.9 VITAMIN D DEFICIENCY: ICD-10-CM

## 2018-10-26 DIAGNOSIS — I73.00 PRIMARY RAYNAUD'S PHENOMENON: ICD-10-CM

## 2018-10-26 RX ORDER — AMLODIPINE BESYLATE 5 MG/1
TABLET ORAL
Qty: 90 TAB | Refills: 0 | Status: SHIPPED | OUTPATIENT
Start: 2018-10-26 | End: 2019-02-07 | Stop reason: SDUPTHER

## 2018-10-29 ENCOUNTER — OFFICE VISIT (OUTPATIENT)
Dept: FAMILY MEDICINE CLINIC | Age: 78
End: 2018-10-29

## 2018-10-29 VITALS
HEART RATE: 87 BPM | SYSTOLIC BLOOD PRESSURE: 109 MMHG | TEMPERATURE: 98 F | OXYGEN SATURATION: 96 % | DIASTOLIC BLOOD PRESSURE: 67 MMHG | WEIGHT: 192 LBS | RESPIRATION RATE: 18 BRPM | BODY MASS INDEX: 35.33 KG/M2 | HEIGHT: 62 IN

## 2018-10-29 DIAGNOSIS — M12.9 ARTHROPATHY: ICD-10-CM

## 2018-10-29 DIAGNOSIS — Z98.84 STATUS POST GASTRIC BYPASS FOR OBESITY: ICD-10-CM

## 2018-10-29 DIAGNOSIS — E11.21 TYPE 2 DIABETES WITH NEPHROPATHY (HCC): ICD-10-CM

## 2018-10-29 DIAGNOSIS — M79.18 MYOFASCIAL MUSCLE PAIN: ICD-10-CM

## 2018-10-29 DIAGNOSIS — I25.10 CORONARY ARTERY DISEASE INVOLVING NATIVE HEART WITHOUT ANGINA PECTORIS, UNSPECIFIED VESSEL OR LESION TYPE: ICD-10-CM

## 2018-10-29 DIAGNOSIS — E78.2 MIXED HYPERLIPIDEMIA: Primary | ICD-10-CM

## 2018-10-29 DIAGNOSIS — M51.36 DEGENERATIVE LUMBAR DISC: ICD-10-CM

## 2018-10-29 LAB
AMPHETAMINE QL URINE POC: NEGATIVE
BARBITURATES UR POC: NEGATIVE
BENZODIAZEPINES UR POC: NEGATIVE
COCAINE QL URINE POC: NEGATIVE
LOT EXP DATE POC: NORMAL
LOT NUMBER POC: NORMAL
MARIJUANA (THC) QL URINE POC: NEGATIVE
MDMA/ECSTASY UR POC: NEGATIVE
METHADONE QL URINE POC: NEGATIVE
METHAMPHETAMINE QL URINE POC: NEGATIVE
OPIATES 300 QL URINE POC: NORMAL
OXYCODONE UR POC: NEGATIVE
PHENCYCLIDINE QL URINE POC: NEGATIVE
TRICYCLIC ANTIDEPRESSANTS (TCA) QL URINE POC: NORMAL

## 2018-10-29 RX ORDER — NITROGLYCERIN 0.4 MG/1
0.4 TABLET SUBLINGUAL
Qty: 4 TAB | Refills: 0 | Status: SHIPPED | OUTPATIENT
Start: 2018-10-29 | End: 2020-10-29 | Stop reason: SDUPTHER

## 2018-10-29 RX ORDER — HYDROCODONE BITARTRATE AND ACETAMINOPHEN 5; 325 MG/1; MG/1
1 TABLET ORAL
Qty: 60 TAB | Refills: 0 | Status: SHIPPED | OUTPATIENT
Start: 2018-10-29 | End: 2019-06-03 | Stop reason: SDUPTHER

## 2018-10-29 NOTE — PROGRESS NOTES
Ana Luisa Kapadia  77 y.o. female  1940  [address] [place] [id number]  [id number]     [id number]: Progress Note       Encounter Date: 10/29/2018    Chief Complaint   Patient presents with    Injection B12       History provided by patient  History of Present Illness   Ana Luisa Kapadia is a 77 y.o. female who presents to clinic today for:    Hypertension: Controlled   BP Readings from Last 3 Encounters:   10/29/18 109/67   10/09/18 130/56   18 115/72     The patient reports:  taking medications as instructed, no medication side effects noted, no TIA's, no chest pain on exertion, no dyspnea on exertion, no swelling of ankles. Home monitoring:No  Sodium   Date Value Ref Range Status   2018 141 136 - 145 mmol/L Final     Potassium   Date Value Ref Range Status   2018 4.4 3.5 - 5.1 mmol/L Final     Magnesium   Date Value Ref Range Status   2018 2.1 1.6 - 2.4 mg/dL Final     Chloride   Date Value Ref Range Status   2018 106 97 - 108 mmol/L Final     Creatinine   Date Value Ref Range Status   2018 0.91 0.55 - 1.02 MG/DL Final   2018 0.88 0.57 - 1.00 mg/dL Final   05/10/2018 0.86 0.57 - 1.00 mg/dL Final       Our goal is to normalize the blood pressure to decrease the risks of strokes and heart attacks. The patient is in agreement with the plan. Hyperlipidemia/CAD:  Stable Recently reports 1 episode of CP radiating to neck. Episode lasted seconds and was not associated with any other symptoms. She did not feel it was cardiac or similar to prior angina but would like a refill on her nitro as that  several years ago. Cardiovascular risks for her are: diabetic  hypertension  hyperlipidemia.    Currently she takes Lipitor (atorvastatin) ,  Myalgias: No  Cholesterol, total   Date Value Ref Range Status   2018 167 100 - 199 mg/dL Final     HDL Cholesterol   Date Value Ref Range Status   2018 48 >39 mg/dL Final     LDL, calculated Date Value Ref Range Status   08/20/2018 83 0 - 99 mg/dL Final     Triglyceride   Date Value Ref Range Status   08/20/2018 178 (H) 0 - 149 mg/dL Final     ALT (SGPT)   Date Value Ref Range Status   08/20/2018 24 0 - 32 IU/L Final     AST (SGOT)   Date Value Ref Range Status   08/20/2018 36 0 - 40 IU/L Final     Alk. phosphatase   Date Value Ref Range Status   08/20/2018 73 39 - 117 IU/L Final       Wt Readings from Last 3 Encounters:   10/29/18 192 lb (87.1 kg)   10/09/18 190 lb (86.2 kg)   09/27/18 194 lb (88 kg)       Diabetes Follow up: Controlled   Overall the patient feels she is well controlled.     Therapy:oral agents (dual therapy): glipizide (generic), metformin (generic)   Medication compliance:Good   Neuropathy SX: NO   Low blood sugar symptoms: No   Dietary compliance: Good   On ASA: Yes   Pertinent Labs:      Hemoglobin A1c   Date Value Ref Range Status   08/20/2018 5.8 (H) 4.8 - 5.6 % Final     Comment:              Prediabetes: 5.7 - 6.4           Diabetes: >6.4           Glycemic control for adults with diabetes: <7.0       Estimated average glucose   Date Value Ref Range Status   02/08/2018 120 mg/dL Final     Cholesterol, total   Date Value Ref Range Status   08/20/2018 167 100 - 199 mg/dL Final     HDL Cholesterol   Date Value Ref Range Status   08/20/2018 48 >39 mg/dL Final     LDL, calculated   Date Value Ref Range Status   08/20/2018 83 0 - 99 mg/dL Final        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  Symptoms:none  Current medications: vitamin b12  HGB   Date Value Ref Range Status   08/20/2018 12.8 11.1 - 15.9 g/dL Final   05/10/2018 13.0 11.1 - 15.9 g/dL Final   03/15/2018 12.9 11.1 - 15.9 g/dL Final     HCT   Date Value Ref Range Status   08/20/2018 39.4 34.0 - 46.6 % Final   05/10/2018 40.2 34.0 - 46.6 % Final   03/15/2018 37.7 34.0 - 46.6 % Final     MCV   Date Value Ref Range Status   08/20/2018 100 (H) 79 - 97 fL Final   05/10/2018 99 (H) 79 - 97 fL Final   03/15/2018 95 79 - 97 fL Final     Iron   Date Value Ref Range Status   02/08/2018 81 27 - 139 ug/dL Final     TIBC   Date Value Ref Range Status   02/08/2018 338 250 - 450 ug/dL Final     UIBC   Date Value Ref Range Status   02/08/2018 257 118 - 369 ug/dL Final     Iron % saturation   Date Value Ref Range Status   02/08/2018 24 15 - 55 % Final     Ferritin   Date Value Ref Range Status   08/31/2015 13 (L) 15 - 150 ng/mL Final     Vitamin B12   Date Value Ref Range Status   02/08/2018 676 232 - 1,245 pg/mL Final     Folate   Date Value Ref Range Status   05/31/2016 4.1 >3.0 ng/mL Final     Comment:     A serum folate concentration of less than 3.1 ng/mL is  considered to represent clinical deficiency. Health Maintenance  Patient had just seen Eye doctor. Health Maintenance Due   Topic Date Due    Shingrix Vaccine Age 49> (1 of 2) 06/21/1989    EYE EXAM RETINAL OR DILATED Q1  10/26/2018     Review of Systems   Review of Systems   Constitutional: Negative for chills and fever. HENT: Negative for ear pain, nosebleeds, sinus pain and tinnitus. Respiratory: Negative for cough, sputum production, shortness of breath, wheezing and stridor. Cardiovascular: Negative for chest pain, palpitations and leg swelling. Genitourinary: Negative for dysuria, frequency and urgency. Musculoskeletal: Positive for joint pain (hands). Skin: Negative for itching and rash. Neurological: Positive for tingling. Negative for dizziness and headaches. Vitals/Objective:     Vitals:    10/29/18 0931   BP: 109/67   Pulse: 87   Resp: 18   Temp: 98 °F (36.7 °C)   TempSrc: Oral   SpO2: 96%   Weight: 192 lb (87.1 kg)   Height: 5' 1.5\" (1.562 m)     Body mass index is 35.69 kg/m². Wt Readings from Last 3 Encounters:   10/29/18 192 lb (87.1 kg)   10/09/18 190 lb (86.2 kg)   09/27/18 194 lb (88 kg)       Physical Exam   Constitutional: She is oriented to person, place, and time. She appears well-developed and well-nourished.    Cardiovascular: Normal rate and regular rhythm. Pulmonary/Chest: Effort normal and breath sounds normal.   Neurological: She is alert and oriented to person, place, and time. Skin: Skin is warm and dry. Recent Results (from the past 24 hour(s))   AMB POC USCREEN 12 DRUG     Collection Time: 10/29/18 10:06 AM   Result Value Ref Range    LOT NUMBER [id number]     LOT EXP DATE POC 01/23/2020     Marijuana(THC) urine , Ql. (POC) Negative     Cocaine urine , Ql. (POC) Negative     OPIATES 300 QL URINE POC Presumptive Positive     Phencyclidine urine , Ql. (POC) Negative     OXYCODONE UR POC Negative     Tricyclic antidepressants (TCA), Ql. (POC) Presumptive Positive     MDMA/ECSTASY UR POC Negative     Amphetamine urine , Ql. (POC) Negative     Methamphetamine urine , Ql. (POC) Negative     BARBITURATES UR POC Negative     BENZODIAZEPINES UR POC Negative     Methadone urine , Ql. (POC) Negative      Assessment and Plan:     Encounter Diagnoses     ICD-10-CM ICD-9-CM   1. Mixed hyperlipidemia E78.2 272.2   2. Type 2 diabetes with nephropathy ([facility]) E11.21 250.40     583.81   3. Coronary artery disease involving native heart without angina pectoris, unspecified vessel or lesion type I25.10 414.01   4. Arthropathy M12.9 716.90   5. Myofascial muscle pain M79.18 729.1   6. Degenerative lumbar disc M51.36 722.52   7. Status post gastric bypass for obesity Z98.84 V45.86       1. Mixed hyperlipidemia  2. Type 2 diabetes with nephropathy ([facility] [address]  3. Coronary artery disease involving native heart without angina pectoris, unspecified vessel or lesion type  - nitroglycerin (NITROSTAT) 0.4 mg SL tablet; 1 Tab by SubLINGual route every five (5) minutes as needed for Chest Pain. Dispense: 4 Tab; Refill: 0    4. Arthropathy  5. Myofascial muscle pain  6. Degenerative lumbar disc  Patient reports worsening of hand pain. She has f/u with rheumatology in 1 month. - HYDROcodone-acetaminophen (NORCO) 5-325 mg per tablet;  Take 1 Tab by mouth every six (6) hours as needed for Pain. Dispense: 60 Tab; Refill: 0  - AMB POC USCREEN 12 DRUG       7. Status post gastric bypass for obesity  - VITAMIN B12 INJECTION  - THER/PROPH/DIAG INJECTION, SUBCUT/IM      I have discussed the diagnosis with the patient and the intended plan as seen in the above orders. she has expressed understanding. The patient has received an after-visit summary and questions were answered concerning future plans. I have discussed medication side effects and warnings with the patient as well. Electronically Signed: Mabel Foley MD     History/Allergies   Patients past medical, surgical and family histories were reviewed and updated.     Past Medical History:   Diagnosis Date    Anemia     Arthritis     CAD (coronary artery disease) 1997    MI    Degenerative joint disease of right hip 09/14/2015     [facility]/Dr. Alma Nieto    Diabetes ([facility] [address]     Fracture     right shoulder; T6 compression    Gastritis     Hypercholesterolemia     Hyperlipidemia    Lupus 1/9/2015    Osteoporosis, post-menopausal     vertebral fracture      Past Surgical History:   Procedure Laterality Date    CARDIAC SURG PROCEDURE UNLIST      Patient Denies    HX CHOLECYSTECTOMY      HX CORONARY STENT PLACEMENT  02/19/1997    LAD    HX GASTRIC BYPASS  2000    HX HERNIA REPAIR  2005    HX KYPHOPLASTY      t5    HX KYPHOPLASTY      L4    HX ORTHOPAEDIC      bilateral knees    HX ORTHOPAEDIC      r shoulder    HX ORTHOPAEDIC      Laminectomy    MT INC IMPLTJ NEUROSTIMULATOR ELTRD SACRAL NERVE       Family History   Problem Relation Age of Onset    Diabetes Mother     Hypertension Mother     Heart Disease Mother     Diabetes Father     Hypertension Father     Heart Disease Father    [facility] Arthritis-rheumatoid Sister     Elevated Lipids Sister    [address] [facility] Arthritis-rheumatoid Sister     Hypertension Sister     Diabetes Sister     Thyroid Disease Sister     Arthritis-rheumatoid Other      Social History     Socioeconomic History    Marital status:      Spouse name: Not on file    Number of children: Not on file    Years of education: Not on file    Highest education level: Not on file   Social Needs    Financial resource strain: Not on file    Food insecurity - worry: Not on file    Food insecurity - inability: Not on file    Transportation needs - medical: Not on file   [employer] needs - non-medical: Not on file   Occupational History    Not on file   Tobacco Use    Smoking status: Former Smoker     Last attempt to quit: 1989     Years since quittin.8    Smokeless tobacco: Never Used   Substance and Sexual Activity    Alcohol use: No     Alcohol/week: 0.0 oz     Comment: Occasional    Drug use: No    Sexual activity: Not Currently   Other Topics Concern    Not on file   Social History Narrative    Not on file         Allergies   Allergen Reactions    Cephalosporins Nausea and Vomiting     Severe vomiting       Disposition     Follow-up Disposition:  Return in about 4 months (around 2019) for Routine with blood work. Future Appointments   Date Time Provider Yocasta Cisneros   2018  1:40 PM Glenys Jerry MD [address] [place] [address]            Current Medications after this visit     Current Outpatient Medications   Medication Sig    nitroglycerin (NITROSTAT) 0.4 mg SL tablet 1 Tab by SubLINGual route every five (5) minutes as needed for Chest Pain.  HYDROcodone-acetaminophen (NORCO) 5-325 mg per tablet Take 1 Tab by mouth every six (6) hours as needed for Pain.  amLODIPine (NORVASC) 5 mg tablet TAKE 1 TABLET BY MOUTH EVERY DAY    metFORMIN (GLUCOPHAGE) 500 mg tablet Take 1 Tab by mouth two (2) times daily (with meals).     metoprolol succinate (TOPROL-XL) 25 mg XL tablet TAKE 1 TABLET BY MOUTH EVERY DAY    nortriptyline (PAMELOR) 10 mg capsule TAKE 1 CAPSULE BY MOUTH EVERY NIGHT    pantoprazole (PROTONIX) 40 mg tablet TAKE 1 TABLET BY MOUTH DAILY  gabapentin (NEURONTIN) 600 mg tablet Take 1 Tab by mouth three (3) times daily.  ferrous sulfate 325 mg (65 mg iron) tablet Take 1 Tab by mouth two (2) times a day. Indications: Iron Deficiency Anemia    trimethoprim (TRIMPEX) 100 mg tablet Take 1 Tab by mouth daily. Indications: PREVENTION OF BACTERIAL URINARY TRACT INFECTION    leflunomide (ARAVA) 20 mg tablet TAKE 1/2 TABLET BY MOUTH DAILY FOR 7 DAYS THEN 1 TABLET BY MOUTH EVERY DAY IF TOLERATED    cyclobenzaprine (FLEXERIL) 10 mg tablet Take 1 Tab by mouth nightly.  glipiZIDE SR (GLUCOTROL XL) 2.5 mg CR tablet TAKE 1 TABLET BY MOUTH TWICE DAILY    folic acid (FOLVITE) 1 mg tablet TAKE 1 TABLET BY MOUTH DAILY    pantoprazole (PROTONIX) 40 mg tablet TK 1 T PO D    atorvastatin (LIPITOR) 10 mg tablet Take 1 Tab by mouth daily.  ESTRACE 0.01 % (0.1 mg/gram) vaginal cream Insert 0.1 g into vagina every Monday, Wednesday, Friday.  denosumab (PROLIA) 60 mg/mL injection 1 mL by SubCUTAneous route every 6 months.  glucose blood VI test strips (ASCENSIA CONTOUR) strip Glucometer for 250.00 Pt. test blood sugar once daily (in morning)    Syringe with Needle, Safety (3CC SAFETY SYRINGE 25GX5/8\") 3 mL 25 gauge x 5/8\" syrg To be used with B12 injections    busPIRone (BUSPAR) 5 mg tablet Take 5 mg by mouth two (2) times a day.  acetaminophen (TYLENOL) 650 mg CR tablet Take 1,300 mg by mouth daily as needed for Pain.  Blood-Glucose Meter (CONTOUR METER) monitoring kit Dx: 250. Check blood sugars daily.  ZINC PO Take 50 mg by mouth daily.  cholecalciferol, vitamin d3, (VITAMIN D3) 1,000 unit tablet Take 2,000 Units by mouth daily.  calcium-vitamin D (CALCIUM 500+D) 500 mg(1,250mg) -200 unit per tablet Take 2 Tabs by mouth daily.  aspirin 81 mg Tab Take 81 mg by mouth daily.  MULTIVITAMINS (MULTIVITAMIN PO) Take 1 Tab by mouth daily.  Walker [employer] walker. Patient with spinal stenosis and stopped posture.  Pain with limited ambulation. No current facility-administered medications for this visit.       Medications Discontinued During This Encounter   Medication Reason    adalimumab (HUMIRA PEN) 40 mg/0.8 mL injection pen Cost of Medication    varicella-zoster recombinant, PF, (SHINGRIX, PF,) 50 mcg/0.5 mL susr injection Not A Current Medication    HYDROcodone-acetaminophen (NORCO) 5-325 mg per tablet Reorder

## 2018-11-07 RX ORDER — CYCLOBENZAPRINE HCL 10 MG
TABLET ORAL
Qty: 90 TAB | Refills: 0 | Status: SHIPPED | OUTPATIENT
Start: 2018-11-07 | End: 2019-02-07 | Stop reason: SDUPTHER

## 2018-11-09 RX ORDER — ATORVASTATIN CALCIUM 10 MG/1
TABLET, FILM COATED ORAL
Qty: 90 TAB | Refills: 0 | Status: SHIPPED | OUTPATIENT
Start: 2018-11-09 | End: 2019-02-07 | Stop reason: SDUPTHER

## 2018-11-20 ENCOUNTER — OFFICE VISIT (OUTPATIENT)
Dept: RHEUMATOLOGY | Age: 78
End: 2018-11-20

## 2018-11-20 ENCOUNTER — HOSPITAL ENCOUNTER (OUTPATIENT)
Dept: LAB | Age: 78
Discharge: HOME | End: 2018-11-20
Payer: MEDICARE

## 2018-11-20 VITALS
HEIGHT: 61 IN | DIASTOLIC BLOOD PRESSURE: 66 MMHG | TEMPERATURE: 97.5 F | BODY MASS INDEX: 36.44 KG/M2 | HEART RATE: 83 BPM | WEIGHT: 193 LBS | RESPIRATION RATE: 18 BRPM | SYSTOLIC BLOOD PRESSURE: 134 MMHG

## 2018-11-20 DIAGNOSIS — I73.00 RAYNAUD'S SYNDROME WITHOUT GANGRENE: ICD-10-CM

## 2018-11-20 DIAGNOSIS — M81.0 AGE-RELATED OSTEOPOROSIS WITHOUT CURRENT PATHOLOGICAL FRACTURE: ICD-10-CM

## 2018-11-20 DIAGNOSIS — N18.2 CKD (CHRONIC KIDNEY DISEASE) STAGE 2, GFR 60-89 ML/MIN: ICD-10-CM

## 2018-11-20 DIAGNOSIS — M06.09 SERONEGATIVE RHEUMATOID ARTHRITIS OF MULTIPLE SITES (HCC): Primary | ICD-10-CM

## 2018-11-20 DIAGNOSIS — Z79.60 LONG-TERM USE OF IMMUNOSUPPRESSANT MEDICATION: ICD-10-CM

## 2018-11-20 PROCEDURE — 85651 RBC SED RATE NONAUTOMATED: CPT

## 2018-11-20 PROCEDURE — 80053 COMPREHEN METABOLIC PANEL: CPT

## 2018-11-20 PROCEDURE — 86140 C-REACTIVE PROTEIN: CPT

## 2018-11-20 PROCEDURE — 36415 COLL VENOUS BLD VENIPUNCTURE: CPT

## 2018-11-20 PROCEDURE — 85025 COMPLETE CBC W/AUTO DIFF WBC: CPT

## 2018-11-20 NOTE — PATIENT INSTRUCTIONS
Golimumab (By injection) Golimumab ([id number]-[id number]) Treats rheumatoid arthritis, psoriatic arthritis, ankylosing spondylitis, and ulcerative colitis. Brand Name(s): Nuzhat Clark There may be other brand names for this medicine. When This Medicine Should Not Be Used: This medicine is not right for everyone. You should not receive it if you had an allergic reaction to golimumab. How to Use This Medicine:  
Injectable · Your doctor will prescribe your dose and schedule. This medicine is given through a needle placed in a vein or as a shot under your skin. Belynda Pounds must be injected slowly, so the IV will need to stay in place for 30 minutes. · A nurse or other health provider will give you this medicine. · You may be taught how to give your medicine at home. Make sure you understand all instructions before giving yourself an injection. Do not use more medicine or use it more often than your doctor tells you to. · Use a new needle and syringe each time you inject your medicine. · You will be shown the body areas where this shot can be given. Use a different body area each time you give yourself a shot. Keep track of where you give each shot to make sure you rotate body areas. Do not inject into skin areas that are red, bruised, tender, or hard, or have scars or stretch marks. · Check the liquid in the syringe or autoinjector. It should be clear and colorless or slightly yellow. Do not use Simponi® if it is cloudy, discolored, or has particles in it. Do not shake the medicine. · This medicine should come with a Medication Guide. Ask your pharmacist for a copy if you do not have one. · Missed dose: Take a dose as soon as you remember. If it is almost time for your next dose, wait until then and take a regular dose. Do not take extra medicine to make up for a missed dose. · If you store this medicine at home, keep it in the refrigerator.  Do not freeze. Protect the medicine from direct light. Keep the medicine in the original package until you are ready to use it. Drugs and Foods to Avoid: Ask your doctor or pharmacist before using any other medicine, including over-the-counter medicines, vitamins, and herbal products. · Some medicines can affect how golimumab works. Tell your doctor if you are using any of the following: ¨ Abatacept, adalimumab, anakinra, certolizumab, cyclosporine, etanercept, infliximab, rituximab, theophylline, tocilizumab ¨ Blood thinner (including warfarin) ¨ Medicine that weakens the immune system (including a steroid or cancer medicine) · This medicine may interfere with vaccines. Ask your doctor before you get a flu shot or any other vaccines. Warnings While Using This Medicine: · Tell your doctor if you are pregnant or breastfeeding, or if you have liver disease, cancer, heart failure, diabetes, COPD, psoriasis, problems with your immune system, Wegener granulomatosis, optic neuritis, multiple sclerosis, or a history of Guillain-Barré syndrome. Tell your doctor if you have any type of infection (including hepatitis B or tuberculosis) or an infection that keeps coming back. Tell your doctor if you are allergic to latex. · This medicine may cause the following problems: 
¨ Increased risk for infection ¨ Increased risk of certain cancers (including lymphoma, leukemia, colon cancer, skin cancer) ¨ Heart problems, including heart failure ¨ Liver problems ¨ Lupus-like syndrome · You will need to have a skin test for tuberculosis (TB) before you start this medicine. Tell your doctor if you or anyone in your home has ever had a positive TB skin test or been exposed to TB. · This medicine may make you bleed, bruise, or get infections more easily. Take precautions to prevent illness and injury. Wash your hands often. · This medicine may make your skin more sensitive to sunlight.  Wear sunscreen. Do not use sunlamps or tanning beds. · Your doctor will do lab tests at regular visits to check on the effects of this medicine. Keep all appointments. · Throw away used needles in a hard, closed container that the needles cannot poke through. Keep this container away from children and pets. · Keep all medicine out of the reach of children. Never share your medicine with anyone. Possible Side Effects While Using This Medicine:  
Call your doctor right away if you notice any of these side effects: · Allergic reaction: Itching or hives, swelling in your face or hands, swelling or tingling in your mouth or throat, chest tightness, trouble breathing · Change in how much or how often you urinate, painful urination · Changes in vision · Dark urine or pale stools, nausea, vomiting, loss of appetite, stomach pain, yellow skin or eyes · Fever, chills, cough, runny or stuffy nose, sore throat, and body aches · Numbness, tingling, or burning pain in your hands, arms, legs, or feet · Rapid weight gain, swelling in your hands, ankles, or feet · Swollen glands in the neck, underarms, or groin · Trouble breathing, chest pain, uneven heartbeat · Unusual bleeding, bruising, or weakness · Warm, red, swollen, or painful skin, blisters, skin sores If you notice these less serious side effects, talk with your doctor: · Redness, itching, pain, or swelling where the needle was placed or the shot was given If you notice other side effects that you think are caused by this medicine, tell your doctor. Call your doctor for medical advice about side effects. You may report side effects to FDA at 8-576-FDA-8166 © 2017 [facility] Information is for End User's use only and may not be sold, redistributed or otherwise used for commercial purposes. The above information is an  only. It is not intended as medical advice for individual conditions or treatments.  Talk to your doctor, nurse or pharmacist before following any medical regimen to see if it is safe and effective for you.

## 2018-11-20 NOTE — PROGRESS NOTES
REASON FOR VISIT This is a follow-up visit for Ms. Rueda for Seronegative Erosive Rheumatoid Arthritis and Age-Related Osteoporosis. Inflammatory arthritis phenotype includes: Anti-CCP positive: no 
Rheumatoid factor positive: no 
Erosive disease: yes Extra-articular manifestations include: Raynaud's 
 
Immunosuppression Screening (2/13/2018): Quantiferon TB: negative PPD:  Not performed Hepatitis B: negative Hepatitis C: negative Therapy History includes: 
Current DMARD therapy include: leflunomide 20 mg daily Prior DMARD therapy include: methotrexate 12.5 mg subcutaneous,  hydroxychloroquine 300 mg daily, Humira (5/10/2018: 3 samples: cost) Discontinued DMARDs because of inefficacy: None Discontinued DMARDs because of side effects: methotrexate (elevated LFTs) Contra-Indicated DMARDs because of chronic kidney disease: methotrexate Osteoporosis Historical Synopsis Height loss since age 26 (at least two inches): 5 Fracture history includes: yes (T5-T6) Family history of hip fracture: no Fall Risk: no 
 
Daily calcium intake is 1800 mg 
Daily vitamin D intake is 2000 IU Smoking history: no 
Alcohol consumption: no 
Prednisone history: no 
 
Exercise: no 
 
Previous work-up for osteoporosis includes the following: DEXA Scan: 8/08/2017 Vitamin 25OH D level: 46.6 (21/3/2018) PTH: 21 (21/3/2018) TSH: 2.570 (21/3/2018) Therapy History includes: 
 
Current osteoporosis therapy includes: Prolia (3/29/2018 to present) Prior osteoporosis therapy includes: Reclast (2 years) The following osteoporosis therapy have been ineffective: Reclast 
The following osteoporosis therapy were stopped because of side effects: none Immunizations:  
Immunization History Administered Date(s) Administered  Influenza High Dose Vaccine PF 08/05/2016, 09/13/2017  Influenza Vaccine 12/05/2013  Influenza Vaccine Elizabeth Fito) 09/06/2016  Influenza Vaccine (Quad) PF 11/10/2014  Influenza Vaccine (Tri) Adjuvanted 09/27/2018  Influenza Vaccine PF 10/20/2015  Influenza Vaccine Split 10/25/2010, 11/14/2011, 10/10/2012  Pneumococcal Conjugate (PCV-13) 09/13/2017  Pneumococcal Polysaccharide (PPSV-23) 12/05/2013  Tdap 12/05/2013  ZZZ-RETIRED (DO NOT USE) Pneumococcal Vaccine (Unspecified Type) 06/05/2008 Active problems include: 
 
Patient Active Problem List  
Diagnosis Code  CAD (coronary artery disease) I25.10  Hyperlipidemia E78.5  Gastritis K29.70  Vitamin D deficiency E55.9  Status post gastric bypass for obesity Z98.84  
 Arthritis M19.90  
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  
 H/O Bell's palsy Z86.69  
 Diabetic polyneuropathy (HCC) E11.42  
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  
 Nuclear sclerotic cataract H25.10  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  Obesity, morbid ([facility] [address] E66.01  
 H/O total knee replacement Z96.659  Primary localized osteoarthritis of left knee M17.12  
 Hypercholesterolemia E78.00  
 Primary Raynaud's phenomenon I73.00  
 CKD (chronic kidney disease) stage 2, GFR 60-89 ml/min N18.2  Type 2 diabetes with nephropathy (HCC) E11.21  
 CKD (chronic kidney disease) stage 3, GFR 30-59 ml/min (HCC) N18.3 HISTORY OF PRESENT ILLNESS 
 
Ms. Gauri Lucero returns for a follow-up. On her last visit, I continued leflunomide 20 mg daily. She was not approved for patient asistnace for Humira every 14 days and she could not afford it due to high copay. Today, she complains of left IT band pain. She has aching pain in her hands associated swelling and stiffness lasting hours. She will take Aleve or Advil with some relief. Warm water helps. She had her Prolia on 9/27/2018 with good tolerance. She denies interval falls or fractures. She denies fever, weight loss, blurred vision, vision loss, oral ulcers, ankle swelling, dry cough, dyspnea, nausea, vomiting, dysphagia, abdominal pain, black or bloody stool, fall since last visit, rash, easy bruising and increased thirst. 
 
Ms. Vanessa Edmond has continued her medications for arthritis and reports good tolerance without significant side effects. Last toxicity monitoring by blood work was done on 8/20/2018 and did not reveal any significant adverse effects, except eGFR 63. Most recent inflammatory markers from 8/20/2018 revealed a ESR 2 mm/hr (previously 2, 3, 4 mm/hr) and CRP 0.3 mg/L (previously 0.3, 0.5, 0.3 mg/L). The patient has not had any interval hospital admissions, infections, or surgeries. REVIEW OF SYSTEMS A comprehensive review of systems was performed and pertinent results are documented in the HPI, review of systems is otherwise non-contributory. PAST MEDICAL HISTORY She has a past medical history of Anemia, Arthritis, CAD (coronary artery disease), Degenerative joint disease of right hip, Diabetes ([address] Fracture, Gastritis, Hypercholesterolemia, Lupus, and Osteoporosis, post-menopausal. 
 
FAMILY HISTORY Her family history includes Arthritis-rheumatoid in her sister, sister and another family member; Diabetes in her father, mother, and sister; Elevated Lipids in her sister; Heart Disease in her father and mother; Hypertension in her father, mother, and sister; Thyroid Disease in her sister. SOCIAL HISTORY She reports that she quit smoking about 28 years ago. she has never used smokeless tobacco. She reports that she does not drink alcohol or use drugs. MEDICATIONS Current Outpatient Medications Medication Sig Dispense Refill  atorvastatin (LIPITOR) 10 mg tablet TAKE 1 TABLET BY MOUTH EVERY DAY 90 Tab 0  cyclobenzaprine (FLEXERIL) 10 mg tablet TAKE 1 TABLET BY MOUTH EVERY NIGHT 90 Tab 0  
 nitroglycerin (NITROSTAT) 0.4 mg SL tablet 1 Tab by SubLINGual route every five (5) minutes as needed for Chest Pain. 4 Tab 0  
 HYDROcodone-acetaminophen (NORCO) 5-325 mg per tablet Take 1 Tab by mouth every six (6) hours as needed for Pain. 60 Tab 0  
 amLODIPine (NORVASC) 5 mg tablet TAKE 1 TABLET BY MOUTH EVERY DAY 90 Tab 0  
 metFORMIN (GLUCOPHAGE) 500 mg tablet Take 1 Tab by mouth two (2) times daily (with meals). 180 Tab 0  
 metoprolol succinate (TOPROL-XL) 25 mg XL tablet TAKE 1 TABLET BY MOUTH EVERY DAY 90 Tab 0  
 nortriptyline (PAMELOR) 10 mg capsule TAKE 1 CAPSULE BY MOUTH EVERY NIGHT 30 Cap 5  pantoprazole (PROTONIX) 40 mg tablet TAKE 1 TABLET BY MOUTH DAILY 30 Tab 0  
 gabapentin (NEURONTIN) 600 mg tablet Take 1 Tab by mouth three (3) times daily. 90 Tab 5  ferrous sulfate 325 mg (65 mg iron) tablet Take 1 Tab by mouth two (2) times a day. Indications: Iron Deficiency Anemia 60 Tab 5  
 trimethoprim (TRIMPEX) 100 mg tablet Take 1 Tab by mouth daily. Indications: PREVENTION OF BACTERIAL URINARY TRACT INFECTION 30 Tab 5  
 leflunomide (ARAVA) 20 mg tablet TAKE 1/2 TABLET BY MOUTH DAILY FOR 7 DAYS THEN 1 TABLET BY MOUTH EVERY DAY IF TOLERATED 90 Tab 0  
 glipiZIDE SR (GLUCOTROL XL) 2.5 mg CR tablet TAKE 1 TABLET BY MOUTH TWICE DAILY 180 Tab 0  
 denosumab (PROLIA) 60 mg/mL injection 1 mL by SubCUTAneous route every 6 months.  glucose blood VI test strips (ASCENSIA CONTOUR) strip Glucometer for 250.00 Pt. test blood sugar once daily (in morning) 50 Strip 11  
 acetaminophen (TYLENOL) 650 mg CR tablet Take 1,300 mg by mouth daily as needed for Pain.  Blood-Glucose Meter (CONTOUR METER) monitoring kit Dx: 250. Check blood sugars daily.  1 kit 11  
  Walker [employer] walker. Patient with spinal stenosis and stopped posture. Pain with limited ambulation. 1 Each 0  
 ZINC PO Take 50 mg by mouth daily.  cholecalciferol, vitamin d3, (VITAMIN D3) 1,000 unit tablet Take 2,000 Units by mouth daily.  calcium-vitamin D (CALCIUM 500+D) 500 mg(1,250mg) -200 unit per tablet Take 2 Tabs by mouth daily.  aspirin 81 mg Tab Take 81 mg by mouth daily.  MULTIVITAMINS (MULTIVITAMIN PO) Take 1 Tab by mouth daily. ALLERGIES Allergies Allergen Reactions  Cephalosporins Nausea and Vomiting Severe vomiting PHYSICAL EXAMINATION Visit Vitals /66 Pulse 83 Temp 97.5 °F (36.4 °C) Resp 18 Ht 5' 1\" (1.549 m) Wt 193 lb (87.5 kg) BMI 36.47 kg/m² Body mass index is 36.47 kg/m². General: Patient is alert, oriented x 3, not in acute distress HEENT:  
Sclerae are not injected and appear moist. 
There is no alopecia. Neck is supple Cardiovascular: 
Heart is regular rate and rhythm, no murmurs. Chest: 
Lungs are clear to auscultation bilaterally. No rhonchi, wheezes, or crackles. Extremities: 
Free of clubbing, cyanosis +1 nonpitting lower extremity edema with tenderness on the RLE Neurological exam: No focal sensory deficits, muscle strength is full in upper and lower extremities Skin exam: 
 
Psoriasis:     no 
Nail Pitting:     no 
Onycholysis:     no 
Palmoplantar pustulosis:   no 
Acne fulminans:    no 
Acne conglobata:    no 
Hidradenitis Suppurativa:   no 
Dissecting cellulitis of the scalp:  no 
Pilonidal sinus:    no 
Erythema nodosum:    no 
Non-Scarring Alopecia:  no 
Discoid Lupus:   no 
Subacute Cutaneous Lupus:   no 
Heliotrope Rash:   no 
Upper Arm Erythema:   no Shawl Sign:    no 
V-sign:     no 
Holster sign:    no 
Gottron's papules:   no 
Gottron's sign:    no 
Calcinosis:    no 
Raynaud's Phenomenon:  no 
's Hands:   no 
Periungual erythema:   no 
 Abnormal Nailfold Capillaries:  no 
Livedo Reticularis:   no 
Scalp Erythema:   no 
Facial Rosacea: Yes Musculoskeletal: A comprehensive musculoskeletal exam was performed for all joints of each upper and lower extremity and assessed for swelling, tenderness and range of motion. Bilateral Sharla and Heberden nods Bilateral knee crepitus without effusion Joint Count 11/20/2018 8/20/2018 5/10/2018 3/15/2018 2/13/2018 Patient pain (0-100) 80 80 25 10 - MHAQ 0 0.125 0.25 0 - Left wrist- Tender 1 1 1 - 1 Left wrist- Swollen 1 1 1 - 1 Left 1st MCP - Tender 1 1 - - 1 Left 1st MCP - Swollen 1 1 - - 1 Left 2nd MCP - Tender 0 1 1 1 1 Left 2nd MCP - Swollen 1 1 1 1 1 Left 3rd MCP - Tender 1 1 1 1 1 Left 3rd MCP - Swollen 1 1 1 1 1 Left 4th MCP - Tender 1 1 1 1 1 Left 4th MCP - Swollen 0 1 - - 1 Left 5th MCP - Tender 0 - - - - Left 5th MCP - Swollen 1 - 1 - - Left thumb IP - Tender - 1 1 - - Left 2nd PIP - Tender 1 1 1 - 1 Left 2nd PIP - Swollen 1 1 - - 1 Left 3rd PIP - Tender 1 1 1 - 1 Left 3rd PIP - Swollen 1 1 1 - - Left 4th PIP - Tender 1 1 1 1 1 Left 4th PIP - Swollen 1 1 - 1 1 Left 5th PIP - Tender 1 1 1 - - Left 5th PIP - Swollen 1 1 - - - Right wrist- Tender - - - 1 1 Right wrist- Swollen - - - 1 1 Right 1st MCP - Tender 1 - - - - Right 1st MCP - Swollen 1 - - - - Right 2nd MCP - Tender 1 1 1 1 1 Right 2nd MCP - Swollen 1 1 1 1 1 Right 3rd MCP - Tender 0 1 1 1 1 Right 3rd MCP - Swollen 1 1 1 1 1 Right 4th MCP - Tender 1 1 - 1 1 Right 4th MCP - Swollen 1 1 - 1 1 Right 5th MCP - Tender - 1 - - - Right 5th MCP - Swollen - - 1 1 - Right 2nd PIP - Tender 1 1 1 - 1 Right 2nd PIP - Swollen 1 1 - - 1 Right 3rd PIP - Tender 1 1 1 1 1 Right 3rd PIP - Swollen 1 - - 1 - Right 4th PIP - Tender 1 - 1 1 1 Right 4th PIP - Swollen 1 - - - - Right 5th PIP - Tender 1 - 1 1 - Right 5th PIP - Swollen 1 - - - -  
 Tender Joint Count (Total) 15 16 15 11 15 Swollen Joint Count (Total) 17 13 8 9 12 Physician Assessment (0-10) - 4 3 3 - Patient Assessment (0-10) 8 5 5 1.5 - CDAI Total (calculated) - 38 31 24.5 - DATA REVIEW Laboratory Recent laboratory results were reviewed, summarized, and discussed with the patient. Imaging Musculoskeletal Ultrasound None Radiographs Bilateral Hand 2/13/2018: RIGHT: severe osteopenia without fracture. There are scattered degenerative changes. Progressive first Aia 16 joint. No new erosive changes. .  Vascular calcifications. LEFT: severe osteopenia. Scattered degenerative changes as noted previously with progression of the index finger and middle finger DIP joints. There is a new erosion at the index finger proximal phalanx ulnar base without adjacent joint space loss. There are vascular calcifications. Calcification overlying the DRUJ is favored represent overlying vascular calcifications.  
  
Bilateral Foot 2/13/2018: RIGHT: no acute fracture or dislocation. Alignment is anatomic. Mild degenerative changes are seen in the left first MTP joint. Minimal degenerative changes are seen in the right first MTP joint. A possible erosion is seen in the head of the left first metatarsal. No erosive changes are seen in the right foot. Plantar calcaneal heel spurs are noted in both feet, as well as enthesophyte formation at the Achilles insertion site. Arterial vascular calcifications are also noted bilaterally. Mild soft tissue swelling surrounds the left first MTP joint. LEFT: no acute fracture or dislocation. Alignment is anatomic. Mild degenerative changes are seen in the left first MTP joint. Minimal degenerative changes are seen in the right first MTP joint. A possible erosion is seen in the head of the left first metatarsal. No erosive changes are seen in the right foot.  Plantar calcaneal heel spurs are noted in both feet, as well as enthesophyte formation at the Achilles insertion site. Arterial vascular calcifications are also noted bilaterally. Mild soft tissue swelling surrounds the left first MTP joint. Thoracic Spine 11/03/2016: The posterior battery pack is again seen within the intrathecal catheter in stable position. Kyphoplasty material is present in a midthoracic vertebral body is stable mild chronic compression of the adjacent inferior vertebral body. The remaining vertebral body heights are maintained. Anterior osteophytes are noted. There is no abnormality in alignment. Lumbar Spine 10/19/2016: significant disc space narrowing at L3-L4 with 14 mm anterolisthesis. There is facet arthropathy. The vertebral body heights are maintained. Spinal catheters are seen. There are atherosclerotic vascular calcifications. Bilateral Hips 8/31/2015: no fracture, dislocation or other acute abnormality. There osteoarthritic pattern change of both hips, more severe on the right. There is nonuniform joint space narrowing and marginal osteophytes. There is remodeling of the right femur head with subchondral cystic formation. Mild sacroiliac osteoarthritic change. Lower lumbar spondylosis is noted. Wire  leads are seen in the left lower lumbar spine likely reflective of spinal stimulator device. Bilateral Hand 3/01/2011: RIGHT: mild osteopenia. There are degenerative changes of the distal and proximal interphalangeal joints and the base of the thumb. There is no fracture or other acute abnormality. Scattered calcifications are noted in the radial and ulnar arteries. LEFT: mild osteopenia. There are degenerative changes of the distal and proximal interphalangeal joints and  at the base of the thumb. There is no fracture or acute abnormality. There are vascular calcifications of the radial and ulnar arteries. CT Imaging None MR Imaging MRI Brain with without contrast 12/19/2016: There is sulcal and ventricular prominence. Confluent periventricular and scattered foci of increased T2 signal intensity in the corona radiata and centrum semiovale. Tiny remote lacunar infarction in the right cerebellum. There is no intracranial mass, hemorrhage or evidence of acute infarction. There is no Chiari or syrinx. The pituitary and infundibulum are grossly unremarkable. There is no skull base mass. Cerebellopontine angles are grossly unremarkable. The major intracranial vascular flow-voids are unremarkable. No evidence of demyelinating process. There is no abnormal parenchymal enhancement. There is no abnormal meningeal enhancement. The cavernous sinuses are symmetric. Optic chiasm and infundibulum grossly unremarkable. Orbits are grossly symmetric. Dural venous sinuses are grossly patent. The brain architecture is normal. There is no evidence of midline shift or mass-effect. There are no extra-axial fluid collections. The mastoid air cells and paranasal sinuses are well pneumatized and clear. MRI Lumbar Spine with and without contrast 8/07/2013: There is transitional lumbosacral anatomy. In keeping with prior studies, lowest fully formed disc is labeled L5-S1. There has been prior  laminectomies at L3-5 with no change in grade 1 anterolisthesis at L3-4 which measures 5 mm. There is grade 1 anterolisthesis of L4-5 of approximately 2 mm, unchanged. Vertebral body heights are maintained. There is no marrow edema or compression fracture. There are reactive endplate changes at T4-4.  The conus medullaris terminates at L1-2. There is no abnormal intraspinal enhancement. L1/2:  The spinal canal and foramina are widely patent. L2/3:  Diffuse disc bulge, facet hypertrophy and ligamentum flavum thickening cause mild spinal canal and moderate right foraminal stenosis. Left foramen is patent.  L3/4: There is uncovering of disc material with facet hypertrophy resulting in mild thecal sac narrowing. There is severe bilateral foraminal stenosis similar to the prior study. L4/5: Alfred Monterey is diffuse disc bulge and facet hypertrophy, without significant spinal canal stenosis. There is mild right foraminal stenosis. L5/S1:  The spinal canal and foramina are widely patent. DXA DXA 8/08/2017: (excluded Lumbar spine because of overlying wires and degenerative change and kyphoplasty of L4) showed left femoral neck T score: -1.4 (0.837 g/cm2), left total hip T score: -1.4 (0.830 g/cm2), right femoral neck T score: -0.9 (0.919 g/cm2), right total hip T score: -0.9 (0.900 g/cm2), and distal one third left radius T score -3.00 (BMD 0.615 g/cm2). FRAX score 16.8 % probability in 10 years for major osteoporotic fracture and 3.2 % 10 year probability of hip fracture. Nuclear Medicine Nuclear Medicine Bone Scan 1/22/2013: There is intense radiotracer uptake in the upper thoracic spine, likely T5. There is uptake in the region of the sternoclavicular joints and left shoulder, likely degenerative. ASSESSMENT AND PLAN This is a follow-up visit for Ms. Donnie Lesch. 1) Seronegative Erosive Rheumatoid Arthritis. She is maintained on leflunomide 20 mg daily with good tolerance. Her CDAI was 44 (previously 38, 31, 24.5) with 15 tender and 17 swollen joints, consistent with high disease activity. I will continue leflunomide. She has borderline chronic kidney disease stage, so I will avoid methotrexate. She was denied Humira patient assistance and cannot afford it. I will start her on [employer]. E96 patient assistance forms submitted. Labs today. 2) Long Term Use of Immunosuppressants. The patient remains on immunomodulatory medications (leflunomide) and requires frequent toxicity monitoring by blood work. Respective labs were ordered (CBC and CMP). 3) Age-Related Osteoporosis.  (multiple vertebral fractures) She is on Prolia. She denies interval falls or fractures. 4) Primary Raynauds phenomenon. She is on amlodipine 5 mg daily. This was not an active issue today. She may take up to 10 mg if she becomes symptomatic. 5) Chronic Kidney Disease Stage 2-3. The patient's eGFR was 60 (previously 65, 57, 63). I will continue to monitor. Labs today. The patient voiced understanding of the aforementioned assessment and plan. Summary of plan was provided in the After Visit Summary patient instructions. TODAY'S ORDERS Orders Placed This Encounter  METABOLIC PANEL, COMPREHENSIVE  
 CBC WITH AUTOMATED DIFF  C REACTIVE PROTEIN, QT  
 SED RATE (ESR) Future Appointments Date Time Provider Yocasta Burgeri 11/29/2018  9:10 AM Maximus Muñoz MD [facility]  
2/20/2019  1:40 PM Greer Nageotte, MD [address] [place] [address]  
2/27/2019  9:10 AM Maximus Muñoz MD [facility] Pedro Vargas MD, [facility] Adult Rheumatology Rheumatology Ultrasound Certified [place] [facility] Part of [address] Phone [phone] Fax [phone]

## 2018-11-21 LAB
ALBUMIN SERPL-MCNC: 3.9 G/DL (ref 3.5–4.8)
ALBUMIN/GLOB SERPL: 1.8 {RATIO} (ref 1.2–2.2)
ALP SERPL-CCNC: 78 IU/L (ref 39–117)
ALT SERPL-CCNC: 28 IU/L (ref 0–32)
AST SERPL-CCNC: 37 IU/L (ref 0–40)
BASOPHILS # BLD AUTO: 0.1 X10E3/UL (ref 0–0.2)
BASOPHILS NFR BLD AUTO: 1 %
BILIRUB SERPL-MCNC: <0.2 MG/DL (ref 0–1.2)
BUN SERPL-MCNC: 17 MG/DL (ref 8–27)
BUN/CREAT SERPL: 27 (ref 12–28)
CALCIUM SERPL-MCNC: 8.3 MG/DL (ref 8.7–10.3)
CHLORIDE SERPL-SCNC: 105 MMOL/L (ref 96–106)
CO2 SERPL-SCNC: 26 MMOL/L (ref 20–29)
CREAT SERPL-MCNC: 0.64 MG/DL (ref 0.57–1)
CRP SERPL-MCNC: <0.3 MG/L (ref 0–4.9)
EOSINOPHIL # BLD AUTO: 0.4 X10E3/UL (ref 0–0.4)
EOSINOPHIL NFR BLD AUTO: 6 %
ERYTHROCYTE [DISTWIDTH] IN BLOOD BY AUTOMATED COUNT: 13.1 % (ref 12.3–15.4)
ERYTHROCYTE [SEDIMENTATION RATE] IN BLOOD BY WESTERGREN METHOD: 3 MM/HR (ref 0–40)
GLOBULIN SER CALC-MCNC: 2.2 G/DL (ref 1.5–4.5)
GLUCOSE SERPL-MCNC: 151 MG/DL (ref 65–99)
HCT VFR BLD AUTO: 37.5 % (ref 34–46.6)
HGB BLD-MCNC: 12.5 G/DL (ref 11.1–15.9)
IMM GRANULOCYTES # BLD: 0 X10E3/UL (ref 0–0.1)
IMM GRANULOCYTES NFR BLD: 1 %
LYMPHOCYTES # BLD AUTO: 2.5 X10E3/UL (ref 0.7–3.1)
LYMPHOCYTES NFR BLD AUTO: 37 %
MCH RBC QN AUTO: 32.6 PG (ref 26.6–33)
MCHC RBC AUTO-ENTMCNC: 33.3 G/DL (ref 31.5–35.7)
MCV RBC AUTO: 98 FL (ref 79–97)
MONOCYTES # BLD AUTO: 0.8 X10E3/UL (ref 0.1–0.9)
MONOCYTES NFR BLD AUTO: 11 %
NEUTROPHILS # BLD AUTO: 3 X10E3/UL (ref 1.4–7)
NEUTROPHILS NFR BLD AUTO: 44 %
PLATELET # BLD AUTO: 198 X10E3/UL (ref 150–379)
POTASSIUM SERPL-SCNC: 4.4 MMOL/L (ref 3.5–5.2)
PROT SERPL-MCNC: 6.1 G/DL (ref 6–8.5)
RBC # BLD AUTO: 3.84 X10E6/UL (ref 3.77–5.28)
SODIUM SERPL-SCNC: 143 MMOL/L (ref 134–144)
WBC # BLD AUTO: 6.9 X10E3/UL (ref 3.4–10.8)

## 2018-11-29 ENCOUNTER — OFFICE VISIT (OUTPATIENT)
Dept: FAMILY MEDICINE CLINIC | Age: 78
End: 2018-11-29

## 2018-11-29 VITALS
SYSTOLIC BLOOD PRESSURE: 124 MMHG | HEART RATE: 96 BPM | BODY MASS INDEX: 36.25 KG/M2 | OXYGEN SATURATION: 95 % | HEIGHT: 61 IN | WEIGHT: 192 LBS | TEMPERATURE: 98.1 F | RESPIRATION RATE: 18 BRPM | DIASTOLIC BLOOD PRESSURE: 71 MMHG

## 2018-11-29 DIAGNOSIS — M81.0 AGE RELATED OSTEOPOROSIS, UNSPECIFIED PATHOLOGICAL FRACTURE PRESENCE: ICD-10-CM

## 2018-11-29 DIAGNOSIS — Z98.84 STATUS POST GASTRIC BYPASS FOR OBESITY: Primary | ICD-10-CM

## 2018-11-29 DIAGNOSIS — Z87.81 HX OF FRACTURE OF VERTEBRAL COLUMN: ICD-10-CM

## 2018-11-29 RX ORDER — CYANOCOBALAMIN 1000 UG/ML
1000 INJECTION, SOLUTION INTRAMUSCULAR; SUBCUTANEOUS ONCE
Qty: 1 ML | Refills: 0
Start: 2018-11-29 | End: 2018-11-29

## 2018-11-29 NOTE — PATIENT INSTRUCTIONS
Saline Nasal Washes: Care Instructions  Your Care Instructions  Saline nasal washes help keep the nasal passages open by washing out thick or dried mucus. This simple remedy can help relieve symptoms of allergies, sinusitis, and colds. It also can make the nose feel more comfortable by keeping the mucous membranes moist. You may notice a little burning sensation in your nose the first few times you use the solution, but this usually gets better in a few days. Follow-up care is a key part of your treatment and safety. Be sure to make and go to all appointments, and call your doctor if you are having problems. It's also a good idea to know your test results and keep a list of the medicines you take. How can you care for yourself at home? · You can buy premixed saline solution in a squeeze bottle or other sinus rinse products at a drugstore. Read and follow the instructions on the label. · You also can make your own saline solution by adding 1 teaspoon of salt and 1 teaspoon of baking soda to 2 cups of distilled water. · If you use a homemade solution, pour a small amount into a clean bowl. Using a rubber bulb syringe, squeeze the syringe and place the tip in the salt water. Pull a small amount of the salt water into the syringe by relaxing your hand. · Sit down with your head tilted slightly back. Do not lie down. Put the tip of the bulb syringe or the squeeze bottle a little way into one of your nostrils. Gently drip or squirt a few drops into the nostril. Repeat with the other nostril. Some sneezing and gagging are normal at first.  · Gently blow your nose. · Wipe the syringe or bottle tip clean after each use. · Repeat this 2 or 3 times a day. · Use nasal washes gently if you have nosebleeds often. When should you call for help? Watch closely for changes in your health, and be sure to contact your doctor if:    · You often get nosebleeds.     · You have problems doing the nasal washes.    Where can you learn more? Go to http://jennifer-vincent.info/. Enter [id number] [id number] in the search box to learn more about \"Saline Nasal Washes: Care Instructions. \"  Current as of: March 28, 2018  Content Version: 11.8  © 7243-5802 Healthwise, [employer]. Care instructions adapted under license by [employer] (which disclaims liability or warranty for this information). If you have questions about a medical condition or this instruction, always ask your healthcare professional. [address] any warranty or liability for your use of this information.

## 2018-11-29 NOTE — PROGRESS NOTES
Aldo Masters  77 y.o. female  1940  [address] [place] [id number]  [id number]     [id number]: Progress Note       Encounter Date: 11/29/2018    Chief Complaint   Patient presents with    Injection B12       History provided by patient  History of Present Illness   Aldo Masters is a 77 y.o. female who presents to clinic today for:    Dry cough  Patient present with cc of dry cough x several months. Patient reports that she has had cough for months and when she takes mucinex it clears but reoccurs. Right sided back pain  Patient present with cc of right sided back pain x 2 weeks. Pain described as severe aching pain radiating down her right leg. This has been limiting her activity and restricting her to the bed more recently. She is concerned that she has fractured vertebra as she has had 2 spontaneously fracture in the past. S/p kyphoplasty L4 and t6    Health Maintenance  Health Maintenance Due   Topic Date Due    Shingrix Vaccine Age 49> (1 of 2) 06/21/1989     Review of Systems   Review of Systems   Constitutional: Negative for chills and fever. Cardiovascular: Negative for chest pain and palpitations. Musculoskeletal: Positive for back pain and joint pain. Negative for falls. Skin: Negative for itching and rash. Neurological: Negative for dizziness and headaches. Vitals/Objective:     Vitals:    11/29/18 0855   BP: 124/71   Pulse: 96   Resp: 18   Temp: 98.1 °F (36.7 °C)   TempSrc: Oral   SpO2: 95%   Weight: 192 lb (87.1 kg)   Height: 5' 1\" (1.549 m)     Body mass index is 36.28 kg/m². Wt Readings from Last 3 Encounters:   11/29/18 192 lb (87.1 kg)   11/20/18 193 lb (87.5 kg)   10/29/18 192 lb (87.1 kg)       Physical Exam   Constitutional: She appears well-developed and well-nourished. Cardiovascular: Normal rate and regular rhythm.       MSK:    Posture: Normal   Deformity: None    ROM:     Flexion: Normal    Extension: Normal     Lateral bending: Normal      Gait: Normal       Palpation:    L1-L5:+ tenderness    Sacrum: No tenderness    Coccyx: No tenderness    Left Paraspinal: No tenderness    Right Paraspinal: No tenderness     Strength (0-5/5)    Hip Flexion:   Left: 5/5  Right: 5/5    Hip Extension:  Left: 5/5  Right: 5/5    Hip Abduction:  Left: 5/5  Right: 5/5    Hip Adduction:  Left: 5/5  Right: 5/5    Knee Extension:  Left: 5/5  Right: 5/5    Knee Flexion:   Left: 5/5  Right: 5/5    Ankle dorsiflexion:  Left: 5/5  Right: 5/5    Ankle plantarflexion:  Left: 5/5  Right: 5/5    Great toe extension:  Left: 5/5  Right: 5/5     Sensation: Intact, no deficits      DTR:    Patella:  Left: +2  Right: +2    Achilles:  Left: +2  Right: +2     Special test:    Straight leg: Left: Negative  Right: Positive    Blaines: Left: Negative  Right: Negative    Piriformis: Left: Negative  Right: Negative        No results found for this or any previous visit (from the past 24 hour(s)). Assessment and Plan:     Encounter Diagnoses     ICD-10-CM ICD-9-CM   1. Status post gastric bypass for obesity Z98.84 V45.86   2. Hx of fracture of vertebral column Z87.81 V15.51   3. Age related osteoporosis, unspecified pathological fracture presence M81.0 733.01       1. Status post gastric bypass for obesity  - VITAMIN B12 INJECTION  - THER/PROPH/DIAG INJECTION, SUBCUT/IM  - cyanocobalamin (VITAMIN B-12) 1,000 mcg/mL injection; 1 mL by IntraMUSCular route once for 1 dose. Dispense: 1 mL; Refill: 0    2. Hx of fracture of vertebral column  3. Age related osteoporosis, unspecified pathological fracture presence  Strong hx of osteoporosis and prior fracture of vertebra requiring kyphoplasty. - CT SPINE LUMB W CONT; Future      I have discussed the diagnosis with the patient and the intended plan as seen in the above orders. she has expressed understanding. The patient has received an after-visit summary and questions were answered concerning future plans.   I have discussed medication side effects and warnings with the patient as well. Electronically Signed: Kobi Hamilton MD     History/Allergies   Patients past medical, surgical and family histories were reviewed and updated.     Past Medical History:   Diagnosis Date    Anemia     Arthritis     CAD (coronary artery disease) 1997    MI    Degenerative joint disease of right hip 09/14/2015     Virgil Hernandez/Dr. Doc Graham    Diabetes ([address]     Fracture     right shoulder; T6 compression    Gastritis     Hypercholesterolemia     Hyperlipidemia    Lupus 1/9/2015    Osteoporosis, post-menopausal     vertebral fracture      Past Surgical History:   Procedure Laterality Date    CARDIAC SURG PROCEDURE UNLIST      Patient Denies    COLONOSCOPY N/A 10/9/2018    COLONOSCOPY performed by Simeon Hearn MD at [facility] HX CHOLECYSTECTOMY      HX CORONARY STENT PLACEMENT  02/19/1997    LAD    HX GASTRIC BYPASS  2000    HX HERNIA REPAIR  2005    HX KYPHOPLASTY      t5    HX KYPHOPLASTY      L4    HX ORTHOPAEDIC      bilateral knees    HX ORTHOPAEDIC      r shoulder    HX ORTHOPAEDIC      Laminectomy    LA INC IMPLTJ NEUROSTIMULATOR ELTRD SACRAL NERVE       Family History   Problem Relation Age of Onset    Diabetes Mother     Hypertension Mother     Heart Disease Mother     Diabetes Father     Hypertension Father     Heart Disease Father    [facility] Arthritis-rheumatoid Sister     Elevated Lipids Sister    [facility] Arthritis-rheumatoid Sister     Hypertension Sister     Diabetes Sister     Thyroid Disease Sister     Arthritis-rheumatoid Other      Social History     Socioeconomic History    Marital status:      Spouse name: Not on file    Number of children: Not on file    Years of education: Not on file    Highest education level: Not on file   Social Needs    Financial resource strain: Not on file    Food insecurity - worry: Not on file    Food insecurity - inability: Not on file   Goby needs - medical: Not on file    Transportation needs - non-medical: Not on file   Occupational History    Not on file   Tobacco Use    Smoking status: Former Smoker     Last attempt to quit: 1989     Years since quittin.9    Smokeless tobacco: Never Used   Substance and Sexual Activity    Alcohol use: No     Alcohol/week: 0.0 oz     Comment: Occasional    Drug use: No    Sexual activity: Not Currently   Other Topics Concern    Not on file   Social History Narrative    Not on file         Allergies   Allergen Reactions    Cephalosporins Nausea and Vomiting     Severe vomiting       Disposition     Follow-up Disposition:  Return in about 4 weeks (around 2018) for Vitamin B12. Future Appointments   Date Time Provider Yocasta Cisneros   2018 11:00 AM [facility]   2018  9:30 AM Sahra Muñoz MD [facility] SCHED   2019 11:00 AM SS INF2 CH3 <4H [facility] Lynne Morales   2019  1:40 PM Heri Haq MD [facility]   2019  9:10 AM Sahra Muñoz MD [facility] SCHED            Current Medications after this visit     Current Outpatient Medications   Medication Sig    cyanocobalamin (VITAMIN B-12) 1,000 mcg/mL injection 1 mL by IntraMUSCular route once for 1 dose.  atorvastatin (LIPITOR) 10 mg tablet TAKE 1 TABLET BY MOUTH EVERY DAY    cyclobenzaprine (FLEXERIL) 10 mg tablet TAKE 1 TABLET BY MOUTH EVERY NIGHT    nitroglycerin (NITROSTAT) 0.4 mg SL tablet 1 Tab by SubLINGual route every five (5) minutes as needed for Chest Pain.  HYDROcodone-acetaminophen (NORCO) 5-325 mg per tablet Take 1 Tab by mouth every six (6) hours as needed for Pain.  amLODIPine (NORVASC) 5 mg tablet TAKE 1 TABLET BY MOUTH EVERY DAY    metFORMIN (GLUCOPHAGE) 500 mg tablet Take 1 Tab by mouth two (2) times daily (with meals).     metoprolol succinate (TOPROL-XL) 25 mg XL tablet TAKE 1 TABLET BY MOUTH EVERY DAY    nortriptyline (PAMELOR) 10 mg capsule TAKE 1 CAPSULE BY MOUTH EVERY NIGHT    pantoprazole (PROTONIX) 40 mg tablet TAKE 1 TABLET BY MOUTH DAILY    gabapentin (NEURONTIN) 600 mg tablet Take 1 Tab by mouth three (3) times daily.  ferrous sulfate 325 mg (65 mg iron) tablet Take 1 Tab by mouth two (2) times a day. Indications: Iron Deficiency Anemia    trimethoprim (TRIMPEX) 100 mg tablet Take 1 Tab by mouth daily. Indications: PREVENTION OF BACTERIAL URINARY TRACT INFECTION    leflunomide (ARAVA) 20 mg tablet TAKE 1/2 TABLET BY MOUTH DAILY FOR 7 DAYS THEN 1 TABLET BY MOUTH EVERY DAY IF TOLERATED    glipiZIDE SR (GLUCOTROL XL) 2.5 mg CR tablet TAKE 1 TABLET BY MOUTH TWICE DAILY    denosumab (PROLIA) 60 mg/mL injection 1 mL by SubCUTAneous route every 6 months.  glucose blood VI test strips (AR LLCIA CONTOUR) strip Glucometer for 250.00 Pt. test blood sugar once daily (in morning)    acetaminophen (TYLENOL) 650 mg CR tablet Take 1,300 mg by mouth daily as needed for Pain.  Blood-Glucose Meter (CONTOUR METER) monitoring kit Dx: 250. Check blood sugars daily.  Walker [employer] walker. Patient with spinal stenosis and stopped posture. Pain with limited ambulation.  ZINC PO Take 50 mg by mouth daily.  cholecalciferol, vitamin d3, (VITAMIN D3) 1,000 unit tablet Take 2,000 Units by mouth daily.  calcium-vitamin D (CALCIUM 500+D) 500 mg(1,250mg) -200 unit per tablet Take 2 Tabs by mouth daily.  aspirin 81 mg Tab Take 81 mg by mouth daily.  MULTIVITAMINS (MULTIVITAMIN PO) Take 1 Tab by mouth daily. No current facility-administered medications for this visit. There are no discontinued medications.

## 2018-11-29 NOTE — PROGRESS NOTES
1. Have you been to the ER, urgent care clinic, or been hospitalized since your last visit? No     2. Have you seen or consulted any other health care providers outside of the [facility] since your last visit?   No     Reviewed record in preparation for visit and have necessary documentation  Opportunity was given for questions  Goals that were addressed and/or need to be completed during or after this appointment include     Health Maintenance Due   Topic Date Due    Shingrix Vaccine Age 50> (1 of 2) 06/21/1989

## 2018-12-04 ENCOUNTER — TELEPHONE (OUTPATIENT)
Dept: RHEUMATOLOGY | Age: 78
End: 2018-12-04

## 2018-12-04 ENCOUNTER — HOSPITAL ENCOUNTER (OUTPATIENT)
Dept: CT IMAGING | Age: 78
Discharge: HOME | End: 2018-12-04
Attending: FAMILY MEDICINE
Payer: MEDICARE

## 2018-12-04 DIAGNOSIS — M81.0 AGE RELATED OSTEOPOROSIS, UNSPECIFIED PATHOLOGICAL FRACTURE PRESENCE: ICD-10-CM

## 2018-12-04 DIAGNOSIS — Z87.81 HX OF FRACTURE OF VERTEBRAL COLUMN: ICD-10-CM

## 2018-12-04 PROCEDURE — 72131 CT LUMBAR SPINE W/O DYE: CPT

## 2018-12-04 NOTE — TELEPHONE ENCOUNTER
The infusion center at [facility] needs an order the one they received was blank fx # [phone]

## 2018-12-07 ENCOUNTER — HOSPITAL ENCOUNTER (OUTPATIENT)
Dept: INFUSION THERAPY | Age: 78
Discharge: HOME | End: 2018-12-07
Payer: MEDICARE

## 2018-12-07 VITALS
DIASTOLIC BLOOD PRESSURE: 71 MMHG | HEART RATE: 67 BPM | TEMPERATURE: 97.1 F | RESPIRATION RATE: 16 BRPM | SYSTOLIC BLOOD PRESSURE: 111 MMHG

## 2018-12-07 PROCEDURE — 74011000258 HC RX REV CODE- 258: Performed by: INTERNAL MEDICINE

## 2018-12-07 PROCEDURE — 74011250636 HC RX REV CODE- 250/636: Performed by: INTERNAL MEDICINE

## 2018-12-07 PROCEDURE — 74011250636 HC RX REV CODE- 250/636

## 2018-12-07 PROCEDURE — 96365 THER/PROPH/DIAG IV INF INIT: CPT

## 2018-12-07 RX ADMIN — GOLIMUMAB 175 MG: 50 SOLUTION INTRAVENOUS at 11:50

## 2018-12-07 NOTE — PROGRESS NOTES
[facility] VISIT NOTE Pt arrived at [facility] ambulatory and in no distress for Simponi. Assessment completed, pt had no complaints. Davi Chaudhary Patient Vitals for the past 12 hrs: 
 Temp Pulse Resp BP  
12/07/18 1230 97.1 °F (36.2 °C) 67 16 111/71  
12/07/18 1057 98 °F (36.7 °C) 89 16 136/73 PIV accessed in left arm without difficulty. Medications received: 
Simponi IV Tolerated treatment well, no adverse reaction noted. PIV removed and 2x2 and band-aid applied. D/C'd from [facility] ambulatory and in no distress accompanied by . Next appointment is 1/4/18 at 11:00.

## 2018-12-07 NOTE — PROGRESS NOTES
Problem: Patient Education:  Go to Education Activity Goal: Patient/Family Education Outcome: Progressing Towards Goal 
Pt here for Simponi

## 2018-12-13 RX ORDER — PANTOPRAZOLE SODIUM 40 MG/1
TABLET, DELAYED RELEASE ORAL
Qty: 30 TAB | Refills: 0 | Status: SHIPPED | OUTPATIENT
Start: 2018-12-13 | End: 2019-01-10 | Stop reason: SDUPTHER

## 2018-12-21 RX ORDER — GLIPIZIDE 2.5 MG/1
TABLET, EXTENDED RELEASE ORAL
Qty: 180 TAB | Refills: 0 | Status: SHIPPED | OUTPATIENT
Start: 2018-12-21 | End: 2019-03-19 | Stop reason: SDUPTHER

## 2018-12-28 ENCOUNTER — OFFICE VISIT (OUTPATIENT)
Dept: FAMILY MEDICINE CLINIC | Age: 78
End: 2018-12-28

## 2018-12-28 VITALS
HEIGHT: 61 IN | SYSTOLIC BLOOD PRESSURE: 125 MMHG | OXYGEN SATURATION: 95 % | TEMPERATURE: 97 F | DIASTOLIC BLOOD PRESSURE: 68 MMHG | RESPIRATION RATE: 16 BRPM | WEIGHT: 195 LBS | BODY MASS INDEX: 36.82 KG/M2 | HEART RATE: 80 BPM

## 2018-12-28 DIAGNOSIS — Z98.84 STATUS POST GASTRIC BYPASS FOR OBESITY: Primary | ICD-10-CM

## 2018-12-28 RX ORDER — CYANOCOBALAMIN 1000 UG/ML
1000 INJECTION, SOLUTION INTRAMUSCULAR; SUBCUTANEOUS ONCE
Qty: 1 ML | Refills: 0
Start: 2018-12-28 | End: 2018-12-28

## 2018-12-28 NOTE — PATIENT INSTRUCTIONS
A Healthy Lifestyle: Care Instructions Your Care Instructions A healthy lifestyle can help you feel good, stay at a healthy weight, and have plenty of energy for both work and play. A healthy lifestyle is something you can share with your whole family. A healthy lifestyle also can lower your risk for serious health problems, such as high blood pressure, heart disease, and diabetes. You can follow a few steps listed below to improve your health and the health of your family. Follow-up care is a key part of your treatment and safety. Be sure to make and go to all appointments, and call your doctor if you are having problems. It's also a good idea to know your test results and keep a list of the medicines you take. How can you care for yourself at home? · Do not eat too much sugar, fat, or fast foods. You can still have dessert and treats now and then. The goal is moderation. · Start small to improve your eating habits. Pay attention to portion sizes, drink less juice and soda pop, and eat more fruits and vegetables. ? Eat a healthy amount of food. A 3-ounce serving of meat, for example, is about the size of a deck of cards. Fill the rest of your plate with vegetables and whole grains. ? Limit the amount of soda and sports drinks you have every day. Drink more water when you are thirsty. ? Eat at least 5 servings of fruits and vegetables every day. It may seem like a lot, but it is not hard to reach this goal. A serving or helping is 1 piece of fruit, 1 cup of vegetables, or 2 cups of leafy, raw vegetables. Have an apple or some carrot sticks as an afternoon snack instead of a candy bar. Try to have fruits and/or vegetables at every meal. 
· Make exercise part of your daily routine. You may want to start with simple activities, such as walking, bicycling, or slow swimming. Try to be active 30 to 60 minutes every day.  You do not need to do all 30 to 60 minutes all at once. For example, you can exercise 3 times a day for 10 or 20 minutes. Moderate exercise is safe for most people, but it is always a good idea to talk to your doctor before starting an exercise program. 
· Keep moving. Becki Seller the lawn, work in the garden, or [employer]. Take the stairs instead of the elevator at work. · If you smoke, quit. People who smoke have an increased risk for heart attack, stroke, cancer, and other lung illnesses. Quitting is hard, but there are ways to boost your chance of quitting tobacco for good. ? Use nicotine gum, patches, or lozenges. ? Ask your doctor about stop-smoking programs and medicines. ? Keep trying. In addition to reducing your risk of diseases in the future, you will notice some benefits soon after you stop using tobacco. If you have shortness of breath or asthma symptoms, they will likely get better within a few weeks after you quit. · Limit how much alcohol you drink. Moderate amounts of alcohol (up to 2 drinks a day for men, 1 drink a day for women) are okay. But drinking too much can lead to liver problems, high blood pressure, and other health problems. Family health If you have a family, there are many things you can do together to improve your health. · Eat meals together as a family as often as possible. · Eat healthy foods. This includes fruits, vegetables, lean meats and dairy, and whole grains. · Include your family in your fitness plan. Most people think of activities such as jogging or tennis as the way to fitness, but there are many ways you and your family can be more active. Anything that makes you breathe hard and gets your heart pumping is exercise. Here are some tips: 
? Walk to do errands or to take your child to school or the bus. 
? Go for a family bike ride after dinner instead of watching TV. Where can you learn more? Go to http://jennifer-vincent.info/. Enter W391 in the search box to learn more about \"A Healthy Lifestyle: Care Instructions. \" Current as of: December 7, 2017 Content Version: 11.8 © 3488-1095 [employer]wise, [employer]. Care instructions adapted under license by [employer] (which disclaims liability or warranty for this information). If you have questions about a medical condition or this instruction, always ask your healthcare professional. [place] [address] any warranty or liability for your use of this information.

## 2018-12-28 NOTE — PROGRESS NOTES
1. Have you been to the ER, urgent care clinic since your last visit? Hospitalized since your last visit? no 
 
2. Have you seen or consulted any other health care providers outside of the [address] [place] [address] since your last visit? Include any pap smears or colon screening. no 
Reviewed record in preparation for visit and have obtained necessary documentation. Patient did not bring medications to visit for review. Information provided on Advanced Directive, Living Will. Body mass index is 36.84 kg/m². Health Maintenance Due Topic Date Due  Shingrix Vaccine Age 50> (1 of 2) 06/21/1989  
- check for functional glucose monitor and record keeping system-yes Pt was given BS record log to document home readings and return to office for review Diabetic Bundle: LDL-83 
A1C-5.8 BP-125/68 Smoking?no Anticoagulation medication? yes Eye exam dilated?  
Foot exam?

## 2019-01-04 ENCOUNTER — HOSPITAL ENCOUNTER (OUTPATIENT)
Dept: INFUSION THERAPY | Age: 79
Discharge: HOME | End: 2019-01-04
Payer: MEDICARE

## 2019-01-04 VITALS
DIASTOLIC BLOOD PRESSURE: 58 MMHG | TEMPERATURE: 97.1 F | RESPIRATION RATE: 18 BRPM | SYSTOLIC BLOOD PRESSURE: 121 MMHG | BODY MASS INDEX: 36.66 KG/M2 | WEIGHT: 194 LBS | HEART RATE: 75 BPM

## 2019-01-04 PROCEDURE — 74011000258 HC RX REV CODE- 258: Performed by: INTERNAL MEDICINE

## 2019-01-04 PROCEDURE — 74011250636 HC RX REV CODE- 250/636: Performed by: INTERNAL MEDICINE

## 2019-01-04 PROCEDURE — 74011250636 HC RX REV CODE- 250/636

## 2019-01-04 PROCEDURE — 96365 THER/PROPH/DIAG IV INF INIT: CPT

## 2019-01-04 RX ADMIN — GOLIMUMAB 175 MG: 50 SOLUTION INTRAVENOUS at 12:00

## 2019-01-04 NOTE — PROGRESS NOTES
[facility] VISIT NOTE 
 
1100. Pt arrived at [facility] ambulatory and in no distress for Simponi Week 4. Assessment completed, pt c/o generalized pain in joints due to RA. 
 
24g PIV placed in Left AC. Positive blood return noted and flushes easily. Medications received: 
Hunterluba Garett Simponi IV Tolerated treatment well, no adverse reaction noted. PIV removed at discharge, no s/s of infiltration noted. Patient Vitals for the past 12 hrs: 
 Temp Pulse Resp BP  
01/04/19 1230 97.1 °F (36.2 °C) 75 18 121/58  
01/04/19 1104 96.1 °F (35.6 °C) 89 18 134/63  
 
1230. D/C'd from [facility] ambulatory and in no distress accompanied by spouse.  Next appointment is 3/1/19 at 11:00 am.

## 2019-01-10 RX ORDER — PANTOPRAZOLE SODIUM 40 MG/1
TABLET, DELAYED RELEASE ORAL
Qty: 30 TAB | Refills: 0 | Status: SHIPPED | OUTPATIENT
Start: 2019-01-10 | End: 2019-02-07 | Stop reason: SDUPTHER

## 2019-01-11 ENCOUNTER — TELEPHONE (OUTPATIENT)
Dept: FAMILY MEDICINE CLINIC | Age: 79
End: 2019-01-11

## 2019-01-11 NOTE — TELEPHONE ENCOUNTER
Pt called and advised that her DMV forms are at  [employer] and ready for . Copy made for pt chart.

## 2019-01-25 DIAGNOSIS — I10 ESSENTIAL HYPERTENSION WITH GOAL BLOOD PRESSURE LESS THAN 130/80: ICD-10-CM

## 2019-01-25 RX ORDER — METOPROLOL SUCCINATE 25 MG/1
TABLET, EXTENDED RELEASE ORAL
Qty: 90 TAB | Refills: 0 | Status: SHIPPED | OUTPATIENT
Start: 2019-01-25 | End: 2019-04-23 | Stop reason: SDUPTHER

## 2019-01-29 ENCOUNTER — OFFICE VISIT (OUTPATIENT)
Dept: FAMILY MEDICINE CLINIC | Age: 79
End: 2019-01-29

## 2019-01-29 VITALS
SYSTOLIC BLOOD PRESSURE: 116 MMHG | HEART RATE: 76 BPM | BODY MASS INDEX: 37 KG/M2 | DIASTOLIC BLOOD PRESSURE: 63 MMHG | WEIGHT: 196 LBS | TEMPERATURE: 98.1 F | HEIGHT: 61 IN | RESPIRATION RATE: 16 BRPM | OXYGEN SATURATION: 97 %

## 2019-01-29 DIAGNOSIS — Z98.84 STATUS POST GASTRIC BYPASS FOR OBESITY: Primary | ICD-10-CM

## 2019-01-29 DIAGNOSIS — L98.9 FACIAL SKIN LESION: ICD-10-CM

## 2019-01-29 RX ORDER — CYANOCOBALAMIN 1000 UG/ML
1000 INJECTION, SOLUTION INTRAMUSCULAR; SUBCUTANEOUS ONCE
Qty: 1 ML | Refills: 0
Start: 2019-01-29 | End: 2019-01-29

## 2019-01-29 RX ORDER — METFORMIN HYDROCHLORIDE 500 MG/1
500 TABLET ORAL 2 TIMES DAILY WITH MEALS
Qty: 180 TAB | Refills: 0 | Status: SHIPPED | OUTPATIENT
Start: 2019-01-29 | End: 2019-04-24 | Stop reason: SDUPTHER

## 2019-01-29 NOTE — PROGRESS NOTES
1. Have you been to the ER, urgent care clinic, or been hospitalized since your last visit? No  
 
2. Have you seen or consulted any other health care providers outside of the [address] [place] [address] since your last visit? No  
 
Reviewed record in preparation for visit and have necessary documentation Opportunity was given for questions Goals that were addressed and/or need to be completed during or after this appointment include Health Maintenance Due Topic Date Due  Shingrix Vaccine Age 50> (1 of 2) 06/21/1989  
 HEMOGLOBIN A1C Q6M  02/20/2019

## 2019-01-29 NOTE — PROGRESS NOTES
Giacomo Carmichael 
77 y.o. female 1940 
AbhilashdaPriyank Plummer [address] 
[id number] [facility] Practice: Progress Note Encounter Date: 1/29/2019 Chief Complaint Patient presents with  Injection B12  
 Skin Problem  
  check area to face, mole?? History provided by patient and  History of Present Illness Giacomo Carmichael is a 77 y.o. female who presents to clinic today for: 
 
Skin lesion on face. Patient present with cc of two lesion on face. Patient cannot recall when they appeared but notes that when she saw her dermatologist in November they were not present. B12 injection Due for injection today. [facility] Pharm[facility] out of shingrix. Health Maintenance Due Topic Date Due  Shingrix Vaccine Age 50> (1 of 2) 06/21/1989  
 HEMOGLOBIN A1C Q6M  02/20/2019 Review of Systems Review of Systems Constitutional: Negative for chills and fever. Cardiovascular: Negative for chest pain and palpitations. Gastrointestinal: Negative for abdominal pain, diarrhea, nausea and vomiting. Genitourinary: Negative for dysuria and urgency. Skin: Positive for rash. Negative for itching. Neurological: Negative for dizziness and headaches. Vitals/Objective:  
 
Vitals:  
 01/29/19 6942 BP: 116/63 Pulse: 76 Resp: 16 Temp: 98.1 °F (36.7 °C) TempSrc: Oral  
SpO2: 97% Weight: 196 lb (88.9 kg) Height: 5' 1\" (1.549 m) Body mass index is 37.03 kg/m². Wt Readings from Last 3 Encounters:  
01/29/19 196 lb (88.9 kg) 01/04/19 194 lb (88 kg) 12/28/18 195 lb (88.5 kg) Physical Exam  
Constitutional: She is oriented to person, place, and time. She appears well-developed and well-nourished. HENT:  
Head:  
 
 
Cardiovascular: Normal rate and regular rhythm. Pulmonary/Chest: Effort normal and breath sounds normal.  
Neurological: She is alert and oriented to person, place, and time. Skin: Skin is warm and dry. No results found for this or any previous visit (from the past 24 hour(s)). Assessment and Plan:  
 
Encounter Diagnoses ICD-10-CM ICD-9-CM 1. Status post gastric bypass for obesity Z98.84 V45.86  
2. Facial skin lesion L98.9 709.9 1. Status post gastric bypass for obesity 
- VITAMIN B12 INJECTION 
- THER/PROPH/DIAG INJECTION, SUBCUT/IM 
- cyanocobalamin (VITAMIN B-12) 1,000 mcg/mL injection; 1 mL by IntraMUSCular route once for 1 dose. Dispense: 1 mL; Refill: 0 
 
2. Facial skin lesion Patient stated she will call your dermatologist for appointment as she is not scheduled to be sen until April. I have discussed the diagnosis with the patient and the intended plan as seen in the above orders. she has expressed understanding. The patient has received an after-visit summary and questions were answered concerning future plans. I have discussed medication side effects and warnings with the patient as well. Electronically Signed: Memo Alexandre MD 
  
History/Allergies Patients past medical, surgical and family histories were reviewed and updated. Past Medical History:  
Diagnosis Date  Anemia  Arthritis  CAD (coronary artery disease) 1997 MI  
 Degenerative joint disease of right hip 09/14/2015 Ortho Abad Gather  Diabetes ([facility] [address]  Fracture   
 right shoulder; T6 compression  Gastritis  Hypercholesterolemia Hyperlipidemia  Lupus 1/9/2015  Osteoporosis, post-menopausal   
 vertebral fracture Past Surgical History:  
Procedure Laterality Date  CARDIAC SURG PROCEDURE UNLIST Patient Denies  COLONOSCOPY N/A 10/9/2018 COLONOSCOPY performed by Kenney Castellon MD at [address]  HX CORONARY STENT PLACEMENT  02/19/1997 LAD  HX GASTRIC BYPASS  2000  HX HERNIA REPAIR  2005  HX KYPHOPLASTY    
 t5  
 HX KYPHOPLASTY L4  
 HX ORTHOPAEDIC    
 bilateral knees  HX ORTHOPAEDIC    
 r shoulder  HX ORTHOPAEDIC Laminectomy  GA INC IMPLTJ NEUROSTIMULATOR ELTRD SACRAL NERVE Family History Problem Relation Age of Onset  Diabetes Mother  Hypertension Mother  Heart Disease Mother  Diabetes Father  Hypertension Father  Heart Disease Father Klaus.Rakel Arthritis-rheumatoid Sister  Elevated Lipids Sister Klaus.Kobs Arthritis-rheumatoid Sister  Hypertension Sister  Diabetes Sister  Thyroid Disease Sister  Arthritis-rheumatoid Other Social History Socioeconomic History  Marital status:  Spouse name: Not on file  Number of children: Not on file  Years of education: Not on file  Highest education level: Not on file Social Needs  Financial resource strain: Not on file  Food insecurity - worry: Not on file  Food insecurity - inability: Not on file  Transportation needs - medical: Not on file  Transportation needs - non-medical: Not on file Occupational History  Not on file Tobacco Use  Smoking status: Former Smoker Last attempt to quit: 1989 Years since quittin.1  Smokeless tobacco: Never Used Substance and Sexual Activity  Alcohol use: No  
  Alcohol/week: 0.0 oz  
  Comment: Occasional  
 Drug use: No  
 Sexual activity: Not Currently Other Topics Concern  Not on file Social History Narrative  Not on file Allergies Allergen Reactions  Cephalosporins Nausea and Vomiting Severe vomiting Disposition Follow-up Disposition: 
Return in about 4 weeks (around 2019) for Routine with blood work and Vitamin B12 injection. . 
 
Future Appointments Date Time Provider [facility] 2019  1:40 PM Karen Ramirez MD [id number] [facility]  
2019  9:30 AM Chase Muñoz MD [facility] SCHED  
2019  9:10 AM Chase Muñoz MD [facility] SCHED  
3/1/2019 11:00 AM SS INF2 CH3 <4H [facility] 4/26/2019 11:00 AM SS INF2 CH3 <4H [facility] Current Medications after this visit Current Outpatient Medications Medication Sig  cyanocobalamin (VITAMIN B-12) 1,000 mcg/mL injection 1 mL by IntraMUSCular route once for 1 dose.  metoprolol succinate (TOPROL-XL) 25 mg XL tablet TAKE 1 TABLET BY MOUTH EVERY DAY  pantoprazole (PROTONIX) 40 mg tablet TAKE 1 TABLET BY MOUTH DAILY  glucose blood VI test strips (ASCENSIA CONTOUR) strip Glucometer for 250.00 Pt. test blood sugar once daily (in morning)  glipiZIDE SR (GLUCOTROL XL) 2.5 mg CR tablet TAKE 1 TABLET BY MOUTH TWICE DAILY  leflunomide (ARAVA) 20 mg tablet Take 1 Tab by mouth daily.  atorvastatin (LIPITOR) 10 mg tablet TAKE 1 TABLET BY MOUTH EVERY DAY  cyclobenzaprine (FLEXERIL) 10 mg tablet TAKE 1 TABLET BY MOUTH EVERY NIGHT  nitroglycerin (NITROSTAT) 0.4 mg SL tablet 1 Tab by SubLINGual route every five (5) minutes as needed for Chest Pain.  HYDROcodone-acetaminophen (NORCO) 5-325 mg per tablet Take 1 Tab by mouth every six (6) hours as needed for Pain.  amLODIPine (NORVASC) 5 mg tablet TAKE 1 TABLET BY MOUTH EVERY DAY  nortriptyline (PAMELOR) 10 mg capsule TAKE 1 CAPSULE BY MOUTH EVERY NIGHT  gabapentin (NEURONTIN) 600 mg tablet Take 1 Tab by mouth three (3) times daily.  ferrous sulfate 325 mg (65 mg iron) tablet Take 1 Tab by mouth two (2) times a day. Indications: Iron Deficiency Anemia  trimethoprim (TRIMPEX) 100 mg tablet Take 1 Tab by mouth daily. Indications: PREVENTION OF BACTERIAL URINARY TRACT INFECTION  
 denosumab (PROLIA) 60 mg/mL injection 1 mL by SubCUTAneous route every 6 months.  acetaminophen (TYLENOL) 650 mg CR tablet Take 1,300 mg by mouth daily as needed for Pain.  Blood-Glucose Meter (CONTOUR METER) monitoring kit Dx: 250. Check blood sugars daily.  Walker [employer] walker. Patient with spinal stenosis and stopped posture. Pain with limited ambulation.  ZINC PO Take 50 mg by mouth daily.  cholecalciferol, vitamin d3, (VITAMIN D3) 1,000 unit tablet Take 2,000 Units by mouth daily.  calcium-vitamin D (CALCIUM 500+D) 500 mg(1,250mg) -200 unit per tablet Take 2 Tabs by mouth daily.  aspirin 81 mg Tab Take 81 mg by mouth daily.  MULTIVITAMINS (MULTIVITAMIN PO) Take 1 Tab by mouth daily.  metFORMIN (GLUCOPHAGE) 500 mg tablet Take 1 Tab by mouth two (2) times daily (with meals). No current facility-administered medications for this visit. There are no discontinued medications.

## 2019-02-01 ENCOUNTER — APPOINTMENT (OUTPATIENT)
Dept: INFUSION THERAPY | Age: 79
End: 2019-02-01
Payer: MEDICARE

## 2019-02-07 DIAGNOSIS — I73.00 PRIMARY RAYNAUD'S PHENOMENON: ICD-10-CM

## 2019-02-07 DIAGNOSIS — M81.0 AGE-RELATED OSTEOPOROSIS WITHOUT CURRENT PATHOLOGICAL FRACTURE: ICD-10-CM

## 2019-02-07 DIAGNOSIS — N18.2 CKD (CHRONIC KIDNEY DISEASE) STAGE 2, GFR 60-89 ML/MIN: ICD-10-CM

## 2019-02-07 DIAGNOSIS — E55.9 VITAMIN D DEFICIENCY: ICD-10-CM

## 2019-02-07 DIAGNOSIS — M06.9 RHEUMATOID ARTHRITIS WITH UNKNOWN RHEUMATOID FACTOR STATUS (HCC): ICD-10-CM

## 2019-02-07 RX ORDER — PANTOPRAZOLE SODIUM 40 MG/1
TABLET, DELAYED RELEASE ORAL
Qty: 30 TAB | Refills: 0 | Status: SHIPPED | OUTPATIENT
Start: 2019-02-07 | End: 2019-02-07 | Stop reason: SDUPTHER

## 2019-02-08 RX ORDER — PANTOPRAZOLE SODIUM 40 MG/1
40 TABLET, DELAYED RELEASE ORAL DAILY
Qty: 90 TAB | Refills: 1 | Status: SHIPPED | OUTPATIENT
Start: 2019-02-08 | End: 2019-03-07 | Stop reason: SDUPTHER

## 2019-02-08 RX ORDER — CYCLOBENZAPRINE HCL 10 MG
10 TABLET ORAL
Qty: 90 TAB | Refills: 0 | Status: SHIPPED | OUTPATIENT
Start: 2019-02-08 | End: 2019-05-06 | Stop reason: SDUPTHER

## 2019-02-08 RX ORDER — ATORVASTATIN CALCIUM 10 MG/1
10 TABLET, FILM COATED ORAL DAILY
Qty: 90 TAB | Refills: 1 | Status: SHIPPED | OUTPATIENT
Start: 2019-02-08 | End: 2019-05-06 | Stop reason: SDUPTHER

## 2019-02-08 RX ORDER — AMLODIPINE BESYLATE 5 MG/1
5 TABLET ORAL DAILY
Qty: 90 TAB | Refills: 0 | Status: SHIPPED | OUTPATIENT
Start: 2019-02-08 | End: 2019-05-06 | Stop reason: SDUPTHER

## 2019-02-20 ENCOUNTER — OFFICE VISIT (OUTPATIENT)
Dept: RHEUMATOLOGY | Age: 79
End: 2019-02-20

## 2019-02-20 ENCOUNTER — HOSPITAL ENCOUNTER (OUTPATIENT)
Dept: LAB | Age: 79
Discharge: HOME | End: 2019-02-20
Payer: MEDICARE

## 2019-02-20 VITALS
HEART RATE: 94 BPM | RESPIRATION RATE: 18 BRPM | BODY MASS INDEX: 36.06 KG/M2 | HEIGHT: 61 IN | WEIGHT: 191 LBS | DIASTOLIC BLOOD PRESSURE: 83 MMHG | TEMPERATURE: 98.2 F | SYSTOLIC BLOOD PRESSURE: 129 MMHG

## 2019-02-20 DIAGNOSIS — M81.0 AGE-RELATED OSTEOPOROSIS WITHOUT CURRENT PATHOLOGICAL FRACTURE: ICD-10-CM

## 2019-02-20 DIAGNOSIS — M06.09 SERONEGATIVE RHEUMATOID ARTHRITIS OF MULTIPLE SITES (HCC): Primary | ICD-10-CM

## 2019-02-20 DIAGNOSIS — Z79.60 LONG-TERM USE OF IMMUNOSUPPRESSANT MEDICATION: ICD-10-CM

## 2019-02-20 DIAGNOSIS — N18.2 CKD (CHRONIC KIDNEY DISEASE) STAGE 2, GFR 60-89 ML/MIN: ICD-10-CM

## 2019-02-20 DIAGNOSIS — E55.9 VITAMIN D DEFICIENCY: ICD-10-CM

## 2019-02-20 PROCEDURE — 36415 COLL VENOUS BLD VENIPUNCTURE: CPT

## 2019-02-20 PROCEDURE — 85025 COMPLETE CBC W/AUTO DIFF WBC: CPT

## 2019-02-20 PROCEDURE — 80053 COMPREHEN METABOLIC PANEL: CPT

## 2019-02-20 PROCEDURE — 86140 C-REACTIVE PROTEIN: CPT

## 2019-02-20 PROCEDURE — 86803 HEPATITIS C AB TEST: CPT

## 2019-02-20 PROCEDURE — 85651 RBC SED RATE NONAUTOMATED: CPT

## 2019-02-20 RX ORDER — LEFLUNOMIDE 20 MG/1
20 TABLET ORAL DAILY
Qty: 90 TAB | Refills: 0 | Status: SHIPPED | OUTPATIENT
Start: 2019-02-20 | End: 2019-06-17 | Stop reason: SDUPTHER

## 2019-02-20 NOTE — PROGRESS NOTES
REASON FOR VISIT This is a follow-up visit for Ms. Rueda for Seronegative Erosive Rheumatoid Arthritis and Age-Related Osteoporosis. Inflammatory arthritis phenotype includes: Anti-CCP positive: no 
Rheumatoid factor positive: no 
Erosive disease: yes Extra-articular manifestations include: Raynaud's 
 
Immunosuppression Screening (2/13/2018): Quantiferon TB: negative PPD:  Not performed Hepatitis B: negative Hepatitis C: negative Therapy History includes: 
Current DMARD therapy include: leflunomide 20 mg daily, Simponi Aria (12/07/2018 to present) Prior DMARD therapy include: methotrexate 12.5 mg subcutaneous,  hydroxychloroquine 300 mg daily, Humira (5/10/2018: 3 samples: cost) Discontinued DMARDs because of inefficacy: None Discontinued DMARDs because of side effects: methotrexate (elevated LFTs) Contra-Indicated DMARDs because of chronic kidney disease: methotrexate Osteoporosis Historical Synopsis Height loss since age 26 (at least two inches): 5 Fracture history includes: yes (T5-T6) Family history of hip fracture: no Fall Risk: no 
 
Daily calcium intake is 1800 mg 
Daily vitamin D intake is 2000 IU Smoking history: no 
Alcohol consumption: no 
Prednisone history: no 
 
Exercise: no 
 
Previous work-up for osteoporosis includes the following: DEXA Scan: 8/08/2017 Vitamin 25OH D level: 46.6 (21/3/2018) PTH: 21 (21/3/2018) TSH: 2.570 (21/3/2018) Therapy History includes: 
Current osteoporosis therapy includes: Prolia (3/29/2018 to present) Prior osteoporosis therapy includes: Reclast (2 years) The following osteoporosis therapy have been ineffective: Reclast 
The following osteoporosis therapy were stopped because of side effects: none Immunizations:  
Immunization History Administered Date(s) Administered  Influenza High Dose Vaccine PF 08/05/2016, 09/13/2017  Influenza Vaccine 12/05/2013  Influenza Vaccine Danika ) 09/06/2016  Influenza Vaccine (Quad) PF 11/10/2014  Influenza Vaccine (Tri) Adjuvanted 09/27/2018  Influenza Vaccine PF 10/20/2015  Influenza Vaccine Split 10/25/2010, 11/14/2011, 10/10/2012  Pneumococcal Conjugate (PCV-13) 09/13/2017  Pneumococcal Polysaccharide (PPSV-23) 12/05/2013  Tdap 12/05/2013  ZZZ-RETIRED (DO NOT USE) Pneumococcal Vaccine (Unspecified Type) 06/05/2008 Active problems include: 
 
Patient Active Problem List  
Diagnosis Code  CAD (coronary artery disease) I25.10  Hyperlipidemia E78.5  Gastritis K29.70  Vitamin D deficiency E55.9  Status post gastric bypass for obesity Z98.84  
 Arthritis M19.90  
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  
 H/O Bell's palsy Z86.69  
 Diabetic polyneuropathy (HCC) E11.42  
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  
 Nuclear sclerotic cataract H25.10  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  Obesity, morbid ([facility] [address] E66.01  
 H/O total knee replacement Z96.659  Primary localized osteoarthritis of left knee M17.12  
 Hypercholesterolemia E78.00  
 Primary Raynaud's phenomenon I73.00  
 CKD (chronic kidney disease) stage 2, GFR 60-89 ml/min N18.2  Type 2 diabetes with nephropathy (HCC) E11.21  
 CKD (chronic kidney disease) stage 3, GFR 30-59 ml/min (HCC) N18.3 HISTORY OF PRESENT ILLNESS 
 
Ms. Shireen Mora returns for a follow-up. On her last visit, I continued leflunomide 20 mg daily.  She was denied Humira patient assistance and cannot afford it, so I started start her on [address]. [employer] patient assistance forms were submitted. She received it on Simponi Aria on 12/07/2018 with good tolerance. Her most recent infusion was 1/04/2019. Today, she feels better. She has less aching pain in her hands associated swelling and stiffness lasting hours. She can make a fist in her right hand but not yet in her left. She will take Aleve or Advil with some relief. Warm water helps. Cold makes it worse. Norvasc helps her raynaud's. She had her Prolia on 9/27/2018 with good tolerance. She denies interval falls or fractures. She denies fever, weight loss, blurred vision, vision loss, oral ulcers, ankle swelling, dry cough, dyspnea, nausea, vomiting, dysphagia, abdominal pain, black or bloody stool, fall since last visit, rash, easy bruising and increased thirst. 
 
Ms. Alyssa Cazares has continued her medications for arthritis and reports good tolerance without significant side effects. Last toxicity monitoring by blood work was done on 11/20/2018 and did not reveal any significant adverse effects, except eGFR 63. Most recent inflammatory markers from 11/20/2018 revealed a ESR 3 mm/hr (previously 2, 2, 3, 4 mm/hr) and CRP 0.3 mg/L (previously 0.3, 0.3, 0.5, 0.3 mg/L). The patient has not had any interval hospital admissions, infections, or surgeries. REVIEW OF SYSTEMS A comprehensive review of systems was performed and pertinent results are documented in the HPI, review of systems is otherwise non-contributory. PAST MEDICAL HISTORY She has a past medical history of Anemia, Arthritis, CAD (coronary artery disease) (1997), Degenerative joint disease of right hip (09/14/2015 ), Diabetes ([facility] [address] Fracture, Gastritis, Hypercholesterolemia, Lupus (1/9/2015), and Osteoporosis, post-menopausal. She also has no past medical history of Abuse, Anemia NEC, Arrhythmia, Asthma, Calculus of kidney, Cancer ([facility] [address] Chronic kidney disease, Chronic obstructive pulmonary disease ([facility] [address] Chronic pain, Congestive heart failure, unspecified, Contact dermatitis and other eczema, due to unspecified cause, COPD, Depression, GERD (gastroesophageal reflux disease), Headache(784.0), Liver disease, Malignant hyperthermia due to anesthesia, Psychotic disorder ([facility] [address] PUD (peptic ulcer disease), Seizures ([facility] [address] Sleep apnea, Stroke ([facility] Thromboembolus ([facility] [address] [facility] Thyroid disease, or Trauma. FAMILY HISTORY Her family history includes Arthritis-rheumatoid in her sister, sister and another family member; Diabetes in her father, mother, and sister; Elevated Lipids in her sister; Heart Disease in her father and mother; Hypertension in her father, mother, and sister; Thyroid Disease in her sister. SOCIAL HISTORY She reports that she quit smoking about 29 years ago. she has never used smokeless tobacco. She reports that she does not drink alcohol or use drugs. MEDICATIONS Current Outpatient Medications Medication Sig Dispense Refill  golimumab (SIMPONI ARIA IV) 2 mg/kg by IntraVENous route every two (2) months.  leflunomide (ARAVA) 20 mg tablet Take 1 Tab by mouth daily. 90 Tab 0  
 atorvastatin (LIPITOR) 10 mg tablet Take 1 Tab by mouth daily. 90 Tab 01  
 pantoprazole (PROTONIX) 40 mg tablet Take 1 Tab by mouth daily. 90 Tab 1  cyclobenzaprine (FLEXERIL) 10 mg tablet Take 1 Tab by mouth nightly. 90 Tab 0  
 amLODIPine (NORVASC) 5 mg tablet Take 1 Tab by mouth daily. 90 Tab 0  
 metFORMIN (GLUCOPHAGE) 500 mg tablet Take 1 Tab by mouth two (2) times daily (with meals).  180 Tab 0  
 metoprolol succinate (TOPROL-XL) 25 mg XL tablet TAKE 1 TABLET BY MOUTH EVERY DAY 90 Tab 0  
 glucose blood VI test strips (ASCENSIA CONTOUR) strip Glucometer for 250.00 Pt. test blood sugar once daily (in morning) 50 Strip 11  
 glipiZIDE SR (GLUCOTROL XL) 2.5 mg CR tablet TAKE 1 TABLET BY MOUTH TWICE DAILY 180 Tab 0  
 nitroglycerin (NITROSTAT) 0.4 mg SL tablet 1 Tab by SubLINGual route every five (5) minutes as needed for Chest Pain. 4 Tab 0  
 HYDROcodone-acetaminophen (NORCO) 5-325 mg per tablet Take 1 Tab by mouth every six (6) hours as needed for Pain. 60 Tab 0  
 nortriptyline (PAMELOR) 10 mg capsule TAKE 1 CAPSULE BY MOUTH EVERY NIGHT 30 Cap 5  
 gabapentin (NEURONTIN) 600 mg tablet Take 1 Tab by mouth three (3) times daily. 90 Tab 5  ferrous sulfate 325 mg (65 mg iron) tablet Take 1 Tab by mouth two (2) times a day. Indications: Iron Deficiency Anemia 60 Tab 5  
 trimethoprim (TRIMPEX) 100 mg tablet Take 1 Tab by mouth daily. Indications: PREVENTION OF BACTERIAL URINARY TRACT INFECTION 30 Tab 5  
 denosumab (PROLIA) 60 mg/mL injection 1 mL by SubCUTAneous route every 6 months.  acetaminophen (TYLENOL) 650 mg CR tablet Take 1,300 mg by mouth daily as needed for Pain.  Blood-Glucose Meter (CONTOUR METER) monitoring kit Dx: 250. Check blood sugars daily. 1 kit 11  Walker [employer] walker. Patient with spinal stenosis and stopped posture. Pain with limited ambulation. 1 Each 0  
 ZINC PO Take 50 mg by mouth daily.  cholecalciferol, vitamin d3, (VITAMIN D3) 1,000 unit tablet Take 2,000 Units by mouth daily.  calcium-vitamin D (CALCIUM 500+D) 500 mg(1,250mg) -200 unit per tablet Take 2 Tabs by mouth daily.  aspirin 81 mg Tab Take 81 mg by mouth daily.  MULTIVITAMINS (MULTIVITAMIN PO) Take 1 Tab by mouth daily. ALLERGIES Allergies Allergen Reactions  Cephalosporins Nausea and Vomiting Severe vomiting PHYSICAL EXAMINATION Visit Vitals /83 Pulse 94 Temp 98.2 °F (36.8 °C) Resp 18 Ht 5' 1\" (1.549 m) Wt 191 lb (86.6 kg) BMI 36.09 kg/m² Body mass index is 36.09 kg/m². General: Patient is alert, oriented x 3, not in acute distress HEENT:  
Sclerae are not injected and appear moist. 
 There is no alopecia. Neck is supple Cardiovascular: 
Heart is regular rate and rhythm, no murmurs. Chest: 
Lungs are clear to auscultation bilaterally. No rhonchi, wheezes, or crackles. Extremities: 
Free of clubbing, cyanosis +1 nonpitting lower extremity edema with tenderness on the RLE Neurological exam: Muscle strength is full in upper and lower extremities Skin exam: 
 
Psoriasis:     no 
Nail Pitting:     no 
Onycholysis:     no 
Palmoplantar pustulosis:   no 
Acne fulminans:    no 
Acne conglobata:    no 
Hidradenitis Suppurativa:   no 
Dissecting cellulitis of the scalp:  no 
Pilonidal sinus:    no 
Erythema nodosum:    no 
Non-Scarring Alopecia:  no 
Discoid Lupus:   no 
Subacute Cutaneous Lupus:   no 
Heliotrope Rash:   no 
Upper Arm Erythema:   no Shawl Sign:    no 
V-sign:     no 
Holster sign:    no 
Gottron's papules:   no 
Gottron's sign:    no 
Calcinosis:    no 
Raynaud's Phenomenon:  no 
's Hands:   no 
Periungual erythema:   no 
Abnormal Nailfold Capillaries:  no 
Livedo Reticularis:   no 
Scalp Erythema:   no 
Facial Rosacea: Yes Musculoskeletal: A comprehensive musculoskeletal exam was performed for all joints of each upper and lower extremity and assessed for swelling, tenderness and range of motion. Left SC tenderness and hard swelling Bilateral Sharla and Heberden nods Bilateral knee crepitus without effusion Joint Count 2/20/2019 11/20/2018 8/20/2018 5/10/2018 3/15/2018 2/13/2018 Patient pain (0-100) 50 80 80 25 10 - MHAQ 0.125 0 0.125 0.25 0 - Left wrist- Tender - 1 1 1 - 1 Left wrist- Swollen - 1 1 1 - 1 Left 1st MCP - Tender 1 1 1 - - 1 Left 1st MCP - Swollen 1 1 1 - - 1 Left 2nd MCP - Tender 0 0 1 1 1 1 Left 2nd MCP - Swollen 1 1 1 1 1 1 Left 3rd MCP - Tender 1 1 1 1 1 1 Left 3rd MCP - Swollen 1 1 1 1 1 1 Left 4th MCP - Tender 1 1 1 1 1 1 Left 4th MCP - Swollen 0 0 1 - - 1 Left 5th MCP - Tender 1 0 - - - -  
 Left 5th MCP - Swollen 1 1 - 1 - - Left thumb IP - Tender - - 1 1 - - Left 2nd PIP - Tender 1 1 1 1 - 1 Left 2nd PIP - Swollen 1 1 1 - - 1 Left 3rd PIP - Tender 1 1 1 1 - 1 Left 3rd PIP - Swollen 1 1 1 1 - - Left 4th PIP - Tender 1 1 1 1 1 1 Left 4th PIP - Swollen 1 1 1 - 1 1 Left 5th PIP - Tender 1 1 1 1 - - Left 5th PIP - Swollen 1 1 1 - - - Right wrist- Tender - - - - 1 1 Right wrist- Swollen - - - - 1 1 Right 1st MCP - Tender 1 1 - - - - Right 1st MCP - Swollen 1 1 - - - - Right 2nd MCP - Tender 1 1 1 1 1 1 Right 2nd MCP - Swollen 1 1 1 1 1 1 Right 3rd MCP - Tender 1 0 1 1 1 1 Right 3rd MCP - Swollen 1 1 1 1 1 1 Right 4th MCP - Tender 0 1 1 - 1 1 Right 4th MCP - Swollen 1 1 1 - 1 1 Right 5th MCP - Tender 0 - 1 - - - Right 5th MCP - Swollen 1 - - 1 1 - Right 2nd PIP - Tender 1 1 1 1 - 1 Right 2nd PIP - Swollen 1 1 1 - - 1 Right 3rd PIP - Tender 1 1 1 1 1 1 Right 3rd PIP - Swollen 0 1 - - 1 - Right 4th PIP - Tender - 1 - 1 1 1 Right 4th PIP - Swollen - 1 - - - - Right 5th PIP - Tender 0 1 - 1 1 - Right 5th PIP - Swollen 1 1 - - - - Tender Joint Count (Total) 13 15 16 15 11 15 Swollen Joint Count (Total) 15 17 13 8 9 12 Physician Assessment (0-10) 4 4 4 3 3 - Patient Assessment (0-10) 2.5 8 5 5 1.5 - CDAI Total (calculated) 34.5 44 38 31 24.5 - DATA REVIEW Laboratory Recent laboratory results were reviewed, summarized, and discussed with the patient. Imaging Musculoskeletal Ultrasound None Radiographs Bilateral Hand 2/13/2018: RIGHT: severe osteopenia without fracture. There are scattered degenerative changes. Progressive first Aia 16 joint. No new erosive changes. .  Vascular calcifications. LEFT: severe osteopenia. Scattered degenerative changes as noted previously with progression of the index finger and middle finger DIP joints.  There is a new erosion at the index finger proximal phalanx ulnar base without adjacent joint space loss. There are vascular calcifications. Calcification overlying the DRUJ is favored represent overlying vascular calcifications.  
  
Bilateral Foot 2/13/2018: RIGHT: no acute fracture or dislocation. Alignment is anatomic. Mild degenerative changes are seen in the left first MTP joint. Minimal degenerative changes are seen in the right first MTP joint. A possible erosion is seen in the head of the left first metatarsal. No erosive changes are seen in the right foot. Plantar calcaneal heel spurs are noted in both feet, as well as enthesophyte formation at the Achilles insertion site. Arterial vascular calcifications are also noted bilaterally. Mild soft tissue swelling surrounds the left first MTP joint. LEFT: no acute fracture or dislocation. Alignment is anatomic. Mild degenerative changes are seen in the left first MTP joint. Minimal degenerative changes are seen in the right first MTP joint. A possible erosion is seen in the head of the left first metatarsal. No erosive changes are seen in the right foot. Plantar calcaneal heel spurs are noted in both feet, as well as enthesophyte formation at the Achilles insertion site. Arterial vascular calcifications are also noted bilaterally. Mild soft tissue swelling surrounds the left first MTP joint. Thoracic Spine 11/03/2016: The posterior battery pack is again seen within the intrathecal catheter in stable position. Kyphoplasty material is present in a midthoracic vertebral body is stable mild chronic compression of the adjacent inferior vertebral body. The remaining vertebral body heights are maintained. Anterior osteophytes are noted. There is no abnormality in alignment. Lumbar Spine 10/19/2016: significant disc space narrowing at L3-L4 with 14 mm anterolisthesis. There is facet arthropathy. The vertebral body heights are maintained. Spinal catheters are seen.  There are atherosclerotic vascular calcifications. Bilateral Hips 8/31/2015: no fracture, dislocation or other acute abnormality. There osteoarthritic pattern change of both hips, more severe on the right. There is nonuniform joint space narrowing and marginal osteophytes. There is remodeling of the right femur head with subchondral cystic formation. Mild sacroiliac osteoarthritic change. Lower lumbar spondylosis is noted. Wire  leads are seen in the left lower lumbar spine likely reflective of spinal stimulator device. Bilateral Hand 3/01/2011: RIGHT: mild osteopenia. There are degenerative changes of the distal and proximal interphalangeal joints and the base of the thumb. There is no fracture or other acute abnormality. Scattered calcifications are noted in the radial and ulnar arteries. LEFT: mild osteopenia. There are degenerative changes of the distal and proximal interphalangeal joints and  at the base of the thumb. There is no fracture or acute abnormality. There are vascular calcifications of the radial and ulnar arteries. CT Imaging None MR Imaging MRI Brain with without contrast 12/19/2016: There is sulcal and ventricular prominence. Confluent periventricular and scattered foci of increased T2 signal intensity in the corona radiata and centrum semiovale. Tiny remote lacunar infarction in the right cerebellum. There is no intracranial mass, hemorrhage or evidence of acute infarction. There is no Chiari or syrinx. The pituitary and infundibulum are grossly unremarkable. There is no skull base mass. Cerebellopontine angles are grossly unremarkable. The major intracranial vascular flow-voids are unremarkable. No evidence of demyelinating process. There is no abnormal parenchymal enhancement. There is no abnormal meningeal enhancement. The cavernous sinuses are symmetric. Optic chiasm and infundibulum grossly unremarkable. Orbits are grossly symmetric. Dural venous sinuses are grossly patent.  The brain architecture is normal. There is no evidence of midline shift or mass-effect. There are no extra-axial fluid collections. The mastoid air cells and paranasal sinuses are well pneumatized and clear. MRI Lumbar Spine with and without contrast 8/07/2013: There is transitional lumbosacral anatomy. In keeping with prior studies, lowest fully formed disc is labeled L5-S1. There has been prior  laminectomies at L3-5 with no change in grade 1 anterolisthesis at L3-4 which measures 5 mm. There is grade 1 anterolisthesis of L4-5 of approximately 2 mm, unchanged. Vertebral body heights are maintained. There is no marrow edema or compression fracture. There are reactive endplate changes at V1-1.  The conus medullaris terminates at L1-2. There is no abnormal intraspinal enhancement. L1/2:  The spinal canal and foramina are widely patent. L2/3:  Diffuse disc bulge, facet hypertrophy and ligamentum flavum thickening cause mild spinal canal and moderate right foraminal stenosis. Left foramen is patent. L3/4: There is uncovering of disc material with facet hypertrophy resulting in mild thecal sac narrowing. There is severe bilateral foraminal stenosis similar to the prior study. L4/5: Oleta Sand is diffuse disc bulge and facet hypertrophy, without significant spinal canal stenosis. There is mild right foraminal stenosis. L5/S1:  The spinal canal and foramina are widely patent. DXA DXA 8/08/2017: (excluded Lumbar spine because of overlying wires and degenerative change and kyphoplasty of L4) showed left femoral neck T score: -1.4 (0.837 g/cm2), left total hip T score: -1.4 (0.830 g/cm2), right femoral neck T score: -0.9 (0.919 g/cm2), right total hip T score: -0.9 (0.900 g/cm2), and distal one third left radius T score -3.00 (BMD 0.615 g/cm2). FRAX score 16.8 % probability in 10 years for major osteoporotic fracture and 3.2 % 10 year probability of hip fracture. Nuclear Medicine Nuclear Medicine Bone Scan 1/22/2013: There is intense radiotracer uptake in the upper thoracic spine, likely T5. There is uptake in the region of the sternoclavicular joints and left shoulder, likely degenerative. ASSESSMENT AND PLAN This is a follow-up visit for Ms. Deshawn Ospina. 1) Seronegative Erosive Rheumatoid Arthritis. She is maintained on leflunomide 20 mg daily and Chip Raw, which she started on 12/28/2018 with good tolerance. Her CDAI was 34.5 (previously 44, 38, 31, 24.5) with 13 tender and 15 swollen joints, consistent with high disease activity. She has received two doses so far and has started feeling better. I will continue treatment. Labs today. 2) Long Term Use of Immunosuppressants. The patient remains on immunomodulatory medications (leflunomide, Simponi Aria) and requires frequent toxicity monitoring by blood work. Respective labs were ordered (CBC and CMP). 3) Age-Related Osteoporosis. (multiple vertebral fractures) She is on Prolia with good tolerance. She denies interval falls or fractures. 4) Primary Raynauds phenomenon. She is on amlodipine 5 mg daily. This was not an active issue today. She may take up to 10 mg if she becomes symptomatic. 5) Chronic Kidney Disease Stage 2-3. The patient's eGFR was 85 (previously 60, 65, 57, 63). I will continue to monitor. Labs today. The patient voiced understanding of the aforementioned assessment and plan. Summary of plan was provided in the After Visit Summary patient instructions. TODAY'S ORDERS Orders Placed This Encounter  QUANTIFERON-TB GOLD PLUS  
 CBC WITH AUTOMATED DIFF  
 METABOLIC PANEL, COMPREHENSIVE  C REACTIVE PROTEIN, QT  
 SED RATE (ESR)  CHRONIC HEPATITIS PANEL  
 golimumab ([address] ARIA IV)  leflunomide (ARAVA) 20 mg tablet Future Appointments Date Time Provider Yocasta Cisneros 2/26/2019  9:30 AM Gail Muñoz MD [facility]  
 2/27/2019  9:10 AM [facility]  
3/1/2019 11:00 AM SS INF2 CH3 <4H [facility]  
4/26/2019 11:00 AM SS INF2 CH3 <4H [facility]  
5/29/2019  3:20 PM Sumit Ramirez MD [address] [facility], [facility] Adult Rheumatology Rheumatology Ultrasound Certified [facility], [address] Phone [phone] Fax [phone]

## 2019-02-23 LAB
ALBUMIN SERPL-MCNC: 4.2 G/DL (ref 3.5–4.8)
ALBUMIN/GLOB SERPL: 1.7 {RATIO} (ref 1.2–2.2)
ALP SERPL-CCNC: 137 IU/L (ref 39–117)
ALT SERPL-CCNC: 46 IU/L (ref 0–32)
AST SERPL-CCNC: 59 IU/L (ref 0–40)
BASOPHILS # BLD AUTO: 0.1 X10E3/UL (ref 0–0.2)
BASOPHILS NFR BLD AUTO: 1 %
BILIRUB SERPL-MCNC: 0.3 MG/DL (ref 0–1.2)
BUN SERPL-MCNC: 19 MG/DL (ref 8–27)
BUN/CREAT SERPL: 23 (ref 12–28)
CALCIUM SERPL-MCNC: 9.2 MG/DL (ref 8.7–10.3)
CHLORIDE SERPL-SCNC: 104 MMOL/L (ref 96–106)
CO2 SERPL-SCNC: 25 MMOL/L (ref 20–29)
COMMENT, 144067: NORMAL
CREAT SERPL-MCNC: 0.81 MG/DL (ref 0.57–1)
CRP SERPL-MCNC: 0.5 MG/L (ref 0–4.9)
EOSINOPHIL # BLD AUTO: 0.4 X10E3/UL (ref 0–0.4)
EOSINOPHIL NFR BLD AUTO: 5 %
ERYTHROCYTE [DISTWIDTH] IN BLOOD BY AUTOMATED COUNT: 13 % (ref 12.3–15.4)
ERYTHROCYTE [SEDIMENTATION RATE] IN BLOOD BY WESTERGREN METHOD: 4 MM/HR (ref 0–40)
GAMMA INTERFERON BACKGROUND BLD IA-ACNC: 0.14 IU/ML
GLOBULIN SER CALC-MCNC: 2.5 G/DL (ref 1.5–4.5)
GLUCOSE SERPL-MCNC: 102 MG/DL (ref 65–99)
HBV CORE AB SERPL QL IA: NEGATIVE
HBV CORE IGM SERPL QL IA: NEGATIVE
HBV E AB SERPL QL IA: NEGATIVE
HBV E AG SERPL QL IA: NEGATIVE
HBV SURFACE AB SER QL: NON REACTIVE
HBV SURFACE AG SERPL QL IA: NEGATIVE
HCT VFR BLD AUTO: 39.3 % (ref 34–46.6)
HCV AB S/CO SERPL IA: <0.1 S/CO RATIO (ref 0–0.9)
HGB BLD-MCNC: 12.9 G/DL (ref 11.1–15.9)
IMM GRANULOCYTES # BLD AUTO: 0.1 X10E3/UL (ref 0–0.1)
IMM GRANULOCYTES NFR BLD AUTO: 1 %
LYMPHOCYTES # BLD AUTO: 2.9 X10E3/UL (ref 0.7–3.1)
LYMPHOCYTES NFR BLD AUTO: 34 %
M TB IFN-G BLD-IMP: NEGATIVE
M TB IFN-G CD4+ BCKGRND COR BLD-ACNC: 0.15 IU/ML
MCH RBC QN AUTO: 32.2 PG (ref 26.6–33)
MCHC RBC AUTO-ENTMCNC: 32.8 G/DL (ref 31.5–35.7)
MCV RBC AUTO: 98 FL (ref 79–97)
MITOGEN IGNF BLD-ACNC: >10 IU/ML
MONOCYTES # BLD AUTO: 1 X10E3/UL (ref 0.1–0.9)
MONOCYTES NFR BLD AUTO: 12 %
NEUTROPHILS # BLD AUTO: 3.9 X10E3/UL (ref 1.4–7)
NEUTROPHILS NFR BLD AUTO: 47 %
PLATELET # BLD AUTO: 246 X10E3/UL (ref 150–379)
POTASSIUM SERPL-SCNC: 4.8 MMOL/L (ref 3.5–5.2)
PROT SERPL-MCNC: 6.7 G/DL (ref 6–8.5)
QUANTIFERON INCUBATION, QF1T: NORMAL
QUANTIFERON TB2 AG: 0.14 IU/ML
RBC # BLD AUTO: 4.01 X10E6/UL (ref 3.77–5.28)
SERVICE CMNT-IMP: NORMAL
SODIUM SERPL-SCNC: 143 MMOL/L (ref 134–144)
WBC # BLD AUTO: 8.4 X10E3/UL (ref 3.4–10.8)

## 2019-02-27 ENCOUNTER — OFFICE VISIT (OUTPATIENT)
Dept: FAMILY MEDICINE CLINIC | Age: 79
End: 2019-02-27

## 2019-02-27 VITALS
WEIGHT: 193 LBS | SYSTOLIC BLOOD PRESSURE: 105 MMHG | OXYGEN SATURATION: 95 % | HEART RATE: 80 BPM | RESPIRATION RATE: 16 BRPM | HEIGHT: 61 IN | DIASTOLIC BLOOD PRESSURE: 55 MMHG | BODY MASS INDEX: 36.44 KG/M2 | TEMPERATURE: 98.1 F

## 2019-02-27 DIAGNOSIS — E11.42 DIABETIC POLYNEUROPATHY ASSOCIATED WITH TYPE 2 DIABETES MELLITUS (HCC): ICD-10-CM

## 2019-02-27 DIAGNOSIS — Z98.84 STATUS POST GASTRIC BYPASS FOR OBESITY: Primary | ICD-10-CM

## 2019-02-27 DIAGNOSIS — E11.21 TYPE 2 DIABETES WITH NEPHROPATHY (HCC): ICD-10-CM

## 2019-02-27 DIAGNOSIS — E78.00 HYPERCHOLESTEROLEMIA: ICD-10-CM

## 2019-02-27 RX ORDER — CYANOCOBALAMIN 1000 UG/ML
1000 INJECTION, SOLUTION INTRAMUSCULAR; SUBCUTANEOUS ONCE
Qty: 1 ML | Refills: 0
Start: 2019-02-27 | End: 2019-02-27

## 2019-02-27 NOTE — PROGRESS NOTES
1. Have you been to the ER, urgent care clinic since your last visit? Hospitalized since your last visit? No    2. Have you seen or consulted any other health care providers outside of the [address] [place] [address] since your last visit? Include any pap smears or colon screening.  No  Reviewed record in preparation for visit and have necessary documentation  Pt did not bring medication to office visit for review    Goals that were addressed and/or need to be completed during or after this appointment include     Health Maintenance Due   Topic Date Due    Shingrix Vaccine Age 50> (1 of 2) 06/21/1989    HEMOGLOBIN A1C Q6M  02/20/2019

## 2019-02-27 NOTE — PROGRESS NOTES
Marika Yi  77 y.o. female  1940  [address] [place] [id number]  [id number]     [id number]: Progress Note       Encounter Date: 2/27/2019    Chief Complaint   Patient presents with    Injection B12       History provided by patient and spouse  History of Present Illness   Marika Yi is a 77 y.o. female who presents to clinic today for:    Hypertension: Controlled   BP Readings from Last 3 Encounters:   02/27/19 105/55   02/20/19 129/83   01/29/19 116/63     The patient reports:  taking medications as instructed, no medication side effects noted, no TIA's, no chest pain on exertion, no dyspnea on exertion, no swelling of ankles. Home monitoring:No  Sodium   Date Value Ref Range Status   02/20/2019 143 134 - 144 mmol/L Final     Potassium   Date Value Ref Range Status   02/20/2019 4.8 3.5 - 5.2 mmol/L Final     Creatinine   Date Value Ref Range Status   02/20/2019 0.81 0.57 - 1.00 mg/dL Final   11/20/2018 0.64 0.57 - 1.00 mg/dL Final       Our goal is to normalize the blood pressure to decrease the risks of strokes and heart attacks. The patient is in agreement with the plan. Diabetes Follow up: Controlled   Overall the patient feels she is doing well. She is compliant with all medication. Recently had blood work done though it did not include HbA1c. Pertinent Labs:      Hemoglobin A1c   Date Value Ref Range Status   08/20/2018 5.8 (H) 4.8 - 5.6 % Final     Comment:              Prediabetes: 5.7 - 6.4           Diabetes: >6.4           Glycemic control for adults with diabetes: <7.0       Estimated average glucose   Date Value Ref Range Status   02/08/2018 120 mg/dL Final       Hyperlipidemia:  Stable  Cardiovascular risks for her are: diabetic  existing CAD  hypertension  hyperlipidemia.    Currently she takes Lipitor (atorvastatin) , 10  Myalgias: No  Cholesterol, total   Date Value Ref Range Status   08/20/2018 167 100 - 199 mg/dL Final     HDL Cholesterol Date Value Ref Range Status   08/20/2018 48 >39 mg/dL Final     LDL, calculated   Date Value Ref Range Status   08/20/2018 83 0 - 99 mg/dL Final     Triglyceride   Date Value Ref Range Status   08/20/2018 178 (H) 0 - 149 mg/dL Final     ALT (SGPT)   Date Value Ref Range Status   02/20/2019 46 (H) 0 - 32 IU/L Final     AST (SGOT)   Date Value Ref Range Status   02/20/2019 59 (H) 0 - 40 IU/L Final     Alk. 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 HGB   Date Value Ref Range Status   02/20/2019 12.9 11.1 - 15.9 g/dL Final   11/20/2018 12.5 11.1 - 15.9 g/dL Final   08/20/2018 12.8 11.1 - 15.9 g/dL Final     HCT   Date Value Ref Range Status   02/20/2019 39.3 34.0 - 46.6 % Final   11/20/2018 37.5 34.0 - 46.6 % Final   08/20/2018 39.4 34.0 - 46.6 % Final     MCV   Date Value Ref Range Status   02/20/2019 98 (H) 79 - 97 fL Final   11/20/2018 98 (H) 79 - 97 fL Final   08/20/2018 100 (H) 79 - 97 fL Final     Iron   Date Value Ref Range Status   02/08/2018 81 27 - 139 ug/dL Final     TIBC   Date Value Ref Range Status   02/08/2018 338 250 - 450 ug/dL Final     UIBC   Date Value Ref Range Status   02/08/2018 257 118 - 369 ug/dL Final     Iron % saturation   Date Value Ref Range Status   02/08/2018 24 15 - 55 % Final     Ferritin   Date Value Ref Range Status   08/31/2015 13 (L) 15 - 150 ng/mL Final     Vitamin B12   Date Value Ref Range Status   02/08/2018 676 232 - 1,245 pg/mL Final     Folate   Date Value Ref Range Status   05/31/2016 4.1 >3.0 ng/mL Final     Comment:     A serum folate concentration of less than 3.1 ng/mL is  considered to represent clinical deficiency. Health Maintenance  Patient to Check if shingles vaccine in stock.    Health Maintenance Due   Topic Date Due    Shingrix Vaccine Age 50> (1 of 2) 06/21/1989    HEMOGLOBIN A1C Q6M  02/20/2019 Review of Systems   Review of Systems   Constitutional: Negative for chills and fever. HENT: Negative for congestion and sinus pain. Respiratory: Negative for cough, hemoptysis and stridor. Cardiovascular: Negative for chest pain and palpitations. Gastrointestinal: Negative for abdominal pain, constipation, diarrhea, nausea and vomiting. Genitourinary: Negative for dysuria, frequency and urgency. Musculoskeletal: Positive for joint pain (followed by Rheumatology). Skin: Negative for itching and rash. Neurological: Negative for dizziness and headaches. Psychiatric/Behavioral: Negative for depression and suicidal ideas. The patient is not nervous/anxious and does not have insomnia. Vitals/Objective:     Vitals:    02/27/19 0919   BP: 105/55   Pulse: 80   Resp: 16   Temp: 98.1 °F (36.7 °C)   TempSrc: Oral   SpO2: 95%   Weight: 193 lb (87.5 kg)   Height: 5' 1\" (1.549 m)     Body mass index is 36.47 kg/m². Wt Readings from Last 3 Encounters:   02/27/19 193 lb (87.5 kg)   02/20/19 191 lb (86.6 kg)   01/29/19 196 lb (88.9 kg)       Physical Exam   Constitutional: She is oriented to person, place, and time. She appears well-developed and well-nourished. Cardiovascular: Normal rate and regular rhythm. Pulmonary/Chest: Effort normal and breath sounds normal.   Neurological: She is alert and oriented to person, place, and time. Skin: Skin is warm and dry. Capillary refill takes less than 2 seconds. No results found for this or any previous visit (from the past 24 hour(s)). Assessment and Plan:     Encounter Diagnoses     ICD-10-CM ICD-9-CM   1. Status post gastric bypass for obesity Z98.84 V45.86   2. Type 2 diabetes with nephropathy (HCC) E11.21 250.40     583.81   3. Diabetic polyneuropathy associated with type 2 diabetes mellitus (HCC) E11.42 250.60     357.2   4. Hypercholesterolemia E78.00 272.0       1. Status post gastric bypass for obesity  Doing well on injections.    - VITAMIN B12 INJECTION  - THER/PROPH/DIAG INJECTION, SUBCUT/IM  - cyanocobalamin (VITAMIN B-12) 1,000 mcg/mL injection; 1 mL by IntraMUSCular route once for 1 dose. Dispense: 1 mL; Refill: 0    2. Type 2 diabetes with nephropathy ([facility]  3. Diabetic polyneuropathy associated with type 2 diabetes mellitus ([facility] [facility]  Kidney function stable based on labs completed by Rheumatology. Check HbA1c to be done at next lab draw with rheumatology.   - HEMOGLOBIN A1C WITH EAG    4. Hypercholesterolemia  Tolerating stain well. - LIPID PANEL      I have discussed the diagnosis with the patient and the intended plan as seen in the above orders. she has expressed understanding. The patient has received an after-visit summary and questions were answered concerning future plans. I have discussed medication side effects and warnings with the patient as well. Electronically Signed: Gilberto Figueroa MD     History/Allergies   Patients past medical, surgical and family histories were reviewed and updated.     Past Medical History:   Diagnosis Date    Anemia     Arthritis     CAD (coronary artery disease) 1997    MI    Degenerative joint disease of right hip 09/14/2015     Virgil Hernandez/Dr. Neli Shabazz    Diabetes ([facility]     Fracture     right shoulder; T6 compression    Gastritis     Hypercholesterolemia     Hyperlipidemia    Lupus 1/9/2015    Osteoporosis, post-menopausal     vertebral fracture      Past Surgical History:   Procedure Laterality Date    CARDIAC SURG PROCEDURE UNLIST      Patient Denies    COLONOSCOPY N/A 10/9/2018    COLONOSCOPY performed by Dennise Leyden, MD at [facility] HX CHOLECYSTECTOMY      HX CORONARY STENT PLACEMENT  02/19/1997    LAD    HX GASTRIC BYPASS  2000    HX HERNIA REPAIR  2005    HX KYPHOPLASTY      t5    HX KYPHOPLASTY      L4    HX ORTHOPAEDIC      bilateral knees    HX ORTHOPAEDIC      r shoulder    HX ORTHOPAEDIC      Laminectomy    TX INC IMPLTJ NEUROSTIMULATOR ELTRD SACRAL NERVE       Family History   Problem Relation Age of Onset    Diabetes Mother     Hypertension Mother     Heart Disease Mother     Diabetes Father     Hypertension Father     Heart Disease Father    Corea Arthritis-rheumatoid Sister     Elevated Lipids Sister    [place]a Arthritis-rheumatoid Sister     Hypertension Sister     Diabetes Sister     Thyroid Disease Sister     Arthritis-rheumatoid Other      Social History     Socioeconomic History    Marital status:      Spouse name: Not on file    Number of children: Not on file    Years of education: Not on file    Highest education level: Not on file   Social Needs    Financial resource strain: Not on file    Food insecurity - worry: Not on file    Food insecurity - inability: Not on file   [employer] needs - medical: Not on file   [employer] needs - non-medical: Not on file   Occupational History    Not on file   Tobacco Use    Smoking status: Former Smoker     Last attempt to quit: 1989     Years since quittin.1    Smokeless tobacco: Never Used   Substance and Sexual Activity    Alcohol use: No     Alcohol/week: 0.0 oz     Comment: Occasional    Drug use: No    Sexual activity: Not Currently   Other Topics Concern    Not on file   Social History Narrative    Not on file         Allergies   Allergen Reactions    Cephalosporins Nausea and Vomiting     Severe vomiting       Disposition     Follow-up Disposition:  Return in about 4 months (around 2019) for Routine;1 month for VitB12 injection.  .    Future Appointments   Date Time Provider Yocasta Cisneros   3/1/2019 11:00 AM SS INF2 CH3 <4H [facility]   3/27/2019  9:50 AM Jolly Muñoz MD [facility] SCHED   2019 11:[facility]   2019  3:20 PM Karen Ramirez MD [facility]   2019  9:30 AM Jolly Muñoz MD [facility] SCHED            Current Medications after this visit     Current Outpatient Medications   Medication Sig    cyanocobalamin (VITAMIN B-12) 1,000 mcg/mL injection 1 mL by IntraMUSCular route once for 1 dose.  golimumab (SIMPONI ARIA IV) 2 mg/kg by IntraVENous route every two (2) months.  leflunomide (ARAVA) 20 mg tablet Take 1 Tab by mouth daily.  atorvastatin (LIPITOR) 10 mg tablet Take 1 Tab by mouth daily.  pantoprazole (PROTONIX) 40 mg tablet Take 1 Tab by mouth daily.  cyclobenzaprine (FLEXERIL) 10 mg tablet Take 1 Tab by mouth nightly.  amLODIPine (NORVASC) 5 mg tablet Take 1 Tab by mouth daily.  metFORMIN (GLUCOPHAGE) 500 mg tablet Take 1 Tab by mouth two (2) times daily (with meals).  metoprolol succinate (TOPROL-XL) 25 mg XL tablet TAKE 1 TABLET BY MOUTH EVERY DAY    glucose blood VI test strips (ASCOutboxIA CONTOUR) strip Glucometer for 250.00 Pt. test blood sugar once daily (in morning)    glipiZIDE SR (GLUCOTROL XL) 2.5 mg CR tablet TAKE 1 TABLET BY MOUTH TWICE DAILY    nitroglycerin (NITROSTAT) 0.4 mg SL tablet 1 Tab by SubLINGual route every five (5) minutes as needed for Chest Pain.  HYDROcodone-acetaminophen (NORCO) 5-325 mg per tablet Take 1 Tab by mouth every six (6) hours as needed for Pain.  nortriptyline (PAMELOR) 10 mg capsule TAKE 1 CAPSULE BY MOUTH EVERY NIGHT    gabapentin (NEURONTIN) 600 mg tablet Take 1 Tab by mouth three (3) times daily.  ferrous sulfate 325 mg (65 mg iron) tablet Take 1 Tab by mouth two (2) times a day. Indications: Iron Deficiency Anemia    trimethoprim (TRIMPEX) 100 mg tablet Take 1 Tab by mouth daily. Indications: PREVENTION OF BACTERIAL URINARY TRACT INFECTION    denosumab (PROLIA) 60 mg/mL injection 1 mL by SubCUTAneous route every 6 months.  acetaminophen (TYLENOL) 650 mg CR tablet Take 1,300 mg by mouth daily as needed for Pain.  Blood-Glucose Meter (CONTOUR METER) monitoring kit Dx: 250. Check blood sugars daily.  ZINC PO Take 50 mg by mouth daily.     cholecalciferol, vitamin d3, (VITAMIN D3) 1,000 unit tablet Take 2,000 Units by mouth daily.  calcium-vitamin D (CALCIUM 500+D) 500 mg(1,250mg) -200 unit per tablet Take 2 Tabs by mouth daily.  aspirin 81 mg Tab Take 81 mg by mouth daily.  MULTIVITAMINS (MULTIVITAMIN PO) Take 1 Tab by mouth daily.  Walker Kohort walker. Patient with spinal stenosis and stopped posture. Pain with limited ambulation. No current facility-administered medications for this visit. Facility-Administered Medications Ordered in Other Visits   Medication Dose Route Frequency    [START ON 3/1/2019] golimumab (SIMPONI ARIA) 175 mg in 0.9% sodium chloride 100 mL infusion  175 mg IntraVENous ONCE     There are no discontinued medications.

## 2019-03-01 ENCOUNTER — HOSPITAL ENCOUNTER (OUTPATIENT)
Dept: INFUSION THERAPY | Age: 79
Discharge: HOME | End: 2019-03-01
Payer: MEDICARE

## 2019-03-04 ENCOUNTER — HOSPITAL ENCOUNTER (OUTPATIENT)
Dept: INFUSION THERAPY | Age: 79
Discharge: HOME | End: 2019-03-04
Payer: MEDICARE

## 2019-03-04 VITALS
SYSTOLIC BLOOD PRESSURE: 124 MMHG | TEMPERATURE: 97.2 F | WEIGHT: 194.7 LBS | BODY MASS INDEX: 36.79 KG/M2 | RESPIRATION RATE: 16 BRPM | DIASTOLIC BLOOD PRESSURE: 58 MMHG | HEART RATE: 70 BPM

## 2019-03-04 PROCEDURE — 74011250636 HC RX REV CODE- 250/636

## 2019-03-04 PROCEDURE — 96365 THER/PROPH/DIAG IV INF INIT: CPT

## 2019-03-04 PROCEDURE — 74011000258 HC RX REV CODE- 258: Performed by: INTERNAL MEDICINE

## 2019-03-04 PROCEDURE — 74011250636 HC RX REV CODE- 250/636: Performed by: INTERNAL MEDICINE

## 2019-03-04 RX ADMIN — GOLIMUMAB 175 MG: 50 SOLUTION INTRAVENOUS at 11:45

## 2019-03-04 NOTE — PROGRESS NOTES
Problem: Knowledge Deficit Goal: *Verbalizes understanding of procedures and medications Outcome: Progressing Towards Goal 
Pt here for Simponi

## 2019-03-04 NOTE — PROGRESS NOTES
[facility] VISIT NOTE Pt arrived at [facility] ambulatory and in no distress for q 8 week Simponi. Assessment completed, pt did not have any complaints . Patient Vitals for the past 12 hrs: 
 Temp Pulse Resp BP  
03/04/19 1216 97.2 °F (36.2 °C) 70 16 124/58  
03/04/19 1105 96.9 °F (36.1 °C) 91 18 124/58 PIV accessed in left arm without difficulty. Positive blood return noted. Medications received: 
Simpoini IV Tolerated treatment well, no adverse reaction noted. PIV de-accessed per protocol. D/C'd from [facility] ambulatory and in no distress accompanied by granddaughter. Next appointment is 4/26/19 at 11:00 .

## 2019-03-07 RX ORDER — PANTOPRAZOLE SODIUM 40 MG/1
TABLET, DELAYED RELEASE ORAL
Qty: 30 TAB | Refills: 0 | Status: SHIPPED | OUTPATIENT
Start: 2019-03-07 | End: 2019-06-17 | Stop reason: SDUPTHER

## 2019-03-19 RX ORDER — GLIPIZIDE 2.5 MG/1
TABLET, EXTENDED RELEASE ORAL
Qty: 180 TAB | Refills: 0 | Status: SHIPPED | OUTPATIENT
Start: 2019-03-19 | End: 2019-06-17 | Stop reason: SDUPTHER

## 2019-03-21 DIAGNOSIS — E11.42 DIABETIC POLYNEUROPATHY ASSOCIATED WITH TYPE 2 DIABETES MELLITUS (HCC): ICD-10-CM

## 2019-03-21 RX ORDER — GABAPENTIN 600 MG/1
TABLET ORAL
Qty: 90 TAB | Refills: 0 | Status: SHIPPED | OUTPATIENT
Start: 2019-03-21 | End: 2019-04-24 | Stop reason: SDUPTHER

## 2019-03-27 ENCOUNTER — OFFICE VISIT (OUTPATIENT)
Dept: FAMILY MEDICINE CLINIC | Age: 79
End: 2019-03-27

## 2019-03-27 VITALS
BODY MASS INDEX: 36.79 KG/M2 | DIASTOLIC BLOOD PRESSURE: 64 MMHG | SYSTOLIC BLOOD PRESSURE: 105 MMHG | TEMPERATURE: 98.2 F | HEART RATE: 93 BPM | OXYGEN SATURATION: 96 % | RESPIRATION RATE: 20 BRPM | HEIGHT: 61 IN

## 2019-03-27 DIAGNOSIS — E53.8 VITAMIN B 12 DEFICIENCY: Primary | ICD-10-CM

## 2019-03-27 RX ORDER — CYANOCOBALAMIN 1000 UG/ML
1000 INJECTION, SOLUTION INTRAMUSCULAR; SUBCUTANEOUS ONCE
Qty: 1 ML | Refills: 0
Start: 2019-03-27 | End: 2019-03-27

## 2019-04-16 RX ORDER — NORTRIPTYLINE HYDROCHLORIDE 10 MG/1
CAPSULE ORAL
Qty: 30 CAP | Refills: 0 | Status: SHIPPED | OUTPATIENT
Start: 2019-04-16 | End: 2019-05-14 | Stop reason: SDUPTHER

## 2019-04-23 DIAGNOSIS — I10 ESSENTIAL HYPERTENSION WITH GOAL BLOOD PRESSURE LESS THAN 130/80: ICD-10-CM

## 2019-04-23 RX ORDER — METOPROLOL SUCCINATE 25 MG/1
TABLET, EXTENDED RELEASE ORAL
Qty: 90 TAB | Refills: 1 | Status: SHIPPED | OUTPATIENT
Start: 2019-04-23 | End: 2019-09-06 | Stop reason: SDUPTHER

## 2019-04-24 DIAGNOSIS — E11.21 TYPE 2 DIABETES WITH NEPHROPATHY (HCC): Primary | ICD-10-CM

## 2019-04-24 DIAGNOSIS — E11.42 DIABETIC POLYNEUROPATHY ASSOCIATED WITH TYPE 2 DIABETES MELLITUS (HCC): ICD-10-CM

## 2019-04-24 DIAGNOSIS — I10 ESSENTIAL HYPERTENSION WITH GOAL BLOOD PRESSURE LESS THAN 130/80: ICD-10-CM

## 2019-04-24 RX ORDER — METOPROLOL SUCCINATE 25 MG/1
TABLET, EXTENDED RELEASE ORAL
Qty: 90 TAB | Refills: 1 | Status: CANCELLED | OUTPATIENT
Start: 2019-04-24

## 2019-04-25 RX ORDER — GABAPENTIN 600 MG/1
600 TABLET ORAL 3 TIMES DAILY
Qty: 90 TAB | Refills: 5 | Status: SHIPPED | OUTPATIENT
Start: 2019-04-25 | End: 2019-10-26 | Stop reason: SDUPTHER

## 2019-04-25 RX ORDER — METFORMIN HYDROCHLORIDE 500 MG/1
500 TABLET ORAL 2 TIMES DAILY WITH MEALS
Qty: 180 TAB | Refills: 1 | Status: SHIPPED | OUTPATIENT
Start: 2019-04-25 | End: 2019-09-06 | Stop reason: SDUPTHER

## 2019-04-25 NOTE — TELEPHONE ENCOUNTER
I am confused. As you referenced, the prescription for metoprolol was filled on 4/23/19 for #90 with 1 refill. This was only 2 days ago.      Benito Aldridge MD

## 2019-04-26 ENCOUNTER — HOSPITAL ENCOUNTER (OUTPATIENT)
Dept: INFUSION THERAPY | Age: 79
Discharge: HOME | End: 2019-04-26
Payer: MEDICARE

## 2019-04-26 VITALS
SYSTOLIC BLOOD PRESSURE: 121 MMHG | TEMPERATURE: 96.4 F | DIASTOLIC BLOOD PRESSURE: 57 MMHG | BODY MASS INDEX: 36.28 KG/M2 | RESPIRATION RATE: 16 BRPM | HEART RATE: 80 BPM | WEIGHT: 192 LBS

## 2019-04-26 PROCEDURE — 96365 THER/PROPH/DIAG IV INF INIT: CPT

## 2019-04-26 PROCEDURE — 74011000258 HC RX REV CODE- 258: Performed by: INTERNAL MEDICINE

## 2019-04-26 PROCEDURE — 74011250636 HC RX REV CODE- 250/636: Performed by: INTERNAL MEDICINE

## 2019-04-26 PROCEDURE — 74011250636 HC RX REV CODE- 250/636

## 2019-04-26 RX ORDER — NITROFURANTOIN 25; 75 MG/1; MG/1
100 CAPSULE ORAL 2 TIMES DAILY
COMMUNITY
End: 2019-06-03 | Stop reason: ALTCHOICE

## 2019-04-26 RX ADMIN — GOLIMUMAB 175 MG: 50 SOLUTION INTRAVENOUS at 11:22

## 2019-04-29 ENCOUNTER — OFFICE VISIT (OUTPATIENT)
Dept: FAMILY MEDICINE CLINIC | Age: 79
End: 2019-04-29

## 2019-04-29 VITALS
HEART RATE: 91 BPM | TEMPERATURE: 98.3 F | SYSTOLIC BLOOD PRESSURE: 100 MMHG | WEIGHT: 198 LBS | DIASTOLIC BLOOD PRESSURE: 60 MMHG | HEIGHT: 61 IN | OXYGEN SATURATION: 96 % | BODY MASS INDEX: 37.38 KG/M2 | RESPIRATION RATE: 20 BRPM

## 2019-04-29 DIAGNOSIS — E53.8 VITAMIN B 12 DEFICIENCY: Primary | ICD-10-CM

## 2019-04-29 RX ORDER — CYANOCOBALAMIN 1000 UG/ML
1000 INJECTION, SOLUTION INTRAMUSCULAR; SUBCUTANEOUS ONCE
Qty: 1 ML | Refills: 0
Start: 2019-04-29 | End: 2019-04-29

## 2019-04-29 NOTE — LETTER
4/29/2019 3:19 PM 
 
Ms. Hodgson Beto Hungda. Les Elian 56 Dear Ms. Rueda: We would like to inform you that Wale Luis MD, with [facility], will be transferring to [facility] on July 1, 2019. If you wish to continue your care with Dr. Tim Weathers, your records will be transferred at no charge to you. You can reach her at the new location on July 1, 2019 at: [facility] [address], [address], [address] Office: [phone], Fax: [phone] We realize that practice transitions can be challenging for patients. We remain committed to providing ongoing care to our patients and would be happy to assist you in transitioning your care to another physician if desired. You may consider transferring your care to another provider within [facility]. If you would like to continue your care with [facility], please contact the office at [phone] to schedule an appointment. We hope you will decide to remain with [facility] where we can readily transition your care to another [facility] provider and transfer your medical records electronically to that [facility] provider without charge. Information regarding other [facility] providers can be found at [email or web address]. Under [employer], your medical records are the property of [facility], where they will be securely maintained. However, if you do not wish to continue your medical care at [facility], you may obtain a copy of your medical records by completing the attached medical records authorization form and returning it to [facility]. Please note that there may be a ten dollar ($10) processing fee in addition to copy costs of no more than fifty cents/page.  A request for copies of medical records must be in writing, dated and signed by you or your legal guardian or representative. [address] [place] [address] will accept a facsimile or other copy of the original authorization signed by you or the responsible legal guardian or representative. Our commitment is to make this transition as seamless as possible. We appreciate you choosing the physicians of [address] [place] [address] and look forward to continuing our service to you and your family. Sincerely, Dewey Mojica  [address] [place] [address]

## 2019-04-29 NOTE — PROGRESS NOTES
1. Have you been to the ER, urgent care clinic since your last visit? Hospitalized since your last visit? No    2. Have you seen or consulted any other health care providers outside of the [address] [place] [address] since your last visit? Include any pap smears or colon screening.  No  Reviewed record in preparation for visit and have necessary documentation  Pt did not bring medication to office visit for review    Goals that were addressed and/or need to be completed during or after this appointment include   Health Maintenance Due   Topic Date Due    Shingrix Vaccine Age 50> (1 of 2) 06/21/1989    HEMOGLOBIN A1C Q6M  02/20/2019

## 2019-05-06 DIAGNOSIS — M81.0 AGE-RELATED OSTEOPOROSIS WITHOUT CURRENT PATHOLOGICAL FRACTURE: ICD-10-CM

## 2019-05-06 DIAGNOSIS — D51.0 PERNICIOUS ANEMIA: ICD-10-CM

## 2019-05-06 DIAGNOSIS — Z87.440 HISTORY OF RECURRENT UTI (URINARY TRACT INFECTION): ICD-10-CM

## 2019-05-06 DIAGNOSIS — E55.9 VITAMIN D DEFICIENCY: ICD-10-CM

## 2019-05-06 DIAGNOSIS — N18.2 CKD (CHRONIC KIDNEY DISEASE) STAGE 2, GFR 60-89 ML/MIN: ICD-10-CM

## 2019-05-06 DIAGNOSIS — I73.00 PRIMARY RAYNAUD'S PHENOMENON: ICD-10-CM

## 2019-05-06 DIAGNOSIS — M06.9 RHEUMATOID ARTHRITIS WITH UNKNOWN RHEUMATOID FACTOR STATUS (HCC): ICD-10-CM

## 2019-05-06 RX ORDER — TRIMETHOPRIM 100 MG/1
100 TABLET ORAL DAILY
Qty: 30 TAB | Refills: 5 | Status: SHIPPED | OUTPATIENT
Start: 2019-05-06 | End: 2019-11-23 | Stop reason: SDUPTHER

## 2019-05-06 RX ORDER — ATORVASTATIN CALCIUM 10 MG/1
10 TABLET, FILM COATED ORAL DAILY
Qty: 90 TAB | Refills: 1 | Status: SHIPPED | OUTPATIENT
Start: 2019-05-06 | End: 2019-06-28

## 2019-05-06 RX ORDER — LANOLIN ALCOHOL/MO/W.PET/CERES
325 CREAM (GRAM) TOPICAL 2 TIMES DAILY
Qty: 60 TAB | Refills: 5 | Status: SHIPPED | OUTPATIENT
Start: 2019-05-06 | End: 2019-09-06 | Stop reason: SDUPTHER

## 2019-05-06 RX ORDER — CYCLOBENZAPRINE HCL 10 MG
10 TABLET ORAL
Qty: 90 TAB | Refills: 0 | Status: SHIPPED | OUTPATIENT
Start: 2019-05-06 | End: 2019-09-06 | Stop reason: SDUPTHER

## 2019-05-07 RX ORDER — AMLODIPINE BESYLATE 5 MG/1
5 TABLET ORAL DAILY
Qty: 90 TAB | Refills: 0 | Status: SHIPPED | OUTPATIENT
Start: 2019-05-07 | End: 2019-08-07 | Stop reason: SDUPTHER

## 2019-05-14 RX ORDER — NORTRIPTYLINE HYDROCHLORIDE 10 MG/1
CAPSULE ORAL
Qty: 30 CAP | Refills: 5 | Status: SHIPPED | OUTPATIENT
Start: 2019-05-14 | End: 2019-11-23 | Stop reason: SDUPTHER

## 2019-05-20 RX ORDER — AMLODIPINE BESYLATE 5 MG/1
TABLET ORAL
Qty: 90 TAB | Refills: 0 | Status: SHIPPED | OUTPATIENT
Start: 2019-05-20 | End: 2019-08-07 | Stop reason: SDUPTHER

## 2019-06-03 ENCOUNTER — OFFICE VISIT (OUTPATIENT)
Dept: FAMILY MEDICINE CLINIC | Age: 79
End: 2019-06-03

## 2019-06-03 VITALS
HEART RATE: 80 BPM | HEIGHT: 61 IN | TEMPERATURE: 98.4 F | DIASTOLIC BLOOD PRESSURE: 63 MMHG | RESPIRATION RATE: 16 BRPM | OXYGEN SATURATION: 92 % | BODY MASS INDEX: 36.82 KG/M2 | SYSTOLIC BLOOD PRESSURE: 127 MMHG | WEIGHT: 195 LBS

## 2019-06-03 DIAGNOSIS — M79.18 MYOFASCIAL MUSCLE PAIN: ICD-10-CM

## 2019-06-03 DIAGNOSIS — M12.9 ARTHROPATHY: ICD-10-CM

## 2019-06-03 DIAGNOSIS — M51.36 DEGENERATIVE LUMBAR DISC: ICD-10-CM

## 2019-06-03 DIAGNOSIS — D51.0 PERNICIOUS ANEMIA: Primary | ICD-10-CM

## 2019-06-03 RX ORDER — HYDROCODONE BITARTRATE AND ACETAMINOPHEN 5; 325 MG/1; MG/1
1 TABLET ORAL
Qty: 60 TAB | Refills: 0 | Status: SHIPPED | OUTPATIENT
Start: 2019-06-03 | End: 2020-01-09 | Stop reason: SDUPTHER

## 2019-06-03 RX ORDER — CYANOCOBALAMIN 1000 UG/ML
1000 INJECTION, SOLUTION INTRAMUSCULAR; SUBCUTANEOUS
COMMUNITY
End: 2019-06-28 | Stop reason: SDUPTHER

## 2019-06-03 RX ORDER — CYANOCOBALAMIN 1000 UG/ML
1000 INJECTION, SOLUTION INTRAMUSCULAR; SUBCUTANEOUS ONCE
Qty: 1 ML | Refills: 0
Start: 2019-06-03 | End: 2019-06-03

## 2019-06-03 RX ORDER — GABAPENTIN 300 MG/1
300 CAPSULE ORAL
Qty: 90 CAP | Refills: 1 | Status: SHIPPED | OUTPATIENT
Start: 2019-06-03 | End: 2019-12-30 | Stop reason: SDUPTHER

## 2019-06-03 NOTE — PROGRESS NOTES
1. Have you been to the ER, urgent care clinic, or been hospitalized since your last visit? No     2. Have you seen or consulted any other health care providers outside of the [address] [place] [address] since your last visit?   No     Reviewed record in preparation for visit and have necessary documentation  opportunity was given for questions  Goals that were addressed and/or need to be completed during or after this appointment include   Health Maintenance Due   Topic Date Due    Shingrix Vaccine Age 50> (1 of 2) 06/21/1989    HEMOGLOBIN A1C Q6M  02/20/2019    FOOT EXAM Q1  06/27/2019

## 2019-06-03 NOTE — PROGRESS NOTES
Roslyn Marsh  77 y.o. female  1940 [address]  [id number]     [address]: Progress Note       Encounter Date: 6/3/2019    Chief Complaint   Patient presents with    Injection B12    Medication Evaluation     refill hydrocodone, and talk about increasing gabapentin       History provided by patient  History of Present Illness   Roslyn Marsh is a 77 y.o. female who presents to clinic today for:    Arthritis pain and neuropathy  Patient present with cc of arthritis pain. She currently uses gabapentin as a daily medicaiton with hydrocodone as PRN. She had last fill for hydrocodone in 10/2018. Patient reports that burning pain in legs has been gradually worsening at night. Would like to increase some kind of medication if possible. Review of Systems   Review of Systems   Constitutional: Negative for chills and fever. Cardiovascular: Negative for chest pain and palpitations. Gastrointestinal: Negative for abdominal pain, diarrhea, nausea and vomiting. Musculoskeletal: Positive for back pain and joint pain. Negative for myalgias and neck pain. Skin: Negative for itching and rash. Neurological: Negative for dizziness and headaches. Vitals/Objective:     Vitals:    06/03/19 1259   BP: 127/63   Pulse: 80   Resp: 16   Temp: 98.4 °F (36.9 °C)   TempSrc: Oral   SpO2: 92%   Weight: 195 lb (88.5 kg)   Height: 5' 1\" (1.549 m)     Body mass index is 36.84 kg/m². Wt Readings from Last 3 Encounters:   06/03/19 195 lb (88.5 kg)   04/29/19 198 lb (89.8 kg)   04/26/19 192 lb (87.1 kg)       Physical Exam   Constitutional: She is oriented to person, place, and time. She appears well-developed and well-nourished. Cardiovascular: Normal rate and regular rhythm. Pulmonary/Chest: Effort normal and breath sounds normal.   Neurological: She is alert and oriented to person, place, and time. Skin: Skin is warm and dry.  Capillary refill takes less than 2 seconds. No results found for this or any previous visit (from the past 24 hour(s)). Assessment and Plan:     Encounter Diagnoses     ICD-10-CM ICD-9-CM   1. Pernicious anemia D51.0 281.0   2. Arthropathy M12.9 716.90   3. Myofascial muscle pain M79.18 729.1   4. Degenerative lumbar disc M51.36 722.52       1. Arthropathy  2. Myofascial muscle pain  3. Degenerative lumbar disc  Increase dose of nightly gabapentin for worsening pain.   - HYDROcodone-acetaminophen (NORCO) 5-325 mg per tablet; Take 1 Tab by mouth every six (6) hours as needed for Pain for up to 90 days. Dispense: 60 Tab; Refill: 0  - gabapentin (NEURONTIN) 300 mg capsule; Take 1 Cap by mouth nightly. Take in addition to 600 mg cap at night. Dispense: 90 Cap; Refill: 1    4. Pernicious anemia  - VITAMIN B12 INJECTION  - THER/PROPH/DIAG INJECTION, SUBCUT/IM  - cyanocobalamin (VITAMIN B-12) 1,000 mcg/mL injection; 1 mL by IntraMUSCular route once for 1 dose. Dispense: 1 mL; Refill: 0      I have discussed the diagnosis with the patient and the intended plan as seen in the above orders. she has expressed understanding. The patient has received an after-visit summary and questions were answered concerning future plans. I have discussed medication side effects and warnings with the patient as well. Electronically Signed: Christo Matson MD     History/Allergies   Patients past medical, surgical and family histories were reviewed and updated.     Past Medical History:   Diagnosis Date    Anemia     Arthritis     CAD (coronary artery disease) 1997    MI    Degenerative joint disease of right hip 09/14/2015     Virgil Hernandez/Dr. Antolin Carballo    Diabetes [facility])     Fracture     right shoulder; T6 compression    Gastritis     Hypercholesterolemia     Hyperlipidemia    Lupus ([facility] [address] 1/9/2015    Osteoporosis, post-menopausal     vertebral fracture      Past Surgical History:   Procedure Laterality Date    CARDIAC SURG PROCEDURE UNLIST      Patient Denies    COLONOSCOPY N/A 10/9/2018    COLONOSCOPY performed by Dorothea Philip MD at [address] [facility] HX CHOLECYSTECTOMY      HX CORONARY STENT PLACEMENT  1997    LAD    HX GASTRIC BYPASS      HX HERNIA REPAIR      HX KYPHOPLASTY      t5    HX KYPHOPLASTY      L4    HX ORTHOPAEDIC      bilateral knees    HX ORTHOPAEDIC      r shoulder    HX ORTHOPAEDIC      Laminectomy    AR INC IMPLTJ NEUROSTIMULATOR ELTRD SACRAL NERVE       Family History   Problem Relation Age of Onset    Diabetes Mother     Hypertension Mother     Heart Disease Mother     Diabetes Father     Hypertension Father     Heart Disease Father    [facility] Arthritis-rheumatoid Sister     Elevated Lipids Sister    [facility] Arthritis-rheumatoid Sister     Hypertension Sister     Diabetes Sister     Thyroid Disease Sister     Arthritis-rheumatoid Other      Social History     Tobacco Use    Smoking status: Former Smoker     Last attempt to quit: 1989     Years since quittin.4    Smokeless tobacco: Never Used   Substance Use Topics    Alcohol use: No     Alcohol/week: 0.0 oz     Comment: Occasional    Drug use: No          Allergies   Allergen Reactions    Cephalosporins Nausea and Vomiting     Severe vomiting       Disposition     Follow-up and Dispositions  ·   Return in about 1 month (around 2019) for VitB12 injection. .         Future Appointments   Date Time Provider Yocasta Cisneros   2019 11:00 AM SS INF2 CH3 <4H [facility]   2019  9:30 AM Maty Muñoz MD [facility] SCHED   2019  3:20 PM Lalito Ramirez MD [address] [place] [address]            Current Medications after this visit     Current Outpatient Medications   Medication Sig    HYDROcodone-acetaminophen (NORCO) 5-325 mg per tablet Take 1 Tab by mouth every six (6) hours as needed for Pain for up to 90 days.     cyanocobalamin (VITAMIN B12) 1,000 mcg/mL injection 1,000 mcg by IntraMUSCular route every thirty (30) days.  cyanocobalamin (VITAMIN B-12) 1,000 mcg/mL injection 1 mL by IntraMUSCular route once for 1 dose.  gabapentin (NEURONTIN) 300 mg capsule Take 1 Cap by mouth nightly. Take in addition to 600 mg cap at night.  amLODIPine (NORVASC) 5 mg tablet TAKE 1 TABLET BY MOUTH EVERY DAY    nortriptyline (PAMELOR) 10 mg capsule TAKE 1 CAPSULE BY MOUTH EVERY NIGHT    atorvastatin (LIPITOR) 10 mg tablet Take 1 Tab by mouth daily.  trimethoprim (TRIMPEX) 100 mg tablet Take 1 Tab by mouth daily. Indications: urinary tract infection prevention    ferrous sulfate 325 mg (65 mg iron) tablet Take 1 Tab by mouth two (2) times a day. Indications: anemia from inadequate iron    cyclobenzaprine (FLEXERIL) 10 mg tablet Take 1 Tab by mouth nightly.  metFORMIN (GLUCOPHAGE) 500 mg tablet Take 1 Tab by mouth two (2) times daily (with meals).  gabapentin (NEURONTIN) 600 mg tablet Take 1 Tab by mouth three (3) times daily.  metoprolol succinate (TOPROL-XL) 25 mg XL tablet TAKE 1 TABLET BY MOUTH EVERY DAY    glipiZIDE SR (GLUCOTROL XL) 2.5 mg CR tablet TAKE 1 TABLET BY MOUTH TWICE DAILY    pantoprazole (PROTONIX) 40 mg tablet TAKE 1 TABLET BY MOUTH DAILY    golimumab (SIMPONI ARIA IV) 2 mg/kg by IntraVENous route every two (2) months.  leflunomide (ARAVA) 20 mg tablet Take 1 Tab by mouth daily.  nitroglycerin (NITROSTAT) 0.4 mg SL tablet 1 Tab by SubLINGual route every five (5) minutes as needed for Chest Pain.  denosumab (PROLIA) 60 mg/mL injection 1 mL by SubCUTAneous route every 6 months.  acetaminophen (TYLENOL) 650 mg CR tablet Take 1,300 mg by mouth daily as needed for Pain.  ZINC PO Take 50 mg by mouth daily.  cholecalciferol, vitamin d3, (VITAMIN D3) 1,000 unit tablet Take 2,000 Units by mouth daily.  calcium-vitamin D (CALCIUM 500+D) 500 mg(1,250mg) -200 unit per tablet Take 2 Tabs by mouth daily.  aspirin 81 mg Tab Take 81 mg by mouth daily.     MULTIVITAMINS (MULTIVITAMIN PO) Take 1 Tab by mouth daily.  amLODIPine (NORVASC) 5 mg tablet Take 1 Tab by mouth daily.  glucose blood VI test strips (ASCENSIA CONTOUR) strip Glucometer for 250.00 Pt. test blood sugar once daily (in morning)    Blood-Glucose Meter (CONTOUR METER) monitoring kit Dx: 250. Check blood sugars daily.  Walker [employer] walker. Patient with spinal stenosis and stopped posture. Pain with limited ambulation. No current facility-administered medications for this visit.       Medications Discontinued During This Encounter   Medication Reason    nitrofurantoin, macrocrystal-monohydrate, (MACROBID) 100 mg capsule Therapy Completed    HYDROcodone-acetaminophen (NORCO) 5-325 mg per tablet Reorder

## 2019-06-17 DIAGNOSIS — N18.2 CKD (CHRONIC KIDNEY DISEASE) STAGE 2, GFR 60-89 ML/MIN: ICD-10-CM

## 2019-06-17 DIAGNOSIS — E55.9 VITAMIN D DEFICIENCY: ICD-10-CM

## 2019-06-17 DIAGNOSIS — M81.0 AGE-RELATED OSTEOPOROSIS WITHOUT CURRENT PATHOLOGICAL FRACTURE: ICD-10-CM

## 2019-06-17 RX ORDER — LEFLUNOMIDE 20 MG/1
20 TABLET ORAL DAILY
Qty: 90 TAB | Refills: 0 | Status: SHIPPED | OUTPATIENT
Start: 2019-06-17 | End: 2019-08-07 | Stop reason: SDUPTHER

## 2019-06-17 RX ORDER — GLIPIZIDE 2.5 MG/1
2.5 TABLET, EXTENDED RELEASE ORAL 2 TIMES DAILY
Qty: 180 TAB | Refills: 1 | Status: SHIPPED | OUTPATIENT
Start: 2019-06-17 | End: 2019-09-06 | Stop reason: SDUPTHER

## 2019-06-17 RX ORDER — PANTOPRAZOLE SODIUM 40 MG/1
40 TABLET, DELAYED RELEASE ORAL DAILY
Qty: 90 TAB | Refills: 1 | Status: SHIPPED | OUTPATIENT
Start: 2019-06-17 | End: 2020-01-09 | Stop reason: SDUPTHER

## 2019-06-21 ENCOUNTER — HOSPITAL ENCOUNTER (OUTPATIENT)
Dept: INFUSION THERAPY | Age: 79
Discharge: HOME | End: 2019-06-21
Payer: MEDICARE

## 2019-06-21 VITALS
RESPIRATION RATE: 16 BRPM | HEART RATE: 72 BPM | BODY MASS INDEX: 36.79 KG/M2 | DIASTOLIC BLOOD PRESSURE: 71 MMHG | TEMPERATURE: 97.2 F | WEIGHT: 194.7 LBS | SYSTOLIC BLOOD PRESSURE: 117 MMHG

## 2019-06-21 LAB
ALBUMIN SERPL-MCNC: 3.4 G/DL (ref 3.5–5)
ALBUMIN/GLOB SERPL: 1.1 {RATIO} (ref 1.1–2.2)
ALP SERPL-CCNC: 110 U/L (ref 45–117)
ALT SERPL-CCNC: 33 U/L (ref 12–78)
ANION GAP SERPL CALC-SCNC: 3 MMOL/L (ref 5–15)
AST SERPL-CCNC: 39 U/L (ref 15–37)
BASOPHILS # BLD: 0.1 K/UL (ref 0–0.1)
BASOPHILS NFR BLD: 1 % (ref 0–1)
BILIRUB SERPL-MCNC: 0.5 MG/DL (ref 0.2–1)
BUN SERPL-MCNC: 17 MG/DL (ref 6–20)
BUN/CREAT SERPL: 18 (ref 12–20)
CALCIUM SERPL-MCNC: 7.9 MG/DL (ref 8.5–10.1)
CHLORIDE SERPL-SCNC: 108 MMOL/L (ref 97–108)
CO2 SERPL-SCNC: 29 MMOL/L (ref 21–32)
COMMENT, HOLDF: NORMAL
CREAT SERPL-MCNC: 0.95 MG/DL (ref 0.55–1.02)
CRP SERPL-MCNC: <0.29 MG/DL (ref 0–0.6)
DIFFERENTIAL METHOD BLD: ABNORMAL
EOSINOPHIL # BLD: 0.6 K/UL (ref 0–0.4)
EOSINOPHIL NFR BLD: 7 % (ref 0–7)
ERYTHROCYTE [DISTWIDTH] IN BLOOD BY AUTOMATED COUNT: 12.8 % (ref 11.5–14.5)
ERYTHROCYTE [SEDIMENTATION RATE] IN BLOOD: 11 MM/HR (ref 0–30)
GLOBULIN SER CALC-MCNC: 3.2 G/DL (ref 2–4)
GLUCOSE SERPL-MCNC: 117 MG/DL (ref 65–100)
HCT VFR BLD AUTO: 38.3 % (ref 35–47)
HGB BLD-MCNC: 12.2 G/DL (ref 11.5–16)
IMM GRANULOCYTES # BLD AUTO: 0.1 K/UL (ref 0–0.04)
IMM GRANULOCYTES NFR BLD AUTO: 1 % (ref 0–0.5)
LYMPHOCYTES # BLD: 2.6 K/UL (ref 0.8–3.5)
LYMPHOCYTES NFR BLD: 33 % (ref 12–49)
MCH RBC QN AUTO: 32.3 PG (ref 26–34)
MCHC RBC AUTO-ENTMCNC: 31.9 G/DL (ref 30–36.5)
MCV RBC AUTO: 101.3 FL (ref 80–99)
MONOCYTES # BLD: 0.7 K/UL (ref 0–1)
MONOCYTES NFR BLD: 9 % (ref 5–13)
NEUTS SEG # BLD: 3.9 K/UL (ref 1.8–8)
NEUTS SEG NFR BLD: 49 % (ref 32–75)
NRBC # BLD: 0 K/UL (ref 0–0.01)
NRBC BLD-RTO: 0 PER 100 WBC
PLATELET # BLD AUTO: 183 K/UL (ref 150–400)
PMV BLD AUTO: 11 FL (ref 8.9–12.9)
POTASSIUM SERPL-SCNC: 4.2 MMOL/L (ref 3.5–5.1)
PROT SERPL-MCNC: 6.6 G/DL (ref 6.4–8.2)
RBC # BLD AUTO: 3.78 M/UL (ref 3.8–5.2)
SAMPLES BEING HELD,HOLD: NORMAL
SODIUM SERPL-SCNC: 140 MMOL/L (ref 136–145)
WBC # BLD AUTO: 8 K/UL (ref 3.6–11)

## 2019-06-21 PROCEDURE — 74011250636 HC RX REV CODE- 250/636

## 2019-06-21 PROCEDURE — 36415 COLL VENOUS BLD VENIPUNCTURE: CPT

## 2019-06-21 PROCEDURE — 74011000258 HC RX REV CODE- 258: Performed by: INTERNAL MEDICINE

## 2019-06-21 PROCEDURE — 85025 COMPLETE CBC W/AUTO DIFF WBC: CPT

## 2019-06-21 PROCEDURE — 86140 C-REACTIVE PROTEIN: CPT

## 2019-06-21 PROCEDURE — 80053 COMPREHEN METABOLIC PANEL: CPT

## 2019-06-21 PROCEDURE — 96413 CHEMO IV INFUSION 1 HR: CPT

## 2019-06-21 PROCEDURE — 85652 RBC SED RATE AUTOMATED: CPT

## 2019-06-21 PROCEDURE — 74011250636 HC RX REV CODE- 250/636: Performed by: INTERNAL MEDICINE

## 2019-06-21 RX ADMIN — GOLIMUMAB 175 MG: 50 SOLUTION INTRAVENOUS at 11:41

## 2019-06-21 NOTE — PROGRESS NOTES
Outpatient Infusion Center Progress Note    7560 Pt admit to [facility] for Simponi [id number] ambulatory in stable condition. Assessment completed. No new concerns voiced. Patient reports no active infections or taking any antibiotics at this time. 24G PIV placed in left AC with positive blood return noted. Labs drawn and sent. Patient Vitals for the past 12 hrs:   Temp Pulse Resp BP   06/21/19 1211 97.2 °F (36.2 °C) 72 16 117/71   06/21/19 1043 97.1 °F (36.2 °C) 88 16 119/67     Recent Results (from the past 12 hour(s))   CBC WITH AUTOMATED DIFF    Collection Time: 06/21/19 10:54 AM   Result Value Ref Range    WBC 8.0 3.6 - 11.0 K/uL    RBC 3.78 (L) 3.80 - 5.20 M/uL    HGB 12.2 11.5 - 16.0 g/dL    HCT 38.3 35.0 - 47.0 %    .3 (H) 80.0 - 99.0 FL    MCH 32.3 26.0 - 34.0 PG    MCHC 31.9 30.0 - 36.5 g/dL    RDW 12.8 11.5 - 14.5 %    PLATELET 952 935 - 845 K/uL    MPV 11.0 8.9 - 12.9 FL    NRBC 0.0 0  WBC    ABSOLUTE NRBC 0.00 0.00 - 0.01 K/uL    NEUTROPHILS 49 32 - 75 %    LYMPHOCYTES 33 12 - 49 %    MONOCYTES 9 5 - 13 %    EOSINOPHILS 7 0 - 7 %    BASOPHILS 1 0 - 1 %    IMMATURE GRANULOCYTES 1 (H) 0.0 - 0.5 %    ABS. NEUTROPHILS 3.9 1.8 - 8.0 K/UL    ABS. LYMPHOCYTES 2.6 0.8 - 3.5 K/UL    ABS. MONOCYTES 0.7 0.0 - 1.0 K/UL    ABS. EOSINOPHILS 0.6 (H) 0.0 - 0.4 K/UL    ABS. BASOPHILS 0.1 0.0 - 0.1 K/UL    ABS. IMM.  GRANS. 0.1 (H) 0.00 - 0.04 K/UL    DF AUTOMATED     METABOLIC PANEL, COMPREHENSIVE    Collection Time: 06/21/19 10:54 AM   Result Value Ref Range    Sodium 140 136 - 145 mmol/L    Potassium 4.2 3.5 - 5.1 mmol/L    Chloride 108 97 - 108 mmol/L    CO2 29 21 - 32 mmol/L    Anion gap 3 (L) 5 - 15 mmol/L    Glucose 117 (H) 65 - 100 mg/dL    BUN 17 6 - 20 MG/DL    Creatinine 0.95 0.55 - 1.02 MG/DL    BUN/Creatinine ratio 18 12 - 20      GFR est AA >60 >60 ml/min/1.73m2    GFR est non-AA 57 (L) >60 ml/min/1.73m2    Calcium 7.9 (L) 8.5 - 10.1 MG/DL    Bilirubin, total 0.5 0.2 - 1.0 MG/DL    ALT (SGPT) 33 12 - 78 U/L    AST (SGOT) 39 (H) 15 - 37 U/L    Alk. phosphatase 110 45 - 117 U/L    Protein, total 6.6 6.4 - 8.2 g/dL    Albumin 3.4 (L) 3.5 - 5.0 g/dL    Globulin 3.2 2.0 - 4.0 g/dL    A-G Ratio 1.1 1.1 - 2.2     SED RATE (ESR)    Collection Time: 06/21/19 10:54 AM   Result Value Ref Range    Sed rate, automated 11 0 - 30 mm/hr   C REACTIVE PROTEIN, QT    Collection Time: 06/21/19 10:54 AM   Result Value Ref Range    C-Reactive protein <0.29 0.00 - 0.60 mg/dL   SAMPLES BEING HELD    Collection Time: 06/21/19 10:54 AM   Result Value Ref Range    SAMPLES BEING HELD 1PST     COMMENT        Add-on orders for these samples will be processed based on acceptable specimen integrity and analyte stability, which may vary by analyte. Medications:  Simponi IVPB      1215  Pt tolerated treatment well. PIV removed and pressure dressing applied. D/c home ambulatory in no distress.  Pt aware of next appointment scheduled for 8/16/19 at 11am.

## 2019-06-28 ENCOUNTER — OFFICE VISIT (OUTPATIENT)
Dept: FAMILY MEDICINE CLINIC | Age: 79
End: 2019-06-28

## 2019-06-28 VITALS
DIASTOLIC BLOOD PRESSURE: 55 MMHG | BODY MASS INDEX: 36.82 KG/M2 | RESPIRATION RATE: 16 BRPM | SYSTOLIC BLOOD PRESSURE: 114 MMHG | TEMPERATURE: 98.6 F | HEIGHT: 61 IN | HEART RATE: 82 BPM | OXYGEN SATURATION: 94 % | WEIGHT: 195 LBS

## 2019-06-28 DIAGNOSIS — E78.2 MIXED HYPERLIPIDEMIA: ICD-10-CM

## 2019-06-28 DIAGNOSIS — D51.0 PERNICIOUS ANEMIA: Primary | ICD-10-CM

## 2019-06-28 PROBLEM — Z96.659 H/O TOTAL KNEE REPLACEMENT: Status: RESOLVED | Noted: 2017-08-18 | Resolved: 2019-06-28

## 2019-06-28 RX ORDER — SYRINGE-NEEDLE,INSULIN,0.5 ML 30 GX5/16"
1 SYRINGE, EMPTY DISPOSABLE MISCELLANEOUS
Qty: 10 SYRINGE | Refills: 1 | Status: SHIPPED | OUTPATIENT
Start: 2019-06-28 | End: 2019-08-07

## 2019-06-28 RX ORDER — PHENAZOPYRIDINE HYDROCHLORIDE 100 MG/1
TABLET, FILM COATED ORAL
COMMUNITY
Start: 2019-06-27 | End: 2019-06-29

## 2019-06-28 RX ORDER — ATORVASTATIN CALCIUM 40 MG/1
40 TABLET, FILM COATED ORAL DAILY
COMMUNITY
Start: 2019-06-28 | End: 2020-04-13 | Stop reason: SDUPTHER

## 2019-06-28 RX ORDER — CYANOCOBALAMIN 1000 UG/ML
1000 INJECTION, SOLUTION INTRAMUSCULAR; SUBCUTANEOUS
Qty: 10 ML | Refills: 1 | Status: SHIPPED | OUTPATIENT
Start: 2019-06-28 | End: 2020-11-16

## 2019-06-28 RX ORDER — NITROFURANTOIN 25; 75 MG/1; MG/1
CAPSULE ORAL
COMMUNITY
Start: 2019-06-27 | End: 2019-07-07

## 2019-06-28 RX ORDER — CYANOCOBALAMIN 1000 UG/ML
1000 INJECTION, SOLUTION INTRAMUSCULAR; SUBCUTANEOUS ONCE
Qty: 1 ML | Refills: 0
Start: 2019-06-28 | End: 2019-06-28

## 2019-06-28 NOTE — PATIENT INSTRUCTIONS
Pernicious Anemia: Care Instructions  Your Care Instructions    Pernicious anemia means that you do not have enough red blood cells. It happens when your body can't absorb vitamin B12 from food. Your body needs vitamin B12 to make red blood cells. Red blood cells carry oxygen around your body. Vitamin B12 also helps your nerves work well. Your doctor can treat this problem with vitamin B12 shots. You may also take vitamin B12 by pill or nasal spray. With treatment, most anemia gets better in a few days. But if you have severe anemia, you may need a blood transfusion to give you red blood cells as quickly as possible. Follow-up care is a key part of your treatment and safety. Be sure to make and go to all appointments, and call your doctor if you are having problems. It's also a good idea to know your test results and keep a list of the medicines you take. How can you care for yourself at home? · Be safe with medicines. Take your medicines exactly as prescribed. Call your doctor if you think you are having a problem with your medicine. · Follow your doctor's instructions about vitamin B12 shots, vitamin B12 pills, or a vitamin B12 nasal spray. · Eat a varied diet. Include foods with a lot of vitamin B12, such as eggs, milk, and meat. · Do not drink alcohol while you are being treated. Alcohol can prevent the body from absorbing vitamin B12. · Eat foods that have folate (also called folic acid). This is another type of B vitamin. Foods with folate include leafy green vegetables, citrus fruits, and fortified cereals. When should you call for help? Call 911 anytime you think you may need emergency care.  For example, call if:    · You passed out (lost consciousness).    Call your doctor now or seek immediate medical care if:    · You are dizzy or light-headed, or you feel like you may faint.     · You have new or worse bleeding.     · You are short of breath.    Watch closely for changes in your health, and be sure to contact your doctor if:    · You have numbness or tingling in your hands or feet.     · You feel weaker or more tired.     · You have trouble with balance or coordination.     · You do not get better as expected. Where can you learn more? Go to http://jennifer-vincent.info/. Enter L076 in the search box to learn more about \"Pernicious Anemia: Care Instructions. \"  Current as of: May 6, 2018  Content Version: 11.9  © 6583-2697 [employer]. Care instructions adapted under license by [employer] (which disclaims liability or warranty for this information). If you have questions about a medical condition or this instruction, always ask your healthcare professional. [address] any warranty or liability for your use of this information.

## 2019-06-28 NOTE — PROGRESS NOTES
Tim Hernandez  [de-identified] y.o. female  1940  [id number]    [facility]  Progress Note     Encounter Date: 6/28/2019    Assessment and Plan:     Encounter Diagnoses     ICD-10-CM ICD-9-CM   1. Pernicious anemia D51.0 281.0   2. Mixed hyperlipidemia E78.2 272.2       1. Pernicious anemia  - cyanocobalamin (VITAMIN B12) 1,000 mcg/mL injection; 1 mL by IntraMUSCular route every thirty (30) days. Indications: Anemia caused by Vitamin B12 Deficiency-Pernicious Anemia  Dispense: 10 mL; Refill: 1  - VITAMIN B12 INJECTION  - THER/PROPH/DIAG INJECTION, SUBCUT/IM  - cyanocobalamin (VITAMIN B-12) 1,000 mcg/mL injection; 1 mL by IntraMUSCular route once for 1 dose. Dispense: 1 mL; Refill: 0  - Insulin Syringe-Needle U-100 1 mL 30 gauge x 5/16 syrg; 1 Each by Does Not Apply route every thirty (30) days for 12 doses. To be used for Vitamin B12 injection. Dispense: 10 Syringe; Refill: 1    2. Mixed hyperlipidemia  - atorvastatin (LIPITOR) 40 mg tablet; Take 1 Tab by mouth daily. Prescribed and managed by Dr. Nusrat Valenzuela. I have discussed the diagnosis with the patient and the intended plan as seen in the above orders. she has expressed understanding. The patient has received an after-visit summary and questions were answered concerning future plans. I have discussed medication side effects and warnings with the patient as well.     Electronically Signed: Selwyn Reynoso MD      Chief Complaint   Patient presents with    Diabetes    Cholesterol Problem       History provided by patient  History of Present Illness   Tim Hernandez is a [de-identified] y.o. female who presents to clinic today for:    Diabetes Follow up: Controlled   Overall the patient feels her dietary compliance isGood   Diabetic Consultants:Endocrinologist - N/A   Last HbA1c:   Lab Results   Component Value Date/Time    Hemoglobin A1c 5.8 (H) 08/20/2018 10:48 AM    Hemoglobin A1c (POC) 6.1 06/24/2015 04:12 PM         Health Maintenance  Patient to have blood drawn in 5 weeks with lab work from cardiologist.   Health Maintenance Due   Topic Date Due    Shingrix Vaccine Age 50> (1 of 2) 06/21/1989    HEMOGLOBIN A1C Q6M  02/20/2019    FOOT EXAM Q1  06/27/2019     Review of Systems   Review of Systems   Constitutional: Negative for chills and fever. Cardiovascular: Negative for chest pain and palpitations. Genitourinary: Negative for dysuria and urgency. Skin: Negative for rash. Vitals/Objective:     Vitals:    06/28/19 0906   BP: 114/55   Pulse: 82   Resp: 16   Temp: 98.6 °F (37 °C)   TempSrc: Oral   SpO2: 94%   Weight: 195 lb (88.5 kg)   Height: 5' 1\" (1.549 m)     Body mass index is 36.84 kg/m². Wt Readings from Last 3 Encounters:   06/28/19 195 lb (88.5 kg)   06/21/19 194 lb 11.2 oz (88.3 kg)   06/03/19 195 lb (88.5 kg)       Physical Exam   Constitutional: She is oriented to person, place, and time. She appears well-developed and well-nourished. HENT:   Head: Normocephalic and atraumatic. Right Ear: External ear normal.   Left Ear: External ear normal.   Cardiovascular: Normal rate and regular rhythm. Pulmonary/Chest: Effort normal and breath sounds normal.   Neurological: She is alert and oriented to person, place, and time. Skin: Skin is warm. No rash noted. No erythema. No results found for this or any previous visit (from the past 24 hour(s)). Disposition     Follow-up and Dispositions  ·   Return in about 4 months (around 10/28/2019) for Routine (Chronic Conditions).        Future Appointments   Date Time Provider Yocasta Burgeri   8/7/2019  3:20 PM Alix Bird MD [facility]   8/16/2019 11:00 AM SS INF2 CH3 <4H [facility]y   8/29/2019  3:10 PM Hebert Chandler MD [address]   10/11/2019 11:00 AM SS INF2 CH3 <4H [facility]   12/6/2019 11:00 AM SS INF2 CH3 <4H [facility]       Current Medications after this visit     Current Outpatient Medications Medication Sig    nitrofurantoin, macrocrystal-monohydrate, (MACROBID) 100 mg capsule Macrobid 100 mg capsule   Take 1 capsule every 12 hours by oral route for 10 days.  phenazopyridine (PYRIDIUM) 100 mg tablet Pyridium 100 mg tablet   Take 1 tablet 3 times a day by oral route for 2 days.  cyanocobalamin (VITAMIN B12) 1,000 mcg/mL injection 1 mL by IntraMUSCular route every thirty (30) days. Indications: Anemia caused by Vitamin B12 Deficiency-Pernicious Anemia    atorvastatin (LIPITOR) 40 mg tablet Take 1 Tab by mouth daily. Prescribed and managed by Dr. Fernando Garcia.  cyanocobalamin (VITAMIN B-12) 1,000 mcg/mL injection 1 mL by IntraMUSCular route once for 1 dose.  Insulin Syringe-Needle U-100 1 mL 30 gauge x 5/16 syrg 1 Each by Does Not Apply route every thirty (30) days for 12 doses. To be used for Vitamin B12 injection.  pantoprazole (PROTONIX) 40 mg tablet Take 1 Tab by mouth daily.  glipiZIDE SR (GLUCOTROL XL) 2.5 mg CR tablet Take 1 Tab by mouth two (2) times a day. Indications: type 2 diabetes mellitus    leflunomide (ARAVA) 20 mg tablet Take 1 Tab by mouth daily.  HYDROcodone-acetaminophen (NORCO) 5-325 mg per tablet Take 1 Tab by mouth every six (6) hours as needed for Pain for up to 90 days.  gabapentin (NEURONTIN) 300 mg capsule Take 1 Cap by mouth nightly. Take in addition to 600 mg cap at night.  amLODIPine (NORVASC) 5 mg tablet TAKE 1 TABLET BY MOUTH EVERY DAY    nortriptyline (PAMELOR) 10 mg capsule TAKE 1 CAPSULE BY MOUTH EVERY NIGHT    trimethoprim (TRIMPEX) 100 mg tablet Take 1 Tab by mouth daily. Indications: urinary tract infection prevention    ferrous sulfate 325 mg (65 mg iron) tablet Take 1 Tab by mouth two (2) times a day. Indications: anemia from inadequate iron    cyclobenzaprine (FLEXERIL) 10 mg tablet Take 1 Tab by mouth nightly.  metFORMIN (GLUCOPHAGE) 500 mg tablet Take 1 Tab by mouth two (2) times daily (with meals).     gabapentin (NEURONTIN) 600 mg tablet Take 1 Tab by mouth three (3) times daily.  metoprolol succinate (TOPROL-XL) 25 mg XL tablet TAKE 1 TABLET BY MOUTH EVERY DAY    golimumab (SIMPONI ARIA IV) 2 mg/kg by IntraVENous route every two (2) months.  nitroglycerin (NITROSTAT) 0.4 mg SL tablet 1 Tab by SubLINGual route every five (5) minutes as needed for Chest Pain.  denosumab (PROLIA) 60 mg/mL injection 1 mL by SubCUTAneous route every 6 months.  acetaminophen (TYLENOL) 650 mg CR tablet Take 1,300 mg by mouth daily as needed for Pain.  ZINC PO Take 50 mg by mouth daily.  cholecalciferol, vitamin d3, (VITAMIN D3) 1,000 unit tablet Take 2,000 Units by mouth daily.  calcium-vitamin D (CALCIUM 500+D) 500 mg(1,250mg) -200 unit per tablet Take 2 Tabs by mouth daily.  aspirin 81 mg Tab Take 81 mg by mouth daily.  MULTIVITAMINS (MULTIVITAMIN PO) Take 1 Tab by mouth daily.  amLODIPine (NORVASC) 5 mg tablet Take 1 Tab by mouth daily.  glucose blood VI test strips (ASCENSIA CONTOUR) strip Glucometer for 250.00 Pt. test blood sugar once daily (in morning)    Blood-Glucose Meter (CONTOUR METER) monitoring kit Dx: 250. Check blood sugars daily.  Walker [employer] walker. Patient with spinal stenosis and stopped posture. Pain with limited ambulation. No current facility-administered medications for this visit.       Medications Discontinued During This Encounter   Medication Reason    cyanocobalamin (VITAMIN B12) 1,000 mcg/mL injection Reorder    atorvastatin (LIPITOR) 10 mg tablet

## 2019-06-28 NOTE — PROGRESS NOTES
1. Have you been to the ER, urgent care clinic, or been hospitalized since your last visit? [facility] 6/27/2019 UTI    2. Have you seen or consulted any other health care providers outside of the [address] [place] [address] since your last visit?   No     Reviewed record in preparation for visit and have necessary documentation  opportunity was given for questions  Goals that were addressed and/or need to be completed during or after this appointment include   Health Maintenance Due   Topic Date Due    Shingrix Vaccine Age 50> (1 of 2) 06/21/1989    HEMOGLOBIN A1C Q6M  02/20/2019    FOOT EXAM Q1  06/27/2019

## 2019-08-07 ENCOUNTER — OFFICE VISIT (OUTPATIENT)
Dept: RHEUMATOLOGY | Age: 79
End: 2019-08-07

## 2019-08-07 VITALS
DIASTOLIC BLOOD PRESSURE: 62 MMHG | HEIGHT: 61 IN | SYSTOLIC BLOOD PRESSURE: 116 MMHG | TEMPERATURE: 97.7 F | HEART RATE: 63 BPM | RESPIRATION RATE: 18 BRPM | WEIGHT: 190 LBS | BODY MASS INDEX: 35.87 KG/M2

## 2019-08-07 DIAGNOSIS — N18.2 CKD (CHRONIC KIDNEY DISEASE) STAGE 2, GFR 60-89 ML/MIN: ICD-10-CM

## 2019-08-07 DIAGNOSIS — M81.0 AGE-RELATED OSTEOPOROSIS WITHOUT CURRENT PATHOLOGICAL FRACTURE: ICD-10-CM

## 2019-08-07 DIAGNOSIS — Z78.0 POST-MENOPAUSAL: ICD-10-CM

## 2019-08-07 DIAGNOSIS — I73.00 PRIMARY RAYNAUD'S PHENOMENON: ICD-10-CM

## 2019-08-07 DIAGNOSIS — E55.9 VITAMIN D DEFICIENCY: ICD-10-CM

## 2019-08-07 DIAGNOSIS — M06.09 SERONEGATIVE RHEUMATOID ARTHRITIS OF MULTIPLE SITES (HCC): Primary | ICD-10-CM

## 2019-08-07 DIAGNOSIS — Z79.60 LONG-TERM USE OF IMMUNOSUPPRESSANT MEDICATION: ICD-10-CM

## 2019-08-07 RX ORDER — AMLODIPINE BESYLATE 5 MG/1
TABLET ORAL
Qty: 90 TAB | Refills: 0 | Status: SHIPPED | OUTPATIENT
Start: 2019-08-07 | End: 2019-11-11 | Stop reason: SDUPTHER

## 2019-08-07 RX ORDER — LEFLUNOMIDE 20 MG/1
20 TABLET ORAL DAILY
Qty: 90 TAB | Refills: 0 | Status: SHIPPED | OUTPATIENT
Start: 2019-08-07 | End: 2020-02-12 | Stop reason: SDUPTHER

## 2019-08-07 NOTE — LETTER
8/7/19 Patient: Jennifer Hernandez YOB: 1940 Date of Visit: 8/7/2019 Lucretia Larson MD 
[address] [id number] VIA In Basket Dear Lucretia Larson MD, Thank you for referring Ms. Leann Carmona to [address] [place] [address] for evaluation. My notes for this consultation are attached. If you have questions, please do not hesitate to call me. I look forward to following your patient along with you.  
 
 
Sincerely, 
 
Elissa Hubbard MD

## 2019-08-07 NOTE — PROGRESS NOTES
Chief Complaint   Patient presents with    Arthritis     1. Have you been to the ER, urgent care clinic since your last visit? Hospitalized since your last visit? Yes [facility] for bronchitis    2. Have you seen or consulted any other health care providers outside of the [address] [place] [address] since your last visit? Include any pap smears or colon screening.  No

## 2019-08-07 NOTE — PROGRESS NOTES
REASON FOR VISIT    This is a follow-up visit for Ms. Rueda for Seronegative Erosive Rheumatoid Arthritis and Age-Related Osteoporosis. Inflammatory arthritis phenotype includes:  Anti-CCP positive: no  Rheumatoid factor positive: no  Erosive disease: yes  Extra-articular manifestations include: Raynaud's    Immunosuppression Screening (2/20/2019):   Quantiferon TB: negative   PPD:  Not performed  Hepatitis B: negative    Hepatitis C: negative     Therapy History includes:  Current DMARD therapy include: leflunomide 20 mg daily (2/13/2018 to present), Simponi Aria every 8 weeks (12/07/2018 to present)  Prior DMARD therapy include: methotrexate 12.5 mg subcutaneous, hydroxychloroquine 300 mg daily, Humira (5/10/2018: 3 samples: cost)  Discontinued DMARDs because of inefficacy: None  Discontinued DMARDs because of side effects: methotrexate (elevated LFTs), leflunomide (elevated LFTs)  Contra-Indicated DMARDs because of chronic kidney disease: methotrexate     Osteoporosis Historical Synopsis    Height loss since age 26 (at least two inches): 5  Fracture history includes: yes (T5-T6)  Family history of hip fracture: no  Fall Risk: no    Daily calcium intake is 1800 mg  Daily vitamin D intake is 2000 IU    Smoking history: no  Alcohol consumption: no  Prednisone history: no    Exercise: no    Previous work-up for osteoporosis includes the following:  DEXA Scan: 8/08/2017  Vitamin 25OH D level: 46.6 (21/3/2018)  PTH: 21 (21/3/2018)  TSH: 2.570 (21/3/2018)    Therapy History includes:  Current osteoporosis therapy includes: Prolia (3/29/2018 to present)  Prior osteoporosis therapy includes: Reclast (2 years)  The following osteoporosis therapy have been ineffective: Reclast  The following osteoporosis therapy were stopped because of side effects: none    Immunizations:   Immunization History   Administered Date(s) Administered    (RETIRED) Pneumococcal Vaccine (Unspecified Type) 06/05/2008    Influenza High Dose Vaccine PF 08/05/2016, 09/13/2017    Influenza Vaccine 12/05/2013, 10/01/2018    Influenza Vaccine (Quad) 09/06/2016    Influenza Vaccine (Quad) PF 11/10/2014    Influenza Vaccine (Tri) Adjuvanted 09/27/2018    Influenza Vaccine PF 10/20/2015    Influenza Vaccine Split 10/25/2010, 11/14/2011, 10/10/2012    Pneumococcal Conjugate (PCV-13) 09/13/2017    Pneumococcal Polysaccharide (PPSV-23) 12/05/2013    Tdap 12/05/2013     Active problems include:    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    Long-term use of immunosuppressant medication Z79.899    Long-term use of Plaquenil Z79.899    Hypocalcemia E83.51    Low back pain M54.5    Age-related osteoporosis without current pathological fracture M81.0    Diabetic polyneuropathy ([facility] [facility] E11.42    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 Garfield Shirley returns for a follow-up. On her last visit, I continued leflunomide 20 mg daily and Simponi Aria infusions with good tolerance. Her most recent infusion was 6/21/2019.  Labs on 2/20/2019 noted elevated liver function tests so I held leflunomide but she did not review her labs on MyChart, so had continued it. She has breakthrough around 2 weeks before her dose. Today, she feels better. She reports intermittent pain in her hands in the morning associated with swelling and stiffness lasting 30 minutes that improves with use. She uses Advil at times with some relief. Warm water helps. Cold makes it worse. Amlodipine helps her Raynaud's. She had her Prolia on 9/27/2018 with good tolerance. She denies interval falls or fractures. She endorses ankle swelling, chronic history easy bruising. She denies fever, weight loss, blurred vision, vision loss, oral ulcers, dry cough, dyspnea, nausea, vomiting, dysphagia, abdominal pain, black or bloody stool, fall since last visit, rash and increased thirst.    Ms. Claudette Witt has continued her medications for arthritis and reports good tolerance without significant side effects. Last toxicity monitoring by blood work was done on 6/21/2019 and did not reveal any significant adverse effects, except eGFR 57, AST 39, albumin 3.4. Most recent inflammatory markers from 6/21/2019 revealed a ESR 11 mm/hr (previously 3, 2, 2, 3, 4 mm/hr) and CRP 0.29 mg/L (previously 0.3, 0.3, 0.3, 0.5, 0.3 mg/L). The patient has not had any interval hospital admissions or surgeries. She had bronchitis recently. She saw her GI recently for retrosternal chest pain radiating to her throat. She saw her cardiologist who ordered a nuclear stress test which was normal    REVIEW OF SYSTEMS    A comprehensive review of systems was performed and pertinent results are documented in the HPI, review of systems is otherwise non-contributory.     PAST MEDICAL HISTORY    She has a past medical history of Anemia, Arthritis, CAD (coronary artery disease) (1997), Degenerative joint disease of right hip (09/14/2015 ), Diabetes ([facility] Fracture, Gastritis, H/O Bell's palsy (7/9/2012), Hypercholesterolemia, Lupus ([address] (1/9/2015), and Osteoporosis, post-menopausal. She also has no past medical history of Abuse, Anemia NEC, Arrhythmia, Asthma, Calculus of kidney, Cancer ([facility] Chronic kidney disease, Chronic obstructive pulmonary disease ([address] Chronic pain, Congestive heart failure, unspecified, Contact dermatitis and other eczema, due to unspecified cause, COPD, Depression, GERD (gastroesophageal reflux disease), Headache(784.0), Liver disease, Malignant hyperthermia due to anesthesia, Psychotic disorder ([facility] PUD (peptic ulcer disease), Seizures ([facility] [facility] Sleep apnea, Stroke ([facility] Thromboembolus ([facility] Thyroid disease, or Trauma. FAMILY HISTORY    Her family history includes Arthritis-rheumatoid in her sister, sister and another family member; Diabetes in her father, mother, and sister; Elevated Lipids in her sister; Heart Disease in her father and mother; Hypertension in her father, mother, and sister; Thyroid Disease in her sister. SOCIAL HISTORY    She reports that she quit smoking about 29 years ago. She has never used smokeless tobacco. She reports that she does not drink alcohol or use drugs. MEDICATIONS    Current Outpatient Medications   Medication Sig Dispense Refill    leflunomide (ARAVA) 20 mg tablet Take 1 Tab by mouth daily. 90 Tab 0    amLODIPine (NORVASC) 5 mg tablet TAKE 1 TABLET BY MOUTH EVERY DAY 90 Tab 0    cyanocobalamin (VITAMIN B12) 1,000 mcg/mL injection 1 mL by IntraMUSCular route every thirty (30) days. Indications: Anemia caused by Vitamin B12 Deficiency-Pernicious Anemia 10 mL 1    atorvastatin (LIPITOR) 40 mg tablet Take 1 Tab by mouth daily. Prescribed and managed by Dr. Candido eNw.  pantoprazole (PROTONIX) 40 mg tablet Take 1 Tab by mouth daily. 90 Tab 1    glipiZIDE SR (GLUCOTROL XL) 2.5 mg CR tablet Take 1 Tab by mouth two (2) times a day. Indications: type 2 diabetes mellitus 180 Tab 1    HYDROcodone-acetaminophen (NORCO) 5-325 mg per tablet Take 1 Tab by mouth every six (6) hours as needed for Pain for up to 90 days.  60 Tab 0    gabapentin (NEURONTIN) 300 mg capsule Take 1 Cap by mouth nightly. Take in addition to 600 mg cap at night. 90 Cap 1    nortriptyline (PAMELOR) 10 mg capsule TAKE 1 CAPSULE BY MOUTH EVERY NIGHT 30 Cap 5    trimethoprim (TRIMPEX) 100 mg tablet Take 1 Tab by mouth daily. Indications: urinary tract infection prevention 30 Tab 5    ferrous sulfate 325 mg (65 mg iron) tablet Take 1 Tab by mouth two (2) times a day. Indications: anemia from inadequate iron 60 Tab 5    cyclobenzaprine (FLEXERIL) 10 mg tablet Take 1 Tab by mouth nightly. 90 Tab 0    metFORMIN (GLUCOPHAGE) 500 mg tablet Take 1 Tab by mouth two (2) times daily (with meals). 180 Tab 1    gabapentin (NEURONTIN) 600 mg tablet Take 1 Tab by mouth three (3) times daily. 90 Tab 5    metoprolol succinate (TOPROL-XL) 25 mg XL tablet TAKE 1 TABLET BY MOUTH EVERY DAY 90 Tab 1    golimumab (SIMPONI ARIA IV) 2 mg/kg by IntraVENous route every two (2) months.  glucose blood VI test strips ([employer] CONTOUR) strip Glucometer for 250.00 Pt. test blood sugar once daily (in morning) 50 Strip 11    nitroglycerin (NITROSTAT) 0.4 mg SL tablet 1 Tab by SubLINGual route every five (5) minutes as needed for Chest Pain. 4 Tab 0    acetaminophen (TYLENOL) 650 mg CR tablet Take 1,300 mg by mouth daily as needed for Pain.  Blood-Glucose Meter (CONTOUR METER) monitoring kit Dx: 250. Check blood sugars daily. 1 kit 11    Walker Misc Rolling walker. Patient with spinal stenosis and stopped posture. Pain with limited ambulation. 1 Each 0    ZINC PO Take 50 mg by mouth daily.  cholecalciferol, vitamin d3, (VITAMIN D3) 1,000 unit tablet Take 2,000 Units by mouth daily.  calcium-vitamin D (CALCIUM 500+D) 500 mg(1,250mg) -200 unit per tablet Take 2 Tabs by mouth daily.  aspirin 81 mg Tab Take 81 mg by mouth daily.  MULTIVITAMINS (MULTIVITAMIN PO) Take 1 Tab by mouth daily.  denosumab (PROLIA) 60 mg/mL injection 1 mL by SubCUTAneous route every 6 months. ALLERGIES    Allergies   Allergen Reactions    Cephalosporins Nausea and Vomiting     Severe vomiting       PHYSICAL EXAMINATION    Visit Vitals  /62   Pulse 63   Temp 97.7 °F (36.5 °C)   Resp 18   Ht 5' 1\" (1.549 m)   Wt 190 lb (86.2 kg)   BMI 35.90 kg/m²     Body mass index is 35.9 kg/m². General: Patient is alert, oriented x 3, not in acute distress    HEENT:   Sclerae are not injected and appear moist.  There is no alopecia. Neck is supple     Cardiovascular:  Heart is regular rate and rhythm, no murmurs. Chest:  Lungs are clear to auscultation bilaterally. No rhonchi, wheezes, or crackles. Extremities:  Free of clubbing, cyanosis +1 nonpitting lower extremity edema with tenderness on the RLE    Neurological exam:  Muscle strength is full in upper and lower extremities     Skin exam:    Psoriasis:     no  Nail Pitting:     no  Onycholysis:     no  Palmoplantar pustulosis:   no  Acne fulminans:    no  Acne conglobata:    no  Hidradenitis Suppurativa:   no  Dissecting cellulitis of the scalp:  no  Pilonidal sinus:    no  Erythema nodosum:    no  Non-Scarring Alopecia:  no  Discoid Lupus:   no  Subacute Cutaneous Lupus:   no  Heliotrope Rash:   no  Upper Arm Erythema:   no  Shawl Sign:    no  V-sign:     no  Holster sign:    no  Gottron's papules:   no  Gottron's sign:    no  Calcinosis:    no  Raynaud's Phenomenon:  no  's Hands:   no  Periungual erythema:   no  Abnormal Nailfold Capillaries:  no  Livedo Reticularis:   no  Scalp Erythema:   no  Facial Rosacea: Yes    Musculoskeletal:  A comprehensive musculoskeletal exam was performed for all joints of each upper and lower extremity and assessed for swelling, tenderness and range of motion.       Bilateral Sharla and Heberden nods  Bilateral knee crepitus without effusion    Joint Count 8/7/2019 2/20/2019 11/20/2018 8/20/2018 5/10/2018 3/15/2018 2/13/2018   Patient pain (0-100) 50 50 80 80 25 10 -   MHAQ 0.125 0.125 0 0.125 0.25 0 - Left wrist- Tender - - 1 1 1 - 1   Left wrist- Swollen - - 1 1 1 - 1   Left 1st MCP - Tender - 1 1 1 - - 1   Left 1st MCP - Swollen - 1 1 1 - - 1   Left 2nd MCP - Tender - 0 0 1 1 1 1   Left 2nd MCP - Swollen - 1 1 1 1 1 1   Left 3rd MCP - Tender 1 1 1 1 1 1 1   Left 3rd MCP - Swollen 0 1 1 1 1 1 1   Left 4th MCP - Tender - 1 1 1 1 1 1   Left 4th MCP - Swollen - 0 0 1 - - 1   Left 5th MCP - Tender 1 1 0 - - - -   Left 5th MCP - Swollen 0 1 1 - 1 - -   Left thumb IP - Tender - - - 1 1 - -   Left 2nd PIP - Tender - 1 1 1 1 - 1   Left 2nd PIP - Swollen - 1 1 1 - - 1   Left 3rd PIP - Tender 1 1 1 1 1 - 1   Left 3rd PIP - Swollen 0 1 1 1 1 - -   Left 4th PIP - Tender 1 1 1 1 1 1 1   Left 4th PIP - Swollen 0 1 1 1 - 1 1   Left 5th PIP - Tender 1 1 1 1 1 - -   Left 5th PIP - Swollen 0 1 1 1 - - -   Right wrist- Tender - - - - - 1 1   Right wrist- Swollen - - - - - 1 1   Right 1st MCP - Tender - 1 1 - - - -   Right 1st MCP - Swollen - 1 1 - - - -   Right 2nd MCP - Tender - 1 1 1 1 1 1   Right 2nd MCP - Swollen - 1 1 1 1 1 1   Right 3rd MCP - Tender - 1 0 1 1 1 1   Right 3rd MCP - Swollen - 1 1 1 1 1 1   Right 4th MCP - Tender - 0 1 1 - 1 1   Right 4th MCP - Swollen - 1 1 1 - 1 1   Right 5th MCP - Tender - 0 - 1 - - -   Right 5th MCP - Swollen - 1 - - 1 1 -   Right 2nd PIP - Tender 1 1 1 1 1 - 1   Right 2nd PIP - Swollen 0 1 1 1 - - 1   Right 3rd PIP - Tender 1 1 1 1 1 1 1   Right 3rd PIP - Swollen 0 0 1 - - 1 -   Right 4th PIP - Tender - - 1 - 1 1 1   Right 4th PIP - Swollen - - 1 - - - -   Right 5th PIP - Tender 1 0 1 - 1 1 -   Right 5th PIP - Swollen 0 1 1 - - - -   Tender Joint Count (Total) 8 13 15 16 15 11 15   Swollen Joint Count (Total) 0 15 17 13 8 9 12   Physician Assessment (0-10) 2 4 4 4 3 3 -   Patient Assessment (0-10) 5 2.5 8 5 5 1.5 -   CDAI Total (calculated) 15 34.5 44 38 31 24.5 -       DATA REVIEW    Laboratory     Recent laboratory results were reviewed, summarized, and discussed with the patient. Imaging    Musculoskeletal Ultrasound    None    Radiographs    Bilateral Hand 2/13/2018: RIGHT: severe osteopenia without fracture. There are scattered degenerative changes. Progressive first [facility] joint. No new erosive changes. .  Vascular calcifications. LEFT: severe osteopenia. Scattered degenerative changes as noted previously with progression of the index finger and middle finger DIP joints. There is a new erosion at the index finger proximal phalanx ulnar base without adjacent joint space loss. There are vascular calcifications. Calcification overlying the DRUJ is favored represent overlying vascular calcifications.      Bilateral Foot 2/13/2018: RIGHT: no acute fracture or dislocation. Alignment is anatomic. Mild degenerative changes are seen in the left first MTP joint. Minimal degenerative changes are seen in the right first MTP joint. A possible erosion is seen in the head of the left first metatarsal. No erosive changes are seen in the right foot. Plantar calcaneal heel spurs are noted in both feet, as well as enthesophyte formation at the Achilles insertion site. Arterial vascular calcifications are also noted bilaterally. Mild soft tissue swelling surrounds the left first MTP joint. LEFT: no acute fracture or dislocation. Alignment is anatomic. Mild degenerative changes are seen in the left first MTP joint. Minimal degenerative changes are seen in the right first MTP joint. A possible erosion is seen in the head of the left first metatarsal. No erosive changes are seen in the right foot. Plantar calcaneal heel spurs are noted in both feet, as well as enthesophyte formation at the Achilles insertion site. Arterial vascular calcifications are also noted bilaterally. Mild soft tissue swelling surrounds the left first MTP joint. Thoracic Spine 11/03/2016: The posterior battery pack is again seen within the intrathecal catheter in stable position.  Kyphoplasty material is present in a midthoracic vertebral body is stable mild chronic compression of the adjacent inferior vertebral body. The remaining vertebral body heights are maintained. Anterior osteophytes are noted. There is no abnormality in alignment. Lumbar Spine 10/19/2016: significant disc space narrowing at L3-L4 with 14 mm anterolisthesis. There is facet arthropathy. The vertebral body heights are maintained. Spinal catheters are seen. There are atherosclerotic vascular calcifications. Bilateral Hips 8/31/2015: no fracture, dislocation or other acute abnormality. There osteoarthritic pattern change of both hips, more severe on the right. There is nonuniform joint space narrowing and marginal osteophytes. There is remodeling of the right femur head with subchondral cystic formation. Mild sacroiliac osteoarthritic change. Lower lumbar spondylosis is noted. Wire  leads are seen in the left lower lumbar spine likely reflective of spinal stimulator device. Bilateral Hand 3/01/2011: RIGHT: mild osteopenia. There are degenerative changes of the distal and proximal interphalangeal joints and the base of the thumb. There is no fracture or other acute abnormality. Scattered calcifications are noted in the radial and ulnar arteries. LEFT: mild osteopenia. There are degenerative changes of the distal and proximal interphalangeal joints and  at the base of the thumb. There is no fracture or acute abnormality. There are vascular calcifications of the radial and ulnar arteries. CT Imaging    None    MR Imaging    MRI Brain with without contrast 12/19/2016: There is sulcal and ventricular prominence. Confluent periventricular and scattered foci of increased T2 signal intensity in the corona radiata and centrum semiovale. Tiny remote lacunar infarction in the right cerebellum. There is no intracranial mass, hemorrhage or evidence of acute infarction. There is no Chiari or syrinx. The pituitary and infundibulum are grossly unremarkable.  There is no skull base mass. Cerebellopontine angles are grossly unremarkable. The major intracranial vascular flow-voids are unremarkable. No evidence of demyelinating process. There is no abnormal parenchymal enhancement. There is no abnormal meningeal enhancement. The cavernous sinuses are symmetric. Optic chiasm and infundibulum grossly unremarkable. Orbits are grossly symmetric. Dural venous sinuses are grossly patent. The brain architecture is normal. There is no evidence of midline shift or mass-effect. There are no extra-axial fluid collections. The mastoid air cells and paranasal sinuses are well pneumatized and clear. MRI Lumbar Spine with and without contrast 8/07/2013: There is transitional lumbosacral anatomy. In keeping with prior studies, lowest fully formed disc is labeled L5-S1. There has been prior  laminectomies at L3-5 with no change in grade 1 anterolisthesis at L3-4 which measures 5 mm. There is grade 1 anterolisthesis of L4-5 of approximately 2 mm, unchanged. Vertebral body heights are maintained. There is no marrow edema or compression fracture. There are reactive endplate changes at B1-2.  The conus medullaris terminates at L1-2. There is no abnormal intraspinal enhancement. L1/2:  The spinal canal and foramina are widely patent. L2/3:  Diffuse disc bulge, facet hypertrophy and ligamentum flavum thickening cause mild spinal canal and moderate right foraminal stenosis. Left foramen is patent. L3/4: There is uncovering of disc material with facet hypertrophy resulting in mild thecal sac narrowing. There is severe bilateral foraminal stenosis similar to the prior study. L4/5: Joel Morning is diffuse disc bulge and facet hypertrophy, without significant spinal canal stenosis. There is mild right foraminal stenosis. L5/S1:  The spinal canal and foramina are widely patent.     DXA     DXA 8/08/2017: (excluded Lumbar spine because of overlying wires and degenerative change and kyphoplasty of L4) showed left femoral neck T score: -1.4 (0.837 g/cm2), left total hip T score: -1.4 (0.830 g/cm2), right femoral neck T score: -0.9 (0.919 g/cm2), right total hip T score: -0.9 (0.900 g/cm2), and distal one third left radius T score -3.00 (BMD 0.615 g/cm2). FRAX score 16.8 % probability in 10 years for major osteoporotic fracture and 3.2 % 10 year probability of hip fracture. Nuclear Medicine    Nuclear Medicine Bone Scan 1/22/2013: There is intense radiotracer uptake in the upper thoracic spine, likely T5. There is uptake in the region of the sternoclavicular joints and left shoulder, likely degenerative. ASSESSMENT AND PLAN    This is a follow-up visit for Ms. Kayden Jones. 1) Seronegative Erosive Rheumatoid Arthritis. She is maintained on leflunomide 20 mg daily and Simponi Aria every 8 weeks, with good tolerance but has noticed breakthrough 2 weeks before her dose. Her most recent infusion was 6/21/2019. Her CDAI was 15 (previously 34.5, 44, 38, 31, 24.5) with 8 tender and 0 swollen joints, consistent with moderate disease activity. She has underlying osteoarthritis pain, which I think is contributing to her PtGlobal. I will change her Simponi to every 6 weeks. I had stopped leflunomide 20 mg due to elevated liver function tests but she did not stop it due not reading her MyChart results and her liver function tests improved. Therefore, I will continue it. 2) Long Term Use of Immunosuppressants. The patient remains on immunomodulatory medications (leflunomide, Simponi Aria) and requires frequent toxicity monitoring by blood work. Respective labs were ordered (CBC and CMP). 3) Age-Related Osteoporosis. (multiple vertebral fractures) She was on Prolia with good tolerance but has not received it since 9/27/2018. She denies interval falls or fractures. I will resubmit an order. I ordered a follow up DEXA    4) Primary Raynauds phenomenon. She is on amlodipine 5 mg daily. This was not an active issue today.  She may take up to 10 mg if she becomes symptomatic. 5) Chronic Kidney Disease Stage 2-3. The patient's eGFR was 57 (previously 85, 60, 65, 57, 63). I will continue to monitor. The patient voiced understanding of the aforementioned assessment and plan. Summary of plan was provided in the After Visit Summary patient instructions.      TODAY'S ORDERS    Orders Placed This Encounter    DEXA BONE DENSITY STUDY AXIAL    leflunomide (ARAVA) 20 mg tablet    amLODIPine (NORVASC) 5 mg tablet     Future Appointments   Date Time Provider Yocasta Cisneros   8/16/2019 11:00 AM SS INF2 CH3 <4H [facility]   9/5/2019  2:30 PM [facility]   10/11/2019 11:00 AM SS INF2 CH3 <4H [facility]   11/7/2019  2:00 PM Marilou Wilcox MD [address] [facility]   12/6/2019 11:00 AM SS INF2 CH3 <4H [id number], [address] [facility] [place]    Adult Rheumatology   Rheumatology Ultrasound Certified  [facility]  [facility], [address] [facility]   Phone [phone]  Fax [phone]

## 2019-08-07 NOTE — PATIENT INSTRUCTIONS
Please call the Patient Care Scheduling Team [phone] to schedule your test (DXA) Will submit your Prolia order again Will change Simponi to every 6 weeks

## 2019-08-16 ENCOUNTER — HOSPITAL ENCOUNTER (OUTPATIENT)
Dept: INFUSION THERAPY | Age: 79
Discharge: HOME | End: 2019-08-16
Payer: MEDICARE

## 2019-08-16 ENCOUNTER — HOSPITAL ENCOUNTER (OUTPATIENT)
Dept: MAMMOGRAPHY | Age: 79
Discharge: HOME | End: 2019-08-16
Attending: INTERNAL MEDICINE
Payer: MEDICARE

## 2019-08-16 VITALS
DIASTOLIC BLOOD PRESSURE: 68 MMHG | OXYGEN SATURATION: 95 % | TEMPERATURE: 98.4 F | WEIGHT: 192 LBS | RESPIRATION RATE: 18 BRPM | HEART RATE: 89 BPM | SYSTOLIC BLOOD PRESSURE: 128 MMHG | BODY MASS INDEX: 36.28 KG/M2

## 2019-08-16 DIAGNOSIS — M81.0 AGE-RELATED OSTEOPOROSIS WITHOUT CURRENT PATHOLOGICAL FRACTURE: Primary | ICD-10-CM

## 2019-08-16 DIAGNOSIS — Z78.0 POST-MENOPAUSAL: ICD-10-CM

## 2019-08-16 LAB
ALBUMIN SERPL-MCNC: 3.7 G/DL (ref 3.5–5)
ANION GAP SERPL CALC-SCNC: 7 MMOL/L (ref 5–15)
BUN SERPL-MCNC: 19 MG/DL (ref 6–20)
BUN/CREAT SERPL: 15 (ref 12–20)
CALCIUM SERPL-MCNC: 8.5 MG/DL (ref 8.5–10.1)
CHLORIDE SERPL-SCNC: 109 MMOL/L (ref 97–108)
CO2 SERPL-SCNC: 26 MMOL/L (ref 21–32)
COMMENT, HOLDF: NORMAL
CREAT SERPL-MCNC: 1.25 MG/DL (ref 0.55–1.02)
GLUCOSE SERPL-MCNC: 152 MG/DL (ref 65–100)
MAGNESIUM SERPL-MCNC: 2.2 MG/DL (ref 1.6–2.4)
PHOSPHATE SERPL-MCNC: 4.4 MG/DL (ref 2.6–4.7)
POTASSIUM SERPL-SCNC: 4.6 MMOL/L (ref 3.5–5.1)
SAMPLES BEING HELD,HOLD: NORMAL
SODIUM SERPL-SCNC: 142 MMOL/L (ref 136–145)

## 2019-08-16 PROCEDURE — 77080 DXA BONE DENSITY AXIAL: CPT

## 2019-08-16 PROCEDURE — 83735 ASSAY OF MAGNESIUM: CPT

## 2019-08-16 PROCEDURE — 80069 RENAL FUNCTION PANEL: CPT

## 2019-08-16 PROCEDURE — 74011250636 HC RX REV CODE- 250/636: Performed by: INTERNAL MEDICINE

## 2019-08-16 PROCEDURE — 96365 THER/PROPH/DIAG IV INF INIT: CPT

## 2019-08-16 PROCEDURE — 74011000258 HC RX REV CODE- 258: Performed by: INTERNAL MEDICINE

## 2019-08-16 PROCEDURE — 36415 COLL VENOUS BLD VENIPUNCTURE: CPT

## 2019-08-16 PROCEDURE — 96372 THER/PROPH/DIAG INJ SC/IM: CPT

## 2019-08-16 RX ORDER — SODIUM CHLORIDE 0.9 % (FLUSH) 0.9 %
5-10 SYRINGE (ML) INJECTION AS NEEDED
Status: DISCONTINUED | OUTPATIENT
Start: 2019-08-16 | End: 2019-08-17 | Stop reason: HOSPADM

## 2019-08-16 RX ADMIN — GOLIMUMAB 175 MG: 50 SOLUTION INTRAVENOUS at 11:40

## 2019-08-16 RX ADMIN — Medication 10 ML: at 12:10

## 2019-08-16 RX ADMIN — DENOSUMAB 60 MG: 60 INJECTION SUBCUTANEOUS at 12:12

## 2019-08-16 NOTE — PROGRESS NOTES
1130.  Call placed to  Dr. Chen ha RN regarding patient needing to come back in 10 days for renal panel after prolia injection. Alta placed order for patient to go to lab [facility]. Copy of order faxed here and given to patient. Patient denies any recent/upcoming dental work.

## 2019-08-16 NOTE — PROGRESS NOTES
[facility] VISIT NOTE      4509: Pt arrived at [address] [place] [address] ambulatory and in no distress for Prolia and Simponia Aria therapy. Assessment completed, pt c/o joint pain in hands and ankles. 24G IV placed. Ana Stein Positive blood return noted and labs drawn. Patient Vitals for the past 12 hrs:   Temp Pulse Resp BP SpO2   08/16/19 1207 98.4 °F (36.9 °C) 89 18 128/68 95 %   08/16/19 1050 97.8 °F (36.6 °C) 77 16 134/68 98 %     Recent Results (from the past 12 hour(s))   RENAL FUNCTION PANEL    Collection Time: 08/16/19 11:10 AM   Result Value Ref Range    Sodium 142 136 - 145 mmol/L    Potassium 4.6 3.5 - 5.1 mmol/L    Chloride 109 (H) 97 - 108 mmol/L    CO2 26 21 - 32 mmol/L    Anion gap 7 5 - 15 mmol/L    Glucose 152 (H) 65 - 100 mg/dL    BUN 19 6 - 20 MG/DL    Creatinine 1.25 (H) 0.55 - 1.02 MG/DL    BUN/Creatinine ratio 15 12 - 20      GFR est AA 50 (L) >60 ml/min/1.73m2    GFR est non-AA 41 (L) >60 ml/min/1.73m2    Calcium 8.5 8.5 - 10.1 MG/DL    Phosphorus 4.4 2.6 - 4.7 MG/DL    Albumin 3.7 3.5 - 5.0 g/dL   MAGNESIUM    Collection Time: 08/16/19 11:10 AM   Result Value Ref Range    Magnesium 2.2 1.6 - 2.4 mg/dL   SAMPLES BEING HELD    Collection Time: 08/16/19 11:10 AM   Result Value Ref Range    SAMPLES BEING HELD 1PST     COMMENT        Add-on orders for these samples will be processed based on acceptable specimen integrity and analyte stability, which may vary by analyte.        Medications received:  Medications Administered     denosumab (PROLIA) injection 60 mg     Admin Date  08/16/2019 Action  Given Dose  60 mg Route  SubCUTAneous Administered By  Christos Ferguson RN          golimumab (SIMPONI ARIA) 175 mg in 0.9% sodium chloride, overfill volume 110 mL infusion     Admin Date  08/16/2019 Action  Given Dose  175 mg Rate  220 mL/hr Route  IntraVENous Administered By  Christos Ferguson RN          sodium chloride (NS) flush 5-10 mL     Admin Date  08/16/2019 Action  Given Dose  10 mL Route  IntraVENous Administered By  Lorna Dillard RN                Tolerated treatment well, no adverse reaction noted. IV removed per protocol. Positive blood return noted. D/C'd from [facility] ambulatory and in no distress accompanied by .  Next appointment is 9/27/19 at [address] [place], [place] [address]Oswald

## 2019-09-04 ENCOUNTER — HOSPITAL ENCOUNTER (OUTPATIENT)
Dept: LAB | Age: 79
Discharge: HOME | End: 2019-09-04
Payer: MEDICARE

## 2019-09-04 ENCOUNTER — TELEPHONE (OUTPATIENT)
Dept: FAMILY MEDICINE CLINIC | Age: 79
End: 2019-09-04

## 2019-09-04 PROCEDURE — 80069 RENAL FUNCTION PANEL: CPT

## 2019-09-04 NOTE — TELEPHONE ENCOUNTER
Call made to pt, no answer, mess left. Request came in for refills on medications. Pt needs to schedule an appt. Last appt was with Dr. Gerardo Can in June. Pt needs to schedule with new provider.

## 2019-09-05 ENCOUNTER — OFFICE VISIT (OUTPATIENT)
Dept: FAMILY MEDICINE CLINIC | Age: 79
End: 2019-09-05

## 2019-09-05 VITALS
HEIGHT: 61 IN | WEIGHT: 193 LBS | HEART RATE: 72 BPM | RESPIRATION RATE: 16 BRPM | SYSTOLIC BLOOD PRESSURE: 89 MMHG | DIASTOLIC BLOOD PRESSURE: 57 MMHG | BODY MASS INDEX: 36.44 KG/M2 | OXYGEN SATURATION: 96 % | TEMPERATURE: 97.3 F

## 2019-09-05 DIAGNOSIS — E11.21 TYPE 2 DIABETES WITH NEPHROPATHY (HCC): ICD-10-CM

## 2019-09-05 DIAGNOSIS — Z00.00 MEDICARE ANNUAL WELLNESS VISIT, SUBSEQUENT: Primary | ICD-10-CM

## 2019-09-05 DIAGNOSIS — Z13.39 SCREENING FOR ALCOHOLISM: ICD-10-CM

## 2019-09-05 DIAGNOSIS — Z13.31 SCREENING FOR DEPRESSION: ICD-10-CM

## 2019-09-05 DIAGNOSIS — Z23 ENCOUNTER FOR IMMUNIZATION: ICD-10-CM

## 2019-09-05 LAB
ALBUMIN SERPL-MCNC: 3.9 G/DL (ref 3.5–4.7)
BUN SERPL-MCNC: 17 MG/DL (ref 8–27)
BUN/CREAT SERPL: 23 (ref 12–28)
CALCIUM SERPL-MCNC: 8.6 MG/DL (ref 8.7–10.3)
CHLORIDE SERPL-SCNC: 106 MMOL/L (ref 96–106)
CHOLEST SERPL-MCNC: 136 MG/DL (ref 100–199)
CO2 SERPL-SCNC: 24 MMOL/L (ref 20–29)
CREAT SERPL-MCNC: 0.73 MG/DL (ref 0.57–1)
EST. AVERAGE GLUCOSE BLD GHB EST-MCNC: 128 MG/DL
GLUCOSE SERPL-MCNC: 118 MG/DL (ref 65–99)
HBA1C MFR BLD: 6.1 % (ref 4.8–5.6)
HDLC SERPL-MCNC: 40 MG/DL
LDLC SERPL CALC-MCNC: 63 MG/DL (ref 0–99)
PHOSPHATE SERPL-MCNC: 2.9 MG/DL (ref 2.5–4.5)
POTASSIUM SERPL-SCNC: 4.8 MMOL/L (ref 3.5–5.2)
SODIUM SERPL-SCNC: 145 MMOL/L (ref 134–144)
TRIGL SERPL-MCNC: 167 MG/DL (ref 0–149)
VLDLC SERPL CALC-MCNC: 33 MG/DL (ref 5–40)

## 2019-09-05 NOTE — PROGRESS NOTES
This is the Subsequent Medicare Annual Wellness Exam, performed 12 months or more after the Initial AWV or the last Subsequent AWV    I have reviewed the patient's medical history in detail and updated the computerized patient record. History     Past Medical History:   Diagnosis Date    Anemia     Arthritis     CAD (coronary artery disease) 1997    MI    Degenerative joint disease of right hip 09/14/2015     Virgil Hernandez/Dr. Vernon Aiken    Diabetes [facility])     Fracture     right shoulder; T6 compression    Gastritis     H/O Bell's palsy 7/9/2012    R-side of face. Dx >20 years ago. Has persistent R-sided facial droop     Hypercholesterolemia     Hyperlipidemia    Lupus ([address] 1/9/2015    Osteoporosis, post-menopausal     vertebral fracture    Squamous cell cancer of skin of left cheek     Squamous cell skin cancer     Right knee      Past Surgical History:   Procedure Laterality Date    CARDIAC SURG PROCEDURE UNLIST      Patient Denies    COLONOSCOPY N/A 10/9/2018    COLONOSCOPY performed by Anu Larson MD at [facility] HX CHOLECYSTECTOMY      HX CORONARY STENT PLACEMENT  02/19/1997    LAD    HX GASTRIC BYPASS  2000    HX HERNIA REPAIR  2005    HX KYPHOPLASTY      t5    HX KYPHOPLASTY      L4    HX ORTHOPAEDIC      bilateral knees    HX ORTHOPAEDIC      r shoulder    HX ORTHOPAEDIC      Laminectomy    NV INC IMPLTJ NEUROSTIMULATOR ELTRD SACRAL NERVE       Current Outpatient Medications   Medication Sig Dispense Refill    leflunomide (ARAVA) 20 mg tablet Take 1 Tab by mouth daily. 90 Tab 0    amLODIPine (NORVASC) 5 mg tablet TAKE 1 TABLET BY MOUTH EVERY DAY 90 Tab 0    cyanocobalamin (VITAMIN B12) 1,000 mcg/mL injection 1 mL by IntraMUSCular route every thirty (30) days. Indications: Anemia caused by Vitamin B12 Deficiency-Pernicious Anemia 10 mL 1    atorvastatin (LIPITOR) 40 mg tablet Take 1 Tab by mouth daily. Prescribed and managed by Dr. Maty Delacruz.       pantoprazole (PROTONIX) 40 mg tablet Take 1 Tab by mouth daily. 90 Tab 1    glipiZIDE SR (GLUCOTROL XL) 2.5 mg CR tablet Take 1 Tab by mouth two (2) times a day. Indications: type 2 diabetes mellitus 180 Tab 1    gabapentin (NEURONTIN) 300 mg capsule Take 1 Cap by mouth nightly. Take in addition to 600 mg cap at night. 90 Cap 1    nortriptyline (PAMELOR) 10 mg capsule TAKE 1 CAPSULE BY MOUTH EVERY NIGHT 30 Cap 5    trimethoprim (TRIMPEX) 100 mg tablet Take 1 Tab by mouth daily. Indications: urinary tract infection prevention 30 Tab 5    ferrous sulfate 325 mg (65 mg iron) tablet Take 1 Tab by mouth two (2) times a day. Indications: anemia from inadequate iron 60 Tab 5    cyclobenzaprine (FLEXERIL) 10 mg tablet Take 1 Tab by mouth nightly. 90 Tab 0    metFORMIN (GLUCOPHAGE) 500 mg tablet Take 1 Tab by mouth two (2) times daily (with meals). 180 Tab 1    gabapentin (NEURONTIN) 600 mg tablet Take 1 Tab by mouth three (3) times daily. 90 Tab 5    metoprolol succinate (TOPROL-XL) 25 mg XL tablet TAKE 1 TABLET BY MOUTH EVERY DAY 90 Tab 1    golimumab (SIMPONI ARIA IV) 2 mg/kg by IntraVENous route every two (2) months.  glucose blood VI test strips (ASCENSIA CONTOUR) strip Glucometer for 250.00 Pt. test blood sugar once daily (in morning) 50 Strip 11    nitroglycerin (NITROSTAT) 0.4 mg SL tablet 1 Tab by SubLINGual route every five (5) minutes as needed for Chest Pain. 4 Tab 0    denosumab (PROLIA) 60 mg/mL injection 1 mL by SubCUTAneous route every 6 months.  acetaminophen (TYLENOL) 650 mg CR tablet Take 1,300 mg by mouth daily as needed for Pain.  Blood-Glucose Meter (CONTOUR METER) monitoring kit Dx: 250. Check blood sugars daily. 1 kit 11    Walker Misc Rolling walker. Patient with spinal stenosis and stopped posture. Pain with limited ambulation. 1 Each 0    ZINC PO Take 50 mg by mouth daily.  cholecalciferol, vitamin d3, (VITAMIN D3) 1,000 unit tablet Take 2,000 Units by mouth daily.       calcium-vitamin D (CALCIUM 500+D) 500 mg(1,250mg) -200 unit per tablet Take 2 Tabs by mouth daily.  aspirin 81 mg Tab Take 81 mg by mouth daily.  MULTIVITAMINS (MULTIVITAMIN PO) Take 1 Tab by mouth daily.  HYDROcodone-acetaminophen (NORCO) 5-325 mg per tablet Take 1 Tab by mouth every six (6) hours as needed for Pain for up to 90 days.  61 Tab 0     Allergies   Allergen Reactions    Cephalosporins Nausea and Vomiting     Severe vomiting     Family History   Problem Relation Age of Onset    Diabetes Mother     Hypertension Mother     Heart Disease Mother     Diabetes Father     Hypertension Father     Heart Disease Father    [facility] Arthritis-rheumatoid Sister     Elevated Lipids Sister    [facility] Arthritis-rheumatoid Sister     Hypertension Sister     Diabetes Sister     Thyroid Disease Sister     Arthritis-rheumatoid Other      Social History     Tobacco Use    Smoking status: Former Smoker     Last attempt to quit: 1989     Years since quittin.7    Smokeless tobacco: Never Used   Substance Use Topics    Alcohol use: No     Alcohol/week: 0.0 standard drinks     Comment: Occasional     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    Long-term use of immunosuppressant medication Z79.899    Long-term use of Plaquenil Z79.899    Hypocalcemia E83.51    Low back pain M54.5    Age-related osteoporosis without current pathological fracture M81.0    Diabetic polyneuropathy ([facility] E11.42    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    Unspecified hereditary and idiopathic peripheral neuropathy G60.9    Raynauds syndrome I73.00    Blepharoconjunctivitis H10.509    Seronegative rheumatoid arthritis of multiple sites ([facility] M06.09    Nuclear sclerotic cataract H25.10    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 Functional Ability and Level of Safety:   Hearing Loss  Hearing is good. Activities of Daily Living  The home contains: handrails  Patient does total self care    Fall Risk  Fall Risk Assessment, last 12 mths 9/5/2019   Able to walk? Yes   Fall in past 12 months? No   Fall with injury? -   Number of falls in past 12 months -   Fall Risk Score -       Abuse Screen  Patient is not abused    Cognitive Screening   Evaluation of Cognitive Function:  Has your family/caregiver stated any concerns about your memory: no  Normal MMSE 29/30    Patient Care Team   Patient Care Team:  Ori Hannah MD as PCP - General (Family Practice)  Keri Quezada MD (Neurology) - doesn't see regularly anymore  Bella Zuniga MD as Consulting Provider (Dermatology)  Gregoria Dillon MD ([address] [place] [address] Vascular Surgery)  Pia Collins MD (Rheumatology)    Diabetic Foot Exam:  Skin intact, no lesions. DP pulses 2+ b/l. Sensation intact to light touch and monofilament throughout. Assessment/Plan   Education and counseling provided:  Are appropriate based on today's review and evaluation  Influenza Vaccine  Shingrix - pt on waiting list at pharmacy for this   Urine cup provided for urine microalbumin.  Pt also with recurrent UTI so will bring this back with sample if she develops symptoms    Diagnoses and all orders for this visit: 1. Type 2 diabetes with nephropathy (HCC)  -     MICROALBUMIN, UR, RAND W/ MICROALB/CREAT RATIO  -      DIABETES FOOT EXAM    2. Medicare annual wellness visit, subsequent    3. Screening for alcoholism  -     [place] ANNUAL ALCOHOL SCREEN 15 MIN    4. Screening for depression  -     DEPRESSION SCREEN ANNUAL    5.  Encounter for immunization  -     INFLUENZA VACCINE INACTIVATED (IIV), SUBUNIT, ADJUVANTED, IM  -     ADMIN INFLUENZA VIRUS VAC        Health Maintenance Due   Topic Date Due    Shingrix Vaccine Age 50> (1 of 2) 06/21/1989    HEMOGLOBIN A1C Q6M  02/20/2019    FOOT EXAM Q1  06/27/2019    Influenza Age 5 to Adult  08/01/2019    MICROALBUMIN Q1  08/20/2019    LIPID PANEL Q1  08/20/2019    MEDICARE YEARLY EXAM  08/28/2019

## 2019-09-05 NOTE — PATIENT INSTRUCTIONS
A Healthy Lifestyle: Care Instructions  Your Care Instructions    A healthy lifestyle can help you feel good, stay at a healthy weight, and have plenty of energy for both work and play. A healthy lifestyle is something you can share with your whole family. A healthy lifestyle also can lower your risk for serious health problems, such as high blood pressure, heart disease, and diabetes. You can follow a few steps listed below to improve your health and the health of your family. Follow-up care is a key part of your treatment and safety. Be sure to make and go to all appointments, and call your doctor if you are having problems. It's also a good idea to know your test results and keep a list of the medicines you take. How can you care for yourself at home? · Do not eat too much sugar, fat, or fast foods. You can still have dessert and treats now and then. The goal is moderation. · Start small to improve your eating habits. Pay attention to portion sizes, drink less juice and soda pop, and eat more fruits and vegetables. ? Eat a healthy amount of food. A 3-ounce serving of meat, for example, is about the size of a deck of cards. Fill the rest of your plate with vegetables and whole grains. ? Limit the amount of soda and sports drinks you have every day. Drink more water when you are thirsty. ? Eat at least 5 servings of fruits and vegetables every day. It may seem like a lot, but it is not hard to reach this goal. A serving or helping is 1 piece of fruit, 1 cup of vegetables, or 2 cups of leafy, raw vegetables. Have an apple or some carrot sticks as an afternoon snack instead of a candy bar. Try to have fruits and/or vegetables at every meal.  · Make exercise part of your daily routine. You may want to start with simple activities, such as walking, bicycling, or slow swimming. Try to be active 30 to 60 minutes every day. You do not need to do all 30 to 60 minutes all at once.  For example, you can exercise 3 times a day for 10 or 20 minutes. Moderate exercise is safe for most people, but it is always a good idea to talk to your doctor before starting an exercise program.  · Keep moving. Chao Olson the lawn, work in the garden, or [place]. Take the stairs instead of the elevator at work. · If you smoke, quit. People who smoke have an increased risk for heart attack, stroke, cancer, and other lung illnesses. Quitting is hard, but there are ways to boost your chance of quitting tobacco for good. ? Use nicotine gum, patches, or lozenges. ? Ask your doctor about stop-smoking programs and medicines. ? Keep trying. In addition to reducing your risk of diseases in the future, you will notice some benefits soon after you stop using tobacco. If you have shortness of breath or asthma symptoms, they will likely get better within a few weeks after you quit. · Limit how much alcohol you drink. Moderate amounts of alcohol (up to 2 drinks a day for men, 1 drink a day for women) are okay. But drinking too much can lead to liver problems, high blood pressure, and other health problems. Family health  If you have a family, there are many things you can do together to improve your health. · Eat meals together as a family as often as possible. · Eat healthy foods. This includes fruits, vegetables, lean meats and dairy, and whole grains. · Include your family in your fitness plan. Most people think of activities such as jogging or tennis as the way to fitness, but there are many ways you and your family can be more active. Anything that makes you breathe hard and gets your heart pumping is exercise. Here are some tips:  ? Walk to do errands or to take your child to school or the bus.  ? Go for a family bike ride after dinner instead of watching TV. Where can you learn more? Go to http://jennifer-vincent.info/. Enter K172 in the search box to learn more about \"A Healthy Lifestyle: Care Instructions. \"  Current as of: September 11, 2018  Content Version: 12.1  © 8675-8873 Healthwise, Incorporated. Care instructions adapted under license by [employer] (which disclaims liability or warranty for this information). If you have questions about a medical condition or this instruction, always ask your healthcare professional. [address] any warranty or liability for your use of this information. Medicare Wellness Visit, Female     The best way to live healthy is to have a lifestyle where you eat a well-balanced diet, exercise regularly, limit alcohol use, and quit all forms of tobacco/nicotine, if applicable. Regular preventive services are another way to keep healthy. Preventive services (vaccines, screening tests, monitoring & exams) can help personalize your care plan, which helps you manage your own care. Screening tests can find health problems at the earliest stages, when they are easiest to treat. Jorge Perry follows the current, evidence-based guidelines published by the [facility] States Quentin Alvarado (USPSTF) when recommending preventive services for our patients. Because we follow these guidelines, sometimes recommendations change over time as research supports it. (For example, mammograms used to be recommended annually. Even though Medicare will still pay for an annual mammogram, the newer guidelines recommend a mammogram every two years for women of average risk.)  Of course, you and your doctor may decide to screen more often for some diseases, based on your risk and your health status. Preventive services for you include:  - Medicare offers their members a free annual wellness visit, which is time for you and your primary care provider to discuss and plan for your preventive service needs. Take advantage of this benefit every year!  -All adults over the age of 72 should receive the recommended pneumonia vaccines.  Current USPSTF guidelines recommend a series of two vaccines for the best pneumonia protection.   -All adults should have a flu vaccine yearly and a tetanus vaccine every 10 years. All adults age 61 and older should receive a shingles vaccine once in their lifetime.    -A bone mass density test is recommended when a woman turns 65 to screen for osteoporosis. This test is only recommended one time, as a screening. Some providers will use this same test as a disease monitoring tool if you already have osteoporosis. -All adults age 38-68 who are overweight should have a diabetes screening test once every three years.   -Other screening tests and preventive services for persons with diabetes include: an eye exam to screen for diabetic retinopathy, a kidney function test, a foot exam, and stricter control over your cholesterol.   -Cardiovascular screening for adults with routine risk involves an electrocardiogram (ECG) at intervals determined by your doctor.   -Colorectal cancer screenings should be done for adults age 54-65 with no increased risk factors for colorectal cancer. There are a number of acceptable methods of screening for this type of cancer. Each test has its own benefits and drawbacks. Discuss with your doctor what is most appropriate for you during your annual wellness visit. The different tests include: colonoscopy (considered the best screening method), a fecal occult blood test, a fecal DNA test, and sigmoidoscopy. -Breast cancer screenings are recommended every other year for women of normal risk, age 54-69.  -Cervical cancer screenings for women over age 72 are only recommended with certain risk factors.   -All adults born between [facility] should be screened once for Hepatitis C.      Here is a list of your current Health Maintenance items (your personalized list of preventive services) with a due date:  Health Maintenance Due   Topic Date Due    Shingles Vaccine (1 of 2) 06/21/1989    Hemoglobin A1C    02/20/2019    Diabetic Foot Care  06/27/2019    Flu Vaccine  08/01/2019    Albumin Urine Test  08/20/2019    Cholesterol Test   08/20/2019    Annual Well Visit  08/28/2019

## 2019-09-05 NOTE — PROGRESS NOTES
1. Have you been to the ER, urgent care clinic since your last visit? Hospitalized since your last visit? no    2. Have you seen or consulted any other health care providers outside of the [address] [place] [address] since your last visit? Include any pap smears or colon screening. no  Reviewed record in preparation for visit and have obtained necessary documentation. Patient did not bring medications to visit for review. Information provided on Advanced Directive, Living Will. Body mass index is 36.47 kg/m².    Health Maintenance Due   Topic Date Due    Shingrix Vaccine Age 50> (1 of 2) 06/21/1989    HEMOGLOBIN A1C Q6M  02/20/2019    FOOT EXAM Q1  06/27/2019    Influenza Age 5 to Adult  08/01/2019    MICROALBUMIN Q1  08/20/2019    LIPID PANEL Q1  08/20/2019    MEDICARE YEARLY EXAM  08/28/2019

## 2019-09-06 DIAGNOSIS — E11.21 TYPE 2 DIABETES WITH NEPHROPATHY (HCC): ICD-10-CM

## 2019-09-06 DIAGNOSIS — I10 ESSENTIAL HYPERTENSION WITH GOAL BLOOD PRESSURE LESS THAN 130/80: ICD-10-CM

## 2019-09-06 DIAGNOSIS — D51.0 PERNICIOUS ANEMIA: ICD-10-CM

## 2019-09-09 RX ORDER — GLIPIZIDE 2.5 MG/1
2.5 TABLET, EXTENDED RELEASE ORAL 2 TIMES DAILY
Qty: 180 TAB | Refills: 1 | Status: SHIPPED | OUTPATIENT
Start: 2019-09-09 | End: 2019-12-27 | Stop reason: SDUPTHER

## 2019-09-09 RX ORDER — LANOLIN ALCOHOL/MO/W.PET/CERES
325 CREAM (GRAM) TOPICAL 2 TIMES DAILY
Qty: 180 TAB | Refills: 1 | Status: SHIPPED | OUTPATIENT
Start: 2019-09-09 | End: 2019-12-02 | Stop reason: SDUPTHER

## 2019-09-09 RX ORDER — METOPROLOL SUCCINATE 25 MG/1
TABLET, EXTENDED RELEASE ORAL
Qty: 90 TAB | Refills: 1 | Status: SHIPPED | OUTPATIENT
Start: 2019-09-09 | End: 2019-10-21 | Stop reason: SDUPTHER

## 2019-09-09 RX ORDER — CYCLOBENZAPRINE HCL 10 MG
10 TABLET ORAL
Qty: 90 TAB | Refills: 0 | Status: SHIPPED | OUTPATIENT
Start: 2019-09-09 | End: 2019-10-21 | Stop reason: SDUPTHER

## 2019-09-09 RX ORDER — METFORMIN HYDROCHLORIDE 500 MG/1
500 TABLET ORAL 2 TIMES DAILY WITH MEALS
Qty: 180 TAB | Refills: 1 | Status: SHIPPED | OUTPATIENT
Start: 2019-09-09 | End: 2019-10-21 | Stop reason: SDUPTHER

## 2019-09-15 DIAGNOSIS — E55.9 VITAMIN D DEFICIENCY: ICD-10-CM

## 2019-09-15 DIAGNOSIS — N18.2 CKD (CHRONIC KIDNEY DISEASE) STAGE 2, GFR 60-89 ML/MIN: ICD-10-CM

## 2019-09-15 DIAGNOSIS — M81.0 AGE-RELATED OSTEOPOROSIS WITHOUT CURRENT PATHOLOGICAL FRACTURE: ICD-10-CM

## 2019-09-15 RX ORDER — LEFLUNOMIDE 20 MG/1
TABLET ORAL
Qty: 90 TAB | Refills: 0 | Status: SHIPPED | OUTPATIENT
Start: 2019-09-15 | End: 2020-02-12 | Stop reason: SDUPTHER

## 2019-09-27 ENCOUNTER — HOSPITAL ENCOUNTER (OUTPATIENT)
Dept: INFUSION THERAPY | Age: 79
Discharge: HOME | End: 2019-09-27
Payer: MEDICARE

## 2019-09-27 VITALS
TEMPERATURE: 97.7 F | RESPIRATION RATE: 18 BRPM | HEART RATE: 79 BPM | SYSTOLIC BLOOD PRESSURE: 113 MMHG | BODY MASS INDEX: 36.07 KG/M2 | DIASTOLIC BLOOD PRESSURE: 59 MMHG | WEIGHT: 190.9 LBS

## 2019-09-27 LAB
ALBUMIN SERPL-MCNC: 3.4 G/DL (ref 3.5–5)
ALBUMIN/GLOB SERPL: 1.1 {RATIO} (ref 1.1–2.2)
ALP SERPL-CCNC: 96 U/L (ref 45–117)
ALT SERPL-CCNC: 28 U/L (ref 12–78)
ANION GAP SERPL CALC-SCNC: 2 MMOL/L (ref 5–15)
AST SERPL-CCNC: 34 U/L (ref 15–37)
BASOPHILS # BLD: 0.1 K/UL (ref 0–0.1)
BASOPHILS NFR BLD: 1 % (ref 0–1)
BILIRUB SERPL-MCNC: 0.3 MG/DL (ref 0.2–1)
BUN SERPL-MCNC: 16 MG/DL (ref 6–20)
BUN/CREAT SERPL: 20 (ref 12–20)
CALCIUM SERPL-MCNC: 7.7 MG/DL (ref 8.5–10.1)
CHLORIDE SERPL-SCNC: 109 MMOL/L (ref 97–108)
CO2 SERPL-SCNC: 28 MMOL/L (ref 21–32)
CREAT SERPL-MCNC: 0.81 MG/DL (ref 0.55–1.02)
CRP SERPL-MCNC: <0.29 MG/DL (ref 0–0.6)
DIFFERENTIAL METHOD BLD: ABNORMAL
EOSINOPHIL # BLD: 0.2 K/UL (ref 0–0.4)
EOSINOPHIL NFR BLD: 3 % (ref 0–7)
ERYTHROCYTE [DISTWIDTH] IN BLOOD BY AUTOMATED COUNT: 12.8 % (ref 11.5–14.5)
ERYTHROCYTE [SEDIMENTATION RATE] IN BLOOD: 11 MM/HR (ref 0–30)
GLOBULIN SER CALC-MCNC: 3 G/DL (ref 2–4)
GLUCOSE SERPL-MCNC: 155 MG/DL (ref 65–100)
HCT VFR BLD AUTO: 38 % (ref 35–47)
HGB BLD-MCNC: 12.3 G/DL (ref 11.5–16)
IMM GRANULOCYTES # BLD AUTO: 0.1 K/UL (ref 0–0.04)
IMM GRANULOCYTES NFR BLD AUTO: 1 % (ref 0–0.5)
LYMPHOCYTES # BLD: 2.7 K/UL (ref 0.8–3.5)
LYMPHOCYTES NFR BLD: 42 % (ref 12–49)
MCH RBC QN AUTO: 32.8 PG (ref 26–34)
MCHC RBC AUTO-ENTMCNC: 32.4 G/DL (ref 30–36.5)
MCV RBC AUTO: 101.3 FL (ref 80–99)
MONOCYTES # BLD: 0.7 K/UL (ref 0–1)
MONOCYTES NFR BLD: 11 % (ref 5–13)
NEUTS SEG # BLD: 2.6 K/UL (ref 1.8–8)
NEUTS SEG NFR BLD: 42 % (ref 32–75)
NRBC # BLD: 0 K/UL (ref 0–0.01)
NRBC BLD-RTO: 0 PER 100 WBC
PLATELET # BLD AUTO: 179 K/UL (ref 150–400)
PMV BLD AUTO: 11.4 FL (ref 8.9–12.9)
POTASSIUM SERPL-SCNC: 4.1 MMOL/L (ref 3.5–5.1)
PROT SERPL-MCNC: 6.4 G/DL (ref 6.4–8.2)
RBC # BLD AUTO: 3.75 M/UL (ref 3.8–5.2)
SODIUM SERPL-SCNC: 139 MMOL/L (ref 136–145)
WBC # BLD AUTO: 6.3 K/UL (ref 3.6–11)

## 2019-09-27 PROCEDURE — 36415 COLL VENOUS BLD VENIPUNCTURE: CPT

## 2019-09-27 PROCEDURE — 74011000258 HC RX REV CODE- 258: Performed by: INTERNAL MEDICINE

## 2019-09-27 PROCEDURE — 85652 RBC SED RATE AUTOMATED: CPT

## 2019-09-27 PROCEDURE — 80053 COMPREHEN METABOLIC PANEL: CPT

## 2019-09-27 PROCEDURE — 96413 CHEMO IV INFUSION 1 HR: CPT

## 2019-09-27 PROCEDURE — 74011250636 HC RX REV CODE- 250/636: Performed by: INTERNAL MEDICINE

## 2019-09-27 PROCEDURE — 85025 COMPLETE CBC W/AUTO DIFF WBC: CPT

## 2019-09-27 PROCEDURE — 86140 C-REACTIVE PROTEIN: CPT

## 2019-09-27 RX ORDER — SODIUM CHLORIDE 0.9 % (FLUSH) 0.9 %
5-10 SYRINGE (ML) INJECTION AS NEEDED
Status: DISCONTINUED | OUTPATIENT
Start: 2019-09-27 | End: 2019-09-28 | Stop reason: HOSPADM

## 2019-09-27 RX ADMIN — GOLIMUMAB 175 MG: 50 SOLUTION INTRAVENOUS at 11:45

## 2019-09-27 NOTE — PROGRESS NOTES
[facility] VISIT NOTE    0867  Pt arrived at [facility] ambulatory with cane and in no distress for Q6wk Lashay Ruby. Assessment completed, pt c/o aching in hands. 24 G PIV placed in right AC with positive blood return, labs drawn and sent for processing. Vitals:  Patient Vitals for the past 12 hrs:   Temp Pulse Resp BP   09/27/19 1218 97.7 °F (36.5 °C) 79  113/59   09/27/19 1053 99.5 °F (37.5 °C) 91 18 139/60       Labs:     Recent Results (from the past 12 hour(s))   CBC WITH AUTOMATED DIFF    Collection Time: 09/27/19 11:03 AM   Result Value Ref Range    WBC 6.3 3.6 - 11.0 K/uL    RBC 3.75 (L) 3.80 - 5.20 M/uL    HGB 12.3 11.5 - 16.0 g/dL    HCT 38.0 35.0 - 47.0 %    .3 (H) 80.0 - 99.0 FL    MCH 32.8 26.0 - 34.0 PG    MCHC 32.4 30.0 - 36.5 g/dL    RDW 12.8 11.5 - 14.5 %    PLATELET 954 432 - 899 K/uL    MPV 11.4 8.9 - 12.9 FL    NRBC 0.0 0  WBC    ABSOLUTE NRBC 0.00 0.00 - 0.01 K/uL    NEUTROPHILS 42 32 - 75 %    LYMPHOCYTES 42 12 - 49 %    MONOCYTES 11 5 - 13 %    EOSINOPHILS 3 0 - 7 %    BASOPHILS 1 0 - 1 %    IMMATURE GRANULOCYTES 1 (H) 0.0 - 0.5 %    ABS. NEUTROPHILS 2.6 1.8 - 8.0 K/UL    ABS. LYMPHOCYTES 2.7 0.8 - 3.5 K/UL    ABS. MONOCYTES 0.7 0.0 - 1.0 K/UL    ABS. EOSINOPHILS 0.2 0.0 - 0.4 K/UL    ABS. BASOPHILS 0.1 0.0 - 0.1 K/UL    ABS. IMM.  GRANS. 0.1 (H) 0.00 - 0.04 K/UL    DF AUTOMATED     METABOLIC PANEL, COMPREHENSIVE    Collection Time: 09/27/19 11:03 AM   Result Value Ref Range    Sodium 139 136 - 145 mmol/L    Potassium 4.1 3.5 - 5.1 mmol/L    Chloride 109 (H) 97 - 108 mmol/L    CO2 28 21 - 32 mmol/L    Anion gap 2 (L) 5 - 15 mmol/L    Glucose 155 (H) 65 - 100 mg/dL    BUN 16 6 - 20 MG/DL    Creatinine 0.81 0.55 - 1.02 MG/DL    BUN/Creatinine ratio 20 12 - 20      GFR est AA >60 >60 ml/min/1.73m2    GFR est non-AA >60 >60 ml/min/1.73m2    Calcium 7.7 (L) 8.5 - 10.1 MG/DL    Bilirubin, total 0.3 0.2 - 1.0 MG/DL    ALT (SGPT) 28 12 - 78 U/L    AST (SGOT) 34 15 - 37 U/L Alk. phosphatase 96 45 - 117 U/L    Protein, total 6.4 6.4 - 8.2 g/dL    Albumin 3.4 (L) 3.5 - 5.0 g/dL    Globulin 3.0 2.0 - 4.0 g/dL    A-G Ratio 1.1 1.1 - 2.2     SED RATE (ESR)    Collection Time: 09/27/19 11:03 AM   Result Value Ref Range    Sed rate, automated 11 0 - 30 mm/hr   C REACTIVE PROTEIN, QT    Collection Time: 09/27/19 11:03 AM   Result Value Ref Range    C-Reactive protein <0.29 0.00 - 0.60 mg/dL       Medications received:    Medications Administered     golimumab (SIMPONI ARIA) 175 mg in 0.9% sodium chloride, overfill volume 110 mL infusion     Admin Date  09/27/2019 Action  Given Dose  175 mg Rate  220 mL/hr Route  IntraVENous Administered By  Kristina Burk, VIVIANA                Tolerated treatment well, no adverse reaction noted and PIV removed    1220  D/C'd from [facility] ambulatory with cane and in no distress accompanied by spouse.      Future Appointments:  Future Appointments   Date Time Provider Yocasta Cisneros   11/7/2019  2:00 PM Mariya Patton MD [facility]   11/8/2019 11:00 AM SS INF2 CH3 <4H [facility]   12/20/2019 11:00 [facility]   1/31/2020 11:00 AM SS INF2 CH3 <4H [facility]   2[facility]17[facility]2020 [facility]   3/5/2020 10:30 AM Lior Pascual MD [facility]   8/21/2020 11:00 AM SS INF5 CH4 <1H [facility] [address] [place] [address]

## 2019-10-04 ENCOUNTER — APPOINTMENT (OUTPATIENT)
Dept: INFUSION THERAPY | Age: 79
End: 2019-10-04
Payer: MEDICARE

## 2019-10-11 ENCOUNTER — APPOINTMENT (OUTPATIENT)
Dept: INFUSION THERAPY | Age: 79
End: 2019-10-11

## 2019-10-21 RX ORDER — CYCLOBENZAPRINE HCL 10 MG
10 TABLET ORAL
Qty: 90 TAB | Refills: 0 | Status: SHIPPED | OUTPATIENT
Start: 2019-10-21 | End: 2020-04-10 | Stop reason: SDUPTHER

## 2019-10-26 DIAGNOSIS — E11.42 DIABETIC POLYNEUROPATHY ASSOCIATED WITH TYPE 2 DIABETES MELLITUS (HCC): ICD-10-CM

## 2019-10-30 RX ORDER — GABAPENTIN 600 MG/1
TABLET ORAL
Qty: 90 TAB | Refills: 5 | Status: SHIPPED | OUTPATIENT
Start: 2019-10-30 | End: 2020-05-21

## 2019-11-08 ENCOUNTER — APPOINTMENT (OUTPATIENT)
Dept: INFUSION THERAPY | Age: 79
End: 2019-11-08
Payer: MEDICARE

## 2019-11-11 DIAGNOSIS — N18.2 CKD (CHRONIC KIDNEY DISEASE) STAGE 2, GFR 60-89 ML/MIN: ICD-10-CM

## 2019-11-11 DIAGNOSIS — E55.9 VITAMIN D DEFICIENCY: ICD-10-CM

## 2019-11-11 DIAGNOSIS — M81.0 AGE-RELATED OSTEOPOROSIS WITHOUT CURRENT PATHOLOGICAL FRACTURE: ICD-10-CM

## 2019-11-11 DIAGNOSIS — I73.00 PRIMARY RAYNAUD'S PHENOMENON: ICD-10-CM

## 2019-11-11 RX ORDER — AMLODIPINE BESYLATE 5 MG/1
TABLET ORAL
Qty: 90 TAB | Refills: 0 | Status: SHIPPED | OUTPATIENT
Start: 2019-11-11 | End: 2020-02-10

## 2019-11-12 RX ORDER — SODIUM CHLORIDE 9 MG/ML
25 INJECTION, SOLUTION INTRAVENOUS CONTINUOUS
Status: DISPENSED | OUTPATIENT
Start: 2019-11-15 | End: 2019-11-15

## 2019-11-15 ENCOUNTER — APPOINTMENT (OUTPATIENT)
Dept: INFUSION THERAPY | Age: 79
End: 2019-11-15

## 2019-11-15 ENCOUNTER — HOSPITAL ENCOUNTER (OUTPATIENT)
Dept: INFUSION THERAPY | Age: 79
Discharge: HOME | End: 2019-11-15
Payer: MEDICARE

## 2019-11-15 VITALS
BODY MASS INDEX: 36.07 KG/M2 | DIASTOLIC BLOOD PRESSURE: 55 MMHG | SYSTOLIC BLOOD PRESSURE: 111 MMHG | WEIGHT: 196 LBS | RESPIRATION RATE: 16 BRPM | HEART RATE: 69 BPM | HEIGHT: 62 IN | OXYGEN SATURATION: 97 % | TEMPERATURE: 97.8 F

## 2019-11-15 PROCEDURE — 74011250636 HC RX REV CODE- 250/636: Performed by: INTERNAL MEDICINE

## 2019-11-15 PROCEDURE — 96413 CHEMO IV INFUSION 1 HR: CPT

## 2019-11-15 PROCEDURE — 74011000258 HC RX REV CODE- 258: Performed by: INTERNAL MEDICINE

## 2019-11-15 RX ADMIN — GOLIMUMAB 175 MG: 50 SOLUTION INTRAVENOUS at 11:46

## 2019-11-15 RX ADMIN — SODIUM CHLORIDE 25 ML/HR: 900 INJECTION, SOLUTION INTRAVENOUS at 11:40

## 2019-11-15 NOTE — PROGRESS NOTES
[facility] Progress Note    Date: November 15, 2019    Name: Danny Gillespie    MRN: [id number]         : 1940    Ms. Regine Brewer Arrived ambulatory and in no distress for Simponi Aria Regimen. Assessment was completed, no acute issues at this time, no new complaints voiced. 24G PIV placed in left AC. No labs drawn today. Patient reports no active infections or taking any antibiotics at this time. Ms. Rueda's vitals were reviewed. Visit Vitals  /65 (BP 1 Location: Right arm, BP Patient Position: At rest)   Pulse 75   Temp 97.8 °F (36.6 °C)   Resp 16   Ht 5' 1.5\" (1.562 m)   Wt 88.9 kg (196 lb)   SpO2 97%   Breastfeeding? No   BMI 36.43 kg/m²       Medications:  Medications Administered     0.9% sodium chloride infusion     Admin Date  11/15/2019 Action  New Bag Dose  25 mL/hr Rate  25 mL/hr Route  IntraVENous Administered By  Bharat Zavala RN          golimumab (SIMPONI ARIA) 175 mg in 0.9% sodium chloride, overfill volume 110 mL infusion     Admin Date  11/15/2019 Action  Given Dose  175 mg Rate  220 mL/hr Route  IntraVENous Administered By  Bharat Zavala RN              Patient Vitals for the past 12 hrs:   Temp Pulse Resp BP SpO2   11/15/19 1222  69 16 111/55    11/15/19 1052 97.8 °F (36.6 °C) 75 16 126/65 97 %       PIV flushed and removed. Pressure dressing applied. Ms. Regine Brewer tolerated treatment well and was discharged from [place] [address] in stable condition at 1225. She is to return on  at 11am for her next appointment.     Shasha Ventura RN  November 15, 2019

## 2019-12-06 ENCOUNTER — APPOINTMENT (OUTPATIENT)
Dept: INFUSION THERAPY | Age: 79
End: 2019-12-06
Payer: MEDICARE

## 2019-12-20 ENCOUNTER — APPOINTMENT (OUTPATIENT)
Dept: INFUSION THERAPY | Age: 79
End: 2019-12-20

## 2019-12-23 RX ORDER — SODIUM CHLORIDE 9 MG/ML
25 INJECTION, SOLUTION INTRAVENOUS CONTINUOUS
Status: DISPENSED | OUTPATIENT
Start: 2019-12-27 | End: 2019-12-27

## 2019-12-27 ENCOUNTER — HOSPITAL ENCOUNTER (OUTPATIENT)
Dept: INFUSION THERAPY | Age: 79
Discharge: HOME | End: 2019-12-27
Payer: MEDICARE

## 2019-12-27 VITALS
OXYGEN SATURATION: 97 % | DIASTOLIC BLOOD PRESSURE: 53 MMHG | SYSTOLIC BLOOD PRESSURE: 116 MMHG | HEART RATE: 67 BPM | RESPIRATION RATE: 16 BRPM | TEMPERATURE: 97 F | BODY MASS INDEX: 37.14 KG/M2 | WEIGHT: 199.8 LBS

## 2019-12-27 LAB
ALBUMIN SERPL-MCNC: 3.4 G/DL (ref 3.5–5)
ALBUMIN/GLOB SERPL: 1.1 {RATIO} (ref 1.1–2.2)
ALP SERPL-CCNC: 78 U/L (ref 45–117)
ALT SERPL-CCNC: 32 U/L (ref 12–78)
ANION GAP SERPL CALC-SCNC: 3 MMOL/L (ref 5–15)
AST SERPL-CCNC: 32 U/L (ref 15–37)
BASOPHILS # BLD: 0.1 K/UL (ref 0–0.1)
BASOPHILS NFR BLD: 1 % (ref 0–1)
BILIRUB SERPL-MCNC: 0.3 MG/DL (ref 0.2–1)
BUN SERPL-MCNC: 20 MG/DL (ref 6–20)
BUN/CREAT SERPL: 25 (ref 12–20)
CALCIUM SERPL-MCNC: 8.3 MG/DL (ref 8.5–10.1)
CHLORIDE SERPL-SCNC: 107 MMOL/L (ref 97–108)
CO2 SERPL-SCNC: 30 MMOL/L (ref 21–32)
CREAT SERPL-MCNC: 0.81 MG/DL (ref 0.55–1.02)
CRP SERPL HS-MCNC: 0.2 MG/L
DIFFERENTIAL METHOD BLD: ABNORMAL
EOSINOPHIL # BLD: 0.4 K/UL (ref 0–0.4)
EOSINOPHIL NFR BLD: 6 % (ref 0–7)
ERYTHROCYTE [DISTWIDTH] IN BLOOD BY AUTOMATED COUNT: 12.7 % (ref 11.5–14.5)
ERYTHROCYTE [SEDIMENTATION RATE] IN BLOOD: 1 MM/HR (ref 0–30)
GLOBULIN SER CALC-MCNC: 3.1 G/DL (ref 2–4)
GLUCOSE SERPL-MCNC: 110 MG/DL (ref 65–100)
HCT VFR BLD AUTO: 38.9 % (ref 35–47)
HGB BLD-MCNC: 12.5 G/DL (ref 11.5–16)
IMM GRANULOCYTES # BLD AUTO: 0.1 K/UL (ref 0–0.04)
IMM GRANULOCYTES NFR BLD AUTO: 1 % (ref 0–0.5)
LYMPHOCYTES # BLD: 2.4 K/UL (ref 0.8–3.5)
LYMPHOCYTES NFR BLD: 39 % (ref 12–49)
MCH RBC QN AUTO: 32 PG (ref 26–34)
MCHC RBC AUTO-ENTMCNC: 32.1 G/DL (ref 30–36.5)
MCV RBC AUTO: 99.5 FL (ref 80–99)
MONOCYTES # BLD: 0.7 K/UL (ref 0–1)
MONOCYTES NFR BLD: 12 % (ref 5–13)
NEUTS SEG # BLD: 2.6 K/UL (ref 1.8–8)
NEUTS SEG NFR BLD: 41 % (ref 32–75)
NRBC # BLD: 0 K/UL (ref 0–0.01)
NRBC BLD-RTO: 0 PER 100 WBC
PLATELET # BLD AUTO: 198 K/UL (ref 150–400)
PMV BLD AUTO: 10.7 FL (ref 8.9–12.9)
POTASSIUM SERPL-SCNC: 4.4 MMOL/L (ref 3.5–5.1)
PROT SERPL-MCNC: 6.5 G/DL (ref 6.4–8.2)
RBC # BLD AUTO: 3.91 M/UL (ref 3.8–5.2)
SODIUM SERPL-SCNC: 140 MMOL/L (ref 136–145)
WBC # BLD AUTO: 6.2 K/UL (ref 3.6–11)

## 2019-12-27 PROCEDURE — 36415 COLL VENOUS BLD VENIPUNCTURE: CPT

## 2019-12-27 PROCEDURE — 85652 RBC SED RATE AUTOMATED: CPT

## 2019-12-27 PROCEDURE — 86141 C-REACTIVE PROTEIN HS: CPT

## 2019-12-27 PROCEDURE — 96365 THER/PROPH/DIAG IV INF INIT: CPT

## 2019-12-27 PROCEDURE — 85025 COMPLETE CBC W/AUTO DIFF WBC: CPT

## 2019-12-27 PROCEDURE — 74011250636 HC RX REV CODE- 250/636: Performed by: INTERNAL MEDICINE

## 2019-12-27 PROCEDURE — 74011000258 HC RX REV CODE- 258: Performed by: INTERNAL MEDICINE

## 2019-12-27 PROCEDURE — 80053 COMPREHEN METABOLIC PANEL: CPT

## 2019-12-27 RX ORDER — GLIPIZIDE 2.5 MG/1
2.5 TABLET, EXTENDED RELEASE ORAL 2 TIMES DAILY
Qty: 180 TAB | Refills: 1 | Status: SHIPPED | OUTPATIENT
Start: 2019-12-27 | End: 2020-07-03 | Stop reason: SDUPTHER

## 2019-12-27 RX ADMIN — GOLIMUMAB 175 MG: 50 SOLUTION INTRAVENOUS at 12:15

## 2019-12-27 RX ADMIN — SODIUM CHLORIDE 25 ML/HR: 900 INJECTION, SOLUTION INTRAVENOUS at 12:10

## 2019-12-27 NOTE — PROGRESS NOTES
Outpatient Infusion Center   Visit Progress Note    0963 Patient admitted to Health system for [address] ambulatory in stable condition. Assessment completed. No new concerns voiced. Patient Vitals for the past 12 hrs:   Temp Pulse Resp BP SpO2   12/27/19 1245  67 16 116/53    12/27/19 1125 97 °F (36.1 °C) 76 16 135/63 97 %       LAC PIV with positive blood return. Medications:  Simponi Aria IV    1255 Patient tolerated treatment well and declined to remain for post infusion observation. Patient discharged from [place] [address] ambulatory in no distress. Patient aware of next appointment on 2/7 at 6. Recent Results (from the past 12 hour(s))   METABOLIC PANEL, COMPREHENSIVE    Collection Time: 12/27/19 11:35 AM   Result Value Ref Range    Sodium 140 136 - 145 mmol/L    Potassium 4.4 3.5 - 5.1 mmol/L    Chloride 107 97 - 108 mmol/L    CO2 30 21 - 32 mmol/L    Anion gap 3 (L) 5 - 15 mmol/L    Glucose 110 (H) 65 - 100 mg/dL    BUN 20 6 - 20 MG/DL    Creatinine 0.81 0.55 - 1.02 MG/DL    BUN/Creatinine ratio 25 (H) 12 - 20      GFR est AA >60 >60 ml/min/1.73m2    GFR est non-AA >60 >60 ml/min/1.73m2    Calcium 8.3 (L) 8.5 - 10.1 MG/DL    Bilirubin, total 0.3 0.2 - 1.0 MG/DL    ALT (SGPT) 32 12 - 78 U/L    AST (SGOT) 32 15 - 37 U/L    Alk.  phosphatase 78 45 - 117 U/L    Protein, total 6.5 6.4 - 8.2 g/dL    Albumin 3.4 (L) 3.5 - 5.0 g/dL    Globulin 3.1 2.0 - 4.0 g/dL    A-G Ratio 1.1 1.1 - 2.2     SED RATE (ESR)    Collection Time: 12/27/19 11:35 AM   Result Value Ref Range    Sed rate, automated 1 0 - 30 mm/hr   CBC WITH AUTOMATED DIFF    Collection Time: 12/27/19 11:58 AM   Result Value Ref Range    WBC 6.2 3.6 - 11.0 K/uL    RBC 3.91 3.80 - 5.20 M/uL    HGB 12.5 11.5 - 16.0 g/dL    HCT 38.9 35.0 - 47.0 %    MCV 99.5 (H) 80.0 - 99.0 FL    MCH 32.0 26.0 - 34.0 PG    MCHC 32.1 30.0 - 36.5 g/dL    RDW 12.7 11.5 - 14.5 %    PLATELET 884 253 - 871 K/uL    MPV 10.7 8.9 - 12.9 FL    NRBC 0.0 0  WBC ABSOLUTE NRBC 0.00 0.00 - 0.01 K/uL    NEUTROPHILS 41 32 - 75 %    LYMPHOCYTES 39 12 - 49 %    MONOCYTES 12 5 - 13 %    EOSINOPHILS 6 0 - 7 %    BASOPHILS 1 0 - 1 %    IMMATURE GRANULOCYTES 1 (H) 0.0 - 0.5 %    ABS. NEUTROPHILS 2.6 1.8 - 8.0 K/UL    ABS. LYMPHOCYTES 2.4 0.8 - 3.5 K/UL    ABS. MONOCYTES 0.7 0.0 - 1.0 K/UL    ABS. EOSINOPHILS 0.4 0.0 - 0.4 K/UL    ABS. BASOPHILS 0.1 0.0 - 0.1 K/UL    ABS. IMM.  GRANS. 0.1 (H) 0.00 - 0.04 K/UL    DF AUTOMATED

## 2019-12-30 DIAGNOSIS — M12.9 ARTHROPATHY: ICD-10-CM

## 2019-12-30 DIAGNOSIS — M51.36 DEGENERATIVE LUMBAR DISC: ICD-10-CM

## 2019-12-30 DIAGNOSIS — M79.18 MYOFASCIAL MUSCLE PAIN: ICD-10-CM

## 2019-12-31 RX ORDER — GABAPENTIN 300 MG/1
300 CAPSULE ORAL
Qty: 90 CAP | Refills: 1 | Status: SHIPPED | OUTPATIENT
Start: 2019-12-31 | End: 2020-07-30 | Stop reason: DRUGHIGH

## 2020-01-09 ENCOUNTER — OFFICE VISIT (OUTPATIENT)
Dept: FAMILY MEDICINE CLINIC | Age: 80
End: 2020-01-09

## 2020-01-09 VITALS
DIASTOLIC BLOOD PRESSURE: 68 MMHG | SYSTOLIC BLOOD PRESSURE: 103 MMHG | RESPIRATION RATE: 16 BRPM | BODY MASS INDEX: 36.99 KG/M2 | HEART RATE: 80 BPM | WEIGHT: 201 LBS | HEIGHT: 62 IN | TEMPERATURE: 97.9 F

## 2020-01-09 DIAGNOSIS — E53.8 B12 DEFICIENCY: ICD-10-CM

## 2020-01-09 DIAGNOSIS — M51.36 DEGENERATIVE LUMBAR DISC: ICD-10-CM

## 2020-01-09 DIAGNOSIS — M79.18 MYOFASCIAL MUSCLE PAIN: ICD-10-CM

## 2020-01-09 DIAGNOSIS — K21.9 GASTROESOPHAGEAL REFLUX DISEASE, ESOPHAGITIS PRESENCE NOT SPECIFIED: Primary | ICD-10-CM

## 2020-01-09 DIAGNOSIS — M12.9 ARTHROPATHY: ICD-10-CM

## 2020-01-09 DIAGNOSIS — D51.0 PERNICIOUS ANEMIA: ICD-10-CM

## 2020-01-09 RX ORDER — PANTOPRAZOLE SODIUM 40 MG/1
40 TABLET, DELAYED RELEASE ORAL DAILY
Qty: 90 TAB | Refills: 1 | Status: SHIPPED | OUTPATIENT
Start: 2020-01-09 | End: 2020-07-22

## 2020-01-09 RX ORDER — HYDROCODONE BITARTRATE AND ACETAMINOPHEN 5; 325 MG/1; MG/1
1 TABLET ORAL
Qty: 60 TAB | Refills: 0 | Status: SHIPPED | OUTPATIENT
Start: 2020-01-09 | End: 2020-04-10 | Stop reason: SDUPTHER

## 2020-01-09 RX ORDER — CYANOCOBALAMIN 1000 UG/ML
1000 INJECTION, SOLUTION INTRAMUSCULAR; SUBCUTANEOUS ONCE
Qty: 1 ML | Refills: 0
Start: 2020-01-09 | End: 2020-01-09

## 2020-01-09 RX ORDER — LANOLIN ALCOHOL/MO/W.PET/CERES
CREAM (GRAM) TOPICAL
Qty: 60 TAB | Refills: 3 | Status: SHIPPED | OUTPATIENT
Start: 2020-01-09 | End: 2020-06-09 | Stop reason: SDUPTHER

## 2020-01-09 NOTE — PROGRESS NOTES
CC: B12 deficiency    HPI: Pt is a [de-identified] y.o. female who presents for B12 deficiency. She has pernicious anemia and was told she needed to be on this lifelong but had taken a break and is ready to get back on it. She has chronic pain related to her arthritis and takes hydrocodone very sparingly. Her last refill was 5 months ago. The change in weather makes her symptoms worse. Past Medical History:   Diagnosis Date    Anemia     Arthritis     CAD (coronary artery disease)     MI    Degenerative joint disease of right hip 2015     [facility] [place]/Dr. Lorri Mann    Diabetes [facility])     Fracture     right shoulder; T6 compression    Gastritis     H/O Bell's palsy 2012    R-side of face. Dx >20 years ago.  Has persistent R-sided facial droop     Hypercholesterolemia     Hyperlipidemia    Lupus ([address] 2015    Osteoporosis, post-menopausal     vertebral fracture    Squamous cell cancer of skin of left cheek     Squamous cell skin cancer     Right knee       Family History   Problem Relation Age of Onset    Diabetes Mother     Hypertension Mother     Heart Disease Mother     Diabetes Father     Hypertension Father     Heart Disease Father    [facility] Arthritis-rheumatoid Sister     Elevated Lipids Sister    [facility] Arthritis-rheumatoid Sister     Hypertension Sister     Diabetes Sister     Thyroid Disease Sister     Arthritis-rheumatoid Other        Social History     Tobacco Use    Smoking status: Former Smoker     Last attempt to quit: 1989     Years since quittin.0    Smokeless tobacco: Never Used   Substance Use Topics    Alcohol use: No     Alcohol/week: 0.0 standard drinks     Comment: Occasional    Drug use: No       ROS:  Positive only when bolded  Constitutional: F/C, changes in weight (8lb weight gain in the past 4 months)  Musculoskeletal: Myalgias, arthralgias  Neurological: Changes in gait, numbness/tingling (chronic, neuropathy)    PE:  Visit Vitals  /68 (BP 1 Location: Left arm, BP Patient Position: Sitting)   Pulse 80   Temp 97.9 °F (36.6 °C) (Oral)   Resp 16   Ht 5' 1.5\" (1.562 m)   Wt 201 lb (91.2 kg)   BMI 37.36 kg/m²     Gen: Pt sitting in chair, in NAD  Head: Normocephalic, atraumatic  Eyes: Sclera anicteric, EOM grossly intact, PERRL  Throat: MMM  Neck: Supple  CVS: Normal S1, S2, no m/r/g  Resp: CTAB, no wheezes or rales  Extrem: Atraumatic, no cyanosis. Trace edema to ankles b/l  Pulses: 2+   Skin: Warm, dry  Neuro: Alert, oriented, appropriate      A/P:   Encounter Diagnoses     ICD-10-CM ICD-9-CM   1. Gastroesophageal reflux disease, esophagitis presence not specified K21.9 530.81   2. Pernicious anemia D51.0 281.0   3. Arthropathy M12.9 716.90   4. Myofascial muscle pain M79.18 729.1   5. Degenerative lumbar disc M51.36 722.52   6. B12 deficiency E53.8 266.2     1. Pernicious anemia  - ferrous sulfate 325 mg (65 mg iron) tablet; TAKE 1 TABLET BY MOUTH TWICE DAILY FOR LOW IRON  Dispense: 60 Tab; Refill: 3  - VITAMIN B12 INJECTION  - THER/PROPH/DIAG INJECTION, SUBCUT/IM  - cyanocobalamin (VITAMIN B-12) 1,000 mcg/mL injection; 1 mL by IntraMUSCular route once for 1 dose. Dispense: 1 mL; Refill: 0    2. DDD/arthritis:  reviewed and appropriate. Pt does not take medication every day and uses sparingly.   - HYDROcodone-acetaminophen (NORCO) 5-325 mg per tablet; Take 1 Tab by mouth every six (6) hours as needed for Pain for up to 90 days. Dispense: 60 Tab; Refill: 0    3. Gastroesophageal reflux disease, esophagitis presence not specified: Stable, will refill rx  - pantoprazole (PROTONIX) 40 mg tablet; Take 1 Tab by mouth daily. Dispense: 90 Tab; Refill: 1    6. B12 deficiency  - VITAMIN B12 INJECTION  - THER/PROPH/DIAG INJECTION, SUBCUT/IM  - cyanocobalamin (VITAMIN B-12) 1,000 mcg/mL injection; 1 mL by IntraMUSCular route once for 1 dose. Dispense: 1 mL; Refill: 0       RTC in 1 month for B12 injection    Discussed diagnoses in detail with patient. Medication risks/benefits/side effects discussed with patient. All of the patient's questions were addressed. The patient understands and agrees with our plan of care. The patient knows to call back if they are unsure of or forget any changes we discussed today or if the symptoms change. The patient received an After-Visit Summary which contains VS, orders, medication list and allergy list. This can be used as a \"mini-medical record\" should they have to seek medical care while out of town. Current Outpatient Medications on File Prior to Visit   Medication Sig Dispense Refill    gabapentin (NEURONTIN) 300 mg capsule Take 1 Cap by mouth nightly. Take in addition to 600 mg cap at night. 90 Cap 1    glipiZIDE SR (GLUCOTROL XL) 2.5 mg CR tablet Take 1 Tab by mouth two (2) times a day. Indications: type 2 diabetes mellitus 180 Tab 1    nortriptyline (PAMELOR) 10 mg capsule TAKE 1 CAPSULE BY MOUTH EVERY NIGHT 30 Cap 5    trimethoprim (TRIMPEX) 100 mg tablet TAKE 1 TABLET BY MOUTH DAILY FOR URINARY TRACT INFECTION PREVENTION 30 Tab 5    amLODIPine (NORVASC) 5 mg tablet TAKE 1 TABLET BY MOUTH EVERY DAY 90 Tab 0    gabapentin (NEURONTIN) 600 mg tablet TAKE 1 TABLET BY MOUTH THREE TIMES DAILY 90 Tab 5    metoprolol succinate (TOPROL-XL) 25 mg XL tablet TAKE 1 TABLET BY MOUTH EVERY DAY 90 Tab 1    metFORMIN (GLUCOPHAGE) 500 mg tablet TAKE 1 TABLET BY MOUTH TWICE DAILY WITH MEALS 180 Tab 1    cyclobenzaprine (FLEXERIL) 10 mg tablet Take 1 Tab by mouth nightly. 90 Tab 0    leflunomide (ARAVA) 20 mg tablet TAKE 1 TABLET BY MOUTH DAILY 90 Tab 0    leflunomide (ARAVA) 20 mg tablet Take 1 Tab by mouth daily. 90 Tab 0    cyanocobalamin (VITAMIN B12) 1,000 mcg/mL injection 1 mL by IntraMUSCular route every thirty (30) days. Indications: Anemia caused by Vitamin B12 Deficiency-Pernicious Anemia 10 mL 1    atorvastatin (LIPITOR) 40 mg tablet Take 1 Tab by mouth daily. Prescribed and managed by Dr. Delfino Norton.       golimumab (SIMPONI ARIA IV) 2 mg/kg by IntraVENous route every two (2) months.  glucose blood VI test strips (ASCENSIA CONTOUR) strip Glucometer for 250.00 Pt. test blood sugar once daily (in morning) 50 Strip 11    nitroglycerin (NITROSTAT) 0.4 mg SL tablet 1 Tab by SubLINGual route every five (5) minutes as needed for Chest Pain. 4 Tab 0    denosumab (PROLIA) 60 mg/mL injection 1 mL by SubCUTAneous route every 6 months.  acetaminophen (TYLENOL) 650 mg CR tablet Take 1,300 mg by mouth daily as needed for Pain.  Blood-Glucose Meter (CONTOUR METER) monitoring kit Dx: 250. Check blood sugars daily. 1 kit 11    Walker Misc Rolling walker. Patient with spinal stenosis and stopped posture. Pain with limited ambulation. 1 Each 0    ZINC PO Take 50 mg by mouth daily.  cholecalciferol, vitamin d3, (VITAMIN D3) 1,000 unit tablet Take 2,000 Units by mouth daily.  calcium-vitamin D (CALCIUM 500+D) 500 mg(1,250mg) -200 unit per tablet Take 2 Tabs by mouth daily.  aspirin 81 mg Tab Take 81 mg by mouth daily.  MULTIVITAMINS (MULTIVITAMIN PO) Take 1 Tab by mouth daily. No current facility-administered medications on file prior to visit.

## 2020-01-09 NOTE — PATIENT INSTRUCTIONS
Pernicious Anemia: Care Instructions  Your Care Instructions    Pernicious anemia means that you do not have enough red blood cells. It happens when your body can't absorb vitamin B12 from food. Your body needs vitamin B12 to make red blood cells. Red blood cells carry oxygen around your body. Vitamin B12 also helps your nerves work well. Your doctor can treat this problem with vitamin B12 shots. You may also take vitamin B12 by pill or nasal spray. With treatment, most anemia gets better in a few days. But if you have severe anemia, you may need a blood transfusion to give you red blood cells as quickly as possible. Follow-up care is a key part of your treatment and safety. Be sure to make and go to all appointments, and call your doctor if you are having problems. It's also a good idea to know your test results and keep a list of the medicines you take. How can you care for yourself at home? · Be safe with medicines. Take your medicines exactly as prescribed. Call your doctor if you think you are having a problem with your medicine. · Follow your doctor's instructions about vitamin B12 shots, vitamin B12 pills, or a vitamin B12 nasal spray. · Eat a varied diet. Include foods with a lot of vitamin B12, such as eggs, milk, and meat. · Do not drink alcohol while you are being treated. Alcohol can prevent the body from absorbing vitamin B12. · Eat foods that have folate (also called folic acid). This is another type of B vitamin. Foods with folate include leafy green vegetables, citrus fruits, and fortified cereals. When should you call for help? Call 911 anytime you think you may need emergency care.  For example, call if:    · You passed out (lost consciousness).    Call your doctor now or seek immediate medical care if:    · You are dizzy or light-headed, or you feel like you may faint.     · You have new or worse bleeding.     · You are short of breath.    Watch closely for changes in your health, and be sure to contact your doctor if:    · You have numbness or tingling in your hands or feet.     · You feel weaker or more tired.     · You have trouble with balance or coordination.     · You do not get better as expected. Where can you learn more? Go to http://jennifer-vincent.info/. Enter V130 in the search box to learn more about \"Pernicious Anemia: Care Instructions. \"  Current as of: March 28, 2019  Content Version: 12.2  © 0886-1869 [employer]. Care instructions adapted under license by [employer] (which disclaims liability or warranty for this information). If you have questions about a medical condition or this instruction, always ask your healthcare professional. [address] any warranty or liability for your use of this information.

## 2020-01-31 ENCOUNTER — APPOINTMENT (OUTPATIENT)
Dept: INFUSION THERAPY | Age: 80
End: 2020-01-31
Payer: MEDICARE

## 2020-02-04 RX ORDER — SODIUM CHLORIDE 9 MG/ML
25 INJECTION, SOLUTION INTRAVENOUS CONTINUOUS
Status: DISPENSED | OUTPATIENT
Start: 2020-02-07 | End: 2020-02-07

## 2020-02-07 ENCOUNTER — HOSPITAL ENCOUNTER (OUTPATIENT)
Dept: INFUSION THERAPY | Age: 80
Discharge: HOME | End: 2020-02-07
Payer: MEDICARE

## 2020-02-07 VITALS
RESPIRATION RATE: 16 BRPM | SYSTOLIC BLOOD PRESSURE: 139 MMHG | WEIGHT: 201.4 LBS | TEMPERATURE: 96.5 F | OXYGEN SATURATION: 92 % | HEART RATE: 108 BPM | BODY MASS INDEX: 37.06 KG/M2 | DIASTOLIC BLOOD PRESSURE: 72 MMHG | HEIGHT: 62 IN

## 2020-02-07 LAB
ALBUMIN SERPL-MCNC: 3.5 G/DL (ref 3.5–5)
ANION GAP SERPL CALC-SCNC: 7 MMOL/L (ref 5–15)
BUN SERPL-MCNC: 21 MG/DL (ref 6–20)
BUN/CREAT SERPL: 21 (ref 12–20)
CALCIUM SERPL-MCNC: 8.1 MG/DL (ref 8.5–10.1)
CHLORIDE SERPL-SCNC: 107 MMOL/L (ref 97–108)
CO2 SERPL-SCNC: 27 MMOL/L (ref 21–32)
COMMENT, HOLDF: NORMAL
CREAT SERPL-MCNC: 0.99 MG/DL (ref 0.55–1.02)
GLUCOSE SERPL-MCNC: 118 MG/DL (ref 65–100)
MAGNESIUM SERPL-MCNC: 2 MG/DL (ref 1.6–2.4)
PHOSPHATE SERPL-MCNC: 3.6 MG/DL (ref 2.6–4.7)
POTASSIUM SERPL-SCNC: 4.4 MMOL/L (ref 3.5–5.1)
SAMPLES BEING HELD,HOLD: NORMAL
SODIUM SERPL-SCNC: 141 MMOL/L (ref 136–145)

## 2020-02-07 PROCEDURE — 74011250636 HC RX REV CODE- 250/636: Performed by: INTERNAL MEDICINE

## 2020-02-07 PROCEDURE — 74011000258 HC RX REV CODE- 258: Performed by: INTERNAL MEDICINE

## 2020-02-07 PROCEDURE — 96372 THER/PROPH/DIAG INJ SC/IM: CPT

## 2020-02-07 PROCEDURE — 80069 RENAL FUNCTION PANEL: CPT

## 2020-02-07 PROCEDURE — 83735 ASSAY OF MAGNESIUM: CPT

## 2020-02-07 PROCEDURE — 96413 CHEMO IV INFUSION 1 HR: CPT

## 2020-02-07 PROCEDURE — 36415 COLL VENOUS BLD VENIPUNCTURE: CPT

## 2020-02-07 RX ADMIN — SODIUM CHLORIDE 25 ML/HR: 0.9 INJECTION, SOLUTION INTRAVENOUS at 11:40

## 2020-02-07 RX ADMIN — GOLIMUMAB 175 MG: 50 SOLUTION INTRAVENOUS at 11:42

## 2020-02-07 RX ADMIN — DENOSUMAB 60 MG: 60 INJECTION SUBCUTANEOUS at 12:30

## 2020-02-07 NOTE — PROGRESS NOTES
Outpatient Infusion Center Short Visit Progress Note    1030 Patient admitted to [facility] for Simponi and Prolia ambulatory in stable condition. Assessment completed. No new concerns voiced. Vital Signs:  Visit Vitals  /72   Pulse (!) 108   Temp 96.5 °F (35.8 °C)   Resp 16   Ht 5' 2\" (1.575 m)   Wt 91.4 kg (201 lb 6.4 oz)   SpO2 92%   Breastfeeding No   BMI 36.84 kg/m²         24 G PIV in left AC with positive blood return. Lab Results:  Recent Results (from the past 12 hour(s))   RENAL FUNCTION PANEL    Collection Time: 02/07/20 10:46 AM   Result Value Ref Range    Sodium 141 136 - 145 mmol/L    Potassium 4.4 3.5 - 5.1 mmol/L    Chloride 107 97 - 108 mmol/L    CO2 27 21 - 32 mmol/L    Anion gap 7 5 - 15 mmol/L    Glucose 118 (H) 65 - 100 mg/dL    BUN 21 (H) 6 - 20 MG/DL    Creatinine 0.99 0.55 - 1.02 MG/DL    BUN/Creatinine ratio 21 (H) 12 - 20      GFR est AA >60 >60 ml/min/1.73m2    GFR est non-AA 54 (L) >60 ml/min/1.73m2    Calcium 8.1 (L) 8.5 - 10.1 MG/DL    Phosphorus 3.6 2.6 - 4.7 MG/DL    Albumin 3.5 3.5 - 5.0 g/dL   MAGNESIUM    Collection Time: 02/07/20 10:46 AM   Result Value Ref Range    Magnesium 2.0 1.6 - 2.4 mg/dL   SAMPLES BEING HELD    Collection Time: 02/07/20 10:46 AM   Result Value Ref Range    SAMPLES BEING HELD 1PST     COMMENT        Add-on orders for these samples will be processed based on acceptable specimen integrity and analyte stability, which may vary by analyte.            Medications:  Medications Administered     0.9% sodium chloride infusion     Admin Date  02/07/2020 Action  New Bag Dose  25 mL/hr Rate  25 mL/hr Route  IntraVENous Administered By  Gerardo Blue RN          denosumab (PROLIA) injection 60 mg     Admin Date  02/07/2020 Action  Given Dose  60 mg Route  SubCUTAneous Administered By  Gerardo Blue RN          golimumab (SIMPONI ARIA) 175 mg in 0.9% sodium chloride, overfill volume 110 mL infusion     Admin Date  02/07/2020 Action  Given Dose  175 mg Rate  220 mL/hr Route  IntraVENous Administered By  Jessica Hendrickson, VIVIANA                Patient tolerated treatment well. Patient discharged from [facility] ambulatory in no distress at 1240. Patient aware of next appointment.     Future Appointments   Date Time Provider Yocasta Amelia   2/12/2020  2:20 PM Lula Carter MD [address] [facility]   2/17/2020  1:20 PM Frankie Alanis MD [place] [id number] [facility]   2/17/2020  3:00 PM SS INF5 CH1 >7H [facility]   3/5/2020 10:30 AM Frankie Alanis MD [facility]   8/21/2020 11:00 AM SS INF5 CH4 <1H [facility] No

## 2020-02-09 DIAGNOSIS — M81.0 AGE-RELATED OSTEOPOROSIS WITHOUT CURRENT PATHOLOGICAL FRACTURE: ICD-10-CM

## 2020-02-09 DIAGNOSIS — E55.9 VITAMIN D DEFICIENCY: ICD-10-CM

## 2020-02-09 DIAGNOSIS — I73.00 PRIMARY RAYNAUD'S PHENOMENON: ICD-10-CM

## 2020-02-09 DIAGNOSIS — N18.2 CKD (CHRONIC KIDNEY DISEASE) STAGE 2, GFR 60-89 ML/MIN: ICD-10-CM

## 2020-02-10 RX ORDER — AMLODIPINE BESYLATE 5 MG/1
TABLET ORAL
Qty: 90 TAB | Refills: 0 | Status: SHIPPED | OUTPATIENT
Start: 2020-02-10 | End: 2020-02-12 | Stop reason: SDUPTHER

## 2020-02-12 ENCOUNTER — OFFICE VISIT (OUTPATIENT)
Dept: RHEUMATOLOGY | Age: 80
End: 2020-02-12

## 2020-02-12 VITALS
HEIGHT: 62 IN | HEART RATE: 102 BPM | DIASTOLIC BLOOD PRESSURE: 69 MMHG | RESPIRATION RATE: 18 BRPM | SYSTOLIC BLOOD PRESSURE: 109 MMHG | WEIGHT: 199 LBS | TEMPERATURE: 97.6 F | BODY MASS INDEX: 36.62 KG/M2

## 2020-02-12 DIAGNOSIS — R93.89 ABNORMAL FINDINGS ON DIAGNOSTIC IMAGING OF OTHER SPECIFIED BODY STRUCTURES: ICD-10-CM

## 2020-02-12 DIAGNOSIS — E55.9 VITAMIN D DEFICIENCY: ICD-10-CM

## 2020-02-12 DIAGNOSIS — I73.00 PRIMARY RAYNAUD'S PHENOMENON: ICD-10-CM

## 2020-02-12 DIAGNOSIS — Z79.60 LONG-TERM USE OF IMMUNOSUPPRESSANT MEDICATION: ICD-10-CM

## 2020-02-12 DIAGNOSIS — M06.09 SERONEGATIVE RHEUMATOID ARTHRITIS OF MULTIPLE SITES (HCC): Primary | ICD-10-CM

## 2020-02-12 DIAGNOSIS — M81.0 AGE-RELATED OSTEOPOROSIS WITHOUT CURRENT PATHOLOGICAL FRACTURE: ICD-10-CM

## 2020-02-12 DIAGNOSIS — N18.2 CKD (CHRONIC KIDNEY DISEASE) STAGE 2, GFR 60-89 ML/MIN: ICD-10-CM

## 2020-02-12 RX ORDER — LEFLUNOMIDE 20 MG/1
20 TABLET ORAL DAILY
Qty: 90 TAB | Refills: 0 | Status: SHIPPED | OUTPATIENT
Start: 2020-02-12 | End: 2020-02-12

## 2020-02-12 RX ORDER — LEFLUNOMIDE 20 MG/1
20 TABLET ORAL DAILY
Qty: 90 TAB | Refills: 1 | Status: SHIPPED | OUTPATIENT
Start: 2020-02-12 | End: 2020-05-18 | Stop reason: SDUPTHER

## 2020-02-12 RX ORDER — AMLODIPINE BESYLATE 5 MG/1
5 TABLET ORAL DAILY
Qty: 90 TAB | Refills: 0 | Status: SHIPPED | OUTPATIENT
Start: 2020-02-12 | End: 2020-05-11

## 2020-02-12 NOTE — PROGRESS NOTES
REASON FOR VISIT    This is a follow-up visit for Ms. Rueda for     ICD-10-CM   1. Seronegative rheumatoid arthritis of multiple sites ([facility] M06.09   2. Age-related osteoporosis without current pathological fracture M81.0      Inflammatory arthritis phenotype includes:  Anti-CCP positive: no  Rheumatoid factor positive: no  Erosive disease: yes  Extra-articular manifestations include: Raynaud's    Immunosuppression Screening (2/20/2019):   Quantiferon TB: negative   PPD:  Not performed  Hepatitis B: negative    Hepatitis C: negative     Therapy History includes:  Current DMARD therapy include: Simponi Aria every 8 weeks (12/07/2018 to present)  Prior DMARD therapy include: methotrexate 12.5 mg subcutaneous, leflunomide 20 mg daily (2/13/2018 to 10/2019), hydroxychloroquine 300 mg daily, Humira (5/10/2018: 3 samples: cost)  Discontinued DMARDs because of inefficacy: None  Discontinued DMARDs because of side effects: methotrexate (elevated LFTs), leflunomide (elevated LFTs)  Contra-Indicated DMARDs because of chronic kidney disease: methotrexate     Osteoporosis Historical Synopsis    Height loss since age 26 (at least two inches): 5  Fracture history includes: yes (T5-T6)  Family history of hip fracture: no  Fall Risk: no    Daily calcium intake is 1800 mg  Daily vitamin D intake is 2000 IU    Smoking history: no  Alcohol consumption: no  Prednisone history: no    Exercise: no    Previous work-up for osteoporosis includes the following:  DEXA Scan: 8/16/2019  Vitamin 25OH D level: 46.6 (21/3/2018)  PTH: 21 (21/3/2018)  TSH: 2.570 (21/3/2018)    Therapy History includes:  Current osteoporosis therapy includes: Prolia (3/29/2018 to present)  Prior osteoporosis therapy includes: Reclast (2 years)  The following osteoporosis therapy have been ineffective: Reclast  The following osteoporosis therapy were stopped because of side effects: none    Immunizations:   Immunization History   Administered Date(s) Administered   Anmol (RETIRED) Pneumococcal Vaccine (Unspecified Type) 06/05/2008    Influenza High Dose Vaccine PF 08/05/2016, 09/13/2017    Influenza Vaccine 12/05/2013, 10/01/2018, 08/27/2019    Influenza Vaccine (Quad) 09/06/2016    Influenza Vaccine (Quad) PF 11/10/2014    Influenza Vaccine (Tri) Adjuvanted 09/27/2018, 09/05/2019    Influenza Vaccine PF 10/20/2015    Influenza Vaccine Split 10/25/2010, 11/14/2011, 10/10/2012    Pneumococcal Conjugate (PCV-13) 09/13/2017    Pneumococcal Polysaccharide (PPSV-23) 12/05/2013    Tdap 12/05/2013     Active problems include:    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    Long-term use of immunosuppressant medication Z79.899    Long-term use of Plaquenil Z79.899    Hypocalcemia E83.51    Low back pain M54.5    Age-related osteoporosis without current pathological fracture M81.0    Diabetic polyneuropathy ([facility] E11.42    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 Anita Thakkar returns for a follow-up.     On her last visit, I continued leflunomide 20 mg daily and changed her Simponi Aria infusions to every 6 weeks due to breakthrough with good tolerance. Her most recent infusion was 2/07/2020. Her most recent Prolia was on 2/07/2020 with good tolerance. I also ordered a DXA. She stopped leflunomide in 10/2019 by mistake and has felt somewhat worse. Today, she feels better. She reports intermittent pain in her hands in the morning associated with swelling and stiffness lasting 30 minutes to hours that improves with use. She uses Advil at times with some relief. Warm water helps. Cold makes it worse. Amlodipine helps her Raynaud's. She endorses interval fall without fracture, easy bruising. She denies fever, weight loss, blurred vision, vision loss, oral ulcers, ankle swelling, dry cough, dyspnea, nausea, vomiting, dysphagia, abdominal pain, black or bloody stool, rash and increased thirst.    Ms. Jovanny Rowe has continued her medications for arthritis and reports good tolerance without significant side effects. Last toxicity monitoring by blood work was done on 6/21/2019 and did not reveal any significant adverse effects, except eGFR 57, AST 39, albumin 3.4. Most recent inflammatory markers from 12/27/2019 revealed a ESR 1 mm/hr and CRP N/A mg/L. The patient has not had any interval hospital admissions, infections but had bilateral cataracts. REVIEW OF SYSTEMS    A comprehensive review of systems was performed and pertinent results are documented in the HPI, review of systems is otherwise non-contributory. PAST MEDICAL HISTORY    She has a past medical history of Anemia, Arthritis, CAD (coronary artery disease) (1997), Degenerative joint disease of right hip (09/14/2015 ), Diabetes ([facility] Fracture, Gastritis, H/O Bell's palsy (7/9/2012), Hypercholesterolemia, Lupus ([address] (1/9/2015), Osteoporosis, post-menopausal, Squamous cell cancer of skin of left cheek, and Squamous cell skin cancer.     FAMILY HISTORY    Her family history includes Arthritis-rheumatoid in her sister, sister and another family member; Diabetes in her father, mother, and sister; Elevated Lipids in her sister; Heart Disease in her father and mother; Hypertension in her father, mother, and sister; Thyroid Disease in her sister. SOCIAL HISTORY    She reports that she quit smoking about 30 years ago. She has never used smokeless tobacco. She reports that she does not drink alcohol or use drugs. MEDICATIONS    Current Outpatient Medications   Medication Sig Dispense Refill    amLODIPine (NORVASC) 5 mg tablet Take 1 Tab by mouth daily. 90 Tab 0    leflunomide (ARAVA) 20 mg tablet Take 1 Tab by mouth daily. 90 Tab 1    pantoprazole (PROTONIX) 40 mg tablet Take 1 Tab by mouth daily. 90 Tab 1    ferrous sulfate 325 mg (65 mg iron) tablet TAKE 1 TABLET BY MOUTH TWICE DAILY FOR LOW IRON 60 Tab 3    HYDROcodone-acetaminophen (NORCO) 5-325 mg per tablet Take 1 Tab by mouth every six (6) hours as needed for Pain for up to 90 days. 60 Tab 0    gabapentin (NEURONTIN) 300 mg capsule Take 1 Cap by mouth nightly. Take in addition to 600 mg cap at night. (Patient taking differently: Take 300 mg by mouth nightly.) 90 Cap 1    glipiZIDE SR (GLUCOTROL XL) 2.5 mg CR tablet Take 1 Tab by mouth two (2) times a day. Indications: type 2 diabetes mellitus 180 Tab 1    nortriptyline (PAMELOR) 10 mg capsule TAKE 1 CAPSULE BY MOUTH EVERY NIGHT 30 Cap 5    trimethoprim (TRIMPEX) 100 mg tablet TAKE 1 TABLET BY MOUTH DAILY FOR URINARY TRACT INFECTION PREVENTION 30 Tab 5    gabapentin (NEURONTIN) 600 mg tablet TAKE 1 TABLET BY MOUTH THREE TIMES DAILY 90 Tab 5    metoprolol succinate (TOPROL-XL) 25 mg XL tablet TAKE 1 TABLET BY MOUTH EVERY DAY 90 Tab 1    metFORMIN (GLUCOPHAGE) 500 mg tablet TAKE 1 TABLET BY MOUTH TWICE DAILY WITH MEALS 180 Tab 1    cyclobenzaprine (FLEXERIL) 10 mg tablet Take 1 Tab by mouth nightly. 90 Tab 0    cyanocobalamin (VITAMIN B12) 1,000 mcg/mL injection 1 mL by IntraMUSCular route every thirty (30) days.  Indications: Anemia caused by Vitamin B12 Deficiency-Pernicious Anemia 10 mL 1    atorvastatin (LIPITOR) 40 mg tablet Take 1 Tab by mouth daily. Prescribed and managed by Dr. Gerri Navarrete.  golimumab (SIMPONI ARIA IV) 2 mg/kg by IntraVENous route every six (6) weeks.  glucose blood VI test strips (ASCENSIA CONTOUR) strip Glucometer for 250.00 Pt. test blood sugar once daily (in morning) 50 Strip 11    nitroglycerin (NITROSTAT) 0.4 mg SL tablet 1 Tab by SubLINGual route every five (5) minutes as needed for Chest Pain. 4 Tab 0    denosumab (PROLIA) 60 mg/mL injection 1 mL by SubCUTAneous route every 6 months.  acetaminophen (TYLENOL) 650 mg CR tablet Take 1,300 mg by mouth daily as needed for Pain.  Blood-Glucose Meter (CONTOUR METER) monitoring kit Dx: 250. Check blood sugars daily. 1 kit 11    Walker Misc Rolling walker. Patient with spinal stenosis and stopped posture. Pain with limited ambulation. 1 Each 0    ZINC PO Take 50 mg by mouth daily.  cholecalciferol, vitamin d3, (VITAMIN D3) 1,000 unit tablet Take 2,000 Units by mouth daily.  calcium-vitamin D (CALCIUM 500+D) 500 mg(1,250mg) -200 unit per tablet Take 2 Tabs by mouth daily.  aspirin 81 mg Tab Take 81 mg by mouth daily.  MULTIVITAMINS (MULTIVITAMIN PO) Take 1 Tab by mouth daily. ALLERGIES    Allergies   Allergen Reactions    Cephalosporins Nausea and Vomiting     Severe vomiting       PHYSICAL EXAMINATION    Visit Vitals  /69   Pulse (!) 102   Temp 97.6 °F (36.4 °C)   Resp 18   Ht 5' 2\" (1.575 m)   Wt 199 lb (90.3 kg)   BMI 36.40 kg/m²     Body mass index is 36.4 kg/m². General: Patient is alert, oriented x 3, not in acute distress, daughter at bedside    HEENT:   Sclerae are not injected and appear moist.  There is no alopecia. Neck is supple     Cardiovascular:  Heart is regular rate and rhythm, no murmurs. Chest:  Lungs are clear to auscultation bilaterally. No rhonchi, wheezes, or crackles.     Extremities:  Free of clubbing, cyanosis    Neurological exam:  Muscle strength is full in upper and lower extremities     Skin exam:    Psoriasis:     no  Nail Pitting:     no  Onycholysis:     no  Palmoplantar pustulosis:   no  Acne fulminans:    no  Acne conglobata:    no  Hidradenitis Suppurativa:   no  Dissecting cellulitis of the scalp:  no  Pilonidal sinus:    no  Erythema nodosum:    no  Non-Scarring Alopecia:  no  Discoid Lupus:   no  Subacute Cutaneous Lupus:   no  Heliotrope Rash:   no  Upper Arm Erythema:   no  Shawl Sign:    no  V-sign:     no  Holster sign:    no  Gottron's papules:   no  Gottron's sign:    no  Calcinosis:    no  Raynaud's Phenomenon:  no  's Hands:   no  Periungual erythema:   no  Abnormal Nailfold Capillaries:  no  Livedo Reticularis:   no  Scalp Erythema:   no  Facial Rosacea: Yes    Musculoskeletal:  A comprehensive musculoskeletal exam was performed for all joints of each upper and lower extremity and assessed for swelling, tenderness and range of motion.       Bilateral Sharla and Heberden nods  Bilateral knee crepitus without effusion    Joint Count 2/12/2020 8/7/2019 2/20/2019 11/20/2018 8/20/2018 5/10/2018 3/15/2018   Patient pain (0-100) 50 50 50 80 80 25 10   MHAQ 0.375 0.125 0.125 0 0.125 0.25 0   Left wrist- Tender - - - 1 1 1 -   Left wrist- Swollen - - - 1 1 1 -   Left 1st MCP - Tender - - 1 1 1 - -   Left 1st MCP - Swollen - - 1 1 1 - -   Left 2nd MCP - Tender 1 - 0 0 1 1 1   Left 2nd MCP - Swollen 1 - 1 1 1 1 1   Left 3rd MCP - Tender - 1 1 1 1 1 1   Left 3rd MCP - Swollen - 0 1 1 1 1 1   Left 4th MCP - Tender - - 1 1 1 1 1   Left 4th MCP - Swollen - - 0 0 1 - -   Left 5th MCP - Tender - 1 1 0 - - -   Left 5th MCP - Swollen - 0 1 1 - 1 -   Left thumb IP - Tender - - - - 1 1 -   Left 2nd PIP - Tender 1 - 1 1 1 1 -   Left 2nd PIP - Swollen 1 - 1 1 1 - -   Left 3rd PIP - Tender 1 1 1 1 1 1 -   Left 3rd PIP - Swollen 1 0 1 1 1 1 -   Left 4th PIP - Tender 1 1 1 1 1 1 1   Left 4th PIP - Swollen 1 0 1 1 1 - 1   Left 5th PIP - Tender 1 1 1 1 1 1 -   Left 5th PIP - Swollen 1 0 1 1 1 - -   Right wrist- Tender - - - - - - 1   Right wrist- Swollen - - - - - - 1   Right 1st MCP - Tender - - 1 1 - - -   Right 1st MCP - Swollen - - 1 1 - - -   Right 2nd MCP - Tender - - 1 1 1 1 1   Right 2nd MCP - Swollen - - 1 1 1 1 1   Right 3rd MCP - Tender - - 1 0 1 1 1   Right 3rd MCP - Swollen - - 1 1 1 1 1   Right 4th MCP - Tender - - 0 1 1 - 1   Right 4th MCP - Swollen - - 1 1 1 - 1   Right 5th MCP - Tender - - 0 - 1 - -   Right 5th MCP - Swollen - - 1 - - 1 1   Right 2nd PIP - Tender - 1 1 1 1 1 -   Right 2nd PIP - Swollen - 0 1 1 1 - -   Right 3rd PIP - Tender 1 1 1 1 1 1 1   Right 3rd PIP - Swollen 1 0 0 1 - - 1   Right 4th PIP - Tender 1 - - 1 - 1 1   Right 4th PIP - Swollen 0 - - 1 - - -   Right 5th PIP - Tender 1 1 0 1 - 1 1   Right 5th PIP - Swollen 0 0 1 1 - - -   Tender Joint Count (Total) 8 8 13 15 16 15 11   Swollen Joint Count (Total) 6 0 15 17 13 8 9   Physician Assessment (0-10) 2 2 4 4 4 3 3   Patient Assessment (0-10) 5 5 2.5 8 5 5 1.5   CDAI Total (calculated) 21 15 34.5 44 38 31 24.5       DATA REVIEW    Laboratory     Recent laboratory results were reviewed, summarized, and discussed with the patient. Imaging    Musculoskeletal Ultrasound    None    Radiographs    Bilateral Hand 2/13/2018: RIGHT: severe osteopenia without fracture. There are scattered degenerative changes. Progressive first Aia 16 joint. No new erosive changes. .  Vascular calcifications. LEFT: severe osteopenia. Scattered degenerative changes as noted previously with progression of the index finger and middle finger DIP joints. There is a new erosion at the index finger proximal phalanx ulnar base without adjacent joint space loss. There are vascular calcifications. Calcification overlying the DRUJ is favored represent overlying vascular calcifications.      Bilateral Foot 2/13/2018: RIGHT: no acute fracture or dislocation. Alignment is anatomic.  Mild degenerative changes are seen in the left first MTP joint. Minimal degenerative changes are seen in the right first MTP joint. A possible erosion is seen in the head of the left first metatarsal. No erosive changes are seen in the right foot. Plantar calcaneal heel spurs are noted in both feet, as well as enthesophyte formation at the Achilles insertion site. Arterial vascular calcifications are also noted bilaterally. Mild soft tissue swelling surrounds the left first MTP joint. LEFT: no acute fracture or dislocation. Alignment is anatomic. Mild degenerative changes are seen in the left first MTP joint. Minimal degenerative changes are seen in the right first MTP joint. A possible erosion is seen in the head of the left first metatarsal. No erosive changes are seen in the right foot. Plantar calcaneal heel spurs are noted in both feet, as well as enthesophyte formation at the Achilles insertion site. Arterial vascular calcifications are also noted bilaterally. Mild soft tissue swelling surrounds the left first MTP joint. Thoracic Spine 11/03/2016: The posterior battery pack is again seen within the intrathecal catheter in stable position. Kyphoplasty material is present in a midthoracic vertebral body is stable mild chronic compression of the adjacent inferior vertebral body. The remaining vertebral body heights are maintained. Anterior osteophytes are noted. There is no abnormality in alignment. Lumbar Spine 10/19/2016: significant disc space narrowing at L3-L4 with 14 mm anterolisthesis. There is facet arthropathy. The vertebral body heights are maintained. Spinal catheters are seen. There are atherosclerotic vascular calcifications. Bilateral Hips 8/31/2015: no fracture, dislocation or other acute abnormality. There osteoarthritic pattern change of both hips, more severe on the right. There is nonuniform joint space narrowing and marginal osteophytes.  There is remodeling of the right femur head with subchondral cystic formation. Mild sacroiliac osteoarthritic change. Lower lumbar spondylosis is noted. Wire  leads are seen in the left lower lumbar spine likely reflective of spinal stimulator device. Bilateral Hand 3/01/2011: RIGHT: mild osteopenia. There are degenerative changes of the distal and proximal interphalangeal joints and the base of the thumb. There is no fracture or other acute abnormality. Scattered calcifications are noted in the radial and ulnar arteries. LEFT: mild osteopenia. There are degenerative changes of the distal and proximal interphalangeal joints and  at the base of the thumb. There is no fracture or acute abnormality. There are vascular calcifications of the radial and ulnar arteries. CT Imaging    None    MR Imaging    MRI Brain with without contrast 12/19/2016: There is sulcal and ventricular prominence. Confluent periventricular and scattered foci of increased T2 signal intensity in the corona radiata and centrum semiovale. Tiny remote lacunar infarction in the right cerebellum. There is no intracranial mass, hemorrhage or evidence of acute infarction. There is no Chiari or syrinx. The pituitary and infundibulum are grossly unremarkable. There is no skull base mass. Cerebellopontine angles are grossly unremarkable. The major intracranial vascular flow-voids are unremarkable. No evidence of demyelinating process. There is no abnormal parenchymal enhancement. There is no abnormal meningeal enhancement. The cavernous sinuses are symmetric. Optic chiasm and infundibulum grossly unremarkable. Orbits are grossly symmetric. Dural venous sinuses are grossly patent. The brain architecture is normal. There is no evidence of midline shift or mass-effect. There are no extra-axial fluid collections. The mastoid air cells and paranasal sinuses are well pneumatized and clear. MRI Lumbar Spine with and without contrast 8/07/2013: There is transitional lumbosacral anatomy.  In keeping with prior studies, lowest fully formed disc is labeled L5-S1. There has been prior  laminectomies at L3-5 with no change in grade 1 anterolisthesis at L3-4 which measures 5 mm. There is grade 1 anterolisthesis of L4-5 of approximately 2 mm, unchanged. Vertebral body heights are maintained. There is no marrow edema or compression fracture. There are reactive endplate changes at G6-6.  The conus medullaris terminates at L1-2. There is no abnormal intraspinal enhancement. L1/2:  The spinal canal and foramina are widely patent. L2/3:  Diffuse disc bulge, facet hypertrophy and ligamentum flavum thickening cause mild spinal canal and moderate right foraminal stenosis. Left foramen is patent. L3/4: There is uncovering of disc material with facet hypertrophy resulting in mild thecal sac narrowing. There is severe bilateral foraminal stenosis similar to the prior study. L4/5: Ahsan Mallet is diffuse disc bulge and facet hypertrophy, without significant spinal canal stenosis. There is mild right foraminal stenosis. L5/S1:  The spinal canal and foramina are widely patent. DXA     DXA 8/16/2019: (excluded Possible increased density in the L2 and T6 vertebral bodies) left femoral neck T score: -1.1 (0.881 g/cm2), left total hip T score: -0.9 (0.889 g/cm2), right femoral neck T score: -0.7 (0.945 g/cm2), right total hip T score: -0.5 (0.941 g/cm2), and distal one third left radius T score -2.4 (BMD 0.542 g/cm2). FRAX score 21 % probability in 10 years for major osteoporotic fracture and 39 % 10 year probability of hip fracture. DXA 8/08/2017: (excluded Lumbar spine because of overlying wires and degenerative change and kyphoplasty of L4) showed left femoral neck T score: -1.4 (0.837 g/cm2), left total hip T score: -1.4 (0.830 g/cm2), right femoral neck T score: -0.9 (0.919 g/cm2), right total hip T score: -0.9 (0.900 g/cm2), and distal one third left radius T score -3.0 (BMD 0.615 g/cm2).  FRAX score 16.8 % probability in 10 years for major osteoporotic fracture and 3.2 % 10 year probability of hip fracture. Nuclear Medicine    Nuclear Medicine Bone Scan 1/22/2013: There is intense radiotracer uptake in the upper thoracic spine, likely T5. There is uptake in the region of the sternoclavicular joints and left shoulder, likely degenerative. ASSESSMENT AND PLAN    This is a follow-up visit for Ms. Nunez Prudent. 1) Seronegative Erosive Rheumatoid Arthritis. She is maintained on Simponi Aria every 6 weeks, with good tolerance. She is no longer on leflunomide 20 mg daily by mistake and has felt worse coming. Her most recent infusion was 2/07/2020. Her CDAI was 21 (previously 15, 34.5, 44, 38, 31, 24.5) with 8 tender and 6 swollen joints, consistent with moderate disease activity. I asked her to resume leflunomide. 2) Long Term Use of Immunosuppressants. The patient remains on immunomodulatory medications (Rachel Walden) and requires frequent toxicity monitoring by blood work. Respective labs were ordered (CBC and CMP). 3) Age-Related Osteoporosis. (multiple vertebral fractures) Her most recent DXA on  8/16/2019 showed distal one third left radius T score -2.4 (BMD 0.542 g/cm2). FRAX score 21 % probability in 10 years for major osteoporotic fracture and 39 % 10 year probability of hip fracture. She is maintained on Prolia with good tolerance. Her most recent dose was 2/07/2020. 4) Primary Raynauds phenomenon. She is on amlodipine 5 mg daily. This was not an active issue today. She may take up to 10 mg if she becomes symptomatic. 5) Chronic Kidney Disease Stage 2-3. I will continue to monitor. The patient voiced understanding of the aforementioned assessment and plan. Summary of plan was provided in the After Visit Summary patient instructions.      TODAY'S ORDERS    Orders Placed This Encounter    amLODIPine (NORVASC) 5 mg tablet    leflunomide (ARAVA) 20 mg tablet     Future Appointments   Date Time Provider Yocasta Cisneros 2/17/2020  1:20 PM MD ANNA Jorge WARREN SCHED   2/17/2020  3:00 PM [facility]   3/5/2020 10:30 AM MD ANNA Jorge WARREN SCHED   8/12/2020  2:00 PM Cara Santos MD [address] [facility]   8/21/2020 11:00 AM SS INF5 CH4 <1H [facility] Aniyah Jung MD, [address] [facility]    Adult Rheumatology   Rheumatology Ultrasound Certified  [facility]  [facility] of [facility]  [address], [address] [facility], [address]   Phone [phone]  Fax [phone]

## 2020-02-12 NOTE — PROGRESS NOTES
Chief Complaint   Patient presents with    Arthritis     1. Have you been to the ER, urgent care clinic since your last visit? Hospitalized since your last visit? No    2. Have you seen or consulted any other health care providers outside of the [address] [place] [address] since your last visit? Include any pap smears or colon screening.  Yes, eye doctor

## 2020-02-17 ENCOUNTER — APPOINTMENT (OUTPATIENT)
Dept: INFUSION THERAPY | Age: 80
End: 2020-02-17
Payer: MEDICARE

## 2020-02-20 ENCOUNTER — OFFICE VISIT (OUTPATIENT)
Dept: FAMILY MEDICINE CLINIC | Age: 80
End: 2020-02-20

## 2020-02-20 VITALS
TEMPERATURE: 97.5 F | BODY MASS INDEX: 36.47 KG/M2 | DIASTOLIC BLOOD PRESSURE: 62 MMHG | WEIGHT: 198.2 LBS | SYSTOLIC BLOOD PRESSURE: 103 MMHG | HEIGHT: 62 IN | RESPIRATION RATE: 16 BRPM | HEART RATE: 99 BPM | OXYGEN SATURATION: 100 %

## 2020-02-20 DIAGNOSIS — E53.8 B12 DEFICIENCY: Primary | ICD-10-CM

## 2020-02-20 RX ORDER — CYANOCOBALAMIN 1000 UG/ML
1000 INJECTION, SOLUTION INTRAMUSCULAR; SUBCUTANEOUS ONCE
Qty: 1 ML | Refills: 0
Start: 2020-02-20 | End: 2020-02-20

## 2020-02-20 NOTE — PATIENT INSTRUCTIONS
Pernicious Anemia: Care Instructions  Your Care Instructions    Pernicious anemia means that you do not have enough red blood cells. It happens when your body can't absorb vitamin B12 from food. Your body needs vitamin B12 to make red blood cells. Red blood cells carry oxygen around your body. Vitamin B12 also helps your nerves work well. Your doctor can treat this problem with vitamin B12 shots. You may also take vitamin B12 by pill or nasal spray. With treatment, most anemia gets better in a few days. But if you have severe anemia, you may need a blood transfusion to give you red blood cells as quickly as possible. Follow-up care is a key part of your treatment and safety. Be sure to make and go to all appointments, and call your doctor if you are having problems. It's also a good idea to know your test results and keep a list of the medicines you take. How can you care for yourself at home? · Be safe with medicines. Take your medicines exactly as prescribed. Call your doctor if you think you are having a problem with your medicine. · Follow your doctor's instructions about vitamin B12 shots, vitamin B12 pills, or a vitamin B12 nasal spray. · Eat a varied diet. Include foods with a lot of vitamin B12, such as eggs, milk, and meat. · Do not drink alcohol while you are being treated. Alcohol can prevent the body from absorbing vitamin B12. · Eat foods that have folate (also called folic acid). This is another type of B vitamin. Foods with folate include leafy green vegetables, citrus fruits, and fortified cereals. When should you call for help? Call 911 anytime you think you may need emergency care.  For example, call if:    · You passed out (lost consciousness).    Call your doctor now or seek immediate medical care if:    · You are dizzy or light-headed, or you feel like you may faint.     · You have new or worse bleeding.     · You are short of breath.    Watch closely for changes in your health, and be sure to contact your doctor if:    · You have numbness or tingling in your hands or feet.     · You feel weaker or more tired.     · You have trouble with balance or coordination.     · You do not get better as expected. Where can you learn more? Go to http://jennifer-vincent.info/. Enter H951 in the search box to learn more about \"Pernicious Anemia: Care Instructions. \"  Current as of: March 28, 2019  Content Version: 12.2  © 4498-8759 [employer]. Care instructions adapted under license by [employer] (which disclaims liability or warranty for this information). If you have questions about a medical condition or this instruction, always ask your healthcare professional. [address] any warranty or liability for your use of this information.

## 2020-02-20 NOTE — PROGRESS NOTES
1. Have you been to the ER, urgent care clinic since your last visit? Hospitalized since your last visit? No    2. Have you seen or consulted any other health care providers outside of the [address] [place] [address] since your last visit? Include any pap smears or colon screening.  No  Reviewed record in preparation for visit and have necessary documentation  Pt did not bring medication to office visit for review    Goals that were addressed and/or need to be completed during or after this appointment include     Health Maintenance Due   Topic Date Due    Shingrix Vaccine Age 50> (1 of 2) 06/21/1989    MICROALBUMIN Q1  08/20/2019

## 2020-02-20 NOTE — PROGRESS NOTES
CC: B12 injectoin    HPI: Pt is a [de-identified] y.o. female who presents for B12 injection. She is doing well without complaints. She has a h/o lupus, neuropathy and arthritis for which she takes hydrocodone only for severe pain. Normally 60 pills last her 4-5 months. When she last went to fill the hydrocodone last month the pharmacy would only give her 6 tabs and said there was some issue with her insurance. She did not fill it. Past Medical History:   Diagnosis Date    Anemia     Arthritis     CAD (coronary artery disease)     MI    Degenerative joint disease of right hip 2015     [facility]/Dr. Rossy Sanchez    Diabetes [facility])     Fracture     right shoulder; T6 compression    Gastritis     H/O Bell's palsy 2012    R-side of face. Dx >20 years ago.  Has persistent R-sided facial droop     Hypercholesterolemia     Hyperlipidemia    Lupus ([address] 2015    Osteoporosis, post-menopausal     vertebral fracture    Squamous cell cancer of skin of left cheek     Squamous cell skin cancer     Right knee       Family History   Problem Relation Age of Onset    Diabetes Mother     Hypertension Mother     Heart Disease Mother     Diabetes Father     Hypertension Father     Heart Disease Father    Conrad Anne Arthritis-rheumatoid Sister     Elevated Lipids Sister    Conrad Anne Arthritis-rheumatoid Sister     Hypertension Sister     Diabetes Sister     Thyroid Disease Sister     Arthritis-rheumatoid Other        Social History     Tobacco Use    Smoking status: Former Smoker     Last attempt to quit: 1989     Years since quittin.1    Smokeless tobacco: Never Used   Substance Use Topics    Alcohol use: No     Alcohol/week: 0.0 standard drinks     Comment: Occasional    Drug use: No       ROS:  Positive only when bolded  Constitutional: Changes in weight  Neurological: Changes in gait    PE:  Visit Vitals  /62 (BP 1 Location: Left arm, BP Patient Position: Sitting)   Pulse 99   Temp 97.5 °F (36.4 °C) (Oral)   Resp 16   Ht 5' 2\" (1.575 m)   Wt 198 lb 3.2 oz (89.9 kg)   SpO2 100%   BMI 36.25 kg/m²     Gen: Pt sitting in chair, in NAD  Head: Normocephalic, atraumatic  Eyes: Sclera anicteric, EOM grossly intact  Throat: MMM  Neck: Supple  CVS: Normal S1, S2, no m/r/g  Resp: CTAB, no wheezes or rales  Extrem: Atraumatic, no cyanosis or edema   Skin: Warm, dry  Neuro: Alert, oriented, appropriate      A/P:   Encounter Diagnoses     ICD-10-CM ICD-9-CM   1. B12 deficiency E53.8 266.2     1. B12 deficiency  - VITAMIN B12 INJECTION  - THER/PROPH/DIAG INJECTION, SUBCUT/IM  - cyanocobalamin (VITAMIN B-12) 1,000 mcg/mL injection; 1 mL by IntraMUSCular route once for 1 dose. Dispense: 1 mL; Refill: 0       RTC in 1 month for MWV and B12 injection. Will call pharmacy about hydrocodone prescription. Discussed diagnoses in detail with patient. Medication risks/benefits/side effects discussed with patient. All of the patient's questions were addressed. The patient understands and agrees with our plan of care. The patient knows to call back if they are unsure of or forget any changes we discussed today or if the symptoms change. The patient received an After-Visit Summary which contains VS, orders, medication list and allergy list. This can be used as a \"mini-medical record\" should they have to seek medical care while out of town. Current Outpatient Medications on File Prior to Visit   Medication Sig Dispense Refill    amLODIPine (NORVASC) 5 mg tablet Take 1 Tab by mouth daily. 90 Tab 0    leflunomide (ARAVA) 20 mg tablet Take 1 Tab by mouth daily. 90 Tab 1    pantoprazole (PROTONIX) 40 mg tablet Take 1 Tab by mouth daily. 90 Tab 1    ferrous sulfate 325 mg (65 mg iron) tablet TAKE 1 TABLET BY MOUTH TWICE DAILY FOR LOW IRON 60 Tab 3    HYDROcodone-acetaminophen (NORCO) 5-325 mg per tablet Take 1 Tab by mouth every six (6) hours as needed for Pain for up to 90 days.  60 Tab 0    gabapentin (NEURONTIN) 300 mg capsule Take 1 Cap by mouth nightly. Take in addition to 600 mg cap at night. (Patient taking differently: Take 300 mg by mouth nightly.) 90 Cap 1    glipiZIDE SR (GLUCOTROL XL) 2.5 mg CR tablet Take 1 Tab by mouth two (2) times a day. Indications: type 2 diabetes mellitus 180 Tab 1    nortriptyline (PAMELOR) 10 mg capsule TAKE 1 CAPSULE BY MOUTH EVERY NIGHT 30 Cap 5    trimethoprim (TRIMPEX) 100 mg tablet TAKE 1 TABLET BY MOUTH DAILY FOR URINARY TRACT INFECTION PREVENTION 30 Tab 5    gabapentin (NEURONTIN) 600 mg tablet TAKE 1 TABLET BY MOUTH THREE TIMES DAILY 90 Tab 5    metoprolol succinate (TOPROL-XL) 25 mg XL tablet TAKE 1 TABLET BY MOUTH EVERY DAY 90 Tab 1    metFORMIN (GLUCOPHAGE) 500 mg tablet TAKE 1 TABLET BY MOUTH TWICE DAILY WITH MEALS 180 Tab 1    cyclobenzaprine (FLEXERIL) 10 mg tablet Take 1 Tab by mouth nightly. 90 Tab 0    cyanocobalamin (VITAMIN B12) 1,000 mcg/mL injection 1 mL by IntraMUSCular route every thirty (30) days. Indications: Anemia caused by Vitamin B12 Deficiency-Pernicious Anemia 10 mL 1    atorvastatin (LIPITOR) 40 mg tablet Take 1 Tab by mouth daily. Prescribed and managed by Dr. Meera Torrez.  golimumab (SIMPONI ARIA IV) 2 mg/kg by IntraVENous route every six (6) weeks.  glucose blood VI test strips (ASCENSIA CONTOUR) strip Glucometer for 250.00 Pt. test blood sugar once daily (in morning) 50 Strip 11    nitroglycerin (NITROSTAT) 0.4 mg SL tablet 1 Tab by SubLINGual route every five (5) minutes as needed for Chest Pain. 4 Tab 0    denosumab (PROLIA) 60 mg/mL injection 1 mL by SubCUTAneous route every 6 months.  acetaminophen (TYLENOL) 650 mg CR tablet Take 1,300 mg by mouth daily as needed for Pain.  Blood-Glucose Meter (CONTOUR METER) monitoring kit Dx: 250. Check blood sugars daily. 1 kit 11    Walker Misc Rolling walker. Patient with spinal stenosis and stopped posture. Pain with limited ambulation. 1 Each 0    ZINC PO Take 50 mg by mouth daily.       cholecalciferol, vitamin d3, (VITAMIN D3) 1,000 unit tablet Take 2,000 Units by mouth daily.  calcium-vitamin D (CALCIUM 500+D) 500 mg(1,250mg) -200 unit per tablet Take 2 Tabs by mouth daily.  aspirin 81 mg Tab Take 81 mg by mouth daily.  MULTIVITAMINS (MULTIVITAMIN PO) Take 1 Tab by mouth daily. No current facility-administered medications on file prior to visit.

## 2020-02-21 ENCOUNTER — TELEPHONE (OUTPATIENT)
Dept: FAMILY MEDICINE CLINIC | Age: 80
End: 2020-02-21

## 2020-02-21 NOTE — TELEPHONE ENCOUNTER
Spoke with pt's pharmacy. They state the problem with the hydrocodone is that because she fills it so infrequently the insurance is considering her an opiate-naive patient and will require a prior auth. They are faxing us the forms to fill out for this. Left message for pt and will send MyChart message.

## 2020-02-26 NOTE — TELEPHONE ENCOUNTER
Explained below and she will check her My Chart when she returns from [place]. She states she understands.

## 2020-03-06 ENCOUNTER — HOSPITAL ENCOUNTER (OUTPATIENT)
Dept: LAB | Age: 80
Discharge: HOME | End: 2020-03-06

## 2020-03-06 ENCOUNTER — OFFICE VISIT (OUTPATIENT)
Dept: FAMILY MEDICINE CLINIC | Age: 80
End: 2020-03-06

## 2020-03-06 VITALS
SYSTOLIC BLOOD PRESSURE: 116 MMHG | BODY MASS INDEX: 36.44 KG/M2 | DIASTOLIC BLOOD PRESSURE: 55 MMHG | RESPIRATION RATE: 20 BRPM | TEMPERATURE: 98.1 F | WEIGHT: 198 LBS | HEART RATE: 77 BPM | HEIGHT: 62 IN

## 2020-03-06 DIAGNOSIS — N30.90 CYSTITIS: Primary | ICD-10-CM

## 2020-03-06 DIAGNOSIS — N30.90 CYSTITIS: ICD-10-CM

## 2020-03-06 LAB
BILIRUB UR QL STRIP: NEGATIVE
COMMENT, HOLDF: NORMAL
GLUCOSE UR-MCNC: NEGATIVE MG/DL
KETONES P FAST UR STRIP-MCNC: NEGATIVE MG/DL
PH UR STRIP: 6 [PH] (ref 4.6–8)
PROT UR QL STRIP: NORMAL
SAMPLES BEING HELD,HOLD: NORMAL
SP GR UR STRIP: 1.02 (ref 1–1.03)
UA UROBILINOGEN AMB POC: NORMAL (ref 0.2–1)
URINALYSIS CLARITY POC: CLEAR
URINALYSIS COLOR POC: YELLOW
URINE BLOOD POC: NEGATIVE
URINE LEUKOCYTES POC: NORMAL
URINE NITRITES POC: NEGATIVE

## 2020-03-06 RX ORDER — CIPROFLOXACIN 500 MG/1
500 TABLET ORAL DAILY
Qty: 3 TAB | Refills: 0 | Status: SHIPPED | OUTPATIENT
Start: 2020-03-06 | End: 2020-03-09

## 2020-03-06 NOTE — PROGRESS NOTES
Subjective  CC: Tim Hernandez is an [de-identified] y.o. female urinary frequency. Patient presents for dysuria. Symptoms started 2 weeks ago and is getting worse. It is associated with odor. She states that she has bladder fullness and burning with end of urination. Symptoms feel similar to what they were in the past.    Urinary urgency: yes  Urinary frequency: yes    History of UTI?: yes, In the past she has had salmonella, Group B strep, and e coli. Allergies - reviewed: Allergies   Allergen Reactions    Cephalosporins Nausea and Vomiting     Severe vomiting         Medications - reviewed:   Current Outpatient Medications   Medication Sig    ciprofloxacin HCl (CIPRO) 500 mg tablet Take 1 Tab by mouth daily for 3 days.  amLODIPine (NORVASC) 5 mg tablet Take 1 Tab by mouth daily.  leflunomide (ARAVA) 20 mg tablet Take 1 Tab by mouth daily.  pantoprazole (PROTONIX) 40 mg tablet Take 1 Tab by mouth daily.  ferrous sulfate 325 mg (65 mg iron) tablet TAKE 1 TABLET BY MOUTH TWICE DAILY FOR LOW IRON    HYDROcodone-acetaminophen (NORCO) 5-325 mg per tablet Take 1 Tab by mouth every six (6) hours as needed for Pain for up to 90 days.  gabapentin (NEURONTIN) 300 mg capsule Take 1 Cap by mouth nightly. Take in addition to 600 mg cap at night. (Patient taking differently: Take 300 mg by mouth nightly.)    glipiZIDE SR (GLUCOTROL XL) 2.5 mg CR tablet Take 1 Tab by mouth two (2) times a day.  Indications: type 2 diabetes mellitus    nortriptyline (PAMELOR) 10 mg capsule TAKE 1 CAPSULE BY MOUTH EVERY NIGHT    trimethoprim (TRIMPEX) 100 mg tablet TAKE 1 TABLET BY MOUTH DAILY FOR URINARY TRACT INFECTION PREVENTION    gabapentin (NEURONTIN) 600 mg tablet TAKE 1 TABLET BY MOUTH THREE TIMES DAILY    metoprolol succinate (TOPROL-XL) 25 mg XL tablet TAKE 1 TABLET BY MOUTH EVERY DAY    metFORMIN (GLUCOPHAGE) 500 mg tablet TAKE 1 TABLET BY MOUTH TWICE DAILY WITH MEALS    cyclobenzaprine (FLEXERIL) 10 mg tablet Take 1 Tab by mouth nightly.  cyanocobalamin (VITAMIN B12) 1,000 mcg/mL injection 1 mL by IntraMUSCular route every thirty (30) days. Indications: Anemia caused by Vitamin B12 Deficiency-Pernicious Anemia    atorvastatin (LIPITOR) 40 mg tablet Take 1 Tab by mouth daily. Prescribed and managed by Dr. Raj Chapin.  golimumab (SIMPONI ARIA IV) 2 mg/kg by IntraVENous route every six (6) weeks.  glucose blood VI test strips (ASCENSIA CONTOUR) strip Glucometer for 250.00 Pt. test blood sugar once daily (in morning)    nitroglycerin (NITROSTAT) 0.4 mg SL tablet 1 Tab by SubLINGual route every five (5) minutes as needed for Chest Pain.  denosumab (PROLIA) 60 mg/mL injection 1 mL by SubCUTAneous route every 6 months.  acetaminophen (TYLENOL) 650 mg CR tablet Take 1,300 mg by mouth daily as needed for Pain.  Blood-Glucose Meter (CONTOUR METER) monitoring kit Dx: 250. Check blood sugars daily.  ZINC PO Take 50 mg by mouth daily.  cholecalciferol, vitamin d3, (VITAMIN D3) 1,000 unit tablet Take 2,000 Units by mouth daily.  calcium-vitamin D (CALCIUM 500+D) 500 mg(1,250mg) -200 unit per tablet Take 2 Tabs by mouth daily.  aspirin 81 mg Tab Take 81 mg by mouth daily.  MULTIVITAMINS (MULTIVITAMIN PO) Take 1 Tab by mouth daily.  Walker [employer] walker. Patient with spinal stenosis and stopped posture. Pain with limited ambulation. No current facility-administered medications for this visit. Past Medical History - reviewed:  Past Medical History:   Diagnosis Date    Anemia     Arthritis     CAD (coronary artery disease) 1997    MI    Degenerative joint disease of right hip 09/14/2015     [facility] [place]/Dr. Araceli Robles    Diabetes [address] [address]     Fracture     right shoulder; T6 compression    Gastritis     H/O Bell's palsy 7/9/2012    R-side of face. Dx >20 years ago.  Has persistent R-sided facial droop     Hypercholesterolemia     Hyperlipidemia    Lupus ([facility] [address] 1/9/2015    Osteoporosis, post-menopausal     vertebral fracture    Squamous cell cancer of skin of left cheek     Squamous cell skin cancer     Right knee         Immunizations - reviewed:   Immunization History   Administered Date(s) Administered    (RETIRED) Pneumococcal Vaccine (Unspecified Type) 06/05/2008    Influenza High Dose Vaccine PF 08/05/2016, 09/13/2017    Influenza Vaccine 12/05/2013, 10/01/2018, 08/27/2019    Influenza Vaccine (Quad) 09/06/2016    Influenza Vaccine (Quad) PF 11/10/2014    Influenza Vaccine (Tri) Adjuvanted 09/27/2018, 09/05/2019    Influenza Vaccine PF 10/20/2015    Influenza Vaccine Split 10/25/2010, 11/14/2011, 10/10/2012    Pneumococcal Conjugate (PCV-13) 09/13/2017    Pneumococcal Polysaccharide (PPSV-23) 12/05/2013    Tdap 12/05/2013         ROS  Review of Systems : A complete review of systems as performed and is negative except for those mentioned in the HPI. Physical Exam  Visit Vitals  /55 (BP 1 Location: Right arm, BP Patient Position: Sitting)   Pulse 77   Temp 98.1 °F (36.7 °C) (Oral)   Resp 20   Ht 5' 2\" (1.575 m)   Wt 198 lb (89.8 kg)   BMI 36.21 kg/m²       General appearance - Alert, NAD. Head: Atraumatic. Normocephalic. No lymphadenopathy  Eyes: EOMI. Sclera white. Ears: Hearing grossly normal. TM non erythematous with no effusion   Nose: Nares patent, no polyps  Throat: pharynx clear, no exudates. Respiratory - LCTAB. No wheeze/rale/rhonchi  Heart - Normal rate, regular rhythm. No m/r/r  Abdomen - Soft, non tender. Non distended. No CVA tenderness or suprapubic tenderness  Extremities - No LE edema. Distal pulses intact  Skin - normal coloration and normal turgor. No cyanosis, no rash. Assessment/Plan  1. Urinary urgency - discussed with pt treating empirically until cultures return due to history. - MICROALBUMIN, UR, RAND W/ MICROALB/CREAT RATIO  - AMB POC URINALYSIS DIP STICK AUTO W/O MICRO  - CULTURE, URINE;  Future  - ciprofloxacin HCl (CIPRO) 500 mg tablet; Take 1 Tab by mouth daily for 3 days. Dispense: 3 Tab; Refill: 0      I discussed the aforementioned diagnoses with the patient as well as the plan of care.      Mason Gonzalez MD  Family Medicine Resident  PGY 3

## 2020-03-06 NOTE — PROGRESS NOTES
1. Have you been to the ER, urgent care clinic since your last visit? Hospitalized since your last visit? No    2. Have you seen or consulted any other health care providers outside of the [address] [place] [address] since your last visit? Include any pap smears or colon screening.  No  Reviewed record in preparation for visit and have necessary documentation  Pt did not bring medication to office visit for review      Goals that were addressed and/or need to be completed during or after this appointment include   Health Maintenance Due   Topic Date Due    Shingrix Vaccine Age 50> (1 of 2) 06/21/1989    MICROALBUMIN Q1  08/20/2019

## 2020-03-09 DIAGNOSIS — N30.00 ACUTE CYSTITIS WITHOUT HEMATURIA: Primary | ICD-10-CM

## 2020-03-09 LAB
BACTERIA SPEC CULT: ABNORMAL
CC UR VC: ABNORMAL
SERVICE CMNT-IMP: ABNORMAL

## 2020-03-09 RX ORDER — AMOXICILLIN AND CLAVULANATE POTASSIUM 500; 125 MG/1; MG/1
1 TABLET, FILM COATED ORAL 2 TIMES DAILY
Qty: 10 TAB | Refills: 0 | Status: SHIPPED | OUTPATIENT
Start: 2020-03-09 | End: 2020-08-12

## 2020-03-09 NOTE — PROGRESS NOTES
E coli UTI not susceptible to previous antibiotic, cipro. Pt is to stop cipro and will send in Augmentin. She is to take 1 tablet twice a day for 5 days. Please call pt and make them aware.  Thank you

## 2020-03-10 ENCOUNTER — TELEPHONE (OUTPATIENT)
Dept: FAMILY MEDICINE CLINIC | Age: 80
End: 2020-03-10

## 2020-03-10 NOTE — TELEPHONE ENCOUNTER
Attempted to call. Unsuccessful. Message left  Need to inform pt per Dr Dukes Degree  E coli UTI not susceptible to previous antibiotic, cipro. Pt is to stop cipro and will send in Augmentin.  She is to take 1 tablet twice a day for 5 days    Message sent by [address]

## 2020-03-10 NOTE — TELEPHONE ENCOUNTER
----- Message from Jaymie Gong MD sent at 3/9/2020 11:34 AM EDT -----  E coli UTI not susceptible to previous antibiotic, cipro. Pt is to stop cipro and will send in Augmentin. She is to take 1 tablet twice a day for 5 days. Please call pt and make them aware.  Thank you

## 2020-03-23 RX ORDER — SODIUM CHLORIDE 9 MG/ML
25 INJECTION, SOLUTION INTRAVENOUS CONTINUOUS
Status: DISPENSED | OUTPATIENT
Start: 2020-03-26 | End: 2020-03-26

## 2020-03-26 ENCOUNTER — HOSPITAL ENCOUNTER (OUTPATIENT)
Dept: INFUSION THERAPY | Age: 80
Discharge: HOME | End: 2020-03-26
Payer: MEDICARE

## 2020-03-26 VITALS
SYSTOLIC BLOOD PRESSURE: 115 MMHG | HEART RATE: 84 BPM | TEMPERATURE: 97.3 F | OXYGEN SATURATION: 99 % | DIASTOLIC BLOOD PRESSURE: 57 MMHG | BODY MASS INDEX: 35.48 KG/M2 | RESPIRATION RATE: 18 BRPM | WEIGHT: 194 LBS

## 2020-03-26 LAB
ALBUMIN SERPL-MCNC: 3.7 G/DL (ref 3.5–5)
ALBUMIN/GLOB SERPL: 1.1 {RATIO} (ref 1.1–2.2)
ALP SERPL-CCNC: 122 U/L (ref 45–117)
ALT SERPL-CCNC: 52 U/L (ref 12–78)
ANION GAP SERPL CALC-SCNC: 4 MMOL/L (ref 5–15)
AST SERPL-CCNC: 53 U/L (ref 15–37)
BASO+EOS+MONOS # BLD AUTO: 0.5 K/UL (ref 0.2–1.2)
BASO+EOS+MONOS NFR BLD AUTO: 6 % (ref 3.2–16.9)
BILIRUB SERPL-MCNC: 0.3 MG/DL (ref 0.2–1)
BUN SERPL-MCNC: 19 MG/DL (ref 6–20)
BUN/CREAT SERPL: 20 (ref 12–20)
CALCIUM SERPL-MCNC: 9.6 MG/DL (ref 8.5–10.1)
CHLORIDE SERPL-SCNC: 102 MMOL/L (ref 97–108)
CO2 SERPL-SCNC: 29 MMOL/L (ref 21–32)
CREAT SERPL-MCNC: 0.94 MG/DL (ref 0.55–1.02)
CRP SERPL-MCNC: <0.29 MG/DL (ref 0–0.6)
DIFFERENTIAL METHOD BLD: NORMAL
ERYTHROCYTE [DISTWIDTH] IN BLOOD BY AUTOMATED COUNT: 13.9 % (ref 11.8–15.8)
ERYTHROCYTE [SEDIMENTATION RATE] IN BLOOD: 7 MM/HR (ref 0–30)
GLOBULIN SER CALC-MCNC: 3.3 G/DL (ref 2–4)
GLUCOSE SERPL-MCNC: 126 MG/DL (ref 65–100)
HCT VFR BLD AUTO: 44.1 % (ref 35–47)
HGB BLD-MCNC: 14.7 G/DL (ref 11.5–16)
LYMPHOCYTES # BLD: 3.2 K/UL (ref 0.8–3.5)
LYMPHOCYTES NFR BLD: 38 % (ref 12–49)
MCH RBC QN AUTO: 32 PG (ref 26–34)
MCHC RBC AUTO-ENTMCNC: 33.3 G/DL (ref 30–36.5)
MCV RBC AUTO: 96.1 FL (ref 80–99)
NEUTS SEG # BLD: 4.9 K/UL (ref 1.8–8)
NEUTS SEG NFR BLD: 56 % (ref 32–75)
PLATELET # BLD AUTO: 227 K/UL (ref 150–400)
POTASSIUM SERPL-SCNC: 4.2 MMOL/L (ref 3.5–5.1)
PROT SERPL-MCNC: 7 G/DL (ref 6.4–8.2)
RBC # BLD AUTO: 4.59 M/UL (ref 3.8–5.2)
SODIUM SERPL-SCNC: 135 MMOL/L (ref 136–145)
WBC # BLD AUTO: 8.6 K/UL (ref 3.6–11)

## 2020-03-26 PROCEDURE — 86140 C-REACTIVE PROTEIN: CPT

## 2020-03-26 PROCEDURE — 85652 RBC SED RATE AUTOMATED: CPT

## 2020-03-26 PROCEDURE — 96413 CHEMO IV INFUSION 1 HR: CPT

## 2020-03-26 PROCEDURE — 74011000258 HC RX REV CODE- 258: Performed by: INTERNAL MEDICINE

## 2020-03-26 PROCEDURE — 36415 COLL VENOUS BLD VENIPUNCTURE: CPT

## 2020-03-26 PROCEDURE — 74011250636 HC RX REV CODE- 250/636: Performed by: INTERNAL MEDICINE

## 2020-03-26 PROCEDURE — 85025 COMPLETE CBC W/AUTO DIFF WBC: CPT

## 2020-03-26 PROCEDURE — 80053 COMPREHEN METABOLIC PANEL: CPT

## 2020-03-26 RX ADMIN — SODIUM CHLORIDE 25 ML/HR: 900 INJECTION, SOLUTION INTRAVENOUS at 14:40

## 2020-03-26 RX ADMIN — GOLIMUMAB 175 MG: 50 SOLUTION INTRAVENOUS at 14:41

## 2020-03-26 NOTE — PROGRESS NOTES
[facility] Progress Note    Date: 2020    Name: John Avina    MRN: [id number]         : 1940    Ms. Regine Brewer Arrived ambulatory and in no distress for Simponi Infusion. Assessment was completed, no acute issues at this time, no new complaints voiced. 24 G PIV established to left arm, + blood return. Labs drawn and pending in CC. Ms. Rueda's vitals were reviewed. Visit Vitals  /80 (BP 1 Location: Left arm, BP Patient Position: At rest)   Pulse 84   Temp 97.3 °F (36.3 °C)   Resp 18   SpO2 99%   Breastfeeding No       Medications:  Medications Administered     0.9% sodium chloride infusion     Admin Date  2020 Action  New Bag Dose  25 mL/hr Rate  25 mL/hr Route  IntraVENous Administered By  Titi Oshea RN          golimumab (SIMPONI ARIA) 175 mg in 0.9% sodium chloride, overfill volume 110 mL infusion     Admin Date  2020 Action  Given Dose  175 mg Rate  220 mL/hr Route  IntraVENous Administered By  Titi Oseha RN              Ms. Regine Brewer tolerated treatment well and was discharged from [place] [address] in stable condition. PIV flushed & removed. She is to return on May 6 2020 for her next appointment.     Marita Penny RN  2020

## 2020-03-27 ENCOUNTER — APPOINTMENT (OUTPATIENT)
Dept: INFUSION THERAPY | Age: 80
End: 2020-03-27
Payer: MEDICARE

## 2020-03-30 DIAGNOSIS — N30.90 CYSTITIS: Primary | ICD-10-CM

## 2020-03-30 RX ORDER — NITROFURANTOIN 25; 75 MG/1; MG/1
100 CAPSULE ORAL 2 TIMES DAILY
Qty: 14 CAP | Refills: 0 | Status: SHIPPED | OUTPATIENT
Start: 2020-03-30 | End: 2020-04-06

## 2020-04-10 ENCOUNTER — PATIENT MESSAGE (OUTPATIENT)
Dept: FAMILY MEDICINE CLINIC | Age: 80
End: 2020-04-10

## 2020-04-10 DIAGNOSIS — M12.9 ARTHROPATHY: ICD-10-CM

## 2020-04-10 DIAGNOSIS — M51.36 DEGENERATIVE LUMBAR DISC: ICD-10-CM

## 2020-04-10 DIAGNOSIS — E78.2 MIXED HYPERLIPIDEMIA: ICD-10-CM

## 2020-04-10 DIAGNOSIS — M79.18 MYOFASCIAL MUSCLE PAIN: ICD-10-CM

## 2020-04-13 RX ORDER — ATORVASTATIN CALCIUM 40 MG/1
40 TABLET, FILM COATED ORAL DAILY
Qty: 90 TAB | Refills: 1 | Status: SHIPPED | OUTPATIENT
Start: 2020-04-13 | End: 2020-10-09

## 2020-04-13 RX ORDER — CYCLOBENZAPRINE HCL 10 MG
10 TABLET ORAL
Qty: 90 TAB | Refills: 0 | Status: SHIPPED | OUTPATIENT
Start: 2020-04-13 | End: 2020-07-27 | Stop reason: SDUPTHER

## 2020-04-13 RX ORDER — HYDROCODONE BITARTRATE AND ACETAMINOPHEN 5; 325 MG/1; MG/1
1 TABLET ORAL
Qty: 60 TAB | Refills: 0 | Status: SHIPPED | OUTPATIENT
Start: 2020-04-13 | End: 2020-07-03 | Stop reason: SDUPTHER

## 2020-04-18 DIAGNOSIS — I10 ESSENTIAL HYPERTENSION WITH GOAL BLOOD PRESSURE LESS THAN 130/80: ICD-10-CM

## 2020-04-18 DIAGNOSIS — E11.21 TYPE 2 DIABETES WITH NEPHROPATHY (HCC): ICD-10-CM

## 2020-04-20 RX ORDER — METFORMIN HYDROCHLORIDE 500 MG/1
TABLET ORAL
Qty: 180 TAB | Refills: 1 | Status: SHIPPED | OUTPATIENT
Start: 2020-04-20 | End: 2020-04-23 | Stop reason: SDUPTHER

## 2020-04-20 RX ORDER — METOPROLOL SUCCINATE 25 MG/1
TABLET, EXTENDED RELEASE ORAL
Qty: 90 TAB | Refills: 1 | Status: SHIPPED | OUTPATIENT
Start: 2020-04-20 | End: 2020-10-22

## 2020-04-22 DIAGNOSIS — E11.21 TYPE 2 DIABETES WITH NEPHROPATHY (HCC): ICD-10-CM

## 2020-04-22 DIAGNOSIS — Z87.440 HISTORY OF RECURRENT UTI (URINARY TRACT INFECTION): ICD-10-CM

## 2020-04-23 RX ORDER — METFORMIN HYDROCHLORIDE 500 MG/1
TABLET ORAL
Qty: 180 TAB | Refills: 1 | Status: SHIPPED | OUTPATIENT
Start: 2020-04-23 | End: 2020-10-22

## 2020-04-23 RX ORDER — METFORMIN HYDROCHLORIDE 500 MG/1
TABLET ORAL
Qty: 180 TAB | Refills: 1 | OUTPATIENT
Start: 2020-04-23

## 2020-04-23 RX ORDER — TRIMETHOPRIM 100 MG/1
100 TABLET ORAL DAILY
Qty: 30 TAB | Refills: 5 | Status: SHIPPED | OUTPATIENT
Start: 2020-04-23 | End: 2020-06-17

## 2020-05-01 RX ORDER — ACETAMINOPHEN 325 MG/1
650 TABLET ORAL
Status: ACTIVE | OUTPATIENT
Start: 2020-05-08 | End: 2020-05-08

## 2020-05-01 RX ORDER — SODIUM CHLORIDE 9 MG/ML
25 INJECTION, SOLUTION INTRAVENOUS CONTINUOUS
Status: DISPENSED | OUTPATIENT
Start: 2020-05-08 | End: 2020-05-08

## 2020-05-01 RX ORDER — DIPHENHYDRAMINE HYDROCHLORIDE 50 MG/ML
50 INJECTION, SOLUTION INTRAMUSCULAR; INTRAVENOUS
Status: ACTIVE | OUTPATIENT
Start: 2020-05-08 | End: 2020-05-08

## 2020-05-08 ENCOUNTER — HOSPITAL ENCOUNTER (OUTPATIENT)
Dept: INFUSION THERAPY | Age: 80
Discharge: HOME | End: 2020-05-08
Payer: MEDICARE

## 2020-05-08 VITALS
TEMPERATURE: 98.3 F | SYSTOLIC BLOOD PRESSURE: 120 MMHG | DIASTOLIC BLOOD PRESSURE: 72 MMHG | BODY MASS INDEX: 36.21 KG/M2 | HEART RATE: 75 BPM | RESPIRATION RATE: 16 BRPM | WEIGHT: 198 LBS

## 2020-05-08 PROCEDURE — 74011000258 HC RX REV CODE- 258: Performed by: INTERNAL MEDICINE

## 2020-05-08 PROCEDURE — 96372 THER/PROPH/DIAG INJ SC/IM: CPT

## 2020-05-08 PROCEDURE — 74011250636 HC RX REV CODE- 250/636: Performed by: INTERNAL MEDICINE

## 2020-05-08 PROCEDURE — 96413 CHEMO IV INFUSION 1 HR: CPT

## 2020-05-08 RX ADMIN — SODIUM CHLORIDE 25 ML/HR: 900 INJECTION, SOLUTION INTRAVENOUS at 09:52

## 2020-05-08 RX ADMIN — SODIUM CHLORIDE 175 MG: 9 INJECTION, SOLUTION INTRAVENOUS at 10:52

## 2020-05-08 NOTE — PROGRESS NOTES
[facility] VISIT NOTE    0940. Pt arrived at [facility] ambulatory and in no distress for Simponi. Assessment completed, pt c/o chronic generalized joint pain. 24g PIV placed in Left Arm. Positive blood return noted and flushes easily. Medications received:  NS KVO  Simponi IV    Tolerated treatment well, no adverse reaction noted. PIV removed at discharge, no s/s of infiltration noted. Patient Vitals for the past 12 hrs:   Temp Pulse Resp BP   05/08/20 1124 98.3 °F (36.8 °C) 75 16 120/72   05/08/20 0943 97.1 °F (36.2 °C) 97 18 132/78     1130. D/C'd from [facility] ambulatory and in no distress.  Next appointment is 6/19/20 at 10:00 am.

## 2020-05-10 DIAGNOSIS — M81.0 AGE-RELATED OSTEOPOROSIS WITHOUT CURRENT PATHOLOGICAL FRACTURE: ICD-10-CM

## 2020-05-10 DIAGNOSIS — N18.2 CKD (CHRONIC KIDNEY DISEASE) STAGE 2, GFR 60-89 ML/MIN: ICD-10-CM

## 2020-05-10 DIAGNOSIS — E55.9 VITAMIN D DEFICIENCY: ICD-10-CM

## 2020-05-10 DIAGNOSIS — I73.00 PRIMARY RAYNAUD'S PHENOMENON: ICD-10-CM

## 2020-05-11 RX ORDER — AMLODIPINE BESYLATE 5 MG/1
TABLET ORAL
Qty: 90 TAB | Refills: 0 | Status: SHIPPED | OUTPATIENT
Start: 2020-05-11 | End: 2020-08-14

## 2020-05-18 DIAGNOSIS — E55.9 VITAMIN D DEFICIENCY: ICD-10-CM

## 2020-05-18 DIAGNOSIS — M81.0 AGE-RELATED OSTEOPOROSIS WITHOUT CURRENT PATHOLOGICAL FRACTURE: ICD-10-CM

## 2020-05-18 DIAGNOSIS — N18.2 CKD (CHRONIC KIDNEY DISEASE) STAGE 2, GFR 60-89 ML/MIN: ICD-10-CM

## 2020-05-19 DIAGNOSIS — E11.42 DIABETIC POLYNEUROPATHY ASSOCIATED WITH TYPE 2 DIABETES MELLITUS (HCC): ICD-10-CM

## 2020-05-19 RX ORDER — LEFLUNOMIDE 20 MG/1
20 TABLET ORAL DAILY
Qty: 90 TAB | Refills: 1 | Status: SHIPPED | OUTPATIENT
Start: 2020-05-19 | End: 2020-10-29 | Stop reason: SDUPTHER

## 2020-05-21 RX ORDER — GABAPENTIN 600 MG/1
TABLET ORAL
Qty: 90 TAB | Refills: 1 | Status: SHIPPED | OUTPATIENT
Start: 2020-05-21 | End: 2020-07-22

## 2020-06-09 DIAGNOSIS — D51.0 PERNICIOUS ANEMIA: ICD-10-CM

## 2020-06-09 RX ORDER — NORTRIPTYLINE HYDROCHLORIDE 10 MG/1
10 CAPSULE ORAL
Qty: 30 CAP | Refills: 5 | Status: SHIPPED | OUTPATIENT
Start: 2020-06-09 | End: 2021-01-11

## 2020-06-09 RX ORDER — LANOLIN ALCOHOL/MO/W.PET/CERES
CREAM (GRAM) TOPICAL
Qty: 60 TAB | Refills: 5 | Status: SHIPPED | OUTPATIENT
Start: 2020-06-09 | End: 2020-12-29

## 2020-06-12 RX ORDER — SODIUM CHLORIDE 9 MG/ML
25 INJECTION, SOLUTION INTRAVENOUS CONTINUOUS
Status: DISPENSED | OUTPATIENT
Start: 2020-06-19 | End: 2020-06-19

## 2020-06-12 RX ORDER — ACETAMINOPHEN 325 MG/1
650 TABLET ORAL
Status: ACTIVE | OUTPATIENT
Start: 2020-06-19 | End: 2020-06-19

## 2020-06-12 RX ORDER — DIPHENHYDRAMINE HYDROCHLORIDE 50 MG/ML
50 INJECTION, SOLUTION INTRAMUSCULAR; INTRAVENOUS
Status: ACTIVE | OUTPATIENT
Start: 2020-06-19 | End: 2020-06-19

## 2020-06-17 DIAGNOSIS — Z87.440 HISTORY OF RECURRENT UTI (URINARY TRACT INFECTION): ICD-10-CM

## 2020-06-17 RX ORDER — TRIMETHOPRIM 100 MG/1
TABLET ORAL
Qty: 30 TAB | Refills: 5 | Status: SHIPPED | OUTPATIENT
Start: 2020-06-17 | End: 2020-10-29 | Stop reason: SDUPTHER

## 2020-06-19 ENCOUNTER — HOSPITAL ENCOUNTER (OUTPATIENT)
Dept: INFUSION THERAPY | Age: 80
Discharge: HOME | End: 2020-06-19
Payer: MEDICARE

## 2020-06-19 VITALS
TEMPERATURE: 96.3 F | OXYGEN SATURATION: 98 % | DIASTOLIC BLOOD PRESSURE: 63 MMHG | BODY MASS INDEX: 35.79 KG/M2 | HEART RATE: 76 BPM | SYSTOLIC BLOOD PRESSURE: 133 MMHG | WEIGHT: 195.7 LBS | RESPIRATION RATE: 16 BRPM

## 2020-06-19 LAB
ALBUMIN SERPL-MCNC: 3.6 G/DL (ref 3.5–5)
ALBUMIN/GLOB SERPL: 1.1 {RATIO} (ref 1.1–2.2)
ALP SERPL-CCNC: 107 U/L (ref 45–117)
ALT SERPL-CCNC: 32 U/L (ref 12–78)
ANION GAP SERPL CALC-SCNC: 2 MMOL/L (ref 5–15)
AST SERPL-CCNC: 38 U/L (ref 15–37)
BASO+EOS+MONOS # BLD AUTO: 0.7 K/UL (ref 0.2–1.2)
BASO+EOS+MONOS NFR BLD AUTO: 9 % (ref 3.2–16.9)
BILIRUB SERPL-MCNC: 0.8 MG/DL (ref 0.2–1)
BUN SERPL-MCNC: 20 MG/DL (ref 6–20)
BUN/CREAT SERPL: 23 (ref 12–20)
CALCIUM SERPL-MCNC: 8.3 MG/DL (ref 8.5–10.1)
CHLORIDE SERPL-SCNC: 108 MMOL/L (ref 97–108)
CO2 SERPL-SCNC: 30 MMOL/L (ref 21–32)
CREAT SERPL-MCNC: 0.86 MG/DL (ref 0.55–1.02)
CRP SERPL-MCNC: <0.29 MG/DL (ref 0–0.6)
DIFFERENTIAL METHOD BLD: NORMAL
ERYTHROCYTE [DISTWIDTH] IN BLOOD BY AUTOMATED COUNT: 14.2 % (ref 11.8–15.8)
ERYTHROCYTE [SEDIMENTATION RATE] IN BLOOD: 9 MM/HR (ref 0–30)
GLOBULIN SER CALC-MCNC: 3.3 G/DL (ref 2–4)
GLUCOSE SERPL-MCNC: 83 MG/DL (ref 65–100)
HCT VFR BLD AUTO: 40 % (ref 35–47)
HGB BLD-MCNC: 13.4 G/DL (ref 11.5–16)
LYMPHOCYTES # BLD: 3 K/UL (ref 0.8–3.5)
LYMPHOCYTES NFR BLD: 39 % (ref 12–49)
MCH RBC QN AUTO: 33 PG (ref 26–34)
MCHC RBC AUTO-ENTMCNC: 33.5 G/DL (ref 30–36.5)
MCV RBC AUTO: 98.5 FL (ref 80–99)
NEUTS SEG # BLD: 4.2 K/UL (ref 1.8–8)
NEUTS SEG NFR BLD: 52 % (ref 32–75)
PLATELET # BLD AUTO: 189 K/UL (ref 150–400)
POTASSIUM SERPL-SCNC: 4.3 MMOL/L (ref 3.5–5.1)
PROT SERPL-MCNC: 6.9 G/DL (ref 6.4–8.2)
RBC # BLD AUTO: 4.06 M/UL (ref 3.8–5.2)
SODIUM SERPL-SCNC: 140 MMOL/L (ref 136–145)
WBC # BLD AUTO: 7.9 K/UL (ref 3.6–11)

## 2020-06-19 PROCEDURE — 85025 COMPLETE CBC W/AUTO DIFF WBC: CPT

## 2020-06-19 PROCEDURE — 74011250636 HC RX REV CODE- 250/636: Performed by: INTERNAL MEDICINE

## 2020-06-19 PROCEDURE — 96413 CHEMO IV INFUSION 1 HR: CPT

## 2020-06-19 PROCEDURE — 85652 RBC SED RATE AUTOMATED: CPT

## 2020-06-19 PROCEDURE — 74011000258 HC RX REV CODE- 258: Performed by: INTERNAL MEDICINE

## 2020-06-19 PROCEDURE — 86140 C-REACTIVE PROTEIN: CPT

## 2020-06-19 PROCEDURE — 36415 COLL VENOUS BLD VENIPUNCTURE: CPT

## 2020-06-19 PROCEDURE — 96365 THER/PROPH/DIAG IV INF INIT: CPT

## 2020-06-19 PROCEDURE — 80053 COMPREHEN METABOLIC PANEL: CPT

## 2020-06-19 RX ADMIN — SODIUM CHLORIDE 175 MG: 9 INJECTION, SOLUTION INTRAVENOUS at 10:53

## 2020-06-19 RX ADMIN — SODIUM CHLORIDE 25 ML/HR: 900 INJECTION, SOLUTION INTRAVENOUS at 10:47

## 2020-06-19 NOTE — PROGRESS NOTES
hospitals Progress Note    Date: 2020    Name: Venancio Ball    MRN: [id number]         : 1940      0955:  Ms. Gerri Brandon Arrived ambulatory and in no distress for Simponi Aria Infusion. Assessment was completed, no acute issues at this time, no new complaints voiced. 24 G PIV established to left arm, + blood return. Labs sent for processing. Ms. Rueda's vitals were reviewed. Patient Vitals for the past 24 hrs:   Temp Pulse Resp BP SpO2   20 1133 (!) 96.3 °F (35.7 °C) 76 16 133/63    20 0959 97.5 °F (36.4 °C) 85 16 136/61 98 %       Lab results were obtained and reviewed. Recent Results (from the past 12 hour(s))   CBC WITH 3 PART DIFF    Collection Time: 20 10:07 AM   Result Value Ref Range    WBC 7.9 3.6 - 11.0 K/uL    RBC 4.06 3.80 - 5.20 M/uL    HGB 13.4 11.5 - 16.0 g/dL    HCT 40.0 35.0 - 47.0 %    MCV 98.5 80.0 - 99.0 FL    MCH 33.0 26.0 - 34.0 PG    MCHC 33.5 30.0 - 36.5 g/dL    RDW 14.2 11.8 - 15.8 %    PLATELET 794 545 - 026 K/uL    NEUTROPHILS 52 32 - 75 %    MIXED CELLS 9 3.2 - 16.9 %    LYMPHOCYTES 39 12 - 49 %    ABS. NEUTROPHILS 4.2 1.8 - 8.0 K/UL    ABS. MIXED CELLS 0.7 0.2 - 1.2 K/uL    ABS. LYMPHOCYTES 3.0 0.8 - 3.5 K/UL    DF AUTOMATED     METABOLIC PANEL, COMPREHENSIVE    Collection Time: 20 10:07 AM   Result Value Ref Range    Sodium 140 136 - 145 mmol/L    Potassium 4.3 3.5 - 5.1 mmol/L    Chloride 108 97 - 108 mmol/L    CO2 30 21 - 32 mmol/L    Anion gap 2 (L) 5 - 15 mmol/L    Glucose 83 65 - 100 mg/dL    BUN 20 6 - 20 MG/DL    Creatinine 0.86 0.55 - 1.02 MG/DL    BUN/Creatinine ratio 23 (H) 12 - 20      GFR est AA >60 >60 ml/min/1.73m2    GFR est non-AA >60 >60 ml/min/1.73m2    Calcium 8.3 (L) 8.5 - 10.1 MG/DL    Bilirubin, total 0.8 0.2 - 1.0 MG/DL    ALT (SGPT) 32 12 - 78 U/L    AST (SGOT) 38 (H) 15 - 37 U/L    Alk.  phosphatase 107 45 - 117 U/L    Protein, total 6.9 6.4 - 8.2 g/dL    Albumin 3.6 3.5 - 5.0 g/dL    Globulin 3.3 2.0 - 4.0 g/dL    A-G Ratio 1.1 1.1 - 2.2     SED RATE (ESR)    Collection Time: 06/19/20 10:07 AM   Result Value Ref Range    Sed rate, automated 9 0 - 30 mm/hr   C REACTIVE PROTEIN, QT    Collection Time: 06/19/20 10:07 AM   Result Value Ref Range    C-Reactive protein <0.29 0.00 - 0.60 mg/dL       Medications:  Medications Administered     0.9% sodium chloride infusion     Admin Date  06/19/2020 Action  New Bag Dose  25 mL/hr Rate  25 mL/hr Route  IntraVENous Administered By  Deedee Hernandez RN          golimumab (SIMPONI ARIA) 175 mg in 0.9% sodium chloride, overfill volume 110 mL infusion     Admin Date  06/19/2020 Action  Given Dose  175 mg Rate  220 mL/hr Route  IntraVENous Administered By  Deedee Hernandez RN                Ms. Pete Pedersen tolerated treatment well and was discharged from [place] [address] in stable condition at 1140. PIV flushed & removed. She is to return on July 31 at 1000 for her next appointment.     María Shetty RN  June 19, 2020

## 2020-06-24 ENCOUNTER — TELEPHONE (OUTPATIENT)
Dept: FAMILY MEDICINE CLINIC | Age: 80
End: 2020-06-24

## 2020-06-24 NOTE — TELEPHONE ENCOUNTER
----- Message from Chrissie Bean sent at 6/24/2020  1:47 PM EDT -----  Regarding: [place]/telephone  Pt is requesting an appointment to come in the office for a possible UTI. Pts number is [phone].

## 2020-06-25 ENCOUNTER — VIRTUAL VISIT (OUTPATIENT)
Dept: FAMILY MEDICINE CLINIC | Age: 80
End: 2020-06-25

## 2020-06-25 ENCOUNTER — HOSPITAL ENCOUNTER (OUTPATIENT)
Dept: LAB | Age: 80
Discharge: HOME | End: 2020-06-25

## 2020-06-25 DIAGNOSIS — R30.0 DYSURIA: ICD-10-CM

## 2020-06-25 DIAGNOSIS — N30.00 ACUTE CYSTITIS WITHOUT HEMATURIA: Primary | ICD-10-CM

## 2020-06-25 DIAGNOSIS — N30.00 ACUTE CYSTITIS WITHOUT HEMATURIA: ICD-10-CM

## 2020-06-25 RX ORDER — NITROFURANTOIN 25; 75 MG/1; MG/1
100 CAPSULE ORAL 2 TIMES DAILY
Qty: 14 CAP | Refills: 0 | Status: SHIPPED | OUTPATIENT
Start: 2020-06-25 | End: 2020-08-12

## 2020-06-25 NOTE — TELEPHONE ENCOUNTER
----- Message from Taylor Rosario sent at 6/25/2020  9:06 AM EDT -----  Regarding: Dr. Brenda Segal  Caller: pt    Reason: The patient was told schedule an in-office visit for her bladder infection.      Best contact number(s): [phone]

## 2020-06-25 NOTE — PROGRESS NOTES
Jerzy Seo  79 y.o. female  1940  [address]  [facility] [address]  [id number]   [address]:    Telemedicine Progress Note  Rodricknaomi Dias DO       Encounter Date and Time: June 25, 2020 at 2:57 PM    Consent: Jerzy Seo, who was seen by synchronous (real-time) audio-video technology, and/or her healthcare decision maker, is aware that this patient-initiated, Telehealth encounter on 6/25/2020 is a billable service, with coverage as determined by her insurance carrier. She is aware that she may receive a bill and has provided verbal consent to proceed: Yes. Chief Complaint   Patient presents with    Bladder Infection     History of Present Illness   Jerzy Seo is a 79 y.o. female was evaluated by synchronous (real-time) audio-video technology from home, through a secure patient portal.    Feels like she has a bladder infection. Pain with urination. Increased frequency and urgency. Having accidents if she can't get to the bathroom fast enough. No hematuria or back pain. Last urine cx grew E Coli which was not sensitive to Augmentin, Cipro, Levaquin, Bactrim or PCN. Review of Systems   Review of Systems   Constitutional: Negative for chills and fever. Gastrointestinal: Negative for abdominal pain, nausea and vomiting. Vitals/Objective:     General: alert, cooperative, no distress   Mental  status: mental status: alert, oriented to person, place, and time, normal mood, behavior, speech, dress, motor activity, and thought processes   Resp: resp: normal effort and no respiratory distress   Neuro: neuro: no gross deficits   Skin: skin: no discoloration or lesions of concern on visible areas   Due to this being a TeleHealth evaluation, many elements of the physical examination are unable to be assessed.     UA - trace protein, 1+ LE (urine cx dropped off prior to visit)    Assessment and Plan:   Time-based coding, delete if not needed: I spent at least 5 minutes with this established patient, and >50% of the time was spent counseling and/or coordinating care regarding acute cystitis    1. Acute cystitis without hematuria - new problem  Follow up urine cx. UA not very impressive but similar to last UA where patient grew MDR E Coli. Will start [address] which last cx was sensitive to. Instructed if she has F/C, N/V, abd pain/ back pain to go to ER immediately. - CULTURE, URINE; Future  - nitrofurantoin, macrocrystal-monohydrate, (Macrobid) 100 mg capsule; Take 1 Cap by mouth two (2) times a day. Dispense: 14 Cap; Refill: 0    Time spent in direct conversation with the patient to include medical condition(s) discussed, assessment and treatment plan:    We discussed the expected course, resolution and complications of the diagnosis(es) in detail. Medication risks, benefits, costs, interactions, and alternatives were discussed as indicated. I advised her to contact the office if her condition worsens, changes or fails to improve as anticipated. She expressed understanding with the diagnosis(es) and plan. Patient understands that this encounter was a temporary measure, and the importance of further follow up and examination was emphasized. Patient verbalized understanding. Electronically Signed: Jess Farris DO    Patient discussed with Dr. Sebastián Pinto (Attending)    Glynn Cabrera is a 79 y.o. female who was evaluated by an audio-video encounter for concerns as above. Patient identification was verified prior to start of the visit. A caregiver was present when appropriate. Due to this being a TeleHealth encounter (During [facility] public health emergency), evaluation of the following organ systems was limited: Vitals/Constitutional/EENT/Resp/CV/GI//MS/Neuro/Skin/Heme-Lymph-Imm.   Pursuant to the emergency declaration under the [address] [facility] [place], 1135 waiver authority and the [facility] [employer] [place] Appropriations Act, this Virtual Visit was conducted, with patient's (and/or legal guardian's) consent, to reduce the patient's risk of exposure to COVID-19 and provide necessary medical care. Services were provided through a synchronous discussion virtually to substitute for in-person clinic visit. I was at [facility] practice. The patient was at home. History   Patients past medical, surgical and family histories were reviewed and updated. Past Medical History:   Diagnosis Date    Anemia     Arthritis     CAD (coronary artery disease) 1997    MI    Degenerative joint disease of right hip 09/14/2015     [facility]/Dr. Marcelino Diallo    Diabetes [facility])     Fracture     right shoulder; T6 compression    Gastritis     H/O Bell's palsy 7/9/2012    R-side of face. Dx >20 years ago.  Has persistent R-sided facial droop     Hypercholesterolemia     Hyperlipidemia    Lupus ([facility] [address] 1/9/2015    Osteoporosis, post-menopausal     vertebral fracture    Squamous cell cancer of skin of left cheek     Squamous cell skin cancer     Right knee     Past Surgical History:   Procedure Laterality Date    CARDIAC SURG PROCEDURE UNLIST      Patient Denies    COLONOSCOPY N/A 10/9/2018    COLONOSCOPY performed by Korin Ennis [facility] at [facility] HX CATARACT REMOVAL Bilateral     HX CATARACT REMOVAL Right 12/19/2019    HX CATARACT REMOVAL Left 01/07/2020    HX CHOLECYSTECTOMY      HX CORONARY STENT PLACEMENT  02/19/1997    LAD    HX GASTRIC BYPASS  2000    HX HERNIA REPAIR  2005    HX KYPHOPLASTY      t5    HX KYPHOPLASTY      L4    HX ORTHOPAEDIC      bilateral knees    HX ORTHOPAEDIC      r shoulder    HX ORTHOPAEDIC      Laminectomy    [employer] INC IMPLTJ NEUROSTIMULATOR ELTRD SACRAL NERVE       Family History   Problem Relation Age of Onset    Diabetes Mother     Hypertension Mother     Heart Disease Mother     Diabetes Father     Hypertension Father     Heart Disease Father    [place] Sicard Arthritis-rheumatoid Sister     Elevated Lipids Sister    Olvin Favorite Arthritis-rheumatoid Sister     Hypertension Sister     Diabetes Sister     Thyroid Disease Sister     Arthritis-rheumatoid Other      Social History     Socioeconomic History    Marital status:      Spouse name: Not on file    Number of children: Not on file    Years of education: Not on file    Highest education level: Not on file   Occupational History    Not on file   Social Needs    Financial resource strain: Not on file    Food insecurity     Worry: Not on file     Inability: Not on file    Transportation needs     Medical: Not on file     Non-medical: Not on file   Tobacco Use    Smoking status: Former Smoker     Last attempt to quit: 1989     Years since quittin.5    Smokeless tobacco: Never Used   Substance and Sexual Activity    Alcohol use: No     Alcohol/week: 0.0 standard drinks     Comment: Occasional    Drug use: No    Sexual activity: Not Currently   Lifestyle    Physical activity     Days per week: Not on file     Minutes per session: Not on file    Stress: Not on file   Relationships    Social connections     Talks on phone: Not on file     Gets together: Not on file     Attends [employer] service: Not on file     Active member of club or organization: Not on file     Attends meetings of clubs or organizations: Not on file     Relationship status: Not on file    Intimate partner violence     Fear of current or ex partner: Not on file     Emotionally abused: Not on file     Physically abused: Not on file     Forced sexual activity: Not on file   Other Topics Concern    Not on file   Social History Narrative    Not on file     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    Long-term use of immunosuppressant medication Z79.899    Long-term use of Plaquenil Z79.899    Hypocalcemia E83.51    Low back pain M54.5    Age-related osteoporosis without current pathological fracture M81.0    Diabetic polyneuropathy (HCC) E11.42    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    Unspecified hereditary and idiopathic peripheral neuropathy G60.9    Raynauds syndrome I73.00    Blepharoconjunctivitis H10.509    Seronegative rheumatoid arthritis of multiple sites ([facility] [address] M06.09    Nuclear sclerotic cataract H25.10    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 14 Cap 0    trimethoprim (TRIMPEX) 100 mg tablet TAKE 1 TABLET BY MOUTH DAILY FOR URINARY TRACT INFECTION PREVENTION 30 Tab 5    nortriptyline (PAMELOR) 10 mg capsule Take 1 Cap by mouth nightly. 30 Cap 5    ferrous sulfate 325 mg (65 mg iron) tablet TAKE 1 TABLET BY MOUTH TWICE DAILY FOR LOW IRON 60 Tab 5    glucose blood VI test strips (Ascensia CONTOUR) strip Glucometer for 250.00 Pt. test blood sugar once daily (in morning) 50 Strip 11    gabapentin (NEURONTIN) 600 mg tablet TAKE 1 TABLET BY MOUTH THREE TIMES DAILY 90 Tab 1    leflunomide (ARAVA) 20 mg tablet Take 1 Tab by mouth daily.  90 Tab 1    amLODIPine (NORVASC) 5 mg tablet TAKE 1 TABLET BY MOUTH EVERY DAY 90 Tab 0    metFORMIN (GLUCOPHAGE) 500 mg tablet TAKE 1 TABLET BY MOUTH TWICE DAILY WITH MEALS 180 Tab 1    metoprolol succinate (TOPROL-XL) 25 mg XL tablet TAKE 1 TABLET BY MOUTH EVERY DAY 90 Tab 1    cyclobenzaprine (FLEXERIL) 10 mg tablet Take 1 Tab by mouth nightly. 90 Tab 0    HYDROcodone-acetaminophen (NORCO) 5-325 mg per tablet Take 1 Tab by mouth every six (6) hours as needed for Pain for up to 90 days. 60 Tab 0    atorvastatin (LIPITOR) 40 mg tablet Take 1 Tab by mouth daily. Prescribed and managed by Dr. Aletha Monreal. 90 Tab 1    amoxicillin-clavulanate (AUGMENTIN) 500-125 mg per tablet Take 1 Tab by mouth two (2) times a day. 10 Tab 0    pantoprazole (PROTONIX) 40 mg tablet Take 1 Tab by mouth daily. 90 Tab 1    gabapentin (NEURONTIN) 300 mg capsule Take 1 Cap by mouth nightly. Take in addition to 600 mg cap at night. (Patient taking differently: Take 300 mg by mouth nightly.) 90 Cap 1    glipiZIDE SR (GLUCOTROL XL) 2.5 mg CR tablet Take 1 Tab by mouth two (2) times a day. Indications: type 2 diabetes mellitus 180 Tab 1    cyanocobalamin (VITAMIN B12) 1,000 mcg/mL injection 1 mL by IntraMUSCular route every thirty (30) days. 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  nitroglycerin (NITROSTAT) 0.4 mg SL tablet 1 Tab by SubLINGual route every five (5) minutes as needed for Chest Pain. 4 Tab 0    denosumab (PROLIA) 60 mg/mL injection 1 mL by SubCUTAneous route every 6 months.  acetaminophen (TYLENOL) 650 mg CR tablet Take 1,300 mg by mouth daily as needed for Pain.  Blood-Glucose Meter (CONTOUR METER) monitoring kit Dx: 250. Check blood sugars daily. 1 kit 11    ZINC PO Take 50 mg by mouth daily.  cholecalciferol, vitamin d3, (VITAMIN D3) 1,000 unit tablet Take 2,000 Units by mouth daily.  calcium-vitamin D (CALCIUM 500+D) 500 mg(1,250mg) -200 unit per tablet Take 2 Tabs by mouth daily.  aspirin 81 mg Tab Take 81 mg by mouth daily.       MULTIVITAMINS (MULTIVITAMIN PO) Take 1 Tab by mouth daily.        Allergies   Allergen Reactions    Cephalosporins Nausea and Vomiting     Severe vomiting

## 2020-06-26 NOTE — PROGRESS NOTES
[address] Medicine Residency Attending Addendum:  Dr. Noel Bean, DO,  the patient and I were not physically present during this encounter. The resident and I are concurrently monitoring the patient care through appropriate telecommunication technology. I discussed the findings, assessment and plan with the resident and agree with the resident's findings and plan as documented in the resident's note.       Taniya Aguayo MD

## 2020-06-27 LAB
BACTERIA SPEC CULT: NORMAL
CC UR VC: NORMAL
SERVICE CMNT-IMP: NORMAL

## 2020-06-29 ENCOUNTER — TELEPHONE (OUTPATIENT)
Dept: FAMILY MEDICINE CLINIC | Age: 80
End: 2020-06-29

## 2020-06-29 NOTE — TELEPHONE ENCOUNTER
Attempted to call patient to discuss lab results. Did not answer. Will send letter.   Carmelita Delgado, DO

## 2020-07-08 ENCOUNTER — TELEPHONE (OUTPATIENT)
Dept: FAMILY MEDICINE CLINIC | Age: 80
End: 2020-07-08

## 2020-07-08 NOTE — TELEPHONE ENCOUNTER
Attempted to call. Unsuccessful. . message left  Will send message by [email or web address]  Pt will need an appointment for routine care and labs

## 2020-07-08 NOTE — TELEPHONE ENCOUNTER
----- Message from Micah Mckeon MD sent at 7/7/2020  4:24 PM EDT -----  Regarding: Appt? Could we get her an appt to follow-up her chronic conditions? She is due for labs. We can do virtual visit but I will need her to get labs first. Thanks!   Vibha

## 2020-07-08 NOTE — LETTER
7/8/2020 9:55 AM 
 
Ms. Gordo Reynolds. Les [address]um[address] 56 Your good health is important to us, that is why we are sending you this friendly reminder. As always, our goal is to be your partner in life-long wellness. Our records indicate that you are due for a Routine appointment with labs. This may be a virtual appointment with coming in to office for labs. Please call [facility] at [phone] to schedule this appointment at your earliest convenience [facility]

## 2020-07-22 DIAGNOSIS — E11.42 DIABETIC POLYNEUROPATHY ASSOCIATED WITH TYPE 2 DIABETES MELLITUS (HCC): ICD-10-CM

## 2020-07-22 DIAGNOSIS — K21.9 GASTROESOPHAGEAL REFLUX DISEASE, ESOPHAGITIS PRESENCE NOT SPECIFIED: ICD-10-CM

## 2020-07-22 RX ORDER — PANTOPRAZOLE SODIUM 40 MG/1
TABLET, DELAYED RELEASE ORAL
Qty: 90 TAB | Refills: 1 | Status: SHIPPED | OUTPATIENT
Start: 2020-07-22 | End: 2021-01-19

## 2020-07-22 RX ORDER — GABAPENTIN 600 MG/1
TABLET ORAL
Qty: 90 TAB | Refills: 0 | Status: SHIPPED | OUTPATIENT
Start: 2020-07-22 | End: 2020-07-30 | Stop reason: DRUGHIGH

## 2020-07-28 RX ORDER — CYCLOBENZAPRINE HCL 10 MG
10 TABLET ORAL
Qty: 90 TAB | Refills: 0 | Status: SHIPPED | OUTPATIENT
Start: 2020-07-28 | End: 2020-11-16 | Stop reason: SDUPTHER

## 2020-07-30 ENCOUNTER — VIRTUAL VISIT (OUTPATIENT)
Dept: FAMILY MEDICINE CLINIC | Age: 80
End: 2020-07-30

## 2020-07-30 DIAGNOSIS — I10 ESSENTIAL HYPERTENSION: ICD-10-CM

## 2020-07-30 DIAGNOSIS — E11.42 DIABETIC POLYNEUROPATHY ASSOCIATED WITH TYPE 2 DIABETES MELLITUS (HCC): Primary | ICD-10-CM

## 2020-07-30 DIAGNOSIS — E78.2 MIXED HYPERLIPIDEMIA: ICD-10-CM

## 2020-07-30 RX ORDER — GABAPENTIN 600 MG/1
600 TABLET ORAL 4 TIMES DAILY
Qty: 120 TAB | Refills: 3 | Status: SHIPPED | OUTPATIENT
Start: 2020-07-30 | End: 2020-10-29 | Stop reason: SDUPTHER

## 2020-07-30 NOTE — PROGRESS NOTES
Arielle Rodriguez  79 y.o. female  1940 [address]  [id number]    [phone] (home)      [facility]:    Telephone Encounter  Clari Nathan MD       Encounter Date: 7/30/2020 at 9:31 AM    Consent:  She and/or the health care decision maker is aware that that she may receive a bill for this telephone service, depending on her insurance coverage, and has provided verbal consent to proceed: Yes    No chief complaint on file. History of Present Illness   Arielle Rodriguez is a 79 y.o. female was evaluated by telephone. I communicated with the patient and/or health care decision maker about f/u DM, HTN, and HLD. She is doing well other than she notes the neuropathic pain in her feet is much worse with the heat. She is currently taking gabapentin 600mg TID and 300mg once a day. She is wondering if she could increase to 600mg QID. HTN:  Checking BPs at home?: NO but has it checked regularly at the infusion center  Logs today?: YES  Range of BPs?: 110-130's/50-70's  Headaches?: NO  Blurry vision?: NO  Lower extremity edema?: YES, only when she is on her feet   Smoking?: NO    DM:  Checking BG at home?: YES  Logs today?: NO  BG range:   Insulin dependent?: NO  Other medications for DM?: Glipizide 2.5mg BID and metformin 500mg BID  Symptoms of hypoglycemia?: NO  Aspirin?: YES  ACEi/ARB?: NO  Statin?: YES  Last eye exam?: 10/2018  Last foot exam?: 9/2019  Last A1c:   Lab Results   Component Value Date/Time    Hemoglobin A1c 6.1 (H) 09/04/2019 07:16 AM    Hemoglobin A1c (POC) 6.1 06/24/2015 04:12 PM     LastLDL:   Lab Results   Component Value Date/Time    LDL, calculated 63 09/04/2019 07:16 AM     Last microalbumin:   Lab Results   Component Value Date/Time    Microalb/Creat ratio (ug/mg creat.) 12.3 08/20/2018 10:49 AM           Review of Systems   Per HPI    Vitals/Objective:   General: Patient speaking in complete sentences without effort. Normal speech and cooperative. Due to this being a Virtual Check-in/Telephone evaluation, many elements of the physical examination are unable to be assessed. Assessment and Plan:   Time-based coding, delete if not needed: I spent at least 10 minutes with this established patient, and >50% of the time was spent counseling and/or coordinating care regarding DM, HTN and diabetic neuropathy  Total Time: minutes: 21-30 minutes      1. Mixed hyperlipidemia  - LIPID PANEL; Future    2. Diabetic polyneuropathy associated with type 2 diabetes mellitus [facility]): Discussed with pt. We can increase gabapentin to 600mg QID, however if it is not helping at that point we will need to consider alternative therapy. Discussed risks of gabapentin including risk of falls and confusion, and how these risks increase with age and in combination with her other medications. She has not had any of these symptoms up to this point and is aware of what to look out for.   - gabapentin (NEURONTIN) 600 mg tablet; Take 1 Tab by mouth four (4) times daily. Max Daily Amount: 2,400 mg. Dispense: 120 Tab; Refill: 3  - HEMOGLOBIN A1C WITH EAG; Future    3. Essential hypertension: Well controlled. Recently had CMP checked with Rheumatology that showed normal kidney function and electrolytes. - Continue current medications    RTC in 6 months for f/u chronic conditions or sooner prn. We discussed the expected course, resolution and complications of the diagnosis(es) in detail. Medication risks, benefits, costs, interactions, and alternatives were discussed as indicated. I advised her to contact the office if her condition worsens, changes or fails to improve as anticipated. She expressed understanding with the diagnosis(es) and plan. Patient understands that this encounter was a temporary measure, and the importance of further follow up and examination was emphasized. Patient verbalized understanding.          I affirm this is a Patient Initiated Episode with an Established Patient who has not had a related appointment within my department in the past 7 days or scheduled within the next 24 hours. Note: not billable if this call serves to triage the patient into an appointment for the relevant concern      Electronically Signed: Finn Wilcox MD  Providers location when delivering service: In the office        ICD-10-CM ICD-9-CM    1. Diabetic polyneuropathy associated with type 2 diabetes mellitus (HCC)  E11.42 250.60 gabapentin (NEURONTIN) 600 mg tablet     357.2 HEMOGLOBIN A1C WITH EAG   2. Mixed hyperlipidemia  E78.2 272.2 LIPID PANEL   3. Essential hypertension  I10 401.9        Pursuant to the emergency declaration under the [address] [place] [address], Replaced by [facility] waiver authority and the [employer] Act, this Virtual  Visit was conducted, with patient's consent, to reduce the patient's risk of exposure to COVID-19 and provide continuity of care for an established patient. History     Past Medical History:   Diagnosis Date    Anemia     Arthritis     CAD (coronary artery disease) 1997    MI    Degenerative joint disease of right hip 09/14/2015     [facility]/Dr. Harlin Habermann    Diabetes [facility])     Fracture     right shoulder; T6 compression    Gastritis     H/O Bell's palsy 7/9/2012    R-side of face. Dx >20 years ago.  Has persistent R-sided facial droop     Hypercholesterolemia     Hyperlipidemia    Lupus ([facility] [address] 1/9/2015    Osteoporosis, post-menopausal     vertebral fracture    Squamous cell cancer of skin of left cheek     Squamous cell skin cancer     Right knee     Past Surgical History:   Procedure Laterality Date    CARDIAC SURG PROCEDURE UNLIST      Patient Denies    COLONOSCOPY N/A 10/9/2018    COL[facility]un[facility] Ord[facility] HX CATARACT REMOVAL Bilateral     HX CATARACT REMOVAL Right 12/19/2019    HX CATARACT REMOVAL Left 2020    HX CHOLECYSTECTOMY      HX CORONARY STENT PLACEMENT  1997    LAD    HX GASTRIC BYPASS      HX HERNIA REPAIR  2005    HX KYPHOPLASTY      t5    HX KYPHOPLASTY      L4    HX ORTHOPAEDIC      bilateral knees    HX ORTHOPAEDIC      r shoulder    HX ORTHOPAEDIC      Laminectomy    [employer] IMPLTJ NEUROSTIMULATOR ELTRD SACRAL NERVE       Family History   Problem Relation Age of Onset    Diabetes Mother     Hypertension Mother     Heart Disease Mother     Diabetes Father     Hypertension Father     Heart Disease Father    [facility] Arthritis-rheumatoid Sister     Elevated Lipids Sister    [facility] Arthritis-rheumatoid Sister     Hypertension Sister     Diabetes Sister     Thyroid Disease Sister     Arthritis-rheumatoid Other      Social History     Socioeconomic History    Marital status:      Spouse name: Not on file    Number of children: Not on file    Years of education: Not on file    Highest education level: Not on file   Occupational History    Not on file   Social Needs    Financial resource strain: Not on file    Food insecurity     Worry: Not on file     Inability: Not on file    Transportation needs     Medical: Not on file     Non-medical: Not on file   Tobacco Use    Smoking status: Former Smoker     Last attempt to quit: 1989     Years since quittin.6    Smokeless tobacco: Never Used   Substance and Sexual Activity    Alcohol use: No     Alcohol/week: 0.0 standard drinks     Comment: Occasional    Drug use: No    Sexual activity: Not Currently   Lifestyle    Physical activity     Days per week: Not on file     Minutes per session: Not on file    Stress: Not on file   Relationships    Social connections     Talks on phone: Not on file     Gets together: Not on file     Attends Congregation service: Not on file     Active member of club or organization: Not on file     Attends meetings of clubs or organizations: Not on file     Relationship status: Not on file    Intimate partner violence     Fear of current or ex partner: Not on file     Emotionally abused: Not on file     Physically abused: Not on file     Forced sexual activity: Not on file   Other Topics Concern    Not on file   Social History Narrative    Not on file            Current Medications/Allergies   Medications and Allergies reviewed:    Current Outpatient Medications   Medication Sig Dispense Refill    gabapentin (NEURONTIN) 600 mg tablet Take 1 Tab by mouth four (4) times daily. Max Daily Amount: 2,400 mg. 120 Tab 3    cyclobenzaprine (FLEXERIL) 10 mg tablet Take 1 Tab by mouth nightly. 90 Tab 0    pantoprazole (PROTONIX) 40 mg tablet TAKE 1 TABLET BY MOUTH DAILY 90 Tab 1    glipiZIDE SR (GLUCOTROL XL) 2.5 mg CR tablet Take 1 Tab by mouth two (2) times a day. Indications: type 2 diabetes mellitus 180 Tab 1    HYDROcodone-acetaminophen (NORCO) 5-325 mg per tablet Take 1 Tab by mouth every six (6) hours as needed for Pain for up to 90 days. 60 Tab 0    nitrofurantoin, macrocrystal-monohydrate, (Macrobid) 100 mg capsule Take 1 Cap by mouth two (2) times a day. 14 Cap 0    trimethoprim (TRIMPEX) 100 mg tablet TAKE 1 TABLET BY MOUTH DAILY FOR URINARY TRACT INFECTION PREVENTION 30 Tab 5    nortriptyline (PAMELOR) 10 mg capsule Take 1 Cap by mouth nightly. 30 Cap 5    ferrous sulfate 325 mg (65 mg iron) tablet TAKE 1 TABLET BY MOUTH TWICE DAILY FOR LOW IRON 60 Tab 5    glucose blood VI test strips (Ascensia CONTOUR) strip Glucometer for 250.00 Pt. test blood sugar once daily (in morning) 50 Strip 11    leflunomide (ARAVA) 20 mg tablet Take 1 Tab by mouth daily.  90 Tab 1    amLODIPine (NORVASC) 5 mg tablet TAKE 1 TABLET BY MOUTH EVERY DAY 90 Tab 0    metFORMIN (GLUCOPHAGE) 500 mg tablet TAKE 1 TABLET BY MOUTH TWICE DAILY WITH MEALS 180 Tab 1    metoprolol succinate (TOPROL-XL) 25 mg XL tablet TAKE 1 TABLET BY MOUTH EVERY DAY 90 Tab 1    atorvastatin (LIPITOR) 40 mg tablet Take 1 Tab by mouth daily. Prescribed and managed by Dr. America Ordoñez. 90 Tab 1    amoxicillin-clavulanate (AUGMENTIN) 500-125 mg per tablet Take 1 Tab by mouth two (2) times a day. 10 Tab 0    cyanocobalamin (VITAMIN B12) 1,000 mcg/mL injection 1 mL by IntraMUSCular route every thirty (30) days. 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  nitroglycerin (NITROSTAT) 0.4 mg SL tablet 1 Tab by SubLINGual route every five (5) minutes as needed for Chest Pain. 4 Tab 0    denosumab (PROLIA) 60 mg/mL injection 1 mL by SubCUTAneous route every 6 months.  acetaminophen (TYLENOL) 650 mg CR tablet Take 1,300 mg by mouth daily as needed for Pain.  Blood-Glucose Meter (CONTOUR METER) monitoring kit Dx: 250. Check blood sugars daily. 1 kit 11    ZINC PO Take 50 mg by mouth daily.  cholecalciferol, vitamin d3, (VITAMIN D3) 1,000 unit tablet Take 2,000 Units by mouth daily.  calcium-vitamin D (CALCIUM 500+D) 500 mg(1,250mg) -200 unit per tablet Take 2 Tabs by mouth daily.  aspirin 81 mg Tab Take 81 mg by mouth daily.  MULTIVITAMINS (MULTIVITAMIN PO) Take 1 Tab by mouth daily.        Facility-Administered Medications Ordered in Other Visits   Medication Dose Route Frequency Provider Last Rate Last Dose    [START ON 7/31/2020] golimumab (SIMPONI ARIA) 175 mg in 0.9% sodium chloride, overfill volume 110 mL infusion  175 mg IntraVENous ONCE Caden Obrien MD        [START ON 7/31/2020] 0.9% sodium chloride infusion  25 mL/hr IntraVENous CONTINUOUS Caden Obrien MD        [START ON 7/31/2020] acetaminophen (TYLENOL) tablet 650 mg  650 mg Oral Q4H PRN Caden Obrien MD        [START ON 7/31/2020] diphenhydrAMINE (BENADRYL) injection 50 mg  50 mg IntraVENous Q4H PRN Caden Obrien MD         Allergies   Allergen Reactions    Cephalosporins Nausea and Vomiting     Severe vomiting

## 2020-08-05 RX ORDER — SODIUM CHLORIDE 9 MG/ML
25 INJECTION, SOLUTION INTRAVENOUS CONTINUOUS
Status: DISPENSED | OUTPATIENT
Start: 2020-08-07 | End: 2020-08-07

## 2020-08-05 RX ORDER — ACETAMINOPHEN 325 MG/1
650 TABLET ORAL
Status: ACTIVE | OUTPATIENT
Start: 2020-08-07 | End: 2020-08-07

## 2020-08-05 RX ORDER — DIPHENHYDRAMINE HYDROCHLORIDE 50 MG/ML
50 INJECTION, SOLUTION INTRAMUSCULAR; INTRAVENOUS
Status: ACTIVE | OUTPATIENT
Start: 2020-08-07 | End: 2020-08-07

## 2020-08-07 ENCOUNTER — HOSPITAL ENCOUNTER (OUTPATIENT)
Dept: INFUSION THERAPY | Age: 80
Discharge: HOME | End: 2020-08-07
Payer: MEDICARE

## 2020-08-07 VITALS
RESPIRATION RATE: 16 BRPM | HEART RATE: 70 BPM | DIASTOLIC BLOOD PRESSURE: 67 MMHG | WEIGHT: 194 LBS | SYSTOLIC BLOOD PRESSURE: 127 MMHG | TEMPERATURE: 98 F | BODY MASS INDEX: 35.48 KG/M2

## 2020-08-07 PROCEDURE — 74011250636 HC RX REV CODE- 250/636: Performed by: INTERNAL MEDICINE

## 2020-08-07 PROCEDURE — 74011000258 HC RX REV CODE- 258: Performed by: INTERNAL MEDICINE

## 2020-08-07 PROCEDURE — 96413 CHEMO IV INFUSION 1 HR: CPT

## 2020-08-07 RX ADMIN — GOLIMUMAB 175 MG: 50 SOLUTION INTRAVENOUS at 11:50

## 2020-08-07 RX ADMIN — SODIUM CHLORIDE 25 ML/HR: 900 INJECTION, SOLUTION INTRAVENOUS at 11:50

## 2020-08-07 NOTE — PROGRESS NOTES
[facility] Progress Note    Date: 2020    Name: Gordo Fong    MRN: [id number]         : 1940    Ms. Jones Reeves Arrived ambulatory and in no distress for Simponi Aria Regimen. Assessment was completed, no acute issues at this time, no new complaints voiced. 24G PIV placed in left AC without difficulty, positive blood return noted. No labs drawn today. Ms. Rueda's vitals were reviewed. Patient Vitals for the past 12 hrs:   Temp Pulse Resp BP   20 1224  70 16 127/67   20 1031 98 °F (36.7 °C) 70 16 122/69     Medications:  Medications Administered     0.9% sodium chloride infusion     Admin Date  2020 Action  New Bag Dose  25 mL/hr Rate  25 mL/hr Route  IntraVENous Administered By  Shimon Chavez RN          golimumab (SIMPONI ARIA) 175 mg in 0.9% sodium chloride, overfill volume 110 mL infusion     Admin Date  2020 Action  Given Dose  175 mg Rate  220 mL/hr Route  IntraVENous Administered By  Shimon Chavez RN                  Ms. Jones Reeves tolerated treatment well and was discharged from [place] [address] in stable condition at 1225. PIV with positive blood return at end of treatment, flushed with saline and removed. Pressure dressing applied. She is to return on  at 1100 for her next appointment.     Jasper Pollock RN  2020

## 2020-08-12 ENCOUNTER — OFFICE VISIT (OUTPATIENT)
Dept: RHEUMATOLOGY | Age: 80
End: 2020-08-12
Payer: MEDICARE

## 2020-08-12 VITALS
DIASTOLIC BLOOD PRESSURE: 73 MMHG | SYSTOLIC BLOOD PRESSURE: 135 MMHG | HEIGHT: 62 IN | WEIGHT: 196 LBS | RESPIRATION RATE: 18 BRPM | BODY MASS INDEX: 36.07 KG/M2 | HEART RATE: 87 BPM | TEMPERATURE: 97.7 F

## 2020-08-12 DIAGNOSIS — I73.00 PRIMARY RAYNAUD'S PHENOMENON: ICD-10-CM

## 2020-08-12 DIAGNOSIS — E55.9 VITAMIN D DEFICIENCY: ICD-10-CM

## 2020-08-12 DIAGNOSIS — N18.2 CKD (CHRONIC KIDNEY DISEASE) STAGE 2, GFR 60-89 ML/MIN: ICD-10-CM

## 2020-08-12 DIAGNOSIS — M81.0 AGE-RELATED OSTEOPOROSIS WITHOUT CURRENT PATHOLOGICAL FRACTURE: ICD-10-CM

## 2020-08-12 DIAGNOSIS — M06.09 SERONEGATIVE RHEUMATOID ARTHRITIS OF MULTIPLE SITES (HCC): Primary | ICD-10-CM

## 2020-08-12 PROCEDURE — G8752 SYS BP LESS 140: HCPCS | Performed by: INTERNAL MEDICINE

## 2020-08-12 PROCEDURE — G8536 NO DOC ELDER MAL SCRN: HCPCS | Performed by: INTERNAL MEDICINE

## 2020-08-12 PROCEDURE — G8754 DIAS BP LESS 90: HCPCS | Performed by: INTERNAL MEDICINE

## 2020-08-12 PROCEDURE — G8417 CALC BMI ABV UP PARAM F/U: HCPCS | Performed by: INTERNAL MEDICINE

## 2020-08-12 PROCEDURE — G8432 DEP SCR NOT DOC, RNG: HCPCS | Performed by: INTERNAL MEDICINE

## 2020-08-12 PROCEDURE — 1090F PRES/ABSN URINE INCON ASSESS: CPT | Performed by: INTERNAL MEDICINE

## 2020-08-12 PROCEDURE — 1101F PT FALLS ASSESS-DOCD LE1/YR: CPT | Performed by: INTERNAL MEDICINE

## 2020-08-12 PROCEDURE — 99215 OFFICE O/P EST HI 40 MIN: CPT | Performed by: INTERNAL MEDICINE

## 2020-08-12 PROCEDURE — G8427 DOCREV CUR MEDS BY ELIG CLIN: HCPCS | Performed by: INTERNAL MEDICINE

## 2020-08-12 NOTE — LETTER
8/12/20 Patient: Mansi Hatch YOB: 1940 Date of Visit: 8/12/2020 eNhal Peña MD 
[address] [address] VIA In Basket Dear Nehal Peña MD, Thank you for referring Ms. Madhavi Almonte to [address] [place] [address] for evaluation. My notes for this consultation are attached. If you have questions, please do not hesitate to call me. I look forward to following your patient along with you.  
 
 
Sincerely, 
 
Esperanza Madrigal MD

## 2020-08-12 NOTE — PROGRESS NOTES
REASON FOR VISIT    This is a follow-up visit for Ms. Rueda for     ICD-10-CM   1. Seronegative rheumatoid arthritis of multiple sites ([facility] M06.09   2. Age-related osteoporosis without current pathological fracture M81.0      Inflammatory arthritis phenotype includes:  Anti-CCP positive: no  Rheumatoid factor positive: no  Erosive disease: yes  Extra-articular manifestations include: Raynaud's    Immunosuppression Screening (2/20/2019):   Quantiferon TB: negative   PPD:  Not performed  Hepatitis B: negative    Hepatitis C: negative     Therapy History includes:  Current DMARD therapy include: leflunomide 20 mg daily (2/13/2018 to 10/2019; 2/12/2020 to present), Simponi Aria every 6 weeks (12/07/2018 to present)  Prior DMARD therapy include: methotrexate 12.5 mg subcutaneous,  hydroxychloroquine 300 mg daily, Humira (5/10/2018: 3 samples: cost)  Discontinued DMARDs because of inefficacy: None  Discontinued DMARDs because of side effects: methotrexate (elevated LFTs), leflunomide (elevated LFTs)  Contra-Indicated DMARDs because of chronic kidney disease: methotrexate     Osteoporosis Historical Synopsis    Height loss since age 26 (at least two inches): 5  Fracture history includes: yes (T5-T6)  Family history of hip fracture: no  Fall Risk: no    Daily calcium intake is 1800 mg  Daily vitamin D intake is 2000 IU    Smoking history: no  Alcohol consumption: no  Prednisone history: no    Exercise: no    Previous work-up for osteoporosis includes the following:  DEXA Scan: 8/16/2019  Vitamin 25OH D level: 46.6 (21/3/2018)  PTH: 21 (21/3/2018)  TSH: 2.570 (21/3/2018)    Therapy History includes:  Current osteoporosis therapy includes: Prolia (3/29/2018 to present)  Prior osteoporosis therapy includes: Reclast (2 years)  The following osteoporosis therapy have been ineffective: Reclast  The following osteoporosis therapy were stopped because of side effects: none    Immunizations:   Immunization History   Administered Date(s) Administered    (RETIRED) Pneumococcal Vaccine (Unspecified Type) 06/05/2008    Influenza High Dose Vaccine PF 08/05/2016, 09/13/2017    Influenza Vaccine 12/05/2013, 10/01/2018, 08/27/2019    Influenza Vaccine (Quad) 09/06/2016    Influenza Vaccine (Quad) PF 11/10/2014    Influenza Vaccine (Tri) Adjuvanted 09/27/2018, 09/05/2019    Influenza Vaccine PF 10/20/2015    Influenza Vaccine Split 10/25/2010, 11/14/2011, 10/10/2012    Pneumococcal Conjugate (PCV-13) 09/13/2017    Pneumococcal Polysaccharide (PPSV-23) 12/05/2013    Tdap 12/05/2013     Active problems include:    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    Long-term use of immunosuppressant medication Z79.899    Long-term use of Plaquenil Z79.899    Hypocalcemia E83.51    Low back pain M54.5    Age-related osteoporosis without current pathological fracture M81.0    Diabetic polyneuropathy ([facility] E11.42    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    Unspecified hereditary and idiopathic peripheral neuropathy G60.9    Raynauds syndrome I73.00    Blepharoconjunctivitis H10.509    Seronegative rheumatoid arthritis of multiple sites ([facility] M06.09    Nuclear sclerotic cataract H25.10    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    Obesity, morbid (HCC) E66.01    Primary localized osteoarthritis of left knee M17.12    Primary Raynaud's phenomenon I73.00    CKD (chronic kidney disease) stage 2, GFR 60-89 ml/min N18.2    Type 2 diabetes with nephropathy (HCC) E11.21     HISTORY OF PRESENT ILLNESS    Ms. Robbie Abdul returns for a follow-up.     On her last visit, I asked her to resume leflunomide 20 mg daily and contiued Simponi Aria infusions every 6 weeks due to breakthrough with good tolerance. Her most recent infusion was 8/07/2020. Her most recent Prolia was on 2/07/2020 with good tolerance. She was not given her Prolia with her last Simponi infusion. Her next Prolia is on 8/21/2020. Today, she feels better. She has swelling and stiffness in her hands. She feels better after her infusion. She feels improvement in her stiffness after her infusion. she had no pain in her hands in the morning but has mild swelling in her fingers. She feels that Simponi is lasting. She endorses ankle swelling in the afternoon especially with the heat. She denies fever, weight loss, blurred vision, vision loss, oral ulcers, dry cough, dyspnea, nausea, vomiting, dysphagia, abdominal pain, black or bloody stool, fall since last visit, rash, easy bruising and increased thirst.    Last toxicity monitoring by blood work was done on 6/19/2020 and did not reveal any significant adverse effects. Most recent inflammatory markers from 6/19/2020 revealed a ESR 9 mm/hr and CRP 0.29 mg/L. The patient has not had any interval hospital admissions, infections or surgeries. REVIEW OF SYSTEMS    A comprehensive review of systems was performed and pertinent results are documented in the HPI, review of systems is otherwise non-contributory. PAST MEDICAL HISTORY    She has a past medical history of Anemia, Arthritis, CAD (coronary artery disease) (1997), Degenerative joint disease of right hip (09/14/2015 ), Diabetes ([facility] Fracture, Gastritis, H/O Bell's palsy (7/9/2012), Hypercholesterolemia, Lupus ([address] (1/9/2015), Osteoporosis, post-menopausal, Squamous cell cancer of skin of left cheek, and Squamous cell skin cancer. FAMILY HISTORY    Her family history includes Arthritis-rheumatoid in her sister, sister and another family member; Diabetes in her father, mother, and sister; Elevated Lipids in her sister;  Heart Disease in her father and mother; Hypertension in her father, mother, and sister; Thyroid Disease in her sister. SOCIAL HISTORY    She reports that she quit smoking about 30 years ago. She has never used smokeless tobacco. She reports that she does not drink alcohol or use drugs. MEDICATIONS    Current Outpatient Medications   Medication Sig Dispense Refill    gabapentin (NEURONTIN) 600 mg tablet Take 1 Tab by mouth four (4) times daily. Max Daily Amount: 2,400 mg. 120 Tab 3    cyclobenzaprine (FLEXERIL) 10 mg tablet Take 1 Tab by mouth nightly. 90 Tab 0    pantoprazole (PROTONIX) 40 mg tablet TAKE 1 TABLET BY MOUTH DAILY 90 Tab 1    glipiZIDE SR (GLUCOTROL XL) 2.5 mg CR tablet Take 1 Tab by mouth two (2) times a day. Indications: type 2 diabetes mellitus 180 Tab 1    HYDROcodone-acetaminophen (NORCO) 5-325 mg per tablet Take 1 Tab by mouth every six (6) hours as needed for Pain for up to 90 days. 60 Tab 0    trimethoprim (TRIMPEX) 100 mg tablet TAKE 1 TABLET BY MOUTH DAILY FOR URINARY TRACT INFECTION PREVENTION 30 Tab 5    nortriptyline (PAMELOR) 10 mg capsule Take 1 Cap by mouth nightly. 30 Cap 5    ferrous sulfate 325 mg (65 mg iron) tablet TAKE 1 TABLET BY MOUTH TWICE DAILY FOR LOW IRON 60 Tab 5    glucose blood VI test strips (Ascensia CONTOUR) strip Glucometer for 250.00 Pt. test blood sugar once daily (in morning) 50 Strip 11    leflunomide (ARAVA) 20 mg tablet Take 1 Tab by mouth daily. 90 Tab 1    amLODIPine (NORVASC) 5 mg tablet TAKE 1 TABLET BY MOUTH EVERY DAY 90 Tab 0    metFORMIN (GLUCOPHAGE) 500 mg tablet TAKE 1 TABLET BY MOUTH TWICE DAILY WITH MEALS 180 Tab 1    metoprolol succinate (TOPROL-XL) 25 mg XL tablet TAKE 1 TABLET BY MOUTH EVERY DAY 90 Tab 1    atorvastatin (LIPITOR) 40 mg tablet Take 1 Tab by mouth daily. Prescribed and managed by Dr. Tawanna Christina. 90 Tab 1    cyanocobalamin (VITAMIN B12) 1,000 mcg/mL injection 1 mL by IntraMUSCular route every thirty (30) days.  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  nitroglycerin (NITROSTAT) 0.4 mg SL tablet 1 Tab by SubLINGual route every five (5) minutes as needed for Chest Pain. 4 Tab 0    denosumab (PROLIA) 60 mg/mL injection 1 mL by SubCUTAneous route every 6 months.  acetaminophen (TYLENOL) 650 mg CR tablet Take 1,300 mg by mouth daily as needed for Pain.  Blood-Glucose Meter (CONTOUR METER) monitoring kit Dx: 250. Check blood sugars daily. 1 kit 11    ZINC PO Take 50 mg by mouth daily.  cholecalciferol, vitamin d3, (VITAMIN D3) 1,000 unit tablet Take 2,000 Units by mouth daily.  calcium-vitamin D (CALCIUM 500+D) 500 mg(1,250mg) -200 unit per tablet Take 2 Tabs by mouth daily.  aspirin 81 mg Tab Take 81 mg by mouth daily.  MULTIVITAMINS (MULTIVITAMIN PO) Take 1 Tab by mouth daily. ALLERGIES    Allergies   Allergen Reactions    Cephalosporins Nausea and Vomiting     Severe vomiting       PHYSICAL EXAMINATION    Visit Vitals  /73   Pulse 87   Temp 97.7 °F (36.5 °C)   Resp 18   Ht 5' 2\" (1.575 m)   Wt 196 lb (88.9 kg)   BMI 35.85 kg/m²     Body mass index is 35.85 kg/m². General: Patient is alert, oriented x 3, not in acute distress, daughter at bedside    HEENT:   Sclerae are not injected and appear moist.  There is no alopecia. Neck is supple     Cardiovascular:  Heart is regular rate and rhythm, no murmurs. Chest:  Lungs are clear to auscultation bilaterally. No rhonchi, wheezes, or crackles.     Extremities:  Free of clubbing, cyanosis    Neurological exam:  Muscle strength is full in upper and lower extremities     Skin exam:    Psoriasis:     no  Nail Pitting:     no  Onycholysis:     no  Palmoplantar pustulosis:   no  Acne fulminans:    no  Acne conglobata:    no  Hidradenitis Suppurativa:   no  Dissecting cellulitis of the scalp:  no  Pilonidal sinus:    no  Erythema nodosum:    no  Non-Scarring Alopecia:  no  Discoid Lupus:   no  Subacute Cutaneous Lupus:   no  Heliotrope Rash:   no  Upper Arm Erythema:   no  Shawl Sign:    no  V-sign:     no  Holster sign:    no  Gottron's papules:   no  Gottron's sign:    no  Calcinosis:    no  Raynaud's Phenomenon:  no  's Hands:   no  Periungual erythema:   no  Abnormal Nailfold Capillaries:  no  Livedo Reticularis:   no  Scalp Erythema:   no  Facial Rosacea: Yes    Musculoskeletal:  A comprehensive musculoskeletal exam was performed for all joints of each upper and lower extremity and assessed for swelling, tenderness and range of motion.       Bilateral Sharla and Heberden nods  Bilateral knee crepitus without effusion    Joint Count 8/12/2020 2/12/2020 8/7/2019 2/20/2019 11/20/2018 8/20/2018 5/10/2018   Patient pain (0-100) - 50 50 50 80 80 25   MHAQ - 0.375 0.125 0.125 0 0.125 0.25   Left wrist- Tender - - - - 1 1 1   Left wrist- Swollen - - - - 1 1 1   Left 1st MCP - Tender - - - 1 1 1 -   Left 1st MCP - Swollen - - - 1 1 1 -   Left 2nd MCP - Tender - 1 - 0 0 1 1   Left 2nd MCP - Swollen - 1 - 1 1 1 1   Left 3rd MCP - Tender - - 1 1 1 1 1   Left 3rd MCP - Swollen - - 0 1 1 1 1   Left 4th MCP - Tender - - - 1 1 1 1   Left 4th MCP - Swollen - - - 0 0 1 -   Left 5th MCP - Tender - - 1 1 0 - -   Left 5th MCP - Swollen - - 0 1 1 - 1   Left thumb IP - Tender - - - - - 1 1   Left 2nd PIP - Tender - 1 - 1 1 1 1   Left 2nd PIP - Swollen - 1 - 1 1 1 -   Left 3rd PIP - Tender - 1 1 1 1 1 1   Left 3rd PIP - Swollen - 1 0 1 1 1 1   Left 4th PIP - Tender 1 1 1 1 1 1 1   Left 4th PIP - Swollen 1 1 0 1 1 1 -   Left 5th PIP - Tender - 1 1 1 1 1 1   Left 5th PIP - Swollen - 1 0 1 1 1 -   Right wrist- Tender - - - - - - -   Right wrist- Swollen - - - - - - -   Right 1st MCP - Tender - - - 1 1 - -   Right 1st MCP - Swollen - - - 1 1 - -   Right 2nd MCP - Tender - - - 1 1 1 1   Right 2nd MCP - Swollen - - - 1 1 1 1   Right 3rd MCP - Tender - - - 1 0 1 1   Right 3rd MCP - Swollen - - - 1 1 1 1 Right 4th MCP - Tender - - - 0 1 1 -   Right 4th MCP - Swollen - - - 1 1 1 -   Right 5th MCP - Tender - - - 0 - 1 -   Right 5th MCP - Swollen - - - 1 - - 1   Right 2nd PIP - Tender - - 1 1 1 1 1   Right 2nd PIP - Swollen - - 0 1 1 1 -   Right 3rd PIP - Tender 1 1 1 1 1 1 1   Right 3rd PIP - Swollen 0 1 0 0 1 - -   Right 4th PIP - Tender - 1 - - 1 - 1   Right 4th PIP - Swollen - 0 - - 1 - -   Right 5th PIP - Tender - 1 1 0 1 - 1   Right 5th PIP - Swollen - 0 0 1 1 - -   Tender Joint Count (Total) 2 8 8 13 15 16 15   Swollen Joint Count (Total) 1 6 0 15 17 13 8   Physician Assessment (0-10) - 2 2 4 4 4 3   Patient Assessment (0-10) - 5 5 2.5 8 5 5   CDAI Total (calculated) - 21 15 34.5 44 38 31       DATA REVIEW    Laboratory     Recent laboratory results were reviewed, summarized, and discussed with the patient. Imaging    Musculoskeletal Ultrasound    None    Radiographs    Bilateral Hand 2/13/2018: RIGHT: severe osteopenia without fracture. There are scattered degenerative changes. Progressive first Aia 16 joint. No new erosive changes. .  Vascular calcifications. LEFT: severe osteopenia. Scattered degenerative changes as noted previously with progression of the index finger and middle finger DIP joints. There is a new erosion at the index finger proximal phalanx ulnar base without adjacent joint space loss. There are vascular calcifications. Calcification overlying the DRUJ is favored represent overlying vascular calcifications.      Bilateral Foot 2/13/2018: RIGHT: no acute fracture or dislocation. Alignment is anatomic. Mild degenerative changes are seen in the left first MTP joint. Minimal degenerative changes are seen in the right first MTP joint. A possible erosion is seen in the head of the left first metatarsal. No erosive changes are seen in the right foot. Plantar calcaneal heel spurs are noted in both feet, as well as enthesophyte formation at the Achilles insertion site.  Arterial vascular calcifications are also noted bilaterally. Mild soft tissue swelling surrounds the left first MTP joint. LEFT: no acute fracture or dislocation. Alignment is anatomic. Mild degenerative changes are seen in the left first MTP joint. Minimal degenerative changes are seen in the right first MTP joint. A possible erosion is seen in the head of the left first metatarsal. No erosive changes are seen in the right foot. Plantar calcaneal heel spurs are noted in both feet, as well as enthesophyte formation at the Achilles insertion site. Arterial vascular calcifications are also noted bilaterally. Mild soft tissue swelling surrounds the left first MTP joint. Thoracic Spine 11/03/2016: The posterior battery pack is again seen within the intrathecal catheter in stable position. Kyphoplasty material is present in a midthoracic vertebral body is stable mild chronic compression of the adjacent inferior vertebral body. The remaining vertebral body heights are maintained. Anterior osteophytes are noted. There is no abnormality in alignment. Lumbar Spine 10/19/2016: significant disc space narrowing at L3-L4 with 14 mm anterolisthesis. There is facet arthropathy. The vertebral body heights are maintained. Spinal catheters are seen. There are atherosclerotic vascular calcifications. Bilateral Hips 8/31/2015: no fracture, dislocation or other acute abnormality. There osteoarthritic pattern change of both hips, more severe on the right. There is nonuniform joint space narrowing and marginal osteophytes. There is remodeling of the right femur head with subchondral cystic formation. Mild sacroiliac osteoarthritic change. Lower lumbar spondylosis is noted. Wire  leads are seen in the left lower lumbar spine likely reflective of spinal stimulator device. Bilateral Hand 3/01/2011: RIGHT: mild osteopenia. There are degenerative changes of the distal and proximal interphalangeal joints and the base of the thumb.  There is no fracture or other acute abnormality. Scattered calcifications are noted in the radial and ulnar arteries. LEFT: mild osteopenia. There are degenerative changes of the distal and proximal interphalangeal joints and  at the base of the thumb. There is no fracture or acute abnormality. There are vascular calcifications of the radial and ulnar arteries. CT Imaging    None    MR Imaging    MRI Brain with without contrast 12/19/2016: There is sulcal and ventricular prominence. Confluent periventricular and scattered foci of increased T2 signal intensity in the corona radiata and centrum semiovale. Tiny remote lacunar infarction in the right cerebellum. There is no intracranial mass, hemorrhage or evidence of acute infarction. There is no Chiari or syrinx. The pituitary and infundibulum are grossly unremarkable. There is no skull base mass. Cerebellopontine angles are grossly unremarkable. The major intracranial vascular flow-voids are unremarkable. No evidence of demyelinating process. There is no abnormal parenchymal enhancement. There is no abnormal meningeal enhancement. The cavernous sinuses are symmetric. Optic chiasm and infundibulum grossly unremarkable. Orbits are grossly symmetric. Dural venous sinuses are grossly patent. The brain architecture is normal. There is no evidence of midline shift or mass-effect. There are no extra-axial fluid collections. The mastoid air cells and paranasal sinuses are well pneumatized and clear. MRI Lumbar Spine with and without contrast 8/07/2013: There is transitional lumbosacral anatomy. In keeping with prior studies, lowest fully formed disc is labeled L5-S1. There has been prior  laminectomies at L3-5 with no change in grade 1 anterolisthesis at L3-4 which measures 5 mm. There is grade 1 anterolisthesis of L4-5 of approximately 2 mm, unchanged. Vertebral body heights are maintained. There is no marrow edema or compression fracture.  There are reactive endplate changes at L3-4.  The conus medullaris terminates at L1-2. There is no abnormal intraspinal enhancement. L1/2:  The spinal canal and foramina are widely patent. L2/3:  Diffuse disc bulge, facet hypertrophy and ligamentum flavum thickening cause mild spinal canal and moderate right foraminal stenosis. Left foramen is patent. L3/4: There is uncovering of disc material with facet hypertrophy resulting in mild thecal sac narrowing. There is severe bilateral foraminal stenosis similar to the prior study. L4/5: Gwenyth Stamp is diffuse disc bulge and facet hypertrophy, without significant spinal canal stenosis. There is mild right foraminal stenosis. L5/S1:  The spinal canal and foramina are widely patent. DXA     DXA 8/16/2019: (excluded Possible increased density in the L2 and T6 vertebral bodies) left femoral neck T score: -1.1 (0.881 g/cm2), left total hip T score: -0.9 (0.889 g/cm2), right femoral neck T score: -0.7 (0.945 g/cm2), right total hip T score: -0.5 (0.941 g/cm2), and distal one third left radius T score -2.4 (BMD 0.542 g/cm2). FRAX score 21 % probability in 10 years for major osteoporotic fracture and 39 % 10 year probability of hip fracture. DXA 8/08/2017: (excluded Lumbar spine because of overlying wires and degenerative change and kyphoplasty of L4) showed left femoral neck T score: -1.4 (0.837 g/cm2), left total hip T score: -1.4 (0.830 g/cm2), right femoral neck T score: -0.9 (0.919 g/cm2), right total hip T score: -0.9 (0.900 g/cm2), and distal one third left radius T score -3.0 (BMD 0.615 g/cm2). FRAX score 16.8 % probability in 10 years for major osteoporotic fracture and 3.2 % 10 year probability of hip fracture. Nuclear Medicine    Nuclear Medicine Bone Scan 1/22/2013: There is intense radiotracer uptake in the upper thoracic spine, likely T5. There is uptake in the region of the sternoclavicular joints and left shoulder, likely degenerative.     ASSESSMENT AND PLAN    This is a follow-up visit for Ms. Anusha Schwartz. 1) Seronegative Erosive Rheumatoid Arthritis. She is maintained on leflunomide 20 mg daily and Simponi Aria every 6 weeks, with good tolerance. Her most recent infusion was 8/07/2020. Her CDAI was N/A (previously 21, 15, 34.5, 44, 38, 31, 24.5) with 2 tender and 1 swollen joints, consistent with moderate disease activity. She denies breakthrough with Simponi but knows that it is working. I will continue treatment. 2) Long Term Use of Immunosuppressants. The patient remains on immunomodulatory medications (leflunomide, Simponi Aria) and requires frequent toxicity monitoring by blood work. Respective labs were ordered (Quantiferon TB, chronic hepatitis panel). 3) Age-Related Osteoporosis. (multiple vertebral fractures) Her most recent DXA on  8/16/2019 showed distal one third left radius T score -2.4 (BMD 0.542 g/cm2). FRAX score 21 % probability in 10 years for major osteoporotic fracture and 39 % 10 year probability of hip fracture. She is maintained on Prolia with good tolerance. Her most recent dose was 2/07/2020. Her next dose is 8/21/2020. 4) Primary Raynauds phenomenon. She is on amlodipine 5 mg daily. This was not an active issue today. She may take up to 10 mg if she becomes symptomatic. 5) Chronic Kidney Disease Stage 2-3. I will continue to monitor. 6) Dependent Edema. This is due to varicosities and dependent drainage. The patient voiced understanding of the aforementioned assessment and plan. Summary of plan was provided in the After Visit Summary patient instructions.      TODAY'S ORDERS    Orders Placed This Encounter    QUANTIFERON-TB GOLD PLUS    CHRONIC HEPATITIS PANEL    VITAMIN D, 25 HYDROXY     Future Appointments   Date Time Provider Yocasta Cisneros   8/21/2020 11:00 AM [facility]   9/18/2020 11:30 AM SS INF3 CH4 <2H [facility]   10/30/2020 11:30 AM [facility]   12/11/2020 11:30 AM SS INF3 CH4 <2H [address]   1/22/2021 11:30 AM SS INF3 CH4 <2H [facility]   2/11/2021  2:30 PM Ramana Cole MD AOCR BS AMB     Shan Barrientos MD, [address] [place] [address]    Adult Rheumatology   Rheumatology Ultrasound Certified  [facility]  A Part of [facility], [address]   Phone [phone]  Fax [phone]

## 2020-08-14 DIAGNOSIS — N18.2 CKD (CHRONIC KIDNEY DISEASE) STAGE 2, GFR 60-89 ML/MIN: ICD-10-CM

## 2020-08-14 DIAGNOSIS — E55.9 VITAMIN D DEFICIENCY: ICD-10-CM

## 2020-08-14 DIAGNOSIS — I73.00 PRIMARY RAYNAUD'S PHENOMENON: ICD-10-CM

## 2020-08-14 DIAGNOSIS — M81.0 AGE-RELATED OSTEOPOROSIS WITHOUT CURRENT PATHOLOGICAL FRACTURE: ICD-10-CM

## 2020-08-14 RX ORDER — AMLODIPINE BESYLATE 5 MG/1
TABLET ORAL
Qty: 90 TAB | Refills: 0 | Status: SHIPPED | OUTPATIENT
Start: 2020-08-14 | End: 2020-11-12

## 2020-08-16 ENCOUNTER — PATIENT MESSAGE (OUTPATIENT)
Dept: FAMILY MEDICINE CLINIC | Age: 80
End: 2020-08-16

## 2020-08-16 DIAGNOSIS — N18.2 CKD (CHRONIC KIDNEY DISEASE) STAGE 2, GFR 60-89 ML/MIN: Primary | ICD-10-CM

## 2020-08-17 NOTE — TELEPHONE ENCOUNTER
From: Ryne Nguyen  To: Yadiel Duvall MD  Sent: 8/16/2020 1:27 PM EDT  Subject: Non-Urgent Medical Question    Good afternoon, The nurse at [facility] outpatient INFUSION center wanted to know. if you could add renal profile to my blood work. Because I need it before I can get my prolia shot on Friday 21. Thanks [employer] ect I'm going to get my labs Tuesday.

## 2020-08-20 LAB
CHOLEST SERPL-MCNC: 162 MG/DL
EST. AVERAGE GLUCOSE BLD GHB EST-MCNC: 140 MG/DL
HBA1C MFR BLD: 6.5 % (ref 4–5.6)
HDLC SERPL-MCNC: 53 MG/DL
HDLC SERPL: 3.1 {RATIO} (ref 0–5)
LDLC SERPL CALC-MCNC: 65 MG/DL (ref 0–100)
LIPID PROFILE,FLP: ABNORMAL
TRIGL SERPL-MCNC: 220 MG/DL (ref ?–150)
VLDLC SERPL CALC-MCNC: 44 MG/DL

## 2020-08-21 LAB
ALBUMIN SERPL-MCNC: 3.9 G/DL (ref 3.5–5)
ANION GAP SERPL CALC-SCNC: 3 MMOL/L (ref 5–15)
BUN SERPL-MCNC: 15 MG/DL (ref 6–20)
BUN/CREAT SERPL: 19 (ref 12–20)
CALCIUM SERPL-MCNC: 8.9 MG/DL (ref 8.5–10.1)
CHLORIDE SERPL-SCNC: 108 MMOL/L (ref 97–108)
CO2 SERPL-SCNC: 28 MMOL/L (ref 21–32)
COMMENT, HOLDF: NORMAL
CREAT SERPL-MCNC: 0.79 MG/DL (ref 0.55–1.02)
GLUCOSE SERPL-MCNC: 105 MG/DL (ref 65–100)
PHOSPHATE SERPL-MCNC: 3 MG/DL (ref 2.6–4.7)
POTASSIUM SERPL-SCNC: 4.7 MMOL/L (ref 3.5–5.1)
SAMPLES BEING HELD,HOLD: NORMAL
SODIUM SERPL-SCNC: 139 MMOL/L (ref 136–145)

## 2020-08-24 LAB
25(OH)D3+25(OH)D2 SERPL-MCNC: 37.9 NG/ML (ref 30–100)
M TB IFN-G BLD-IMP: NEGATIVE
QUANTIFERON CRITERIA, QFI1T: NORMAL
QUANTIFERON MITOGEN VALUE: >10 IU/ML
QUANTIFERON NIL VALUE: 0.03 IU/ML
QUANTIFERON TB1 AG: 0.16 IU/ML
QUANTIFERON TB2 AG: 0.07 IU/ML

## 2020-08-31 ENCOUNTER — HOSPITAL ENCOUNTER (OUTPATIENT)
Dept: INFUSION THERAPY | Age: 80
Discharge: HOME | End: 2020-08-31
Payer: MEDICARE

## 2020-08-31 VITALS
DIASTOLIC BLOOD PRESSURE: 58 MMHG | OXYGEN SATURATION: 96 % | TEMPERATURE: 97.9 F | HEART RATE: 84 BPM | SYSTOLIC BLOOD PRESSURE: 120 MMHG | RESPIRATION RATE: 16 BRPM

## 2020-08-31 PROCEDURE — 96372 THER/PROPH/DIAG INJ SC/IM: CPT

## 2020-08-31 PROCEDURE — 74011250636 HC RX REV CODE- 250/636: Performed by: INTERNAL MEDICINE

## 2020-08-31 RX ADMIN — DENOSUMAB 60 MG: 60 INJECTION SUBCUTANEOUS at 14:15

## 2020-08-31 NOTE — PROGRESS NOTES
Outpatient Infusion Center Short Visit Progress Note    Patient admitted to [facility] for ambulatory ambulatory in stable condition. Assessment completed. No new concerns voiced. Vital Signs:  Visit Vitals  /58   Pulse 84   Temp 97.9 °F (36.6 °C)   Resp 16   SpO2 96%   Breastfeeding No       Medications:  Medications Administered     denosumab (PROLIA) injection 60 mg     Admin Date  08/31/2020 Action  Given Dose  60 mg Route  SubCUTAneous Administered By  Jyoti Bone                Patient tolerated treatment well. Patient discharged from [place] [address] ambulatory in no distress. Patient aware of next appointment.     [facility], RN    Future Appointments   Date Time Provider Yocasta Cisneros   8/31/2020  2:30 PM SS INF1 CH2 >7H [facility]   9/10/2020 10:30 AM SS INF7 CH1 LAB [facility]   9/18/2020 11:30 AM SS INF3 CH4 <2H [facility]   10/30/2020 11:30 AM SS INF3 CH4 <2H [facility]   12/11/2020 11:30 AM SS INF3 CH4 <2H [facility]   1/22/2021 11:30 AM SS INF3 CH4 <2H [facility]   2/11/2021  2:30 PM Katelyn Ramirez MD AOCR BS AMB   2/19/2021 11:00 AM [facility]

## 2020-09-10 ENCOUNTER — HOSPITAL ENCOUNTER (OUTPATIENT)
Dept: LAB | Age: 80
Discharge: HOME | End: 2020-09-10
Payer: MEDICARE

## 2020-09-10 PROCEDURE — 86480 TB TEST CELL IMMUN MEASURE: CPT

## 2020-09-10 PROCEDURE — 82306 VITAMIN D 25 HYDROXY: CPT

## 2020-09-10 PROCEDURE — 86803 HEPATITIS C AB TEST: CPT

## 2020-09-10 PROCEDURE — 36415 COLL VENOUS BLD VENIPUNCTURE: CPT

## 2020-09-11 ENCOUNTER — APPOINTMENT (OUTPATIENT)
Dept: INFUSION THERAPY | Age: 80
End: 2020-09-11

## 2020-09-11 RX ORDER — ACETAMINOPHEN 325 MG/1
650 TABLET ORAL
Status: ACTIVE | OUTPATIENT
Start: 2020-09-18 | End: 2020-09-18

## 2020-09-11 RX ORDER — DIPHENHYDRAMINE HYDROCHLORIDE 50 MG/ML
50 INJECTION, SOLUTION INTRAMUSCULAR; INTRAVENOUS
Status: ACTIVE | OUTPATIENT
Start: 2020-09-18 | End: 2020-09-18

## 2020-09-11 RX ORDER — SODIUM CHLORIDE 9 MG/ML
25 INJECTION, SOLUTION INTRAVENOUS CONTINUOUS
Status: DISPENSED | OUTPATIENT
Start: 2020-09-18 | End: 2020-09-18

## 2020-09-14 LAB
25(OH)D3+25(OH)D2 SERPL-MCNC: 34.5 NG/ML (ref 30–100)
COMMENT, 144067: NORMAL
GAMMA INTERFERON BACKGROUND BLD IA-ACNC: 0.03 IU/ML
HBV CORE AB SERPL QL IA: NEGATIVE
HBV CORE IGM SERPL QL IA: NEGATIVE
HBV E AB SERPL QL IA: NEGATIVE
HBV E AG SERPL QL IA: NEGATIVE
HBV SURFACE AB SER QL: NON REACTIVE
HBV SURFACE AG SERPL QL IA: NEGATIVE
HCV AB S/CO SERPL IA: <0.1 S/CO RATIO (ref 0–0.9)
M TB IFN-G BLD-IMP: NEGATIVE
M TB IFN-G CD4+ BCKGRND COR BLD-ACNC: 0.03 IU/ML
MITOGEN IGNF BLD-ACNC: >10 IU/ML
QUANTIFERON INCUBATION, QF1T: NORMAL
QUANTIFERON TB2 AG: 0.03 IU/ML
SERVICE CMNT-IMP: NORMAL

## 2020-09-18 ENCOUNTER — HOSPITAL ENCOUNTER (OUTPATIENT)
Dept: INFUSION THERAPY | Age: 80
Discharge: HOME | End: 2020-09-18
Payer: MEDICARE

## 2020-09-18 VITALS
SYSTOLIC BLOOD PRESSURE: 101 MMHG | TEMPERATURE: 97.6 F | OXYGEN SATURATION: 94 % | HEART RATE: 86 BPM | DIASTOLIC BLOOD PRESSURE: 62 MMHG | RESPIRATION RATE: 18 BRPM | WEIGHT: 196.8 LBS | BODY MASS INDEX: 36 KG/M2

## 2020-09-18 LAB
ALBUMIN SERPL-MCNC: 3.2 G/DL (ref 3.5–5)
ALBUMIN/GLOB SERPL: 1 {RATIO} (ref 1.1–2.2)
ALP SERPL-CCNC: 123 U/L (ref 45–117)
ALT SERPL-CCNC: 39 U/L (ref 12–78)
ANION GAP SERPL CALC-SCNC: 2 MMOL/L (ref 5–15)
AST SERPL-CCNC: 40 U/L (ref 15–37)
BASO+EOS+MONOS # BLD AUTO: 0.6 K/UL (ref 0.2–1.2)
BASO+EOS+MONOS NFR BLD AUTO: 9 % (ref 3.2–16.9)
BILIRUB SERPL-MCNC: 0.5 MG/DL (ref 0.2–1)
BUN SERPL-MCNC: 14 MG/DL (ref 6–20)
BUN/CREAT SERPL: 14 (ref 12–20)
CALCIUM SERPL-MCNC: 7.7 MG/DL (ref 8.5–10.1)
CHLORIDE SERPL-SCNC: 111 MMOL/L (ref 97–108)
CO2 SERPL-SCNC: 28 MMOL/L (ref 21–32)
CREAT SERPL-MCNC: 0.97 MG/DL (ref 0.55–1.02)
CRP SERPL-MCNC: <0.29 MG/DL (ref 0–0.6)
DIFFERENTIAL METHOD BLD: ABNORMAL
ERYTHROCYTE [DISTWIDTH] IN BLOOD BY AUTOMATED COUNT: 13.9 % (ref 11.8–15.8)
ERYTHROCYTE [SEDIMENTATION RATE] IN BLOOD: 10 MM/HR (ref 0–30)
GLOBULIN SER CALC-MCNC: 3.2 G/DL (ref 2–4)
GLUCOSE SERPL-MCNC: 173 MG/DL (ref 65–100)
HCT VFR BLD AUTO: 37.6 % (ref 35–47)
HGB BLD-MCNC: 12.5 G/DL (ref 11.5–16)
LYMPHOCYTES # BLD: 3 K/UL (ref 0.8–3.5)
LYMPHOCYTES NFR BLD: 42 % (ref 12–49)
MCH RBC QN AUTO: 33.2 PG (ref 26–34)
MCHC RBC AUTO-ENTMCNC: 33.2 G/DL (ref 30–36.5)
MCV RBC AUTO: 99.7 FL (ref 80–99)
NEUTS SEG # BLD: 3.4 K/UL (ref 1.8–8)
NEUTS SEG NFR BLD: 49 % (ref 32–75)
PLATELET # BLD AUTO: 189 K/UL (ref 150–400)
POTASSIUM SERPL-SCNC: 4.2 MMOL/L (ref 3.5–5.1)
PROT SERPL-MCNC: 6.4 G/DL (ref 6.4–8.2)
RBC # BLD AUTO: 3.77 M/UL (ref 3.8–5.2)
SODIUM SERPL-SCNC: 141 MMOL/L (ref 136–145)
WBC # BLD AUTO: 7 K/UL (ref 3.6–11)

## 2020-09-18 PROCEDURE — 36415 COLL VENOUS BLD VENIPUNCTURE: CPT

## 2020-09-18 PROCEDURE — 74011000258 HC RX REV CODE- 258: Performed by: INTERNAL MEDICINE

## 2020-09-18 PROCEDURE — 86140 C-REACTIVE PROTEIN: CPT

## 2020-09-18 PROCEDURE — 85652 RBC SED RATE AUTOMATED: CPT

## 2020-09-18 PROCEDURE — 74011250636 HC RX REV CODE- 250/636: Performed by: INTERNAL MEDICINE

## 2020-09-18 PROCEDURE — 80053 COMPREHEN METABOLIC PANEL: CPT

## 2020-09-18 PROCEDURE — 85025 COMPLETE CBC W/AUTO DIFF WBC: CPT

## 2020-09-18 PROCEDURE — 96365 THER/PROPH/DIAG IV INF INIT: CPT

## 2020-09-18 RX ADMIN — GOLIMUMAB 175 MG: 50 SOLUTION INTRAVENOUS at 12:04

## 2020-09-18 RX ADMIN — SODIUM CHLORIDE 25 ML/HR: 900 INJECTION, SOLUTION INTRAVENOUS at 11:36

## 2020-09-18 NOTE — PROGRESS NOTES
[facility] VISIT NOTE    1120. Pt arrived at [facility] ambulatory and in no distress for Simponi. Assessment completed, pt c/o generalized joint pain. 24g PIV placed in Left AC. Positive blood return noted and labs drawn. Medications received:  NS KVO  Simponi IV    Tolerated treatment well, no adverse reaction noted. PIV removed at discharge, no s/s of infiltration noted. Patient Vitals for the past 12 hrs:   Temp Pulse Resp BP SpO2   09/18/20 1236  86 18 101/62    09/18/20 1123 97.6 °F (36.4 °C) 98 18 132/72 94 %     Recent Results (from the past 12 hour(s))   CBC WITH 3 PART DIFF    Collection Time: 09/18/20 11:34 AM   Result Value Ref Range    WBC 7.0 3.6 - 11.0 K/uL    RBC 3.77 (L) 3.80 - 5.20 M/uL    HGB 12.5 11.5 - 16.0 g/dL    HCT 37.6 35.0 - 47.0 %    MCV 99.7 (H) 80.0 - 99.0 FL    MCH 33.2 26.0 - 34.0 PG    MCHC 33.2 30.0 - 36.5 g/dL    RDW 13.9 11.8 - 15.8 %    PLATELET 347 408 - 633 K/uL    NEUTROPHILS 49 32 - 75 %    MIXED CELLS 9 3.2 - 16.9 %    LYMPHOCYTES 42 12 - 49 %    ABS. NEUTROPHILS 3.4 1.8 - 8.0 K/UL    ABS. MIXED CELLS 0.6 0.2 - 1.2 K/uL    ABS. LYMPHOCYTES 3.0 0.8 - 3.5 K/UL    DF AUTOMATED       1240. D/C'd from [facility] ambulatory and in no distress.  Next appointment is 10/30/20 at 11:30 am.

## 2020-09-23 ENCOUNTER — CLINICAL SUPPORT (OUTPATIENT)
Dept: FAMILY MEDICINE CLINIC | Age: 80
End: 2020-09-23
Payer: MEDICARE

## 2020-09-23 DIAGNOSIS — Z23 ENCOUNTER FOR IMMUNIZATION: Primary | ICD-10-CM

## 2020-09-23 PROCEDURE — G0008 ADMIN INFLUENZA VIRUS VAC: HCPCS | Performed by: FAMILY MEDICINE

## 2020-09-24 VITALS — TEMPERATURE: 98.4 F

## 2020-09-24 PROBLEM — I21.9 MYOCARDIAL INFARCTION (HCC): Status: ACTIVE | Noted: 2017-11-20

## 2020-09-24 PROBLEM — E11.9 DIABETES MELLITUS (HCC): Status: ACTIVE | Noted: 2017-11-20

## 2020-09-24 PROBLEM — D64.9 ANEMIA: Status: ACTIVE | Noted: 2017-11-20

## 2020-09-24 PROCEDURE — 90694 VACC AIIV4 NO PRSRV 0.5ML IM: CPT | Performed by: FAMILY MEDICINE

## 2020-09-24 NOTE — PATIENT INSTRUCTIONS
Vaccine Information Statement Influenza (Flu) Vaccine (Inactivated or Recombinant): What You Need to Know Many Vaccine Information Statements are available in Occitan and other languages. See www.immunize.org/vis Hojas de información sobre vacunas están disponibles en español y en muchos otros idiomas. Visite www.immunize.org/vis 1. Why get vaccinated? Influenza vaccine can prevent influenza (flu). Flu is a contagious disease that spreads around the [facility] every year, usually between October and May. Anyone can get the flu, but it is more dangerous for some people. Infants and young children, people 72years of age and older, pregnant women, and people with certain health conditions or a weakened immune system are at greatest risk of flu complications. Pneumonia, bronchitis, sinus infections and ear infections are examples of flu-related complications. If you have a medical condition, such as heart disease, cancer or diabetes, flu can make it worse. Flu can cause fever and chills, sore throat, muscle aches, fatigue, cough, headache, and runny or stuffy nose. Some people may have vomiting and diarrhea, though this is more common in children than adults. Each year thousands of people in the [facility] die from flu, and many more are hospitalized. Flu vaccine prevents millions of illnesses and flu-related visits to the doctor each year. 2. Influenza vaccines CDC recommends everyone 10months of age and older get vaccinated every flu season. Children 6 months through 6years of age may need 2 doses during a single flu season. Everyone else needs only 1 dose each flu season. It takes about 2 weeks for protection to develop after vaccination. There are many flu viruses, and they are always changing. Each year a new flu vaccine is made to protect against three or four viruses that are likely to cause disease in the upcoming flu season.  Even when the vaccine doesnt exactly match these viruses, it may still provide some protection. Influenza vaccine does not cause flu. Influenza vaccine may be given at the same time as other vaccines. 3. Talk with your health care provider Tell your vaccine provider if the person getting the vaccine: 
 Has had an allergic reaction after a previous dose of influenza vaccine, or has any severe, life-threatening allergies.  Has ever had Guillain-Barré Syndrome (also called GBS). In some cases, your health care provider may decide to postpone influenza vaccination to a future visit. People with minor illnesses, such as a cold, may be vaccinated. People who are moderately or severely ill should usually wait until they recover before getting influenza vaccine. Your health care provider can give you more information. 4. Risks of a reaction  Soreness, redness, and swelling where shot is given, fever, muscle aches, and headache can happen after influenza vaccine.  There may be a very small increased risk of Guillain-Barré Syndrome (GBS) after inactivated influenza vaccine (the flu shot). Davidgenny Sam children who get the flu shot along with pneumococcal vaccine (PCV13), and/or DTaP vaccine at the same time might be slightly more likely to have a seizure caused by fever. Tell your health care provider if a child who is getting flu vaccine has ever had a seizure. People sometimes faint after medical procedures, including vaccination. Tell your provider if you feel dizzy or have vision changes or ringing in the ears. As with any medicine, there is a very remote chance of a vaccine causing a severe allergic reaction, other serious injury, or death. 5. What if there is a serious problem? An allergic reaction could occur after the vaccinated person leaves the clinic.  If you see signs of a severe allergic reaction (hives, swelling of the face and throat, difficulty breathing, a fast heartbeat, dizziness, or weakness), call 9-1-1 and get the person to the nearest hospital. 
 
For other signs that concern you, call your health care provider. Adverse reactions should be reported to the Vaccine Adverse Event Reporting System (VAERS). Your health care provider will usually file this report, or you can do it yourself. Visit the VAERS website at www.vaers. hhs.gov or call 3-840.709.6987. VAERS is only for reporting reactions, and VAERS staff do not give medical advice. 6. The National Vaccine Injury Compensation Program 
 
The [facility] Vaccine Injury Compensation Program (VICP) is a federal program that was created to compensate people who may have been injured by certain vaccines. Visit the VICP website at www.hrsa.gov/vaccinecompensation or call 2-647.116.2030 to learn about the program and about filing a claim. There is a time limit to file a claim for compensation. 7. How can I learn more?  Ask your health care provider.  Call your local or state health department.  Contact the Centers for Disease Control and Prevention (CDC): 
- Call 4-849.250.1337 (3-661-TUO-INFO) or 
- Visit CDCs influenza website at www.cdc.gov/flu Vaccine Information Statement (Interim) Inactivated Influenza Vaccine 8/15/2019 
42 FRAN Lester 331XF-31 Department of Health and [place] Centers for Disease Control and Prevention Office Use Only

## 2020-09-27 NOTE — PROGRESS NOTES
The results were reviewed. Calcium slightly low. Please increase daily calcium intake. Slightly elevated AST 40 and . These are not new.

## 2020-10-02 ENCOUNTER — APPOINTMENT (OUTPATIENT)
Dept: INFUSION THERAPY | Age: 80
End: 2020-10-02

## 2020-10-09 DIAGNOSIS — E78.2 MIXED HYPERLIPIDEMIA: ICD-10-CM

## 2020-10-09 RX ORDER — ATORVASTATIN CALCIUM 40 MG/1
TABLET, FILM COATED ORAL
Qty: 90 TAB | Refills: 1 | Status: SHIPPED | OUTPATIENT
Start: 2020-10-09 | End: 2021-03-29 | Stop reason: SDUPTHER

## 2020-10-21 DIAGNOSIS — I10 ESSENTIAL HYPERTENSION WITH GOAL BLOOD PRESSURE LESS THAN 130/80: ICD-10-CM

## 2020-10-21 DIAGNOSIS — E11.21 TYPE 2 DIABETES WITH NEPHROPATHY (HCC): ICD-10-CM

## 2020-10-22 RX ORDER — METFORMIN HYDROCHLORIDE 500 MG/1
TABLET ORAL
Qty: 180 TAB | Refills: 1 | Status: SHIPPED | OUTPATIENT
Start: 2020-10-22 | End: 2021-04-28 | Stop reason: SDUPTHER

## 2020-10-22 RX ORDER — METOPROLOL SUCCINATE 25 MG/1
TABLET, EXTENDED RELEASE ORAL
Qty: 90 TAB | Refills: 1 | Status: SHIPPED | OUTPATIENT
Start: 2020-10-22 | End: 2022-05-16

## 2020-10-23 ENCOUNTER — APPOINTMENT (OUTPATIENT)
Dept: INFUSION THERAPY | Age: 80
End: 2020-10-23

## 2020-10-23 RX ORDER — ALBUTEROL SULFATE 0.83 MG/ML
2.5 SOLUTION RESPIRATORY (INHALATION) AS NEEDED
Status: CANCELLED
Start: 2020-10-30

## 2020-10-23 RX ORDER — SODIUM CHLORIDE 0.9 % (FLUSH) 0.9 %
10 SYRINGE (ML) INJECTION AS NEEDED
Status: CANCELLED | OUTPATIENT
Start: 2020-10-30

## 2020-10-23 RX ORDER — DIPHENHYDRAMINE HYDROCHLORIDE 50 MG/ML
50 INJECTION, SOLUTION INTRAMUSCULAR; INTRAVENOUS AS NEEDED
Status: CANCELLED
Start: 2020-10-30

## 2020-10-23 RX ORDER — SODIUM CHLORIDE 9 MG/ML
10 INJECTION INTRAMUSCULAR; INTRAVENOUS; SUBCUTANEOUS AS NEEDED
Status: CANCELLED | OUTPATIENT
Start: 2020-10-30

## 2020-10-23 RX ORDER — HYDROCORTISONE SODIUM SUCCINATE 100 MG/2ML
100 INJECTION, POWDER, FOR SOLUTION INTRAMUSCULAR; INTRAVENOUS AS NEEDED
Status: CANCELLED | OUTPATIENT
Start: 2020-10-30

## 2020-10-23 RX ORDER — DIPHENHYDRAMINE HYDROCHLORIDE 50 MG/ML
25 INJECTION, SOLUTION INTRAMUSCULAR; INTRAVENOUS AS NEEDED
Status: CANCELLED
Start: 2020-10-30

## 2020-10-23 RX ORDER — ACETAMINOPHEN 325 MG/1
650 TABLET ORAL AS NEEDED
Status: CANCELLED
Start: 2020-10-30

## 2020-10-23 RX ORDER — EPINEPHRINE 1 MG/ML
0.3 INJECTION, SOLUTION, CONCENTRATE INTRAVENOUS AS NEEDED
Status: CANCELLED | OUTPATIENT
Start: 2020-10-30

## 2020-10-23 RX ORDER — ONDANSETRON 2 MG/ML
8 INJECTION INTRAMUSCULAR; INTRAVENOUS AS NEEDED
Status: CANCELLED | OUTPATIENT
Start: 2020-10-30

## 2020-10-23 RX ORDER — HEPARIN 100 UNIT/ML
300-500 SYRINGE INTRAVENOUS AS NEEDED
Status: CANCELLED
Start: 2020-10-30

## 2020-10-23 RX ORDER — SODIUM CHLORIDE 9 MG/ML
25 INJECTION, SOLUTION INTRAVENOUS CONTINUOUS
Status: CANCELLED | OUTPATIENT
Start: 2020-10-30

## 2020-10-29 DIAGNOSIS — E55.9 VITAMIN D DEFICIENCY: ICD-10-CM

## 2020-10-29 DIAGNOSIS — M51.36 DEGENERATIVE LUMBAR DISC: ICD-10-CM

## 2020-10-29 DIAGNOSIS — M81.0 AGE-RELATED OSTEOPOROSIS WITHOUT CURRENT PATHOLOGICAL FRACTURE: ICD-10-CM

## 2020-10-29 DIAGNOSIS — E11.42 DIABETIC POLYNEUROPATHY ASSOCIATED WITH TYPE 2 DIABETES MELLITUS (HCC): ICD-10-CM

## 2020-10-29 DIAGNOSIS — M12.9 ARTHROPATHY: ICD-10-CM

## 2020-10-29 DIAGNOSIS — I25.10 CORONARY ARTERY DISEASE INVOLVING NATIVE HEART WITHOUT ANGINA PECTORIS, UNSPECIFIED VESSEL OR LESION TYPE: ICD-10-CM

## 2020-10-29 DIAGNOSIS — E78.2 MIXED HYPERLIPIDEMIA: ICD-10-CM

## 2020-10-29 DIAGNOSIS — Z87.440 HISTORY OF RECURRENT UTI (URINARY TRACT INFECTION): ICD-10-CM

## 2020-10-29 DIAGNOSIS — N18.2 CKD (CHRONIC KIDNEY DISEASE) STAGE 2, GFR 60-89 ML/MIN: ICD-10-CM

## 2020-10-29 DIAGNOSIS — M79.18 MYOFASCIAL MUSCLE PAIN: ICD-10-CM

## 2020-10-29 RX ORDER — LEFLUNOMIDE 20 MG/1
20 TABLET ORAL DAILY
Qty: 90 TAB | Refills: 1 | Status: SHIPPED | OUTPATIENT
Start: 2020-10-29 | End: 2021-02-16 | Stop reason: SDUPTHER

## 2020-10-30 ENCOUNTER — HOSPITAL ENCOUNTER (OUTPATIENT)
Dept: INFUSION THERAPY | Age: 80
Discharge: HOME | End: 2020-10-30
Payer: MEDICARE

## 2020-10-30 VITALS
BODY MASS INDEX: 35.79 KG/M2 | DIASTOLIC BLOOD PRESSURE: 57 MMHG | RESPIRATION RATE: 18 BRPM | SYSTOLIC BLOOD PRESSURE: 123 MMHG | OXYGEN SATURATION: 95 % | HEART RATE: 75 BPM | TEMPERATURE: 97.2 F | WEIGHT: 195.7 LBS

## 2020-10-30 DIAGNOSIS — M06.09 SERONEGATIVE RHEUMATOID ARTHRITIS OF MULTIPLE SITES (HCC): Primary | ICD-10-CM

## 2020-10-30 DIAGNOSIS — M06.09 SERONEGATIVE RHEUMATOID ARTHRITIS OF MULTIPLE SITES (HCC): ICD-10-CM

## 2020-10-30 LAB
ALBUMIN SERPL-MCNC: 3.4 G/DL (ref 3.5–5)
ALBUMIN/GLOB SERPL: 1.1 {RATIO} (ref 1.1–2.2)
ALP SERPL-CCNC: 92 U/L (ref 45–117)
ALT SERPL-CCNC: 29 U/L (ref 12–78)
ANION GAP SERPL CALC-SCNC: 4 MMOL/L (ref 5–15)
AST SERPL-CCNC: 36 U/L (ref 15–37)
BASOPHILS # BLD: 0.1 K/UL (ref 0–0.1)
BASOPHILS NFR BLD: 1 % (ref 0–1)
BILIRUB SERPL-MCNC: 0.5 MG/DL (ref 0.2–1)
BUN SERPL-MCNC: 17 MG/DL (ref 6–20)
BUN/CREAT SERPL: 20 (ref 12–20)
CALCIUM SERPL-MCNC: 7.7 MG/DL (ref 8.5–10.1)
CHLORIDE SERPL-SCNC: 109 MMOL/L (ref 97–108)
CO2 SERPL-SCNC: 27 MMOL/L (ref 21–32)
CREAT SERPL-MCNC: 0.83 MG/DL (ref 0.55–1.02)
CRP SERPL-MCNC: <0.29 MG/DL (ref 0–0.6)
DIFFERENTIAL METHOD BLD: ABNORMAL
EOSINOPHIL # BLD: 0.3 K/UL (ref 0–0.4)
EOSINOPHIL NFR BLD: 5 % (ref 0–7)
ERYTHROCYTE [DISTWIDTH] IN BLOOD BY AUTOMATED COUNT: 13 % (ref 11.5–14.5)
ERYTHROCYTE [SEDIMENTATION RATE] IN BLOOD: 12 MM/HR (ref 0–30)
GLOBULIN SER CALC-MCNC: 3 G/DL (ref 2–4)
GLUCOSE SERPL-MCNC: 123 MG/DL (ref 65–100)
HCT VFR BLD AUTO: 36.9 % (ref 35–47)
HGB BLD-MCNC: 11.8 G/DL (ref 11.5–16)
IMM GRANULOCYTES # BLD AUTO: 0.1 K/UL (ref 0–0.04)
IMM GRANULOCYTES NFR BLD AUTO: 1 % (ref 0–0.5)
LYMPHOCYTES # BLD: 2.4 K/UL (ref 0.8–3.5)
LYMPHOCYTES NFR BLD: 39 % (ref 12–49)
MCH RBC QN AUTO: 32.6 PG (ref 26–34)
MCHC RBC AUTO-ENTMCNC: 32 G/DL (ref 30–36.5)
MCV RBC AUTO: 101.9 FL (ref 80–99)
MONOCYTES # BLD: 0.6 K/UL (ref 0–1)
MONOCYTES NFR BLD: 10 % (ref 5–13)
NEUTS SEG # BLD: 2.7 K/UL (ref 1.8–8)
NEUTS SEG NFR BLD: 44 % (ref 32–75)
NRBC # BLD: 0 K/UL (ref 0–0.01)
NRBC BLD-RTO: 0 PER 100 WBC
PLATELET # BLD AUTO: 181 K/UL (ref 150–400)
PMV BLD AUTO: 11.4 FL (ref 8.9–12.9)
POTASSIUM SERPL-SCNC: 4.2 MMOL/L (ref 3.5–5.1)
PROT SERPL-MCNC: 6.4 G/DL (ref 6.4–8.2)
RBC # BLD AUTO: 3.62 M/UL (ref 3.8–5.2)
SODIUM SERPL-SCNC: 140 MMOL/L (ref 136–145)
WBC # BLD AUTO: 6.1 K/UL (ref 3.6–11)

## 2020-10-30 PROCEDURE — 85652 RBC SED RATE AUTOMATED: CPT

## 2020-10-30 PROCEDURE — 96413 CHEMO IV INFUSION 1 HR: CPT

## 2020-10-30 PROCEDURE — 74011250636 HC RX REV CODE- 250/636: Performed by: INTERNAL MEDICINE

## 2020-10-30 PROCEDURE — 74011000258 HC RX REV CODE- 258: Performed by: INTERNAL MEDICINE

## 2020-10-30 PROCEDURE — 36415 COLL VENOUS BLD VENIPUNCTURE: CPT

## 2020-10-30 PROCEDURE — 85025 COMPLETE CBC W/AUTO DIFF WBC: CPT

## 2020-10-30 PROCEDURE — 80053 COMPREHEN METABOLIC PANEL: CPT

## 2020-10-30 PROCEDURE — 86140 C-REACTIVE PROTEIN: CPT

## 2020-10-30 RX ORDER — HEPARIN 100 UNIT/ML
300-500 SYRINGE INTRAVENOUS AS NEEDED
Status: DISCONTINUED | OUTPATIENT
Start: 2020-10-30 | End: 2020-10-31 | Stop reason: HOSPADM

## 2020-10-30 RX ORDER — EPINEPHRINE 1 MG/ML
0.3 INJECTION, SOLUTION, CONCENTRATE INTRAVENOUS AS NEEDED
Status: CANCELLED | OUTPATIENT
Start: 2020-12-11

## 2020-10-30 RX ORDER — ONDANSETRON 2 MG/ML
8 INJECTION INTRAMUSCULAR; INTRAVENOUS AS NEEDED
Status: CANCELLED | OUTPATIENT
Start: 2020-12-11

## 2020-10-30 RX ORDER — DIPHENHYDRAMINE HYDROCHLORIDE 50 MG/ML
50 INJECTION, SOLUTION INTRAMUSCULAR; INTRAVENOUS AS NEEDED
Status: CANCELLED
Start: 2020-12-11

## 2020-10-30 RX ORDER — SODIUM CHLORIDE 9 MG/ML
10 INJECTION INTRAMUSCULAR; INTRAVENOUS; SUBCUTANEOUS AS NEEDED
Status: CANCELLED | OUTPATIENT
Start: 2020-12-11

## 2020-10-30 RX ORDER — GABAPENTIN 600 MG/1
600 TABLET ORAL 4 TIMES DAILY
Qty: 120 TAB | Refills: 3 | Status: SHIPPED | OUTPATIENT
Start: 2020-10-30 | End: 2021-03-01 | Stop reason: SDUPTHER

## 2020-10-30 RX ORDER — SODIUM CHLORIDE 9 MG/ML
25 INJECTION, SOLUTION INTRAVENOUS CONTINUOUS
Status: CANCELLED | OUTPATIENT
Start: 2020-12-11

## 2020-10-30 RX ORDER — HEPARIN 100 UNIT/ML
300-500 SYRINGE INTRAVENOUS AS NEEDED
Status: CANCELLED
Start: 2020-12-11

## 2020-10-30 RX ORDER — SODIUM CHLORIDE 9 MG/ML
10 INJECTION INTRAMUSCULAR; INTRAVENOUS; SUBCUTANEOUS AS NEEDED
Status: DISCONTINUED | OUTPATIENT
Start: 2020-10-30 | End: 2020-10-31 | Stop reason: HOSPADM

## 2020-10-30 RX ORDER — ALBUTEROL SULFATE 0.83 MG/ML
2.5 SOLUTION RESPIRATORY (INHALATION) AS NEEDED
Status: CANCELLED
Start: 2020-12-11

## 2020-10-30 RX ORDER — SODIUM CHLORIDE 9 MG/ML
25 INJECTION, SOLUTION INTRAVENOUS CONTINUOUS
Status: DISCONTINUED | OUTPATIENT
Start: 2020-10-30 | End: 2020-10-31 | Stop reason: HOSPADM

## 2020-10-30 RX ORDER — HYDROCORTISONE SODIUM SUCCINATE 100 MG/2ML
100 INJECTION, POWDER, FOR SOLUTION INTRAMUSCULAR; INTRAVENOUS AS NEEDED
Status: CANCELLED | OUTPATIENT
Start: 2020-12-11

## 2020-10-30 RX ORDER — HYDROCODONE BITARTRATE AND ACETAMINOPHEN 5; 325 MG/1; MG/1
1 TABLET ORAL
Qty: 60 TAB | Refills: 0 | Status: SHIPPED | OUTPATIENT
Start: 2020-10-30 | End: 2021-01-15 | Stop reason: SDUPTHER

## 2020-10-30 RX ORDER — SODIUM CHLORIDE 0.9 % (FLUSH) 0.9 %
10 SYRINGE (ML) INJECTION AS NEEDED
Status: DISCONTINUED | OUTPATIENT
Start: 2020-10-30 | End: 2020-10-31 | Stop reason: HOSPADM

## 2020-10-30 RX ORDER — TRIMETHOPRIM 100 MG/1
TABLET ORAL
Qty: 30 TAB | Refills: 5 | Status: SHIPPED | OUTPATIENT
Start: 2020-10-30 | End: 2021-04-27

## 2020-10-30 RX ORDER — SODIUM CHLORIDE 0.9 % (FLUSH) 0.9 %
10 SYRINGE (ML) INJECTION AS NEEDED
Status: CANCELLED | OUTPATIENT
Start: 2020-12-11

## 2020-10-30 RX ORDER — ACETAMINOPHEN 325 MG/1
650 TABLET ORAL AS NEEDED
Status: CANCELLED
Start: 2020-12-11

## 2020-10-30 RX ORDER — NITROGLYCERIN 0.4 MG/1
0.4 TABLET SUBLINGUAL
Qty: 4 TAB | Refills: 0 | Status: SHIPPED | OUTPATIENT
Start: 2020-10-30 | End: 2020-11-02

## 2020-10-30 RX ORDER — BLOOD SUGAR DIAGNOSTIC
STRIP MISCELLANEOUS
Qty: 50 STRIP | Refills: 11 | Status: SHIPPED | OUTPATIENT
Start: 2020-10-30

## 2020-10-30 RX ORDER — DIPHENHYDRAMINE HYDROCHLORIDE 50 MG/ML
25 INJECTION, SOLUTION INTRAMUSCULAR; INTRAVENOUS AS NEEDED
Status: CANCELLED
Start: 2020-12-11

## 2020-10-30 RX ADMIN — SODIUM CHLORIDE 25 ML/HR: 900 INJECTION, SOLUTION INTRAVENOUS at 12:26

## 2020-10-30 RX ADMIN — GOLIMUMAB 175 MG: 50 SOLUTION INTRAVENOUS at 12:31

## 2020-10-30 NOTE — TELEPHONE ENCOUNTER
Rx request for hydrocodone. Pt takes this sparingly for pain and was last filled almost 4 months ago.  reviewed and appropriate. Will get UDS at next in person visit.

## 2020-10-30 NOTE — PROGRESS NOTES
Outpatient Infusion Center   Visit Progress Note    6588 Patient admitted to [facility] for Justin Honey in stable condition. Assessment completed. No new concerns voiced. VS  Patient Vitals for the past 12 hrs:   Temp Pulse Resp BP SpO2   10/30/20 1309 -- 75 18 (!) 123/57 --   10/30/20 1142 97.2 °F (36.2 °C) 81 18 (!) 117/58 95 %         PIV with positive blood return. Medications:  Medications Administered     0.9% sodium chloride infusion     Admin Date  10/30/2020 Action  New Bag Dose  25 mL/hr Rate  25 mL/hr Route  IntraVENous Administered By  Andriy Hargrove RN          golimumab (SIMPONI ARIA) 175 mg in 0.9% sodium chloride, overfill volume 124 mL infusion     Admin Date  10/30/2020 Action  Given Dose  175 mg Rate  248 mL/hr Route  IntraVENous Administered By  Andriy Hargrove, VIVIANA                  5811 Patient tolerated treatment well. Patient discharged from [place] [address] in no distress. Patient aware of next appointment. Labs  Recent Results (from the past 12 hour(s))   METABOLIC PANEL, COMPREHENSIVE    Collection Time: 10/30/20 11:57 AM   Result Value Ref Range    Sodium 140 136 - 145 mmol/L    Potassium 4.2 3.5 - 5.1 mmol/L    Chloride 109 (H) 97 - 108 mmol/L    CO2 27 21 - 32 mmol/L    Anion gap 4 (L) 5 - 15 mmol/L    Glucose 123 (H) 65 - 100 mg/dL    BUN 17 6 - 20 MG/DL    Creatinine 0.83 0.55 - 1.02 MG/DL    BUN/Creatinine ratio 20 12 - 20      GFR est AA >60 >60 ml/min/1.73m2    GFR est non-AA >60 >60 ml/min/1.73m2    Calcium 7.7 (L) 8.5 - 10.1 MG/DL    Bilirubin, total 0.5 0.2 - 1.0 MG/DL    ALT (SGPT) 29 12 - 78 U/L    AST (SGOT) 36 15 - 37 U/L    Alk.  phosphatase 92 45 - 117 U/L    Protein, total 6.4 6.4 - 8.2 g/dL    Albumin 3.4 (L) 3.5 - 5.0 g/dL    Globulin 3.0 2.0 - 4.0 g/dL    A-G Ratio 1.1 1.1 - 2.2     SED RATE (ESR)    Collection Time: 10/30/20 11:57 AM   Result Value Ref Range    Sed rate, automated 12 0 - 30 mm/hr   C REACTIVE PROTEIN, QT    Collection Time: 10/30/20 11:57 AM   Result Value Ref Range    C-Reactive protein <0.29 0.00 - 0.60 mg/dL   CBC WITH AUTOMATED DIFF    Collection Time: 10/30/20  1:11 PM   Result Value Ref Range    WBC 6.1 3.6 - 11.0 K/uL    RBC 3.62 (L) 3.80 - 5.20 M/uL    HGB 11.8 11.5 - 16.0 g/dL    HCT 36.9 35.0 - 47.0 %    .9 (H) 80.0 - 99.0 FL    MCH 32.6 26.0 - 34.0 PG    MCHC 32.0 30.0 - 36.5 g/dL    RDW 13.0 11.5 - 14.5 %    PLATELET 738 709 - 233 K/uL    MPV 11.4 8.9 - 12.9 FL    NRBC 0.0 0  WBC    ABSOLUTE NRBC 0.00 0.00 - 0.01 K/uL    NEUTROPHILS 44 32 - 75 %    LYMPHOCYTES 39 12 - 49 %    MONOCYTES 10 5 - 13 %    EOSINOPHILS 5 0 - 7 %    BASOPHILS 1 0 - 1 %    IMMATURE GRANULOCYTES 1 (H) 0.0 - 0.5 %    ABS. NEUTROPHILS 2.7 1.8 - 8.0 K/UL    ABS. LYMPHOCYTES 2.4 0.8 - 3.5 K/UL    ABS. MONOCYTES 0.6 0.0 - 1.0 K/UL    ABS. EOSINOPHILS 0.3 0.0 - 0.4 K/UL    ABS. BASOPHILS 0.1 0.0 - 0.1 K/UL    ABS. IMM.  GRANS. 0.1 (H) 0.00 - 0.04 K/UL    DF AUTOMATED

## 2020-11-02 DIAGNOSIS — I25.10 CORONARY ARTERY DISEASE INVOLVING NATIVE HEART WITHOUT ANGINA PECTORIS, UNSPECIFIED VESSEL OR LESION TYPE: ICD-10-CM

## 2020-11-02 DIAGNOSIS — E78.2 MIXED HYPERLIPIDEMIA: ICD-10-CM

## 2020-11-02 RX ORDER — NITROGLYCERIN 0.4 MG/1
TABLET SUBLINGUAL
Qty: 25 TAB | Refills: 0 | Status: SHIPPED | OUTPATIENT
Start: 2020-11-02

## 2020-11-10 ENCOUNTER — TELEPHONE (OUTPATIENT)
Dept: FAMILY MEDICINE CLINIC | Age: 80
End: 2020-11-10

## 2020-11-10 NOTE — TELEPHONE ENCOUNTER
Pt is overdue for her medicare wellness visit. She would like to know if provider would see her in office, if possible, because she has not been able to have her vitals taken.

## 2020-11-12 DIAGNOSIS — M81.0 AGE-RELATED OSTEOPOROSIS WITHOUT CURRENT PATHOLOGICAL FRACTURE: ICD-10-CM

## 2020-11-12 DIAGNOSIS — E55.9 VITAMIN D DEFICIENCY: ICD-10-CM

## 2020-11-12 DIAGNOSIS — I73.00 PRIMARY RAYNAUD'S PHENOMENON: ICD-10-CM

## 2020-11-12 DIAGNOSIS — N18.2 CKD (CHRONIC KIDNEY DISEASE) STAGE 2, GFR 60-89 ML/MIN: ICD-10-CM

## 2020-11-12 RX ORDER — AMLODIPINE BESYLATE 5 MG/1
TABLET ORAL
Qty: 90 TAB | Refills: 0 | Status: SHIPPED | OUTPATIENT
Start: 2020-11-12 | End: 2020-11-16

## 2020-11-16 ENCOUNTER — OFFICE VISIT (OUTPATIENT)
Dept: FAMILY MEDICINE CLINIC | Age: 80
End: 2020-11-16
Payer: MEDICARE

## 2020-11-16 VITALS
SYSTOLIC BLOOD PRESSURE: 115 MMHG | OXYGEN SATURATION: 97 % | DIASTOLIC BLOOD PRESSURE: 61 MMHG | HEIGHT: 62 IN | RESPIRATION RATE: 20 BRPM | TEMPERATURE: 98.2 F | WEIGHT: 197 LBS | BODY MASS INDEX: 36.25 KG/M2 | HEART RATE: 102 BPM

## 2020-11-16 DIAGNOSIS — Z13.39 SCREENING FOR ALCOHOLISM: ICD-10-CM

## 2020-11-16 DIAGNOSIS — M62.838 MUSCLE SPASM: ICD-10-CM

## 2020-11-16 DIAGNOSIS — E11.42 DIABETIC POLYNEUROPATHY ASSOCIATED WITH TYPE 2 DIABETES MELLITUS (HCC): ICD-10-CM

## 2020-11-16 DIAGNOSIS — Z00.00 MEDICARE ANNUAL WELLNESS VISIT, SUBSEQUENT: Primary | ICD-10-CM

## 2020-11-16 PROCEDURE — G8754 DIAS BP LESS 90: HCPCS | Performed by: FAMILY MEDICINE

## 2020-11-16 PROCEDURE — G8427 DOCREV CUR MEDS BY ELIG CLIN: HCPCS | Performed by: FAMILY MEDICINE

## 2020-11-16 PROCEDURE — G8752 SYS BP LESS 140: HCPCS | Performed by: FAMILY MEDICINE

## 2020-11-16 PROCEDURE — G8510 SCR DEP NEG, NO PLAN REQD: HCPCS | Performed by: FAMILY MEDICINE

## 2020-11-16 PROCEDURE — G0439 PPPS, SUBSEQ VISIT: HCPCS | Performed by: FAMILY MEDICINE

## 2020-11-16 PROCEDURE — 1101F PT FALLS ASSESS-DOCD LE1/YR: CPT | Performed by: FAMILY MEDICINE

## 2020-11-16 PROCEDURE — G8417 CALC BMI ABV UP PARAM F/U: HCPCS | Performed by: FAMILY MEDICINE

## 2020-11-16 PROCEDURE — G8536 NO DOC ELDER MAL SCRN: HCPCS | Performed by: FAMILY MEDICINE

## 2020-11-16 RX ORDER — CYCLOBENZAPRINE HCL 10 MG
10 TABLET ORAL
Qty: 90 TAB | Refills: 1 | Status: SHIPPED | OUTPATIENT
Start: 2020-11-16 | End: 2021-02-21 | Stop reason: SDUPTHER

## 2020-11-16 NOTE — PROGRESS NOTES
1. Have you been to the ER, urgent care clinic since your last visit? Hospitalized since your last visit? No    2. Have you seen or consulted any other health care providers outside of the [address] [place] [address] since your last visit? Include any pap smears or colon screening.  No  Reviewed record in preparation for visit and have necessary documentation  Pt did not bring medication to office visit for review    Goals that were addressed and/or need to be completed during or after this appointment include   Health Maintenance Due   Topic Date Due    Shingrix Vaccine Age 50> (1 of 2) 06/21/1989    MICROALBUMIN Q1  08/20/2019    Medicare Yearly Exam  09/05/2020    Foot Exam Q1  09/05/2020

## 2020-11-16 NOTE — PATIENT INSTRUCTIONS
Medicare Wellness Visit, Female The best way to live healthy is to have a lifestyle where you eat a well-balanced diet, exercise regularly, limit alcohol use, and quit all forms of tobacco/nicotine, if applicable. Regular preventive services are another way to keep healthy. Preventive services (vaccines, screening tests, monitoring & exams) can help personalize your care plan, which helps you manage your own care. Screening tests can find health problems at the earliest stages, when they are easiest to treat. [facility] follows the current, evidence-based guidelines published by the [facility] Qu[facility] Alvarado ([facility]) when recommending preventive services for our patients. Because we follow these guidelines, sometimes recommendations change over time as research supports it. (For example, mammograms used to be recommended annually. Even though Medicare will still pay for an annual mammogram, the newer guidelines recommend a mammogram every two years for women of average risk). Of course, you and your doctor may decide to screen more often for some diseases, based on your risk and your co-morbidities (chronic disease you are already diagnosed with). Preventive services for you include: - Medicare offers their members a free annual wellness visit, which is time for you and your primary care provider to discuss and plan for your preventive service needs. Take advantage of this benefit every year! 
-All adults over the age of 72 should receive the recommended pneumonia vaccines. Current USPSTF guidelines recommend a series of two vaccines for the best pneumonia protection.  
-All adults should have a flu vaccine yearly and a tetanus vaccine every 10 years.  
-All adults age 48 and older should receive the shingles vaccines (series of two vaccines). -All adults age 38-68 who are overweight should have a diabetes screening test once every three years. -All adults born between [birth year] and 1966 should be screened once for Hepatitis C. 
-Other screening tests and preventive services for persons with diabetes include: an eye exam to screen for diabetic retinopathy, a kidney function test, a foot exam, and stricter control over your cholesterol.  
-Cardiovascular screening for adults with routine risk involves an electrocardiogram (ECG) at intervals determined by your doctor.  
-Colorectal cancer screenings should be done for adults age 54-65 with no increased risk factors for colorectal cancer. There are a number of acceptable methods of screening for this type of cancer. Each test has its own benefits and drawbacks. Discuss with your doctor what is most appropriate for you during your annual wellness visit. The different tests include: colonoscopy (considered the best screening method), a fecal occult blood test, a fecal DNA test, and sigmoidoscopy. 
 
-A bone mass density test is recommended when a woman turns 65 to screen for osteoporosis. This test is only recommended one time, as a screening. Some providers will use this same test as a disease monitoring tool if you already have osteoporosis. -Breast cancer screenings are recommended every other year for women of normal risk, age 54-69. 
-Cervical cancer screenings for women over age 72 are only recommended with certain risk factors. Here is a list of your current Health Maintenance items (your personalized list of preventive services) with a due date: 
Health Maintenance Due Topic Date Due  Shingles Vaccine (1 of 2) 06/21/1989  Albumin Urine Test  08/20/2019 [facility] Annual Well Visit  09/05/2020 [facility]  09/05/2020

## 2020-11-16 NOTE — PROGRESS NOTES
This is the Subsequent Medicare Annual Wellness Exam, performed 12 months or more after the Initial AWV or the last Subsequent AWV    I have reviewed the patient's medical history in detail and updated the computerized patient record. Depression Risk Factor Screening:     3 most recent PHQ Screens 11/16/2020   Little interest or pleasure in doing things Not at all   Feeling down, depressed, irritable, or hopeless Not at all   Total Score PHQ 2 0     Interval history: She is due for a diabetic foot exam. She also needs a refill of her flexeril. Exam:   Feet: Skin intact, no lesions. DP pulses 2+ b/l. Sensation intact to light touch throughout. Decreased to monofilament to the level of the ankle b/l. Alcohol Risk Screen   Do you average more than 1 drink per night or more than 7 drinks a week:  No    On any one occasion in the past three months have you have had more than 3 drinks containing alcohol:  No      Functional Ability and Level of Safety:   Hearing: Hearing is good. Activities of Daily Living: The home contains: handrails and grab bars  Patient does total self care     Ambulation: Uses a rollator or cane when she is walking long distances. Does not need anything at home. Fall Risk:  Fall Risk Assessment, last 12 mths 11/16/2020   Able to walk? Yes   Fall in past 12 months? No   Fall with injury? -   Number of falls in past 12 months -   Fall Risk Score -     Abuse Screen:  Patient is not abused       Cognitive Screening   Has your family/caregiver stated any concerns about your memory: no     Cognitive Screening: Normal - Mini Cog Test    Assessment/Plan   Education and counseling provided:  Are appropriate based on today's review and evaluation  Shingles vaccine - pt would like to hold off on this for now    Diagnoses and all orders for this visit:    1. Medicare annual wellness visit, subsequent    2. Screening for alcoholism  -     [place] ANNUAL ALCOHOL SCREEN 15 MIN    3.  Muscle spasm  -     cyclobenzaprine (FLEXERIL) 10 mg tablet; Take 1 Tab by mouth nightly.     4. Diabetic polyneuropathy associated with type 2 diabetes mellitus ([address]  -      DIABETES FOOT EXAM        Health Maintenance Due     Health Maintenance Due   Topic Date Due    Shingrix Vaccine Age 49> (1 of 2) 06/21/1989    MICROALBUMIN Q1  08/20/2019    Medicare Yearly Exam  09/05/2020    Foot Exam Q1  09/05/2020       Patient Care Team   Patient Care Team:  Chito Chung MD as PCP - General (Family Medicine)  Chito Chung MD as PCP - 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    Long-term use of immunosuppressant medication Z79.899    Long-term use of Plaquenil Z79.899    Hypocalcemia E83.51    Low back pain M54.5    Age-related osteoporosis without current pathological fracture M81.0    Diabetic polyneuropathy ([address] E11.42    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    Neuropathy G62.9    Lupus erythematosus L93.0    Raynaud's disease I73.00    Benign hypertension I10    Blepharoconjunctivitis H10.509    Seronegative rheumatoid arthritis of multiple sites ([address] M06.09    Nuclear sclerotic cataract H25.10    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 Tru[facility]    Diabetes [facility])     Fracture     right shoulder; T6 compression    Gastritis     H/O Bell's palsy 7/9/2012    R-side of face. Dx >20 years ago. Has persistent R-sided facial droop     Hypercholesterolemia     Hyperlipidemia    Lupus ([address] 1/9/2015    Osteoporosis, post-menopausal     vertebral fracture    Squamous cell cancer of skin of left cheek     Squamous cell skin cancer     Right knee      Past Surgical History:   Procedure Laterality Date    CARDIAC SURG PROCEDURE UNLIST      Patient Denies    COLONOSCOPY N/A 10/9/2018    COLONOSCOPY performed by Josh Lyon MD at [facility] HX CATARACT REMOVAL Bilateral     HX CATARACT REMOVAL Right 12/19/2019    HX CATARACT REMOVAL Left 01/07/2020    HX CHOLECYSTECTOMY      HX CORONARY STENT PLACEMENT  02/19/1997    LAD    HX GASTRIC BYPASS  2000    HX HERNIA REPAIR  2005    HX KYPHOPLASTY      t5    HX KYPHOPLASTY      L4    HX ORTHOPAEDIC      bilateral knees    HX ORTHOPAEDIC      r shoulder    HX ORTHOPAEDIC      Laminectomy    IA INC IMPLTJ NEUROSTIMULATOR ELTRD SACRAL NERVE       Current Outpatient Medications   Medication Sig Dispense Refill    cyclobenzaprine (FLEXERIL) 10 mg tablet Take 1 Tab by mouth nightly.  90 Tab 1    nitroglycerin (NITROSTAT) 0.4 mg SL tablet DISSOLVE 1 TABLET UNDER THE TONGUE EVERY 5 MINUTES AS NEEDED FOR CHEST PAIN 25 Tab 0    glucose blood VI test strips (Ascensia CONTOUR) strip Glucometer for 250.00 Pt. test blood sugar once daily (in morning) 50 Strip 11    trimethoprim (TRIMPEX) 100 mg tablet TAKE 1 TABLET BY MOUTH DAILY FOR URINARY TRACT INFECTION PREVENTION 30 Tab 5    HYDROcodone-acetaminophen (NORCO) 5-325 mg per tablet Take 1 Tab by mouth every six (6) hours as needed for Pain for up to 90 days. 60 Tab 0    gabapentin (NEURONTIN) 600 mg tablet Take 1 Tab by mouth four (4) times daily. Max Daily Amount: 2,400 mg. 120 Tab 3    leflunomide (ARAVA) 20 mg tablet Take 1 Tab by mouth daily. 90 Tab 1    metoprolol succinate (TOPROL-XL) 25 mg XL tablet TAKE 1 TABLET BY MOUTH EVERY DAY 90 Tab 1    metFORMIN (GLUCOPHAGE) 500 mg tablet TAKE 1 TABLET BY MOUTH TWICE DAILY WITH MEALS 180 Tab 1    atorvastatin (LIPITOR) 40 mg tablet TAKE 1 TABLET BY MOUTH DAILY 90 Tab 1    pantoprazole (PROTONIX) 40 mg tablet TAKE 1 TABLET BY MOUTH DAILY 90 Tab 1    glipiZIDE SR (GLUCOTROL XL) 2.5 mg CR tablet Take 1 Tab by mouth two (2) times a day. Indications: type 2 diabetes mellitus 180 Tab 1    nortriptyline (PAMELOR) 10 mg capsule Take 1 Cap by mouth nightly. 30 Cap 5    ferrous sulfate 325 mg (65 mg iron) tablet TAKE 1 TABLET BY MOUTH TWICE DAILY FOR LOW IRON 60 Tab 5    golimumab (SIMPONI ARIA IV) 2 mg/kg by IntraVENous route every six (6) weeks.  denosumab (PROLIA) 60 mg/mL injection 1 mL by SubCUTAneous route every 6 months.  acetaminophen (TYLENOL) 650 mg CR tablet Take 1,300 mg by mouth daily as needed for Pain.  Blood-Glucose Meter (CONTOUR METER) monitoring kit Dx: 250. Check blood sugars daily. 1 kit 11    ZINC PO Take 50 mg by mouth daily.  cholecalciferol, vitamin d3, (VITAMIN D3) 1,000 unit tablet Take 2,000 Units by mouth daily.  calcium-vitamin D (CALCIUM 500+D) 500 mg(1,250mg) -200 unit per tablet Take 2 Tabs by mouth daily.  aspirin 81 mg Tab Take 81 mg by mouth daily.  MULTIVITAMINS (MULTIVITAMIN PO) Take 1 Tab by mouth daily.  amLODIPine (NORVASC) 5 mg tablet TAKE 1 TABLET BY MOUTH EVERY DAY 90 Tab 0    cyanocobalamin (VITAMIN B12) 1,000 mcg/mL injection 1 mL by IntraMUSCular route every thirty (30) days.  Indications: Anemia caused by Vitamin B12 Deficiency-Pernicious Anemia 10 mL 1     Allergies   Allergen Reactions    Cephalosporins Nausea and Vomiting Severe vomiting  Other reaction(s): gi upset, unspecified       Family History   Problem Relation Age of Onset    Diabetes Mother     Hypertension Mother     Heart Disease Mother     Diabetes Father     Hypertension Father     Heart Disease Father    [de-identified] Arthritis-rheumatoid Sister     Elevated Lipids Sister    [de-identified] Arthritis-rheumatoid Sister     Hypertension Sister     Diabetes Sister     Thyroid Disease Sister     Arthritis-rheumatoid Other      Social History     Tobacco Use    Smoking status: Former Smoker     Last attempt to quit: 1989     Years since quittin.9    Smokeless tobacco: Never Used   Substance Use Topics    Alcohol use: No     Alcohol/week: 0.0 standard drinks     Comment: Occasional

## 2020-11-30 ENCOUNTER — APPOINTMENT (OUTPATIENT)
Dept: INFUSION THERAPY | Age: 80
End: 2020-11-30

## 2020-12-04 ENCOUNTER — APPOINTMENT (OUTPATIENT)
Dept: INFUSION THERAPY | Age: 80
End: 2020-12-04

## 2020-12-06 DIAGNOSIS — M06.09 SERONEGATIVE RHEUMATOID ARTHRITIS OF MULTIPLE SITES (HCC): ICD-10-CM

## 2020-12-11 ENCOUNTER — HOSPITAL ENCOUNTER (OUTPATIENT)
Dept: INFUSION THERAPY | Age: 80
Discharge: HOME | End: 2020-12-11
Payer: MEDICARE

## 2020-12-11 VITALS
OXYGEN SATURATION: 98 % | WEIGHT: 191.7 LBS | HEIGHT: 61 IN | HEART RATE: 79 BPM | SYSTOLIC BLOOD PRESSURE: 118 MMHG | TEMPERATURE: 97.1 F | RESPIRATION RATE: 18 BRPM | BODY MASS INDEX: 36.19 KG/M2 | DIASTOLIC BLOOD PRESSURE: 58 MMHG

## 2020-12-11 DIAGNOSIS — M06.09 SERONEGATIVE RHEUMATOID ARTHRITIS OF MULTIPLE SITES (HCC): Primary | ICD-10-CM

## 2020-12-11 PROCEDURE — 74011250636 HC RX REV CODE- 250/636: Performed by: INTERNAL MEDICINE

## 2020-12-11 PROCEDURE — 74011000258 HC RX REV CODE- 258: Performed by: INTERNAL MEDICINE

## 2020-12-11 PROCEDURE — 96413 CHEMO IV INFUSION 1 HR: CPT

## 2020-12-11 RX ORDER — ACETAMINOPHEN 325 MG/1
650 TABLET ORAL AS NEEDED
Status: CANCELLED
Start: 2021-01-17

## 2020-12-11 RX ORDER — EPINEPHRINE 1 MG/ML
0.3 INJECTION, SOLUTION, CONCENTRATE INTRAVENOUS AS NEEDED
Status: CANCELLED | OUTPATIENT
Start: 2021-01-17

## 2020-12-11 RX ORDER — SODIUM CHLORIDE 9 MG/ML
10 INJECTION INTRAMUSCULAR; INTRAVENOUS; SUBCUTANEOUS AS NEEDED
Status: CANCELLED | OUTPATIENT
Start: 2021-01-17

## 2020-12-11 RX ORDER — SODIUM CHLORIDE 9 MG/ML
25 INJECTION, SOLUTION INTRAVENOUS CONTINUOUS
Status: CANCELLED | OUTPATIENT
Start: 2021-01-17

## 2020-12-11 RX ORDER — HEPARIN 100 UNIT/ML
300-500 SYRINGE INTRAVENOUS AS NEEDED
Status: CANCELLED
Start: 2021-01-17

## 2020-12-11 RX ORDER — ALBUTEROL SULFATE 0.83 MG/ML
2.5 SOLUTION RESPIRATORY (INHALATION) AS NEEDED
Status: CANCELLED
Start: 2021-01-17

## 2020-12-11 RX ORDER — SODIUM CHLORIDE 9 MG/ML
25 INJECTION, SOLUTION INTRAVENOUS CONTINUOUS
Status: DISPENSED | OUTPATIENT
Start: 2020-12-11 | End: 2020-12-11

## 2020-12-11 RX ORDER — ONDANSETRON 2 MG/ML
8 INJECTION INTRAMUSCULAR; INTRAVENOUS AS NEEDED
Status: CANCELLED | OUTPATIENT
Start: 2021-01-17

## 2020-12-11 RX ORDER — DIPHENHYDRAMINE HYDROCHLORIDE 50 MG/ML
50 INJECTION, SOLUTION INTRAMUSCULAR; INTRAVENOUS AS NEEDED
Status: CANCELLED
Start: 2021-01-17

## 2020-12-11 RX ORDER — DIPHENHYDRAMINE HYDROCHLORIDE 50 MG/ML
25 INJECTION, SOLUTION INTRAMUSCULAR; INTRAVENOUS AS NEEDED
Status: CANCELLED
Start: 2021-01-17

## 2020-12-11 RX ORDER — SODIUM CHLORIDE 0.9 % (FLUSH) 0.9 %
10 SYRINGE (ML) INJECTION AS NEEDED
Status: CANCELLED | OUTPATIENT
Start: 2021-01-17

## 2020-12-11 RX ORDER — HYDROCORTISONE SODIUM SUCCINATE 100 MG/2ML
100 INJECTION, POWDER, FOR SOLUTION INTRAMUSCULAR; INTRAVENOUS AS NEEDED
Status: CANCELLED | OUTPATIENT
Start: 2021-01-17

## 2020-12-11 RX ADMIN — SODIUM CHLORIDE 25 ML/HR: 900 INJECTION, SOLUTION INTRAVENOUS at 12:06

## 2020-12-11 RX ADMIN — GOLIMUMAB 175 MG: 50 SOLUTION INTRAVENOUS at 12:13

## 2020-12-11 NOTE — PROGRESS NOTES
[facility] Progress Note    Date: 2020    Name: Jhon Ybarra    MRN: [id number]         : 1940    Ms. Kyleigh Tijerina Arrived ambulatory and in no distress for [facility]. Assessment was completed, no acute issues at this time, no new complaints voiced. 24 G PIV established to Left arm, + blood return. Ms. Rueda's vitals were reviewed. Visit Vitals  BP (!) 118/58 (BP 1 Location: Right arm, BP Patient Position: Sitting)   Pulse 79   Temp 97.1 °F (36.2 °C)   Resp 18   Ht 5' 1\" (1.549 m)   Wt 87 kg (191 lb 11.2 oz)   SpO2 98%   Breastfeeding No   BMI 36.22 kg/m²       Lab results were reviewed from visit on 10/30. Labs obtained every 3 months. Medications:  Medications Administered     0.9% sodium chloride infusion     Admin Date  2020 Action  New Bag Dose  25 mL/hr Rate  25 mL/hr Route  IntraVENous Administered By  Collins Dakin, RN          golimumab (SIMPONI ARIA) 175 mg in 0.9% sodium chloride, overfill volume 124 mL infusion     Admin Date  2020 Action  Given Dose  175 mg Rate  248 mL/hr Route  IntraVENous Administered By  Collins Dakin, VIVIANA                Ms. Kyleigh Tijerina tolerated treatment well and was discharged from [place] [address] in stable condition at 1300pm.   PIV flushed & removed. She is to return on 2021 at 1130am for her next appointment.     Herman Ambrosio RN  2020

## 2020-12-29 DIAGNOSIS — D51.0 PERNICIOUS ANEMIA: ICD-10-CM

## 2020-12-29 RX ORDER — LANOLIN ALCOHOL/MO/W.PET/CERES
CREAM (GRAM) TOPICAL
Qty: 60 TAB | Refills: 5 | Status: SHIPPED | OUTPATIENT
Start: 2020-12-29 | End: 2021-02-22

## 2021-01-04 NOTE — TELEPHONE ENCOUNTER
Airway  Performed by: Martha Shell MD  Authorized by: Radha Macedo MD     Final Airway Type:  Endotracheal airway  Final Endotracheal Airway*:  ETT  ETT Size (mm)*:  7.5  Cuff*:  Regular  Technique Used for Successful ETT Placement:  Direct laryngoscopy  Devices/Methods Used in Placement*:  Oral ETT  Intubation Procedure*:  ETCO2, Preoxygenation, Atraumatic and Dentition Unchanged  Insertion Site:  Oral  Blade Type*:  MAC  Blade Size*:  4  Measured from*:  Lips  Secured at (cm)*:  21  Placement Verified by: auscultation, capnometry and equal breath sounds    Glottic View*:  1 - full view of glottis  Attempts*:  1   Patient Identified, Procedure confirmed, Emergency equipment available and Safety protocols followed  Location:  OR  Urgency:  Elective  Difficult Airway: No    Indications for Airway Management:  Anesthesia  Sedation Level:  Anesthetized  Mask Difficulty Assessment:  1 - vent by mask  Performed By:  Resident  Anesthesiologist:  Radha Macedo MD  Resident:  Martha Shell MD         Spoke with patient and identifiers verified. Informed patient of urine culture results. Urine Culture, Routine (Abnormal)   Beta hemolytic Streptococcus, group B   Greater than 100,000 colony forming units per mL      Patient aware and verbalized agreement with treatment plan. Amoxicillin 500mg TID for 3 days.

## 2021-01-05 RX ORDER — GLIPIZIDE 2.5 MG/1
TABLET, EXTENDED RELEASE ORAL
Qty: 180 TAB | Refills: 1 | Status: SHIPPED | OUTPATIENT
Start: 2021-01-05 | End: 2021-07-01

## 2021-01-11 RX ORDER — NORTRIPTYLINE HYDROCHLORIDE 10 MG/1
CAPSULE ORAL
Qty: 30 CAP | Refills: 5 | Status: SHIPPED | OUTPATIENT
Start: 2021-01-11 | End: 2021-07-26 | Stop reason: SDUPTHER

## 2021-01-15 DIAGNOSIS — M51.36 DEGENERATIVE LUMBAR DISC: ICD-10-CM

## 2021-01-15 DIAGNOSIS — M79.18 MYOFASCIAL MUSCLE PAIN: ICD-10-CM

## 2021-01-15 DIAGNOSIS — M12.9 ARTHROPATHY: ICD-10-CM

## 2021-01-17 DIAGNOSIS — M06.09 SERONEGATIVE RHEUMATOID ARTHRITIS OF MULTIPLE SITES (HCC): ICD-10-CM

## 2021-01-19 RX ORDER — HYDROCODONE BITARTRATE AND ACETAMINOPHEN 5; 325 MG/1; MG/1
1 TABLET ORAL
Qty: 60 TAB | Refills: 0 | Status: SHIPPED | OUTPATIENT
Start: 2021-01-19 | End: 2021-04-27 | Stop reason: SDUPTHER

## 2021-01-19 NOTE — TELEPHONE ENCOUNTER
Rx request for Norco. Pt takes this as needed for her back pain and not on a daily basis. PDMP reviewed, shows she last filled a 15 day supply almost 3 months ago. Will plan for UDS at next in person visit.

## 2021-01-22 ENCOUNTER — HOSPITAL ENCOUNTER (OUTPATIENT)
Dept: INFUSION THERAPY | Age: 81
Discharge: HOME | End: 2021-01-22
Payer: MEDICARE

## 2021-01-22 VITALS
DIASTOLIC BLOOD PRESSURE: 84 MMHG | HEART RATE: 110 BPM | RESPIRATION RATE: 20 BRPM | WEIGHT: 192.5 LBS | TEMPERATURE: 98.2 F | SYSTOLIC BLOOD PRESSURE: 112 MMHG | BODY MASS INDEX: 36.35 KG/M2 | HEIGHT: 61 IN | OXYGEN SATURATION: 97 %

## 2021-01-22 DIAGNOSIS — M06.09 SERONEGATIVE RHEUMATOID ARTHRITIS OF MULTIPLE SITES (HCC): Primary | ICD-10-CM

## 2021-01-22 LAB
ALBUMIN SERPL-MCNC: 3.4 G/DL (ref 3.5–5)
ALBUMIN/GLOB SERPL: 1.1 {RATIO} (ref 1.1–2.2)
ALP SERPL-CCNC: 99 U/L (ref 45–117)
ALT SERPL-CCNC: 37 U/L (ref 12–78)
ANION GAP SERPL CALC-SCNC: 3 MMOL/L (ref 5–15)
AST SERPL-CCNC: 35 U/L (ref 15–37)
BASOPHILS # BLD: 0.1 K/UL (ref 0–0.1)
BASOPHILS NFR BLD: 1 % (ref 0–1)
BILIRUB SERPL-MCNC: 0.6 MG/DL (ref 0.2–1)
BUN SERPL-MCNC: 14 MG/DL (ref 6–20)
BUN/CREAT SERPL: 16 (ref 12–20)
CALCIUM SERPL-MCNC: 8.1 MG/DL (ref 8.5–10.1)
CHLORIDE SERPL-SCNC: 109 MMOL/L (ref 97–108)
CO2 SERPL-SCNC: 26 MMOL/L (ref 21–32)
CREAT SERPL-MCNC: 0.89 MG/DL (ref 0.55–1.02)
CRP SERPL-MCNC: <0.29 MG/DL (ref 0–0.6)
DIFFERENTIAL METHOD BLD: ABNORMAL
EOSINOPHIL # BLD: 0.2 K/UL (ref 0–0.4)
EOSINOPHIL NFR BLD: 3 % (ref 0–7)
ERYTHROCYTE [DISTWIDTH] IN BLOOD BY AUTOMATED COUNT: 13.5 % (ref 11.5–14.5)
ERYTHROCYTE [SEDIMENTATION RATE] IN BLOOD: 13 MM/HR (ref 0–30)
GLOBULIN SER CALC-MCNC: 3.1 G/DL (ref 2–4)
GLUCOSE SERPL-MCNC: 205 MG/DL (ref 65–100)
HCT VFR BLD AUTO: 38.4 % (ref 35–47)
HGB BLD-MCNC: 12.2 G/DL (ref 11.5–16)
IMM GRANULOCYTES # BLD AUTO: 0.1 K/UL (ref 0–0.04)
IMM GRANULOCYTES NFR BLD AUTO: 1 % (ref 0–0.5)
LYMPHOCYTES # BLD: 2.8 K/UL (ref 0.8–3.5)
LYMPHOCYTES NFR BLD: 40 % (ref 12–49)
MCH RBC QN AUTO: 32.4 PG (ref 26–34)
MCHC RBC AUTO-ENTMCNC: 31.8 G/DL (ref 30–36.5)
MCV RBC AUTO: 102.1 FL (ref 80–99)
MONOCYTES # BLD: 0.7 K/UL (ref 0–1)
MONOCYTES NFR BLD: 10 % (ref 5–13)
NEUTS SEG # BLD: 3.1 K/UL (ref 1.8–8)
NEUTS SEG NFR BLD: 45 % (ref 32–75)
NRBC # BLD: 0 K/UL (ref 0–0.01)
NRBC BLD-RTO: 0 PER 100 WBC
PLATELET # BLD AUTO: 176 K/UL (ref 150–400)
PMV BLD AUTO: 11.2 FL (ref 8.9–12.9)
POTASSIUM SERPL-SCNC: 4.2 MMOL/L (ref 3.5–5.1)
PROT SERPL-MCNC: 6.5 G/DL (ref 6.4–8.2)
RBC # BLD AUTO: 3.76 M/UL (ref 3.8–5.2)
SODIUM SERPL-SCNC: 138 MMOL/L (ref 136–145)
WBC # BLD AUTO: 6.9 K/UL (ref 3.6–11)

## 2021-01-22 PROCEDURE — 74011250636 HC RX REV CODE- 250/636: Performed by: INTERNAL MEDICINE

## 2021-01-22 PROCEDURE — 96413 CHEMO IV INFUSION 1 HR: CPT

## 2021-01-22 PROCEDURE — 86140 C-REACTIVE PROTEIN: CPT

## 2021-01-22 PROCEDURE — 85025 COMPLETE CBC W/AUTO DIFF WBC: CPT

## 2021-01-22 PROCEDURE — 74011000258 HC RX REV CODE- 258: Performed by: INTERNAL MEDICINE

## 2021-01-22 PROCEDURE — 80053 COMPREHEN METABOLIC PANEL: CPT

## 2021-01-22 PROCEDURE — 36415 COLL VENOUS BLD VENIPUNCTURE: CPT

## 2021-01-22 PROCEDURE — 85652 RBC SED RATE AUTOMATED: CPT

## 2021-01-22 RX ORDER — ONDANSETRON 2 MG/ML
8 INJECTION INTRAMUSCULAR; INTRAVENOUS AS NEEDED
Status: CANCELLED | OUTPATIENT
Start: 2021-02-28

## 2021-01-22 RX ORDER — ALBUTEROL SULFATE 0.83 MG/ML
2.5 SOLUTION RESPIRATORY (INHALATION) AS NEEDED
Status: CANCELLED
Start: 2021-02-28

## 2021-01-22 RX ORDER — DIPHENHYDRAMINE HYDROCHLORIDE 50 MG/ML
25 INJECTION, SOLUTION INTRAMUSCULAR; INTRAVENOUS AS NEEDED
Status: CANCELLED
Start: 2021-02-28

## 2021-01-22 RX ORDER — SODIUM CHLORIDE 9 MG/ML
25 INJECTION, SOLUTION INTRAVENOUS CONTINUOUS
Status: DISPENSED | OUTPATIENT
Start: 2021-01-22 | End: 2021-01-22

## 2021-01-22 RX ORDER — DIPHENHYDRAMINE HYDROCHLORIDE 50 MG/ML
50 INJECTION, SOLUTION INTRAMUSCULAR; INTRAVENOUS AS NEEDED
Status: CANCELLED
Start: 2021-02-28

## 2021-01-22 RX ORDER — SODIUM CHLORIDE 9 MG/ML
25 INJECTION, SOLUTION INTRAVENOUS CONTINUOUS
Status: CANCELLED | OUTPATIENT
Start: 2021-02-28

## 2021-01-22 RX ORDER — SODIUM CHLORIDE 0.9 % (FLUSH) 0.9 %
10 SYRINGE (ML) INJECTION AS NEEDED
Status: CANCELLED | OUTPATIENT
Start: 2021-02-28

## 2021-01-22 RX ORDER — SODIUM CHLORIDE 0.9 % (FLUSH) 0.9 %
10 SYRINGE (ML) INJECTION AS NEEDED
Status: DISPENSED | OUTPATIENT
Start: 2021-01-22 | End: 2021-01-22

## 2021-01-22 RX ORDER — HEPARIN 100 UNIT/ML
300-500 SYRINGE INTRAVENOUS AS NEEDED
Status: CANCELLED
Start: 2021-02-28

## 2021-01-22 RX ORDER — EPINEPHRINE 1 MG/ML
0.3 INJECTION, SOLUTION, CONCENTRATE INTRAVENOUS AS NEEDED
Status: CANCELLED | OUTPATIENT
Start: 2021-02-28

## 2021-01-22 RX ORDER — ACETAMINOPHEN 325 MG/1
650 TABLET ORAL AS NEEDED
Status: CANCELLED
Start: 2021-02-28

## 2021-01-22 RX ORDER — HYDROCORTISONE SODIUM SUCCINATE 100 MG/2ML
100 INJECTION, POWDER, FOR SOLUTION INTRAMUSCULAR; INTRAVENOUS AS NEEDED
Status: CANCELLED | OUTPATIENT
Start: 2021-02-28

## 2021-01-22 RX ORDER — SODIUM CHLORIDE 9 MG/ML
10 INJECTION INTRAMUSCULAR; INTRAVENOUS; SUBCUTANEOUS AS NEEDED
Status: CANCELLED | OUTPATIENT
Start: 2021-02-28

## 2021-01-22 RX ADMIN — SODIUM CHLORIDE 25 ML/HR: 900 INJECTION, SOLUTION INTRAVENOUS at 11:50

## 2021-01-22 RX ADMIN — GOLIMUMAB 175 MG: 50 SOLUTION INTRAVENOUS at 11:55

## 2021-01-22 RX ADMIN — Medication 10 ML: at 12:39

## 2021-01-22 NOTE — PROGRESS NOTES
[facility] Progress Note    Date: 2021    Name: Myrna Tyson    MRN: [id number]         : 1940    Ms. Britney Diallo Arrived ambulatory and in no distress for [facility]. Assessment was completed, no acute issues at this time, no new complaints voiced. 24 G PIV established to left arm, + blood return. Labs drawn and sent for processing. Ms. Rueda's vitals were reviewed. Visit Vitals  /84 (BP 1 Location: Right arm, BP Patient Position: At rest;Sitting)   Pulse (!) 110   Temp 98.2 °F (36.8 °C)   Resp 20   Ht 5' 1\" (1.549 m)   Wt 87.3 kg (192 lb 8 oz)   SpO2 97%   Breastfeeding No   BMI 36.37 kg/m²       Lab results were obtained and reviewed. Recent Results (from the past 12 hour(s))   METABOLIC PANEL, COMPREHENSIVE    Collection Time: 21 11:10 AM   Result Value Ref Range    Sodium 138 136 - 145 mmol/L    Potassium 4.2 3.5 - 5.1 mmol/L    Chloride 109 (H) 97 - 108 mmol/L    CO2 26 21 - 32 mmol/L    Anion gap 3 (L) 5 - 15 mmol/L    Glucose 205 (H) 65 - 100 mg/dL    BUN 14 6 - 20 MG/DL    Creatinine 0.89 0.55 - 1.02 MG/DL    BUN/Creatinine ratio 16 12 - 20      GFR est AA >60 >60 ml/min/1.73m2    GFR est non-AA >60 >60 ml/min/1.73m2    Calcium 8.1 (L) 8.5 - 10.1 MG/DL    Bilirubin, total 0.6 0.2 - 1.0 MG/DL    ALT (SGPT) 37 12 - 78 U/L    AST (SGOT) 35 15 - 37 U/L    Alk.  phosphatase 99 45 - 117 U/L    Protein, total 6.5 6.4 - 8.2 g/dL    Albumin 3.4 (L) 3.5 - 5.0 g/dL    Globulin 3.1 2.0 - 4.0 g/dL    A-G Ratio 1.1 1.1 - 2.2     SED RATE (ESR)    Collection Time: 21 11:10 AM   Result Value Ref Range    Sed rate, automated 13 0 - 30 mm/hr   C REACTIVE PROTEIN, QT    Collection Time: 21 11:10 AM   Result Value Ref Range    C-Reactive protein <0.29 0.00 - 0.60 mg/dL   CBC WITH AUTOMATED DIFF    Collection Time: 21 11:18 AM   Result Value Ref Range    WBC 6.9 3.6 - 11.0 K/uL    RBC 3.76 (L) 3.80 - 5.20 M/uL    HGB 12.2 11.5 - 16.0 g/dL    HCT 38.4 35.0 - 47.0 %    MCV 102.1 (H) 80.0 - 99.0 FL    MCH 32.4 26.0 - 34.0 PG    MCHC 31.8 30.0 - 36.5 g/dL    RDW 13.5 11.5 - 14.5 %    PLATELET 355 012 - 857 K/uL    MPV 11.2 8.9 - 12.9 FL    NRBC 0.0 0  WBC    ABSOLUTE NRBC 0.00 0.00 - 0.01 K/uL    NEUTROPHILS 45 32 - 75 %    LYMPHOCYTES 40 12 - 49 %    MONOCYTES 10 5 - 13 %    EOSINOPHILS 3 0 - 7 %    BASOPHILS 1 0 - 1 %    IMMATURE GRANULOCYTES 1 (H) 0.0 - 0.5 %    ABS. NEUTROPHILS 3.1 1.8 - 8.0 K/UL    ABS. LYMPHOCYTES 2.8 0.8 - 3.5 K/UL    ABS. MONOCYTES 0.7 0.0 - 1.0 K/UL    ABS. EOSINOPHILS 0.2 0.0 - 0.4 K/UL    ABS. BASOPHILS 0.1 0.0 - 0.1 K/UL    ABS. IMM. GRANS. 0.1 (H) 0.00 - 0.04 K/UL    DF AUTOMATED         Medications:  Medications Administered     0.9% sodium chloride infusion     Admin Date  01/22/2021 Action  New Bag Dose  25 mL/hr Rate  25 mL/hr Route  IntraVENous Administered By  Rony Zuniga RN          golimumab (SIMPONI ARIA) 175 mg in 0.9% sodium chloride, overfill volume 124 mL infusion     Admin Date  01/22/2021 Action  Given Dose  175 mg Rate  248 mL/hr Route  IntraVENous Administered By  Rony Zuniga RN          sodium chloride (NS) flush 10 mL     Admin Date  01/22/2021 Action  Given Dose  10 mL Route  IntraVENous Administered By  Rony Zuniga RN              Patient with tachycardia during visit, but was drinking coffee prior to and during the visit. Denies any abnormalities. Ms. Chet Pettit tolerated treatment well and was discharged from [place] [address] in stable condition at 1240. PIV flushed & removed. She is to return on February 19 at 1100 for her next appointment.     Sheri Decker RN  January 22, 2021

## 2021-01-25 ENCOUNTER — APPOINTMENT (OUTPATIENT)
Dept: INFUSION THERAPY | Age: 81
End: 2021-01-25

## 2021-02-02 ENCOUNTER — TELEPHONE (OUTPATIENT)
Dept: FAMILY MEDICINE CLINIC | Age: 81
End: 2021-02-02

## 2021-02-02 NOTE — TELEPHONE ENCOUNTER
Attempted to call. unsuccessful.  message left  Need to scheduled an appt per Dr Berta Pierce    Please assist pt in scheduling this appointment

## 2021-02-02 NOTE — TELEPHONE ENCOUNTER
----- Message from Meliton Curry MD sent at 1/26/2021 10:38 AM EST -----  Regarding: Appt  Hi,  No rush on this but could we get her an appt to follow-up chronic conditions in Feb? Her blood sugar on labs with Rheumatology this week was high and I'd like to check another A1c. Thank you!   Vibha

## 2021-02-11 RX ORDER — AMLODIPINE BESYLATE 5 MG/1
5 TABLET ORAL DAILY
Qty: 90 TAB | Refills: 0 | Status: SHIPPED | OUTPATIENT
Start: 2021-02-11 | End: 2021-05-13 | Stop reason: SDUPTHER

## 2021-02-16 ENCOUNTER — VIRTUAL VISIT (OUTPATIENT)
Dept: RHEUMATOLOGY | Age: 81
End: 2021-02-16
Payer: MEDICARE

## 2021-02-16 DIAGNOSIS — I73.00 PRIMARY RAYNAUD'S PHENOMENON: ICD-10-CM

## 2021-02-16 DIAGNOSIS — Z79.60 LONG-TERM USE OF IMMUNOSUPPRESSANT MEDICATION: ICD-10-CM

## 2021-02-16 DIAGNOSIS — M81.0 AGE-RELATED OSTEOPOROSIS WITHOUT CURRENT PATHOLOGICAL FRACTURE: ICD-10-CM

## 2021-02-16 DIAGNOSIS — E55.9 VITAMIN D DEFICIENCY: ICD-10-CM

## 2021-02-16 DIAGNOSIS — N18.2 CKD (CHRONIC KIDNEY DISEASE) STAGE 2, GFR 60-89 ML/MIN: ICD-10-CM

## 2021-02-16 DIAGNOSIS — M06.09 SERONEGATIVE RHEUMATOID ARTHRITIS OF MULTIPLE SITES (HCC): Primary | ICD-10-CM

## 2021-02-16 PROCEDURE — 1090F PRES/ABSN URINE INCON ASSESS: CPT | Performed by: INTERNAL MEDICINE

## 2021-02-16 PROCEDURE — G0463 HOSPITAL OUTPT CLINIC VISIT: HCPCS | Performed by: INTERNAL MEDICINE

## 2021-02-16 PROCEDURE — 1101F PT FALLS ASSESS-DOCD LE1/YR: CPT | Performed by: INTERNAL MEDICINE

## 2021-02-16 PROCEDURE — 99215 OFFICE O/P EST HI 40 MIN: CPT | Performed by: INTERNAL MEDICINE

## 2021-02-16 PROCEDURE — G8432 DEP SCR NOT DOC, RNG: HCPCS | Performed by: INTERNAL MEDICINE

## 2021-02-16 PROCEDURE — G8756 NO BP MEASURE DOC: HCPCS | Performed by: INTERNAL MEDICINE

## 2021-02-16 PROCEDURE — G8427 DOCREV CUR MEDS BY ELIG CLIN: HCPCS | Performed by: INTERNAL MEDICINE

## 2021-02-16 RX ORDER — LEFLUNOMIDE 20 MG/1
20 TABLET ORAL DAILY
Qty: 90 TAB | Refills: 1 | Status: SHIPPED | OUTPATIENT
Start: 2021-02-16 | End: 2021-05-13 | Stop reason: SDUPTHER

## 2021-02-16 NOTE — PROGRESS NOTES
REASON FOR VISIT    This is a follow-up visit for Ms. Rueda for     ICD-10-CM   1. Seronegative rheumatoid arthritis of multiple sites ([facility] M06.09   2. Age-related osteoporosis without current pathological fracture M81.0      The patient has consented for synchronous (real-time) Telemedicine (audio-video technology) on 2/16/2021 for their care to be delivered over telemedicine in place of their regularly scheduled office visit pursuant to the emergency declaration under the [address] [place] [address], [address] [place] [address] and the [employer] Act, this Virtual  Visit was conducted, with patient's consent, to reduce the patient's risk of exposure to COVID-19 and provide continuity of care for an established patient. Services were provided through a video synchronous discussion virtually to substitute for in-person clinic visit.     Inflammatory arthritis phenotype includes:  Anti-CCP positive: no  Rheumatoid factor positive: no  Erosive disease: yes  Extra-articular manifestations include: Raynaud's    Immunosuppression Screening (9/10/2020):  Quantiferon TB: negative   PPD:  Not performed  Hepatitis B: negative    Hepatitis C: negative     Therapy History includes:  Current DMARD therapy include: leflunomide 20 mg daily (2/13/2018 to 10/2019; 2/12/2020 to present), Simponi Aria every 6 weeks (12/07/2018 to present)  Prior DMARD therapy include: methotrexate 12.5 mg subcutaneous,  hydroxychloroquine 300 mg daily, Humira (5/10/2018: 3 samples: cost)  Discontinued DMARDs because of inefficacy: Simponi Aria  Discontinued DMARDs because of side effects: methotrexate (elevated LFTs), leflunomide (elevated LFTs)  Contra-Indicated DMARDs because of chronic kidney disease: methotrexate     Osteoporosis Historical Synopsis    Height loss since age 26 (at least two inches): 5  Fracture history includes: yes (T5-T6)  Family history of hip fracture: no  Fall Risk: no    Daily calcium intake is 1800 mg  Daily vitamin D intake is 2000 IU    Smoking history: no  Alcohol consumption: no  Prednisone history: no    Exercise: no    Previous work-up for osteoporosis includes the following:  DEXA Scan: 8/16/2019  Vitamin 25OH D level: 46.6 (21/3/2018)  PTH: 21 (21/3/2018)  TSH: 2.570 (21/3/2018)    Therapy History includes:  Current osteoporosis therapy includes: Prolia 60 mg every 6 months (3/29/2018 to present)  Prior osteoporosis therapy includes: Reclast (2 years)  The following osteoporosis therapy have been ineffective: Reclast  The following osteoporosis therapy were stopped because of side effects: none    HISTORY OF PRESENT ILLNESS    Ms. Baltazar Kendrick returns for a follow-up. On her last visit, I continued leflunomide 20 mg daily and Simponi Aria infusions every 6 weeks due to breakthrough with good tolerance. Her most recent infusion was 1/22/2021. Her most recent Prolia was on 8/21/2020 with good tolerance. Today, she feels good. She has noticed that the cold weather is causing her more pain. She has pain in her left 3rd MCP with clicking when she bends her finger. She feels swelling in her hands in the morning (left more than right). She has intermittent pain as well. She does not feel Simponi is helping now that it is cold. Her Raynaud has been very active with the cold. She is on amlodipine 5 mg daily. She denies fever, weight loss, blurred vision, vision loss, oral ulcers, ankle swelling, dry cough, dyspnea, nausea, vomiting, dysphagia, abdominal pain, black or bloody stool, fall since last visit, rash, easy bruising and increased thirst.    Most recent toxicity monitoring by blood work was done on 1/22/20201 and did not reveal any significant adverse effects, except albumin 3.4 g/dL. Most recent inflammatory markers from 1/22/20201 revealed a ESR 13 mm/hr and CRP 0.29 mg/L.     I reviewed the patient's interval medical history, surgical history, medications, and allergies. The patient has not had any interval hospital admissions, infections or surgeries. REVIEW OF SYSTEMS    A comprehensive review of systems was performed and pertinent results are documented in the HPI, review of systems is otherwise non-contributory. PAST MEDICAL HISTORY    She has a past medical history of Anemia, Arthritis, CAD (coronary artery disease) (1997), Degenerative joint disease of right hip (09/14/2015 ), [facility] ([facility] Fracture, Gastritis, H/O Bell's palsy (7/9/2012), Hypercholesterolemia, Lupus ([facility] (1/9/2015), Osteoporosis, post-menopausal, Squamous cell cancer of skin of left cheek, and Squamous cell skin cancer. FAMILY HISTORY    Her family history includes Arthritis-rheumatoid in her sister, sister and another family member; Diabetes in her father, mother, and sister; Elevated Lipids in her sister; Heart Disease in her father and mother; Hypertension in her father, mother, and sister; Thyroid Disease in her sister. SOCIAL HISTORY    She reports that she quit smoking about 31 years ago. She has never used smokeless tobacco. She reports that she does not drink alcohol or use drugs. MEDICATIONS    Current Outpatient Medications   Medication Sig    tofacitinib 11 mg Tb24 Take 11 mg by mouth daily. Indications: rheumatoid arthritis    leflunomide (ARAVA) 20 mg tablet Take 1 Tab by mouth daily.  amLODIPine (NORVASC) 5 mg tablet Take 1 Tab by mouth daily.  HYDROcodone-acetaminophen (NORCO) 5-325 mg per tablet Take 1 Tab by mouth every six (6) hours as needed for Pain for up to 90 days.     pantoprazole (PROTONIX) 40 mg tablet TAKE 1 TABLET BY MOUTH DAILY    nortriptyline (PAMELOR) 10 mg capsule TAKE 1 CAPSULE BY MOUTH EVERY NIGHT    glipiZIDE SR (GLUCOTROL XL) 2.5 mg CR tablet TAKE 1 TABLET BY MOUTH TWICE DAILY    ferrous sulfate 325 mg (65 mg iron) tablet TAKE 1 TABLET BY MOUTH TWICE DAILY FOR LOW IRON    cyclobenzaprine (FLEXERIL) 10 mg tablet Take 1 Tab by mouth nightly.  nitroglycerin (NITROSTAT) 0.4 mg SL tablet DISSOLVE 1 TABLET UNDER THE TONGUE EVERY 5 MINUTES AS NEEDED FOR CHEST PAIN    glucose blood VI test strips ([employer] CONTOUR) strip Glucometer for 250.00 Pt. test blood sugar once daily (in morning)    trimethoprim (TRIMPEX) 100 mg tablet TAKE 1 TABLET BY MOUTH DAILY FOR URINARY TRACT INFECTION PREVENTION    gabapentin (NEURONTIN) 600 mg tablet Take 1 Tab by mouth four (4) times daily. Max Daily Amount: 2,400 mg.    metoprolol succinate (TOPROL-XL) 25 mg XL tablet TAKE 1 TABLET BY MOUTH EVERY DAY    metFORMIN (GLUCOPHAGE) 500 mg tablet TAKE 1 TABLET BY MOUTH TWICE DAILY WITH MEALS    atorvastatin (LIPITOR) 40 mg tablet TAKE 1 TABLET BY MOUTH DAILY    denosumab (PROLIA) 60 mg/mL injection 1 mL by SubCUTAneous route every 6 months.  acetaminophen (TYLENOL) 650 mg CR tablet Take 1,300 mg by mouth daily as needed for Pain.  Blood-Glucose Meter (CONTOUR METER) monitoring kit Dx: 250. Check blood sugars daily.  ZINC PO Take 50 mg by mouth daily.  cholecalciferol, vitamin d3, (VITAMIN D3) 1,000 unit tablet Take 2,000 Units by mouth daily.  calcium-vitamin D (CALCIUM 500+D) 500 mg(1,250mg) -200 unit per tablet Take 2 Tabs by mouth daily.  aspirin 81 mg Tab Take 81 mg by mouth daily.  MULTIVITAMINS (MULTIVITAMIN PO) Take 1 Tab by mouth daily. No current facility-administered medications for this visit. ALLERGIES    Allergies   Allergen Reactions    Cephalosporins Nausea and Vomiting     Severe vomiting  Other reaction(s): gi upset, unspecified       PHYSICAL EXAMINATION    There were no vitals taken for this visit. There is no height or weight on file to calculate BMI. General: Patient is alert, oriented x 3, not in acute distress, daughter at bedside    HEENT:   Sclerae are not injected and appear moist.  There is no alopecia.     Chest:  Breathing comfortably at room air    Musculoskeletal:  A comprehensive musculoskeletal exam was NOT performed for all joints of each upper and lower extremity and assessed for swelling, tenderness and range of motion.       Previous Exam    Bilateral Sharla and Heberden nods  Bilateral knee crepitus without effusion    Joint Count 8/12/2020 2/12/2020 8/7/2019 2/20/2019 11/20/2018 8/20/2018 5/10/2018   Patient pain (0-100) - 50 50 50 80 80 25   MHAQ - 0.375 0.125 0.125 0 0.125 0.25   Left wrist- Tender - - - - 1 1 1   Left wrist- Swollen - - - - 1 1 1   Left 1st MCP - Tender - - - 1 1 1 -   Left 1st MCP - Swollen - - - 1 1 1 -   Left 2nd MCP - Tender - 1 - 0 0 1 1   Left 2nd MCP - Swollen - 1 - 1 1 1 1   Left 3rd MCP - Tender - - 1 1 1 1 1   Left 3rd MCP - Swollen - - 0 1 1 1 1   Left 4th MCP - Tender - - - 1 1 1 1   Left 4th MCP - Swollen - - - 0 0 1 -   Left 5th MCP - Tender - - 1 1 0 - -   Left 5th MCP - Swollen - - 0 1 1 - 1   Left thumb IP - Tender - - - - - 1 1   Left 2nd PIP - Tender - 1 - 1 1 1 1   Left 2nd PIP - Swollen - 1 - 1 1 1 -   Left 3rd PIP - Tender - 1 1 1 1 1 1   Left 3rd PIP - Swollen - 1 0 1 1 1 1   Left 4th PIP - Tender 1 1 1 1 1 1 1   Left 4th PIP - Swollen 1 1 0 1 1 1 -   Left 5th PIP - Tender - 1 1 1 1 1 1   Left 5th PIP - Swollen - 1 0 1 1 1 -   Right wrist- Tender - - - - - - -   Right wrist- Swollen - - - - - - -   Right 1st MCP - Tender - - - 1 1 - -   Right 1st MCP - Swollen - - - 1 1 - -   Right 2nd MCP - Tender - - - 1 1 1 1   Right 2nd MCP - Swollen - - - 1 1 1 1   Right 3rd MCP - Tender - - - 1 0 1 1   Right 3rd MCP - Swollen - - - 1 1 1 1   Right 4th MCP - Tender - - - 0 1 1 -   Right 4th MCP - Swollen - - - 1 1 1 -   Right 5th MCP - Tender - - - 0 - 1 -   Right 5th MCP - Swollen - - - 1 - - 1   Right 2nd PIP - Tender - - 1 1 1 1 1   Right 2nd PIP - Swollen - - 0 1 1 1 -   Right 3rd PIP - Tender 1 1 1 1 1 1 1   Right 3rd PIP - Swollen 0 1 0 0 1 - -   Right 4th PIP - Tender - 1 - - 1 - 1   Right 4th PIP - Swollen - 0 - - 1 - -   Right 5th PIP - Tender - 1 1 0 1 - 1   Right 5th PIP - Swollen - 0 0 1 1 - -   Tender Joint Count (Total) 2 8 8 13 15 16 15   Swollen Joint Count (Total) 1 6 0 15 17 13 8   Physician Assessment (0-10) - 2 2 4 4 4 3   Patient Assessment (0-10) - 5 5 2.5 8 5 5   CDAI Total (calculated) - 21 15 34.5 44 38 31       DATA REVIEW    Laboratory     Recent laboratory results were reviewed, summarized, and discussed with the patient. Imaging    Musculoskeletal Ultrasound    None    Radiographs    Bilateral Hand 2/13/2018: RIGHT: severe osteopenia without fracture. There are scattered degenerative changes. Progressive first [facility] joint. No new erosive changes. Vascular calcifications. LEFT: severe osteopenia. Scattered degenerative changes as noted previously with progression of the index finger and middle finger DIP joints. There is a new erosion at the index finger proximal phalanx ulnar base without adjacent joint space loss. There are vascular calcifications. Calcification overlying the DRUJ is favored represent overlying vascular calcifications.      Bilateral Foot 2/13/2018: RIGHT: no acute fracture or dislocation. Alignment is anatomic. Mild degenerative changes are seen in the left first MTP joint. Minimal degenerative changes are seen in the right first MTP joint. A possible erosion is seen in the head of the left first metatarsal. No erosive changes are seen in the right foot. Plantar calcaneal heel spurs are noted in both feet, as well as enthesophyte formation at the Achilles insertion site. Arterial vascular calcifications are also noted bilaterally. Mild soft tissue swelling surrounds the left first MTP joint. LEFT: no acute fracture or dislocation. Alignment is anatomic. Mild degenerative changes are seen in the left first MTP joint. Minimal degenerative changes are seen in the right first MTP joint. A possible erosion is seen in the head of the left first metatarsal. No erosive changes are seen in the right foot.  Plantar calcaneal heel spurs are noted in both feet, as well as enthesophyte formation at the Achilles insertion site. Arterial vascular calcifications are also noted bilaterally. Mild soft tissue swelling surrounds the left first MTP joint. Thoracic Spine 11/03/2016: The posterior battery pack is again seen within the intrathecal catheter in stable position. Kyphoplasty material is present in a midthoracic vertebral body is stable mild chronic compression of the adjacent inferior vertebral body. The remaining vertebral body heights are maintained. Anterior osteophytes are noted. There is no abnormality in alignment. Lumbar Spine 10/19/2016: significant disc space narrowing at L3-L4 with 14 mm anterolisthesis. There is facet arthropathy. The vertebral body heights are maintained. Spinal catheters are seen. There are atherosclerotic vascular calcifications. Bilateral Hips 8/31/2015: no fracture, dislocation or other acute abnormality. There osteoarthritic pattern change of both hips, more severe on the right. There is nonuniform joint space narrowing and marginal osteophytes. There is remodeling of the right femur head with subchondral cystic formation. Mild sacroiliac osteoarthritic change. Lower lumbar spondylosis is noted. Wire  leads are seen in the left lower lumbar spine likely reflective of spinal stimulator device. Bilateral Hand 3/01/2011: RIGHT: mild osteopenia. There are degenerative changes of the distal and proximal interphalangeal joints and the base of the thumb. There is no fracture or other acute abnormality. Scattered calcifications are noted in the radial and ulnar arteries. LEFT: mild osteopenia. There are degenerative changes of the distal and proximal interphalangeal joints and  at the base of the thumb. There is no fracture or acute abnormality. There are vascular calcifications of the radial and ulnar arteries.     CT Imaging    None    MR Imaging    MRI Brain with without contrast 12/19/2016: There is sulcal and ventricular prominence. Confluent periventricular and scattered foci of increased T2 signal intensity in the corona radiata and centrum semiovale. Tiny remote lacunar infarction in the right cerebellum. There is no intracranial mass, hemorrhage or evidence of acute infarction. There is no Chiari or syrinx. The pituitary and infundibulum are grossly unremarkable. There is no skull base mass. Cerebellopontine angles are grossly unremarkable. The major intracranial vascular flow-voids are unremarkable. No evidence of demyelinating process. There is no abnormal parenchymal enhancement. There is no abnormal meningeal enhancement. The cavernous sinuses are symmetric. Optic chiasm and infundibulum grossly unremarkable. Orbits are grossly symmetric. Dural venous sinuses are grossly patent. The brain architecture is normal. There is no evidence of midline shift or mass-effect. There are no extra-axial fluid collections. The mastoid air cells and paranasal sinuses are well pneumatized and clear. MRI Lumbar Spine with and without contrast 8/07/2013: There is transitional lumbosacral anatomy. In keeping with prior studies, lowest fully formed disc is labeled L5-S1. There has been prior  laminectomies at L3-5 with no change in grade 1 anterolisthesis at L3-4 which measures 5 mm. There is grade 1 anterolisthesis of L4-5 of approximately 2 mm, unchanged. Vertebral body heights are maintained. There is no marrow edema or compression fracture. There are reactive endplate changes at S4-1.  The conus medullaris terminates at L1-2. There is no abnormal intraspinal enhancement. L1/2:  The spinal canal and foramina are widely patent. L2/3:  Diffuse disc bulge, facet hypertrophy and ligamentum flavum thickening cause mild spinal canal and moderate right foraminal stenosis. Left foramen is patent. L3/4: There is uncovering of disc material with facet hypertrophy resulting in mild thecal sac narrowing.  There is severe bilateral foraminal stenosis similar to the prior study. L4/5: Bk Levans is diffuse disc bulge and facet hypertrophy, without significant spinal canal stenosis. There is mild right foraminal stenosis. L5/S1:  The spinal canal and foramina are widely patent. DXA     DXA 8/16/2019: (excluded Possible increased density in the L2 and T6 vertebral bodies) left femoral neck T score: -1.1 (0.881 g/cm2), left total hip T score: -0.9 (0.889 g/cm2), right femoral neck T score: -0.7 (0.945 g/cm2), right total hip T score: -0.5 (0.941 g/cm2), and distal one third left radius T score -2.4 (BMD 0.542 g/cm2). FRAX score 21 % probability in 10 years for major osteoporotic fracture and 39 % 10 year probability of hip fracture. DXA 8/08/2017: (excluded Lumbar spine because of overlying wires and degenerative change and kyphoplasty of L4) showed left femoral neck T score: -1.4 (0.837 g/cm2), left total hip T score: -1.4 (0.830 g/cm2), right femoral neck T score: -0.9 (0.919 g/cm2), right total hip T score: -0.9 (0.900 g/cm2), and distal one third left radius T score -3.0 (BMD 0.615 g/cm2). FRAX score 16.8 % probability in 10 years for major osteoporotic fracture and 3.2 % 10 year probability of hip fracture. Nuclear Medicine    Nuclear Medicine Bone Scan 1/22/2013: There is intense radiotracer uptake in the upper thoracic spine, likely T5. There is uptake in the region of the sternoclavicular joints and left shoulder, likely degenerative. ASSESSMENT AND PLAN    This is a follow-up visit for Ms. Dominguez Mar. 1) Seronegative Erosive Rheumatoid Arthritis. She is maintained on leflunomide 20 mg daily and Simponi Aria every 6 weeks, with good tolerance. Her most recent infusion was 1/22/2021. She reports intermittent inflammatory symptoms that has worsened with the cold weather. Her CDAI was N/A (previously 21, 15, 34.5, 44, 38, 31, 24.5) with 2 tender and 1 swollen joints, consistent with moderate disease activity.  She denies breakthrough with Simponi but knows that it is working. The patient's aforementioned chronic illness has progressed. The patient has not suffered a side effect from treatment. I will change Simponi Aria to Xeljanz 11 mg XR daily due to lack of ineffectiveness. I have not discussed my assessment and plan with the patient's care team, but I have forwarded my office note to them. 2) Long Term Use of Immunosuppressants. The patient remains on high risk immunomodulatory medications (leflunomide, Simponi Aria) and requires frequent toxicity monitoring by blood work to evaluate for toxicities. The patient's most recent labs on 1/22/2021, which I reviewed with them, were unremarkable. Respective labs were ordered (CBC and CMP) are done with each infusion. 3) Age-Related Osteoporosis. (multiple vertebral fractures) Her most recent DXA on  8/16/2019 showed distal one third left radius T score -2.4 (BMD 0.542 g/cm2). FRAX score 21 % probability in 10 years for major osteoporotic fracture and 39 % 10 year probability of hip fracture. She is maintained on Prolia with good tolerance. Her most recent dose was 2/07/2020. Her next dose is 8/21/2020. I will continue treatment. 4) Primary Raynauds phenomenon. She is on amlodipine 5 mg daily. This was an active issue today. I asked her to increase her dose to 10 mg daily. 5) Chronic Kidney Disease Stage 2-3. I will continue to monitor. 6) Dependent Edema. This is due to varicosities and dependent drainage. 7) Stenosing Tenosynovitis of eft 3rd Digit. I recommended a steroid injection to be done at her orthopedic or by me. 8) Hypoalbuminemia. Her albumin was  3.4 g/dL. This will be repeated with her next infusion if she has not started Tedra Rosina. The patient voiced understanding of the aforementioned assessment and plan.     The patient has consented for synchronous (real-time) Telemedicine (audio-video technology) on 2/16/2021 for their care to be delivered over telemedicine in place of their regularly scheduled office visit pursuant to the emergency declaration under the [facility] waiver authority and the [employer] Act, this Virtual  Visit was conducted, with patient's consent, to reduce the patient's risk of exposure to COVID-19 and provide continuity of care for an established patient. Services were provided through a video synchronous discussion virtually to substitute for in-person clinic visit.     TODAY'S ORDERS    Orders Placed This Encounter    tofacitinib 11 mg Tb24    leflunomide (ARAVA) 20 mg tablet     Future Appointments   Date Time Provider Yocasta Cisneros   3/5/2021 10:30 AM SS [facility]F3 [facility]   3/5/2021  1:00 PM SS INF7 CH3 <1H [facility]   3/15/2021  2:00 PM SS INF7 CH1 LAB [facility]   4/16/2021 10:30 AM SS INF3 CH4 <2H [facility]   5/13/2021  3:20 PM Jenny THORNE MD [facility] BS AMB   5/28/2021 10:30 AM SS INF3 CH4 <2H [facility] Samira Adkins MD, [address] [facility] [address]    Adult Rheumatology   Rheumatology Ultrasound Certified  [facility]  A Part of [facility], [address] [place] [address] Road   Phone [phone]  Fax [phone]

## 2021-02-19 ENCOUNTER — HOSPITAL ENCOUNTER (OUTPATIENT)
Dept: INFUSION THERAPY | Age: 81
End: 2021-02-19

## 2021-02-21 DIAGNOSIS — D51.0 PERNICIOUS ANEMIA: ICD-10-CM

## 2021-02-21 DIAGNOSIS — M62.838 MUSCLE SPASM: ICD-10-CM

## 2021-02-22 RX ORDER — LANOLIN ALCOHOL/MO/W.PET/CERES
CREAM (GRAM) TOPICAL
Qty: 60 TAB | Refills: 5 | OUTPATIENT
Start: 2021-02-22

## 2021-02-22 RX ORDER — CYCLOBENZAPRINE HCL 10 MG
10 TABLET ORAL
Qty: 90 TAB | Refills: 1 | Status: SHIPPED | OUTPATIENT
Start: 2021-02-22 | End: 2021-06-02 | Stop reason: SDUPTHER

## 2021-02-28 DIAGNOSIS — M06.09 SERONEGATIVE RHEUMATOID ARTHRITIS OF MULTIPLE SITES (HCC): ICD-10-CM

## 2021-03-01 DIAGNOSIS — E11.42 DIABETIC POLYNEUROPATHY ASSOCIATED WITH TYPE 2 DIABETES MELLITUS (HCC): ICD-10-CM

## 2021-03-02 RX ORDER — GABAPENTIN 600 MG/1
600 TABLET ORAL 4 TIMES DAILY
Qty: 120 TAB | Refills: 3 | Status: SHIPPED | OUTPATIENT
Start: 2021-03-04 | End: 2021-07-04 | Stop reason: SDUPTHER

## 2021-03-05 ENCOUNTER — HOSPITAL ENCOUNTER (OUTPATIENT)
Dept: INFUSION THERAPY | Age: 81
Discharge: HOME | End: 2021-03-05

## 2021-03-05 ENCOUNTER — HOSPITAL ENCOUNTER (OUTPATIENT)
Dept: INFUSION THERAPY | Age: 81
Discharge: HOME | End: 2021-03-05
Payer: MEDICARE

## 2021-03-05 VITALS
TEMPERATURE: 98.3 F | RESPIRATION RATE: 20 BRPM | WEIGHT: 193 LBS | DIASTOLIC BLOOD PRESSURE: 56 MMHG | HEART RATE: 105 BPM | BODY MASS INDEX: 36.47 KG/M2 | SYSTOLIC BLOOD PRESSURE: 112 MMHG

## 2021-03-05 DIAGNOSIS — M06.09 SERONEGATIVE RHEUMATOID ARTHRITIS OF MULTIPLE SITES (HCC): Primary | ICD-10-CM

## 2021-03-05 LAB
ALBUMIN SERPL-MCNC: 3.5 G/DL (ref 3.5–5)
ANION GAP BLD CALC-SCNC: 13 MMOL/L (ref 10–20)
ANION GAP SERPL CALC-SCNC: 7 MMOL/L (ref 5–15)
BUN BLD-MCNC: 17 MG/DL (ref 9–20)
BUN SERPL-MCNC: 17 MG/DL (ref 6–20)
BUN/CREAT SERPL: 20 (ref 12–20)
CA-I BLD-MCNC: 1.01 MMOL/L (ref 1.12–1.32)
CALCIUM SERPL-MCNC: 8.4 MG/DL (ref 8.5–10.1)
CHLORIDE BLD-SCNC: 106 MMOL/L (ref 98–107)
CHLORIDE SERPL-SCNC: 109 MMOL/L (ref 97–108)
CO2 BLD-SCNC: 26 MMOL/L (ref 21–32)
CO2 SERPL-SCNC: 24 MMOL/L (ref 21–32)
CREAT BLD-MCNC: 0.7 MG/DL (ref 0.6–1.3)
CREAT SERPL-MCNC: 0.84 MG/DL (ref 0.55–1.02)
GLUCOSE BLD-MCNC: 165 MG/DL (ref 65–100)
GLUCOSE SERPL-MCNC: 168 MG/DL (ref 65–100)
HCT VFR BLD CALC: 40 % (ref 35–47)
PHOSPHATE SERPL-MCNC: 2.7 MG/DL (ref 2.6–4.7)
POTASSIUM BLD-SCNC: 4.1 MMOL/L (ref 3.5–5.1)
POTASSIUM SERPL-SCNC: 4.1 MMOL/L (ref 3.5–5.1)
SERVICE CMNT-IMP: ABNORMAL
SODIUM BLD-SCNC: 140 MMOL/L (ref 136–145)
SODIUM SERPL-SCNC: 140 MMOL/L (ref 136–145)

## 2021-03-05 PROCEDURE — 74011000258 HC RX REV CODE- 258: Performed by: INTERNAL MEDICINE

## 2021-03-05 PROCEDURE — 80069 RENAL FUNCTION PANEL: CPT

## 2021-03-05 PROCEDURE — 96413 CHEMO IV INFUSION 1 HR: CPT

## 2021-03-05 PROCEDURE — 74011250636 HC RX REV CODE- 250/636: Performed by: INTERNAL MEDICINE

## 2021-03-05 PROCEDURE — 96372 THER/PROPH/DIAG INJ SC/IM: CPT

## 2021-03-05 PROCEDURE — 36415 COLL VENOUS BLD VENIPUNCTURE: CPT

## 2021-03-05 PROCEDURE — 80047 BASIC METABLC PNL IONIZED CA: CPT

## 2021-03-05 RX ORDER — SODIUM CHLORIDE 0.9 % (FLUSH) 0.9 %
10 SYRINGE (ML) INJECTION AS NEEDED
Status: CANCELLED | OUTPATIENT
Start: 2021-04-11

## 2021-03-05 RX ORDER — ONDANSETRON 2 MG/ML
8 INJECTION INTRAMUSCULAR; INTRAVENOUS AS NEEDED
Status: CANCELLED | OUTPATIENT
Start: 2021-04-11

## 2021-03-05 RX ORDER — ALBUTEROL SULFATE 0.83 MG/ML
2.5 SOLUTION RESPIRATORY (INHALATION) AS NEEDED
Status: CANCELLED
Start: 2021-04-11

## 2021-03-05 RX ORDER — HEPARIN 100 UNIT/ML
300-500 SYRINGE INTRAVENOUS AS NEEDED
Status: CANCELLED
Start: 2021-04-11

## 2021-03-05 RX ORDER — DIPHENHYDRAMINE HYDROCHLORIDE 50 MG/ML
50 INJECTION, SOLUTION INTRAMUSCULAR; INTRAVENOUS AS NEEDED
Status: CANCELLED
Start: 2021-04-11

## 2021-03-05 RX ORDER — DIPHENHYDRAMINE HYDROCHLORIDE 50 MG/ML
25 INJECTION, SOLUTION INTRAMUSCULAR; INTRAVENOUS AS NEEDED
Status: CANCELLED
Start: 2021-04-11

## 2021-03-05 RX ORDER — EPINEPHRINE 1 MG/ML
0.3 INJECTION, SOLUTION, CONCENTRATE INTRAVENOUS AS NEEDED
Status: CANCELLED | OUTPATIENT
Start: 2021-04-11

## 2021-03-05 RX ORDER — SODIUM CHLORIDE 9 MG/ML
10 INJECTION INTRAMUSCULAR; INTRAVENOUS; SUBCUTANEOUS AS NEEDED
Status: CANCELLED | OUTPATIENT
Start: 2021-04-11

## 2021-03-05 RX ORDER — HYDROCORTISONE SODIUM SUCCINATE 100 MG/2ML
100 INJECTION, POWDER, FOR SOLUTION INTRAMUSCULAR; INTRAVENOUS AS NEEDED
Status: CANCELLED | OUTPATIENT
Start: 2021-04-11

## 2021-03-05 RX ORDER — ACETAMINOPHEN 325 MG/1
650 TABLET ORAL AS NEEDED
Status: CANCELLED
Start: 2021-04-11

## 2021-03-05 RX ORDER — SODIUM CHLORIDE 9 MG/ML
25 INJECTION, SOLUTION INTRAVENOUS CONTINUOUS
Status: CANCELLED | OUTPATIENT
Start: 2021-04-11

## 2021-03-05 RX ORDER — SODIUM CHLORIDE 9 MG/ML
25 INJECTION, SOLUTION INTRAVENOUS CONTINUOUS
Status: DISPENSED | OUTPATIENT
Start: 2021-03-05 | End: 2021-03-05

## 2021-03-05 RX ADMIN — SODIUM CHLORIDE 25 ML/HR: 900 INJECTION, SOLUTION INTRAVENOUS at 12:20

## 2021-03-05 RX ADMIN — GOLIMUMAB 175 MG: 50 SOLUTION INTRAVENOUS at 12:23

## 2021-03-05 RX ADMIN — DENOSUMAB 60 MG: 60 INJECTION SUBCUTANEOUS at 13:15

## 2021-03-05 NOTE — PROGRESS NOTES
[facility] Progress Note    Date: 2021    Name: Eduard Estevez    MRN: [id number]         : 1940    Ms. Susan Rooney Arrived ambulatory and in no distress for Simponi Aria Infusion and Prolia Injection. Assessment was completed, no acute issues at this time, no new complaints voiced. 24 G PIV established to right arm, + blood return. Ms. Rueda's vitals were reviewed. Visit Vitals  BP (!) 126/56 (BP 1 Location: Left arm, BP Patient Position: At rest)   Pulse (!) 105   Temp 98.3 °F (36.8 °C)   Resp 20   Wt 87.5 kg (193 lb)   Breastfeeding No   BMI 36.47 kg/m²       Lab results were obtained and reviewed. Recent Results (from the past 8 hour(s))   POC CHEM8    Collection Time: 21 12:10 PM   Result Value Ref Range    Calcium, ionized (POC) 1.01 (L) 1.12 - 1.32 mmol/L    Sodium (POC) 140 136 - 145 mmol/L    Potassium (POC) 4.1 3.5 - 5.1 mmol/L    Chloride (POC) 106 98 - 107 mmol/L    CO2 (POC) 26 21 - 32 mmol/L    Anion gap (POC) 13 10 - 20 mmol/L    Glucose (POC) 165 (H) 65 - 100 mg/dL    BUN (POC) 17 9 - 20 mg/dL    Creatinine (POC) 0.7 0.6 - 1.3 mg/dL    GFRAA, POC >60 >60 ml/min/1.73m2    GFRNA, POC >60 >60 ml/min/1.73m2    Hematocrit (POC) 40 35.0 - 47.0 %    Comment Comment Not Indicated. Medications:  Medications Administered     0.9% sodium chloride infusion     Admin Date  2021 Action  New Bag Dose  25 mL/hr Rate  25 mL/hr Route  IntraVENous Administered By  Marlene Mosley RN          denosumab (PROLIA) injection 60 mg     Admin Date  2021 Action  Given Dose  60 mg Route  SubCUTAneous Administered By  Marlene Mosley RN          golimumab (SIMPONI ARIA) 175 mg in 0.9% sodium chloride, overfill volume 124 mL infusion     Admin Date  2021 Action  Given Dose  175 mg Rate  248 mL/hr Route  IntraVENous Administered By  Marlene Mosley RN                Ms. Susan Rooney tolerated treatment well and was discharged from [place] [address] in stable condition. PIV flushed & removed. She is aware of future appointments.     Future Appointments   Date Time Provider Yocasta Amelia   3/15/2021  2:00 PM SS INF7 CH1 LAB [facility]   4/16/2021 10:30 AM SS INF3 CH4 <2H [facility]   5/13/2021  3:20 PM Jazmín Ramirez MD AOCR BS AMB   5/28/2021 10:30 AM SS INF3 CH4 <2H [facility] [address], RN  March 5, 2021

## 2021-03-15 ENCOUNTER — HOSPITAL ENCOUNTER (OUTPATIENT)
Dept: LAB | Age: 81
Discharge: HOME | End: 2021-03-15
Payer: MEDICARE

## 2021-03-15 DIAGNOSIS — M81.0 AGE-RELATED OSTEOPOROSIS WITHOUT CURRENT PATHOLOGICAL FRACTURE: Primary | ICD-10-CM

## 2021-03-15 PROCEDURE — 80069 RENAL FUNCTION PANEL: CPT

## 2021-03-15 PROCEDURE — 36415 COLL VENOUS BLD VENIPUNCTURE: CPT

## 2021-03-16 LAB
ALBUMIN SERPL-MCNC: 4.2 G/DL (ref 3.6–4.6)
BUN SERPL-MCNC: 12 MG/DL (ref 8–27)
BUN/CREAT SERPL: 16 (ref 12–28)
CALCIUM SERPL-MCNC: 8.7 MG/DL (ref 8.7–10.3)
CHLORIDE SERPL-SCNC: 105 MMOL/L (ref 96–106)
CO2 SERPL-SCNC: 27 MMOL/L (ref 20–29)
CREAT SERPL-MCNC: 0.77 MG/DL (ref 0.57–1)
GLUCOSE SERPL-MCNC: 180 MG/DL (ref 65–99)
PHOSPHATE SERPL-MCNC: 3.1 MG/DL (ref 3–4.3)
POTASSIUM SERPL-SCNC: 4.9 MMOL/L (ref 3.5–5.2)
SODIUM SERPL-SCNC: 143 MMOL/L (ref 134–144)

## 2021-03-29 DIAGNOSIS — E78.2 MIXED HYPERLIPIDEMIA: ICD-10-CM

## 2021-03-30 RX ORDER — ATORVASTATIN CALCIUM 40 MG/1
TABLET, FILM COATED ORAL
Qty: 90 TAB | Refills: 1 | Status: SHIPPED | OUTPATIENT
Start: 2021-03-30 | End: 2021-10-01

## 2021-04-11 DIAGNOSIS — M06.09 SERONEGATIVE RHEUMATOID ARTHRITIS OF MULTIPLE SITES (HCC): ICD-10-CM

## 2021-04-16 ENCOUNTER — HOSPITAL ENCOUNTER (OUTPATIENT)
Dept: INFUSION THERAPY | Age: 81
Discharge: HOME | End: 2021-04-16
Payer: MEDICARE

## 2021-04-16 VITALS
TEMPERATURE: 96.2 F | RESPIRATION RATE: 16 BRPM | HEART RATE: 91 BPM | BODY MASS INDEX: 36.05 KG/M2 | OXYGEN SATURATION: 97 % | DIASTOLIC BLOOD PRESSURE: 72 MMHG | WEIGHT: 190.8 LBS | SYSTOLIC BLOOD PRESSURE: 131 MMHG

## 2021-04-16 DIAGNOSIS — M06.09 SERONEGATIVE RHEUMATOID ARTHRITIS OF MULTIPLE SITES (HCC): Primary | ICD-10-CM

## 2021-04-16 LAB
ALBUMIN SERPL-MCNC: 3.6 G/DL (ref 3.5–5)
ALBUMIN/GLOB SERPL: 1.2 {RATIO} (ref 1.1–2.2)
ALP SERPL-CCNC: 95 U/L (ref 45–117)
ALT SERPL-CCNC: 36 U/L (ref 12–78)
ANION GAP SERPL CALC-SCNC: 4 MMOL/L (ref 5–15)
AST SERPL-CCNC: 42 U/L (ref 15–37)
BASOPHILS # BLD: 0.1 K/UL (ref 0–0.1)
BASOPHILS NFR BLD: 1 % (ref 0–1)
BILIRUB SERPL-MCNC: 0.4 MG/DL (ref 0.2–1)
BUN SERPL-MCNC: 18 MG/DL (ref 6–20)
BUN/CREAT SERPL: 19 (ref 12–20)
CALCIUM SERPL-MCNC: 7.6 MG/DL (ref 8.5–10.1)
CHLORIDE SERPL-SCNC: 107 MMOL/L (ref 97–108)
CO2 SERPL-SCNC: 26 MMOL/L (ref 21–32)
CREAT SERPL-MCNC: 0.94 MG/DL (ref 0.55–1.02)
CRP SERPL-MCNC: <0.29 MG/DL (ref 0–0.6)
DIFFERENTIAL METHOD BLD: ABNORMAL
EOSINOPHIL # BLD: 0.2 K/UL (ref 0–0.4)
EOSINOPHIL NFR BLD: 3 % (ref 0–7)
ERYTHROCYTE [DISTWIDTH] IN BLOOD BY AUTOMATED COUNT: 12.7 % (ref 11.5–14.5)
ERYTHROCYTE [SEDIMENTATION RATE] IN BLOOD: 11 MM/HR (ref 0–30)
GLOBULIN SER CALC-MCNC: 3 G/DL (ref 2–4)
GLUCOSE SERPL-MCNC: 170 MG/DL (ref 65–100)
HCT VFR BLD AUTO: 39.7 % (ref 35–47)
HGB BLD-MCNC: 12.4 G/DL (ref 11.5–16)
IMM GRANULOCYTES # BLD AUTO: 0 K/UL (ref 0–0.04)
IMM GRANULOCYTES NFR BLD AUTO: 1 % (ref 0–0.5)
LYMPHOCYTES # BLD: 2.3 K/UL (ref 0.8–3.5)
LYMPHOCYTES NFR BLD: 35 % (ref 12–49)
MCH RBC QN AUTO: 31.8 PG (ref 26–34)
MCHC RBC AUTO-ENTMCNC: 31.2 G/DL (ref 30–36.5)
MCV RBC AUTO: 101.8 FL (ref 80–99)
MONOCYTES # BLD: 0.7 K/UL (ref 0–1)
MONOCYTES NFR BLD: 11 % (ref 5–13)
NEUTS SEG # BLD: 3.2 K/UL (ref 1.8–8)
NEUTS SEG NFR BLD: 49 % (ref 32–75)
NRBC # BLD: 0 K/UL (ref 0–0.01)
NRBC BLD-RTO: 0 PER 100 WBC
PLATELET # BLD AUTO: 158 K/UL (ref 150–400)
PMV BLD AUTO: 11.2 FL (ref 8.9–12.9)
POTASSIUM SERPL-SCNC: 4.3 MMOL/L (ref 3.5–5.1)
PROT SERPL-MCNC: 6.6 G/DL (ref 6.4–8.2)
RBC # BLD AUTO: 3.9 M/UL (ref 3.8–5.2)
SODIUM SERPL-SCNC: 137 MMOL/L (ref 136–145)
WBC # BLD AUTO: 6.5 K/UL (ref 3.6–11)

## 2021-04-16 PROCEDURE — 85652 RBC SED RATE AUTOMATED: CPT

## 2021-04-16 PROCEDURE — 85025 COMPLETE CBC W/AUTO DIFF WBC: CPT

## 2021-04-16 PROCEDURE — 86140 C-REACTIVE PROTEIN: CPT

## 2021-04-16 PROCEDURE — 74011250636 HC RX REV CODE- 250/636: Performed by: INTERNAL MEDICINE

## 2021-04-16 PROCEDURE — 96413 CHEMO IV INFUSION 1 HR: CPT

## 2021-04-16 PROCEDURE — 74011000258 HC RX REV CODE- 258: Performed by: INTERNAL MEDICINE

## 2021-04-16 PROCEDURE — 80053 COMPREHEN METABOLIC PANEL: CPT

## 2021-04-16 PROCEDURE — 36415 COLL VENOUS BLD VENIPUNCTURE: CPT

## 2021-04-16 RX ORDER — EPINEPHRINE 1 MG/ML
0.3 INJECTION, SOLUTION, CONCENTRATE INTRAVENOUS AS NEEDED
Status: CANCELLED | OUTPATIENT
Start: 2021-05-23

## 2021-04-16 RX ORDER — ACETAMINOPHEN 325 MG/1
650 TABLET ORAL AS NEEDED
Status: CANCELLED
Start: 2021-05-23

## 2021-04-16 RX ORDER — SODIUM CHLORIDE 0.9 % (FLUSH) 0.9 %
10 SYRINGE (ML) INJECTION AS NEEDED
Status: DISPENSED | OUTPATIENT
Start: 2021-04-16 | End: 2021-04-16

## 2021-04-16 RX ORDER — ALBUTEROL SULFATE 0.83 MG/ML
2.5 SOLUTION RESPIRATORY (INHALATION) AS NEEDED
Status: CANCELLED
Start: 2021-05-23

## 2021-04-16 RX ORDER — HEPARIN 100 UNIT/ML
300-500 SYRINGE INTRAVENOUS AS NEEDED
Status: CANCELLED
Start: 2021-05-23

## 2021-04-16 RX ORDER — HYDROCORTISONE SODIUM SUCCINATE 100 MG/2ML
100 INJECTION, POWDER, FOR SOLUTION INTRAMUSCULAR; INTRAVENOUS AS NEEDED
Status: CANCELLED | OUTPATIENT
Start: 2021-05-23

## 2021-04-16 RX ORDER — SODIUM CHLORIDE 9 MG/ML
10 INJECTION INTRAMUSCULAR; INTRAVENOUS; SUBCUTANEOUS AS NEEDED
Status: CANCELLED | OUTPATIENT
Start: 2021-05-23

## 2021-04-16 RX ORDER — ONDANSETRON 2 MG/ML
8 INJECTION INTRAMUSCULAR; INTRAVENOUS AS NEEDED
Status: CANCELLED | OUTPATIENT
Start: 2021-05-23

## 2021-04-16 RX ORDER — SODIUM CHLORIDE 9 MG/ML
25 INJECTION, SOLUTION INTRAVENOUS CONTINUOUS
Status: CANCELLED | OUTPATIENT
Start: 2021-05-23

## 2021-04-16 RX ORDER — SODIUM CHLORIDE 0.9 % (FLUSH) 0.9 %
10 SYRINGE (ML) INJECTION AS NEEDED
Status: CANCELLED | OUTPATIENT
Start: 2021-05-23

## 2021-04-16 RX ORDER — SODIUM CHLORIDE 9 MG/ML
25 INJECTION, SOLUTION INTRAVENOUS CONTINUOUS
Status: DISPENSED | OUTPATIENT
Start: 2021-04-16 | End: 2021-04-16

## 2021-04-16 RX ORDER — DIPHENHYDRAMINE HYDROCHLORIDE 50 MG/ML
25 INJECTION, SOLUTION INTRAMUSCULAR; INTRAVENOUS AS NEEDED
Status: CANCELLED
Start: 2021-05-23

## 2021-04-16 RX ORDER — DIPHENHYDRAMINE HYDROCHLORIDE 50 MG/ML
50 INJECTION, SOLUTION INTRAMUSCULAR; INTRAVENOUS AS NEEDED
Status: CANCELLED
Start: 2021-05-23

## 2021-04-16 RX ADMIN — GOLIMUMAB 175 MG: 50 SOLUTION INTRAVENOUS at 11:19

## 2021-04-16 RX ADMIN — SODIUM CHLORIDE 25 ML/HR: 9 INJECTION, SOLUTION INTRAVENOUS at 11:15

## 2021-04-16 RX ADMIN — Medication 10 ML: at 12:05

## 2021-04-16 NOTE — PROGRESS NOTES
[facility]patient [facility]fusion [facility] Short Visit Progress Note    4438 Patient admitted to [facility] for [address] ambulatory in stable condition. Assessment completed. No new concerns voiced. Labs drawn and sent for processing. Covid Screening      1. Do you have any symptoms of COVID-19? SOB, coughing, fever, or generally not feeling well ? NO  2. Have you been exposed to COVID-19 recently? NO  3. Have you had any recent contact with family/friend that has a pending COVID test? NO    Vital Signs:  Visit Vitals  /72   Pulse 91   Temp (!) 96.2 °F (35.7 °C)   Resp 16   Wt 86.5 kg (190 lb 12.8 oz)   SpO2 97%   BMI 36.05 kg/m²         Left arm PIV #24 with positive blood return. Lab Results:  Recent Results (from the past 24 hour(s))   CBC WITH AUTOMATED DIFF    Collection Time: 04/16/21 10:45 AM   Result Value Ref Range    WBC 6.5 3.6 - 11.0 K/uL    RBC 3.90 3.80 - 5.20 M/uL    HGB 12.4 11.5 - 16.0 g/dL    HCT 39.7 35.0 - 47.0 %    .8 (H) 80.0 - 99.0 FL    MCH 31.8 26.0 - 34.0 PG    MCHC 31.2 30.0 - 36.5 g/dL    RDW 12.7 11.5 - 14.5 %    PLATELET 260 979 - 887 K/uL    MPV 11.2 8.9 - 12.9 FL    NRBC 0.0 0  WBC    ABSOLUTE NRBC 0.00 0.00 - 0.01 K/uL    NEUTROPHILS 49 32 - 75 %    LYMPHOCYTES 35 12 - 49 %    MONOCYTES 11 5 - 13 %    EOSINOPHILS 3 0 - 7 %    BASOPHILS 1 0 - 1 %    IMMATURE GRANULOCYTES 1 (H) 0.0 - 0.5 %    ABS. NEUTROPHILS 3.2 1.8 - 8.0 K/UL    ABS. LYMPHOCYTES 2.3 0.8 - 3.5 K/UL    ABS. MONOCYTES 0.7 0.0 - 1.0 K/UL    ABS. EOSINOPHILS 0.2 0.0 - 0.4 K/UL    ABS. BASOPHILS 0.1 0.0 - 0.1 K/UL    ABS. IMM.  GRANS. 0.0 0.00 - 0.04 K/UL    DF AUTOMATED     METABOLIC PANEL, COMPREHENSIVE    Collection Time: 04/16/21 10:45 AM   Result Value Ref Range    Sodium 137 136 - 145 mmol/L    Potassium 4.3 3.5 - 5.1 mmol/L    Chloride 107 97 - 108 mmol/L    CO2 26 21 - 32 mmol/L    Anion gap 4 (L) 5 - 15 mmol/L    Glucose 170 (H) 65 - 100 mg/dL    BUN 18 6 - 20 MG/DL    Creatinine 0.94 0.55 - 1.02 MG/DL    BUN/Creatinine ratio 19 12 - 20      GFR est AA >60 >60 ml/min/1.73m2    GFR est non-AA 57 (L) >60 ml/min/1.73m2    Calcium 7.6 (L) 8.5 - 10.1 MG/DL    Bilirubin, total 0.4 0.2 - 1.0 MG/DL    ALT (SGPT) 36 12 - 78 U/L    AST (SGOT) 42 (H) 15 - 37 U/L    Alk. phosphatase 95 45 - 117 U/L    Protein, total 6.6 6.4 - 8.2 g/dL    Albumin 3.6 3.5 - 5.0 g/dL    Globulin 3.0 2.0 - 4.0 g/dL    A-G Ratio 1.2 1.1 - 2.2     SED RATE (ESR)    Collection Time: 04/16/21 10:45 AM   Result Value Ref Range    Sed rate, automated 11 0 - 30 mm/hr   C REACTIVE PROTEIN, QT    Collection Time: 04/16/21 10:45 AM   Result Value Ref Range    C-Reactive protein <0.29 0.00 - 0.60 mg/dL           Medications:  Medications Administered     0.9% sodium chloride infusion     Admin Date  04/16/2021 Action  New Bag Dose  25 mL/hr Rate  25 mL/hr Route  IntraVENous Administered By  Sydnie Lozano RN          golimumab (SIMPONI ARIA) 175 mg in 0.9% sodium chloride, overfill volume 124 mL infusion     Admin Date  04/16/2021 Action  Given Dose  175 mg Rate  248 mL/hr Route  IntraVENous Administered By  Sydnie Lozano RN          sodium chloride (NS) flush 10 mL     Admin Date  04/16/2021 Action  Given Dose  10 mL Route  IntraVENous Administered By  Porfirio Izaguirre RN                Patient tolerated treatment well. Patient discharged from [facility] ambulatory in no distress at 1205. Patient aware of next appointment.     Future Appointments   Date Time Provider Yocasta Cisneros   5/13/2021  3:20 PM Hima Diallo MD [facility] AMB   5/28/2021 10:30 AM SS INF3 CH4 <2H [facility]   7/9/2021 10:30 AM SS INF3 CH4 <2H [facility]   8/20/2021 10:30 AM SS INF3 CH4 <2H [facility]   10/1/2021 10:30 AM SS INF3 CH4 <2H [facility]   11/12/2021 10:30 AM SS INF3 CH4 <2H [facility]

## 2021-04-27 DIAGNOSIS — Z87.440 HISTORY OF RECURRENT UTI (URINARY TRACT INFECTION): ICD-10-CM

## 2021-04-27 DIAGNOSIS — M79.18 MYOFASCIAL MUSCLE PAIN: ICD-10-CM

## 2021-04-27 DIAGNOSIS — M12.9 ARTHROPATHY: ICD-10-CM

## 2021-04-27 DIAGNOSIS — M51.36 DEGENERATIVE LUMBAR DISC: ICD-10-CM

## 2021-04-27 RX ORDER — TRIMETHOPRIM 100 MG/1
TABLET ORAL
Qty: 30 TAB | Refills: 5 | Status: CANCELLED | OUTPATIENT
Start: 2021-04-27

## 2021-04-27 RX ORDER — HYDROCODONE BITARTRATE AND ACETAMINOPHEN 5; 325 MG/1; MG/1
1 TABLET ORAL
Qty: 60 TAB | Refills: 0 | Status: SHIPPED | OUTPATIENT
Start: 2021-04-27 | End: 2021-07-04 | Stop reason: SDUPTHER

## 2021-04-27 RX ORDER — TRIMETHOPRIM 100 MG/1
TABLET ORAL
Qty: 30 TAB | Refills: 5 | Status: SHIPPED | OUTPATIENT
Start: 2021-04-27 | End: 2021-07-28 | Stop reason: SDUPTHER

## 2021-04-27 NOTE — TELEPHONE ENCOUNTER
Attempted to call. Unsuccessful.  Message left  Pt will need in person appt to f/u chronic conditions in May  UDS

## 2021-04-27 NOTE — TELEPHONE ENCOUNTER
Rx request for hydrocodone. Pt takes this infrequently for flares of pain related to RA. Last fill was 3 months ago for a 15 day supply.  reviewed and appropriate. She is due for UDS. Will get her an appt for this and labs.

## 2021-04-28 DIAGNOSIS — E11.21 TYPE 2 DIABETES WITH NEPHROPATHY (HCC): ICD-10-CM

## 2021-04-30 RX ORDER — METFORMIN HYDROCHLORIDE 500 MG/1
TABLET ORAL
Qty: 180 TAB | Refills: 1 | Status: SHIPPED | OUTPATIENT
Start: 2021-04-30 | End: 2021-11-03

## 2021-05-10 ENCOUNTER — DOCUMENTATION ONLY (OUTPATIENT)
Dept: RHEUMATOLOGY | Age: 81
End: 2021-05-10

## 2021-05-10 ENCOUNTER — OFFICE VISIT (OUTPATIENT)
Dept: FAMILY MEDICINE CLINIC | Age: 81
End: 2021-05-10
Payer: MEDICARE

## 2021-05-10 VITALS
RESPIRATION RATE: 20 BRPM | BODY MASS INDEX: 36.06 KG/M2 | WEIGHT: 191 LBS | HEIGHT: 61 IN | HEART RATE: 102 BPM | DIASTOLIC BLOOD PRESSURE: 67 MMHG | TEMPERATURE: 98.4 F | OXYGEN SATURATION: 95 % | SYSTOLIC BLOOD PRESSURE: 103 MMHG

## 2021-05-10 DIAGNOSIS — E11.42 DIABETIC POLYNEUROPATHY ASSOCIATED WITH TYPE 2 DIABETES MELLITUS (HCC): ICD-10-CM

## 2021-05-10 DIAGNOSIS — I10 ESSENTIAL HYPERTENSION: ICD-10-CM

## 2021-05-10 DIAGNOSIS — E66.01 OBESITY, MORBID (HCC): ICD-10-CM

## 2021-05-10 DIAGNOSIS — E53.8 B12 DEFICIENCY: ICD-10-CM

## 2021-05-10 DIAGNOSIS — E78.2 MIXED HYPERLIPIDEMIA: Primary | ICD-10-CM

## 2021-05-10 PROCEDURE — G8754 DIAS BP LESS 90: HCPCS | Performed by: FAMILY MEDICINE

## 2021-05-10 PROCEDURE — G8417 CALC BMI ABV UP PARAM F/U: HCPCS | Performed by: FAMILY MEDICINE

## 2021-05-10 PROCEDURE — G8510 SCR DEP NEG, NO PLAN REQD: HCPCS | Performed by: FAMILY MEDICINE

## 2021-05-10 PROCEDURE — 1101F PT FALLS ASSESS-DOCD LE1/YR: CPT | Performed by: FAMILY MEDICINE

## 2021-05-10 PROCEDURE — G8536 NO DOC ELDER MAL SCRN: HCPCS | Performed by: FAMILY MEDICINE

## 2021-05-10 PROCEDURE — G8427 DOCREV CUR MEDS BY ELIG CLIN: HCPCS | Performed by: FAMILY MEDICINE

## 2021-05-10 PROCEDURE — 99214 OFFICE O/P EST MOD 30 MIN: CPT | Performed by: FAMILY MEDICINE

## 2021-05-10 PROCEDURE — G8752 SYS BP LESS 140: HCPCS | Performed by: FAMILY MEDICINE

## 2021-05-10 PROCEDURE — 1090F PRES/ABSN URINE INCON ASSESS: CPT | Performed by: FAMILY MEDICINE

## 2021-05-10 NOTE — PROGRESS NOTES
1. Have you been to the ER, urgent care clinic since your last visit? Hospitalized since your last visit? No    2. Have you seen or consulted any other health care providers outside of the [address] [place] [address] since your last visit? Include any pap smears or colon screening. No    Reviewed record in preparation for visit and have necessary documentation  Goals that were addressed and/or need to be completed during or after this appointment include     Health Maintenance Due   Topic Date Due    Shingrix Vaccine Age 49> (1 of 2) Never done    MICROALBUMIN Q1  08/20/2019    A1C test (Diabetic or Prediabetic)  02/20/2021       Patient is accompanied by self I have received verbal consent from Leandra Little to discuss any/all medical information while they are present in the room.

## 2021-05-10 NOTE — PROGRESS NOTES
CC: f/u DM, HTN, and HLD    HPI: Pt is a 79 y.o. female who presents for f/u DM, HTN and HLD. HTN:  Checking BPs at home?: NO  Headaches?: NO  Blurry vision?: NO  Lower extremity edema?: NO  Smoking?: NO    DM:  Checking BG at home?: YES, fasting and sometimes randomly  Logs today?: NO  BG range: 's fasting  Insulin dependent?: NO  Other medications for DM?: metformin 500mg BID and glipizide 2.5mg BID  Symptoms of hypoglycemia?: NO  Aspirin?: NO  ACEi/ARB?: NO  Statin?: YES  Last eye exam?: 11/2020  Last foot exam?: 10/2020  Last A1c:   Lab Results   Component Value Date/Time    Hemoglobin A1c 6.1 (H) 05/10/2021 03:25 PM    Hemoglobin A1c (POC) 6.1 06/24/2015 04:12 PM     LastLDL:   Lab Results   Component Value Date/Time    LDL, calculated 72.6 05/10/2021 03:25 PM     Last microalbumin:   Lab Results   Component Value Date/Time    Microalbumin/Creat ratio (mg/g creat) 43 (H) 05/10/2021 03:25 PM    Microalbumin,urine random 2.69 05/10/2021 03:25 PM         Past Medical History:   Diagnosis Date    Anemia     Arthritis     CAD (coronary artery disease) 1997    MI    Degenerative joint disease of right hip 09/14/2015     [facility]/Dr. Diana Leslie    Diabetes ([facility] [address]     Fracture     right shoulder; T6 compression    Gastritis     H/O Bell's palsy 7/9/2012    R-side of face. Dx >20 years ago.  Has persistent R-sided facial droop     Hypercholesterolemia     Hyperlipidemia    Lupus ([facility] [address] 1/9/2015    Osteoporosis, post-menopausal     vertebral fracture    Squamous cell cancer of skin of left cheek     Squamous cell skin cancer     Right knee       Family History   Problem Relation Age of Onset    Diabetes Mother     Hypertension Mother     Heart Disease Mother     Diabetes Father     Hypertension Father     Heart Disease Father    [facility] Arthritis-rheumatoid Sister     Elevated Lipids Sister    [facility] Arthritis-rheumatoid Sister     Hypertension Sister     Diabetes Sister     Thyroid Disease Sister    [facility] Arthritis-rheumatoid Other        Social History     Tobacco Use    Smoking status: Former Smoker     Quit date: 1989     Years since quittin.3    Smokeless tobacco: Never Used   Substance Use Topics    Alcohol use: No     Alcohol/week: 0.0 standard drinks     Comment: Occasional    Drug use: No       ROS:  Per HPI    PE:  Visit Vitals  /67   Pulse (!) 102   Temp 98.4 °F (36.9 °C) (Oral)   Resp 20   Ht 5' 1\" (1.549 m)   Wt 191 lb (86.6 kg)   SpO2 95%   BMI 36.09 kg/m²     Gen: Pt sitting in chair, in NAD  Head: Normocephalic, atraumatic  Eyes: Sclera anicteric, EOM grossly intact, PERRL  Ears: TM's pearly with good light reflex b/l  Nose: Normal nasal mucosa  Throat: MMM, normal lips, tongue and gums  Neck: Supple, no LAD, no thyromegaly or carotid bruits  CVS: Normal S1, S2, no m/r/g  Resp: CTAB, no wheezes or rales  Extrem: Atraumatic, no cyanosis or edema  Pulses: 2+   Skin: Warm, dry  Neuro: Alert, oriented, appropriate      A/P:   Encounter Diagnoses     ICD-10-CM ICD-9-CM   1. Mixed hyperlipidemia  E78.2 272.2   2. B12 deficiency  E53.8 266.2   3. Essential hypertension  I10 401.9   4. Diabetic polyneuropathy associated with type 2 diabetes mellitus (HCC)  E11.42 250.60     357.2   5. Obesity, morbid ([facility] [address]  E66.01 278.01     1. Mixed hyperlipidemia  - LIPID PANEL; Future  - LIPID PANEL    2. B12 deficiency: Pt wondering whether she needs to start back on injections. - VITAMIN B12; Future  - VITAMIN B12    3. Essential hypertension: Initially elevated after walking down banks but significantly improved on recheck to borderline low level. Pt asymptomatic. Continue current medications.  - METABOLIC PANEL, BASIC; Future  - METABOLIC PANEL, BASIC    4. Diabetic polyneuropathy associated with type 2 diabetes mellitus (HCC)  - HEMOGLOBIN A1C WITH EAG;  Future  - MICROALBUMIN, UR, RAND W/ MICROALB/CREAT RATIO; Future  - MICROALBUMIN, UR, RAND W/ MICROALB/CREAT RATIO  - HEMOGLOBIN A1C WITH EAG    5. Obesity, morbid ([facility] [address]       RTC in 6 months for f/u chronic conditions, or sooner prn    Discussed diagnoses in detail with patient. Medication risks/benefits/side effects discussed with patient. All of the patient's questions were addressed. The patient understands and agrees with our plan of care. The patient knows to call back if they are unsure of or forget any changes we discussed today or if the symptoms change. The patient received an After-Visit Summary which contains VS, orders, medication list and allergy list. This can be used as a \"mini-medical record\" should they have to seek medical care while out of town. Current Outpatient Medications on File Prior to Visit   Medication Sig Dispense Refill    metFORMIN (GLUCOPHAGE) 500 mg tablet TAKE 1 TABLET BY MOUTH TWICE DAILY WITH MEALS 180 Tab 1    trimethoprim (TRIMPEX) 100 mg tablet TAKE 1 TABLET BY MOUTH DAILY FOR URINARY TRACT INFECTION PREVENTION 30 Tab 5    HYDROcodone-acetaminophen (NORCO) 5-325 mg per tablet Take 1 Tab by mouth every six (6) hours as needed for Pain for up to 90 days. 60 Tab 0    atorvastatin (LIPITOR) 40 mg tablet TAKE 1 TABLET BY MOUTH DAILY 90 Tab 1    gabapentin (NEURONTIN) 600 mg tablet Take 1 Tab by mouth four (4) times daily. Max Daily Amount: 2,400 mg. 120 Tab 3    cyclobenzaprine (FLEXERIL) 10 mg tablet Take 1 Tab by mouth nightly. 90 Tab 1    tofacitinib 11 mg Tb24 Take 11 mg by mouth daily. Indications: rheumatoid arthritis 30 Tab 11    leflunomide (ARAVA) 20 mg tablet Take 1 Tab by mouth daily. 90 Tab 1    amLODIPine (NORVASC) 5 mg tablet Take 1 Tab by mouth daily.  90 Tab 0    pantoprazole (PROTONIX) 40 mg tablet TAKE 1 TABLET BY MOUTH DAILY 90 Tab 1    nortriptyline (PAMELOR) 10 mg capsule TAKE 1 CAPSULE BY MOUTH EVERY NIGHT 30 Cap 5    glipiZIDE SR (GLUCOTROL XL) 2.5 mg CR tablet TAKE 1 TABLET BY MOUTH TWICE DAILY 180 Tab 1    nitroglycerin (NITROSTAT) 0.4 mg SL tablet DISSOLVE 1 TABLET UNDER THE TONGUE EVERY 5 MINUTES AS NEEDED FOR CHEST PAIN 25 Tab 0    glucose blood VI test strips ([employer] CONTOUR) strip Glucometer for 250.00 Pt. test blood sugar once daily (in morning) 50 Strip 11    metoprolol succinate (TOPROL-XL) 25 mg XL tablet TAKE 1 TABLET BY MOUTH EVERY DAY 90 Tab 1    denosumab (PROLIA) 60 mg/mL injection 1 mL by SubCUTAneous route every 6 months.  acetaminophen (TYLENOL) 650 mg CR tablet Take 1,300 mg by mouth daily as needed for Pain.  Blood-Glucose Meter (CONTOUR METER) monitoring kit Dx: 250. Check blood sugars daily. 1 kit 11    ZINC PO Take 50 mg by mouth daily.  cholecalciferol, vitamin d3, (VITAMIN D3) 1,000 unit tablet Take 2,000 Units by mouth daily.  calcium-vitamin D (CALCIUM 500+D) 500 mg(1,250mg) -200 unit per tablet Take 2 Tabs by mouth daily.  aspirin 81 mg Tab Take 81 mg by mouth daily.  MULTIVITAMINS (MULTIVITAMIN PO) Take 1 Tab by mouth daily. No current facility-administered medications on file prior to visit.

## 2021-05-10 NOTE — PATIENT INSTRUCTIONS
A Healthy Lifestyle: Care Instructions Your Care Instructions A healthy lifestyle can help you feel good, stay at a healthy weight, and have plenty of energy for both work and play. A healthy lifestyle is something you can share with your whole family. A healthy lifestyle also can lower your risk for serious health problems, such as high blood pressure, heart disease, and diabetes. You can follow a few steps listed below to improve your health and the health of your family. Follow-up care is a key part of your treatment and safety. Be sure to make and go to all appointments, and call your doctor if you are having problems. It's also a good idea to know your test results and keep a list of the medicines you take. How can you care for yourself at home? · Do not eat too much sugar, fat, or fast foods. You can still have dessert and treats now and then. The goal is moderation. · Start small to improve your eating habits. Pay attention to portion sizes, drink less juice and soda pop, and eat more fruits and vegetables. ? Eat a healthy amount of food. A 3-ounce serving of meat, for example, is about the size of a deck of cards. Fill the rest of your plate with vegetables and whole grains. ? Limit the amount of soda and sports drinks you have every day. Drink more water when you are thirsty. ? Eat plenty of fruits and vegetables every day. Have an apple or some carrot sticks as an afternoon snack instead of a candy bar. Try to have fruits and/or vegetables at every meal. 
· Make exercise part of your daily routine. You may want to start with simple activities, such as walking, bicycling, or slow swimming. Try to be active 30 to 60 minutes every day. You do not need to do all 30 to 60 minutes all at once. For example, you can exercise 3 times a day for 10 or 20 minutes.  Moderate exercise is safe for most people, but it is always a good idea to talk to your doctor before starting an exercise program. 
· Keep moving. Brayden Alcalay the lawn, work in the garden, or ipatter.com. Take the stairs instead of the elevator at work. · If you smoke, quit. People who smoke have an increased risk for heart attack, stroke, cancer, and other lung illnesses. Quitting is hard, but there are ways to boost your chance of quitting tobacco for good. ? Use nicotine gum, patches, or lozenges. ? Ask your doctor about stop-smoking programs and medicines. ? Keep trying. In addition to reducing your risk of diseases in the future, you will notice some benefits soon after you stop using tobacco. If you have shortness of breath or asthma symptoms, they will likely get better within a few weeks after you quit. · Limit how much alcohol you drink. Moderate amounts of alcohol (up to 2 drinks a day for men, 1 drink a day for women) are okay. But drinking too much can lead to liver problems, high blood pressure, and other health problems. Family health If you have a family, there are many things you can do together to improve your health. · Eat meals together as a family as often as possible. · Eat healthy foods. This includes fruits, vegetables, lean meats and dairy, and whole grains. · Include your family in your fitness plan. Most people think of activities such as jogging or tennis as the way to fitness, but there are many ways you and your family can be more active. Anything that makes you breathe hard and gets your heart pumping is exercise. Here are some tips: 
? Walk to do errands or to take your child to school or the bus. 
? Go for a family bike ride after dinner instead of watching TV. Where can you learn more? Go to http://www.gray.com/ Enter A609 in the search box to learn more about \"A Healthy Lifestyle: Care Instructions. \" Current as of: September 23, 2020               Content Version: 12.8 © 6781-2760 [employer].   
Care instructions adapted under license by [employer] (which disclaims liability or warranty for this information). If you have questions about a medical condition or this instruction, always ask your healthcare professional. [address] any warranty or liability for your use of this information.

## 2021-05-10 NOTE — PROGRESS NOTES
[facility]/Medicare RX approved Jose Rivas from 2/18/2021 to 12/31/2021, Case # [id number].

## 2021-05-11 LAB
ANION GAP SERPL CALC-SCNC: 8 MMOL/L (ref 5–15)
BUN SERPL-MCNC: 19 MG/DL (ref 6–20)
BUN/CREAT SERPL: 23 (ref 12–20)
CALCIUM SERPL-MCNC: 8.3 MG/DL (ref 8.5–10.1)
CHLORIDE SERPL-SCNC: 107 MMOL/L (ref 97–108)
CHOLEST SERPL-MCNC: 169 MG/DL
CO2 SERPL-SCNC: 27 MMOL/L (ref 21–32)
CREAT SERPL-MCNC: 0.84 MG/DL (ref 0.55–1.02)
CREAT UR-MCNC: 62.5 MG/DL
EST. AVERAGE GLUCOSE BLD GHB EST-MCNC: 128 MG/DL
GLUCOSE SERPL-MCNC: 146 MG/DL (ref 65–100)
HBA1C MFR BLD: 6.1 % (ref 4–5.6)
HDLC SERPL-MCNC: 51 MG/DL
HDLC SERPL: 3.3 {RATIO} (ref 0–5)
LDLC SERPL CALC-MCNC: 72.6 MG/DL (ref 0–100)
LIPID PROFILE,FLP: ABNORMAL
MICROALBUMIN UR-MCNC: 2.69 MG/DL
MICROALBUMIN/CREAT UR-RTO: 43 MG/G (ref 0–30)
POTASSIUM SERPL-SCNC: 4.7 MMOL/L (ref 3.5–5.1)
SODIUM SERPL-SCNC: 142 MMOL/L (ref 136–145)
TRIGL SERPL-MCNC: 227 MG/DL (ref ?–150)
VIT B12 SERPL-MCNC: 254 PG/ML (ref 193–986)
VLDLC SERPL CALC-MCNC: 45.4 MG/DL

## 2021-05-13 ENCOUNTER — VIRTUAL VISIT (OUTPATIENT)
Dept: RHEUMATOLOGY | Age: 81
End: 2021-05-13
Payer: MEDICARE

## 2021-05-13 ENCOUNTER — DOCUMENTATION ONLY (OUTPATIENT)
Dept: RHEUMATOLOGY | Age: 81
End: 2021-05-13

## 2021-05-13 DIAGNOSIS — M06.09 SERONEGATIVE RHEUMATOID ARTHRITIS OF MULTIPLE SITES (HCC): Primary | ICD-10-CM

## 2021-05-13 DIAGNOSIS — E55.9 VITAMIN D DEFICIENCY: ICD-10-CM

## 2021-05-13 DIAGNOSIS — Z79.60 LONG-TERM USE OF IMMUNOSUPPRESSANT MEDICATION: ICD-10-CM

## 2021-05-13 DIAGNOSIS — M81.0 AGE-RELATED OSTEOPOROSIS WITHOUT CURRENT PATHOLOGICAL FRACTURE: ICD-10-CM

## 2021-05-13 DIAGNOSIS — N18.2 CKD (CHRONIC KIDNEY DISEASE) STAGE 2, GFR 60-89 ML/MIN: ICD-10-CM

## 2021-05-13 DIAGNOSIS — I73.00 RAYNAUD'S DISEASE WITHOUT GANGRENE: ICD-10-CM

## 2021-05-13 PROCEDURE — G8417 CALC BMI ABV UP PARAM F/U: HCPCS | Performed by: INTERNAL MEDICINE

## 2021-05-13 PROCEDURE — G8536 NO DOC ELDER MAL SCRN: HCPCS | Performed by: INTERNAL MEDICINE

## 2021-05-13 PROCEDURE — G8427 DOCREV CUR MEDS BY ELIG CLIN: HCPCS | Performed by: INTERNAL MEDICINE

## 2021-05-13 PROCEDURE — 1101F PT FALLS ASSESS-DOCD LE1/YR: CPT | Performed by: INTERNAL MEDICINE

## 2021-05-13 PROCEDURE — G8756 NO BP MEASURE DOC: HCPCS | Performed by: INTERNAL MEDICINE

## 2021-05-13 PROCEDURE — G0463 HOSPITAL OUTPT CLINIC VISIT: HCPCS | Performed by: INTERNAL MEDICINE

## 2021-05-13 PROCEDURE — 99214 OFFICE O/P EST MOD 30 MIN: CPT | Performed by: INTERNAL MEDICINE

## 2021-05-13 PROCEDURE — 1090F PRES/ABSN URINE INCON ASSESS: CPT | Performed by: INTERNAL MEDICINE

## 2021-05-13 PROCEDURE — G8432 DEP SCR NOT DOC, RNG: HCPCS | Performed by: INTERNAL MEDICINE

## 2021-05-13 RX ORDER — AMLODIPINE BESYLATE 10 MG/1
10 TABLET ORAL DAILY
Qty: 90 TAB | Refills: 5 | Status: SHIPPED | OUTPATIENT
Start: 2021-05-13 | End: 2021-07-28 | Stop reason: SDUPTHER

## 2021-05-13 RX ORDER — LEFLUNOMIDE 20 MG/1
20 TABLET ORAL DAILY
Qty: 90 TAB | Refills: 1 | Status: SHIPPED | OUTPATIENT
Start: 2021-05-13 | End: 2021-06-28

## 2021-05-13 NOTE — PROGRESS NOTES
REASON FOR VISIT    This is a follow-up visit for Ms. Rueda for     ICD-10-CM   1. Seronegative rheumatoid arthritis of multiple sites ([facility] M06.09   2.  Age-related osteoporosis without current pathological fracture M81.0      Inflammatory arthritis phenotype includes:  Anti-CCP positive: no  Rheumatoid factor positive: no  Erosive disease: yes  Extra-articular manifestations include: Raynaud's    Immunosuppression Screening (9/10/2020):  Quantiferon TB: negative   PPD:  Not performed  Hepatitis B: negative    Hepatitis C: negative     Therapy History includes:  Current DMARD therapy include: leflunomide 20 mg daily (2/13/2018 to 10/2019; 2/12/2020 to present), Simponi Aria every 6 weeks (12/07/2018 to present)  Prior DMARD therapy include: methotrexate 12.5 mg subcutaneous,  hydroxychloroquine 300 mg daily, Humira (5/10/2018: 3 samples: cost)  Discontinued DMARDs because of inefficacy: Simponi Aria  Discontinued DMARDs because of side effects: methotrexate (elevated LFTs), leflunomide (elevated LFTs)  Contra-Indicated DMARDs because of chronic kidney disease: methotrexate     Osteoporosis Historical Synopsis    Height loss since age 26 (at least two inches): 5  Fracture history includes: yes (T5-T6)  Family history of hip fracture: no  Fall Risk: no    Daily calcium intake is 1800 mg  Daily vitamin D intake is 2000 IU    Smoking history: no  Alcohol consumption: no  Prednisone history: no    Exercise: no    Previous work-up for osteoporosis includes the following:  DEXA Scan: 8/16/2019  Vitamin 25OH D level: 46.6 (21/3/2018)  PTH: 21 (21/3/2018)  TSH: 2.570 (21/3/2018)    Therapy History includes:  Current osteoporosis therapy includes: Prolia 60 mg every 6 months (3/29/2018 to present)  Prior osteoporosis therapy includes: Reclast (2 years)  The following osteoporosis therapy have been ineffective: Reclast  The following osteoporosis therapy were stopped because of side effects: none    HISTORY OF PRESENT ILLNESS    Ms. Maria Ines Valle returns for a follow-up. On her last visit, I continued leflunomide 20 mg daily and changed Simponi Aria infusions every 6 weeks to Xeljanz 11 mg XR due to breakthrough. She reports that she was not approved for [place]oms, and that she had continued [place]. She was approved but her copay [phone]. she did not apply for XelSor[employer]. Her most recent infusions was 4/61/2021 week which she has taken with good tolerance. Her most recent Prolia was on 3/05/2021 with good tolerance. I also increased her amlodipine to 10 mg for Raynaud's which helped. Today, she feels good. She has noticed that the cold weather is causing her more pain. She has pain in her left 3rd MCP with clicking when she bends her finger. She feels swelling in her hands in the morning (left more than right). She has intermittent pain as well. She does not feel Simponi is helping now that it is cold. Her Raynaud has been very active with the cold. She is on amlodipine 5 mg daily. She endorses easy bruising. She denies fever, weight loss, blurred vision, vision loss, oral ulcers, ankle swelling, dry cough, dyspnea, nausea, vomiting, dysphagia, abdominal pain, black or bloody stool, fall since last visit, rash and increased thirst.    Most recent toxicity monitoring by blood work was done on 4/16/2021 and did not reveal any significant adverse effects, except AST 42    Most recent inflammatory markers from 4/16/2021 revealed a ESR 11 mm/hr and CRP <0.29 mg/L. I reviewed the patient's interval medical history, surgical history, medications, and allergies. The patient has not had any interval hospital admissions, infections or surgeries. REVIEW OF SYSTEMS    A comprehensive review of systems was performed and pertinent results are documented in the HPI, review of systems is otherwise non-contributory.     PAST MEDICAL HISTORY    She has a past medical history of Anemia, Arthritis, CAD (coronary artery disease) (1997), Degenerative joint disease of right hip (09/14/2015 ), Diabetes ([facility] Fracture, Gastritis, H/O Bell's palsy (7/9/2012), Hypercholesterolemia, Lupus ([facility] (1/9/2015), Osteoporosis, post-menopausal, Squamous cell cancer of skin of left cheek, and Squamous cell skin cancer. FAMILY HISTORY    Her family history includes Arthritis-rheumatoid in her sister, sister and another family member; Diabetes in her father, mother, and sister; Elevated Lipids in her sister; Heart Disease in her father and mother; Hypertension in her father, mother, and sister; Thyroid Disease in her sister. SOCIAL HISTORY    She reports that she quit smoking about 31 years ago. She has never used smokeless tobacco. She reports that she does not drink alcohol or use drugs. MEDICATIONS    Current Outpatient Medications   Medication Sig    leflunomide (ARAVA) 20 mg tablet Take 1 Tab by mouth daily.  amLODIPine (NORVASC) 10 mg tablet Take 1 Tab by mouth daily.  trimethoprim (TRIMPEX) 100 mg tablet TAKE 1 TABLET BY MOUTH DAILY FOR URINARY TRACT INFECTION PREVENTION    HYDROcodone-acetaminophen (NORCO) 5-325 mg per tablet Take 1 Tab by mouth every six (6) hours as needed for Pain for up to 90 days.  atorvastatin (LIPITOR) 40 mg tablet TAKE 1 TABLET BY MOUTH DAILY    gabapentin (NEURONTIN) 600 mg tablet Take 1 Tab by mouth four (4) times daily. Max Daily Amount: 2,400 mg.  cyclobenzaprine (FLEXERIL) 10 mg tablet Take 1 Tab by mouth nightly.     pantoprazole (PROTONIX) 40 mg tablet TAKE 1 TABLET BY MOUTH DAILY    nortriptyline (PAMELOR) 10 mg capsule TAKE 1 CAPSULE BY MOUTH EVERY NIGHT    glipiZIDE SR (GLUCOTROL XL) 2.5 mg CR tablet TAKE 1 TABLET BY MOUTH TWICE DAILY    nitroglycerin (NITROSTAT) 0.4 mg SL tablet DISSOLVE 1 TABLET UNDER THE TONGUE EVERY 5 MINUTES AS NEEDED FOR CHEST PAIN    glucose blood VI test strips (Ascensia CONTOUR) strip Glucometer for 250.00 Pt. test blood sugar once daily (in morning)    metoprolol succinate (TOPROL-XL) 25 mg XL tablet TAKE 1 TABLET BY MOUTH EVERY DAY    denosumab (PROLIA) 60 mg/mL injection 1 mL by SubCUTAneous route every 6 months.  acetaminophen (TYLENOL) 650 mg CR tablet Take 1,300 mg by mouth daily as needed for Pain.  Blood-Glucose Meter (CONTOUR METER) monitoring kit Dx: 250. Check blood sugars daily.  ZINC PO Take 50 mg by mouth daily.  cholecalciferol, vitamin d3, (VITAMIN D3) 1,000 unit tablet Take 2,000 Units by mouth daily.  calcium-vitamin D (CALCIUM 500+D) 500 mg(1,250mg) -200 unit per tablet Take 2 Tabs by mouth daily.  aspirin 81 mg Tab Take 81 mg by mouth daily.  MULTIVITAMINS (MULTIVITAMIN PO) Take 1 Tab by mouth daily.  metFORMIN (GLUCOPHAGE) 500 mg tablet TAKE 1 TABLET BY MOUTH TWICE DAILY WITH MEALS    tofacitinib 11 mg Tb24 Take 11 mg by mouth daily. Indications: rheumatoid arthritis     No current facility-administered medications for this visit. ALLERGIES    Allergies   Allergen Reactions    Cephalosporins Nausea and Vomiting     Severe vomiting  Other reaction(s): gi upset, unspecified       PHYSICAL EXAMINATION    There were no vitals taken for this visit. There is no height or weight on file to calculate BMI. General: Patient is alert, oriented x 3, not in acute distress, daughter at bedside    HEENT:   Sclerae are not injected and appear moist.  There is no alopecia. Cardiovascular:  Heart is regular rate and rhythm, no murmurs. Chest:  Lungs are clear to auscultation bilaterally. No rhonchi, wheezes, or crackles. Extremities:  Free of clubbing, cyanosis    Neurological exam:  Muscle strength is full in upper and lower extremities     Musculoskeletal:  A comprehensive musculoskeletal exam was NOT performed for all joints of each upper and lower extremity and assessed for swelling, tenderness and range of motion.       Previous Exam    Bilateral Sharla and [place]ber[place] nods  Bilateral knee crepitus without effusion    Joint Count 8/12/2020 2/12/2020 8/7/2019 2/20/2019 11/20/2018 8/20/2018 5/10/2018   Patient pain (0-100) - 50 50 50 80 80 25   MHAQ - 0.375 0.125 0.125 0 0.125 0.25   Left wrist- Tender - - - - 1 1 1   Left wrist- Swollen - - - - 1 1 1   Left 1st MCP - Tender - - - 1 1 1 -   Left 1st MCP - Swollen - - - 1 1 1 -   Left 2nd MCP - Tender - 1 - 0 0 1 1   Left 2nd MCP - Swollen - 1 - 1 1 1 1   Left 3rd MCP - Tender - - 1 1 1 1 1   Left 3rd MCP - Swollen - - 0 1 1 1 1   Left 4th MCP - Tender - - - 1 1 1 1   Left 4th MCP - Swollen - - - 0 0 1 -   Left 5th MCP - Tender - - 1 1 0 - -   Left 5th MCP - Swollen - - 0 1 1 - 1   Left thumb IP - Tender - - - - - 1 1   Left 2nd PIP - Tender - 1 - 1 1 1 1   Left 2nd PIP - Swollen - 1 - 1 1 1 -   Left 3rd PIP - Tender - 1 1 1 1 1 1   Left 3rd PIP - Swollen - 1 0 1 1 1 1   Left 4th PIP - Tender 1 1 1 1 1 1 1   Left 4th PIP - Swollen 1 1 0 1 1 1 -   Left 5th PIP - Tender - 1 1 1 1 1 1   Left 5th PIP - Swollen - 1 0 1 1 1 -   Right wrist- Tender - - - - - - -   Right wrist- Swollen - - - - - - -   Right 1st MCP - Tender - - - 1 1 - -   Right 1st MCP - Swollen - - - 1 1 - -   Right 2nd MCP - Tender - - - 1 1 1 1   Right 2nd MCP - Swollen - - - 1 1 1 1   Right 3rd MCP - Tender - - - 1 0 1 1   Right 3rd MCP - Swollen - - - 1 1 1 1   Right 4th MCP - Tender - - - 0 1 1 -   Right 4th MCP - Swollen - - - 1 1 1 -   Right 5th MCP - Tender - - - 0 - 1 -   Right 5th MCP - Swollen - - - 1 - - 1   Right 2nd PIP - Tender - - 1 1 1 1 1   Right 2nd PIP - Swollen - - 0 1 1 1 -   Right 3rd PIP - Tender 1 1 1 1 1 1 1   Right 3rd PIP - Swollen 0 1 0 0 1 - -   Right 4th PIP - Tender - 1 - - 1 - 1   Right 4th PIP - Swollen - 0 - - 1 - -   Right 5th PIP - Tender - 1 1 0 1 - 1   Right 5th PIP - Swollen - 0 0 1 1 - -   Tender Joint Count (Total) 2 8 8 13 15 16 15   Swollen Joint Count (Total) 1 6 0 15 17 13 8   Physician Assessment (0-10) - 2 2 4 4 4 3   Patient Assessment (0-10) - 5 5 2.5 8 5 5 CDAI Total (calculated) - 21 15 34.5 44 38 31       DATA REVIEW    Laboratory     Recent laboratory results were reviewed, summarized, and discussed with the patient. Imaging    Musculoskeletal Ultrasound    None    Radiographs    Bilateral Hand 2/13/2018: RIGHT: severe osteopenia without fracture. There are scattered degenerative changes. Progressive first Aia 16 joint. No new erosive changes. Vascular calcifications. LEFT: severe osteopenia. Scattered degenerative changes as noted previously with progression of the index finger and middle finger DIP joints. There is a new erosion at the index finger proximal phalanx ulnar base without adjacent joint space loss. There are vascular calcifications. Calcification overlying the DRUJ is favored represent overlying vascular calcifications.      Bilateral Foot 2/13/2018: RIGHT: no acute fracture or dislocation. Alignment is anatomic. Mild degenerative changes are seen in the left first MTP joint. Minimal degenerative changes are seen in the right first MTP joint. A possible erosion is seen in the head of the left first metatarsal. No erosive changes are seen in the right foot. Plantar calcaneal heel spurs are noted in both feet, as well as enthesophyte formation at the Achilles insertion site. Arterial vascular calcifications are also noted bilaterally. Mild soft tissue swelling surrounds the left first MTP joint. LEFT: no acute fracture or dislocation. Alignment is anatomic. Mild degenerative changes are seen in the left first MTP joint. Minimal degenerative changes are seen in the right first MTP joint. A possible erosion is seen in the head of the left first metatarsal. No erosive changes are seen in the right foot. Plantar calcaneal heel spurs are noted in both feet, as well as enthesophyte formation at the Achilles insertion site. Arterial vascular calcifications are also noted bilaterally. Mild soft tissue swelling surrounds the left first MTP joint.     Thoracic Spine 11/03/2016: The posterior battery pack is again seen within the intrathecal catheter in stable position. Kyphoplasty material is present in a midthoracic vertebral body is stable mild chronic compression of the adjacent inferior vertebral body. The remaining vertebral body heights are maintained. Anterior osteophytes are noted. There is no abnormality in alignment. Lumbar Spine 10/19/2016: significant disc space narrowing at L3-L4 with 14 mm anterolisthesis. There is facet arthropathy. The vertebral body heights are maintained. Spinal catheters are seen. There are atherosclerotic vascular calcifications. Bilateral Hips 8/31/2015: no fracture, dislocation or other acute abnormality. There osteoarthritic pattern change of both hips, more severe on the right. There is nonuniform joint space narrowing and marginal osteophytes. There is remodeling of the right femur head with subchondral cystic formation. Mild sacroiliac osteoarthritic change. Lower lumbar spondylosis is noted. Wire  leads are seen in the left lower lumbar spine likely reflective of spinal stimulator device. Bilateral Hand 3/01/2011: RIGHT: mild osteopenia. There are degenerative changes of the distal and proximal interphalangeal joints and the base of the thumb. There is no fracture or other acute abnormality. Scattered calcifications are noted in the radial and ulnar arteries. LEFT: mild osteopenia. There are degenerative changes of the distal and proximal interphalangeal joints and  at the base of the thumb. There is no fracture or acute abnormality. There are vascular calcifications of the radial and ulnar arteries. CT Imaging    None    MR Imaging    MRI Brain with without contrast 12/19/2016: There is sulcal and ventricular prominence. Confluent periventricular and scattered foci of increased T2 signal intensity in the corona radiata and centrum semiovale. Tiny remote lacunar infarction in the right cerebellum.  There is no intracranial mass, hemorrhage or evidence of acute infarction. There is no Chiari or syrinx. The pituitary and infundibulum are grossly unremarkable. There is no skull base mass. Cerebellopontine angles are grossly unremarkable. The major intracranial vascular flow-voids are unremarkable. No evidence of demyelinating process. There is no abnormal parenchymal enhancement. There is no abnormal meningeal enhancement. The cavernous sinuses are symmetric. Optic chiasm and infundibulum grossly unremarkable. Orbits are grossly symmetric. Dural venous sinuses are grossly patent. The brain architecture is normal. There is no evidence of midline shift or mass-effect. There are no extra-axial fluid collections. The mastoid air cells and paranasal sinuses are well pneumatized and clear. MRI Lumbar Spine with and without contrast 8/07/2013: There is transitional lumbosacral anatomy. In keeping with prior studies, lowest fully formed disc is labeled L5-S1. There has been prior  laminectomies at L3-5 with no change in grade 1 anterolisthesis at L3-4 which measures 5 mm. There is grade 1 anterolisthesis of L4-5 of approximately 2 mm, unchanged. Vertebral body heights are maintained. There is no marrow edema or compression fracture. There are reactive endplate changes at O0-6.  The conus medullaris terminates at L1-2. There is no abnormal intraspinal enhancement. L1/2:  The spinal canal and foramina are widely patent. L2/3:  Diffuse disc bulge, facet hypertrophy and ligamentum flavum thickening cause mild spinal canal and moderate right foraminal stenosis. Left foramen is patent. L3/4: There is uncovering of disc material with facet hypertrophy resulting in mild thecal sac narrowing. There is severe bilateral foraminal stenosis similar to the prior study. L4/5: Verma Hoop is diffuse disc bulge and facet hypertrophy, without significant spinal canal stenosis. There is mild right foraminal stenosis.  L5/S1:  The spinal canal and foramina are widely patent. DXA     DXA 8/16/2019: (excluded Possible increased density in the L2 and T6 vertebral bodies) left femoral neck T score: -1.1 (0.881 g/cm2), left total hip T score: -0.9 (0.889 g/cm2), right femoral neck T score: -0.7 (0.945 g/cm2), right total hip T score: -0.5 (0.941 g/cm2), and distal one third left radius T score -2.4 (BMD 0.542 g/cm2). FRAX score 21 % probability in 10 years for major osteoporotic fracture and 39 % 10 year probability of hip fracture. DXA 8/08/2017: (excluded Lumbar spine because of overlying wires and degenerative change and kyphoplasty of L4) showed left femoral neck T score: -1.4 (0.837 g/cm2), left total hip T score: -1.4 (0.830 g/cm2), right femoral neck T score: -0.9 (0.919 g/cm2), right total hip T score: -0.9 (0.900 g/cm2), and distal one third left radius T score -3.0 (BMD 0.615 g/cm2). FRAX score 16.8 % probability in 10 years for major osteoporotic fracture and 3.2 % 10 year probability of hip fracture. Nuclear Medicine    Nuclear Medicine Bone Scan 1/22/2013: There is intense radiotracer uptake in the upper thoracic spine, likely T5. There is uptake in the region of the sternoclavicular joints and left shoulder, likely degenerative. ASSESSMENT AND PLAN    This is a follow-up visit for Ms. Mueller Marli. 1) Seronegative Erosive Rheumatoid Arthritis. She is maintained on leflunomide 20 mg daily and Simponi Aria every 6 weeks. She has not started Xeljanz 11 mg XR daily due to being told she was denied, although she was approved but had high copay. We gave her [employer] forms today to submit. Her CDAI was N/A (previously 21, 15, 34.5, 44, 38, 31, 24.5) with 2 tender and 1 swollen joints, consistent with moderate disease activity. She denies breakthrough with Simponi but knows that it is working. 2) Long Term Use of Immunosuppressants.  The patient remains on high risk immunomodulatory medications (leflunomide, Simponi Aria) and requires frequent toxicity monitoring by blood work to evaluate for toxicities. Respective labs were ordered (see below orders for details). 3) Age-Related Osteoporosis. (multiple vertebral fractures) Her most recent DXA on  8/16/2019 showed distal one third left radius T score -2.4 (BMD 0.542 g/cm2). FRAX score 21 % probability in 10 years for major osteoporotic fracture and 39 % 10 year probability of hip fracture. She is maintained on Prolia with good tolerance. Her most recent dose was 3/05/2021 . I will continue treatment. 4) Primary Raynauds phenomenon. She is on amlodipine 10 mg daily with benefit. 5) Chronic Kidney Disease Stage 2-3. I will continue to monitor. 6) Dependent Edema. This is due to varicosities and dependent drainage. 7) Stenosing Tenosynovitis of Left 3rd Digit. I recommended a steroid injection to be done at her orthopedic or by me. The patient voiced understanding of the aforementioned assessment and plan. A total of 35 minutes was spent on this visit, reviewing interval notes, interval testing results, ordering tests, refilling medications, documenting the findings in the note, patient education, counseling, and coordination of care as described above. All questions asked and answered.     TODAY'S ORDERS    Orders Placed This Encounter    leflunomide (ARAVA) 20 mg tablet    amLODIPine (NORVASC) 10 mg tablet     Future Appointments   Date Time Provider Yocasta Cisneros   5/28/2021 10:30 AM SS INF3 CH4 <2H [facility]   7/9/2021 10:30 AM SS INF3 CH4 [facility]2H [facility]   8/20/2021 10:30 AM SS INF3 CH4 <2H [facility]   8/31/2021  2:00 PM Tigist Agudelo MD [facility] BS AMB   10/1/2021 10:30 AM SS INF3 CH4 <2H [facility]   11/11/2021 11:00 AM Danish Tan MD [facility] BS AMB   11/12/2021 10:30 AM SS INF3 CH4 <2H [facility] Lorie Mora MD, [facility]    Adult Rheumatology   Rheumatology Ultrasound Certified  [facility]  A Part of [facility], [address] [place] [address] Road   Phone [phone]  Fax [phone]

## 2021-05-13 NOTE — PROGRESS NOTES
[employer] forms given to patient in the office. Pt will take home and fill out and return to office to send into company. Physician portion already faxed in.

## 2021-05-19 ENCOUNTER — TRANSCRIBE ORDER (OUTPATIENT)
Dept: SCHEDULING | Age: 81
End: 2021-05-19

## 2021-05-19 DIAGNOSIS — I43 NUTRITIONAL AND METABOLIC CARDIOMYOPATHY (HCC): ICD-10-CM

## 2021-05-19 DIAGNOSIS — E63.9 NUTRITIONAL AND METABOLIC CARDIOMYOPATHY (HCC): ICD-10-CM

## 2021-05-19 DIAGNOSIS — E85.4 RESTRICTIVE CARDIOMYOPATHY SECONDARY TO AMYLOIDOSIS (HCC): ICD-10-CM

## 2021-05-19 DIAGNOSIS — E88.9 NUTRITIONAL AND METABOLIC CARDIOMYOPATHY (HCC): ICD-10-CM

## 2021-05-19 DIAGNOSIS — I25.10 CORONARY ATHEROSCLEROSIS OF NATIVE CORONARY ARTERY: Primary | ICD-10-CM

## 2021-05-19 DIAGNOSIS — I43 RESTRICTIVE CARDIOMYOPATHY SECONDARY TO AMYLOIDOSIS (HCC): ICD-10-CM

## 2021-05-23 DIAGNOSIS — M06.09 SERONEGATIVE RHEUMATOID ARTHRITIS OF MULTIPLE SITES (HCC): ICD-10-CM

## 2021-05-28 ENCOUNTER — HOSPITAL ENCOUNTER (OUTPATIENT)
Dept: INFUSION THERAPY | Age: 81
Discharge: HOME | End: 2021-05-28
Payer: MEDICARE

## 2021-05-28 VITALS
WEIGHT: 185.2 LBS | HEIGHT: 61 IN | SYSTOLIC BLOOD PRESSURE: 116 MMHG | RESPIRATION RATE: 16 BRPM | DIASTOLIC BLOOD PRESSURE: 58 MMHG | TEMPERATURE: 97.6 F | BODY MASS INDEX: 34.97 KG/M2 | HEART RATE: 90 BPM

## 2021-05-28 DIAGNOSIS — M06.09 SERONEGATIVE RHEUMATOID ARTHRITIS OF MULTIPLE SITES (HCC): Primary | ICD-10-CM

## 2021-05-28 PROCEDURE — 74011000258 HC RX REV CODE- 258: Performed by: INTERNAL MEDICINE

## 2021-05-28 PROCEDURE — 96413 CHEMO IV INFUSION 1 HR: CPT

## 2021-05-28 PROCEDURE — 74011250636 HC RX REV CODE- 250/636: Performed by: INTERNAL MEDICINE

## 2021-05-28 RX ORDER — ACETAMINOPHEN 325 MG/1
650 TABLET ORAL AS NEEDED
Status: CANCELLED
Start: 2021-07-04

## 2021-05-28 RX ORDER — SODIUM CHLORIDE 9 MG/ML
10 INJECTION INTRAMUSCULAR; INTRAVENOUS; SUBCUTANEOUS AS NEEDED
Status: CANCELLED | OUTPATIENT
Start: 2021-07-04

## 2021-05-28 RX ORDER — HYDROCORTISONE SODIUM SUCCINATE 100 MG/2ML
100 INJECTION, POWDER, FOR SOLUTION INTRAMUSCULAR; INTRAVENOUS AS NEEDED
Status: CANCELLED | OUTPATIENT
Start: 2021-07-04

## 2021-05-28 RX ORDER — EPINEPHRINE 1 MG/ML
0.3 INJECTION, SOLUTION, CONCENTRATE INTRAVENOUS AS NEEDED
Status: CANCELLED | OUTPATIENT
Start: 2021-07-04

## 2021-05-28 RX ORDER — ONDANSETRON 2 MG/ML
8 INJECTION INTRAMUSCULAR; INTRAVENOUS AS NEEDED
Status: CANCELLED | OUTPATIENT
Start: 2021-07-04

## 2021-05-28 RX ORDER — HEPARIN 100 UNIT/ML
300-500 SYRINGE INTRAVENOUS AS NEEDED
Status: CANCELLED
Start: 2021-07-04

## 2021-05-28 RX ORDER — SODIUM CHLORIDE 9 MG/ML
25 INJECTION, SOLUTION INTRAVENOUS CONTINUOUS
Status: CANCELLED | OUTPATIENT
Start: 2021-07-04

## 2021-05-28 RX ORDER — SODIUM CHLORIDE 0.9 % (FLUSH) 0.9 %
10 SYRINGE (ML) INJECTION AS NEEDED
Status: CANCELLED | OUTPATIENT
Start: 2021-07-04

## 2021-05-28 RX ORDER — ALBUTEROL SULFATE 0.83 MG/ML
2.5 SOLUTION RESPIRATORY (INHALATION) AS NEEDED
Status: CANCELLED
Start: 2021-07-04

## 2021-05-28 RX ORDER — SODIUM CHLORIDE 9 MG/ML
25 INJECTION, SOLUTION INTRAVENOUS CONTINUOUS
Status: DISPENSED | OUTPATIENT
Start: 2021-05-28 | End: 2021-05-28

## 2021-05-28 RX ORDER — DIPHENHYDRAMINE HYDROCHLORIDE 50 MG/ML
50 INJECTION, SOLUTION INTRAMUSCULAR; INTRAVENOUS AS NEEDED
Status: CANCELLED
Start: 2021-07-04

## 2021-05-28 RX ORDER — DIPHENHYDRAMINE HYDROCHLORIDE 50 MG/ML
25 INJECTION, SOLUTION INTRAMUSCULAR; INTRAVENOUS AS NEEDED
Status: CANCELLED
Start: 2021-07-04

## 2021-05-28 RX ADMIN — GOLIMUMAB 175 MG: 50 SOLUTION INTRAVENOUS at 11:31

## 2021-05-28 RX ADMIN — SODIUM CHLORIDE 25 ML/HR: 900 INJECTION, SOLUTION INTRAVENOUS at 11:29

## 2021-05-28 NOTE — PROGRESS NOTES
Outpatient Infusion Center Progress Note     Pt admit to [facility] for simponi infusion ambulatory in stable condition. Assessment completed. No new concerns voiced. 24 PIV inserted into L upper forearm without difficulty, no labs ordered with this infusion    Visit Vitals  BP (!) 116/58   Pulse 90   Temp 97.6 °F (36.4 °C)   Resp 16   Ht 5' 1\" (1.549 m)   Wt 84 kg (185 lb 3.2 oz)   Breastfeeding No   BMI 34.99 kg/m²       Medications:  Medications Administered     0.9% sodium chloride infusion     Admin Date  05/28/2021 Action  New Bag Dose  25 mL/hr Rate  25 mL/hr Route  IntraVENous Administered By  Cory Nguyen, VIVIANA          golimumab (SIMPONI ARIA) 175 mg in 0.9% sodium chloride, overfill volume 124 mL infusion     Admin Date  05/28/2021 Action  New Bag Dose  175 mg Rate  248 mL/hr Route  IntraVENous Administered By  Cory Nguyen, RN                  Pt tolerated treatment well. PIV flushed and discontinued per protocol. D/c home ambulatory in no distress. Pt aware of next appointment scheduled for 07/09.

## 2021-06-02 DIAGNOSIS — M62.838 MUSCLE SPASM: ICD-10-CM

## 2021-06-04 RX ORDER — CYCLOBENZAPRINE HCL 10 MG
10 TABLET ORAL
Qty: 90 TABLET | Refills: 1 | Status: SHIPPED | OUTPATIENT
Start: 2021-06-04 | End: 2021-11-29 | Stop reason: SDUPTHER

## 2021-06-10 ENCOUNTER — DOCUMENTATION ONLY (OUTPATIENT)
Dept: RHEUMATOLOGY | Age: 81
End: 2021-06-10

## 2021-06-26 DIAGNOSIS — N18.2 CKD (CHRONIC KIDNEY DISEASE) STAGE 2, GFR 60-89 ML/MIN: ICD-10-CM

## 2021-06-26 DIAGNOSIS — E55.9 VITAMIN D DEFICIENCY: ICD-10-CM

## 2021-06-26 DIAGNOSIS — M81.0 AGE-RELATED OSTEOPOROSIS WITHOUT CURRENT PATHOLOGICAL FRACTURE: ICD-10-CM

## 2021-06-28 RX ORDER — LEFLUNOMIDE 20 MG/1
TABLET ORAL
Qty: 90 TABLET | Refills: 1 | Status: SHIPPED | OUTPATIENT
Start: 2021-06-28 | End: 2021-08-02

## 2021-07-01 RX ORDER — GLIPIZIDE 2.5 MG/1
TABLET, EXTENDED RELEASE ORAL
Qty: 180 TABLET | Refills: 1 | Status: SHIPPED | OUTPATIENT
Start: 2021-07-01 | End: 2021-07-04 | Stop reason: SDUPTHER

## 2021-07-04 DIAGNOSIS — M79.18 MYOFASCIAL MUSCLE PAIN: ICD-10-CM

## 2021-07-04 DIAGNOSIS — M51.36 DEGENERATIVE LUMBAR DISC: ICD-10-CM

## 2021-07-04 DIAGNOSIS — M12.9 ARTHROPATHY: ICD-10-CM

## 2021-07-04 DIAGNOSIS — M06.09 SERONEGATIVE RHEUMATOID ARTHRITIS OF MULTIPLE SITES (HCC): ICD-10-CM

## 2021-07-04 DIAGNOSIS — E11.42 DIABETIC POLYNEUROPATHY ASSOCIATED WITH TYPE 2 DIABETES MELLITUS (HCC): ICD-10-CM

## 2021-07-06 DIAGNOSIS — D51.0 PERNICIOUS ANEMIA: ICD-10-CM

## 2021-07-06 RX ORDER — GABAPENTIN 600 MG/1
600 TABLET ORAL 4 TIMES DAILY
Qty: 120 TABLET | Refills: 1 | Status: SHIPPED | OUTPATIENT
Start: 2021-07-06 | End: 2021-08-05 | Stop reason: SDUPTHER

## 2021-07-06 RX ORDER — GLIPIZIDE 2.5 MG/1
TABLET, EXTENDED RELEASE ORAL
Qty: 180 TABLET | Refills: 1 | Status: SHIPPED | OUTPATIENT
Start: 2021-07-06 | End: 2021-11-23 | Stop reason: ALTCHOICE

## 2021-07-06 RX ORDER — HYDROCODONE BITARTRATE AND ACETAMINOPHEN 5; 325 MG/1; MG/1
1 TABLET ORAL
Qty: 60 TABLET | Refills: 0 | Status: SHIPPED | OUTPATIENT
Start: 2021-07-06 | End: 2021-10-12 | Stop reason: SDUPTHER

## 2021-07-06 NOTE — TELEPHONE ENCOUNTER
Rx request for gabapentin and hydrocodone. Pt takes hydrocodone sparingly for breakthrough pain from her arthritis and has tolerated this for many years without side effects. Last fill 3 months ago for 15 day supply.  down this morning but has always been appropriate in the past. Will refill and plan to get UDS at next visit.

## 2021-07-12 ENCOUNTER — TELEPHONE (OUTPATIENT)
Dept: RHEUMATOLOGY | Age: 81
End: 2021-07-12

## 2021-07-12 NOTE — TELEPHONE ENCOUNTER
Left message for pt stating that she needs to call [facility] to complete the welcome call and set up her first shipment of the medication. I have faxed all the information from the office that is needed, but they will not do anything else until they speak with the pt. I left a message and provided the phone number and I sent a message to her daughter and left the number with her and asked her to have her mother call them please. We do not want her back on infusions and want her on the [place].

## 2021-07-12 NOTE — TELEPHONE ENCOUNTER
Left message for pt asking her to call [employer] to complete the welcome call and go over some information so that they can send out the [place] to her. I asked her to call [phone]. Everything else has been completed and faxed from our end.

## 2021-07-12 NOTE — TELEPHONE ENCOUNTER
----- Message from Mandi Kathleen sent at 7/12/2021  1:40 PM EDT -----  Regarding: Dr Josh Garnica first and last name:      Reason for call: pt said the infusion ctr just called and Dr Perez Roldan wanted to cancel her infusion because  Dr Perez Roldan  wanted to put her on oral meds,     Pt said she can not afford taking the oral meds  because it is $1,600 a month and can not get any assistance with the cost of the oral medication   Pt said she due for her next infusion on this coming Thursday 7/15/2021 and will need new orders in place for that visit      Callback required yes/no and why:yes for reason given above       Best contact number(s):[phone]      Details to clarify the request:      Mandi Kathleen

## 2021-07-13 RX ORDER — LANOLIN ALCOHOL/MO/W.PET/CERES
CREAM (GRAM) TOPICAL
Qty: 60 TABLET | Refills: 5 | Status: SHIPPED | OUTPATIENT
Start: 2021-07-13 | End: 2021-07-30

## 2021-07-15 ENCOUNTER — HOSPITAL ENCOUNTER (OUTPATIENT)
Dept: INFUSION THERAPY | Age: 81
Discharge: HOME | End: 2021-07-15

## 2021-07-20 ENCOUNTER — DOCUMENTATION ONLY (OUTPATIENT)
Dept: RHEUMATOLOGY | Age: 81
End: 2021-07-20

## 2021-07-20 NOTE — PROGRESS NOTES
Appro[facility] patient assistance [facility] for [facility] thru 12/31/21.

## 2021-07-24 DIAGNOSIS — K21.9 GASTROESOPHAGEAL REFLUX DISEASE, UNSPECIFIED WHETHER ESOPHAGITIS PRESENT: ICD-10-CM

## 2021-07-26 RX ORDER — PANTOPRAZOLE SODIUM 40 MG/1
TABLET, DELAYED RELEASE ORAL
Qty: 90 TABLET | Refills: 1 | Status: SHIPPED | OUTPATIENT
Start: 2021-07-26 | End: 2021-11-01

## 2021-07-28 DIAGNOSIS — D51.0 PERNICIOUS ANEMIA: ICD-10-CM

## 2021-07-28 DIAGNOSIS — I73.00 RAYNAUD'S DISEASE WITHOUT GANGRENE: ICD-10-CM

## 2021-07-28 DIAGNOSIS — Z87.440 HISTORY OF RECURRENT UTI (URINARY TRACT INFECTION): ICD-10-CM

## 2021-07-29 RX ORDER — AMLODIPINE BESYLATE 10 MG/1
10 TABLET ORAL DAILY
Qty: 90 TABLET | Refills: 5 | Status: SHIPPED | OUTPATIENT
Start: 2021-07-29 | End: 2021-11-23 | Stop reason: DRUGHIGH

## 2021-07-30 RX ORDER — LANOLIN ALCOHOL/MO/W.PET/CERES
CREAM (GRAM) TOPICAL
Qty: 60 TABLET | Refills: 5 | Status: SHIPPED | OUTPATIENT
Start: 2021-07-30 | End: 2022-02-01 | Stop reason: SDUPTHER

## 2021-07-30 RX ORDER — TRIMETHOPRIM 100 MG/1
TABLET ORAL
Qty: 30 TABLET | Refills: 5 | Status: SHIPPED | OUTPATIENT
Start: 2021-07-30 | End: 2022-02-01 | Stop reason: SDUPTHER

## 2021-08-02 DIAGNOSIS — N18.2 CKD (CHRONIC KIDNEY DISEASE) STAGE 2, GFR 60-89 ML/MIN: ICD-10-CM

## 2021-08-02 DIAGNOSIS — M81.0 AGE-RELATED OSTEOPOROSIS WITHOUT CURRENT PATHOLOGICAL FRACTURE: ICD-10-CM

## 2021-08-02 DIAGNOSIS — E55.9 VITAMIN D DEFICIENCY: ICD-10-CM

## 2021-08-02 RX ORDER — LEFLUNOMIDE 20 MG/1
TABLET ORAL
Qty: 90 TABLET | Refills: 1 | Status: SHIPPED | OUTPATIENT
Start: 2021-08-02 | End: 2021-08-05 | Stop reason: SDUPTHER

## 2021-08-05 DIAGNOSIS — E11.42 DIABETIC POLYNEUROPATHY ASSOCIATED WITH TYPE 2 DIABETES MELLITUS (HCC): ICD-10-CM

## 2021-08-06 RX ORDER — GABAPENTIN 600 MG/1
600 TABLET ORAL 4 TIMES DAILY
Qty: 120 TABLET | Refills: 1 | Status: SHIPPED | OUTPATIENT
Start: 2021-08-06 | End: 2021-10-09 | Stop reason: SDUPTHER

## 2021-08-20 ENCOUNTER — APPOINTMENT (OUTPATIENT)
Dept: INFUSION THERAPY | Age: 81
End: 2021-08-20

## 2021-09-27 ENCOUNTER — APPOINTMENT (OUTPATIENT)
Dept: GENERAL RADIOLOGY | Age: 81
End: 2021-09-27
Attending: EMERGENCY MEDICINE
Payer: MEDICARE

## 2021-09-27 ENCOUNTER — HOSPITAL ENCOUNTER (EMERGENCY)
Age: 81
Discharge: HOME | End: 2021-09-27
Attending: STUDENT IN AN ORGANIZED HEALTH CARE EDUCATION/TRAINING PROGRAM
Payer: MEDICARE

## 2021-09-27 VITALS
WEIGHT: 184 LBS | HEART RATE: 94 BPM | BODY MASS INDEX: 34.74 KG/M2 | SYSTOLIC BLOOD PRESSURE: 105 MMHG | HEIGHT: 61 IN | OXYGEN SATURATION: 96 % | DIASTOLIC BLOOD PRESSURE: 63 MMHG | TEMPERATURE: 98.1 F | RESPIRATION RATE: 18 BRPM

## 2021-09-27 DIAGNOSIS — M06.09 SERONEGATIVE RHEUMATOID ARTHRITIS OF MULTIPLE SITES (HCC): ICD-10-CM

## 2021-09-27 DIAGNOSIS — U07.1 COVID-19 VIRUS INFECTION: Primary | ICD-10-CM

## 2021-09-27 DIAGNOSIS — E86.0 DEHYDRATION: ICD-10-CM

## 2021-09-27 LAB
ALBUMIN SERPL-MCNC: 3.2 G/DL (ref 3.5–5)
ALBUMIN/GLOB SERPL: 1.1 {RATIO} (ref 1.1–2.2)
ALP SERPL-CCNC: 68 U/L (ref 45–117)
ALT SERPL-CCNC: 42 U/L (ref 12–78)
ANION GAP SERPL CALC-SCNC: 6 MMOL/L (ref 5–15)
AST SERPL W P-5'-P-CCNC: 98 U/L (ref 15–37)
ATRIAL RATE: 114 BPM
BASOPHILS # BLD: 0.1 K/UL (ref 0–0.1)
BASOPHILS NFR BLD: 1 % (ref 0–1)
BILIRUB SERPL-MCNC: 0.5 MG/DL (ref 0.2–1)
BNP SERPL-MCNC: 610 PG/ML
BUN SERPL-MCNC: 25 MG/DL (ref 6–20)
BUN/CREAT SERPL: 36 (ref 12–20)
CA-I BLD-MCNC: 7.8 MG/DL (ref 8.5–10.1)
CALCULATED P AXIS, ECG09: 61 DEGREES
CALCULATED R AXIS, ECG10: -26 DEGREES
CALCULATED T AXIS, ECG11: 49 DEGREES
CHLORIDE SERPL-SCNC: 106 MMOL/L (ref 97–108)
CK MB CFR SERPL CALC: 0.9 %
CK MB SERPL-MCNC: 3.9 NG/ML (ref 5–25)
CK SERPL-CCNC: 439 NG/ML (ref 26–192)
CO2 SERPL-SCNC: 27 MMOL/L (ref 21–32)
COVID-19 RAPID TEST, COVR: DETECTED
CREAT SERPL-MCNC: 0.7 MG/DL (ref 0.55–1.02)
DIAGNOSIS, 93000: NORMAL
DIFFERENTIAL METHOD BLD: ABNORMAL
EOSINOPHIL # BLD: 0 K/UL (ref 0–0.4)
EOSINOPHIL NFR BLD: 0 % (ref 0–7)
ERYTHROCYTE [DISTWIDTH] IN BLOOD BY AUTOMATED COUNT: 13.4 % (ref 11.5–14.5)
GLOBULIN SER CALC-MCNC: 2.9 G/DL (ref 2–4)
GLUCOSE BLD STRIP.AUTO-MCNC: 88 MG/DL (ref 65–117)
GLUCOSE SERPL-MCNC: 62 MG/DL (ref 65–100)
HCT VFR BLD AUTO: 37.2 % (ref 35–47)
HGB BLD-MCNC: 12.4 G/DL (ref 11.5–16)
IMM GRANULOCYTES # BLD AUTO: 0 K/UL (ref 0–0.04)
IMM GRANULOCYTES NFR BLD AUTO: 0 % (ref 0–0.5)
LYMPHOCYTES # BLD: 1.2 K/UL (ref 0.8–3.5)
LYMPHOCYTES NFR BLD: 19 % (ref 12–49)
MCH RBC QN AUTO: 32.5 PG (ref 26–34)
MCHC RBC AUTO-ENTMCNC: 33.3 G/DL (ref 30–36.5)
MCV RBC AUTO: 97.6 FL (ref 80–99)
MONOCYTES # BLD: 0.6 K/UL (ref 0–1)
MONOCYTES NFR BLD: 9 % (ref 5–13)
MYELOCYTES NFR BLD MANUAL: 3 %
NEUTS SEG # BLD: 4.2 K/UL (ref 1.8–8)
NEUTS SEG NFR BLD: 68 % (ref 32–75)
NRBC # BLD: 0 K/UL (ref 0–0.01)
NRBC BLD-RTO: 0 PER 100 WBC
P-R INTERVAL, ECG05: 212 MS
PERFORMED BY, TECHID: NORMAL
PLATELET # BLD AUTO: 119 K/UL (ref 150–400)
PMV BLD AUTO: 11.4 FL (ref 8.9–12.9)
POTASSIUM SERPL-SCNC: 3.7 MMOL/L (ref 3.5–5.1)
PROT SERPL-MCNC: 6.1 G/DL (ref 6.4–8.2)
Q-T INTERVAL, ECG07: 298 MS
QRS DURATION, ECG06: 74 MS
QTC CALCULATION (BEZET), ECG08: 410 MS
RBC # BLD AUTO: 3.81 M/UL (ref 3.8–5.2)
RBC MORPH BLD: ABNORMAL
SODIUM SERPL-SCNC: 139 MMOL/L (ref 136–145)
SPECIMEN SOURCE: ABNORMAL
TROPONIN I SERPL-MCNC: <0.05 NG/ML
VENTRICULAR RATE, ECG03: 114 BPM
WBC # BLD AUTO: 6.2 K/UL (ref 3.6–11)

## 2021-09-27 PROCEDURE — 36415 COLL VENOUS BLD VENIPUNCTURE: CPT

## 2021-09-27 PROCEDURE — 71045 X-RAY EXAM CHEST 1 VIEW: CPT

## 2021-09-27 PROCEDURE — 80053 COMPREHEN METABOLIC PANEL: CPT

## 2021-09-27 PROCEDURE — 93005 ELECTROCARDIOGRAM TRACING: CPT

## 2021-09-27 PROCEDURE — 82550 ASSAY OF CK (CPK): CPT

## 2021-09-27 PROCEDURE — 87635 SARS-COV-2 COVID-19 AMP PRB: CPT

## 2021-09-27 PROCEDURE — 74011250636 HC RX REV CODE- 250/636: Performed by: STUDENT IN AN ORGANIZED HEALTH CARE EDUCATION/TRAINING PROGRAM

## 2021-09-27 PROCEDURE — 83880 ASSAY OF NATRIURETIC PEPTIDE: CPT

## 2021-09-27 PROCEDURE — 82553 CREATINE MB FRACTION: CPT

## 2021-09-27 PROCEDURE — 84484 ASSAY OF TROPONIN QUANT: CPT

## 2021-09-27 PROCEDURE — 82962 GLUCOSE BLOOD TEST: CPT

## 2021-09-27 PROCEDURE — 85025 COMPLETE CBC W/AUTO DIFF WBC: CPT

## 2021-09-27 PROCEDURE — 99284 EMERGENCY DEPT VISIT MOD MDM: CPT

## 2021-09-27 RX ORDER — SODIUM CHLORIDE 9 MG/ML
1000 INJECTION, SOLUTION INTRAVENOUS CONTINUOUS
Status: DISCONTINUED | OUTPATIENT
Start: 2021-09-27 | End: 2021-09-28 | Stop reason: HOSPADM

## 2021-09-27 RX ADMIN — SODIUM CHLORIDE 1000 ML: 9 INJECTION, SOLUTION INTRAVENOUS at 17:06

## 2021-09-27 NOTE — ED PROVIDER NOTES
EMERGENCY DEPARTMENT HISTORY AND PHYSICAL EXAM      Date: 9/27/2021  Patient Name: Marcelina Ramos      History of Presenting Illness     Chief Complaint   Patient presents with    Shortness of Breath       History Provided By: Patient    HPI: Marcelina Ramos, 79 y.o. female with PMH as below presenting with 4 days history of runny nose, nasal congestion, productive cough of clear mucus. Patient reports that he intermittently she had felt warm but did not check her temperature. She is having anosmia. She is still drinking and eating adequate amount of liquids and food. In terms are of mild severity, no known aggravating or relieving factors associated with mild shortness of breath when she ambulates. She is able to perform her ADLs without challenges. She is denying a prior shortness of breath to this infection. Denying any had associated chest pain, nausea, vomiting, or diaphoresis. Additionally there is no lower extremity swelling or pain. There are no other complaints, changes, or physical findings at this time. PCP: Angie Cabot, MD    Current Outpatient Medications   Medication Sig Dispense Refill    amLODIPine (NORVASC) 10 mg tablet Take 1 Tablet by mouth daily. 90 Tablet 5    tofacitinib 11 mg Tb24 Take 11 mg by mouth daily. Indications: rheumatoid arthritis 30 Tablet 11    gabapentin (NEURONTIN) 600 mg tablet Take 1 Tablet by mouth four (4) times daily. Max Daily Amount: 2,400 mg. 120 Tablet 1    leflunomide (ARAVA) 20 mg tablet Take 1 Tablet by mouth daily.  90 Tablet 1    trimethoprim (TRIMPEX) 100 mg tablet TAKE 1 TABLET BY MOUTH DAILY FOR URINARY TRACT INFECTION PREVENTION 30 Tablet 5    ferrous sulfate (FeroSuL) 325 mg (65 mg iron) tablet TAKE 1 TABLET BY MOUTH TWICE DAILY FOR LOW IRON 60 Tablet 5    nortriptyline (PAMELOR) 10 mg capsule TAKE 1 CAPSULE BY MOUTH EVERY NIGHT 30 Capsule 5    pantoprazole (PROTONIX) 40 mg tablet TAKE 1 TABLET BY MOUTH DAILY 90 Tablet 1    HYDROcodone-acetaminophen (NORCO) 5-325 mg per tablet Take 1 Tablet by mouth every six (6) hours as needed for Pain for up to 90 days. 60 Tablet 0    glipiZIDE SR (GLUCOTROL XL) 2.5 mg CR tablet TAKE 1 TABLET BY MOUTH TWICE DAILY 180 Tablet 1    cyclobenzaprine (FLEXERIL) 10 mg tablet Take 1 Tablet by mouth nightly. 90 Tablet 1    metFORMIN (GLUCOPHAGE) 500 mg tablet TAKE 1 TABLET BY MOUTH TWICE DAILY WITH MEALS 180 Tab 1    atorvastatin (LIPITOR) 40 mg tablet TAKE 1 TABLET BY MOUTH DAILY 90 Tab 1    nitroglycerin (NITROSTAT) 0.4 mg SL tablet DISSOLVE 1 TABLET UNDER THE TONGUE EVERY 5 MINUTES AS NEEDED FOR CHEST PAIN 25 Tab 0    glucose blood VI test strips (Ascensia CONTOUR) strip Glucometer for 250.00 Pt. test blood sugar once daily (in morning) 50 Strip 11    metoprolol succinate (TOPROL-XL) 25 mg XL tablet TAKE 1 TABLET BY MOUTH EVERY DAY 90 Tab 1    denosumab (PROLIA) 60 mg/mL injection 1 mL by SubCUTAneous route every 6 months.  acetaminophen (TYLENOL) 650 mg CR tablet Take 1,300 mg by mouth daily as needed for Pain.  Blood-Glucose Meter (CONTOUR METER) monitoring kit Dx: 250. Check blood sugars daily. 1 kit 11    ZINC PO Take 50 mg by mouth daily.  cholecalciferol, vitamin d3, (VITAMIN D3) 1,000 unit tablet Take 2,000 Units by mouth daily.  calcium-vitamin D (CALCIUM 500+D) 500 mg(1,250mg) -200 unit per tablet Take 2 Tabs by mouth daily.  aspirin 81 mg Tab Take 81 mg by mouth daily.  MULTIVITAMINS (MULTIVITAMIN PO) Take 1 Tab by mouth daily. Past History     Past Medical History:  Past Medical History:   Diagnosis Date    Anemia     Arthritis     CAD (coronary artery disease) 1997    MI    Degenerative joint disease of right hip 09/14/2015     [facility] [place]/Dr. Solorio Failing    Diabetes ([facility] [address]     Fracture     right shoulder; T6 compression    Gastritis     H/O Bell's palsy 7/9/2012    R-side of face. Dx >20 years ago.  Has persistent R-sided facial droop     Hypercholesterolemia     Hyperlipidemia    Lupus ([facility] [address] 2015    Osteoporosis, post-menopausal     vertebral fracture    Squamous cell cancer of skin of left cheek     Squamous cell skin cancer     Right knee       Past Surgical History:  Past Surgical History:   Procedure Laterality Date    COLONOSCOPY N/A 10/9/2018    COLONOSCOPY performed by Bruna Renee MD at [address] HX CATARACT REMOVAL Bilateral     HX CATARACT REMOVAL Right 2019    HX CATARACT REMOVAL Left 2020    HX CHOLECYSTECTOMY      HX CORONARY STENT PLACEMENT  1997    LAD    HX GASTRIC BYPASS      HX HERNIA REPAIR  2005    HX KYPHOPLASTY      t5    HX KYPHOPLASTY      L4    HX ORTHOPAEDIC      bilateral knees    HX ORTHOPAEDIC      r shoulder    HX ORTHOPAEDIC      Laminectomy    OH CARDIAC SURG PROCEDURE UNLIST      Patient Denies    OH INC IMPLTJ NEUROSTIMULATOR ELTRD SACRAL NERVE         Family History:  Family History   Problem Relation Age of Onset    Diabetes Mother     Hypertension Mother     Heart Disease Mother     Diabetes Father     Hypertension Father     Heart Disease Father    [employer] Arthritis-rheumatoid Sister     Elevated Lipids Sister    [employer] Arthritis-rheumatoid Sister     Hypertension Sister     Diabetes Sister     Thyroid Disease Sister     Arthritis-rheumatoid Other        Social History:  Social History     Tobacco Use    Smoking status: Former Smoker     Quit date: 1989     Years since quittin.7    Smokeless tobacco: Never Used   Substance Use Topics    Alcohol use: No     Alcohol/week: 0.0 standard drinks     Comment: Occasional    Drug use: No       Allergies: Allergies   Allergen Reactions    Cephalosporins Nausea and Vomiting     Severe vomiting  Other reaction(s): gi upset, unspecified         Review of Systems     Review of Systems   Constitutional: Positive for fever. Negative for activity change and chills. HENT: Positive for congestion and rhinorrhea. Negative for facial swelling. Eyes: Negative for discharge and visual disturbance. Respiratory: Positive for cough and shortness of breath. Negative for chest tightness. Cardiovascular: Negative for chest pain and leg swelling. Gastrointestinal: Negative for abdominal pain, diarrhea and vomiting. Genitourinary: Negative for dysuria and flank pain. Musculoskeletal: Negative for back pain and gait problem. Skin: Negative for rash and wound. Neurological: Negative for dizziness, weakness and headaches. Physical Exam     Physical Exam  Vitals and nursing note reviewed. Constitutional:       General: She is not in acute distress. Appearance: Normal appearance. She is obese. She is not ill-appearing, toxic-appearing or diaphoretic. HENT:      Head: Normocephalic and atraumatic. Mouth/Throat:      Mouth: Mucous membranes are moist.      Pharynx: Oropharynx is clear. Eyes:      Extraocular Movements: Extraocular movements intact. Conjunctiva/sclera: Conjunctivae normal.   Cardiovascular:      Rate and Rhythm: Normal rate and regular rhythm. Pulmonary:      Effort: Pulmonary effort is normal.      Breath sounds: Normal breath sounds. No decreased breath sounds, wheezing, rhonchi or rales. Chest:      Chest wall: No tenderness. Abdominal:      General: Abdomen is flat. Palpations: Abdomen is soft. Tenderness: There is no abdominal tenderness. Musculoskeletal:         General: No tenderness or deformity. Cervical back: Normal range of motion and neck supple. Skin:     General: Skin is warm and dry. Neurological:      General: No focal deficit present. Mental Status: She is alert and oriented to person, place, and time.          Lab and Diagnostic Study Results     Labs -     Recent Results (from the past 12 hour(s))   EKG, 12 LEAD, INITIAL    Collection Time: 09/27/21  2:59 PM   Result Value Ref Range    Ventricular Rate 114 BPM    Atrial Rate 114 BPM P-R Interval 212 ms    QRS Duration 74 ms    Q-T Interval 298 ms    QTC Calculation (Bezet) 410 ms    Calculated P Axis 61 degrees    Calculated R Axis -26 degrees    Calculated T Axis 49 degrees    Diagnosis       Sinus tachycardia with 1st degree A-V block with frequent Premature   ventricular complexes  Anterior infarct , age undetermined  Abnormal ECG  When compared with ECG of 16-JAN-2015 22:21,  Premature ventricular complexes are now Present  Anterior infarct is now Present  Confirmed by Elena Montgomery ([id number]) on 9/27/2021 4:29:38 PM     COVID-19 RAPID TEST    Collection Time: 09/27/21  3:00 PM   Result Value Ref Range    Specimen source Please find results under separate order      COVID-19 rapid test DETECTED (A) Not Detected     CBC WITH AUTOMATED DIFF    Collection Time: 09/27/21  3:45 PM   Result Value Ref Range    WBC 6.2 3.6 - 11.0 K/uL    RBC 3.81 3.80 - 5.20 M/uL    HGB 12.4 11.5 - 16.0 g/dL    HCT 37.2 35.0 - 47.0 %    MCV 97.6 80.0 - 99.0 FL    MCH 32.5 26.0 - 34.0 PG    MCHC 33.3 30.0 - 36.5 g/dL    RDW 13.4 11.5 - 14.5 %    PLATELET 119 (L) 656 - 400 K/uL    MPV 11.4 8.9 - 12.9 FL    NRBC 0.0 0.0  WBC    ABSOLUTE NRBC 0.00 0.00 - 0.01 K/uL    NEUTROPHILS 68 32 - 75 %    LYMPHOCYTES 19 12 - 49 %    MONOCYTES 9 5 - 13 %    EOSINOPHILS 0 0 - 7 %    BASOPHILS 1 0 - 1 %    IMMATURE GRANULOCYTES 0 0 - 0.5 %    ABS. NEUTROPHILS 4.2 1.8 - 8.0 K/UL    ABS. LYMPHOCYTES 1.2 0.8 - 3.5 K/UL    ABS. MONOCYTES 0.6 0.0 - 1.0 K/UL    ABS. EOSINOPHILS 0.0 0.0 - 0.4 K/UL    ABS. BASOPHILS 0.1 0.0 - 0.1 K/UL    ABS. IMM.  GRANS. 0.0 0.00 - 0.04 K/UL    DF AUTOMATED      MYELOCYTES 3 %    RBC COMMENTS Normocytic, Normochromic     METABOLIC PANEL, COMPREHENSIVE    Collection Time: 09/27/21  3:45 PM   Result Value Ref Range    Sodium 139 136 - 145 mmol/L    Potassium 3.7 3.5 - 5.1 mmol/L    Chloride 106 97 - 108 mmol/L    CO2 27 21 - 32 mmol/L    Anion gap 6 5 - 15 mmol/L    Glucose 62 (L) 65 - 100 mg/dL BUN 25 (H) 6 - 20 mg/dL    Creatinine 0.70 0.55 - 1.02 mg/dL    BUN/Creatinine ratio 36 (H) 12 - 20      GFR est AA >60 >60 ml/min/1.73m2    GFR est non-AA >60 >60 ml/min/1.73m2    Calcium 7.8 (L) 8.5 - 10.1 mg/dL    Bilirubin, total 0.5 0.2 - 1.0 mg/dL    AST (SGOT) 98 (H) 15 - 37 U/L    ALT (SGPT) 42 12 - 78 U/L    Alk. phosphatase 68 45 - 117 U/L    Protein, total 6.1 (L) 6.4 - 8.2 g/dL    Albumin 3.2 (L) 3.5 - 5.0 g/dL    Globulin 2.9 2.0 - 4.0 g/dL    A-G Ratio 1.1 1.1 - 2.2     CK W/ REFLX CKMB    Collection Time: 09/27/21  3:45 PM   Result Value Ref Range    .0 (H) 26 - 192 ng/mL   TROPONIN I    Collection Time: 09/27/21  3:45 PM   Result Value Ref Range    Troponin-I, Qt. <0.05 <0.05 ng/mL   BNP    Collection Time: 09/27/21  3:45 PM   Result Value Ref Range    NT pro- (H) <450 pg/mL   CK-MB,QUANT. Collection Time: 09/27/21  3:45 PM   Result Value Ref Range    CK - MB 3.9 (H) <3.6 ng/mL    CK-MB Index 0.9     GLUCOSE, POC    Collection Time: 09/27/21  8:35 PM   Result Value Ref Range    Glucose (POC) 88 65 - 117 mg/dL    Performed by Jaymie Saunders ( Tech)        Radiologic Studies -   [unfilled]  CT Results  (Last 48 hours)    None        CXR Results  (Last 48 hours)               09/27/21 1520  XR CHEST PORT Final result    Impression:  No acute pulmonary process. Narrative:  Chest, frontal view, 9/27/2021       History: Chest pain. Comparison: Including chest 5/19/2016. Findings: The cardiac silhouette is within normal limits. The lungs are   adequately expanded. No hydrostatic edema is present. No focal consolidation,   pleural effusions or pneumothorax is identified. There are changes are seen in   the shoulders. Vertebroplasty has been performed in a mid-level thoracic   vertebral body. Spinal stimulator lead is in place.                  Medical Decision Making and ED Course   - I am the first and primary provider for this patient AND AM THE PRIMARY PROVIDER OF RECORD. - I reviewed the vital signs, available nursing notes, past medical history, past surgical history, family history and social history. - Initial assessment performed. The patients presenting problems have been discussed, and the staff are in agreement with the care plan formulated and outlined with them. I have encouraged them to ask questions as they arise throughout their visit. Vital Signs-Reviewed the patient's vital signs. Patient Vitals for the past 12 hrs:   Temp Pulse Resp BP SpO2   09/27/21 1851 98.1 °F (36.7 °C) 94 18 105/63 96 %   09/27/21 1456 98.4 °F (36.9 °C) (!) 107 18 99/61 95 %     I did interpret the EKG. EKG was obtained from triage showed sinus tachycardia with rate of 114, NM is minimally prolonged, QRS minimally shortened, QT within normal.  Left axis deviation, first-degree AV block, frequent PVCs noted, Q waves in the anterior leads which is likely due her coronary artery disease. There is no signs of dysrhythmia. Records Reviewed: Nursing Notes    Provider Notes (Medical Decision Making):   Well-appearing 79-year-old female presenting for evaluation of URI-like symptoms. She is vaccinated against Covid and her last vaccine was in March. My suspicion is that this patient has contracted Covid infection. She had some signs of dehydration on evaluation but otherwise she appears very well. Her tachycardia is very likely secondary to dehydration. She is not febrile, tachypneic, nor hypoxic. I did obtain CBC which was with acceptable range except for mild thrombocytopenia. Her chemistry did not show signs of acute kidney injury. However I did note that her sugar is 62 in which she was given dinner and her fingerstick has improved. proBNP is minimally elevated. Her CK is double the normal.  She did receive 1 L of normal saline. I believe this would have fixed her CK elevation. Additionally, her tachycardia has resolved.   She appears very well in which I discharged her home with  guidance. I instructed her to take Tylenol as needed for fever and aches and should definitely come back to the emerge department if her symptoms worsen, she has developed vomiting, chest pain, or worsening of her mild shortness of breath which is due to Covid. Patient did verbalize understanding. Other differential diagnoses of her symptoms were considered and does not appear to be likely. Disposition     Disposition: Condition improved  DC- Adult Discharges: All of the diagnostic tests were reviewed and questions answered. Diagnosis, care plan and treatment options were discussed. The patient understands the instructions and will follow up as directed. The patients results have been reviewed with them. They have been counseled regarding their diagnosis. The patient verbally convey understanding and agreement of the signs, symptoms, diagnosis, treatment and prognosis and additionally agrees to follow up as recommended with their PCP in 24 - 48 hours. They also agree with the care-plan and convey that all of their questions have been answered. I have also put together some discharge instructions for them that include: 1) educational information regarding their diagnosis, 2) how to care for their diagnosis at home, as well a 3) list of reasons why they would want to return to the ED prior to their follow-up appointment, should their condition change. Discharged      DISCHARGE PLAN:  1. Current Discharge Medication List      CONTINUE these medications which have NOT CHANGED    Details   amLODIPine (NORVASC) 10 mg tablet Take 1 Tablet by mouth daily. Qty: 90 Tablet, Refills: 5    Associated Diagnoses: Raynaud's disease without gangrene      gabapentin (NEURONTIN) 600 mg tablet Take 1 Tablet by mouth four (4) times daily. Max Daily Amount: 2,400 mg.   Qty: 120 Tablet, Refills: 1    Associated Diagnoses: Diabetic polyneuropathy associated with type 2 diabetes mellitus (HCC)      leflunomide (ARAVA) 20 mg tablet Take 1 Tablet by mouth daily. Qty: 90 Tablet, Refills: 1    Comments: **Patient requests 90 days supply**  Associated Diagnoses: CKD (chronic kidney disease) stage 2, GFR 60-89 ml/min; Age-related osteoporosis without current pathological fracture; Vitamin D deficiency      trimethoprim (TRIMPEX) 100 mg tablet TAKE 1 TABLET BY MOUTH DAILY FOR URINARY TRACT INFECTION PREVENTION  Qty: 30 Tablet, Refills: 5    Associated Diagnoses: History of recurrent UTI (urinary tract infection)      ferrous sulfate (FeroSuL) 325 mg (65 mg iron) tablet TAKE 1 TABLET BY MOUTH TWICE DAILY FOR LOW IRON  Qty: 60 Tablet, Refills: 5    Associated Diagnoses: Pernicious anemia      nortriptyline (PAMELOR) 10 mg capsule TAKE 1 CAPSULE BY MOUTH EVERY NIGHT  Qty: 30 Capsule, Refills: 5      pantoprazole (PROTONIX) 40 mg tablet TAKE 1 TABLET BY MOUTH DAILY  Qty: 90 Tablet, Refills: 1    Associated Diagnoses: Gastroesophageal reflux disease, unspecified whether esophagitis present      HYDROcodone-acetaminophen (NORCO) 5-325 mg per tablet Take 1 Tablet by mouth every six (6) hours as needed for Pain for up to 90 days. Qty: 60 Tablet, Refills: 0    Associated Diagnoses: Arthropathy; Myofascial muscle pain; Degenerative lumbar disc      glipiZIDE SR (GLUCOTROL XL) 2.5 mg CR tablet TAKE 1 TABLET BY MOUTH TWICE DAILY  Qty: 180 Tablet, Refills: 1      cyclobenzaprine (FLEXERIL) 10 mg tablet Take 1 Tablet by mouth nightly. Qty: 90 Tablet, Refills: 1    Associated Diagnoses: Muscle spasm      metFORMIN (GLUCOPHAGE) 500 mg tablet TAKE 1 TABLET BY MOUTH TWICE DAILY WITH MEALS  Qty: 180 Tab, Refills: 1    Associated Diagnoses: Type 2 diabetes with nephropathy (HCC)      atorvastatin (LIPITOR) 40 mg tablet TAKE 1 TABLET BY MOUTH DAILY  Qty: 90 Tab, Refills: 1    Associated Diagnoses: Mixed hyperlipidemia      tofacitinib 11 mg Tb24 Take 11 mg by mouth daily.  Indications: rheumatoid arthritis  Qty: 30 Tab, Refills: 11    Associated Diagnoses: Seronegative rheumatoid arthritis of multiple sites (HCC)      nitroglycerin (NITROSTAT) 0.4 mg SL tablet DISSOLVE 1 TABLET UNDER THE TONGUE EVERY 5 MINUTES AS NEEDED FOR CHEST PAIN  Qty: 25 Tab, Refills: 0    Associated Diagnoses: Mixed hyperlipidemia; Coronary artery disease involving native heart without angina pectoris, unspecified vessel or lesion type      glucose blood VI test strips (Ascensia CONTOUR) strip Glucometer for 250.00 Pt. test blood sugar once daily (in morning)  Qty: 50 Strip, Refills: 11      metoprolol succinate (TOPROL-XL) 25 mg XL tablet TAKE 1 TABLET BY MOUTH EVERY DAY  Qty: 90 Tab, Refills: 1    Associated Diagnoses: Essential hypertension with goal blood pressure less than 130/80      denosumab (PROLIA) 60 mg/mL injection 1 mL by SubCUTAneous route every 6 months. Associated Diagnoses: Osteoporosis, postmenopausal      acetaminophen (TYLENOL) 650 mg CR tablet Take 1,300 mg by mouth daily as needed for Pain. Blood-Glucose Meter (CONTOUR METER) monitoring kit Dx: 250. Check blood sugars daily. Qty: 1 kit, Refills: 11      ZINC PO Take 50 mg by mouth daily. cholecalciferol, vitamin d3, (VITAMIN D3) 1,000 unit tablet Take 2,000 Units by mouth daily. Associated Diagnoses: Vitamin D deficiency      calcium-vitamin D (CALCIUM 500+D) 500 mg(1,250mg) -200 unit per tablet Take 2 Tabs by mouth daily. Associated Diagnoses: Vitamin D deficiency      aspirin 81 mg Tab Take 81 mg by mouth daily. Associated Diagnoses: CAD (coronary artery disease)      MULTIVITAMINS (MULTIVITAMIN PO) Take 1 Tab by mouth daily. Associated Diagnoses: Vitamin D deficiency; Status post gastric bypass for obesity           2.    Follow-up Information     Follow up With Specialties Details Why Contact Info    Taniya Serrano MD Family Medicine Schedule an appointment as soon as possible for a visit in 3 days For reevaluation, Discuss your visit to the  [address] Carolyn Gamboa      [facility] EMERGENCY DEPT Emergency Medicine Go to  As needed, For worsening symptoms [address] [address]  [phone]        3. Return to ED if worse   4. Discharge Medication List as of 9/27/2021  8:46 PM          Diagnosis     Clinical Impression:   1. COVID-19 virus infection    2. Dehydration        Attestations: Bertha Milan MD    Please note that this dictation was completed with Asker, the computer voice recognition software. Quite often unanticipated grammatical, syntax, homophones, and other interpretive errors are inadvertently transcribed by the computer software. Please disregard these errors. Please excuse any errors that have escaped final proofreading. Thank you.

## 2021-09-27 NOTE — DISCHARGE INSTRUCTIONS
Thank you! Thank you for allowing me to care for you in the emergency department. I sincerely hope that you are satisfied with your visit today. It is my goal to provide you with excellent care. Below you will find a list of your labs and imaging from your visit today. Should you have any questions regarding these results please do not hesitate to call the emergency department. Labs -     Recent Results (from the past 12 hour(s))   EKG, 12 LEAD, INITIAL    Collection Time: 09/27/21  2:59 PM   Result Value Ref Range    Ventricular Rate 114 BPM    Atrial Rate 114 BPM    P-R Interval 212 ms    QRS Duration 74 ms    Q-T Interval 298 ms    QTC Calculation (Bezet) 410 ms    Calculated P Axis 61 degrees    Calculated R Axis -26 degrees    Calculated T Axis 49 degrees    Diagnosis       Sinus tachycardia with 1st degree A-V block with frequent Premature   ventricular complexes  Anterior infarct , age undetermined  Abnormal ECG  When compared with ECG of 16-JAN-2015 22:21,  Premature ventricular complexes are now Present  Anterior infarct is now Present  Confirmed by Vi Foots ([id number]) on 9/27/2021 4:29:38 PM     COVID-19 RAPID TEST    Collection Time: 09/27/21  3:00 PM   Result Value Ref Range    Specimen source Please find results under separate order      COVID-19 rapid test DETECTED (A) Not Detected     CBC WITH AUTOMATED DIFF    Collection Time: 09/27/21  3:45 PM   Result Value Ref Range    WBC 6.2 3.6 - 11.0 K/uL    RBC 3.81 3.80 - 5.20 M/uL    HGB 12.4 11.5 - 16.0 g/dL    HCT 37.2 35.0 - 47.0 %    MCV 97.6 80.0 - 99.0 FL    MCH 32.5 26.0 - 34.0 PG    MCHC 33.3 30.0 - 36.5 g/dL    RDW 13.4 11.5 - 14.5 %    PLATELET 205 (L) 462 - 400 K/uL    MPV 11.4 8.9 - 12.9 FL    NRBC 0.0 0.0  WBC    ABSOLUTE NRBC 0.00 0.00 - 0.01 K/uL    NEUTROPHILS 68 32 - 75 %    LYMPHOCYTES 19 12 - 49 %    MONOCYTES 9 5 - 13 %    EOSINOPHILS 0 0 - 7 %    BASOPHILS 1 0 - 1 %    IMMATURE GRANULOCYTES 0 0 - 0.5 %    ABS. NEUTROPHILS 4.2 1.8 - 8.0 K/UL    ABS. LYMPHOCYTES 1.2 0.8 - 3.5 K/UL    ABS. MONOCYTES 0.6 0.0 - 1.0 K/UL    ABS. EOSINOPHILS 0.0 0.0 - 0.4 K/UL    ABS. BASOPHILS 0.1 0.0 - 0.1 K/UL    ABS. IMM. GRANS. 0.0 0.00 - 0.04 K/UL    DF AUTOMATED      MYELOCYTES 3 %    RBC COMMENTS Normocytic, Normochromic     METABOLIC PANEL, COMPREHENSIVE    Collection Time: 09/27/21  3:45 PM   Result Value Ref Range    Sodium 139 136 - 145 mmol/L    Potassium 3.7 3.5 - 5.1 mmol/L    Chloride 106 97 - 108 mmol/L    CO2 27 21 - 32 mmol/L    Anion gap 6 5 - 15 mmol/L    Glucose 62 (L) 65 - 100 mg/dL    BUN 25 (H) 6 - 20 mg/dL    Creatinine 0.70 0.55 - 1.02 mg/dL    BUN/Creatinine ratio 36 (H) 12 - 20      GFR est AA >60 >60 ml/min/1.73m2    GFR est non-AA >60 >60 ml/min/1.73m2    Calcium 7.8 (L) 8.5 - 10.1 mg/dL    Bilirubin, total 0.5 0.2 - 1.0 mg/dL    AST (SGOT) 98 (H) 15 - 37 U/L    ALT (SGPT) 42 12 - 78 U/L    Alk. phosphatase 68 45 - 117 U/L    Protein, total 6.1 (L) 6.4 - 8.2 g/dL    Albumin 3.2 (L) 3.5 - 5.0 g/dL    Globulin 2.9 2.0 - 4.0 g/dL    A-G Ratio 1.1 1.1 - 2.2     CK W/ REFLX CKMB    Collection Time: 09/27/21  3:45 PM   Result Value Ref Range    .0 (H) 26 - 192 ng/mL   TROPONIN I    Collection Time: 09/27/21  3:45 PM   Result Value Ref Range    Troponin-I, Qt. <0.05 <0.05 ng/mL   BNP    Collection Time: 09/27/21  3:45 PM   Result Value Ref Range    NT pro- (H) <450 pg/mL   CK-MB,QUANT. Collection Time: 09/27/21  3:45 PM   Result Value Ref Range    CK - MB 3.9 (H) <3.6 ng/mL    CK-MB Index 0.9         Radiologic Studies -   XR CHEST PORT   Final Result   No acute pulmonary process. CT Results  (Last 48 hours)      None          CXR Results  (Last 48 hours)                 09/27/21 1520  XR CHEST PORT Final result    Impression:  No acute pulmonary process. Narrative:  Chest, frontal view, 9/27/2021       History: Chest pain. Comparison: Including chest 5/19/2016.        Findings: The cardiac silhouette is within normal limits. The lungs are   adequately expanded. No hydrostatic edema is present. No focal consolidation,   pleural effusions or pneumothorax is identified. There are changes are seen in   the shoulders. Vertebroplasty has been performed in a mid-level thoracic   vertebral body. Spinal stimulator lead is in place. If you feel that you have not received excellent quality care or timely care, please ask to speak to the nurse manager. Please choose us in the future for your continued health care needs. ------------------------------------------------------------------------------------------------------------  The exam and treatment you received in the Emergency Department were for an urgent problem and are not intended as complete care. It is important that you follow-up with a doctor, nurse practitioner, or physician assistant to:  (1) confirm your diagnosis,  (2) re-evaluation of changes in your illness and treatment, and  (3) for ongoing care. If your symptoms become worse or you do not improve as expected and you are unable to reach your usual health care provider, you should return to the Emergency Department. We are available 24 hours a day. Please take your discharge instructions with you when you go to your follow-up appointment. If you have any problem arranging a follow-up appointment, contact the Emergency Department immediately. If a prescription has been provided, please have it filled as soon as possible to prevent a delay in treatment. Read the entire medication instruction sheet provided to you by the pharmacy. If you have any questions or reservations about taking the medication due to side effects or interactions with other medications, please call your primary care physician or contact the ER to speak with the charge nurse.      Make an appointment with your family doctor or the physician you were referred to for follow-up of this visit as instructed on your discharge paperwork, as this is a mandatory follow-up. Return to the ER if you are unable to be seen or if you are unable to be seen in a timely manner. If you have any problem arranging the follow-up visit, contact the Emergency Department immediately.

## 2021-09-27 NOTE — ED TRIAGE NOTES
Pt SOB, hx of bronchitis, having congested cough.  also sick with hypotension, lethargy, diarrhea, and hypoxia.

## 2021-09-28 ENCOUNTER — PATIENT OUTREACH (OUTPATIENT)
Dept: CASE MANAGEMENT | Age: 81
End: 2021-09-28

## 2021-09-28 NOTE — PROGRESS NOTES
9/28/2021  9:17 AM    Patient contacted regarding COVID-19 diagnosis. Discussed COVID-19 related testing which was available at this time. Test results were positive. Patient informed of results, if available? no.       Patient outreach attempt by this AC today to perform initial post-discharge assessment; lvm requesting a return phone call to this ACM.

## 2021-09-29 NOTE — PROGRESS NOTES
9/29/2021  11:17 AM    Second patient outreach attempt by this ACM to perform initial post-discharge assessment. LVM requesting a return phone call to this ACM. Netscape message routed to patient with COVID-19 resources. COVID Care Transitions episode resolved at this time due to ACM inability to reach patient.

## 2021-10-01 ENCOUNTER — APPOINTMENT (OUTPATIENT)
Dept: INFUSION THERAPY | Age: 81
End: 2021-10-01

## 2021-10-01 DIAGNOSIS — E78.2 MIXED HYPERLIPIDEMIA: ICD-10-CM

## 2021-10-01 RX ORDER — ATORVASTATIN CALCIUM 40 MG/1
TABLET, FILM COATED ORAL
Qty: 90 TABLET | Refills: 1 | Status: SHIPPED | OUTPATIENT
Start: 2021-10-01 | End: 2022-04-05

## 2021-10-09 DIAGNOSIS — M51.36 DEGENERATIVE LUMBAR DISC: ICD-10-CM

## 2021-10-09 DIAGNOSIS — E11.42 DIABETIC POLYNEUROPATHY ASSOCIATED WITH TYPE 2 DIABETES MELLITUS (HCC): ICD-10-CM

## 2021-10-09 DIAGNOSIS — M12.9 ARTHROPATHY: ICD-10-CM

## 2021-10-09 DIAGNOSIS — M79.18 MYOFASCIAL MUSCLE PAIN: ICD-10-CM

## 2021-10-12 RX ORDER — GABAPENTIN 600 MG/1
600 TABLET ORAL 4 TIMES DAILY
Qty: 120 TABLET | Refills: 1 | Status: SHIPPED | OUTPATIENT
Start: 2021-10-12 | End: 2021-11-15 | Stop reason: DRUGHIGH

## 2021-10-12 RX ORDER — HYDROCODONE BITARTRATE AND ACETAMINOPHEN 5; 325 MG/1; MG/1
1 TABLET ORAL
Qty: 60 TABLET | Refills: 0 | Status: SHIPPED | OUTPATIENT
Start: 2021-10-12 | End: 2022-01-26 | Stop reason: SDUPTHER

## 2021-10-12 NOTE — TELEPHONE ENCOUNTER
Rx request for gabapentin and Norco received. Pt takes [place] sparingly with last fill 3 months ago. Has appt coming up, can get updated UDS at that time.  reviewed and appropriate. Rx refilled.

## 2021-10-29 DIAGNOSIS — K21.9 GASTROESOPHAGEAL REFLUX DISEASE, UNSPECIFIED WHETHER ESOPHAGITIS PRESENT: ICD-10-CM

## 2021-11-01 RX ORDER — PANTOPRAZOLE SODIUM 40 MG/1
TABLET, DELAYED RELEASE ORAL
Qty: 90 TABLET | Refills: 1 | Status: SHIPPED | OUTPATIENT
Start: 2021-11-01 | End: 2022-01-26 | Stop reason: SDUPTHER

## 2021-11-03 DIAGNOSIS — E11.21 TYPE 2 DIABETES WITH NEPHROPATHY (HCC): ICD-10-CM

## 2021-11-03 DIAGNOSIS — E55.9 VITAMIN D DEFICIENCY: ICD-10-CM

## 2021-11-03 DIAGNOSIS — M81.0 AGE-RELATED OSTEOPOROSIS WITHOUT CURRENT PATHOLOGICAL FRACTURE: ICD-10-CM

## 2021-11-03 DIAGNOSIS — N18.2 CKD (CHRONIC KIDNEY DISEASE) STAGE 2, GFR 60-89 ML/MIN: ICD-10-CM

## 2021-11-03 RX ORDER — METFORMIN HYDROCHLORIDE 500 MG/1
TABLET ORAL
Qty: 180 TABLET | Refills: 1 | Status: SHIPPED | OUTPATIENT
Start: 2021-11-03 | End: 2022-05-02

## 2021-11-04 RX ORDER — LEFLUNOMIDE 20 MG/1
20 TABLET ORAL DAILY
Qty: 90 TABLET | Refills: 1 | Status: SHIPPED | OUTPATIENT
Start: 2021-11-04 | End: 2021-12-01 | Stop reason: SDUPTHER

## 2021-11-10 ENCOUNTER — TELEPHONE (OUTPATIENT)
Dept: FAMILY MEDICINE CLINIC | Age: 81
End: 2021-11-10

## 2021-11-10 NOTE — TELEPHONE ENCOUNTER
----- Message from Shannan Horowitz sent at 11/9/2021 11:08 AM EST -----  Subject: Message to Provider    QUESTIONS  Information for Provider? patient lost demetrio and she misses you   ---------------------------------------------------------------------------  --------------  CALL BACK INFO  What is the best way for the office to contact you? OK to leave message on   voicemail  Preferred Call Back Phone Number? [phone]  ---------------------------------------------------------------------------  --------------  SCRIPT ANSWERS  Relationship to Patient?  Self

## 2021-11-12 ENCOUNTER — APPOINTMENT (OUTPATIENT)
Dept: INFUSION THERAPY | Age: 81
End: 2021-11-12

## 2021-11-15 ENCOUNTER — OFFICE VISIT (OUTPATIENT)
Dept: FAMILY MEDICINE CLINIC | Age: 81
End: 2021-11-15
Payer: MEDICARE

## 2021-11-15 VITALS
SYSTOLIC BLOOD PRESSURE: 101 MMHG | HEIGHT: 61 IN | HEART RATE: 107 BPM | BODY MASS INDEX: 31.53 KG/M2 | WEIGHT: 167 LBS | DIASTOLIC BLOOD PRESSURE: 59 MMHG | OXYGEN SATURATION: 96 % | RESPIRATION RATE: 20 BRPM | TEMPERATURE: 98.4 F

## 2021-11-15 DIAGNOSIS — E78.2 MIXED HYPERLIPIDEMIA: ICD-10-CM

## 2021-11-15 DIAGNOSIS — R74.8 ELEVATED LIVER ENZYMES: ICD-10-CM

## 2021-11-15 DIAGNOSIS — Z23 ENCOUNTER FOR IMMUNIZATION: ICD-10-CM

## 2021-11-15 DIAGNOSIS — E11.42 DIABETIC POLYNEUROPATHY ASSOCIATED WITH TYPE 2 DIABETES MELLITUS (HCC): Primary | ICD-10-CM

## 2021-11-15 PROCEDURE — G8510 SCR DEP NEG, NO PLAN REQD: HCPCS | Performed by: FAMILY MEDICINE

## 2021-11-15 PROCEDURE — G8536 NO DOC ELDER MAL SCRN: HCPCS | Performed by: FAMILY MEDICINE

## 2021-11-15 PROCEDURE — G8427 DOCREV CUR MEDS BY ELIG CLIN: HCPCS | Performed by: FAMILY MEDICINE

## 2021-11-15 PROCEDURE — 1090F PRES/ABSN URINE INCON ASSESS: CPT | Performed by: FAMILY MEDICINE

## 2021-11-15 PROCEDURE — G0008 ADMIN INFLUENZA VIRUS VAC: HCPCS | Performed by: FAMILY MEDICINE

## 2021-11-15 PROCEDURE — 1101F PT FALLS ASSESS-DOCD LE1/YR: CPT | Performed by: FAMILY MEDICINE

## 2021-11-15 PROCEDURE — G8417 CALC BMI ABV UP PARAM F/U: HCPCS | Performed by: FAMILY MEDICINE

## 2021-11-15 PROCEDURE — G8752 SYS BP LESS 140: HCPCS | Performed by: FAMILY MEDICINE

## 2021-11-15 PROCEDURE — G8754 DIAS BP LESS 90: HCPCS | Performed by: FAMILY MEDICINE

## 2021-11-15 PROCEDURE — 99214 OFFICE O/P EST MOD 30 MIN: CPT | Performed by: FAMILY MEDICINE

## 2021-11-15 PROCEDURE — 90694 VACC AIIV4 NO PRSRV 0.5ML IM: CPT | Performed by: FAMILY MEDICINE

## 2021-11-15 RX ORDER — GABAPENTIN 800 MG/1
800 TABLET ORAL 3 TIMES DAILY
Qty: 90 TABLET | Refills: 2 | Status: SHIPPED | OUTPATIENT
Start: 2021-11-15 | End: 2022-02-10 | Stop reason: SDUPTHER

## 2021-11-15 NOTE — PATIENT INSTRUCTIONS
Grief (Actual/Anticipated): Care Instructions  Overview     Grief is your emotional reaction to a major loss. The words \"sorrow\" and \"heartache\" often are used to describe feelings of grief. You feel grief when you lose a beloved person, pet, place, or thing. It is also natural to feel grief when you lose a valued way of life, such as a job, marriage, or good health. You may begin to grieve before a loss occurs. You may grieve for a loved one who is sick and dying. Children and adults often feel the pain of loss before a big move or divorce. This type of grief helps you get ready for a loss. Grief is different for each person. There is no \"normal\" or \"expected\" period of time for grieving. Some people adjust to their loss within a couple of months. Others may take 2 years or longer, especially if their lives were changed a lot or if the loss was sudden and shocking. Grieving can cause problems such as headaches, loss of appetite, and trouble with thinking or sleeping. You may withdraw from friends and family and behave in ways that are unusual for you. Grief may cause you to question your beliefs and views about life. Grief is natural and does not require medical treatment. But if you have trouble sleeping, it may help to take sleeping pills for a short time. It may help to talk with people who have been through or are going through similar losses. You may also want to talk to a counselor about your feelings. Talking about your loss, sharing your cares and concerns, and getting support from others are important parts of healthy grieving. Follow-up care is a key part of your treatment and safety. Be sure to make and go to all appointments, and call your doctor if you are having problems. It's also a good idea to know your test results and keep a list of the medicines you take. How can you care for yourself at home? · Get enough sleep. Your mind helps make sense of your life while you sleep.  Missing sleep can lead to illness and make it harder for you to deal with your grief. · Eat healthy foods. Try to avoid eating only foods that give you comfort. Ask someone to join you for a meal if you don't like eating alone. Consider taking a multivitamin every day. · Get some exercise every day. Even a walk can help you deal with your grief. Other exercises, such as yoga, can also help you manage stress. · Comfort yourself. Take time to look at photos or use special items that make you feel better. · Stay involved in your life. Don't withdraw from the activities you enjoy. People you know at work, Jew, clubs, or other groups can help you get through your period of grief. · Think about joining a support group to help you deal with your grief. There are many support groups to help people recover from grief. When should you call for help? Call 911 anytime you think you may need emergency care. For example, call if:    · You feel you cannot stop from hurting yourself or someone else. Watch closely for changes in your health, and be sure to contact your doctor if:    · You think you may be depressed.     · You do not get better as expected. Where can you learn more? Go to http://www.gray.com/  Enter H249 in the search box to learn more about \"Grief (Actual/Anticipated): Care Instructions. \"  Current as of: March 17, 2021               Content Version: 13.0  © 3007-4712 [employer]. Care instructions adapted under license by [employer] (which disclaims liability or warranty for this information). If you have questions about a medical condition or this instruction, always ask your healthcare professional. [place] [address] any warranty or liability for your use of this information.

## 2021-11-15 NOTE — PROGRESS NOTES
1. Have you been to the ER, urgent care clinic since your last visit? Hospitalized since your last visit? No    2. Have you seen or consulted any other health care providers outside of the [address] [place] [address] since your last visit? Include any pap smears or colon screening.  No  Reviewed record in preparation for visit and have necessary documentation  Pt did not bring medication to office visit for review    Goals that were addressed and/or need to be completed during or after this appointment include   Health Maintenance Due   Topic Date Due    Shingrix Vaccine Age 49> (1 of 2) Never done    Flu Vaccine (1) 09/01/2021    COVID-19 Vaccine (3 - Booster for Pfizer series) 10/14/2021    A1C test (Diabetic or Prediabetic)  11/10/2021    Foot Exam Q1  11/16/2021    Medicare Yearly Exam  11/17/2021

## 2021-11-15 NOTE — PROGRESS NOTES
CC: f/u DM and HLD    HPI: Pt is a 79 y.o. female who presents for f/u DM and HLD. Of note, she reports that she is on amlodipine for her Raynaud's syndrome but has never had HTN. Her BP has ranged low the past few visits but she denies symptoms of dizziness, lightheaded, or palpitations. She is inquiring about switching her gabapentin from 600mg four times a day to 800mg three times a day. Her  passed away last month. He was hospitalized for COVID after 6 months of increasing medical problems and when he was not responding well to therapies, the family made the decision to transition him to inpatient hospice. Pt states she misses him but feels like overall she is coping well and denies symptoms of depression. She lives close to her daughter and her family has been a strong support system. She lost about 30lbs over this time from stress but states she is back to eating normally now.      DM:  Checking BG at home?: YES, fasting and sometimes after dinner  Logs today?: NO  BG range: 's  Insulin dependent?: NO  Other medications for DM?: Metformin 500mg BID and glipizide 2.5mg daily  Symptoms of hypoglycemia?: NO  Aspirin?: YES  ACEi/ARB?: NO  Statin?: YES  Smoking?: NO  Last eye exam?: 10/2021  Last foot exam?: 11/2020  Last A1c:   Lab Results   Component Value Date/Time    Hemoglobin A1c 6.1 (H) 05/10/2021 03:25 PM    Hemoglobin A1c (POC) 6.1 06/24/2015 04:12 PM     LastLDL:   Lab Results   Component Value Date/Time    LDL, calculated 72.6 05/10/2021 03:25 PM     Last microalbumin:   Lab Results   Component Value Date/Time    Microalbumin/Creat ratio (mg/g creat) 43 (H) 05/10/2021 03:25 PM    Microalbumin,urine random 2.69 05/10/2021 03:25 PM         Past Medical History:   Diagnosis Date    Anemia     Arthritis     CAD (coronary artery disease) 1997    MI    Degenerative joint disease of right hip 09/14/2015     Virgil Hernandez/Dr. Rachelle Hooper    Diabetes ([facility] [address]     Fracture     right shoulder; T6 compression    Gastritis     H/O Bell's palsy 2012    R-side of face. Dx >20 years ago. Has persistent R-sided facial droop     Hypercholesterolemia     Hyperlipidemia    Lupus ([address] 2015    Osteoporosis, post-menopausal     vertebral fracture    Squamous cell cancer of skin of left cheek     Squamous cell skin cancer     Right knee       Family History   Problem Relation Age of Onset    Diabetes Mother     Hypertension Mother     Heart Disease Mother     Diabetes Father     Hypertension Father     Heart Disease Father    [facility] Arthritis-rheumatoid Sister     Elevated Lipids Sister    [facility] Arthritis-rheumatoid Sister     Hypertension Sister     Diabetes Sister     Thyroid Disease Sister     Arthritis-rheumatoid Other        Social History     Tobacco Use    Smoking status: Former Smoker     Quit date: 1989     Years since quittin.8    Smokeless tobacco: Never Used   Substance Use Topics    Alcohol use: No     Alcohol/week: 0.0 standard drinks     Comment: Occasional    Drug use: No       ROS:  Per HPI    PE:  Visit Vitals  BP (!) 101/59   Pulse (!) 107   Temp 98.4 °F (36.9 °C)   Resp 20   Ht 5' 1\" (1.549 m)   Wt 167 lb (75.8 kg)   SpO2 96%   BMI 31.55 kg/m²     Gen: Pt sitting in chair, in NAD  Head: Normocephalic, atraumatic  Eyes: Sclera anicteric, EOM grossly intact, PERRL  Nose: Normal nasal mucosa  Throat: MMM, normal lips, tongue and gums  Neck: Supple, no LAD, no thyromegaly or carotid bruits  CVS: Normal S1, S2, no m/r/g  Resp: CTAB, no wheezes or rales  Extrem: Atraumatic, no cyanosis or edema  Feet: Skin intact, no lesions. DP pulses 2+ b/l. Sensation intact to light touch throughout and decreased to monofilament on balls of feet overlying the MCP  Pulses: 2+   Skin: Warm, dry  Neuro: Alert, oriented, appropriate      A/P:   Encounter Diagnoses     ICD-10-CM ICD-9-CM   1. Diabetic polyneuropathy associated with type 2 diabetes mellitus (HCC)  E11.42 250.60     357.2   2. Mixed hyperlipidemia  E78.2 272.2   3. Elevated liver enzymes  R74.8 790.5   4. Encounter for immunization  Z23 V03.89     1. Diabetic polyneuropathy associated with type 2 diabetes mellitus ([facility] [address]): Will switch gabapentin as requested to decrease pill burden and make it easier for her to take. Discussed that with weight loss, her BG may be lower than in the past and we may be able to come off the glipizide.   - gabapentin (NEURONTIN) 800 mg tablet; Take 1 Tablet by mouth three (3) times daily. Max Daily Amount: 2,400 mg. Dispense: 90 Tablet; Refill: 2  - HM DIABETES FOOT EXAM  - HEMOGLOBIN A1C WITH EAG; Future  - MICROALBUMIN, UR, RAND W/ MICROALB/CREAT RATIO; Future  - MICROALBUMIN, UR, RAND W/ MICROALB/CREAT RATIO  - HEMOGLOBIN A1C WITH EAG    2. Mixed hyperlipidemia:  - LIPID PANEL; Future  - METABOLIC PANEL, COMPREHENSIVE; Future  - METABOLIC PANEL, COMPREHENSIVE  - LIPID PANEL    3. Elevated liver enzymes: Noted when in the ER with COVID, likely 2/2 virus. Will recheck today  - METABOLIC PANEL, COMPREHENSIVE; Future  - METABOLIC PANEL, COMPREHENSIVE    4. Encounter for immunization:  - ADMIN INFLUENZA VIRUS VAC  - FLU (FLUAD QUAD INFLUENZA VACCINE,QUAD,ADJUVANTED)    5. Grief: Pt's symptoms sound like classic grief and it seems she is coping well. Expressed our condolences and reinforced that we are here should she start to develop symptoms that are different/more severe. 6. Hypotension: Pt asymptomatic. Advised that she could try taking half of her amlodipine instead of the 10mg tab to see if this helps. If she starts to get more symptoms of the Raynaud's she can go back to thr 10mg dose. RTC in 6 months for f/u chronic conditions, or sooner prn      Discussed diagnoses in detail with patient. Medication risks/benefits/side effects discussed with patient. All of the patient's questions were addressed. The patient understands and agrees with our plan of care.   The patient knows to call back if they are unsure of or forget any changes we discussed today or if the symptoms change. The patient received an After-Visit Summary which contains VS, orders, medication list and allergy list. This can be used as a \"mini-medical record\" should they have to seek medical care while out of town. Current Outpatient Medications on File Prior to Visit   Medication Sig Dispense Refill    leflunomide (ARAVA) 20 mg tablet TAKE 1 TABLET BY MOUTH DAILY 90 Tablet 1    metFORMIN (GLUCOPHAGE) 500 mg tablet TAKE 1 TABLET BY MOUTH TWICE DAILY WITH MEALS 180 Tablet 1    pantoprazole (PROTONIX) 40 mg tablet TAKE 1 TABLET BY MOUTH DAILY 90 Tablet 1    HYDROcodone-acetaminophen (NORCO) 5-325 mg per tablet Take 1 Tablet by mouth every six (6) hours as needed for Pain for up to 90 days. 60 Tablet 0    atorvastatin (LIPITOR) 40 mg tablet TAKE 1 TABLET BY MOUTH DAILY 90 Tablet 1    tofacitinib 11 mg Tb24 Take 11 mg by mouth daily. Indications: rheumatoid arthritis 30 Tablet 11    trimethoprim (TRIMPEX) 100 mg tablet TAKE 1 TABLET BY MOUTH DAILY FOR URINARY TRACT INFECTION PREVENTION 30 Tablet 5    ferrous sulfate (FeroSuL) 325 mg (65 mg iron) tablet TAKE 1 TABLET BY MOUTH TWICE DAILY FOR LOW IRON 60 Tablet 5    amLODIPine (NORVASC) 10 mg tablet Take 1 Tablet by mouth daily. 90 Tablet 5    nortriptyline (PAMELOR) 10 mg capsule TAKE 1 CAPSULE BY MOUTH EVERY NIGHT 30 Capsule 5    glipiZIDE SR (GLUCOTROL XL) 2.5 mg CR tablet TAKE 1 TABLET BY MOUTH TWICE DAILY 180 Tablet 1    cyclobenzaprine (FLEXERIL) 10 mg tablet Take 1 Tablet by mouth nightly.  90 Tablet 1    nitroglycerin (NITROSTAT) 0.4 mg SL tablet DISSOLVE 1 TABLET UNDER THE TONGUE EVERY 5 MINUTES AS NEEDED FOR CHEST PAIN 25 Tab 0    glucose blood VI test strips ([employer] CONTOUR) strip Glucometer for 250.00 Pt. test blood sugar once daily (in morning) 50 Strip 11    metoprolol succinate (TOPROL-XL) 25 mg XL tablet TAKE 1 TABLET BY MOUTH EVERY DAY 90 Tab 1    denosumab (PROLIA) 60 mg/mL injection 1 mL by SubCUTAneous route every 6 months.  acetaminophen (TYLENOL) 650 mg CR tablet Take 1,300 mg by mouth daily as needed for Pain.  Blood-Glucose Meter (CONTOUR METER) monitoring kit Dx: 250. Check blood sugars daily. 1 kit 11    ZINC PO Take 50 mg by mouth daily.  cholecalciferol, vitamin d3, (VITAMIN D3) 1,000 unit tablet Take 2,000 Units by mouth daily.  calcium-vitamin D (CALCIUM 500+D) 500 mg(1,250mg) -200 unit per tablet Take 2 Tabs by mouth daily.  aspirin 81 mg Tab Take 81 mg by mouth daily.  MULTIVITAMINS (MULTIVITAMIN PO) Take 1 Tab by mouth daily. No current facility-administered medications on file prior to visit.

## 2021-11-16 LAB
ALBUMIN SERPL-MCNC: 3.8 G/DL (ref 3.5–5)
ALBUMIN/GLOB SERPL: 1.3 {RATIO} (ref 1.1–2.2)
ALP SERPL-CCNC: 90 U/L (ref 45–117)
ALT SERPL-CCNC: 40 U/L (ref 12–78)
ANION GAP SERPL CALC-SCNC: 7 MMOL/L (ref 5–15)
AST SERPL-CCNC: 47 U/L (ref 15–37)
BILIRUB SERPL-MCNC: 0.4 MG/DL (ref 0.2–1)
BUN SERPL-MCNC: 16 MG/DL (ref 6–20)
BUN/CREAT SERPL: 18 (ref 12–20)
CALCIUM SERPL-MCNC: 9 MG/DL (ref 8.5–10.1)
CHLORIDE SERPL-SCNC: 106 MMOL/L (ref 97–108)
CHOLEST SERPL-MCNC: 140 MG/DL
CO2 SERPL-SCNC: 23 MMOL/L (ref 21–32)
CREAT SERPL-MCNC: 0.89 MG/DL (ref 0.55–1.02)
CREAT UR-MCNC: 65.5 MG/DL
EST. AVERAGE GLUCOSE BLD GHB EST-MCNC: 114 MG/DL
GLOBULIN SER CALC-MCNC: 3 G/DL (ref 2–4)
GLUCOSE SERPL-MCNC: 100 MG/DL (ref 65–100)
HBA1C MFR BLD: 5.6 % (ref 4–5.6)
HDLC SERPL-MCNC: 46 MG/DL
HDLC SERPL: 3 {RATIO} (ref 0–5)
LDLC SERPL CALC-MCNC: 61.2 MG/DL (ref 0–100)
MICROALBUMIN UR-MCNC: 3.67 MG/DL
MICROALBUMIN/CREAT UR-RTO: 56 MG/G (ref 0–30)
POTASSIUM SERPL-SCNC: 4.7 MMOL/L (ref 3.5–5.1)
PROT SERPL-MCNC: 6.8 G/DL (ref 6.4–8.2)
SODIUM SERPL-SCNC: 136 MMOL/L (ref 136–145)
TRIGL SERPL-MCNC: 164 MG/DL (ref ?–150)
VLDLC SERPL CALC-MCNC: 32.8 MG/DL

## 2021-11-23 ENCOUNTER — TELEPHONE (OUTPATIENT)
Dept: FAMILY MEDICINE CLINIC | Age: 81
End: 2021-11-23

## 2021-11-23 DIAGNOSIS — I10 BENIGN HYPERTENSION: ICD-10-CM

## 2021-11-23 DIAGNOSIS — R79.89 ELEVATED LFTS: Primary | ICD-10-CM

## 2021-11-23 RX ORDER — AMLODIPINE BESYLATE 5 MG/1
5 TABLET ORAL DAILY
Qty: 90 TABLET | Refills: 1 | Status: SHIPPED | OUTPATIENT
Start: 2021-11-23 | End: 2022-06-03 | Stop reason: SDUPTHER

## 2021-11-23 NOTE — TELEPHONE ENCOUNTER
Called to advise pt of recent results as chart review shows she has not read the [employer] message yet. A1c looks great, likely in part 2/2 her weight loss. Can get rid of glucotrol. She is in agreement with this. Will recheck A1c in 6 months.      Kidney function and cholesterol look good.      Liver function is a little off. She has had all the bloodwork to r/o hepatitis and hemochromatosis but I don't see an US in the system. She does not think she has ever had one. Order placed. She reports it is too hard to cut the 10mg amlodipine tabs so she would like an rx for 5mg to be sent to her pharmacy. Rx sent. All questions answered and pt feels comfortable with the plan of care.

## 2021-11-29 ENCOUNTER — HOSPITAL ENCOUNTER (OUTPATIENT)
Dept: MAMMOGRAPHY | Age: 81
Discharge: HOME | End: 2021-11-29
Payer: MEDICARE

## 2021-11-29 DIAGNOSIS — R79.89 ELEVATED LFTS: ICD-10-CM

## 2021-11-29 PROCEDURE — 76705 ECHO EXAM OF ABDOMEN: CPT

## 2021-12-01 ENCOUNTER — OFFICE VISIT (OUTPATIENT)
Dept: RHEUMATOLOGY | Age: 81
End: 2021-12-01
Payer: MEDICARE

## 2021-12-01 VITALS
SYSTOLIC BLOOD PRESSURE: 111 MMHG | WEIGHT: 166 LBS | HEART RATE: 91 BPM | TEMPERATURE: 97.8 F | BODY MASS INDEX: 31.37 KG/M2 | DIASTOLIC BLOOD PRESSURE: 64 MMHG

## 2021-12-01 DIAGNOSIS — Z79.60 LONG-TERM USE OF IMMUNOSUPPRESSANT MEDICATION: ICD-10-CM

## 2021-12-01 DIAGNOSIS — Z11.59 ENCOUNTER FOR SCREENING FOR OTHER VIRAL DISEASES: ICD-10-CM

## 2021-12-01 DIAGNOSIS — N18.2 CKD (CHRONIC KIDNEY DISEASE) STAGE 2, GFR 60-89 ML/MIN: ICD-10-CM

## 2021-12-01 DIAGNOSIS — M06.09 SERONEGATIVE RHEUMATOID ARTHRITIS OF MULTIPLE SITES (HCC): Primary | ICD-10-CM

## 2021-12-01 DIAGNOSIS — E55.9 VITAMIN D DEFICIENCY: ICD-10-CM

## 2021-12-01 DIAGNOSIS — M81.0 AGE-RELATED OSTEOPOROSIS WITHOUT CURRENT PATHOLOGICAL FRACTURE: ICD-10-CM

## 2021-12-01 PROCEDURE — G8752 SYS BP LESS 140: HCPCS | Performed by: INTERNAL MEDICINE

## 2021-12-01 PROCEDURE — 99215 OFFICE O/P EST HI 40 MIN: CPT | Performed by: INTERNAL MEDICINE

## 2021-12-01 PROCEDURE — G8754 DIAS BP LESS 90: HCPCS | Performed by: INTERNAL MEDICINE

## 2021-12-01 PROCEDURE — G8536 NO DOC ELDER MAL SCRN: HCPCS | Performed by: INTERNAL MEDICINE

## 2021-12-01 PROCEDURE — G0463 HOSPITAL OUTPT CLINIC VISIT: HCPCS | Performed by: INTERNAL MEDICINE

## 2021-12-01 PROCEDURE — 1090F PRES/ABSN URINE INCON ASSESS: CPT | Performed by: INTERNAL MEDICINE

## 2021-12-01 PROCEDURE — G8417 CALC BMI ABV UP PARAM F/U: HCPCS | Performed by: INTERNAL MEDICINE

## 2021-12-01 PROCEDURE — 1101F PT FALLS ASSESS-DOCD LE1/YR: CPT | Performed by: INTERNAL MEDICINE

## 2021-12-01 PROCEDURE — G8427 DOCREV CUR MEDS BY ELIG CLIN: HCPCS | Performed by: INTERNAL MEDICINE

## 2021-12-01 PROCEDURE — G8432 DEP SCR NOT DOC, RNG: HCPCS | Performed by: INTERNAL MEDICINE

## 2021-12-01 RX ORDER — HYDROCORTISONE SODIUM SUCCINATE 100 MG/2ML
100 INJECTION, POWDER, FOR SOLUTION INTRAMUSCULAR; INTRAVENOUS AS NEEDED
Status: CANCELLED | OUTPATIENT
Start: 2021-12-01

## 2021-12-01 RX ORDER — DIPHENHYDRAMINE HYDROCHLORIDE 50 MG/ML
25 INJECTION, SOLUTION INTRAMUSCULAR; INTRAVENOUS AS NEEDED
Status: CANCELLED
Start: 2021-12-01

## 2021-12-01 RX ORDER — ALBUTEROL SULFATE 0.83 MG/ML
2.5 SOLUTION RESPIRATORY (INHALATION) AS NEEDED
Status: CANCELLED
Start: 2021-12-01

## 2021-12-01 RX ORDER — ONDANSETRON 2 MG/ML
8 INJECTION INTRAMUSCULAR; INTRAVENOUS AS NEEDED
Status: CANCELLED | OUTPATIENT
Start: 2021-12-01

## 2021-12-01 RX ORDER — ACETAMINOPHEN 325 MG/1
650 TABLET ORAL AS NEEDED
Status: CANCELLED
Start: 2021-12-01

## 2021-12-01 RX ORDER — DIPHENHYDRAMINE HYDROCHLORIDE 50 MG/ML
50 INJECTION, SOLUTION INTRAMUSCULAR; INTRAVENOUS AS NEEDED
Status: CANCELLED
Start: 2021-12-01

## 2021-12-01 RX ORDER — EPINEPHRINE 1 MG/ML
0.3 INJECTION, SOLUTION, CONCENTRATE INTRAVENOUS AS NEEDED
Status: CANCELLED | OUTPATIENT
Start: 2021-12-01

## 2021-12-01 RX ORDER — LEFLUNOMIDE 20 MG/1
20 TABLET ORAL DAILY
Qty: 30 TABLET | Refills: 4 | Status: SHIPPED | OUTPATIENT
Start: 2021-12-01 | End: 2022-02-01 | Stop reason: SDUPTHER

## 2021-12-01 NOTE — PROGRESS NOTES
REASON FOR VISIT    This is a follow-up visit for Ms. Rueda for     ICD-10-CM   1. Seronegative rheumatoid arthritis of multiple sites ([facility] M06.09   2.  Age-related osteoporosis without current pathological fracture M81.0      Inflammatory arthritis phenotype includes:  Anti-CCP positive: no  Rheumatoid factor positive: no  Erosive disease: yes  Extra-articular manifestations include: Raynaud's    Immunosuppression Screening (9/10/2020):  Quantiferon TB: negative   PPD:  Not performed  Hepatitis B: negative    Hepatitis C: negative     Therapy History includes:  Current DMARD therapy include: leflunomide 20 mg daily (2/13/2018 to 10/2019; 2/12/2020 to present), Yasemin Coleman 11 mg XR daily (8/2021 to present)  Prior DMARD therapy include: methotrexate 12.5 mg subcutaneous,  hydroxychloroquine 300 mg daily, Humira (5/10/2018: 3 samples: cost), Simponi Aria every 6 weeks (12/07/2018 to 5/28/2021)  Discontinued DMARDs because of inefficacy: Simponi Aria  Discontinued DMARDs because of side effects: methotrexate (elevated LFTs), leflunomide (elevated LFTs)  Contra-Indicated DMARDs because of chronic kidney disease: methotrexate     Osteoporosis Historical Synopsis    Height loss since age 26 (at least two inches): 5  Fracture history includes: yes (T5-T6)  Family history of hip fracture: no  Fall Risk: no    Smoking history: no  Alcohol consumption: no  Prednisone history: no    Exercise: no    Previous work-up for osteoporosis includes the following:  DEXA Scan: 8/16/2019  Vitamin 25OH D level: 46.6 (21/3/2018)  PTH: 21 (21/3/2018)  TSH: 2.570 (21/3/2018)    Therapy History includes:  Current osteoporosis therapy includes: Prolia 60 mg every 6 months (3/29/2018 to present)  Prior osteoporosis therapy includes: Reclast (2 years)  The following osteoporosis therapy have been ineffective: Reclast  The following osteoporosis therapy were stopped because of side effects: none    HISTORY OF PRESENT ILLNESS    Ms. Onel Reis returns for a follow-up. On her last visit, I continued leflunomide 20 mg daily and asked her to contact [facility] to start Xeljanz 11 mg XR daily, which she has taken with good tolerance. Her most recent Prolia was on 3/05/2021 with good tolerance. Today, she feels better. She no longer has pain in in her hands but has a little stiffness lasting 30 minutes in her left 4th PIP without swelling. Her Raynaud has not been very active and is on amlodipine 5 mg daily lowered from 10 mg due to lower pressures. She endorses 30 lbs weight loss, easy bruising. Her   in 10/2021. She denies fever, blurred vision, vision loss, oral ulcers, ankle swelling, dry cough, dyspnea, nausea, vomiting, dysphagia, abdominal pain, black or bloody stool, fall since last visit, rash and increased thirst.    Most recent toxicity monitoring by blood work was done on 11/15/2021 and did not reveal any significant adverse effects. I reviewed the patient's interval medical history, surgical history, medications, and allergies. The patient has not had any interval hospital admissions or surgeries, except for COVID. REVIEW OF SYSTEMS    A comprehensive review of systems was performed and pertinent results are documented in the HPI, review of systems is otherwise non-contributory. PAST MEDICAL HISTORY    She has a past medical history of Anemia, Arthritis, CAD (coronary artery disease) (), Degenerative joint disease of right hip (2015 ), Diabetes ([facility] Fracture, Gastritis, H/O Bell's palsy (2012), Hypercholesterolemia, Lupus ([facility] (2015), Osteoporosis, post-menopausal, Squamous cell cancer of skin of left cheek, and Squamous cell skin cancer. FAMILY HISTORY    Her family history includes Arthritis-rheumatoid in her sister, sister and another family member; Diabetes in her father, mother, and sister; Elevated Lipids in her sister;  Heart Disease in her father and mother; Hypertension in her father, mother, and sister; Thyroid Disease in her sister. SOCIAL HISTORY    She reports that she quit smoking about 31 years ago. She has never used smokeless tobacco. She reports that she does not drink alcohol and does not use drugs. MEDICATIONS    Current Outpatient Medications   Medication Sig    leflunomide (ARAVA) 20 mg tablet Take 1 Tablet by mouth daily.  cyclobenzaprine (FLEXERIL) 10 mg tablet Take 1 Tablet by mouth nightly as needed for Muscle Spasm(s).  amLODIPine (NORVASC) 5 mg tablet Take 1 Tablet by mouth daily.  gabapentin (NEURONTIN) 800 mg tablet Take 1 Tablet by mouth three (3) times daily. Max Daily Amount: 2,400 mg.    metFORMIN (GLUCOPHAGE) 500 mg tablet TAKE 1 TABLET BY MOUTH TWICE DAILY WITH MEALS    pantoprazole (PROTONIX) 40 mg tablet TAKE 1 TABLET BY MOUTH DAILY    HYDROcodone-acetaminophen (NORCO) 5-325 mg per tablet Take 1 Tablet by mouth every six (6) hours as needed for Pain for up to 90 days.  atorvastatin (LIPITOR) 40 mg tablet TAKE 1 TABLET BY MOUTH DAILY    tofacitinib 11 mg Tb24 Take 11 mg by mouth daily. Indications: rheumatoid arthritis    trimethoprim (TRIMPEX) 100 mg tablet TAKE 1 TABLET BY MOUTH DAILY FOR URINARY TRACT INFECTION PREVENTION    ferrous sulfate (FeroSuL) 325 mg (65 mg iron) tablet TAKE 1 TABLET BY MOUTH TWICE DAILY FOR LOW IRON    nortriptyline (PAMELOR) 10 mg capsule TAKE 1 CAPSULE BY MOUTH EVERY NIGHT    nitroglycerin (NITROSTAT) 0.4 mg SL tablet DISSOLVE 1 TABLET UNDER THE TONGUE EVERY 5 MINUTES AS NEEDED FOR CHEST PAIN    glucose blood VI test strips (Asc[employer]ia CONTOUR) strip Glucometer for 250.00 Pt. test blood sugar once daily (in morning)    metoprolol succinate (TOPROL-XL) 25 mg XL tablet TAKE 1 TABLET BY MOUTH EVERY DAY    denosumab (PROLIA) 60 mg/mL injection 1 mL by SubCUTAneous route every 6 months.  acetaminophen (TYLENOL) 650 mg CR tablet Take 1,300 mg by mouth daily as needed for Pain.     Blood-Glucose Meter (CONTOUR METER) monitoring kit Dx: 250. Check blood sugars daily.  ZINC PO Take 50 mg by mouth daily.  cholecalciferol, vitamin d3, (VITAMIN D3) 1,000 unit tablet Take 2,000 Units by mouth daily.  calcium-vitamin D (CALCIUM 500+D) 500 mg(1,250mg) -200 unit per tablet Take 2 Tabs by mouth daily.  aspirin 81 mg Tab Take 81 mg by mouth daily.  MULTIVITAMINS (MULTIVITAMIN PO) Take 1 Tab by mouth daily. No current facility-administered medications for this visit. ALLERGIES    Allergies   Allergen Reactions    Cephalosporins Nausea and Vomiting     Severe vomiting  Other reaction(s): gi upset, unspecified       PHYSICAL EXAMINATION    Visit Vitals  /64   Pulse 91   Temp 97.8 °F (36.6 °C)   Wt 166 lb (75.3 kg)   BMI 31.37 kg/m²     Body mass index is 31.37 kg/m². General: Patient is alert, oriented x 3, not in acute distress, daughter at bedside    HEENT:   Sclerae are not injected and appear moist.  There is no alopecia. Chest:  Breathing comfortably at room air    Musculoskeletal:  A comprehensive musculoskeletal exam was performed for all joints of each upper and lower extremity and assessed for swelling, tenderness and range of motion.       Bilateral Sharla and Heberden nods  Bilateral knee crepitus without effusion    Joint Count 12/1/2021 8/12/2020 2/12/2020 8/7/2019 2/20/2019 11/20/2018 8/20/2018   Patient pain (0-100) 50 - 50 50 50 80 80   MHAQ 0 - 0.375 0.125 0.125 0 0.125   Left wrist- Tender - - - - - 1 1   Left wrist- Swollen - - - - - 1 1   Left 1st MCP - Tender 1 - - - 1 1 1   Left 1st MCP - Swollen 0 - - - 1 1 1   Left 2nd MCP - Tender - - 1 - 0 0 1   Left 2nd MCP - Swollen - - 1 - 1 1 1   Left 3rd MCP - Tender - - - 1 1 1 1   Left 3rd MCP - Swollen - - - 0 1 1 1   Left 4th MCP - Tender - - - - 1 1 1   Left 4th MCP - Swollen - - - - 0 0 1   Left 5th MCP - Tender - - - 1 1 0 -   Left 5th MCP - Swollen - - - 0 1 1 -   Left thumb IP - Tender - - - - - - 1   Left 2nd PIP - Tender - - 1 - 1 1 1   Left 2nd PIP - Swollen - - 1 - 1 1 1   Left 3rd PIP - Tender 1 - 1 1 1 1 1   Left 3rd PIP - Swollen 1 - 1 0 1 1 1   Left 4th PIP - Tender 1 1 1 1 1 1 1   Left 4th PIP - Swollen 1 1 1 0 1 1 1   Left 5th PIP - Tender 1 - 1 1 1 1 1   Left 5th PIP - Swollen 1 - 1 0 1 1 1   Right wrist- Tender - - - - - - -   Right wrist- Swollen - - - - - - -   Right 1st MCP - Tender - - - - 1 1 -   Right 1st MCP - Swollen - - - - 1 1 -   Right 2nd MCP - Tender - - - - 1 1 1   Right 2nd MCP - Swollen - - - - 1 1 1   Right 3rd MCP - Tender - - - - 1 0 1   Right 3rd MCP - Swollen - - - - 1 1 1   Right 4th MCP - Tender - - - - 0 1 1   Right 4th MCP - Swollen - - - - 1 1 1   Right 5th MCP - Tender - - - - 0 - 1   Right 5th MCP - Swollen - - - - 1 - -   Right 2nd PIP - Tender - - - 1 1 1 1   Right 2nd PIP - Swollen - - - 0 1 1 1   Right 3rd PIP - Tender - 1 1 1 1 1 1   Right 3rd PIP - Swollen - 0 1 0 0 1 -   Right 4th PIP - Tender - - 1 - - 1 -   Right 4th PIP - Swollen - - 0 - - 1 -   Right 5th PIP - Tender - - 1 1 0 1 -   Right 5th PIP - Swollen - - 0 0 1 1 -   Tender Joint Count (Total) 4 2 8 8 13 15 16   Swollen Joint Count (Total) 3 1 6 0 15 17 13   Physician Assessment (0-10) 2 - 2 2 4 4 4   Patient Assessment (0-10) 0 - 5 5 2.5 8 5   CDAI Total (calculated) 9 - 21 15 34.5 44 45       DATA REVIEW    Laboratory     Recent laboratory results were reviewed, summarized, and discussed with the patient. Imaging    Musculoskeletal Ultrasound    None    Radiographs    Bilateral Hand 2/13/2018: RIGHT: severe osteopenia without fracture. There are scattered degenerative changes. Progressive first Aia 16 joint. No new erosive changes. Vascular calcifications. LEFT: severe osteopenia. Scattered degenerative changes as noted previously with progression of the index finger and middle finger DIP joints. There is a new erosion at the index finger proximal phalanx ulnar base without adjacent joint space loss.  There are vascular calcifications. Calcification overlying the DRUJ is favored represent overlying vascular calcifications.      Bilateral Foot 2/13/2018: RIGHT: no acute fracture or dislocation. Alignment is anatomic. Mild degenerative changes are seen in the left first MTP joint. Minimal degenerative changes are seen in the right first MTP joint. A possible erosion is seen in the head of the left first metatarsal. No erosive changes are seen in the right foot. Plantar calcaneal heel spurs are noted in both feet, as well as enthesophyte formation at the Achilles insertion site. Arterial vascular calcifications are also noted bilaterally. Mild soft tissue swelling surrounds the left first MTP joint. LEFT: no acute fracture or dislocation. Alignment is anatomic. Mild degenerative changes are seen in the left first MTP joint. Minimal degenerative changes are seen in the right first MTP joint. A possible erosion is seen in the head of the left first metatarsal. No erosive changes are seen in the right foot. Plantar calcaneal heel spurs are noted in both feet, as well as enthesophyte formation at the Achilles insertion site. Arterial vascular calcifications are also noted bilaterally. Mild soft tissue swelling surrounds the left first MTP joint. Thoracic Spine 11/03/2016: The posterior battery pack is again seen within the intrathecal catheter in stable position. Kyphoplasty material is present in a midthoracic vertebral body is stable mild chronic compression of the adjacent inferior vertebral body. The remaining vertebral body heights are maintained. Anterior osteophytes are noted. There is no abnormality in alignment. Lumbar Spine 10/19/2016: significant disc space narrowing at L3-L4 with 14 mm anterolisthesis. There is facet arthropathy. The vertebral body heights are maintained. Spinal catheters are seen. There are atherosclerotic vascular calcifications.     Bilateral Hips 8/31/2015: no fracture, dislocation or other acute abnormality. There osteoarthritic pattern change of both hips, more severe on the right. There is nonuniform joint space narrowing and marginal osteophytes. There is remodeling of the right femur head with subchondral cystic formation. Mild sacroiliac osteoarthritic change. Lower lumbar spondylosis is noted. Wire  leads are seen in the left lower lumbar spine likely reflective of spinal stimulator device. Bilateral Hand 3/01/2011: RIGHT: mild osteopenia. There are degenerative changes of the distal and proximal interphalangeal joints and the base of the thumb. There is no fracture or other acute abnormality. Scattered calcifications are noted in the radial and ulnar arteries. LEFT: mild osteopenia. There are degenerative changes of the distal and proximal interphalangeal joints and  at the base of the thumb. There is no fracture or acute abnormality. There are vascular calcifications of the radial and ulnar arteries. CT Imaging    None    MR Imaging    MRI Brain with without contrast 12/19/2016: There is sulcal and ventricular prominence. Confluent periventricular and scattered foci of increased T2 signal intensity in the corona radiata and centrum semiovale. Tiny remote lacunar infarction in the right cerebellum. There is no intracranial mass, hemorrhage or evidence of acute infarction. There is no Chiari or syrinx. The pituitary and infundibulum are grossly unremarkable. There is no skull base mass. Cerebellopontine angles are grossly unremarkable. The major intracranial vascular flow-voids are unremarkable. No evidence of demyelinating process. There is no abnormal parenchymal enhancement. There is no abnormal meningeal enhancement. The cavernous sinuses are symmetric. Optic chiasm and infundibulum grossly unremarkable. Orbits are grossly symmetric. Dural venous sinuses are grossly patent. The brain architecture is normal. There is no evidence of midline shift or mass-effect.  There are no extra-axial fluid collections. The mastoid air cells and paranasal sinuses are well pneumatized and clear. MRI Lumbar Spine with and without contrast 8/07/2013: There is transitional lumbosacral anatomy. In keeping with prior studies, lowest fully formed disc is labeled L5-S1. There has been prior  laminectomies at L3-5 with no change in grade 1 anterolisthesis at L3-4 which measures 5 mm. There is grade 1 anterolisthesis of L4-5 of approximately 2 mm, unchanged. Vertebral body heights are maintained. There is no marrow edema or compression fracture. There are reactive endplate changes at H5-3.  The conus medullaris terminates at L1-2. There is no abnormal intraspinal enhancement. L1/2:  The spinal canal and foramina are widely patent. L2/3:  Diffuse disc bulge, facet hypertrophy and ligamentum flavum thickening cause mild spinal canal and moderate right foraminal stenosis. Left foramen is patent. L3/4: There is uncovering of disc material with facet hypertrophy resulting in mild thecal sac narrowing. There is severe bilateral foraminal stenosis similar to the prior study. L4/5: Amedeo Nails is diffuse disc bulge and facet hypertrophy, without significant spinal canal stenosis. There is mild right foraminal stenosis. L5/S1:  The spinal canal and foramina are widely patent. DXA     DXA 8/16/2019: (excluded Possible increased density in the L2 and T6 vertebral bodies) left femoral neck T score: -1.1 (0.881 g/cm2), left total hip T score: -0.9 (0.889 g/cm2), right femoral neck T score: -0.7 (0.945 g/cm2), right total hip T score: -0.5 (0.941 g/cm2), and distal one third left radius T score -2.4 (BMD 0.542 g/cm2). FRAX score 21 % probability in 10 years for major osteoporotic fracture and 39 % 10 year probability of hip fracture.     DXA 8/08/2017: (excluded Lumbar spine because of overlying wires and degenerative change and kyphoplasty of L4) showed left femoral neck T score: -1.4 (0.837 g/cm2), left total hip T score: -1.4 (0.830 g/cm2), right femoral neck T score: -0.9 (0.919 g/cm2), right total hip T score: -0.9 (0.900 g/cm2), and distal one third left radius T score -3.0 (BMD 0.615 g/cm2). FRAX score 16.8 % probability in 10 years for major osteoporotic fracture and 3.2 % 10 year probability of hip fracture. Nuclear Medicine    Nuclear Medicine Bone Scan 1/22/2013: There is intense radiotracer uptake in the upper thoracic spine, likely T5. There is uptake in the region of the sternoclavicular joints and left shoulder, likely degenerative. ASSESSMENT AND PLAN    This is a follow-up visit for Ms. Sofía Alvarez. 1) Seronegative Erosive Rheumatoid Arthritis. She is maintained on leflunomide 20 mg daily and Xeljanz 11 mg XR daily, which she has taken with good tolerance and improvement. Today, she no longer has inflammatory joint symptoms like before. Her CDAI was 9 (previously 21, 15, 34.5, 44, 38, 31, 24.5) with 4 tender and 3 swollen joints, consistent with low disease activity. I will continue treatment. 2) Long Term Use of Immunosuppressants. The patient remains on high risk immunomodulatory medications (leflunomide, Dorean Idler) and requires frequent toxicity monitoring by blood work to evaluate for toxicities. Respective labs were ordered (see below orders for details). 3) Age-Related Osteoporosis. (multiple vertebral fractures) Her most recent DXA on  8/16/2019 showed distal one third left radius T score -2.4 (BMD 0.542 g/cm2). FRAX score 21 % probability in 10 years for major osteoporotic fracture and 39 % 10 year probability of hip fracture. She is maintained on Prolia with good tolerance. Her most recent dose was 3/05/2021. I will continue treatment. New therapy plan placed. 4) Primary Raynauds phenomenon. She is on amlodipine 5 mg daily with benefit dropped from 10 mg due to low blood pressure by cardiology. 5) Chronic Kidney Disease Stage 2-3. I will continue to monitor. 6) Dependent Edema.  This was due to varicosities and dependent drainage. 7) Stenosing Tenosynovitis of Left 3rd Digit. I had recommended a steroid injections in the past.     The patient voiced understanding of the aforementioned assessment and plan. A total of 43 minutes was spent on this visit, reviewing interval notes, interval testing results, ordering tests, refilling medications, documenting the findings in the note, patient education, counseling, and coordination of care as described above. All questions asked and answered.     TODAY'S ORDERS    Orders Placed This Encounter    QUANTIFERON-TB GOLD PLUS    LEFLUNOMIDE METABOLITE    HEP B SURFACE AG    HEP B SURFACE AB    HBV CORE AB, IGG/IGM    HEPATITIS C AB    HEPATITIS BE AB    leflunomide (ARAVA) 20 mg tablet     Future Appointments   Date Time Provider Yocasta Cisneros   12/16/2021 11:20 AM Maribel Dubose MD [facility]   2/3/2022  1:00 PM Blu Castro MD AO [facility]   5/16/2022 10:40 AM Maribel Dubose MD [facility]     Ángel Christian MD, [address] [facility]    Adult Rheumatology   Rheumatology Ultrasound Certified  [facility], [address] [place] [address]   Phone [phone]  Fax [phone]

## 2021-12-01 NOTE — LETTER
12/1/2021    Patient: Asaf Ruano   YOB: 1940   Date of Visit: 12/1/2021     Valiant Peabody, MD  [address]  Via In Basket    Dear Valiant Peabody, MD,      Thank you for referring Ms. Norleen Denver to [facility] for evaluation. My notes for this consultation are attached. If you have questions, please do not hesitate to call me. I look forward to following your patient along with you.       Sincerely,    Lidia Guerrero MD

## 2021-12-16 ENCOUNTER — VIRTUAL VISIT (OUTPATIENT)
Dept: FAMILY MEDICINE CLINIC | Age: 81
End: 2021-12-16
Payer: MEDICARE

## 2021-12-16 ENCOUNTER — HOSPITAL ENCOUNTER (OUTPATIENT)
Dept: INFUSION THERAPY | Age: 81
Discharge: HOME | End: 2021-12-16
Attending: INTERNAL MEDICINE
Payer: MEDICARE

## 2021-12-16 VITALS
HEIGHT: 61 IN | WEIGHT: 167 LBS | OXYGEN SATURATION: 91 % | HEART RATE: 100 BPM | SYSTOLIC BLOOD PRESSURE: 129 MMHG | RESPIRATION RATE: 18 BRPM | DIASTOLIC BLOOD PRESSURE: 62 MMHG | TEMPERATURE: 97.2 F | BODY MASS INDEX: 31.53 KG/M2

## 2021-12-16 DIAGNOSIS — M81.0 AGE-RELATED OSTEOPOROSIS WITHOUT CURRENT PATHOLOGICAL FRACTURE: Primary | ICD-10-CM

## 2021-12-16 DIAGNOSIS — Z00.00 MEDICARE ANNUAL WELLNESS VISIT, SUBSEQUENT: Primary | ICD-10-CM

## 2021-12-16 LAB
ALBUMIN SERPL-MCNC: 3.4 G/DL (ref 3.5–5)
ALBUMIN/GLOB SERPL: 1 {RATIO} (ref 1.1–2.2)
ALP SERPL-CCNC: 131 U/L (ref 45–117)
ALT SERPL-CCNC: 44 U/L (ref 12–78)
ANION GAP BLD CALC-SCNC: 9 MMOL/L (ref 10–20)
ANION GAP SERPL CALC-SCNC: 7 MMOL/L (ref 5–15)
AST SERPL-CCNC: 46 U/L (ref 15–37)
BILIRUB SERPL-MCNC: 0.3 MG/DL (ref 0.2–1)
BUN SERPL-MCNC: 13 MG/DL (ref 6–20)
BUN/CREAT SERPL: 15 (ref 12–20)
CA-I BLD-MCNC: 1.05 MMOL/L (ref 1.12–1.32)
CALCIUM SERPL-MCNC: 8.1 MG/DL (ref 8.5–10.1)
CHLORIDE BLD-SCNC: 106 MMOL/L (ref 98–107)
CHLORIDE SERPL-SCNC: 107 MMOL/L (ref 97–108)
CO2 BLD-SCNC: 27.4 MMOL/L (ref 21–32)
CO2 SERPL-SCNC: 26 MMOL/L (ref 21–32)
CREAT BLD-MCNC: 0.87 MG/DL (ref 0.6–1.3)
CREAT SERPL-MCNC: 0.86 MG/DL (ref 0.55–1.02)
GLOBULIN SER CALC-MCNC: 3.3 G/DL (ref 2–4)
GLUCOSE BLD-MCNC: 229 MG/DL (ref 65–100)
GLUCOSE SERPL-MCNC: 216 MG/DL (ref 65–100)
POTASSIUM BLD-SCNC: 4.3 MMOL/L (ref 3.5–5.1)
POTASSIUM SERPL-SCNC: 4.2 MMOL/L (ref 3.5–5.1)
PROT SERPL-MCNC: 6.7 G/DL (ref 6.4–8.2)
SERVICE CMNT-IMP: ABNORMAL
SODIUM BLD-SCNC: 141 MMOL/L (ref 136–145)
SODIUM SERPL-SCNC: 140 MMOL/L (ref 136–145)

## 2021-12-16 PROCEDURE — G8536 NO DOC ELDER MAL SCRN: HCPCS | Performed by: FAMILY MEDICINE

## 2021-12-16 PROCEDURE — G8756 NO BP MEASURE DOC: HCPCS | Performed by: FAMILY MEDICINE

## 2021-12-16 PROCEDURE — 74011250636 HC RX REV CODE- 250/636: Performed by: INTERNAL MEDICINE

## 2021-12-16 PROCEDURE — 80047 BASIC METABLC PNL IONIZED CA: CPT

## 2021-12-16 PROCEDURE — 96372 THER/PROPH/DIAG INJ SC/IM: CPT

## 2021-12-16 PROCEDURE — G8417 CALC BMI ABV UP PARAM F/U: HCPCS | Performed by: FAMILY MEDICINE

## 2021-12-16 PROCEDURE — G0439 PPPS, SUBSEQ VISIT: HCPCS | Performed by: FAMILY MEDICINE

## 2021-12-16 PROCEDURE — G8432 DEP SCR NOT DOC, RNG: HCPCS | Performed by: FAMILY MEDICINE

## 2021-12-16 PROCEDURE — 1101F PT FALLS ASSESS-DOCD LE1/YR: CPT | Performed by: FAMILY MEDICINE

## 2021-12-16 PROCEDURE — G8427 DOCREV CUR MEDS BY ELIG CLIN: HCPCS | Performed by: FAMILY MEDICINE

## 2021-12-16 PROCEDURE — 80053 COMPREHEN METABOLIC PANEL: CPT

## 2021-12-16 PROCEDURE — 36415 COLL VENOUS BLD VENIPUNCTURE: CPT

## 2021-12-16 RX ORDER — ACETAMINOPHEN 325 MG/1
650 TABLET ORAL AS NEEDED
Status: CANCELLED
Start: 2021-12-31

## 2021-12-16 RX ORDER — ONDANSETRON 2 MG/ML
8 INJECTION INTRAMUSCULAR; INTRAVENOUS AS NEEDED
Status: CANCELLED | OUTPATIENT
Start: 2021-12-31

## 2021-12-16 RX ORDER — ALBUTEROL SULFATE 0.83 MG/ML
2.5 SOLUTION RESPIRATORY (INHALATION) AS NEEDED
Status: CANCELLED
Start: 2021-12-31

## 2021-12-16 RX ORDER — DIPHENHYDRAMINE HYDROCHLORIDE 50 MG/ML
50 INJECTION, SOLUTION INTRAMUSCULAR; INTRAVENOUS AS NEEDED
Status: CANCELLED
Start: 2021-12-31

## 2021-12-16 RX ORDER — EPINEPHRINE 1 MG/ML
0.3 INJECTION, SOLUTION, CONCENTRATE INTRAVENOUS AS NEEDED
Status: CANCELLED | OUTPATIENT
Start: 2021-12-31

## 2021-12-16 RX ORDER — HYDROCORTISONE SODIUM SUCCINATE 100 MG/2ML
100 INJECTION, POWDER, FOR SOLUTION INTRAMUSCULAR; INTRAVENOUS AS NEEDED
Status: CANCELLED | OUTPATIENT
Start: 2021-12-31

## 2021-12-16 RX ORDER — DIPHENHYDRAMINE HYDROCHLORIDE 50 MG/ML
25 INJECTION, SOLUTION INTRAMUSCULAR; INTRAVENOUS AS NEEDED
Status: CANCELLED
Start: 2021-12-31

## 2021-12-16 RX ADMIN — DENOSUMAB 60 MG: 60 INJECTION SUBCUTANEOUS at 15:43

## 2021-12-16 NOTE — PROGRESS NOTES
[facility] [place] Progress Note    Date: 2021    Name: Tyrese Santizo    MRN: [id number]         : 1940    Ms. Onel Reis Arrived ambulatory and in no distress for Prolia Injection. Assessment was completed, no acute issues at this time, no new complaints voiced. Labs drawn peripherally and sent for processing. Patient denies recent dental work. Patient states she is currently taking a calcium supplement. Ionized calcium results at 1.05. CMP sent off for further processing. Ms. Rueda's vitals were reviewed. Visit Vitals  /62   Pulse 100   Temp 97.2 °F (36.2 °C)   Resp 18   SpO2 91%   Breastfeeding No     Recent Results (from the past 8 hour(s))   POC CHEM8    Collection Time: 21  2:38 PM   Result Value Ref Range    Calcium, ionized (POC) 1.05 (L) 1.12 - 1.32 mmol/L    Sodium (POC) 141 136 - 145 mmol/L    Potassium (POC) 4.3 3.5 - 5.1 mmol/L    Chloride (POC) 106 98 - 107 mmol/L    CO2 (POC) 27.4 21 - 32 mmol/L    Anion gap (POC) 9 (L) 10 - 20 mmol/L    Glucose (POC) 229 (H) 65 - 100 mg/dL    Creatinine (POC) 0.87 0.6 - 1.3 mg/dL    GFRAA, POC >60 >60 ml/min/1.73m2    GFRNA, POC >60 >60 ml/min/1.73m2    Comment Comment Not Indicated. METABOLIC PANEL, COMPREHENSIVE    Collection Time: 21  2:43 PM   Result Value Ref Range    Sodium 140 136 - 145 mmol/L    Potassium 4.2 3.5 - 5.1 mmol/L    Chloride 107 97 - 108 mmol/L    CO2 26 21 - 32 mmol/L    Anion gap 7 5 - 15 mmol/L    Glucose 216 (H) 65 - 100 mg/dL    BUN 13 6 - 20 MG/DL    Creatinine 0.86 0.55 - 1.02 MG/DL    BUN/Creatinine ratio 15 12 - 20      GFR est AA >60 >60 ml/min/1.73m2    GFR est non-AA >60 >60 ml/min/1.73m2    Calcium 8.1 (L) 8.5 - 10.1 MG/DL    Bilirubin, total 0.3 0.2 - 1.0 MG/DL    ALT (SGPT) 44 12 - 78 U/L    AST (SGOT) 46 (H) 15 - 37 U/L    Alk.  phosphatase 131 (H) 45 - 117 U/L    Protein, total 6.7 6.4 - 8.2 g/dL    Albumin 3.4 (L) 3.5 - 5.0 g/dL    Globulin 3.3 2.0 - 4.0 g/dL    A-G Ratio 1.0 (L) 1.1 - 2.2 Corrected Calcium 8.58, within parameters to treat. Medications:  Medications Administered     denosumab (PROLIA) injection 60 mg     Admin Date  12/16/2021 Action  Given Dose  60 mg Route  SubCUTAneous Administered By  Tan Thomas RN                Ms. Onel Reis tolerated treatment well and was discharged from [place] [address] in stable condition. She is aware of future appointments.     Future Appointments   Date Time Provider [facility]   2/3/2022  1:00 PM [facility]   5/16/2022 10:40 AM Juliana Ramos MD [facility]   6/16/2022  2:30 PM SS INF6 CH4 <1H 9400 No Name Kvng Martinez RN  December 16, 2021

## 2021-12-16 NOTE — PATIENT INSTRUCTIONS

## 2021-12-16 NOTE — PROGRESS NOTES
This is the Subsequent Medicare Annual Wellness Exam, performed 12 months or more after the Initial AWV or the last Subsequent AWV    I have reviewed the patient's medical history in detail and updated the computerized patient record. Assessment/Plan   Education and counseling provided:  Are appropriate based on today's review and evaluation  Shingrix - pt on waiting list for this at pharmacy    1. Medicare annual wellness visit, subsequent       Depression Risk Factor Screening:     3 most recent PHQ Screens 11/15/2021   Little interest or pleasure in doing things Not at all   Feeling down, depressed, irritable, or hopeless Not at all   Total Score PHQ 2 0       Alcohol Risk Screen    Do you average more than 1 drink per night or more than 7 drinks a week:  No    On any one occasion in the past three months have you have had more than 3 drinks containing alcohol:  No        Functional Ability and Level of Safety:    Hearing: Hearing is good. Activities of Daily Living: The home contains: handrails  Patient does total self care      Ambulation: Sometimes uses a cane or rollator for long distances     Fall Risk:  Fall Risk Assessment, last 12 mths 11/15/2021   Able to walk? Yes   Fall in past 12 months? 0   Do you feel unsteady? 0   Are you worried about falling 0   Number of falls in past 12 months -   Fall with injury?  -      Abuse Screen:  Patient is not abused       Cognitive Screening    Has your family/caregiver stated any concerns about your memory: no    Health Maintenance Due     Health Maintenance Due   Topic Date Due    Shingrix Vaccine Age 49> (1 of 2) Never done    Medicare Yearly Exam  11/17/2021       Patient Care Team   Patient Care Team:  Arnold Araujo MD as PCP - General (Family Medicine)  Arnold Araujo MD as PCP - [address] [place] [address] 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    Long-term use of immunosuppressant medication Z79.899    Long-term use of Plaquenil Z79.899    Hypocalcemia E83.51    Low back pain M54.50    Age-related osteoporosis without current pathological fracture M81.0    Diabetic polyneuropathy ([facility] E11.42    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    Neuropathy G62.9    Lupus erythematosus L93.0    Raynaud's disease I73.00    Benign hypertension I10    Blepharoconjunctivitis H10.509    Seronegative rheumatoid arthritis of multiple sites ([facility] M06.09    Nuclear sclerotic cataract H25.10    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 Hoa Estrada    Diabetes ([facility]     Fracture     right shoulder; T6 compression    Gastritis     H/O Bell's palsy 7/9/2012    R-side of face. Dx >20 years ago.  Has persistent R-sided facial droop     Hypercholesterolemia     Hyperlipidemia    Lupus ([facility] 1/9/2015    Osteoporosis, post-menopausal     vertebral fracture    Squamous cell cancer of skin of left cheek     Squamous cell skin cancer     Right knee      Past Surgical History:   Procedure Laterality Date    COLONOSCOPY N/A 10/9/2018 COLONOSCOPY performed by Amanda Weiner MD at [facility] HX CATARACT REMOVAL Bilateral     HX CATARACT REMOVAL Right 12/19/2019    HX CATARACT REMOVAL Left 01/07/2020    HX CHOLECYSTECTOMY      HX CORONARY STENT PLACEMENT  02/19/1997    LAD    HX GASTRIC BYPASS  2000    HX HERNIA REPAIR  2005    HX KYPHOPLASTY      t5    HX KYPHOPLASTY      L4    HX ORTHOPAEDIC      bilateral knees    HX ORTHOPAEDIC      r shoulder    HX ORTHOPAEDIC      Laminectomy    FL CARDIAC SURG PROCEDURE UNLIST      Patient Denies    FL INC IMPLTJ NEUROSTIMULATOR ELTRD SACRAL NERVE       Current Outpatient Medications   Medication Sig Dispense Refill    leflunomide (ARAVA) 20 mg tablet Take 1 Tablet by mouth daily. 30 Tablet 4    cyclobenzaprine (FLEXERIL) 10 mg tablet Take 1 Tablet by mouth nightly as needed for Muscle Spasm(s). 90 Tablet 1    amLODIPine (NORVASC) 5 mg tablet Take 1 Tablet by mouth daily. 90 Tablet 1    gabapentin (NEURONTIN) 800 mg tablet Take 1 Tablet by mouth three (3) times daily. Max Daily Amount: 2,400 mg. 90 Tablet 2    metFORMIN (GLUCOPHAGE) 500 mg tablet TAKE 1 TABLET BY MOUTH TWICE DAILY WITH MEALS 180 Tablet 1    pantoprazole (PROTONIX) 40 mg tablet TAKE 1 TABLET BY MOUTH DAILY 90 Tablet 1    HYDROcodone-acetaminophen (NORCO) 5-325 mg per tablet Take 1 Tablet by mouth every six (6) hours as needed for Pain for up to 90 days. 60 Tablet 0    atorvastatin (LIPITOR) 40 mg tablet TAKE 1 TABLET BY MOUTH DAILY 90 Tablet 1    tofacitinib 11 mg Tb24 Take 11 mg by mouth daily.  Indications: rheumatoid arthritis 30 Tablet 11    trimethoprim (TRIMPEX) 100 mg tablet TAKE 1 TABLET BY MOUTH DAILY FOR URINARY TRACT INFECTION PREVENTION 30 Tablet 5    ferrous sulfate (FeroSuL) 325 mg (65 mg iron) tablet TAKE 1 TABLET BY MOUTH TWICE DAILY FOR LOW IRON 60 Tablet 5    nortriptyline (PAMELOR) 10 mg capsule TAKE 1 CAPSULE BY MOUTH EVERY NIGHT 30 Capsule 5    nitroglycerin (NITROSTAT) 0.4 mg SL tablet DISSOLVE 1 TABLET UNDER THE TONGUE EVERY 5 MINUTES AS NEEDED FOR CHEST PAIN 25 Tab 0    glucose blood VI test strips ([employer] CONTOUR) strip Glucometer for 250.00 Pt. test blood sugar once daily (in morning) 50 Strip 11    metoprolol succinate (TOPROL-XL) 25 mg XL tablet TAKE 1 TABLET BY MOUTH EVERY DAY 90 Tab 1    denosumab (PROLIA) 60 mg/mL injection 1 mL by SubCUTAneous route every 6 months.  acetaminophen (TYLENOL) 650 mg CR tablet Take 1,300 mg by mouth daily as needed for Pain.  Blood-Glucose Meter (CONTOUR METER) monitoring kit Dx: 250. Check blood sugars daily. 1 kit 11    ZINC PO Take 50 mg by mouth daily.  cholecalciferol, vitamin d3, (VITAMIN D3) 1,000 unit tablet Take 2,000 Units by mouth daily.  calcium-vitamin D (CALCIUM 500+D) 500 mg(1,250mg) -200 unit per tablet Take 2 Tabs by mouth daily.  aspirin 81 mg Tab Take 81 mg by mouth daily.  MULTIVITAMINS (MULTIVITAMIN PO) Take 1 Tab by mouth daily.        Allergies   Allergen Reactions    Cephalosporins Nausea and Vomiting     Severe vomiting  Other reaction(s): gi upset, unspecified       Family History   Problem Relation Age of Onset    Diabetes Mother     Hypertension Mother     Heart Disease Mother     Diabetes Father     Hypertension Father     Heart Disease Father    Dossie Miryam Arthritis-rheumatoid Sister     Elevated Lipids Sister    Dossie Miryam Arthritis-rheumatoid Sister     Hypertension Sister     Diabetes Sister     Thyroid Disease Sister     Arthritis-rheumatoid Other      Social History     Tobacco Use    Smoking status: Former Smoker     Quit date: 1989     Years since quittin.9    Smokeless tobacco: Never Used   Substance Use Topics    Alcohol use: No     Alcohol/week: 0.0 standard drinks     Comment: Elvis Larry, who was evaluated through a synchronous (real-time) audio only encounter, and/or her healthcare decision maker, is aware that it is a billable service, with coverage as determined by her insurance carrier. She provided verbal consent to proceed: Yes, and patient identification was verified. It was conducted pursuant to the emergency declaration under the [address] [place] [address] and the [employer] and [employer] Act. A caregiver was present when appropriate. Ability to conduct physical exam was limited. I was in the office. The patient was at home.     Martín Garcia MD

## 2021-12-20 NOTE — PROGRESS NOTES
The results were reviewed. Slightly elevated liver function test (AST 46). Slightly low calcium --> increase daily dietary calcium intake.

## 2022-01-04 RX ORDER — GLIPIZIDE 2.5 MG/1
TABLET, EXTENDED RELEASE ORAL
Qty: 180 TABLET | Refills: 1 | Status: SHIPPED | OUTPATIENT
Start: 2022-01-04

## 2022-01-26 DIAGNOSIS — M12.9 ARTHROPATHY: ICD-10-CM

## 2022-01-26 DIAGNOSIS — M79.18 MYOFASCIAL MUSCLE PAIN: ICD-10-CM

## 2022-01-26 DIAGNOSIS — M51.36 DEGENERATIVE LUMBAR DISC: ICD-10-CM

## 2022-01-26 DIAGNOSIS — K21.9 GASTROESOPHAGEAL REFLUX DISEASE, UNSPECIFIED WHETHER ESOPHAGITIS PRESENT: ICD-10-CM

## 2022-01-26 RX ORDER — PANTOPRAZOLE SODIUM 40 MG/1
40 TABLET, DELAYED RELEASE ORAL DAILY
Qty: 90 TABLET | Refills: 1 | Status: SHIPPED | OUTPATIENT
Start: 2022-01-26 | End: 2022-08-02 | Stop reason: SDUPTHER

## 2022-01-26 RX ORDER — HYDROCODONE BITARTRATE AND ACETAMINOPHEN 5; 325 MG/1; MG/1
1 TABLET ORAL
Qty: 60 TABLET | Refills: 0 | Status: SHIPPED | OUTPATIENT
Start: 2022-01-26 | End: 2022-05-16 | Stop reason: SDUPTHER

## 2022-02-01 DIAGNOSIS — N18.2 CKD (CHRONIC KIDNEY DISEASE) STAGE 2, GFR 60-89 ML/MIN: ICD-10-CM

## 2022-02-01 DIAGNOSIS — M06.09 SERONEGATIVE RHEUMATOID ARTHRITIS OF MULTIPLE SITES (HCC): ICD-10-CM

## 2022-02-01 DIAGNOSIS — M81.0 AGE-RELATED OSTEOPOROSIS WITHOUT CURRENT PATHOLOGICAL FRACTURE: ICD-10-CM

## 2022-02-01 DIAGNOSIS — Z87.440 HISTORY OF RECURRENT UTI (URINARY TRACT INFECTION): ICD-10-CM

## 2022-02-01 DIAGNOSIS — Z79.60 LONG-TERM USE OF IMMUNOSUPPRESSANT MEDICATION: ICD-10-CM

## 2022-02-01 DIAGNOSIS — D51.0 PERNICIOUS ANEMIA: ICD-10-CM

## 2022-02-01 DIAGNOSIS — E55.9 VITAMIN D DEFICIENCY: ICD-10-CM

## 2022-02-01 RX ORDER — LANOLIN ALCOHOL/MO/W.PET/CERES
CREAM (GRAM) TOPICAL
Qty: 60 TABLET | Refills: 5 | Status: SHIPPED | OUTPATIENT
Start: 2022-02-01 | End: 2022-08-19

## 2022-02-01 RX ORDER — TRIMETHOPRIM 100 MG/1
TABLET ORAL
Qty: 30 TABLET | Refills: 5 | Status: SHIPPED | OUTPATIENT
Start: 2022-02-01 | End: 2022-05-14

## 2022-02-01 RX ORDER — NORTRIPTYLINE HYDROCHLORIDE 10 MG/1
CAPSULE ORAL
Qty: 30 CAPSULE | Refills: 5 | Status: SHIPPED | OUTPATIENT
Start: 2022-02-01 | End: 2022-08-05

## 2022-02-02 RX ORDER — NORTRIPTYLINE HYDROCHLORIDE 10 MG/1
CAPSULE ORAL
Qty: 30 CAPSULE | Refills: 5 | OUTPATIENT
Start: 2022-02-02

## 2022-02-03 ENCOUNTER — OFFICE VISIT (OUTPATIENT)
Dept: RHEUMATOLOGY | Age: 82
End: 2022-02-03
Payer: MEDICARE

## 2022-02-03 VITALS
TEMPERATURE: 98 F | RESPIRATION RATE: 18 BRPM | OXYGEN SATURATION: 94 % | BODY MASS INDEX: 31.91 KG/M2 | DIASTOLIC BLOOD PRESSURE: 69 MMHG | SYSTOLIC BLOOD PRESSURE: 109 MMHG | HEIGHT: 61 IN | WEIGHT: 169 LBS | HEART RATE: 86 BPM

## 2022-02-03 DIAGNOSIS — R09.89 BIBASILAR CRACKLES: ICD-10-CM

## 2022-02-03 DIAGNOSIS — Z79.60 LONG-TERM USE OF IMMUNOSUPPRESSANT MEDICATION: ICD-10-CM

## 2022-02-03 DIAGNOSIS — M06.09 SERONEGATIVE RHEUMATOID ARTHRITIS OF MULTIPLE SITES (HCC): Primary | ICD-10-CM

## 2022-02-03 PROCEDURE — G8752 SYS BP LESS 140: HCPCS | Performed by: INTERNAL MEDICINE

## 2022-02-03 PROCEDURE — G8417 CALC BMI ABV UP PARAM F/U: HCPCS | Performed by: INTERNAL MEDICINE

## 2022-02-03 PROCEDURE — G8754 DIAS BP LESS 90: HCPCS | Performed by: INTERNAL MEDICINE

## 2022-02-03 PROCEDURE — G8427 DOCREV CUR MEDS BY ELIG CLIN: HCPCS | Performed by: INTERNAL MEDICINE

## 2022-02-03 PROCEDURE — G8510 SCR DEP NEG, NO PLAN REQD: HCPCS | Performed by: INTERNAL MEDICINE

## 2022-02-03 PROCEDURE — 99215 OFFICE O/P EST HI 40 MIN: CPT | Performed by: INTERNAL MEDICINE

## 2022-02-03 PROCEDURE — G8536 NO DOC ELDER MAL SCRN: HCPCS | Performed by: INTERNAL MEDICINE

## 2022-02-03 PROCEDURE — 1101F PT FALLS ASSESS-DOCD LE1/YR: CPT | Performed by: INTERNAL MEDICINE

## 2022-02-03 PROCEDURE — G0463 HOSPITAL OUTPT CLINIC VISIT: HCPCS | Performed by: INTERNAL MEDICINE

## 2022-02-03 PROCEDURE — 1090F PRES/ABSN URINE INCON ASSESS: CPT | Performed by: INTERNAL MEDICINE

## 2022-02-03 RX ORDER — LEFLUNOMIDE 20 MG/1
20 TABLET ORAL DAILY
Qty: 30 TABLET | Refills: 2 | Status: SHIPPED | OUTPATIENT
Start: 2022-02-03 | End: 2022-11-04 | Stop reason: SDUPTHER

## 2022-02-03 NOTE — PATIENT INSTRUCTIONS
1.  Inflammatory arthritis looks OK today. Let's continue Xeljanz and leflunomide. Let me know if you have more bad days or more swelling and we can revisit either low-dose prednisone or restart a medication like Plaquenil. 2. You have some crackling in the bottoms of the lungs which can come from RA or from COVID infections. Let's start with a chest xray and let me know if you develop more cough or dyspnea. 3. Repeat labs in 1 month. 4. Look for arthritis gloves from [employer] or your local pharmacy. Also, look for Voltaren gel (diclofenac 1%) to apply to the knuckles and shoulder as needed for sharp pains. 5. Return in June before your next Prolia shot.

## 2022-02-03 NOTE — PROGRESS NOTES
Chief Complaint   Patient presents with    Arthritis     1. Have you been to the ER, urgent care clinic since your last visit? Hospitalized since your last visit? No    2. Have you seen or consulted any other health care providers outside of the [address] [place] [address] since your last visit? Include any pap smears or colon screening.  No

## 2022-02-03 NOTE — PROGRESS NOTES
REASON FOR VISIT    This is a follow-up visit for Ms. Rueda for     ICD-10-CM   1. Seronegative rheumatoid arthritis of multiple sites ([facility] M06.09   2.  Age-related osteoporosis without current pathological fracture M81.0      Inflammatory arthritis phenotype includes:  Anti-CCP positive: no  Rheumatoid factor positive: no  Erosive disease: yes  Extra-articular manifestations include: Raynaud's    Immunosuppression Screening (9/10/2020):  Quantiferon TB: negative   PPD:  Not performed  Hepatitis B: negative    Hepatitis C: negative     Therapy History includes:  Current DMARD therapy include: leflunomide 20 mg daily (2/13/2018 to 10/2019; 2/12/2020 to present), Rice Locus 11 mg XR daily (8/2021 to present)  Prior DMARD therapy include: methotrexate 12.5 mg subcutaneous,  hydroxychloroquine 300 mg daily, Humira (5/10/2018: 3 samples: cost), Simponi Aria every 6 weeks (12/07/2018 to 5/28/2021)  Discontinued DMARDs because of inefficacy: Simponi Aria  Discontinued DMARDs because of side effects: methotrexate (elevated LFTs), leflunomide (elevated LFTs)  Contra-Indicated DMARDs because of chronic kidney disease: methotrexate     Osteoporosis Historical Synopsis    Height loss since age 26 (at least two inches): 5  Fracture history includes: yes (T5-T6)  Family history of hip fracture: no  Fall Risk: no    Smoking history: no  Alcohol consumption: no  Prednisone history: no    Exercise: no    Previous work-up for osteoporosis includes the following:  DEXA Scan: 8/16/2019  Vitamin 25OH D level: 46.6 (21/3/2018)  PTH: 21 (21/3/2018)  TSH: 2.570 (21/3/2018)    Therapy History includes:  Current osteoporosis therapy includes: Prolia 60 mg every 6 months (3/29/2018 to present)  Prior osteoporosis therapy includes: Reclast (2 years)  The following osteoporosis therapy have been ineffective: Reclast  The following osteoporosis therapy were stopped because of side effects: none    HISTORY OF PRESENT ILLNESS    Ms. Bryson Galeas returns for a follow-up. Remains on leflunomide and Alois Isaías. Left 3rd,4th,5th PIPs are more sore. Hands always feel stiff  Has some shooting pains down posterolateral leg to the ankle, moves between hip and ankle. Has known DJD s/p remote laminectomy, as well as 2 vertebral fractures    No prednisone in the recent past.    No breathing problems, no eye redness or pain. Follows with ophthalmology for diabetes, says she hasn't had any retinopathy with this. Takes Flexeril to help her get to sleep, finds she can walk more easily with first getting up. Remains on trimethoprim for recurrent UTI's\, hasn't had a recurrence since starting this. Did have a cough with COVID that went through her family in September. REVIEW OF SYSTEMS    A comprehensive review of systems was performed and pertinent results are documented in the HPI, review of systems is otherwise non-contributory. PAST MEDICAL HISTORY    She has a past medical history of Anemia, Arthritis, CAD (coronary artery disease) (1997), Degenerative joint disease of right hip (09/14/2015 ), Diabetes ([facility] Fracture, Gastritis, H/O Bell's palsy (7/9/2012), Hypercholesterolemia, Lupus ([facility] (1/9/2015), Osteoporosis, post-menopausal, Squamous cell cancer of skin of left cheek, and Squamous cell skin cancer. FAMILY HISTORY    Her family history includes Arthritis-rheumatoid in her sister, sister and another family member; Diabetes in her father, mother, and sister; Elevated Lipids in her sister; Heart Disease in her father and mother; Hypertension in her father, mother, and sister; Thyroid Disease in her sister. SOCIAL HISTORY    She reports that she quit smoking about 32 years ago. She has never used smokeless tobacco. She reports that she does not drink alcohol and does not use drugs.     MEDICATIONS    Current Outpatient Medications   Medication Sig    HYDROcodone-acetaminophen (NORCO) 5-325 mg per tablet Take 1 Tablet by mouth every six (6) hours as needed for Pain for up to 90 days.  pantoprazole (PROTONIX) 40 mg tablet Take 1 Tablet by mouth daily.  nortriptyline (PAMELOR) 10 mg capsule TAKE 1 CAPSULE BY MOUTH EVERY NIGHT    trimethoprim (TRIMPEX) 100 mg tablet TAKE 1 TABLET BY MOUTH DAILY FOR URINARY TRACT INFECTION PREVENTION    ferrous sulfate (FeroSuL) 325 mg (65 mg iron) tablet TAKE 1 TABLET BY MOUTH TWICE DAILY FOR LOW IRON    glipiZIDE SR (GLUCOTROL XL) 2.5 mg CR tablet TAKE 1 TABLET BY MOUTH TWICE DAILY    leflunomide (ARAVA) 20 mg tablet Take 1 Tablet by mouth daily.  cyclobenzaprine (FLEXERIL) 10 mg tablet Take 1 Tablet by mouth nightly as needed for Muscle Spasm(s).  amLODIPine (NORVASC) 5 mg tablet Take 1 Tablet by mouth daily.  gabapentin (NEURONTIN) 800 mg tablet Take 1 Tablet by mouth three (3) times daily. Max Daily Amount: 2,400 mg.    metFORMIN (GLUCOPHAGE) 500 mg tablet TAKE 1 TABLET BY MOUTH TWICE DAILY WITH MEALS    atorvastatin (LIPITOR) 40 mg tablet TAKE 1 TABLET BY MOUTH DAILY    tofacitinib 11 mg Tb24 Take 11 mg by mouth daily. Indications: rheumatoid arthritis    nitroglycerin (NITROSTAT) 0.4 mg SL tablet DISSOLVE 1 TABLET UNDER THE TONGUE EVERY 5 MINUTES AS NEEDED FOR CHEST PAIN    glucose blood VI test strips (Ascensia CONTOUR) strip Glucometer for 250.00 Pt. test blood sugar once daily (in morning)    metoprolol succinate (TOPROL-XL) 25 mg XL tablet TAKE 1 TABLET BY MOUTH EVERY DAY    denosumab (PROLIA) 60 mg/mL injection 1 mL by SubCUTAneous route every 6 months.  acetaminophen (TYLENOL) 650 mg CR tablet Take 1,300 mg by mouth daily as needed for Pain.  Blood-Glucose Meter (CONTOUR METER) monitoring kit Dx: 250. Check blood sugars daily.  ZINC PO Take 50 mg by mouth daily.  cholecalciferol, vitamin d3, (VITAMIN D3) 1,000 unit tablet Take 2,000 Units by mouth daily.  calcium-vitamin D (CALCIUM 500+D) 500 mg(1,250mg) -200 unit per tablet Take 2 Tabs by mouth daily.     aspirin 81 mg Tab Take 81 mg by mouth daily.  MULTIVITAMINS (MULTIVITAMIN PO) Take 1 Tab by mouth daily. No current facility-administered medications for this visit. ALLERGIES    Allergies   Allergen Reactions    Cephalosporins Nausea and Vomiting     Severe vomiting  Other reaction(s): gi upset, unspecified       PHYSICAL EXAMINATION    Visit Vitals  /69 (BP 1 Location: Left upper arm, BP Patient Position: Sitting)   Pulse 86   Temp 98 °F (36.7 °C) (Oral)   Resp 18   Ht 5' 1\" (1.549 m)   Wt 169 lb (76.7 kg)   SpO2 94%   BMI 31.93 kg/m²     Body mass index is 31.93 kg/m². General:  The patient is well developed, well nourished, alert, and in no apparent distress. Eyes: Sclera are anicteric. No conjunctival injection. HEENT:  Oropharynx is clear. No oral ulcers. Adequate salivary pooling. No cervical or supraclavicular lymphadenopathy. Lungs:  Bibasilar crackles. Normal respiratory effort. Cor:  Regular rate and rhythm. No murmur rub or gallop. Abdomen: Soft, non-tender, without hepatomegaly or masses. Extremities: No calf tenderness or edema. Warm and well perfused. Skin:  No significant abnormalities. Neuro: Nonfocal  Musculoskeletal:    A comprehensive musculoskeletal exam was performed for all joints of each upper and lower extremity and assessed for swelling, tenderness and range of motion. Results are documented as below:  Bilateral Sharla and Heberden nodes  Bilateral knee crepitus without effusion  No evidence of synovitis in the small joints of the hands, wrists, shoulders, elbows, hips, knees or ankles. DATA REVIEW    Laboratory     Recent laboratory results were reviewed, summarized, and discussed with the patient. 12/16/21: Cr 0.86, Tbili 0.3, AST 46, ALT 44, AlkP 131, Tprot 6.7, Alb 3.4  11/15/21: A1c 5.6  9/27/21: , NTproBNP 610  9/17/13: Hep B SAg neg, cAb neg;  Hep C Ab neg    Imaging    Musculoskeletal Ultrasound    None    Radiographs    Bilateral Hand 2/13/2018: RIGHT: severe osteopenia without fracture. There are scattered degenerative changes. Progressive first Aia 16 joint. No new erosive changes. Vascular calcifications. LEFT: severe osteopenia. Scattered degenerative changes as noted previously with progression of the index finger and middle finger DIP joints. There is a new erosion at the index finger proximal phalanx ulnar base without adjacent joint space loss. There are vascular calcifications. Calcification overlying the DRUJ is favored represent overlying vascular calcifications.      Bilateral Foot 2/13/2018: RIGHT: no acute fracture or dislocation. Alignment is anatomic. Mild degenerative changes are seen in the left first MTP joint. Minimal degenerative changes are seen in the right first MTP joint. A possible erosion is seen in the head of the left first metatarsal. No erosive changes are seen in the right foot. Plantar calcaneal heel spurs are noted in both feet, as well as enthesophyte formation at the Achilles insertion site. Arterial vascular calcifications are also noted bilaterally. Mild soft tissue swelling surrounds the left first MTP joint. LEFT: no acute fracture or dislocation. Alignment is anatomic. Mild degenerative changes are seen in the left first MTP joint. Minimal degenerative changes are seen in the right first MTP joint. A possible erosion is seen in the head of the left first metatarsal. No erosive changes are seen in the right foot. Plantar calcaneal heel spurs are noted in both feet, as well as enthesophyte formation at the Achilles insertion site. Arterial vascular calcifications are also noted bilaterally. Mild soft tissue swelling surrounds the left first MTP joint. Thoracic Spine 11/03/2016: The posterior battery pack is again seen within the intrathecal catheter in stable position. Kyphoplasty material is present in a midthoracic vertebral body is stable mild chronic compression of the adjacent inferior vertebral body.  The remaining vertebral body heights are maintained. Anterior osteophytes are noted. There is no abnormality in alignment. Lumbar Spine 10/19/2016: significant disc space narrowing at L3-L4 with 14 mm anterolisthesis. There is facet arthropathy. The vertebral body heights are maintained. Spinal catheters are seen. There are atherosclerotic vascular calcifications. Bilateral Hips 8/31/2015: no fracture, dislocation or other acute abnormality. There osteoarthritic pattern change of both hips, more severe on the right. There is nonuniform joint space narrowing and marginal osteophytes. There is remodeling of the right femur head with subchondral cystic formation. Mild sacroiliac osteoarthritic change. Lower lumbar spondylosis is noted. Wire  leads are seen in the left lower lumbar spine likely reflective of spinal stimulator device. Bilateral Hand 3/01/2011: RIGHT: mild osteopenia. There are degenerative changes of the distal and proximal interphalangeal joints and the base of the thumb. There is no fracture or other acute abnormality. Scattered calcifications are noted in the radial and ulnar arteries. LEFT: mild osteopenia. There are degenerative changes of the distal and proximal interphalangeal joints and  at the base of the thumb. There is no fracture or acute abnormality. There are vascular calcifications of the radial and ulnar arteries. CT Imaging    None    MR Imaging    MRI Brain with without contrast 12/19/2016: There is sulcal and ventricular prominence. Confluent periventricular and scattered foci of increased T2 signal intensity in the corona radiata and centrum semiovale. Tiny remote lacunar infarction in the right cerebellum. There is no intracranial mass, hemorrhage or evidence of acute infarction. There is no Chiari or syrinx. The pituitary and infundibulum are grossly unremarkable. There is no skull base mass. Cerebellopontine angles are grossly unremarkable.  The major intracranial vascular flow-voids are unremarkable. No evidence of demyelinating process. There is no abnormal parenchymal enhancement. There is no abnormal meningeal enhancement. The cavernous sinuses are symmetric. Optic chiasm and infundibulum grossly unremarkable. Orbits are grossly symmetric. Dural venous sinuses are grossly patent. The brain architecture is normal. There is no evidence of midline shift or mass-effect. There are no extra-axial fluid collections. The mastoid air cells and paranasal sinuses are well pneumatized and clear. MRI Lumbar Spine with and without contrast 8/07/2013: There is transitional lumbosacral anatomy. In keeping with prior studies, lowest fully formed disc is labeled L5-S1. There has been prior  laminectomies at L3-5 with no change in grade 1 anterolisthesis at L3-4 which measures 5 mm. There is grade 1 anterolisthesis of L4-5 of approximately 2 mm, unchanged. Vertebral body heights are maintained. There is no marrow edema or compression fracture. There are reactive endplate changes at R3-8.  The conus medullaris terminates at L1-2. There is no abnormal intraspinal enhancement. L1/2:  The spinal canal and foramina are widely patent. L2/3:  Diffuse disc bulge, facet hypertrophy and ligamentum flavum thickening cause mild spinal canal and moderate right foraminal stenosis. Left foramen is patent. L3/4: There is uncovering of disc material with facet hypertrophy resulting in mild thecal sac narrowing. There is severe bilateral foraminal stenosis similar to the prior study. L4/5: Cathye Gauss is diffuse disc bulge and facet hypertrophy, without significant spinal canal stenosis. There is mild right foraminal stenosis. L5/S1:  The spinal canal and foramina are widely patent.     DXA     DXA 8/16/2019: (excluded Possible increased density in the L2 and T6 vertebral bodies) left femoral neck T score: -1.1 (0.881 g/cm2), left total hip T score: -0.9 (0.889 g/cm2), right femoral neck T score: -0.7 (0.945 g/cm2), right total hip T score: -0.5 (0.941 g/cm2), and distal one third left radius T score -2.4 (BMD 0.542 g/cm2). FRAX score 21 % probability in 10 years for major osteoporotic fracture and 39 % 10 year probability of hip fracture. DXA 8/08/2017: (excluded Lumbar spine because of overlying wires and degenerative change and kyphoplasty of L4) showed left femoral neck T score: -1.4 (0.837 g/cm2), left total hip T score: -1.4 (0.830 g/cm2), right femoral neck T score: -0.9 (0.919 g/cm2), right total hip T score: -0.9 (0.900 g/cm2), and distal one third left radius T score -3.0 (BMD 0.615 g/cm2). FRAX score 16.8 % probability in 10 years for major osteoporotic fracture and 3.2 % 10 year probability of hip fracture. Nuclear Medicine    Nuclear Medicine Bone Scan 1/22/2013: There is intense radiotracer uptake in the upper thoracic spine, likely T5. There is uptake in the region of the sternoclavicular joints and left shoulder, likely degenerative. ASSESSMENT AND PLAN    This is a follow-up visit for Ms. Rueda for seronegative rheumatoid arthritis. Inflammatory arthritis is well controlled with low disease activity, no alexis synovitis; though she has no respiratory complaints currently she does have bibasilar crackles, possibly post-COVID vs RA-related. Starting with CXR. 1. Bibasilar crackles  - XR CHEST PA LAT; Future  - C REACTIVE PROTEIN, QT; Future  - CBC WITH AUTOMATED DIFF; Future  - METABOLIC PANEL, COMPREHENSIVE; Future  - SED RATE (ESR); Future    2. Seronegative rheumatoid arthritis of multiple sites ([facility]  - Cont Xeljanz, leflunomide, and Plaquenil (hydroxychloroquine) for now  - C REACTIVE PROTEIN, QT; Future  - CBC WITH AUTOMATED DIFF; Future  - METABOLIC PANEL, COMPREHENSIVE; Future  - SED RATE (ESR); Future    3. Long-term use of immunosuppressant medication  - Trend LFTs.  If controlled next visit, will d/c leflunomide given long-term risk of liver injury higher with underlying fatty liver disease  - CBC WITH AUTOMATED DIFF; Future  - METABOLIC PANEL, COMPREHENSIVE;  Future    Face to face time: 25 minutes  Note preparation and records review day of service: 20 minutes  Total provider time day of service: 45 minutes    TODAY'S ORDERS    Orders Placed This Encounter    XR CHEST PA LAT    C REACTIVE PROTEIN, QT    CBC WITH AUTOMATED DIFF    METABOLIC PANEL, COMPREHENSIVE    SED RATE (ESR)     Future Appointments   Date Time Provider Yocasta Amelia   5/16/2022 10:40 AM Dhara Griggs MD [facility] BS AMB   6/3/2022  1:00 PM Ernesto Curtis MD [facility] BS AMB   6/16/2022  2:30 PM SS INF6 CH4 <1H [facility] [address], MD [facility]  Adult Rheumatology   [facility]  A [facility] of [facility], [address] [place] [address]   Phone [phone]  Fax [phone]

## 2022-02-03 NOTE — LETTER
2/3/2022    Patient: Randy Madison   YOB: 1940   Date of Visit: 2/3/2022     Kenney Mtz MD  [address] [place] [address]  [address] [place]    Dear Kenney Mtz MD,    We recently saw Ms. Gabriela Valdivia in the [facility] for evaluation. My notes for this consultation are attached. If you have questions, please do not hesitate to call me. I look forward to following your patient along with you.       Sincerely,    Darnell De Anda MD [facility]  Cell: [phone]

## 2022-02-10 DIAGNOSIS — E11.42 DIABETIC POLYNEUROPATHY ASSOCIATED WITH TYPE 2 DIABETES MELLITUS (HCC): ICD-10-CM

## 2022-02-10 RX ORDER — GABAPENTIN 800 MG/1
800 TABLET ORAL 3 TIMES DAILY
Qty: 90 TABLET | Refills: 2 | Status: SHIPPED | OUTPATIENT
Start: 2022-02-10 | End: 2022-05-14

## 2022-03-18 PROBLEM — M17.12 PRIMARY LOCALIZED OSTEOARTHRITIS OF LEFT KNEE: Status: ACTIVE | Noted: 2017-08-18

## 2022-03-19 PROBLEM — D64.9 ANEMIA: Status: ACTIVE | Noted: 2017-11-20

## 2022-03-19 PROBLEM — E66.01 OBESITY, MORBID (HCC): Status: ACTIVE | Noted: 2017-12-13

## 2022-03-19 PROBLEM — N18.2 CKD (CHRONIC KIDNEY DISEASE) STAGE 2, GFR 60-89 ML/MIN: Status: ACTIVE | Noted: 2018-02-13

## 2022-03-19 PROBLEM — I21.9 MYOCARDIAL INFARCTION (HCC): Status: ACTIVE | Noted: 2017-11-20

## 2022-03-19 PROBLEM — E11.9 DIABETES MELLITUS (HCC): Status: ACTIVE | Noted: 2017-11-20

## 2022-03-19 PROBLEM — I73.00 PRIMARY RAYNAUD'S PHENOMENON: Status: ACTIVE | Noted: 2018-02-13

## 2022-04-03 DIAGNOSIS — E78.2 MIXED HYPERLIPIDEMIA: ICD-10-CM

## 2022-04-05 RX ORDER — ATORVASTATIN CALCIUM 40 MG/1
TABLET, FILM COATED ORAL
Qty: 90 TABLET | Refills: 1 | Status: SHIPPED | OUTPATIENT
Start: 2022-04-05 | End: 2022-10-04

## 2022-04-30 DIAGNOSIS — E11.21 TYPE 2 DIABETES WITH NEPHROPATHY (HCC): ICD-10-CM

## 2022-05-02 RX ORDER — METFORMIN HYDROCHLORIDE 500 MG/1
TABLET ORAL
Qty: 180 TABLET | Refills: 1 | Status: SHIPPED | OUTPATIENT
Start: 2022-05-02 | End: 2022-10-28

## 2022-05-09 ENCOUNTER — TELEPHONE (OUTPATIENT)
Dept: RHEUMATOLOGY | Age: 82
End: 2022-05-09

## 2022-05-09 NOTE — TELEPHONE ENCOUNTER
Call patient on 5/9/2022 could not leave voice message due to patient voice mail box full patient has an appt on 6/3/2022 that needs to be cancel due to a provider cancellation and reschedule. sdh

## 2022-05-12 DIAGNOSIS — E11.42 DIABETIC POLYNEUROPATHY ASSOCIATED WITH TYPE 2 DIABETES MELLITUS (HCC): ICD-10-CM

## 2022-05-12 DIAGNOSIS — Z87.440 HISTORY OF RECURRENT UTI (URINARY TRACT INFECTION): ICD-10-CM

## 2022-05-14 RX ORDER — TRIMETHOPRIM 100 MG/1
TABLET ORAL
Qty: 30 TABLET | Refills: 5 | Status: SHIPPED | OUTPATIENT
Start: 2022-05-14 | End: 2022-06-14 | Stop reason: ALTCHOICE

## 2022-05-14 RX ORDER — GABAPENTIN 800 MG/1
TABLET ORAL
Qty: 90 TABLET | Refills: 1 | Status: SHIPPED | OUTPATIENT
Start: 2022-05-14 | End: 2022-07-12

## 2022-05-16 ENCOUNTER — OFFICE VISIT (OUTPATIENT)
Dept: FAMILY MEDICINE CLINIC | Age: 82
End: 2022-05-16

## 2022-05-16 VITALS
HEIGHT: 61 IN | RESPIRATION RATE: 20 BRPM | TEMPERATURE: 98.6 F | OXYGEN SATURATION: 96 % | DIASTOLIC BLOOD PRESSURE: 78 MMHG | BODY MASS INDEX: 30.58 KG/M2 | WEIGHT: 162 LBS | HEART RATE: 108 BPM | SYSTOLIC BLOOD PRESSURE: 118 MMHG

## 2022-05-16 DIAGNOSIS — E11.42 DIABETIC POLYNEUROPATHY ASSOCIATED WITH TYPE 2 DIABETES MELLITUS (HCC): Primary | ICD-10-CM

## 2022-05-16 DIAGNOSIS — M79.18 MYOFASCIAL MUSCLE PAIN: ICD-10-CM

## 2022-05-16 DIAGNOSIS — E78.2 MIXED HYPERLIPIDEMIA: ICD-10-CM

## 2022-05-16 DIAGNOSIS — M12.9 ARTHROPATHY: ICD-10-CM

## 2022-05-16 DIAGNOSIS — I10 PRIMARY HYPERTENSION: ICD-10-CM

## 2022-05-16 DIAGNOSIS — Z79.899 CONTROLLED SUBSTANCE AGREEMENT SIGNED: ICD-10-CM

## 2022-05-16 DIAGNOSIS — M51.36 DEGENERATIVE LUMBAR DISC: ICD-10-CM

## 2022-05-16 PROCEDURE — G8752 SYS BP LESS 140: HCPCS | Performed by: FAMILY MEDICINE

## 2022-05-16 PROCEDURE — 1101F PT FALLS ASSESS-DOCD LE1/YR: CPT | Performed by: FAMILY MEDICINE

## 2022-05-16 PROCEDURE — 1090F PRES/ABSN URINE INCON ASSESS: CPT | Performed by: FAMILY MEDICINE

## 2022-05-16 PROCEDURE — G8427 DOCREV CUR MEDS BY ELIG CLIN: HCPCS | Performed by: FAMILY MEDICINE

## 2022-05-16 PROCEDURE — G8536 NO DOC ELDER MAL SCRN: HCPCS | Performed by: FAMILY MEDICINE

## 2022-05-16 PROCEDURE — G8510 SCR DEP NEG, NO PLAN REQD: HCPCS | Performed by: FAMILY MEDICINE

## 2022-05-16 PROCEDURE — G8754 DIAS BP LESS 90: HCPCS | Performed by: FAMILY MEDICINE

## 2022-05-16 PROCEDURE — 99215 OFFICE O/P EST HI 40 MIN: CPT | Performed by: FAMILY MEDICINE

## 2022-05-16 PROCEDURE — G8417 CALC BMI ABV UP PARAM F/U: HCPCS | Performed by: FAMILY MEDICINE

## 2022-05-16 RX ORDER — HYDROCODONE BITARTRATE AND ACETAMINOPHEN 5; 325 MG/1; MG/1
1 TABLET ORAL
Qty: 60 TABLET | Refills: 0 | Status: SHIPPED | OUTPATIENT
Start: 2022-05-16 | End: 2022-09-05 | Stop reason: SDUPTHER

## 2022-05-16 NOTE — LETTER
AGREEMENT for controlled medication treatment    I, Lis Muñoz, have agreed to a course of treatment that includes taking controlled medication. For this treatment I designate _____________________________________ as the [facility] Provider.     The purpose of this agreement is to prevent misunderstandings about controlled medications I may be taking for treatment, and to comply with applicable laws. I understand this agreement is essential to the trust and confidence necessary in a provider-patient relationship and that the designated provider will treat me in accordance with the statements below. As part of my treatment I agree to the followin.  USE  a. I will take the controlled medication as instructed by the designated provider and avoid improper use of controlled medications. b.   I will not share, sell or trade controlled medication with anyone as this is a criminal offense.   c.   I will not use any illegal controlled substance, including marijuana, cocaine, etc. as this is a criminal offense. 2.  PROVIDERS  a. I will only obtain controlled medication from the designated provider. b.   I will not attempt to obtain the same or similar controlled medications, such as opioid pain medication, stimulants, anti-anxiety or hypnotics from any other provider as this is a criminal offense.  c.   I will inform the designated provider about all other licensed professionals providing medical care to me and authorize communication between all providers to coordinate care, particularly prescribing or dispensing of controlled medications. 3.  PHARMACY  a.   I will only fill controlled medication prescriptions at the approved pharmacy as listed below. 4.  OFFICE VISITS  a. I agree to attend scheduled office visit appointments. b.   I am aware my office visits may be monthly or as determined necessary by the designated provider.   c.   I will communicate fully with the designated provider about the character and intensity of my symptoms, the effect of the symptoms on my daily life, and how well the controlled medication is helping to relieve the cause of my symptoms. 5.  REFILLS OR CALL-IN PRESCRIPTIONS OF CONTROLLED MEDICATIONS  a. I agree that refills of my prescriptions for controlled medications will be made at the time of an office visit during regular office hours. No refills will be available during evenings or on weekends. Abusive or inappropriate behavior related to medication refills will not be tolerated. b.   I will not call the office repeatedly to inquire about my controlled medication. I understand that medications will be written on the due date and not before. Calling the office repeatedly will be considered harassment, and I may be discharged from the practice. c.   Controlled medications may not be called in for refill, but doing so is at the discretion of the designated provider. d.   I am aware that only I must  prescriptions for controlled medications at the office but the designated provider may allow a designee to  the prescription from the office under very specific circumstance that may develop. 6.  TOXICOLOGY SCREENING  a. I agree to random urine toxicology screenings in order to comply with government and [address] [place] [address] regulations. I understand that I will be financially responsible for any charges incurred by this office, Renu Singh of [facility], [employer], or [facility] for the urine toxicology screening, which may not be covered by my insurance. Failure to do so will be considered non-compliance and I may be discharged. b. In some cases an oral swab or hair sample may be substituted for a urine screen. This is at the discretion of the designated provider.   I understand that I will be financially responsible for any charges incurred as well.  c.   I understand that if the urine toxicology does not show my medications prescribed to me by the designated provider, or it shows any illegal substance or any other medications NOT prescribed by the designated providers, I may be discharged from this practice at the discretion of the designated provider and no further controlled medications will be prescribed or follow-up appointments scheduled. Also, if any illegal substances are detected on the urine toxicology, this information may be provided to local law enforcement. 7. PILL COUNTS  a. I am aware I may be called at any time to come into the office for a count of all my remaining controlled medications in order to help the designated provider understand the rate at which I use my controlled medications and to more effectively adjust dosage. b. I agree that I will use my medication at a rate NO greater than the prescribed rate and that use of my medication at a greater rate will result in my being without medication for the period of time until next expected due date. c. I will bring in the containers with the medication prescribed by all providers, including the designated provider, to each office visit even if there is no medication remaining. All controlled medication will be in the original containers from the pharmacy for each medication. Failure to do so will be considered non-compliance and I may be discharged from the practice. 8.  LOSS OR THEFT OF MEDICATION  a. I will safeguard my controlled medication from loss or theft. b.   Lost or stolen controlled medication may not be replaced. This includes a prescription that has not yet been filled at the pharmacy. c.   In the event my controlled medications are stolen or lost, I will notify the designated providers office immediately.   If such event occurs during the night, weekend or holiday, I will leave a detailed message on the answering machine or answering service at the number listed above.  d.   I will file and produce an official police report for any effort to replace controlled medications prescribed. 9.  AGENCY COLLABORATION  I authorize the designated provider and the authorized pharmacy/pharmacist to cooperate fully with any city, state, or federal law enforcement agency in the investigation of any possible misuse, sale or other diversion of my controlled medication. 10.  TREATMENT  I understand that if I violate any of the conditions, my controlled medication and/or treatment will be terminated. If the violation involves obtaining controlled substances and/or dangerous drugs from another source, the incident may be reported to other medical facilities and authorities, including law enforcement. In this case, the designated provider may taper off the medication over a period of several days, as necessary, to avoid withdrawal symptoms or will suggest alternate treatment facilities. Also, a drug dependence treatment program may be recommended. 11.  AGREEMENT  I agree to follow these guidelines that have been fully explained to me. All of my questions and concerns regarding treatment have been adequately answered. A copy of this document has been given to me. I agree to use ______________________________ Authorized Pharmacy located at _________________________________________________________________      Telephone ________________________________for filling ALL controlled medication prescriptions.     This agreement is entered into on 5/16/2022     Patient signature__________________________________________________________    Legal Guardian signature___________________________________________________    Provider signature_________________________________________________________    Witness signature_________________________________________________________

## 2022-05-16 NOTE — PATIENT INSTRUCTIONS
A Healthy Lifestyle: Care Instructions  Your Care Instructions     A healthy lifestyle can help you feel good, stay at a healthy weight, and have plenty of energy for both work and play. A healthy lifestyle is something you can share with your whole family. A healthy lifestyle also can lower your risk for serious health problems, such as high blood pressure, heart disease, and diabetes. You can follow a few steps listed below to improve your health and the health of your family. Follow-up care is a key part of your treatment and safety. Be sure to make and go to all appointments, and call your doctor if you are having problems. It's also a good idea to know your test results and keep a list of the medicines you take. How can you care for yourself at home? · Do not eat too much sugar, fat, or fast foods. You can still have dessert and treats now and then. The goal is moderation. · Start small to improve your eating habits. Pay attention to portion sizes, drink less juice and soda pop, and eat more fruits and vegetables. ? Eat a healthy amount of food. A 3-ounce serving of meat, for example, is about the size of a deck of cards. Fill the rest of your plate with vegetables and whole grains. ? Limit the amount of soda and sports drinks you have every day. Drink more water when you are thirsty. ? Eat plenty of fruits and vegetables every day. Have an apple or some carrot sticks as an afternoon snack instead of a candy bar. Try to have fruits and/or vegetables at every meal.  · Make exercise part of your daily routine. You may want to start with simple activities, such as walking, bicycling, or slow swimming. Try to be active 30 to 60 minutes every day. You do not need to do all 30 to 60 minutes all at once. For example, you can exercise 3 times a day for 10 or 20 minutes.  Moderate exercise is safe for most people, but it is always a good idea to talk to your doctor before starting an exercise program.  · Keep moving. Shadi Mccullough the lawn, work in the garden, or [employer]. Take the stairs instead of the elevator at work. · If you smoke, quit. People who smoke have an increased risk for heart attack, stroke, cancer, and other lung illnesses. Quitting is hard, but there are ways to boost your chance of quitting tobacco for good. ? Use nicotine gum, patches, or lozenges. ? Ask your doctor about stop-smoking programs and medicines. ? Keep trying. In addition to reducing your risk of diseases in the future, you will notice some benefits soon after you stop using tobacco. If you have shortness of breath or asthma symptoms, they will likely get better within a few weeks after you quit. · Limit how much alcohol you drink. Moderate amounts of alcohol (up to 2 drinks a day for men, 1 drink a day for women) are okay. But drinking too much can lead to liver problems, high blood pressure, and other health problems. Family health  If you have a family, there are many things you can do together to improve your health. · Eat meals together as a family as often as possible. · Eat healthy foods. This includes fruits, vegetables, lean meats and dairy, and whole grains. · Include your family in your fitness plan. Most people think of activities such as jogging or tennis as the way to fitness, but there are many ways you and your family can be more active. Anything that makes you breathe hard and gets your heart pumping is exercise. Here are some tips:  ? Walk to do errands or to take your child to school or the bus.  ? Go for a family bike ride after dinner instead of watching TV. Where can you learn more? Go to http://www.gray.com/  Enter E535 in the search box to learn more about \"A Healthy Lifestyle: Care Instructions. \"  Current as of: June 16, 2021               Content Version: 13.2  © 0600-5963 Healthwise, Incorporated.    Care instructions adapted under license by [employer] (which disclaims liability or warranty for this information). If you have questions about a medical condition or this instruction, always ask your healthcare professional. [address] any warranty or liability for your use of this information.

## 2022-05-16 NOTE — PROGRESS NOTES
1. Have you been to the ER, urgent care clinic since your last visit? Hospitalized since your last visit? No    2. Have you seen or consulted any other health care providers outside of the [address] [place] [address] since your last visit? Include any pap smears or colon screening. No  Reviewed record in preparation for visit and have necessary documentation  Pt did not bring medication to office visit for review  opportunity was given for questions      3. For patients aged 39-70: Has the patient had a colonoscopy / FIT/ Cologuard? NA - based on age      If the patient is female:    4. For patients aged 41-77: Has the patient had a mammogram within the past 2 years? NA - based on age or sex      11. For patients aged 21-65: Has the patient had a pap smear?  NA - based on age or sex      Goals that were addressed and/or need to be completed during or after this appointment include   Health Maintenance Due   Topic Date Due    Shingrix Vaccine Age 50> (1 of 2) Never done    A1C test (Diabetic or Prediabetic)  05/15/2022

## 2022-05-16 NOTE — PROGRESS NOTES
CC: f/u DM, HTN and HLD    HPI: Pt is a 79 y.o. female who presents for f/u DM, HTN and HLD. HTN:  Checking BPs at home?: YES  Logs today?: NO  Range of BPs?: Has been looking good  Headaches?: NO  Blurry vision?: NO  Lower extremity edema?: NO  Smoking?: NO    DM:  Checking BG at home?: YES, normally 's  Insulin dependent?: NO  Other medications for DM?: metformin 500mg BID, glipizide 2.5mg daily  Symptoms of hypoglycemia?: NO  Aspirin?: YES  ACEi/ARB?: NO  Statin?: YES  Last eye exam?: 10/2021 - [facility], will request records                                                                                    Last foot exam?: 10/2021  Last A1c:   Lab Results   Component Value Date/Time    Hemoglobin A1c 5.6 11/15/2021 02:35 PM    Hemoglobin A1c (POC) 6.1 06/24/2015 04:12 PM     LastLDL:   Lab Results   Component Value Date/Time    LDL, calculated 61.2 11/15/2021 02:35 PM     Last microalbumin:   Lab Results   Component Value Date/Time    Microalbumin/Creat ratio (mg/g creat) 56 (H) 11/15/2021 02:35 PM    Microalbumin,urine random 3.67 11/15/2021 02:35 PM         Past Medical History:   Diagnosis Date    Anemia     Arthritis     CAD (coronary artery disease) 1997    MI    Degenerative joint disease of right hip 09/14/2015     [facility]/Dr. Fay Christy    Diabetes [facility] [address]     Fracture     right shoulder; T6 compression    Gastritis     H/O Bell's palsy 7/9/2012    R-side of face. Dx >20 years ago.  Has persistent R-sided facial droop     Hypercholesterolemia     Hyperlipidemia    Lupus ([facility] [address] 1/9/2015    Osteoporosis, post-menopausal     vertebral fracture    Squamous cell cancer of skin of left cheek     Squamous cell skin cancer     Right knee       Family History   Problem Relation Age of Onset    Diabetes Mother     Hypertension Mother     Heart Disease Mother     Diabetes Father     Hypertension Father     Heart Disease Father     Arthritis-rheumatoid Sister     Elevated Lipids Sister    Anmol Arthritis-rheumatoid Sister     Hypertension Sister     Diabetes Sister     Thyroid Disease Sister     Arthritis-rheumatoid Other        Social History     Tobacco Use    Smoking status: Former Smoker     Quit date: 1989     Years since quittin.3    Smokeless tobacco: Never Used   Substance Use Topics    Alcohol use: No     Alcohol/week: 0.0 standard drinks     Comment: Occasional    Drug use: No       ROS:  Per HPI    PE:  Visit Vitals  /78   Pulse (!) 108   Temp 98.6 °F (37 °C)   Resp 20   Ht 5' 1\" (1.549 m)   Wt 162 lb (73.5 kg)   SpO2 96%   BMI 30.61 kg/m²     Gen: Pt sitting in chair, in NAD  Head: Normocephalic, atraumatic  Eyes: Sclera anicteric, EOM grossly intact, PERRL  Nose: Normal nasal mucosa  Throat: MMM, normal lips, tongue and gums  Neck: Supple  CVS: Normal S1, S2, no m/r/g  Resp: CTAB, no wheezes or rales  Extrem: Atraumatic, no cyanosis or edema  Pulses: 2+   Skin: Warm, dry  Neuro: Alert, oriented, appropriate      A/P:   Encounter Diagnoses     ICD-10-CM ICD-9-CM   1. Diabetic polyneuropathy associated with type 2 diabetes mellitus (HCC)  E11.42 250.60     357.2   2. Arthropathy  M12.9 716.90   3. Myofascial muscle pain  M79.18 729.1   4. Degenerative lumbar disc  M51.36 722.52   5. Mixed hyperlipidemia  E78.2 272.2   6. Primary hypertension  I10 401.9   7. Controlled substance agreement signed  Z79.899 V58.69     1. Arthropathy  - HYDROcodone-acetaminophen (NORCO) 5-325 mg per tablet; Take 1 Tablet by mouth every six (6) hours as needed for Pain for up to 90 days. Dispense: 60 Tablet; Refill: 0    2. Myofascial muscle pain  - HYDROcodone-acetaminophen (NORCO) 5-325 mg per tablet; Take 1 Tablet by mouth every six (6) hours as needed for Pain for up to 90 days. Dispense: 60 Tablet; Refill: 0    3. Degenerative lumbar disc  - HYDROcodone-acetaminophen (NORCO) 5-325 mg per tablet; Take 1 Tablet by mouth every six (6) hours as needed for Pain for up to 90 days.   Dispense: 60 Tablet; Refill: 0    4. Diabetic polyneuropathy associated with type 2 diabetes mellitus ([address]): Last A1c very well-controlled but pt reports BG up to 200's when she trialed off glipizide so she restarted. - HEMOGLOBIN A1C WITH EAG; Future  - MICROALBUMIN, UR, RAND W/ MICROALB/CREAT RATIO; Future    5. Mixed hyperlipidemia  - LIPID PANEL; Future    6. Primary hypertension:Stable, continue current medications. 7. Controlled substance agreement signed: New CSC filled out today and UDS obtained.  reviewed, appropriate. - AMB POC USCREEN 12 DRUG        RTC in 6 months for f/u chronic conditions, or sooner prn    A total of 40 minutes was spent on this patient encounter, including 25 minutes obtaining the history and physical exam, 5 minutes reviewing CSC, , and 10 minutes documenting. Discussed diagnoses in detail with patient. Medication risks/benefits/side effects discussed with patient. All of the patient's questions were addressed. The patient understands and agrees with our plan of care. The patient knows to call back if they are unsure of or forget any changes we discussed today or if the symptoms change. The patient received an After-Visit Summary which contains VS, orders, medication list and allergy list. This can be used as a \"mini-medical record\" should they have to seek medical care while out of town. Current Outpatient Medications on File Prior to Visit   Medication Sig Dispense Refill    trimethoprim (TRIMPEX) 100 mg tablet TAKE 1 TABLET BY MOUTH DAILY FOR UTI PREVENTION 30 Tablet 5    gabapentin (NEURONTIN) 800 mg tablet TAKE 1 TABLET BY MOUTH THREE TIMES DAILY. MAX DAILY AMOUNT: 2400 MG 90 Tablet 1    metFORMIN (GLUCOPHAGE) 500 mg tablet TAKE 1 TABLET BY MOUTH TWICE DAILY WITH MEALS 180 Tablet 1    atorvastatin (LIPITOR) 40 mg tablet TAKE 1 TABLET BY MOUTH DAILY 90 Tablet 1    leflunomide (ARAVA) 20 mg tablet Take 1 Tablet by mouth daily.  30 Tablet 2    nortriptyline (PAMELOR) 10 mg capsule TAKE 1 CAPSULE BY MOUTH EVERY NIGHT 30 Capsule 5    ferrous sulfate (FeroSuL) 325 mg (65 mg iron) tablet TAKE 1 TABLET BY MOUTH TWICE DAILY FOR LOW IRON 60 Tablet 5    pantoprazole (PROTONIX) 40 mg tablet Take 1 Tablet by mouth daily. 90 Tablet 1    glipiZIDE SR (GLUCOTROL XL) 2.5 mg CR tablet TAKE 1 TABLET BY MOUTH TWICE DAILY 180 Tablet 1    cyclobenzaprine (FLEXERIL) 10 mg tablet Take 1 Tablet by mouth nightly as needed for Muscle Spasm(s). 90 Tablet 1    amLODIPine (NORVASC) 5 mg tablet Take 1 Tablet by mouth daily. 90 Tablet 1    tofacitinib 11 mg Tb24 Take 11 mg by mouth daily. Indications: rheumatoid arthritis 30 Tablet 11    nitroglycerin (NITROSTAT) 0.4 mg SL tablet DISSOLVE 1 TABLET UNDER THE TONGUE EVERY 5 MINUTES AS NEEDED FOR CHEST PAIN 25 Tab 0    glucose blood VI test strips ([employer] CONTOUR) strip Glucometer for 250.00 Pt. test blood sugar once daily (in morning) 50 Strip 11    denosumab (PROLIA) 60 mg/mL injection 1 mL by SubCUTAneous route every 6 months.  acetaminophen (TYLENOL) 650 mg CR tablet Take 1,300 mg by mouth daily as needed for Pain.  Blood-Glucose Meter (CONTOUR METER) monitoring kit Dx: 250. Check blood sugars daily. 1 kit 11    ZINC PO Take 50 mg by mouth daily.  cholecalciferol, vitamin d3, (VITAMIN D3) 1,000 unit tablet Take 2,000 Units by mouth daily.  calcium-vitamin D (CALCIUM 500+D) 500 mg(1,250mg) -200 unit per tablet Take 2 Tabs by mouth daily.  aspirin 81 mg Tab Take 81 mg by mouth daily.  MULTIVITAMINS (MULTIVITAMIN PO) Take 1 Tab by mouth daily.  metoprolol succinate (TOPROL-XL) 25 mg XL tablet TAKE 1 TABLET BY MOUTH EVERY DAY (Patient not taking: Reported on 2/3/2022) 90 Tab 1     No current facility-administered medications on file prior to visit.

## 2022-05-17 LAB
CHOLEST SERPL-MCNC: 147 MG/DL
EST. AVERAGE GLUCOSE BLD GHB EST-MCNC: 120 MG/DL
HBA1C MFR BLD: 5.8 % (ref 4–5.6)
HDLC SERPL-MCNC: 51 MG/DL
HDLC SERPL: 2.9 {RATIO} (ref 0–5)
LDLC SERPL CALC-MCNC: 49.6 MG/DL (ref 0–100)
TRIGL SERPL-MCNC: 232 MG/DL (ref ?–150)
VLDLC SERPL CALC-MCNC: 46.4 MG/DL

## 2022-05-18 LAB
ALBUMIN SERPL-MCNC: 4.1 G/DL (ref 3.6–4.6)
ALBUMIN/GLOB SERPL: 1.9 {RATIO} (ref 1.2–2.2)
ALP SERPL-CCNC: 108 IU/L (ref 44–121)
ALT SERPL-CCNC: 31 IU/L (ref 0–32)
AST SERPL-CCNC: 40 IU/L (ref 0–40)
BASOPHILS # BLD AUTO: 0.1 X10E3/UL (ref 0–0.2)
BASOPHILS NFR BLD AUTO: 1 %
BILIRUB SERPL-MCNC: 0.2 MG/DL (ref 0–1.2)
BUN SERPL-MCNC: 16 MG/DL (ref 8–27)
BUN/CREAT SERPL: 18 (ref 12–28)
CALCIUM SERPL-MCNC: 8.4 MG/DL (ref 8.7–10.3)
CHLORIDE SERPL-SCNC: 101 MMOL/L (ref 96–106)
CO2 SERPL-SCNC: 18 MMOL/L (ref 20–29)
CREAT SERPL-MCNC: 0.88 MG/DL (ref 0.57–1)
CRP SERPL-MCNC: <1 MG/L (ref 0–10)
EGFR: 66 ML/MIN/1.73
EOSINOPHIL # BLD AUTO: 0.2 X10E3/UL (ref 0–0.4)
EOSINOPHIL NFR BLD AUTO: 2 %
ERYTHROCYTE [DISTWIDTH] IN BLOOD BY AUTOMATED COUNT: 12.7 % (ref 11.7–15.4)
ERYTHROCYTE [SEDIMENTATION RATE] IN BLOOD BY WESTERGREN METHOD: 2 MM/HR (ref 0–40)
GLOBULIN SER CALC-MCNC: 2.2 G/DL (ref 1.5–4.5)
GLUCOSE SERPL-MCNC: 193 MG/DL (ref 65–99)
HCT VFR BLD AUTO: 38.5 % (ref 34–46.6)
HGB BLD-MCNC: 12.7 G/DL (ref 11.1–15.9)
IMM GRANULOCYTES # BLD AUTO: 0.1 X10E3/UL (ref 0–0.1)
IMM GRANULOCYTES NFR BLD AUTO: 1 %
LYMPHOCYTES # BLD AUTO: 2.4 X10E3/UL (ref 0.7–3.1)
LYMPHOCYTES NFR BLD AUTO: 34 %
MCH RBC QN AUTO: 33.2 PG (ref 26.6–33)
MCHC RBC AUTO-ENTMCNC: 33 G/DL (ref 31.5–35.7)
MCV RBC AUTO: 101 FL (ref 79–97)
MONOCYTES # BLD AUTO: 0.6 X10E3/UL (ref 0.1–0.9)
MONOCYTES NFR BLD AUTO: 9 %
NEUTROPHILS # BLD AUTO: 3.8 X10E3/UL (ref 1.4–7)
NEUTROPHILS NFR BLD AUTO: 53 %
PLATELET # BLD AUTO: 191 X10E3/UL (ref 150–450)
POTASSIUM SERPL-SCNC: 5 MMOL/L (ref 3.5–5.2)
PROT SERPL-MCNC: 6.3 G/DL (ref 6–8.5)
RBC # BLD AUTO: 3.83 X10E6/UL (ref 3.77–5.28)
SODIUM SERPL-SCNC: 138 MMOL/L (ref 134–144)
WBC # BLD AUTO: 7.1 X10E3/UL (ref 3.4–10.8)

## 2022-06-03 DIAGNOSIS — M62.838 MUSCLE SPASM: ICD-10-CM

## 2022-06-03 DIAGNOSIS — I10 BENIGN HYPERTENSION: ICD-10-CM

## 2022-06-03 RX ORDER — AMLODIPINE BESYLATE 5 MG/1
5 TABLET ORAL DAILY
Qty: 90 TABLET | Refills: 1 | Status: SHIPPED | OUTPATIENT
Start: 2022-06-03 | End: 2022-06-07

## 2022-06-03 RX ORDER — CYCLOBENZAPRINE HCL 10 MG
10 TABLET ORAL
Qty: 90 TABLET | Refills: 1 | Status: SHIPPED | OUTPATIENT
Start: 2022-06-03 | End: 2022-09-05 | Stop reason: SDUPTHER

## 2022-06-05 DIAGNOSIS — I10 BENIGN HYPERTENSION: ICD-10-CM

## 2022-06-07 RX ORDER — AMLODIPINE BESYLATE 5 MG/1
5 TABLET ORAL DAILY
Qty: 90 TABLET | Refills: 1 | Status: SHIPPED | OUTPATIENT
Start: 2022-06-07 | End: 2022-06-14 | Stop reason: ALTCHOICE

## 2022-06-08 ENCOUNTER — PATIENT MESSAGE (OUTPATIENT)
Dept: FAMILY MEDICINE CLINIC | Age: 82
End: 2022-06-08

## 2022-06-08 DIAGNOSIS — I10 PRIMARY HYPERTENSION: Primary | ICD-10-CM

## 2022-06-08 DIAGNOSIS — N39.0 RECURRENT UTI: ICD-10-CM

## 2022-06-08 DIAGNOSIS — R80.9 CONTROLLED TYPE 2 DIABETES MELLITUS WITH MICROALBUMINURIA, WITHOUT LONG-TERM CURRENT USE OF INSULIN (HCC): ICD-10-CM

## 2022-06-08 DIAGNOSIS — E11.29 CONTROLLED TYPE 2 DIABETES MELLITUS WITH MICROALBUMINURIA, WITHOUT LONG-TERM CURRENT USE OF INSULIN (HCC): ICD-10-CM

## 2022-06-08 LAB
CREAT UR-MCNC: 18.8 MG/DL
MICROALBUMIN UR-MCNC: 0.92 MG/DL
MICROALBUMIN/CREAT UR-RTO: 49 MG/G (ref 0–30)

## 2022-06-10 NOTE — TELEPHONE ENCOUNTER
From: Marinell Canavan, MD  To: Lucy Garrett  Sent: 6/8/2022 10:15 AM EDT  Subject: Lab results    Hi Mrs. Yola Hussein,  Your urine test for early changes to the kidneys came back stable, but your level is at the point where we are seeing this early damage. Normally in this case we like to add a medication called lisinopril to help protect the kidneys. It doesn't look like you've ever been on this, at least in our system. Is this something you'd be willing to try? Lisinopril is also a blood pressure medication so we could add a medium dose of this and let you stop the amlodipine. What do you think?   Dr. Carson Ayala

## 2022-06-14 RX ORDER — GRANULES FOR ORAL 3 G/1
3 POWDER ORAL
Qty: 12 PACKET | Refills: 1 | Status: SHIPPED | OUTPATIENT
Start: 2022-06-14

## 2022-06-14 RX ORDER — LISINOPRIL 20 MG/1
20 TABLET ORAL DAILY
Qty: 30 TABLET | Refills: 3 | Status: SHIPPED | OUTPATIENT
Start: 2022-06-14 | End: 2022-07-12 | Stop reason: SDUPTHER

## 2022-06-14 NOTE — TELEPHONE ENCOUNTER
Reviewed lab results with pt including microalbuminuria. She is not on ACEi and never has been. Will switch amlodipine to lisinopril. She is on trimethoprim daily for UTI ppx. Will switch that to once weekly fosfomycin so she will not have the potential interaction between lisinopril and trimethoprim. She is allergic to cephalosporins and wish to avoid macrobid 2/2 age. Pt notified via [address]

## 2022-06-16 ENCOUNTER — HOSPITAL ENCOUNTER (OUTPATIENT)
Dept: INFUSION THERAPY | Age: 82
Discharge: HOME | End: 2022-06-16
Attending: INTERNAL MEDICINE
Payer: MEDICARE

## 2022-06-16 VITALS
TEMPERATURE: 98.9 F | HEART RATE: 88 BPM | RESPIRATION RATE: 18 BRPM | OXYGEN SATURATION: 98 % | SYSTOLIC BLOOD PRESSURE: 126 MMHG | DIASTOLIC BLOOD PRESSURE: 71 MMHG

## 2022-06-16 DIAGNOSIS — M81.0 AGE-RELATED OSTEOPOROSIS WITHOUT CURRENT PATHOLOGICAL FRACTURE: Primary | ICD-10-CM

## 2022-06-16 LAB
ALBUMIN SERPL-MCNC: 3.4 G/DL (ref 3.5–5)
ALBUMIN/GLOB SERPL: 1 {RATIO} (ref 1.1–2.2)
ALP SERPL-CCNC: 94 U/L (ref 45–117)
ALT SERPL-CCNC: 36 U/L (ref 12–78)
ANION GAP SERPL CALC-SCNC: 5 MMOL/L (ref 5–15)
AST SERPL-CCNC: 38 U/L (ref 15–37)
BILIRUB SERPL-MCNC: 0.4 MG/DL (ref 0.2–1)
BUN SERPL-MCNC: 14 MG/DL (ref 6–20)
BUN/CREAT SERPL: 18 (ref 12–20)
CALCIUM SERPL-MCNC: 8.4 MG/DL (ref 8.5–10.1)
CHLORIDE SERPL-SCNC: 105 MMOL/L (ref 97–108)
CO2 SERPL-SCNC: 28 MMOL/L (ref 21–32)
CREAT SERPL-MCNC: 0.78 MG/DL (ref 0.55–1.02)
GLOBULIN SER CALC-MCNC: 3.5 G/DL (ref 2–4)
GLUCOSE SERPL-MCNC: 162 MG/DL (ref 65–100)
POTASSIUM SERPL-SCNC: 4.3 MMOL/L (ref 3.5–5.1)
PROT SERPL-MCNC: 6.9 G/DL (ref 6.4–8.2)
SODIUM SERPL-SCNC: 138 MMOL/L (ref 136–145)

## 2022-06-16 PROCEDURE — 80053 COMPREHEN METABOLIC PANEL: CPT

## 2022-06-16 PROCEDURE — 36415 COLL VENOUS BLD VENIPUNCTURE: CPT

## 2022-06-16 PROCEDURE — 96372 THER/PROPH/DIAG INJ SC/IM: CPT

## 2022-06-16 PROCEDURE — 74011250636 HC RX REV CODE- 250/636: Performed by: INTERNAL MEDICINE

## 2022-06-16 RX ORDER — EPINEPHRINE 1 MG/ML
0.3 INJECTION, SOLUTION, CONCENTRATE INTRAVENOUS AS NEEDED
OUTPATIENT
Start: 2022-12-15

## 2022-06-16 RX ORDER — DIPHENHYDRAMINE HYDROCHLORIDE 50 MG/ML
50 INJECTION, SOLUTION INTRAMUSCULAR; INTRAVENOUS AS NEEDED
Start: 2022-12-15

## 2022-06-16 RX ORDER — ACETAMINOPHEN 325 MG/1
650 TABLET ORAL AS NEEDED
Start: 2022-12-15

## 2022-06-16 RX ORDER — ALBUTEROL SULFATE 0.83 MG/ML
2.5 SOLUTION RESPIRATORY (INHALATION) AS NEEDED
Start: 2022-12-15

## 2022-06-16 RX ORDER — ONDANSETRON 2 MG/ML
8 INJECTION INTRAMUSCULAR; INTRAVENOUS AS NEEDED
OUTPATIENT
Start: 2022-12-15

## 2022-06-16 RX ORDER — DIPHENHYDRAMINE HYDROCHLORIDE 50 MG/ML
25 INJECTION, SOLUTION INTRAMUSCULAR; INTRAVENOUS AS NEEDED
Start: 2022-12-15

## 2022-06-16 RX ORDER — HYDROCORTISONE SODIUM SUCCINATE 100 MG/2ML
100 INJECTION, POWDER, FOR SOLUTION INTRAMUSCULAR; INTRAVENOUS AS NEEDED
OUTPATIENT
Start: 2022-12-15

## 2022-06-16 RX ADMIN — DENOSUMAB 60 MG: 60 INJECTION SUBCUTANEOUS at 15:45

## 2022-06-16 NOTE — PROGRESS NOTES
[facility] Progress Note    Date: 2022    Name: Melia Welch    MRN: [id number]         : 1940    Ms. Zohra Castro Arrived ambulatory and in no distress for Prolia Injection. Assessment was completed, no acute issues at this time, no new complaints voiced. Peripheral Labs drawn and sent for processing. Patient has not had any recent dental procedures. Ms. Rueda's vitals were reviewed. Visit Vitals  /71 (BP 1 Location: Left arm, BP Patient Position: Sitting)   Pulse 88   Temp 98.9 °F (37.2 °C)   Resp 18   SpO2 98%   Breastfeeding No     Lab results obtained and reviewed. Recent Results (from the past 8 hour(s))   METABOLIC PANEL, COMPREHENSIVE    Collection Time: 22  2:50 PM   Result Value Ref Range    Sodium 138 136 - 145 mmol/L    Potassium 4.3 3.5 - 5.1 mmol/L    Chloride 105 97 - 108 mmol/L    CO2 28 21 - 32 mmol/L    Anion gap 5 5 - 15 mmol/L    Glucose 162 (H) 65 - 100 mg/dL    BUN 14 6 - 20 MG/DL    Creatinine 0.78 0.55 - 1.02 MG/DL    BUN/Creatinine ratio 18 12 - 20      GFR est AA >60 >60 ml/min/1.73m2    GFR est non-AA >60 >60 ml/min/1.73m2    Calcium 8.4 (L) 8.5 - 10.1 MG/DL    Bilirubin, total 0.4 0.2 - 1.0 MG/DL    ALT (SGPT) 36 12 - 78 U/L    AST (SGOT) 38 (H) 15 - 37 U/L    Alk. phosphatase 94 45 - 117 U/L    Protein, total 6.9 6.4 - 8.2 g/dL    Albumin 3.4 (L) 3.5 - 5.0 g/dL    Globulin 3.5 2.0 - 4.0 g/dL    A-G Ratio 1.0 (L) 1.1 - 2.2       Corrected Calcium 8.8. Within parameters to treat. Medications:  Medications Administered     denosumab (PROLIA) injection 60 mg     Admin Date  2022 Action  Given Dose  60 mg Route  SubCUTAneous Administered By  Vale Muro RN                Ms. Zohra Castro tolerated treatment well and was discharged from [place] [address] in stable condition. Patient to follow-up with the provider regarding future appointments at the [facility].     Reno Hernandez RN  2022

## 2022-06-17 NOTE — PROGRESS NOTES
Urine sample brought to lab 6-6-22 but UDS not obtained.  [employer] message sent for pt to return to provide urine sample

## 2022-07-07 ENCOUNTER — OFFICE VISIT (OUTPATIENT)
Dept: RHEUMATOLOGY | Age: 82
End: 2022-07-07
Payer: MEDICARE

## 2022-07-07 VITALS
HEIGHT: 61 IN | DIASTOLIC BLOOD PRESSURE: 70 MMHG | BODY MASS INDEX: 29.94 KG/M2 | SYSTOLIC BLOOD PRESSURE: 118 MMHG | TEMPERATURE: 97.7 F | WEIGHT: 158.6 LBS | RESPIRATION RATE: 18 BRPM

## 2022-07-07 DIAGNOSIS — M81.0 AGE-RELATED OSTEOPOROSIS WITHOUT CURRENT PATHOLOGICAL FRACTURE: ICD-10-CM

## 2022-07-07 DIAGNOSIS — Z79.60 LONG-TERM USE OF IMMUNOSUPPRESSANT MEDICATION: ICD-10-CM

## 2022-07-07 DIAGNOSIS — E53.8 VITAMIN B12 DEFICIENCY: ICD-10-CM

## 2022-07-07 DIAGNOSIS — M06.09 SERONEGATIVE RHEUMATOID ARTHRITIS OF MULTIPLE SITES (HCC): Primary | ICD-10-CM

## 2022-07-07 PROCEDURE — 1101F PT FALLS ASSESS-DOCD LE1/YR: CPT | Performed by: INTERNAL MEDICINE

## 2022-07-07 PROCEDURE — 1090F PRES/ABSN URINE INCON ASSESS: CPT | Performed by: INTERNAL MEDICINE

## 2022-07-07 PROCEDURE — G0463 HOSPITAL OUTPT CLINIC VISIT: HCPCS | Performed by: INTERNAL MEDICINE

## 2022-07-07 PROCEDURE — G8754 DIAS BP LESS 90: HCPCS | Performed by: INTERNAL MEDICINE

## 2022-07-07 PROCEDURE — G8510 SCR DEP NEG, NO PLAN REQD: HCPCS | Performed by: INTERNAL MEDICINE

## 2022-07-07 PROCEDURE — G8752 SYS BP LESS 140: HCPCS | Performed by: INTERNAL MEDICINE

## 2022-07-07 PROCEDURE — G8427 DOCREV CUR MEDS BY ELIG CLIN: HCPCS | Performed by: INTERNAL MEDICINE

## 2022-07-07 PROCEDURE — 99215 OFFICE O/P EST HI 40 MIN: CPT | Performed by: INTERNAL MEDICINE

## 2022-07-07 PROCEDURE — 1123F ACP DISCUSS/DSCN MKR DOCD: CPT | Performed by: INTERNAL MEDICINE

## 2022-07-07 PROCEDURE — G8417 CALC BMI ABV UP PARAM F/U: HCPCS | Performed by: INTERNAL MEDICINE

## 2022-07-07 PROCEDURE — G8536 NO DOC ELDER MAL SCRN: HCPCS | Performed by: INTERNAL MEDICINE

## 2022-07-07 NOTE — Clinical Note
7/31/2022    Patient: Aubrey Wisdom   YOB: 1940   Date of Visit: 7/7/2022     Sanya Schultz MD  [address]  Via In [facility] [address]    Dear Sanya Schultz MD,      Thank you for referring Ms. Nimesh Gann to [address] [place] [address] for evaluation. My notes for this consultation are attached. If you have questions, please do not hesitate to call me. I look forward to following your patient along with you.       Sincerely,    Gerber Husain MD

## 2022-07-07 NOTE — PROGRESS NOTES
REASON FOR VISIT    This is a follow-up visit for Ms. Rueda for     ICD-10-CM   1. Seronegative rheumatoid arthritis of multiple sites ([facility] M06.09   2.  Age-related osteoporosis without current pathological fracture M81.0      Inflammatory arthritis phenotype includes:  Anti-CCP positive: no  Rheumatoid factor positive: no  Erosive disease: yes  Extra-articular manifestations include: Raynaud's    Immunosuppression Screening (9/10/2020):  Quantiferon TB: negative   PPD:  Not performed  Hepatitis B: negative    Hepatitis C: negative     Therapy History includes:  Current DMARD therapy include: leflunomide 20 mg daily (2/13/2018 to 10/2019; 2/12/2020 to present), Nika Ghee 11 mg XR daily (8/2021 to present)  Prior DMARD therapy include: methotrexate 12.5 mg subcutaneous,  hydroxychloroquine 300 mg daily, Humira (5/10/2018: 3 samples: cost), Simponi Aria every 6 weeks (12/07/2018 to 5/28/2021)  Discontinued DMARDs because of inefficacy: Simponi Aria  Discontinued DMARDs because of side effects: methotrexate (elevated LFTs), leflunomide (elevated LFTs)  Contra-Indicated DMARDs because of chronic kidney disease: methotrexate     Osteoporosis Historical Synopsis    Height loss since age 26 (at least two inches): 5  Fracture history includes: yes (T5-T6)  Family history of hip fracture: no  Fall Risk: no    Smoking history: no  Alcohol consumption: no  Prednisone history: no    Exercise: no    Previous work-up for osteoporosis includes the following:  DEXA Scan: 8/16/2019  Vitamin 25OH D level: 46.6 (21/3/2018)  PTH: 21 (21/3/2018)  TSH: 2.570 (21/3/2018)    Therapy History includes:  Current osteoporosis therapy includes: Prolia 60 mg every 6 months (3/29/2018 to present)  Prior osteoporosis therapy includes: Reclast (2 years)  The following osteoporosis therapy have been ineffective: Reclast  The following osteoporosis therapy were stopped because of side effects: none    HISTORY OF PRESENT ILLNESS    Ms. Carmina Kennedy returns for a follow-up. Pt is still receiving her Prolia injections which are well tolerated. Pt is not taking any Prednisone currently. Pt stopped her Rob[place] Sender because she is \"tired of being cut on. \" Her doctors thinks that her Justin Sender was contributing to her worsening skin cancers. She has been off of it since the end of April. She says that she can feel a difference since stopping it, but she is able to manage without it. She is still taking Leflunomide. Pt main complaint is her worsening joint pain in her hands. Pt thinks that her peripheral neuropathy is moving up her legs. She notes that sometimes when she is walking she feels like she has to focus on balancing. She was on B12 supplements in the past, but she is not currently on one. Pt has a gastric bypass in the past.     Pt has both of her knees replaced. She notes that sometimes her whole legs become painful and sore. Pt denies a nagging cough since she had COVID. Pt takes vitamin D3 and calcium. REVIEW OF SYSTEMS    A comprehensive review of systems was performed and pertinent results are documented in the HPI, review of systems is otherwise non-contributory. PAST MEDICAL HISTORY    She has a past medical history of Anemia, Arthritis, CAD (coronary artery disease) (1997), Degenerative joint disease of right hip (09/14/2015 ), Diabetes ([address] Fracture, Gastritis, H/O Bell's palsy (7/9/2012), Hypercholesterolemia, Lupus ([address] (1/9/2015), Osteoporosis, post-menopausal, Squamous cell cancer of skin of left cheek, and Squamous cell skin cancer. FAMILY HISTORY    Her family history includes Arthritis-rheumatoid in her sister, sister and another family member; Diabetes in her father, mother, and sister; Elevated Lipids in her sister; Heart Disease in her father and mother; Hypertension in her father, mother, and sister; Thyroid Disease in her sister. SOCIAL HISTORY    She reports that she quit smoking about 32 years ago.  She has never used smokeless tobacco. She reports that she does not drink alcohol and does not use drugs. MEDICATIONS    Current Outpatient Medications   Medication Sig    fosfomycin (MONUROL) 3 gram pack oral packet Take 1 Packet by mouth every seven (7) days. Indications: urinary tract infection prevention    lisinopriL (PRINIVIL, ZESTRIL) 20 mg tablet Take 1 Tablet by mouth daily. cyclobenzaprine (FLEXERIL) 10 mg tablet Take 1 Tablet by mouth nightly as needed for Muscle Spasm(s). HYDROcodone-acetaminophen (NORCO) 5-325 mg per tablet Take 1 Tablet by mouth every six (6) hours as needed for Pain for up to 90 days. gabapentin (NEURONTIN) 800 mg tablet TAKE 1 TABLET BY MOUTH THREE TIMES DAILY. MAX DAILY AMOUNT: 2400 MG    metFORMIN (GLUCOPHAGE) 500 mg tablet TAKE 1 TABLET BY MOUTH TWICE DAILY WITH MEALS    atorvastatin (LIPITOR) 40 mg tablet TAKE 1 TABLET BY MOUTH DAILY    leflunomide (ARAVA) 20 mg tablet Take 1 Tablet by mouth daily. nortriptyline (PAMELOR) 10 mg capsule TAKE 1 CAPSULE BY MOUTH EVERY NIGHT    ferrous sulfate (FeroSuL) 325 mg (65 mg iron) tablet TAKE 1 TABLET BY MOUTH TWICE DAILY FOR LOW IRON    pantoprazole (PROTONIX) 40 mg tablet Take 1 Tablet by mouth daily. glipiZIDE SR (GLUCOTROL XL) 2.5 mg CR tablet TAKE 1 TABLET BY MOUTH TWICE DAILY    tofacitinib 11 mg Tb24 Take 11 mg by mouth daily. Indications: rheumatoid arthritis    nitroglycerin (NITROSTAT) 0.4 mg SL tablet DISSOLVE 1 TABLET UNDER THE TONGUE EVERY 5 MINUTES AS NEEDED FOR CHEST PAIN    glucose blood VI test strips (Ascensia CONTOUR) strip Glucometer for 250.00 Pt. test blood sugar once daily (in morning)    denosumab (PROLIA) 60 mg/mL injection 1 mL by SubCUTAneous route every 6 months. acetaminophen (TYLENOL) 650 mg CR tablet Take 1,300 mg by mouth daily as needed for Pain. Blood-Glucose Meter (CONTOUR METER) monitoring kit Dx: 250. Check blood sugars daily. ZINC PO Take 50 mg by mouth daily.     cholecalciferol, vitamin d3, (VITAMIN D3) 1,000 unit tablet Take 2,000 Units by mouth daily. calcium-vitamin D (CALCIUM 500+D) 500 mg(1,250mg) -200 unit per tablet Take 2 Tabs by mouth daily. aspirin 81 mg Tab Take 81 mg by mouth daily. MULTIVITAMINS (MULTIVITAMIN PO) Take 1 Tab by mouth daily. No current facility-administered medications for this visit. ALLERGIES    Allergies   Allergen Reactions    Cephalosporins Nausea and Vomiting     Severe vomiting  Other reaction(s): gi upset, unspecified       PHYSICAL EXAMINATION    Visit Vitals  /70 (BP 1 Location: Left upper arm, BP Patient Position: Sitting)   Temp 97.7 °F (36.5 °C) (Oral)   Resp 18   Ht 5' 1\" (1.549 m)   Wt 158 lb 9.6 oz (71.9 kg)   BMI 29.97 kg/m²     Body mass index is 29.97 kg/m². General:  The patient is well developed, well nourished, alert, and in no apparent distress. Eyes: Sclera are anicteric. No conjunctival injection. HEENT:  Oropharynx is clear. No oral ulcers. Adequate salivary pooling. No cervical or supraclavicular lymphadenopathy. Lungs:  Interval clearance of bibasilar crackles. Normal respiratory effort. Cor:  Regular rate and rhythm. No murmur rub or gallop. Abdomen: Soft, non-tender, without hepatomegaly or masses. Extremities: No calf tenderness or edema. Warm and well perfused. Skin:  No significant abnormalities. Neuro: Nonfocal  Musculoskeletal:    A comprehensive musculoskeletal exam was performed for all joints of each upper and lower extremity and assessed for swelling, tenderness and range of motion. Results are documented as below:  Stable bilateral Sharla and Heberden nodes without synovitis  Bilateral knee crepitus without effusion  No evidence of synovitis in the small joints of the hands, wrists, shoulders, elbows, hips, knees or ankles. DATA REVIEW    Laboratory     Recent laboratory results were reviewed, summarized, and discussed with the patient.   6/16/22: Cr 0.78, AST 38, ALT 36, Alk phos 94, Tbili 0.4,   6/7/22: microalbumin urine random 0.92, Cr urine 18.8, LFT WNL microalbumin/Cr ratio 49  5/16/22: HGB A1c 5.8, Triglyceride 232, ESR 2, Cr 0.88, LFT WNL, WBC 7.1, HGB 12.7, Plt 191, CRp <1 mg/L  12/16/21: Cr 0.86, Tbili 0.3, AST 46, ALT 44, AlkP 131, Tprot 6.7, Alb 3.4  11/15/21: A1c 5.6  9/27/21: , NTproBNP 610  9/17/13: Hep B SAg neg, cAb neg; Hep C Ab neg    Imaging    Musculoskeletal Ultrasound    None    Radiographs    XR CHEST PA LAT (5/16/22): Clear lungs. Treated and untreated vertebral compression fractures    Bilateral Hand 2/13/2018: RIGHT: severe osteopenia without fracture. There are scattered degenerative changes. Progressive first Aia 16 joint. No new erosive changes. Vascular calcifications. LEFT: severe osteopenia. Scattered degenerative changes as noted previously with progression of the index finger and middle finger DIP joints. There is a new erosion at the index finger proximal phalanx ulnar base without adjacent joint space loss. There are vascular calcifications. Calcification overlying the DRUJ is favored represent overlying vascular calcifications. Bilateral Foot 2/13/2018: RIGHT: no acute fracture or dislocation. Alignment is anatomic. Mild degenerative changes are seen in the left first MTP joint. Minimal degenerative changes are seen in the right first MTP joint. A possible erosion is seen in the head of the left first metatarsal. No erosive changes are seen in the right foot. Plantar calcaneal heel spurs are noted in both feet, as well as enthesophyte formation at the Achilles insertion site. Arterial vascular calcifications are also noted bilaterally. Mild soft tissue swelling surrounds the left first MTP joint. LEFT: no acute fracture or dislocation. Alignment is anatomic. Mild degenerative changes are seen in the left first MTP joint. Minimal degenerative changes are seen in the right first MTP joint.  A possible erosion is seen in the head of the left first metatarsal. No erosive changes are seen in the right foot. Plantar calcaneal heel spurs are noted in both feet, as well as enthesophyte formation at the Achilles insertion site. Arterial vascular calcifications are also noted bilaterally. Mild soft tissue swelling surrounds the left first MTP joint. Thoracic Spine 11/03/2016: The posterior battery pack is again seen within the intrathecal catheter in stable position. Kyphoplasty material is present in a midthoracic vertebral body is stable mild chronic compression of the adjacent inferior vertebral body. The remaining vertebral body heights are maintained. Anterior osteophytes are noted. There is no abnormality in alignment. Lumbar Spine 10/19/2016: significant disc space narrowing at L3-L4 with 14 mm anterolisthesis. There is facet arthropathy. The vertebral body heights are maintained. Spinal catheters are seen. There are atherosclerotic vascular calcifications. Bilateral Hips 8/31/2015: no fracture, dislocation or other acute abnormality. There osteoarthritic pattern change of both hips, more severe on the right. There is nonuniform joint space narrowing and marginal osteophytes. There is remodeling of the right femur head with subchondral cystic formation. Mild sacroiliac osteoarthritic change. Lower lumbar spondylosis is noted. Wire  leads are seen in the left lower lumbar spine likely reflective of spinal stimulator device. Bilateral Hand 3/01/2011: RIGHT: mild osteopenia. There are degenerative changes of the distal and proximal interphalangeal joints and the base of the thumb. There is no fracture or other acute abnormality. Scattered calcifications are noted in the radial and ulnar arteries. LEFT: mild osteopenia. There are degenerative changes of the distal and proximal interphalangeal joints and  at the base of the thumb. There is no fracture or acute abnormality. There are vascular calcifications of the radial and ulnar arteries.     CT Imaging    None    MR Imaging    MRI Brain with without contrast 12/19/2016: There is sulcal and ventricular prominence. Confluent periventricular and scattered foci of increased T2 signal intensity in the corona radiata and centrum semiovale. Tiny remote lacunar infarction in the right cerebellum. There is no intracranial mass, hemorrhage or evidence of acute infarction. There is no Chiari or syrinx. The pituitary and infundibulum are grossly unremarkable. There is no skull base mass. Cerebellopontine angles are grossly unremarkable. The major intracranial vascular flow-voids are unremarkable. No evidence of demyelinating process. There is no abnormal parenchymal enhancement. There is no abnormal meningeal enhancement. The cavernous sinuses are symmetric. Optic chiasm and infundibulum grossly unremarkable. Orbits are grossly symmetric. Dural venous sinuses are grossly patent. The brain architecture is normal. There is no evidence of midline shift or mass-effect. There are no extra-axial fluid collections. The mastoid air cells and paranasal sinuses are well pneumatized and clear. MRI Lumbar Spine with and without contrast 8/07/2013: There is transitional lumbosacral anatomy. In keeping with prior studies, lowest fully formed disc is labeled L5-S1. There has been prior  laminectomies at L3-5 with no change in grade 1 anterolisthesis at L3-4 which measures 5 mm. There is grade 1 anterolisthesis of L4-5 of approximately 2 mm, unchanged. Vertebral body heights are maintained. There is no marrow edema or compression fracture. There are reactive endplate changes at Q3-5. The conus medullaris terminates at L1-2. There is no abnormal intraspinal enhancement. L1/2:  The spinal canal and foramina are widely patent. L2/3:  Diffuse disc bulge, facet hypertrophy and ligamentum flavum thickening cause mild spinal canal and moderate right foraminal stenosis. Left foramen is patent.  L3/4: There is uncovering of disc material with facet hypertrophy resulting in mild thecal sac narrowing. There is severe bilateral foraminal stenosis similar to the prior study. L4/5:  There is diffuse disc bulge and facet hypertrophy, without significant spinal canal stenosis. There is mild right foraminal stenosis. L5/S1:  The spinal canal and foramina are widely patent. DXA     DXA 8/16/2019: (excluded Possible increased density in the L2 and T6 vertebral bodies) left femoral neck T score: -1.1 (0.881 g/cm2), left total hip T score: -0.9 (0.889 g/cm2), right femoral neck T score: -0.7 (0.945 g/cm2), right total hip T score: -0.5 (0.941 g/cm2), and distal one third left radius T score -2.4 (BMD 0.542 g/cm2). FRAX score 21 % probability in 10 years for major osteoporotic fracture and 39 % 10 year probability of hip fracture. DXA 8/08/2017: (excluded Lumbar spine because of overlying wires and degenerative change and kyphoplasty of L4) showed left femoral neck T score: -1.4 (0.837 g/cm2), left total hip T score: -1.4 (0.830 g/cm2), right femoral neck T score: -0.9 (0.919 g/cm2), right total hip T score: -0.9 (0.900 g/cm2), and distal one third left radius T score -3.0 (BMD 0.615 g/cm2). FRAX score 16.8 % probability in 10 years for major osteoporotic fracture and 3.2 % 10 year probability of hip fracture. Nuclear Medicine    Nuclear Medicine Bone Scan 1/22/2013: There is intense radiotracer uptake in the upper thoracic spine, likely T5. There is uptake in the region of the sternoclavicular joints and left shoulder, likely degenerative. ASSESSMENT AND PLAN    This is a follow-up visit for Ms. Rueda for seronegative rheumatoid arthritis. Inflammatory arthritis is well controlled with low disease activity, no alexis synovitis despite recent cessation of tofacitinib due to recurrent nonmelanoma skin cancers. If paresthesias worsen despite normal B12 levels, may need to also revisit alternatives to leflunomide.  We reviewed her very low-grade transaminitis but she is comfortable continuing leflunomide for now. She has had borderline low B12 levels as of last year and is at continued risk for decreased absorption s/p bypass, raising concern that her lower body paresthesias are related to further worsening off of supplementation. Updating levels, barring overt deficiency will defer to her PCP if her low-normal levels warrant supplementation at this point. 1. Age-related osteoporosis without current pathological fracture  -Cont Prolia q6mo  -Cont 2000 units cholecalciferol daily  -Reviewed goal 3-4 servings high-calcium foods/day, with calcium supplement as needed to meet this goal    2. Seronegative rheumatoid arthritis of multiple sites (HCC)  - Cont leflunomide 20mg daily monotherapy  - VITAMIN B12; Future  - C REACTIVE PROTEIN, QT; Future  - CBC WITH AUTOMATED DIFF; Future  - METABOLIC PANEL, COMPREHENSIVE; Future  - SED RATE (ESR); Future    3. Long-term use of immunosuppressant medication  - CBC WITH AUTOMATED DIFF; Future  - METABOLIC PANEL, COMPREHENSIVE; Future    4. Vitamin B12 deficiency  - VITAMIN B12; Future  - CBC WITH AUTOMATED DIFF; Future    Patient Instructions   1) Continue getting Prolia injections every 6 months as scheduled. 2) Continue on your current dose of Leflunomide for now. 3) Stay off of the [place]. 4) Reach out to your PCP of GI doctor to discuss treatment for your low B12 numbers and your progressive nerve problems. 5) I recommend that you get another Covid booster. 6) Check labs in the next 2 months. 7) Follow up in 4 months. Let me know if you have any worsening of symptoms in the meantime.       Face to face time: 25 minutes  Note preparation and records review day of service: 20 minutes  Total provider time day of service: 45 minutes    TODAY'S ORDERS    Orders Placed This Encounter    VITAMIN B12    C REACTIVE PROTEIN, QT    CBC WITH AUTOMATED DIFF    METABOLIC PANEL, COMPREHENSIVE    SED RATE (ESR)     Future Appointments   Date Time Provider Yocasta Cisneros   11/21/2022 10:00 AM Yuri Matthews MD [facility] BS AMB   12/20/2022  2:30 PM [facility]     This was scribed by Barbara Camacho in the presence of Dr. Vero Schmitt MD S  Adult Rheumatology   [address]  [facility], [address] [place] [address]   Phone [phone]  Fax [phone]

## 2022-07-07 NOTE — PATIENT INSTRUCTIONS
1) Continue getting Prolia injections every 6 months as scheduled. 2) Continue on your current dose of Leflunomide for now. 3) Stay off of the [place]. 4) Reach out to your PCP of GI doctor to discuss treatment for your low B12 numbers and your progressive nerve problems. 5) I recommend that you get another Covid booster. 6) Check labs in the next 2 months. 7) Follow up in 4 months. Let me know if you have any worsening of symptoms in the meantime.

## 2022-07-07 NOTE — PROGRESS NOTES
Chief Complaint   Patient presents with    Arthritis    Joint Pain     legs, hands     1. Have you been to the ER, urgent care clinic since your last visit? Hospitalized since your last visit? Yes Where: [facility]    2. Have you seen or consulted any other health care providers outside of the [address] [place] [address] since your last visit? Include any pap smears or colon screening.  No

## 2022-07-07 NOTE — LETTER
7/31/2022 3:41 AM    Patient:  Glynn Cabrera   YOB: 1940  Date of Visit: 7/7/2022      Dear Ashley Medina MD  [email or web address]  Via In Basket: We recently saw Ms. Fleet Goltz in the [facility] for evaluation. My notes for this consultation are attached. If you have questions, please do not hesitate to call me. I look forward to following your patient along with you.     Sincerely,  Silva Pace MD [facility]  Cell: [phone]

## 2022-07-13 ENCOUNTER — TELEPHONE (OUTPATIENT)
Dept: FAMILY MEDICINE CLINIC | Age: 82
End: 2022-07-13

## 2022-07-13 NOTE — TELEPHONE ENCOUNTER
Dr Mikhail Reece,    We received a fax from the patient's pharmacy stating the fosfomycin is not covered. Patient is requesting refill. Pharmacy is asking for alternate medication.

## 2022-07-15 ENCOUNTER — TELEPHONE (OUTPATIENT)
Dept: FAMILY MEDICINE CLINIC | Age: 82
End: 2022-07-15

## 2022-07-22 ENCOUNTER — TELEPHONE (OUTPATIENT)
Dept: FAMILY MEDICINE CLINIC | Age: 82
End: 2022-07-22

## 2022-07-22 NOTE — TELEPHONE ENCOUNTER
Attempted to call. Unsuccessful. Message left  Appeal letter states  Per Dr Barbera Hammans    She has a history of uncomplicated acute cystitis in women due to susceptible strains of E coli and E faecalis. She has a history of recurrent cystitis and has been on prophylactic antibiotics to prevent this. These have worked very well at preventing recurrent infection and potential complications. 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 Trimethoprim is contraindicated with the use of ACEi or ARB due to the risk of hyperkalemia. She is allergic to cephalosporins and should not be on nitrofurantoin because of her age. Therefore, fosfomycin was the only alternative that has efficacy in preventing recurrent cystitis.

## 2022-07-22 NOTE — TELEPHONE ENCOUNTER
Carrie with [facility] needs answers for the follow ing questions in regards to the Fosformycin appeal.    Does the pt have a diagnosis of uncomplicated urinary tract infection, or is it a complicated uti. Please advise.

## 2022-08-05 RX ORDER — NORTRIPTYLINE HYDROCHLORIDE 10 MG/1
CAPSULE ORAL
Qty: 30 CAPSULE | Refills: 5 | Status: SHIPPED | OUTPATIENT
Start: 2022-08-05 | End: 2022-09-05 | Stop reason: SDUPTHER

## 2022-08-18 DIAGNOSIS — D51.0 PERNICIOUS ANEMIA: ICD-10-CM

## 2022-08-19 RX ORDER — LANOLIN ALCOHOL/MO/W.PET/CERES
CREAM (GRAM) TOPICAL
Qty: 60 TABLET | Refills: 5 | Status: SHIPPED | OUTPATIENT
Start: 2022-08-19

## 2022-09-05 DIAGNOSIS — Z79.899 CONTROLLED SUBSTANCE AGREEMENT SIGNED: ICD-10-CM

## 2022-09-05 DIAGNOSIS — M12.9 ARTHROPATHY: ICD-10-CM

## 2022-09-05 DIAGNOSIS — M51.36 DEGENERATIVE LUMBAR DISC: ICD-10-CM

## 2022-09-05 DIAGNOSIS — M62.838 MUSCLE SPASM: ICD-10-CM

## 2022-09-05 DIAGNOSIS — M79.18 MYOFASCIAL MUSCLE PAIN: ICD-10-CM

## 2022-09-06 PROBLEM — Z79.899 CONTROLLED SUBSTANCE AGREEMENT SIGNED: Status: ACTIVE | Noted: 2022-09-06

## 2022-09-06 RX ORDER — HYDROCODONE BITARTRATE AND ACETAMINOPHEN 5; 325 MG/1; MG/1
1 TABLET ORAL
Qty: 60 TABLET | Refills: 0 | Status: SHIPPED | OUTPATIENT
Start: 2022-09-06 | End: 2022-12-05

## 2022-09-06 RX ORDER — NORTRIPTYLINE HYDROCHLORIDE 10 MG/1
CAPSULE ORAL
Qty: 30 CAPSULE | Refills: 5 | Status: SHIPPED | OUTPATIENT
Start: 2022-09-06

## 2022-09-06 RX ORDER — CYCLOBENZAPRINE HCL 10 MG
10 TABLET ORAL
Qty: 90 TABLET | Refills: 1 | Status: SHIPPED | OUTPATIENT
Start: 2022-09-06

## 2022-09-06 NOTE — TELEPHONE ENCOUNTER
Rx request for Hydrocodone. Pt takes this sparingly for breakthrough pain and not daily. Last fill 4 months ago. Has appt coming up, will get UDS at that time. CSC on file.  reviewed and appropriate. Rx sent.

## 2022-10-04 DIAGNOSIS — E78.2 MIXED HYPERLIPIDEMIA: ICD-10-CM

## 2022-10-04 RX ORDER — ATORVASTATIN CALCIUM 40 MG/1
40 TABLET, FILM COATED ORAL DAILY
Qty: 90 TABLET | Refills: 1 | OUTPATIENT
Start: 2022-10-04

## 2022-10-04 RX ORDER — ATORVASTATIN CALCIUM 40 MG/1
TABLET, FILM COATED ORAL
Qty: 90 TABLET | Refills: 1 | Status: SHIPPED | OUTPATIENT
Start: 2022-10-04

## 2022-10-11 LAB
ALBUMIN SERPL-MCNC: 4.3 G/DL (ref 3.6–4.6)
ALBUMIN/GLOB SERPL: 2 {RATIO} (ref 1.2–2.2)
ALP SERPL-CCNC: 95 IU/L (ref 44–121)
ALT SERPL-CCNC: 22 IU/L (ref 0–32)
AST SERPL-CCNC: 33 IU/L (ref 0–40)
BASOPHILS # BLD AUTO: 0.1 X10E3/UL (ref 0–0.2)
BASOPHILS NFR BLD AUTO: 1 %
BILIRUB SERPL-MCNC: 0.3 MG/DL (ref 0–1.2)
BUN SERPL-MCNC: 15 MG/DL (ref 8–27)
BUN/CREAT SERPL: 18 (ref 12–28)
CALCIUM SERPL-MCNC: 8.4 MG/DL (ref 8.7–10.3)
CHLORIDE SERPL-SCNC: 103 MMOL/L (ref 96–106)
CO2 SERPL-SCNC: 22 MMOL/L (ref 20–29)
CREAT SERPL-MCNC: 0.84 MG/DL (ref 0.57–1)
CRP SERPL-MCNC: <1 MG/L (ref 0–10)
EGFR: 69 ML/MIN/1.73
EOSINOPHIL # BLD AUTO: 0.3 X10E3/UL (ref 0–0.4)
EOSINOPHIL NFR BLD AUTO: 4 %
ERYTHROCYTE [DISTWIDTH] IN BLOOD BY AUTOMATED COUNT: 11.9 % (ref 11.7–15.4)
ERYTHROCYTE [SEDIMENTATION RATE] IN BLOOD BY WESTERGREN METHOD: 3 MM/HR (ref 0–40)
GLOBULIN SER CALC-MCNC: 2.2 G/DL (ref 1.5–4.5)
GLUCOSE SERPL-MCNC: 89 MG/DL (ref 70–99)
HCT VFR BLD AUTO: 37.3 % (ref 34–46.6)
HGB BLD-MCNC: 12.3 G/DL (ref 11.1–15.9)
IMM GRANULOCYTES # BLD AUTO: 0.1 X10E3/UL (ref 0–0.1)
IMM GRANULOCYTES NFR BLD AUTO: 1 %
LYMPHOCYTES # BLD AUTO: 2.5 X10E3/UL (ref 0.7–3.1)
LYMPHOCYTES NFR BLD AUTO: 32 %
MCH RBC QN AUTO: 32.7 PG (ref 26.6–33)
MCHC RBC AUTO-ENTMCNC: 33 G/DL (ref 31.5–35.7)
MCV RBC AUTO: 99 FL (ref 79–97)
MONOCYTES # BLD AUTO: 0.7 X10E3/UL (ref 0.1–0.9)
MONOCYTES NFR BLD AUTO: 8 %
NEUTROPHILS # BLD AUTO: 4.3 X10E3/UL (ref 1.4–7)
NEUTROPHILS NFR BLD AUTO: 54 %
PLATELET # BLD AUTO: 206 X10E3/UL (ref 150–450)
POTASSIUM SERPL-SCNC: 4.7 MMOL/L (ref 3.5–5.2)
PROT SERPL-MCNC: 6.5 G/DL (ref 6–8.5)
RBC # BLD AUTO: 3.76 X10E6/UL (ref 3.77–5.28)
SODIUM SERPL-SCNC: 140 MMOL/L (ref 134–144)
VIT B12 SERPL-MCNC: 233 PG/ML (ref 232–1245)
WBC # BLD AUTO: 7.9 X10E3/UL (ref 3.4–10.8)

## 2022-10-19 DIAGNOSIS — E11.42 DIABETIC POLYNEUROPATHY ASSOCIATED WITH TYPE 2 DIABETES MELLITUS (HCC): ICD-10-CM

## 2022-10-21 RX ORDER — GABAPENTIN 800 MG/1
TABLET ORAL
Qty: 90 TABLET | Refills: 2 | OUTPATIENT
Start: 2022-10-21

## 2022-10-21 RX ORDER — GABAPENTIN 800 MG/1
TABLET ORAL
Qty: 90 TABLET | Refills: 2 | Status: SHIPPED | OUTPATIENT
Start: 2022-10-21

## 2022-10-21 NOTE — TELEPHONE ENCOUNTER
Spoke with pt to offer appt  She has an appt scheduled for 11-21-22   She reports being seen every 6 months  She will be going out of town today and requests refill  Please advise

## 2022-10-21 NOTE — TELEPHONE ENCOUNTER
Pt states she will be coming in on her scheduled appointment but that she cannot do without her medication. Pt states with her neuropathy she will be in such pain. She requested it early because she is going out of town today and will be gone for a week. She cannot leave without it. Please send it to the Pharmacy. Please call Pt.  Thank

## 2022-10-28 DIAGNOSIS — E11.21 TYPE 2 DIABETES WITH NEPHROPATHY (HCC): ICD-10-CM

## 2022-10-28 RX ORDER — METFORMIN HYDROCHLORIDE 500 MG/1
TABLET ORAL
Qty: 180 TABLET | Refills: 1 | Status: SHIPPED | OUTPATIENT
Start: 2022-10-28

## 2022-11-04 ENCOUNTER — OFFICE VISIT (OUTPATIENT)
Dept: RHEUMATOLOGY | Age: 82
End: 2022-11-04
Payer: MEDICARE

## 2022-11-04 VITALS
TEMPERATURE: 98.3 F | SYSTOLIC BLOOD PRESSURE: 101 MMHG | DIASTOLIC BLOOD PRESSURE: 43 MMHG | HEART RATE: 61 BPM | WEIGHT: 150 LBS | OXYGEN SATURATION: 81 % | BODY MASS INDEX: 28.34 KG/M2 | RESPIRATION RATE: 16 BRPM

## 2022-11-04 DIAGNOSIS — M81.0 AGE-RELATED OSTEOPOROSIS WITHOUT CURRENT PATHOLOGICAL FRACTURE: ICD-10-CM

## 2022-11-04 DIAGNOSIS — M06.09 SERONEGATIVE RHEUMATOID ARTHRITIS OF MULTIPLE SITES (HCC): Primary | ICD-10-CM

## 2022-11-04 DIAGNOSIS — N18.2 CKD (CHRONIC KIDNEY DISEASE) STAGE 2, GFR 60-89 ML/MIN: ICD-10-CM

## 2022-11-04 DIAGNOSIS — Z79.60 LONG-TERM USE OF IMMUNOSUPPRESSANT MEDICATION: ICD-10-CM

## 2022-11-04 DIAGNOSIS — E55.9 VITAMIN D DEFICIENCY: ICD-10-CM

## 2022-11-04 PROCEDURE — G0463 HOSPITAL OUTPT CLINIC VISIT: HCPCS | Performed by: INTERNAL MEDICINE

## 2022-11-04 RX ORDER — ACITRETIN 25 MG/1
CAPSULE ORAL
COMMUNITY
Start: 2022-11-01 | End: 2022-11-21

## 2022-11-04 RX ORDER — AMLODIPINE BESYLATE 5 MG/1
5 TABLET ORAL DAILY
COMMUNITY
Start: 2022-08-30 | End: 2022-11-21

## 2022-11-04 RX ORDER — HYDROXYCHLOROQUINE SULFATE 200 MG/1
300 TABLET, FILM COATED ORAL DAILY
Qty: 135 TABLET | Refills: 1 | Status: SHIPPED | OUTPATIENT
Start: 2022-11-04

## 2022-11-04 RX ORDER — AMOXICILLIN AND CLAVULANATE POTASSIUM 875; 125 MG/1; MG/1
1 TABLET, FILM COATED ORAL 2 TIMES DAILY
COMMUNITY
Start: 2022-08-21 | End: 2022-11-21

## 2022-11-04 RX ORDER — LEFLUNOMIDE 20 MG/1
20 TABLET ORAL DAILY
Qty: 30 TABLET | Refills: 2 | Status: SHIPPED | OUTPATIENT
Start: 2022-11-04

## 2022-11-04 NOTE — PROGRESS NOTES
Chief Complaint   Patient presents with    Joint Pain     1. Have you been to the ER, urgent care clinic since your last visit? Hospitalized since your last visit? No    2. Have you seen or consulted any other health care providers outside of the [address] [place] [address] since your last visit? Include any pap smears or colon screening.  No

## 2022-11-04 NOTE — PATIENT INSTRUCTIONS
1) Continue to take one pill of leflunomide daily. 2) I am prescribing you 1.5 pills of daily plaquenil. Make sure you see an ophthalmologist once per year as long as you are on this medicine. The chances are 1 in 5000 after 5 years that you could develop visual changes. 3) Be cautious when you are on Gabapentin because this can throw off your balance. 4) Reach out to your PCP and discuss management of your vitamin B12 levels. 5) Check labs in 3 months before next visit. 6) Follow up in 3 months. Let me know if you have any questions or concerns int he meantime.

## 2022-11-04 NOTE — PROGRESS NOTES
REASON FOR VISIT    This is a follow-up visit for Ms. Rueda for     ICD-10-CM   1. Seronegative rheumatoid arthritis of multiple sites ([facility] M06.09   2.  Age-related osteoporosis without current pathological fracture M81.0      Inflammatory arthritis phenotype includes:  Anti-CCP positive: no  Rheumatoid factor positive: no  Erosive disease: yes  Extra-articular manifestations include: Raynaud's    Immunosuppression Screening (9/10/2020):  Quantiferon TB: negative   PPD:  Not performed  Hepatitis B: negative    Hepatitis C: negative     Therapy History includes:  Current DMARD therapy include: leflunomide 20 mg daily (2/13/2018 to 10/2019; 2/12/2020 to present), Creed Aj 11 mg XR daily (8/2021 to present)  Prior DMARD therapy include: methotrexate 12.5 mg subcutaneous,  hydroxychloroquine 300 mg daily, Humira (5/10/2018: 3 samples: cost), Simponi Aria every 6 weeks (12/07/2018 to 5/28/2021)  Discontinued DMARDs because of inefficacy: Simponi Aria  Discontinued DMARDs because of side effects: methotrexate (elevated LFTs), leflunomide (elevated LFTs)  Contra-Indicated DMARDs because of chronic kidney disease: methotrexate     Osteoporosis Historical Synopsis    Height loss since age 26 (at least two inches): 5  Fracture history includes: yes (T5-T6)  Family history of hip fracture: no  Fall Risk: no    Smoking history: no  Alcohol consumption: no  Prednisone history: no    Exercise: no    Previous work-up for osteoporosis includes the following:  DEXA Scan: 8/16/2019  Vitamin 25OH D level: 46.6 (21/3/2018)  PTH: 21 (21/3/2018)  TSH: 2.570 (21/3/2018)    Therapy History includes:  Current osteoporosis therapy includes: Prolia 60 mg every 6 months (3/29/2018 to present)  Prior osteoporosis therapy includes: Reclast (2 years)  The following osteoporosis therapy have been ineffective: Reclast  The following osteoporosis therapy were stopped because of side effects: none    HISTORY OF PRESENT ILLNESS    Ms. Shirley Purcell returns for a follow-up. Pt takes Prolia shots every 6 months with good tolerance. She has not been on Leflunomide because she ran out. She says that her neuropathy is that same it was last time and it is not moving up her legs. She still takes Gabapentin, which helps her pain. Pt took Plaquenil prescribed by Dr. Afua Burgos in the past. She notes that Dr. Estephania Becerra took her off of it. Pt main joint complaint is her hips. Pt says that she had a skin cancer spot on her R hand that she got taken off in August. She has not had any new skin cancer spots pop up since her last visit. She just started other day Acitretin for her skin cancer. Pt says that her balance is still off. She says that she sometimes walks as if she is \"drunk: She denies falls. She notes that she has dizzy spells at times when she stands up. Pt had a gastric bypass surgery when she was 60years old. REVIEW OF SYSTEMS    A comprehensive review of systems was performed and pertinent results are documented in the HPI, review of systems is otherwise non-contributory. PAST MEDICAL HISTORY    She has a past medical history of Anemia, Arthritis, CAD (coronary artery disease) (1997), Degenerative joint disease of right hip (09/14/2015 ), Diabetes ([facility] Fracture, Gastritis, H/O Bell's palsy (7/9/2012), Hypercholesterolemia, Lupus ([facility] (1/9/2015), Osteoporosis, post-menopausal, Squamous cell cancer of skin of left cheek, and Squamous cell skin cancer. FAMILY HISTORY    Her family history includes Arthritis-rheumatoid in her sister, sister and another family member; Diabetes in her father, mother, and sister; Elevated Lipids in her sister; Heart Disease in her father and mother; Hypertension in her father, mother, and sister; Thyroid Disease in her sister. SOCIAL HISTORY    She reports that she quit smoking about 32 years ago.  She has never used smokeless tobacco. She reports that she does not drink alcohol and does not use drugs.    MEDICATIONS    Current Outpatient Medications   Medication Sig    nortriptyline (PAMELOR) 10 mg capsule TAKE 1 CAPSULE BY MOUTH EVERY NIGHT    acitretin 25 mg capsule     amLODIPine (NORVASC) 5 mg tablet Take 5 mg by mouth daily. amoxicillin-clavulanate (AUGMENTIN) 875-125 mg per tablet Take 1 Tablet by mouth two (2) times a day. metFORMIN (GLUCOPHAGE) 500 mg tablet TAKE 1 TABLET BY MOUTH TWICE DAILY WITH MEALS    gabapentin (NEURONTIN) 800 mg tablet TAKE 1 TABLET BY MOUTH THREE TIMES DAILY. MAX DAILY AMOUNT: 2400 MG    atorvastatin (LIPITOR) 40 mg tablet TAKE 1 TABLET BY MOUTH DAILY    HYDROcodone-acetaminophen (NORCO) 5-325 mg per tablet Take 1 Tablet by mouth every six (6) hours as needed for Pain for up to 90 days. cyclobenzaprine (FLEXERIL) 10 mg tablet Take 1 Tablet by mouth nightly as needed for Muscle Spasm(s). ferrous sulfate (FeroSuL) 325 mg (65 mg iron) tablet TAKE 1 TABLET BY MOUTH TWICE DAILY    pantoprazole (PROTONIX) 40 mg tablet Take 1 Tablet by mouth in the morning. Attempt a 2 week trial off of this medication at least once a year to see if you still need it.    lisinopriL (PRINIVIL, ZESTRIL) 20 mg tablet Take 1 Tablet by mouth daily. fosfomycin (MONUROL) 3 gram pack oral packet Take 1 Packet by mouth every seven (7) days. Indications: urinary tract infection prevention    leflunomide (ARAVA) 20 mg tablet Take 1 Tablet by mouth daily. glipiZIDE SR (GLUCOTROL XL) 2.5 mg CR tablet TAKE 1 TABLET BY MOUTH TWICE DAILY    nitroglycerin (NITROSTAT) 0.4 mg SL tablet DISSOLVE 1 TABLET UNDER THE TONGUE EVERY 5 MINUTES AS NEEDED FOR CHEST PAIN    glucose blood VI test strips (Ascensia CONTOUR) strip Glucometer for 250.00 Pt. test blood sugar once daily (in morning)    denosumab (PROLIA) 60 mg/mL injection 1 mL by SubCUTAneous route every 6 months. acetaminophen (TYLENOL) 650 mg CR tablet Take 1,300 mg by mouth daily as needed for Pain.     Blood-Glucose Meter (CONTOUR METER) monitoring kit Dx: 250. Check blood sugars daily. ZINC PO Take 50 mg by mouth daily. cholecalciferol, vitamin d3, (VITAMIN D3) 1,000 unit tablet Take 2,000 Units by mouth daily. calcium-vitamin D (CALCIUM 500+D) 500 mg(1,250mg) -200 unit per tablet Take 2 Tabs by mouth daily. aspirin 81 mg Tab Take 81 mg by mouth daily. MULTIVITAMINS (MULTIVITAMIN PO) Take 1 Tab by mouth daily. No current facility-administered medications for this visit. ALLERGIES    Allergies   Allergen Reactions    Cephalosporins Nausea and Vomiting     Severe vomiting  Other reaction(s): gi upset, unspecified       PHYSICAL EXAMINATION    Visit Vitals  BP (!) 101/43 (BP 1 Location: Left upper arm, BP Patient Position: Sitting, BP Cuff Size: Adult)   Pulse 61   Temp 98.3 °F (36.8 °C) (Oral)   Resp 16   Wt 150 lb (68 kg)   SpO2 (!) 81%   BMI 28.34 kg/m²     General:  The patient is well developed, well nourished, alert, and in no apparent distress. Eyes: Sclera are anicteric. No conjunctival injection. HEENT:  Oropharynx is clear. No oral ulcers. Adequate salivary pooling. No cervical or supraclavicular lymphadenopathy. Lungs:  No return of bibasilar crackles. Normal respiratory effort. Cor:  Regular rate and rhythm. No murmur rub or gallop. Abdomen: Soft, non-tender, without hepatomegaly or masses. Extremities: No calf tenderness or edema. Warm and well perfused. Skin:  No significant abnormalities. Neuro: Nonfocal  Musculoskeletal:    A comprehensive musculoskeletal exam was performed for all joints of each upper and lower extremity and assessed for swelling, tenderness and range of motion. Results are documented as below:  Stable bilateral Sharla and Heberden nodes with PIP-predominant tenderness  Bilateral rotational hip pain  Bilateral knee crepitus without effusion  No evidence of synovitis in the small joints of the hands, wrists, shoulders, elbows, hips, knees or ankles.         DATA REVIEW    Laboratory     Recent laboratory results were reviewed, summarized, and discussed with the patient. 10/10/22: ESR 3, Cr 0.84, LFT WNL, CBC WNL, CRP <1 mg/L, Vit B12 233  6/16/22: Cr 0.78, AST 38, ALT 36, Alk phos 94, Tbili 0.4,   6/7/22: microalbumin urine random 0.92, Cr urine 18.8, LFT WNL microalbumin/Cr ratio 49  5/16/22: HGB A1c 5.8, Triglyceride 232, ESR 2, Cr 0.88, LFT WNL, WBC 7.1, HGB 12.7, Plt 191, CRp <1 mg/L  12/16/21: Cr 0.86, Tbili 0.3, AST 46, ALT 44, AlkP 131, Tprot 6.7, Alb 3.4  11/15/21: A1c 5.6  9/27/21: , NTproBNP 610  9/17/13: Hep B SAg neg, cAb neg; Hep C Ab neg    Imaging    Musculoskeletal Ultrasound    None    Radiographs    XR CHEST PA LAT (5/16/22): Clear lungs. Treated and untreated vertebral compression fractures    Bilateral Hand 2/13/2018: RIGHT: severe osteopenia without fracture. There are scattered degenerative changes. Progressive first Aia 16 joint. No new erosive changes. Vascular calcifications. LEFT: severe osteopenia. Scattered degenerative changes as noted previously with progression of the index finger and middle finger DIP joints. There is a new erosion at the index finger proximal phalanx ulnar base without adjacent joint space loss. There are vascular calcifications. Calcification overlying the DRUJ is favored represent overlying vascular calcifications. Bilateral Foot 2/13/2018: RIGHT: no acute fracture or dislocation. Alignment is anatomic. Mild degenerative changes are seen in the left first MTP joint. Minimal degenerative changes are seen in the right first MTP joint. A possible erosion is seen in the head of the left first metatarsal. No erosive changes are seen in the right foot. Plantar calcaneal heel spurs are noted in both feet, as well as enthesophyte formation at the Achilles insertion site. Arterial vascular calcifications are also noted bilaterally. Mild soft tissue swelling surrounds the left first MTP joint.  LEFT: no acute fracture or dislocation. Alignment is anatomic. Mild degenerative changes are seen in the left first MTP joint. Minimal degenerative changes are seen in the right first MTP joint. A possible erosion is seen in the head of the left first metatarsal. No erosive changes are seen in the right foot. Plantar calcaneal heel spurs are noted in both feet, as well as enthesophyte formation at the Achilles insertion site. Arterial vascular calcifications are also noted bilaterally. Mild soft tissue swelling surrounds the left first MTP joint. Thoracic Spine 11/03/2016: The posterior battery pack is again seen within the intrathecal catheter in stable position. Kyphoplasty material is present in a midthoracic vertebral body is stable mild chronic compression of the adjacent inferior vertebral body. The remaining vertebral body heights are maintained. Anterior osteophytes are noted. There is no abnormality in alignment. Lumbar Spine 10/19/2016: significant disc space narrowing at L3-L4 with 14 mm anterolisthesis. There is facet arthropathy. The vertebral body heights are maintained. Spinal catheters are seen. There are atherosclerotic vascular calcifications. Bilateral Hips 8/31/2015: no fracture, dislocation or other acute abnormality. There osteoarthritic pattern change of both hips, more severe on the right. There is nonuniform joint space narrowing and marginal osteophytes. There is remodeling of the right femur head with subchondral cystic formation. Mild sacroiliac osteoarthritic change. Lower lumbar spondylosis is noted. Wire  leads are seen in the left lower lumbar spine likely reflective of spinal stimulator device. Bilateral Hand 3/01/2011: RIGHT: mild osteopenia. There are degenerative changes of the distal and proximal interphalangeal joints and the base of the thumb. There is no fracture or other acute abnormality. Scattered calcifications are noted in the radial and ulnar arteries. LEFT: mild osteopenia.  There are degenerative changes of the distal and proximal interphalangeal joints and  at the base of the thumb. There is no fracture or acute abnormality. There are vascular calcifications of the radial and ulnar arteries. CT Imaging    None    MR Imaging    MRI Brain with without contrast 12/19/2016: There is sulcal and ventricular prominence. Confluent periventricular and scattered foci of increased T2 signal intensity in the corona radiata and centrum semiovale. Tiny remote lacunar infarction in the right cerebellum. There is no intracranial mass, hemorrhage or evidence of acute infarction. There is no Chiari or syrinx. The pituitary and infundibulum are grossly unremarkable. There is no skull base mass. Cerebellopontine angles are grossly unremarkable. The major intracranial vascular flow-voids are unremarkable. No evidence of demyelinating process. There is no abnormal parenchymal enhancement. There is no abnormal meningeal enhancement. The cavernous sinuses are symmetric. Optic chiasm and infundibulum grossly unremarkable. Orbits are grossly symmetric. Dural venous sinuses are grossly patent. The brain architecture is normal. There is no evidence of midline shift or mass-effect. There are no extra-axial fluid collections. The mastoid air cells and paranasal sinuses are well pneumatized and clear. MRI Lumbar Spine with and without contrast 8/07/2013: There is transitional lumbosacral anatomy. In keeping with prior studies, lowest fully formed disc is labeled L5-S1. There has been prior  laminectomies at L3-5 with no change in grade 1 anterolisthesis at L3-4 which measures 5 mm. There is grade 1 anterolisthesis of L4-5 of approximately 2 mm, unchanged. Vertebral body heights are maintained. There is no marrow edema or compression fracture. There are reactive endplate changes at U8-0. The conus medullaris terminates at L1-2. There is no abnormal intraspinal enhancement.  L1/2:  The spinal canal and foramina are widely patent. L2/3:  Diffuse disc bulge, facet hypertrophy and ligamentum flavum thickening cause mild spinal canal and moderate right foraminal stenosis. Left foramen is patent. L3/4: There is uncovering of disc material with facet hypertrophy resulting in mild thecal sac narrowing. There is severe bilateral foraminal stenosis similar to the prior study. L4/5:  There is diffuse disc bulge and facet hypertrophy, without significant spinal canal stenosis. There is mild right foraminal stenosis. L5/S1:  The spinal canal and foramina are widely patent. DXA     DXA 8/16/2019: (excluded Possible increased density in the L2 and T6 vertebral bodies) left femoral neck T score: -1.1 (0.881 g/cm2), left total hip T score: -0.9 (0.889 g/cm2), right femoral neck T score: -0.7 (0.945 g/cm2), right total hip T score: -0.5 (0.941 g/cm2), and distal one third left radius T score -2.4 (BMD 0.542 g/cm2). FRAX score 21 % probability in 10 years for major osteoporotic fracture and 39 % 10 year probability of hip fracture. DXA 8/08/2017: (excluded Lumbar spine because of overlying wires and degenerative change and kyphoplasty of L4) showed left femoral neck T score: -1.4 (0.837 g/cm2), left total hip T score: -1.4 (0.830 g/cm2), right femoral neck T score: -0.9 (0.919 g/cm2), right total hip T score: -0.9 (0.900 g/cm2), and distal one third left radius T score -3.0 (BMD 0.615 g/cm2). FRAX score 16.8 % probability in 10 years for major osteoporotic fracture and 3.2 % 10 year probability of hip fracture. Nuclear Medicine    Nuclear Medicine Bone Scan 1/22/2013: There is intense radiotracer uptake in the upper thoracic spine, likely T5. There is uptake in the region of the sternoclavicular joints and left shoulder, likely degenerative. ASSESSMENT AND PLAN    This is a follow-up visit for Ms. Ruead for seronegative rheumatoid arthritis.  Inflammatory arthritis is more symptomatic, lower suspicion for leflunomide-related paresthesias without recent progression. Adding Plaquenil for tighter arthritis control and coverage of possible CPPD. 1. Age-related osteoporosis without current pathological fracture  - Last 6/22, due 12/22, has been getting from [facility] infusion  - CBC WITH AUTOMATED DIFF; Future  - METABOLIC PANEL, COMPREHENSIVE; Future    2. Seronegative rheumatoid arthritis of multiple sites (HCC)  - hydrOXYchloroQUINE (PLAQUENIL) 200 mg tablet; Take 1.5 Tablets by mouth daily. Dispense: 135 Tablet; Refill: 1  - C REACTIVE PROTEIN, QT; Future  - CBC WITH AUTOMATED DIFF; Future  - METABOLIC PANEL, COMPREHENSIVE; Future  - SED RATE (ESR); Future  - C REACTIVE PROTEIN, QT  - CBC WITH AUTOMATED DIFF  - METABOLIC PANEL, COMPREHENSIVE  - SED RATE (ESR)  - leflunomide (ARAVA) 20 mg tablet; Take 1 Tablet by mouth daily. Dispense: 30 Tablet; Refill: 2    3. CKD (chronic kidney disease) stage 2, GFR 60-89 ml/min  - leflunomide (ARAVA) 20 mg tablet; Take 1 Tablet by mouth daily. Dispense: 30 Tablet; Refill: 2    4. Vitamin D deficiency  - leflunomide (ARAVA) 20 mg tablet; Take 1 Tablet by mouth daily. Dispense: 30 Tablet; Refill: 2    5. Long-term use of immunosuppressant medication  - leflunomide (ARAVA) 20 mg tablet; Take 1 Tablet by mouth daily. Dispense: 30 Tablet; Refill: 2      Patient Instructions   1) Continue to take one pill of leflunomide daily. 2) I am prescribing you 1.5 pills of daily plaquenil. Make sure you see an ophthalmologist once per year as long as you are on this medicine. The chances are 1 in 5000 after 5 years that you could develop visual changes. 3) Be cautious when you are on Gabapentin because this can throw off your balance. 4) Reach out to your PCP and discuss management of your vitamin B12 levels. 5) Check labs in 3 months before next visit. 6) Follow up in 3 months. Let me know if you have any questions or concerns int he meantime.           TODAY'S ORDERS    Orders Placed This Encounter    C REACTIVE PROTEIN, QT    CBC WITH AUTOMATED DIFF    METABOLIC PANEL, COMPREHENSIVE    SED RATE (ESR)    DISCONTD: acitretin 25 mg capsule    DISCONTD: amLODIPine (NORVASC) 5 mg tablet    DISCONTD: amoxicillin-clavulanate (AUGMENTIN) 875-125 mg per tablet    hydrOXYchloroQUINE (PLAQUENIL) 200 mg tablet    leflunomide (ARAVA) 20 mg tablet     Future Appointments   Date Time Provider Yocasta Cisneros   12/20/2022  2:30 PM SS INF6 CH4 <1H [facility]   12/23/2022  1:00 PM Tom Pickard MD [facility] BS AMB   2/6/2023  1:20 PM Anselmo Moreira MD [facility] BS AMB   5/22/2023  1:40 PM Tom Pickard MD [facility] BS AMB     Face to face time: 25 minutes  Note preparation and records review day of service: 21 minutes  Total provider time day of service: 46 minutes    This was scribed by Ayad Brunner in the presence of Dr. Vern Juárez. The note was reviewed and amended personally, and I agree with the above information.     Norwood Gottron, MD    Adult Rheumatology   [facility]  A Part of [facility], [address] [place] [address] Road   Phone [phone]  Fax [phone]

## 2022-11-10 DIAGNOSIS — I10 PRIMARY HYPERTENSION: ICD-10-CM

## 2022-11-10 DIAGNOSIS — E11.29 CONTROLLED TYPE 2 DIABETES MELLITUS WITH MICROALBUMINURIA, WITHOUT LONG-TERM CURRENT USE OF INSULIN (HCC): ICD-10-CM

## 2022-11-10 DIAGNOSIS — R80.9 CONTROLLED TYPE 2 DIABETES MELLITUS WITH MICROALBUMINURIA, WITHOUT LONG-TERM CURRENT USE OF INSULIN (HCC): ICD-10-CM

## 2022-11-10 RX ORDER — LISINOPRIL 20 MG/1
20 TABLET ORAL DAILY
Qty: 30 TABLET | Refills: 5 | Status: SHIPPED | OUTPATIENT
Start: 2022-11-10 | End: 2022-11-21 | Stop reason: DRUGHIGH

## 2022-11-21 ENCOUNTER — OFFICE VISIT (OUTPATIENT)
Dept: FAMILY MEDICINE CLINIC | Age: 82
End: 2022-11-21
Payer: MEDICARE

## 2022-11-21 VITALS
HEIGHT: 61 IN | WEIGHT: 151 LBS | TEMPERATURE: 97.9 F | HEART RATE: 91 BPM | BODY MASS INDEX: 28.51 KG/M2 | RESPIRATION RATE: 16 BRPM | DIASTOLIC BLOOD PRESSURE: 56 MMHG | SYSTOLIC BLOOD PRESSURE: 90 MMHG | OXYGEN SATURATION: 95 %

## 2022-11-21 DIAGNOSIS — E11.29 CONTROLLED TYPE 2 DIABETES MELLITUS WITH MICROALBUMINURIA, WITHOUT LONG-TERM CURRENT USE OF INSULIN (HCC): ICD-10-CM

## 2022-11-21 DIAGNOSIS — E53.8 B12 DEFICIENCY: ICD-10-CM

## 2022-11-21 DIAGNOSIS — R80.9 CONTROLLED TYPE 2 DIABETES MELLITUS WITH MICROALBUMINURIA, WITHOUT LONG-TERM CURRENT USE OF INSULIN (HCC): ICD-10-CM

## 2022-11-21 DIAGNOSIS — I10 PRIMARY HYPERTENSION: ICD-10-CM

## 2022-11-21 DIAGNOSIS — E11.42 DIABETIC POLYNEUROPATHY ASSOCIATED WITH TYPE 2 DIABETES MELLITUS (HCC): Primary | ICD-10-CM

## 2022-11-21 DIAGNOSIS — E78.2 MIXED HYPERLIPIDEMIA: ICD-10-CM

## 2022-11-21 PROCEDURE — 1090F PRES/ABSN URINE INCON ASSESS: CPT | Performed by: FAMILY MEDICINE

## 2022-11-21 PROCEDURE — G8432 DEP SCR NOT DOC, RNG: HCPCS | Performed by: FAMILY MEDICINE

## 2022-11-21 PROCEDURE — 99214 OFFICE O/P EST MOD 30 MIN: CPT | Performed by: FAMILY MEDICINE

## 2022-11-21 PROCEDURE — 3074F SYST BP LT 130 MM HG: CPT | Performed by: FAMILY MEDICINE

## 2022-11-21 PROCEDURE — G8752 SYS BP LESS 140: HCPCS | Performed by: FAMILY MEDICINE

## 2022-11-21 PROCEDURE — 1123F ACP DISCUSS/DSCN MKR DOCD: CPT | Performed by: FAMILY MEDICINE

## 2022-11-21 PROCEDURE — 3044F HG A1C LEVEL LT 7.0%: CPT | Performed by: FAMILY MEDICINE

## 2022-11-21 PROCEDURE — 3078F DIAST BP <80 MM HG: CPT | Performed by: FAMILY MEDICINE

## 2022-11-21 PROCEDURE — 1101F PT FALLS ASSESS-DOCD LE1/YR: CPT | Performed by: FAMILY MEDICINE

## 2022-11-21 PROCEDURE — G8417 CALC BMI ABV UP PARAM F/U: HCPCS | Performed by: FAMILY MEDICINE

## 2022-11-21 PROCEDURE — G8536 NO DOC ELDER MAL SCRN: HCPCS | Performed by: FAMILY MEDICINE

## 2022-11-21 PROCEDURE — G8754 DIAS BP LESS 90: HCPCS | Performed by: FAMILY MEDICINE

## 2022-11-21 PROCEDURE — G8427 DOCREV CUR MEDS BY ELIG CLIN: HCPCS | Performed by: FAMILY MEDICINE

## 2022-11-21 RX ORDER — LISINOPRIL 5 MG/1
5 TABLET ORAL DAILY
Qty: 30 TABLET | Refills: 5 | Status: SHIPPED | OUTPATIENT
Start: 2022-11-21

## 2022-11-21 RX ORDER — LANOLIN ALCOHOL/MO/W.PET/CERES
1000 CREAM (GRAM) TOPICAL DAILY
Qty: 180 TABLET | Refills: 1 | Status: SHIPPED | OUTPATIENT
Start: 2022-11-21

## 2022-11-21 RX ORDER — BLOOD SUGAR DIAGNOSTIC
STRIP MISCELLANEOUS
Qty: 50 STRIP | Refills: 11 | Status: SHIPPED | OUTPATIENT
Start: 2022-11-21

## 2022-11-21 NOTE — PROGRESS NOTES
CC: follow-up DM, HTN and HLD    HPI: Pt is a 79 y.o. female who presents for follow-up DM, HTN and HLD. After her last visit her amlodipine was switched to lisinopril 2/2 persistent microalbuminuria. Since stopping the amlodipine her Raynaud's is worse. She still feels dizzy when she stands up but has not been able to check her BP at home. She is having worse pain in both hips. She is managing it with NSAIDs/Tylenol and occasional hydrocodone for breakthrough pain. She has tried PT in the past and didn't feel like it helped. Neuropathy is stable. Gabapentin helps. HTN:  Checking BPs at home?: NO - she lost her machine  Logs today?: NO  Headaches?: NO  Blurry vision?: NO  Lower extremity edema?: NO  Smoking?: NO    DM:  Checking BG at home?: YES  Logs today?: NO  BG range: 's  Insulin dependent?: NO  Other medications for DM?: Metformin 500mg BID. She stopped glipizide a week ago and has not had any hypoglycemia since  Symptoms of hypoglycemia?: YES  Aspirin?: YES  ACEi/ARB?: YES  Statin?: YES  Last foot exam?: 11/2020  Last A1c:   Lab Results   Component Value Date/Time    Hemoglobin A1c 5.8 (H) 05/16/2022 12:15 PM    Hemoglobin A1c (POC) 6.1 06/24/2015 04:12 PM     LastLDL:   Lab Results   Component Value Date/Time    LDL, calculated 49.6 05/16/2022 12:15 PM     Last microalbumin:   Lab Results   Component Value Date/Time    Microalbumin/Creat ratio (mg/g creat) 49 (H) 06/07/2022 08:31 AM    Microalbumin,urine random 0.92 06/07/2022 08:31 AM         Past Medical History:   Diagnosis Date    Anemia     Arthritis     CAD (coronary artery disease) 1997    MI    Degenerative joint disease of right hip 09/14/2015     [facility] [place]/Dr. Charlotte Mueller    Diabetes [facility])     Fracture     right shoulder; T6 compression    Gastritis     H/O Bell's palsy 7/9/2012    R-side of face. Dx >20 years ago.  Has persistent R-sided facial droop     Hypercholesterolemia     Hyperlipidemia    Lupus ([address] 1/9/2015 Osteoporosis, post-menopausal     vertebral fracture    Squamous cell cancer of skin of left cheek     Squamous cell skin cancer     Right knee       Family History   Problem Relation Age of Onset    Diabetes Mother     Hypertension Mother     Heart Disease Mother     Diabetes Father     Hypertension Father     Heart Disease Father     Arthritis-rheumatoid Sister     Elevated Lipids Sister     Arthritis-rheumatoid Sister     Hypertension Sister     Diabetes Sister     Thyroid Disease Sister     Arthritis-rheumatoid Other        Social History     Tobacco Use    Smoking status: Former     Types: Cigarettes     Quit date: 1989     Years since quittin.9    Smokeless tobacco: Never   Vaping Use    Vaping Use: Never used   Substance Use Topics    Alcohol use: No     Alcohol/week: 0.0 standard drinks     Comment: Occasional    Drug use: No       ROS:  Per HPI    PE:  Visit Vitals  BP (!) 90/56   Pulse 91   Temp 97.9 °F (36.6 °C)   Resp 16   Ht 5' 1\" (1.549 m)   Wt 151 lb (68.5 kg)   SpO2 95%   BMI 28.53 kg/m²     Gen: Pt sitting in chair, in NAD  Head: Normocephalic, atraumatic  Eyes: Sclera anicteric, EOM grossly intact, PERRL  Nose: Normal nasal mucosa  Throat: MMM, normal lips, tongue and gums  Neck: Supple, no carotid bruits  CVS: Normal S1, S2, no m/r/g  Resp: CTAB, no wheezes or rales  Extrem: Atraumatic, no cyanosis or edema  Feet: Skin intact, no lesions. DP pulses 2+ b/l. Sensation intact to light touch throughout. Decreased to monofilament to the level of the ankles b/l. Pulses: 2+   Skin: Warm, dry  Neuro: Alert, oriented, appropriate    Mini-cog: Normal      A/P:   Encounter Diagnoses     ICD-10-CM ICD-9-CM   1. Diabetic polyneuropathy associated with type 2 diabetes mellitus (HCC)  E11.42 250.60     357.2   2. Mixed hyperlipidemia  E78.2 272.2   3. B12 deficiency  E53.8 266.2   4. Primary hypertension  I10 401.9   5.  Controlled type 2 diabetes mellitus with microalbuminuria, without long-term current use of insulin ([facility])  E11.29 250.40    R80.9 791.0     1. Diabetic polyneuropathy associated with type 2 diabetes mellitus ([facility] [address]): Neuropathy symptoms stable and controlled with gabapentin. Continue current dose.   - STOP glipizide 2/2 hypoglycemia - she has lost weight and likely does not need this anymore. Last A1c 5.8.   - MICROALBUMIN, UR, RAND W/ MICROALB/CREAT RATIO; Future  - HEMOGLOBIN A1C WITH EAG; Future  - HEMOGLOBIN A1C WITH EAG  - MICROALBUMIN, UR, RAND W/ MICROALB/CREAT RATIO    2. Mixed hyperlipidemia  - LIPID PANEL; Future  - LIPID PANEL    3. B12 deficiency: Pt recently had labs checked with Rheumatology and level was low normal. She would like to start repletion again. She has a remote h/o gastric bypass and had initially been on IM supplementation. Discussed options, will do trial of oral supplementation now and recheck levels with next labs. If no improvement, will switch back to IM. - cyanocobalamin (VITAMIN B12) 500 mcg tablet; Take 2 Tablets by mouth daily. Dispense: 180 Tablet; Refill: 1    4. Primary hypertension: BP low today and likely causing dizziness at home. Will decrease lisinopril from 20mg to 5mg and pt to check BP daily at home, send [employer] message with values in 1-2 weeks. At that time can discuss whether or not we can add back a low dose of amlodipine to help with her Raynaud's symptoms. - lisinopriL (PRINIVIL, ZESTRIL) 5 mg tablet; Take 1 Tablet by mouth daily. Dispense: 30 Tablet; Refill: 5    5. Controlled type 2 diabetes mellitus with microalbuminuria, without long-term current use of insulin ([facility])  - lisinopriL (PRINIVIL, ZESTRIL) 5 mg tablet; Take 1 Tablet by mouth daily. Dispense: 30 Tablet; Refill: 5       RTC in 6 months for f/u chronic conditions, or sooner prn    Discussed diagnoses in detail with patient. Medication risks/benefits/side effects discussed with patient. All of the patient's questions were addressed.  The patient understands and agrees with our plan of care. The patient knows to call back if they are unsure of or forget any changes we discussed today or if the symptoms change. The patient received an After-Visit Summary which contains VS, orders, medication list and allergy list. This can be used as a \"mini-medical record\" should they have to seek medical care while out of town. Current Outpatient Medications on File Prior to Visit   Medication Sig Dispense Refill    hydrOXYchloroQUINE (PLAQUENIL) 200 mg tablet Take 1.5 Tablets by mouth daily. 135 Tablet 1    leflunomide (ARAVA) 20 mg tablet Take 1 Tablet by mouth daily. 30 Tablet 2    metFORMIN (GLUCOPHAGE) 500 mg tablet TAKE 1 TABLET BY MOUTH TWICE DAILY WITH MEALS 180 Tablet 1    gabapentin (NEURONTIN) 800 mg tablet TAKE 1 TABLET BY MOUTH THREE TIMES DAILY. MAX DAILY AMOUNT: 2400 MG 90 Tablet 2    atorvastatin (LIPITOR) 40 mg tablet TAKE 1 TABLET BY MOUTH DAILY 90 Tablet 1    nortriptyline (PAMELOR) 10 mg capsule TAKE 1 CAPSULE BY MOUTH EVERY NIGHT 30 Capsule 5    HYDROcodone-acetaminophen (NORCO) 5-325 mg per tablet Take 1 Tablet by mouth every six (6) hours as needed for Pain for up to 90 days. 60 Tablet 0    cyclobenzaprine (FLEXERIL) 10 mg tablet Take 1 Tablet by mouth nightly as needed for Muscle Spasm(s). 90 Tablet 1    ferrous sulfate (FeroSuL) 325 mg (65 mg iron) tablet TAKE 1 TABLET BY MOUTH TWICE DAILY 60 Tablet 5    pantoprazole (PROTONIX) 40 mg tablet Take 1 Tablet by mouth in the morning. Attempt a 2 week trial off of this medication at least once a year to see if you still need it. 90 Tablet 1    glipiZIDE SR (GLUCOTROL XL) 2.5 mg CR tablet TAKE 1 TABLET BY MOUTH TWICE DAILY 180 Tablet 1    nitroglycerin (NITROSTAT) 0.4 mg SL tablet DISSOLVE 1 TABLET UNDER THE TONGUE EVERY 5 MINUTES AS NEEDED FOR CHEST PAIN 25 Tab 0    denosumab (PROLIA) 60 mg/mL injection 1 mL by SubCUTAneous route every 6 months.       acetaminophen (TYLENOL) 650 mg CR tablet Take 1,300 mg by mouth daily as needed for Pain. Blood-Glucose Meter (CONTOUR METER) monitoring kit Dx: 250. Check blood sugars daily. 1 kit 11    ZINC PO Take 50 mg by mouth daily. cholecalciferol, vitamin d3, (VITAMIN D3) 1,000 unit tablet Take 2,000 Units by mouth daily. calcium-vitamin D (OS-ROBERT +D3) 500 mg-200 unit per tablet Take 2 Tabs by mouth daily. aspirin 81 mg Tab Take 81 mg by mouth daily. MULTIVITAMINS (MULTIVITAMIN PO) Take 1 Tab by mouth daily. acitretin 25 mg capsule  (Patient not taking: Reported on 11/21/2022)      amLODIPine (NORVASC) 5 mg tablet Take 5 mg by mouth daily. (Patient not taking: No sig reported)      amoxicillin-clavulanate (AUGMENTIN) 875-125 mg per tablet Take 1 Tablet by mouth two (2) times a day. (Patient not taking: No sig reported)      fosfomycin (MONUROL) 3 gram pack oral packet Take 1 Packet by mouth every seven (7) days. Indications: urinary tract infection prevention 12 Packet 1     No current facility-administered medications on file prior to visit.

## 2022-11-21 NOTE — PROGRESS NOTES
1. Have you been to the ER, urgent care clinic since your last visit? Hospitalized since your last visit? No    2. Have you seen or consulted any other health care providers outside of the [address] [place] [address] since your last visit? Include any pap smears or colon screening. No  Reviewed record in preparation for visit and have necessary documentation  Pt did not bring medication to office visit for review  opportunity was given for questions      3. For patients aged 39-70: Has the patient had a colonoscopy / FIT/ Cologuard? NA - based on age      If the patient is female:    4. For patients aged 41-77: Has the patient had a mammogram within the past 2 years? NA - based on age or sex      11. For patients aged 21-65: Has the patient had a pap smear?  NA - based on age or sex      Goals that were addressed and/or need to be completed during or after this appointment include   Health Maintenance Due   Topic Date Due    Shingrix Vaccine Age 49> (1 of 2) Never done    COVID-19 Vaccine (4 - Booster for [employer] series) 01/10/2022    Flu Vaccine (1) 08/01/2022    Eye Exam Retinal or Dilated  10/21/2022    Foot Exam Q1  11/15/2022    A1C test (Diabetic or Prediabetic)  11/16/2022    Medicare Yearly Exam  12/17/2022

## 2022-11-22 LAB
CHOLEST SERPL-MCNC: 125 MG/DL
EST. AVERAGE GLUCOSE BLD GHB EST-MCNC: 117 MG/DL
HBA1C MFR BLD: 5.7 % (ref 4–5.6)
HDLC SERPL-MCNC: 49 MG/DL
HDLC SERPL: 2.6 {RATIO} (ref 0–5)
LDLC SERPL CALC-MCNC: 41.4 MG/DL (ref 0–100)
TRIGL SERPL-MCNC: 173 MG/DL (ref ?–150)
VLDLC SERPL CALC-MCNC: 34.6 MG/DL

## 2022-12-04 RX ORDER — DIPHENHYDRAMINE HYDROCHLORIDE 50 MG/ML
25 INJECTION, SOLUTION INTRAMUSCULAR; INTRAVENOUS AS NEEDED
Start: 2022-12-11

## 2022-12-04 RX ORDER — DIPHENHYDRAMINE HYDROCHLORIDE 50 MG/ML
50 INJECTION, SOLUTION INTRAMUSCULAR; INTRAVENOUS AS NEEDED
Start: 2022-12-11

## 2022-12-04 RX ORDER — ONDANSETRON 2 MG/ML
8 INJECTION INTRAMUSCULAR; INTRAVENOUS AS NEEDED
OUTPATIENT
Start: 2022-12-11

## 2022-12-04 RX ORDER — EPINEPHRINE 1 MG/ML
0.3 INJECTION, SOLUTION, CONCENTRATE INTRAVENOUS AS NEEDED
OUTPATIENT
Start: 2022-12-11

## 2022-12-04 RX ORDER — ALBUTEROL SULFATE 0.83 MG/ML
2.5 SOLUTION RESPIRATORY (INHALATION) AS NEEDED
Start: 2022-12-11

## 2022-12-04 RX ORDER — ACETAMINOPHEN 325 MG/1
650 TABLET ORAL AS NEEDED
Start: 2022-12-11

## 2022-12-04 RX ORDER — HYDROCORTISONE SODIUM SUCCINATE 100 MG/2ML
100 INJECTION, POWDER, FOR SOLUTION INTRAMUSCULAR; INTRAVENOUS AS NEEDED
OUTPATIENT
Start: 2022-12-11

## 2022-12-23 ENCOUNTER — VIRTUAL VISIT (OUTPATIENT)
Dept: FAMILY MEDICINE CLINIC | Age: 82
End: 2022-12-23
Payer: MEDICARE

## 2022-12-23 DIAGNOSIS — I95.2 HYPOTENSION DUE TO DRUGS: ICD-10-CM

## 2022-12-23 DIAGNOSIS — R42 DIZZINESS: ICD-10-CM

## 2022-12-23 DIAGNOSIS — Z00.00 MEDICARE ANNUAL WELLNESS VISIT, SUBSEQUENT: Primary | ICD-10-CM

## 2022-12-23 PROCEDURE — G8510 SCR DEP NEG, NO PLAN REQD: HCPCS | Performed by: FAMILY MEDICINE

## 2022-12-23 PROCEDURE — G8427 DOCREV CUR MEDS BY ELIG CLIN: HCPCS | Performed by: FAMILY MEDICINE

## 2022-12-23 PROCEDURE — 1101F PT FALLS ASSESS-DOCD LE1/YR: CPT | Performed by: FAMILY MEDICINE

## 2022-12-23 PROCEDURE — G0439 PPPS, SUBSEQ VISIT: HCPCS | Performed by: FAMILY MEDICINE

## 2022-12-23 PROCEDURE — G8417 CALC BMI ABV UP PARAM F/U: HCPCS | Performed by: FAMILY MEDICINE

## 2022-12-23 PROCEDURE — 1123F ACP DISCUSS/DSCN MKR DOCD: CPT | Performed by: FAMILY MEDICINE

## 2022-12-23 PROCEDURE — G8536 NO DOC ELDER MAL SCRN: HCPCS | Performed by: FAMILY MEDICINE

## 2022-12-23 RX ORDER — ACITRETIN 25 MG/1
CAPSULE ORAL
COMMUNITY
Start: 2022-12-01

## 2022-12-23 NOTE — PATIENT INSTRUCTIONS
Medicare Wellness Visit, Female     The best way to live healthy is to have a lifestyle where you eat a well-balanced diet, exercise regularly, limit alcohol use, and quit all forms of tobacco/nicotine, if applicable. Regular preventive services are another way to keep healthy. Preventive services (vaccines, screening tests, monitoring & exams) can help personalize your care plan, which helps you manage your own care. Screening tests can find health problems at the earliest stages, when they are easiest to treat. [facility] follows the current, evidence-based guidelines published by the [facility] Quentin Alvarado ([facility]) when recommending preventive services for our patients. Because we follow these guidelines, sometimes recommendations change over time as research supports it. (For example, mammograms used to be recommended annually. Even though Medicare will still pay for an annual mammogram, the newer guidelines recommend a mammogram every two years for women of average risk). Of course, you and your doctor may decide to screen more often for some diseases, based on your risk and your co-morbidities (chronic disease you are already diagnosed with). Preventive services for you include:  - Medicare offers their members a free annual wellness visit, which is time for you and your primary care provider to discuss and plan for your preventive service needs.  Take advantage of this benefit every year!    -Over the age of 72 should receive the recommended pneumonia vaccines.    -All adults should have a flu vaccine yearly.  -All adults should have a tetanus vaccine every 10 years.   -Over the age 48 should receive the shingles vaccines.        -All adults should be screened once for Hepatitis C.  -All adults age 38-68 who are overweight should have a diabetes screening test once every three years.   -Other screening tests and preventive services for persons with diabetes include: an eye exam to screen for diabetic retinopathy, a kidney function test, a foot exam, and stricter control over your cholesterol.   -Cardiovascular screening for adults with routine risk involves an electrocardiogram (ECG) at intervals determined by your doctor.     -Colorectal cancer screenings should be done for adults age 39-70 with no increased risk factors for colorectal cancer. There are a number of acceptable methods of screening for this type of cancer. Each test has its own benefits and drawbacks. Discuss with your doctor what is most appropriate for you during your annual wellness visit. The different tests include: colonoscopy (considered the best screening method), a fecal occult blood test, a fecal DNA test, and sigmoidoscopy.    -Lung cancer screening is recommended annually with a low dose CT scan for adults between age 54 and 68, who have smoked at least 30 pack years (equivalent of 1 pack per day for 30 days), and who is a current smoker or quit less than 15 years ago.    -A bone mass density test is recommended when a woman turns 65 to screen for osteoporosis. This test is only recommended one time, as a screening. Some providers will use this same test as a disease monitoring tool if you already have osteoporosis. -Breast cancer screenings are recommended every other year for women of normal risk, age 54-69.    -Cervical cancer screenings for women over age 72 are only recommended with certain risk factors.      Here is a list of your current Health Maintenance items (your personalized list of preventive services) with a due date:  Health Maintenance Due   Topic Date Due    Shingles Vaccine (1 of 2) Never done    COVID-19 Vaccine (4 - Booster for [employer] series) 01/10/2022    Yearly Flu Vaccine (1) 08/01/2022    Eye Exam  10/21/2022    Annual Well Visit  12/17/2022

## 2022-12-23 NOTE — PROGRESS NOTES
This is the Subsequent Medicare Annual Wellness Exam, performed 12 months or more after the Initial AWV or the last Subsequent AWV    I have reviewed the patient's medical history in detail and updated the computerized patient record. Assessment/Plan   Education and counseling provided:  Are appropriate based on today's review and evaluation  Influenza Vaccine  Shingrix - pt did already, will get records  COVID booster - pt to get at pharmacy  Eye exam - done 2/2022, will get records    1. Medicare annual wellness visit, subsequent    2. Dizziness: much improved since dropping dose of lisinopril to 5mg, however still happening 2-3 times a week and BP has gone to 90's/40's. Will have her stop lisinopril altogether and check in with us in 1-2 weeks to see how she is feeling and how BP's running. 3. Hypotension due to drug: Will stop lisinopril as above    Depression Risk Factor Screening:     3 most recent PHQ Screens 12/21/2022   Little interest or pleasure in doing things Not at all   Feeling down, depressed, irritable, or hopeless Not at all   Total Score PHQ 2 0       Alcohol & Drug Abuse Risk Screen   Do you average more than 1 drink per night or more than 7 drinks a week?: (P) No  On any one occasion in the past three months have you had more than 3 drinks containing alcohol?: (P) No            Functional Ability and Level of Safety:   Hearing:  Hearing: (P) Patient reports hearing is good      Activities of Daily Living: The home contains: (P) rugs  Functional ADLs: (P) Patient does total self care     Ambulation:  Patient ambulates: (P) with mild difficulty  Walking is difficult due to: (P) pain  Walking aids: (P) cane, additional comments below  Additional comments : (P) Rollator     Fall Risk:  Fall Risk Assessment, last 12 mths 11/21/2022   Able to walk? Yes   Fall in past 12 months? 0   Do you feel unsteady? 0   Are you worried about falling 0   Is TUG test greater than 12 seconds?  -   Is the gait abnormal? -   Number of falls in past 12 months -   Fall with injury? -     Abuse Screen:  Do you ever feel afraid of your partner?: (P) No  Are you in a relationship with someone who physically or mentally threatens you?: (P) No  Is it safe for you to go home?: (P) Yes        Cognitive Screening   Has your family/caregiver stated any concerns about your memory?: (P) No    Cognitive screening done at last visit and wnl.     Health Maintenance Due     Health Maintenance Due   Topic Date Due    Shingles Vaccine (1 of 2) Never done    COVID-19 Vaccine (4 - Booster for [employer] series) 01/10/2022    Flu Vaccine (1) 08/01/2022    Eye Exam Retinal or Dilated  10/21/2022    Medicare Yearly Exam  12/17/2022       Patient Care Team   Patient Care Team:  El Bernstein MD as PCP - General (Family Medicine)  El Bernstein MD as PCP - [facility] Empaneled Provider  Odilon Carbajal MD (Neurology)  Ashley Villalobos MD as Consulting Provider (Dermatology Physician)  Xochitl Gallagher MD ([address] [place] [address] Vascular Surgery)  Darleen Waggoner MD (Rheumatology Internal Medicine)    History     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    Neuropathy G62.9    Lupus erythematosus L93.0    Raynaud's disease I73.00    Benign hypertension I10    Blepharoconjunctivitis H10.509    Seronegative rheumatoid arthritis of multiple sites ([facility] [address] M06.09    Nuclear sclerotic cataract H25.10 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 Emmett Johnson    Diabetes [facility])     Fracture     right shoulder; T6 compression    Gastritis     H/O Bell's palsy 7/9/2012    R-side of face. Dx >20 years ago. Has persistent R-sided facial droop     Hypercholesterolemia     Hyperlipidemia    Lupus ([facility] [address] 1/9/2015    Osteoporosis, post-menopausal     vertebral fracture    Squamous cell cancer of skin of left cheek     Squamous cell skin cancer     Right knee      Past Surgical History:   Procedure Laterality Date    COLONOSCOPY N/A 10/9/2018    COLONOSCOPY performed by Triston Garcia MD at [facility] 18 Bilateral     HX CATARACT REMOVAL Right 12/19/2019    HX CATARACT REMOVAL Left 01/07/2020    HX CHOLECYSTECTOMY      HX CORONARY STENT PLACEMENT  02/19/1997    LAD    HX GASTRIC BYPASS  2000    HX HERNIA REPAIR  2005    HX KYPHOPLASTY      t5    HX KYPHOPLASTY      L4    HX ORTHOPAEDIC      bilateral knees    HX ORTHOPAEDIC      r shoulder    HX ORTHOPAEDIC      Laminectomy    MS CARDIAC SURG PROCEDURE UNLIST      Patient Denies    MS OPEN IMPLANTATION SIGIFREDO SACRAL NERVE       Current Outpatient Medications   Medication Sig Dispense Refill    glucose blood VI test strips ([employer] CONTOUR) strip Glucometer for 250.00 Pt. test blood sugar once daily (in morning) 50 Strip 11    cyanocobalamin (VITAMIN B12) 500 mcg tablet Take 2 Tablets by mouth daily.  180 Tablet 1    lisinopriL (PRINIVIL, ZESTRIL) 5 mg tablet Take 1 Tablet by mouth daily. 30 Tablet 5    hydrOXYchloroQUINE (PLAQUENIL) 200 mg tablet Take 1.5 Tablets by mouth daily. 135 Tablet 1    leflunomide (ARAVA) 20 mg tablet Take 1 Tablet by mouth daily. 30 Tablet 2    metFORMIN (GLUCOPHAGE) 500 mg tablet TAKE 1 TABLET BY MOUTH TWICE DAILY WITH MEALS 180 Tablet 1    gabapentin (NEURONTIN) 800 mg tablet TAKE 1 TABLET BY MOUTH THREE TIMES DAILY. MAX DAILY AMOUNT: 2400 MG 90 Tablet 2    atorvastatin (LIPITOR) 40 mg tablet TAKE 1 TABLET BY MOUTH DAILY 90 Tablet 1    nortriptyline (PAMELOR) 10 mg capsule TAKE 1 CAPSULE BY MOUTH EVERY NIGHT 30 Capsule 5    HYDROcodone-acetaminophen (NORCO) 5-325 mg per tablet Take 1 Tablet by mouth every six (6) hours as needed for Pain for up to 90 days. 60 Tablet 0    cyclobenzaprine (FLEXERIL) 10 mg tablet Take 1 Tablet by mouth nightly as needed for Muscle Spasm(s). 90 Tablet 1    ferrous sulfate (FeroSuL) 325 mg (65 mg iron) tablet TAKE 1 TABLET BY MOUTH TWICE DAILY 60 Tablet 5    pantoprazole (PROTONIX) 40 mg tablet Take 1 Tablet by mouth in the morning. Attempt a 2 week trial off of this medication at least once a year to see if you still need it. 90 Tablet 1    fosfomycin (MONUROL) 3 gram pack oral packet Take 1 Packet by mouth every seven (7) days. Indications: urinary tract infection prevention 12 Packet 1    nitroglycerin (NITROSTAT) 0.4 mg SL tablet DISSOLVE 1 TABLET UNDER THE TONGUE EVERY 5 MINUTES AS NEEDED FOR CHEST PAIN 25 Tab 0    denosumab (PROLIA) 60 mg/mL injection 1 mL by SubCUTAneous route every 6 months. acetaminophen (TYLENOL) 650 mg CR tablet Take 1,300 mg by mouth daily as needed for Pain. Blood-Glucose Meter (CONTOUR METER) monitoring kit Dx: 250. Check blood sugars daily. 1 kit 11    ZINC PO Take 50 mg by mouth daily. cholecalciferol, vitamin d3, (VITAMIN D3) 1,000 unit tablet Take 2,000 Units by mouth daily. calcium-vitamin D (OS-ROBERT +D3) 500 mg-200 unit per tablet Take 2 Tabs by mouth daily.       aspirin 81 mg Tab Take 81 mg by mouth daily. MULTIVITAMINS (MULTIVITAMIN PO) Take 1 Tab by mouth daily. Allergies   Allergen Reactions    Cephalosporins Nausea and Vomiting     Severe vomiting  Other reaction(s): gi upset, unspecified       Family History   Problem Relation Age of Onset    Diabetes Mother     Hypertension Mother     Heart Disease Mother     Diabetes Father     Hypertension Father     Heart Disease Father     Arthritis-rheumatoid Sister     Elevated Lipids Sister     Arthritis-rheumatoid Sister     Hypertension Sister     Diabetes Sister     Thyroid Disease Sister     Arthritis-rheumatoid Other      Social History     Tobacco Use    Smoking status: Former     Types: Cigarettes     Quit date: 1989     Years since quittin.0    Smokeless tobacco: Never   Substance Use Topics    Alcohol use: No     Alcohol/week: 0.0 standard drinks     Comment: Occasional       Inetta Rival, was evaluated through a synchronous (real-time) audio-video encounter. The patient (or guardian if applicable) is aware that this is a billable service, which includes applicable co-pays. This Virtual Visit was conducted with patient's (and/or legal guardian's) consent. The visit was conducted pursuant to the emergency declaration under the [address] [place] [address], [address] [place] [address] authority and the [employer] Act. Patient identification was verified, and a caregiver was present when appropriate. The patient was located at: Home: [address] [place] [id number]  The provider was located at:  Facility (Appt Department): [facility]       Sally Panchal MD

## 2023-01-17 ENCOUNTER — OFFICE VISIT (OUTPATIENT)
Dept: FAMILY MEDICINE CLINIC | Age: 83
End: 2023-01-17
Payer: MEDICARE

## 2023-01-17 ENCOUNTER — TELEPHONE (OUTPATIENT)
Dept: FAMILY MEDICINE CLINIC | Age: 83
End: 2023-01-17

## 2023-01-17 VITALS
SYSTOLIC BLOOD PRESSURE: 121 MMHG | BODY MASS INDEX: 27.09 KG/M2 | DIASTOLIC BLOOD PRESSURE: 80 MMHG | OXYGEN SATURATION: 96 % | HEART RATE: 97 BPM | WEIGHT: 143.5 LBS | RESPIRATION RATE: 16 BRPM | HEIGHT: 61 IN | TEMPERATURE: 97.4 F

## 2023-01-17 DIAGNOSIS — J40 BRONCHITIS: Primary | ICD-10-CM

## 2023-01-17 DIAGNOSIS — U07.1 COVID-19: ICD-10-CM

## 2023-01-17 PROCEDURE — 1101F PT FALLS ASSESS-DOCD LE1/YR: CPT | Performed by: FAMILY MEDICINE

## 2023-01-17 PROCEDURE — G8417 CALC BMI ABV UP PARAM F/U: HCPCS | Performed by: FAMILY MEDICINE

## 2023-01-17 PROCEDURE — 99214 OFFICE O/P EST MOD 30 MIN: CPT | Performed by: FAMILY MEDICINE

## 2023-01-17 PROCEDURE — 1090F PRES/ABSN URINE INCON ASSESS: CPT | Performed by: FAMILY MEDICINE

## 2023-01-17 PROCEDURE — G8427 DOCREV CUR MEDS BY ELIG CLIN: HCPCS | Performed by: FAMILY MEDICINE

## 2023-01-17 PROCEDURE — 94640 AIRWAY INHALATION TREATMENT: CPT | Performed by: FAMILY MEDICINE

## 2023-01-17 PROCEDURE — G8510 SCR DEP NEG, NO PLAN REQD: HCPCS | Performed by: FAMILY MEDICINE

## 2023-01-17 PROCEDURE — 3079F DIAST BP 80-89 MM HG: CPT | Performed by: FAMILY MEDICINE

## 2023-01-17 PROCEDURE — 3074F SYST BP LT 130 MM HG: CPT | Performed by: FAMILY MEDICINE

## 2023-01-17 PROCEDURE — G8536 NO DOC ELDER MAL SCRN: HCPCS | Performed by: FAMILY MEDICINE

## 2023-01-17 PROCEDURE — 1123F ACP DISCUSS/DSCN MKR DOCD: CPT | Performed by: FAMILY MEDICINE

## 2023-01-17 RX ORDER — IPRATROPIUM BROMIDE AND ALBUTEROL SULFATE 2.5; .5 MG/3ML; MG/3ML
3 SOLUTION RESPIRATORY (INHALATION)
Status: COMPLETED | OUTPATIENT
Start: 2023-01-17 | End: 2023-01-17

## 2023-01-17 RX ORDER — IPRATROPIUM BROMIDE AND ALBUTEROL SULFATE 2.5; .5 MG/3ML; MG/3ML
3 SOLUTION RESPIRATORY (INHALATION)
Qty: 1 EACH | Refills: 0 | Status: SHIPPED | OUTPATIENT
Start: 2023-01-17

## 2023-01-17 RX ORDER — AZITHROMYCIN 250 MG/1
TABLET, FILM COATED ORAL
Qty: 6 TABLET | Refills: 0 | Status: SHIPPED | OUTPATIENT
Start: 2023-01-17

## 2023-01-17 RX ORDER — DOXYCYCLINE 100 MG/1
100 TABLET ORAL 2 TIMES DAILY
Qty: 14 TABLET | Refills: 0 | Status: SHIPPED | OUTPATIENT
Start: 2023-01-17 | End: 2023-01-17

## 2023-01-17 RX ADMIN — IPRATROPIUM BROMIDE AND ALBUTEROL SULFATE 3 ML: 2.5; .5 SOLUTION RESPIRATORY (INHALATION) at 15:25

## 2023-01-17 NOTE — PROGRESS NOTES
1. Have you been to the ER, urgent care clinic since your last visit? Hospitalized since your last visit? Went [facility]    2. Have you seen or consulted any other health care providers outside of the [address] [place] [address] since your last visit? Including any pap smears or colon screening.  [facility]      Health Maintenance Due   Topic Date Due    Shingles Vaccine (1 of 2) Never done    COVID-19 Vaccine (4 - Booster for Pfizer series) 01/10/2022    Flu Vaccine (1) 08/01/2022    Eye Exam Retinal or Dilated  10/21/2022

## 2023-01-17 NOTE — PROGRESS NOTES
[facility]    History of Present Illness:   Ana Lisa is a 79 y.o. female here for   Chief Complaint   Patient presents with    Cough     Tired and winded walking short distances         HPI:  Patient here for continued cough and shortness of breath. She was diagnosed with COVID approximately 2 weeks ago at [facility]. She has been checking her sats at home and they have been in the 90s. They gave her prednisone and albuterol but she is not doing any better. She is a former smoker. Health Maintenance  Health Maintenance Due   Topic Date Due    Shingles Vaccine (1 of 2) Never done    COVID-19 Vaccine (4 - Booster for Pfizer series) 01/10/2022    Flu Vaccine (1) 08/01/2022    Eye Exam Retinal or Dilated  10/21/2022       Past Medical, Family, and Social History:     Past Medical History:   Diagnosis Date    Anemia     Arthritis     CAD (coronary artery disease) 1997    [place]    Degenerative joint disease of right hip 09/14/2015     [facility] [place]/Dr. Samira Tierney    Diabetes [facility])     Fracture     right shoulder; T6 compression    Gastritis     H/O Bell's palsy 7/9/2012    R-side of face. Dx >20 years ago.  Has persistent R-sided facial droop     Hypercholesterolemia     Hyperlipidemia    Lupus ([address] 1/9/2015    Osteoporosis, post-menopausal     vertebral fracture    Squamous cell cancer of skin of left cheek     Squamous cell skin cancer     Right knee      Past Surgical History:   Procedure Laterality Date    COLONOSCOPY N/A 10/9/2018    COLONOSCOPY performed by Erick Black MD at [address] [facility] Bilateral     HX CATARACT REMOVAL Right 12/19/2019    HX CATARACT REMOVAL Left 01/07/2020    HX CHOLECYSTECTOMY      HX CORONARY STENT PLACEMENT  02/19/1997    LAD    HX GASTRIC BYPASS  2000    HX HERNIA REPAIR  2005    HX KYPHOPLASTY      t5    HX KYPHOPLASTY      L4    HX ORTHOPAEDIC      bilateral knees    HX ORTHOPAEDIC      r shoulder    HX ORTHOPAEDIC      Laminectomy    [place] OPEN IMPLANTATION SIGIFREDO SACRAL NERVE      [place] UNLISTED PROCEDURE CARDIAC SURGERY      Patient Denies       Current Outpatient Medications on File Prior to Visit   Medication Sig Dispense Refill    acitretin 25 mg capsule TAKE 1 CAPSULE BY MOUTH EVERY OTHER DAY      glucose blood VI test strips ([employer] CONTOUR) strip Glucometer for 250.00 Pt. test blood sugar once daily (in morning) 50 Strip 11    cyanocobalamin (VITAMIN B12) 500 mcg tablet Take 2 Tablets by mouth daily. 180 Tablet 1    lisinopriL (PRINIVIL, ZESTRIL) 5 mg tablet Take 1 Tablet by mouth daily. 30 Tablet 5    hydrOXYchloroQUINE (PLAQUENIL) 200 mg tablet Take 1.5 Tablets by mouth daily. 135 Tablet 1    leflunomide (ARAVA) 20 mg tablet Take 1 Tablet by mouth daily. 30 Tablet 2    metFORMIN (GLUCOPHAGE) 500 mg tablet TAKE 1 TABLET BY MOUTH TWICE DAILY WITH MEALS 180 Tablet 1    gabapentin (NEURONTIN) 800 mg tablet TAKE 1 TABLET BY MOUTH THREE TIMES DAILY. MAX DAILY AMOUNT: 2400 MG 90 Tablet 2    atorvastatin (LIPITOR) 40 mg tablet TAKE 1 TABLET BY MOUTH DAILY 90 Tablet 1    nortriptyline (PAMELOR) 10 mg capsule TAKE 1 CAPSULE BY MOUTH EVERY NIGHT 30 Capsule 5    cyclobenzaprine (FLEXERIL) 10 mg tablet Take 1 Tablet by mouth nightly as needed for Muscle Spasm(s). 90 Tablet 1    ferrous sulfate (FeroSuL) 325 mg (65 mg iron) tablet TAKE 1 TABLET BY MOUTH TWICE DAILY 60 Tablet 5    pantoprazole (PROTONIX) 40 mg tablet Take 1 Tablet by mouth in the morning. Attempt a 2 week trial off of this medication at least once a year to see if you still need it. 90 Tablet 1    nitroglycerin (NITROSTAT) 0.4 mg SL tablet DISSOLVE 1 TABLET UNDER THE TONGUE EVERY 5 MINUTES AS NEEDED FOR CHEST PAIN 25 Tab 0    denosumab (PROLIA) 60 mg/mL injection 1 mL by SubCUTAneous route every 6 months. acetaminophen (TYLENOL) 650 mg CR tablet Take 1,300 mg by mouth daily as needed for Pain. ZINC PO Take 50 mg by mouth daily.       cholecalciferol, vitamin d3, (VITAMIN D3) 1,000 unit tablet Take 2,000 Units by mouth daily. calcium-vitamin D (OS-ROBERT +D3) 500 mg-200 unit per tablet Take 2 Tabs by mouth daily. aspirin 81 mg Tab Take 81 mg by mouth daily. MULTIVITAMINS (MULTIVITAMIN PO) Take 1 Tab by mouth daily. HYDROcodone-acetaminophen (NORCO) 5-325 mg per tablet Take 1 Tablet by mouth every six (6) hours as needed for Pain for up to 90 days. 60 Tablet 0    fosfomycin (MONUROL) 3 gram pack oral packet Take 1 Packet by mouth every seven (7) days. Indications: urinary tract infection prevention (Patient not taking: Reported on 1/17/2023) 12 Packet 1    Blood-Glucose Meter (CONTOUR METER) monitoring kit Dx: 250. Check blood sugars daily. 1 kit 11     No current facility-administered medications on file prior to visit.        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    Neuropathy G62.9    Lupus erythematosus L93.0    Raynaud's disease I73.00    Benign hypertension I10    Blepharoconjunctivitis H10.509    Seronegative rheumatoid arthritis of multiple sites ([facility] M06.09    Nuclear sclerotic cataract H25.10    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    Obesity, morbid (HCC) E66.01    Primary localized osteoarthritis of left knee M17.12    Primary Raynaud's phenomenon I73.00    CKD (chronic kidney disease) stage 2, GFR 60-89 ml/min N18.2    Diabetes mellitus (HCC) E11.9    Myocardial infarction (HCC) I21.9    Anemia D64.9 Controlled substance agreement signed Z79.899       Social History     Socioeconomic History    Marital status:    Tobacco Use    Smoking status: Former     Types: Cigarettes     Quit date: 1989     Years since quittin.0    Smokeless tobacco: Never   Vaping Use    Vaping Use: Never used   Substance and Sexual Activity    Alcohol use: No     Alcohol/week: 0.0 standard drinks     Comment: Occasional    Drug use: No    Sexual activity: Not Currently     Social Determinants of Health     Financial Resource Strain: Low Risk     Difficulty of Paying Living Expenses: Not very hard   Food Insecurity: No Food Insecurity    Worried About Running Out of Food in the Last Year: Never true    Ran Out of Food in the Last Year: Never true        Review of Systems   Review of Systems   Constitutional:  Negative for chills and fever. Respiratory:  Positive for cough, shortness of breath and wheezing. Cardiovascular:  Negative for chest pain.      Objective:   Visit Vitals  /80 (BP 1 Location: Left arm, BP Patient Position: Sitting, BP Cuff Size: Adult)   Pulse 97   Temp 97.4 °F (36.3 °C) (Oral)   Resp 16   Ht 5' 1\" (1.549 m)   Wt 143 lb 8 oz (65.1 kg)   SpO2 96%   BMI 27.11 kg/m²        Physical Exam  PHYSICAL EXAM:  Gen: Pt sitting in chair, in NAD  Head: Normocephalic, atraumatic  Eyes: Sclera anicteric, EOM grossly intact,  Ears: TM's pearly with good light reflex b/l  CVS: Normal S1, S2, no m/r/g  Resp: Decreased breath sounds with wheezing throughout, after neb improvement but wheezing still present  Neuro: Alert, oriented, appropriate    Pertinent Labs/Studies:  3 most recent PHQ Screens 2023   Little interest or pleasure in doing things Not at all   Feeling down, depressed, irritable, or hopeless Not at all   Total Score PHQ 2 0      XR Results (most recent):  Results from Appointment encounter on 23    XR CHEST PA LAT    Narrative  EXAM: XR CHEST PA LAT    INDICATION: Acute cough    COMPARISON: May 16, 2022    TECHNIQUE: PA and lateral chest views    FINDINGS: The cardiac size is within normal limits. The pulmonary vasculature is  within normal limits. The lungs and pleural spaces are clear. Prior kyphoplasty, chronic compressions,  epidural catheter in place. Impression  No acute changes. Assessment and orders:       ICD-10-CM ICD-9-CM    1. Bronchitis  J40 490 XR CHEST PA LAT      albuterol-ipratropium (DUO-NEB) 2.5 MG-0.5 MG/3 ML      azithromycin (ZITHROMAX) 250 mg tablet      albuterol-ipratropium (DUO-NEB) 2.5 mg-0.5 mg/3 ml nebu      2. COVID-19  U07.1 079.89 XR CHEST PA LAT      azithromycin (ZITHROMAX) 250 mg tablet      albuterol-ipratropium (DUO-NEB) 2.5 mg-0.5 mg/3 ml nebu        Diagnoses and all orders for this visit:    1. Bronchitis  -     XR CHEST PA LAT; Future  -     albuterol-ipratropium (DUO-NEB) 2.5 MG-0.5 MG/3 ML  -     azithromycin (ZITHROMAX) 250 mg tablet; 2 tabs po on day 1, then 1 tab daily day 2-5  -     albuterol-ipratropium (DUO-NEB) 2.5 mg-0.5 mg/3 ml nebu; 3 mL by Nebulization route every four (4) hours as needed for Wheezing. 2. COVID-19  -     XR CHEST PA LAT; Future  -     azithromycin (ZITHROMAX) 250 mg tablet; 2 tabs po on day 1, then 1 tab daily day 2-5  -     albuterol-ipratropium (DUO-NEB) 2.5 mg-0.5 mg/3 ml nebu; 3 mL by Nebulization route every four (4) hours as needed for Wheezing. Follow-up and Dispositions    Return in about 1 week (around 1/24/2023) for [facility] or [place]. the following changes in treatment are made: Ordered duonebs to do at home as she showed much improvement here. She is still having symptoms 2 weeks out and is a former smoker so we will treat with antibiotics. Called in azithromycin-- discussed increased risk of QT prolongation along with arrhythmias with Plaquenil. However she cannot tolerate cephalosporins and Levaquin has the same potential interactions along with seizures.   Doxycycline is contraindicated with acitretin. Patient verbalized understanding and accepts plan & risks. reviewed medications and side effects in detail  Advised patient if symptoms worsen to go to the ER. Spent 32 min with patient, reviewing chart and face to face exam, clinical documentation. I have discussed the diagnosis with the patient and the intended plan as seen in the above orders. Social history, medical history, and labs were reviewed. The patient has received an after-visit summary and questions were answered concerning future plans. I have discussed medication side effects and warnings with the patient as well. Patient/guardian verbalized understanding and accepts plan & risks. Please note that this dictation was completed with [employer], the computer voice recognition software. Quite often unanticipated grammatical, syntax, homophones, and other interpretive errors are inadvertently transcribed by the computer software. Please disregard these errors. Please excuse any errors that have escaped final proofreading. Thank you.      MD GABY Ewing & ROSA BANSAL [facility]  01/17/23

## 2023-01-17 NOTE — TELEPHONE ENCOUNTER
Call placed to pharmacy spoke with Jeff Levine. Informed per  to cancel doxy she will send over something else. Stated he was taking out of system now

## 2023-01-17 NOTE — PATIENT INSTRUCTIONS
Your doctor has recommended that you have an eye exam done. You can contact one of the below offices to schedule your own appointment. Once you have the visit, please ask their office to send us a copy for your record here. Taryn Webber                     [phone]  Dr. Avery Mancini                          [phone]  Dr. Zachary Carl                           [phone]  [place]  [address] [place] [address]             [phone]

## 2023-01-18 ENCOUNTER — TELEPHONE (OUTPATIENT)
Dept: FAMILY MEDICINE CLINIC | Age: 83
End: 2023-01-18

## 2023-01-24 ENCOUNTER — OFFICE VISIT (OUTPATIENT)
Dept: FAMILY MEDICINE CLINIC | Age: 83
End: 2023-01-24
Payer: MEDICARE

## 2023-01-24 VITALS
HEART RATE: 91 BPM | WEIGHT: 143.2 LBS | HEIGHT: 61 IN | SYSTOLIC BLOOD PRESSURE: 133 MMHG | DIASTOLIC BLOOD PRESSURE: 71 MMHG | BODY MASS INDEX: 27.03 KG/M2 | OXYGEN SATURATION: 96 % | TEMPERATURE: 97.2 F | RESPIRATION RATE: 16 BRPM

## 2023-01-24 DIAGNOSIS — U07.1 COVID-19: ICD-10-CM

## 2023-01-24 DIAGNOSIS — J40 BRONCHITIS: Primary | ICD-10-CM

## 2023-01-24 PROCEDURE — 3075F SYST BP GE 130 - 139MM HG: CPT | Performed by: FAMILY MEDICINE

## 2023-01-24 PROCEDURE — G8427 DOCREV CUR MEDS BY ELIG CLIN: HCPCS | Performed by: FAMILY MEDICINE

## 2023-01-24 PROCEDURE — 1090F PRES/ABSN URINE INCON ASSESS: CPT | Performed by: FAMILY MEDICINE

## 2023-01-24 PROCEDURE — 99213 OFFICE O/P EST LOW 20 MIN: CPT | Performed by: FAMILY MEDICINE

## 2023-01-24 PROCEDURE — 3078F DIAST BP <80 MM HG: CPT | Performed by: FAMILY MEDICINE

## 2023-01-24 PROCEDURE — G8432 DEP SCR NOT DOC, RNG: HCPCS | Performed by: FAMILY MEDICINE

## 2023-01-24 PROCEDURE — G8536 NO DOC ELDER MAL SCRN: HCPCS | Performed by: FAMILY MEDICINE

## 2023-01-24 PROCEDURE — G8417 CALC BMI ABV UP PARAM F/U: HCPCS | Performed by: FAMILY MEDICINE

## 2023-01-24 PROCEDURE — 1123F ACP DISCUSS/DSCN MKR DOCD: CPT | Performed by: FAMILY MEDICINE

## 2023-01-24 PROCEDURE — 1101F PT FALLS ASSESS-DOCD LE1/YR: CPT | Performed by: FAMILY MEDICINE

## 2023-01-24 RX ORDER — BUDESONIDE 0.5 MG/2ML
500 INHALANT ORAL 2 TIMES DAILY
Qty: 1 EACH | Refills: 5 | Status: SHIPPED | OUTPATIENT
Start: 2023-01-24 | End: 2023-02-23

## 2023-01-24 NOTE — PROGRESS NOTES
[facility]    History of Present Illness:   Glynn Cabrera is a 79 y.o. female here for   Chief Complaint   Patient presents with    Follow-up     Still coughing but shortness of breath is better. HPI:  Patient here for follow-up. She says the shortness of breath is better but she does continue to cough. I treated her with Z-Nii due to allergies and interactions with both cephalosporins and doxycycline. She has been using DuoNeb twice daily and that is helping. She was diagnosed with COVID approximately 1 month ago at [facility]. She has been checking her sats at home and they have been in the 90s. They gave her prednisone and albutero. She is a former smoker. XR Results (most recent):  Results from Appointment encounter on 01/17/23    XR CHEST PA LAT    Narrative  EXAM: XR CHEST PA LAT    INDICATION: Acute cough    COMPARISON: May 16, 2022    TECHNIQUE: PA and lateral chest views    FINDINGS: The cardiac size is within normal limits. The pulmonary vasculature is  within normal limits. The lungs and pleural spaces are clear. Prior kyphoplasty, chronic compressions,  epidural catheter in place. Impression  No acute changes. Health Maintenance  Health Maintenance Due   Topic Date Due    Shingles Vaccine (1 of 2) Never done    COVID-19 Vaccine (4 - Booster for Pfizer series) 01/10/2022    Flu Vaccine (1) 08/01/2022    Eye Exam Retinal or Dilated  10/21/2022       Past Medical, Family, and Social History:     Past Medical History:   Diagnosis Date    Anemia     Arthritis     CAD (coronary artery disease) 1997    MI    Degenerative joint disease of right hip 09/14/2015     [facility] [place]/Dr. Brayan Tamayo    Diabetes [facility])     Fracture     right shoulder; T6 compression    Gastritis     H/O Bell's palsy 7/9/2012    R-side of face. Dx >20 years ago.  Has persistent R-sided facial droop     Hypercholesterolemia     Hyperlipidemia    Lupus ([address] 1/9/2015    Osteoporosis, post-menopausal     vertebral fracture    Squamous cell cancer of skin of left cheek     Squamous cell skin cancer     Right knee      Past Surgical History:   Procedure Laterality Date    COLONOSCOPY N/A 10/9/2018    COLONOSCOPY performed by Prateek Hunter MD [address]    HX CATARACT REMOVAL Bilateral     HX CATARACT REMOVAL Right 12/19/2019    HX CATARACT REMOVAL Left 01/07/2020    HX CHOLECYSTECTOMY      HX CORONARY STENT PLACEMENT  02/19/1997    LAD    HX GASTRIC BYPASS  2000    HX HERNIA REPAIR  2005    HX KYPHOPLASTY      t5    HX KYPHOPLASTY      L4    HX ORTHOPAEDIC      bilateral knees    HX ORTHOPAEDIC      r shoulder    HX ORTHOPAEDIC      Laminectomy    DC OPEN IMPLANTATION SIGIFREDO SACRAL NERVE      DC UNLISTED PROCEDURE CARDIAC SURGERY      Patient Denies       Current Outpatient Medications on File Prior to Visit   Medication Sig Dispense Refill    gabapentin (NEURONTIN) 800 mg tablet TAKE 1 TABLET BY MOUTH THREE TIMES DAILY. MAX DAILY AMOUNT: 2400 MG 90 Tablet 2    azithromycin (ZITHROMAX) 250 mg tablet 2 tabs po on day 1, then 1 tab daily day 2-5 6 Tablet 0    albuterol-ipratropium (DUO-NEB) 2.5 mg-0.5 mg/3 ml nebu 3 mL by Nebulization route every four (4) hours as needed for Wheezing. 1 Each 0    acitretin 25 mg capsule TAKE 1 CAPSULE BY MOUTH EVERY OTHER DAY      glucose blood VI test strips ([employer] CONTOUR) strip Glucometer for 250.00 Pt. test blood sugar once daily (in morning) 50 Strip 11    cyanocobalamin (VITAMIN B12) 500 mcg tablet Take 2 Tablets by mouth daily. 180 Tablet 1    hydrOXYchloroQUINE (PLAQUENIL) 200 mg tablet Take 1.5 Tablets by mouth daily. 135 Tablet 1    leflunomide (ARAVA) 20 mg tablet Take 1 Tablet by mouth daily.  30 Tablet 2    metFORMIN (GLUCOPHAGE) 500 mg tablet TAKE 1 TABLET BY MOUTH TWICE DAILY WITH MEALS 180 Tablet 1    atorvastatin (LIPITOR) 40 mg tablet TAKE 1 TABLET BY MOUTH DAILY 90 Tablet 1    nortriptyline (PAMELOR) 10 mg capsule TAKE 1 CAPSULE BY MOUTH EVERY NIGHT 30 Capsule 5    cyclobenzaprine (FLEXERIL) 10 mg tablet Take 1 Tablet by mouth nightly as needed for Muscle Spasm(s). 90 Tablet 1    ferrous sulfate (FeroSuL) 325 mg (65 mg iron) tablet TAKE 1 TABLET BY MOUTH TWICE DAILY 60 Tablet 5    pantoprazole (PROTONIX) 40 mg tablet Take 1 Tablet by mouth in the morning. Attempt a 2 week trial off of this medication at least once a year to see if you still need it. 90 Tablet 1    nitroglycerin (NITROSTAT) 0.4 mg SL tablet DISSOLVE 1 TABLET UNDER THE TONGUE EVERY 5 MINUTES AS NEEDED FOR CHEST PAIN 25 Tab 0    denosumab (PROLIA) 60 mg/mL injection 1 mL by SubCUTAneous route every 6 months. acetaminophen (TYLENOL) 650 mg CR tablet Take 1,300 mg by mouth daily as needed for Pain. Blood-Glucose Meter (CONTOUR METER) monitoring kit Dx: 250. Check blood sugars daily. 1 kit 11    ZINC PO Take 50 mg by mouth daily. cholecalciferol, vitamin d3, (VITAMIN D3) 1,000 unit tablet Take 2,000 Units by mouth daily. calcium-vitamin D (OS-ROBERT +D3) 500 mg-200 unit per tablet Take 2 Tabs by mouth daily. aspirin 81 mg Tab Take 81 mg by mouth daily. MULTIVITAMINS (MULTIVITAMIN PO) Take 1 Tab by mouth daily. [DISCONTINUED] lisinopriL (PRINIVIL, ZESTRIL) 5 mg tablet Take 1 Tablet by mouth daily. 30 Tablet 5    HYDROcodone-acetaminophen (NORCO) 5-325 mg per tablet Take 1 Tablet by mouth every six (6) hours as needed for Pain for up to 90 days. 60 Tablet 0    [DISCONTINUED] fosfomycin (MONUROL) 3 gram pack oral packet Take 1 Packet by mouth every seven (7) days. Indications: urinary tract infection prevention (Patient not taking: No sig reported) 12 Packet 1     No current facility-administered medications on file prior to visit.        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    Neuropathy G62.9    Lupus erythematosus L93.0    Raynaud's disease I73.00    Benign hypertension I10    Blepharoconjunctivitis H10.509    Seronegative rheumatoid arthritis of multiple sites ([facility] [address] M06.09    Nuclear sclerotic cataract H25.10    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    Obesity, morbid ([facility]) E66.01    Primary localized osteoarthritis of left knee M17.12    Primary Raynaud's phenomenon I73.00    CKD (chronic kidney disease) stage 2, GFR 60-89 ml/min N18.2    Diabetes mellitus (HCC) E11.9    Myocardial infarction (HCC) I21.9    Anemia D64.9    Controlled substance agreement signed Z79.899       Social History     Socioeconomic History    Marital status:    Tobacco Use    Smoking status: Former     Types: Cigarettes     Quit date: 1989     Years since quittin.0    Smokeless tobacco: Never   Vaping Use    Vaping Use: Never used   Substance and Sexual Activity    Alcohol use: No     Alcohol/week: 0.0 standard drinks     Comment: Occasional    Drug use: No    Sexual activity: Not Currently     Social Determinants of Health     Financial Resource Strain: Low Risk     Difficulty of Paying Living Expenses: Not very hard   Food Insecurity: No Food Insecurity    Worried About Running Out of Food in the Last Year: Never true    Ran Out of Food in the Last Year: Never true        Review of Systems   Review of Systems   Constitutional:  Negative for chills and fever. Respiratory:  Positive for cough. Negative for shortness of breath and wheezing. Cardiovascular:  Negative for chest pain.      Objective:   Visit Vitals  BP 133/71 (BP 1 Location: Left upper arm, BP Patient Position: Sitting, BP Cuff Size: Large adult)   Pulse 91   Temp 97.2 °F (36.2 °C) (Oral)   Resp 16   Ht 5' 1\" (1.549 m)   Wt 143 lb 3.2 oz (65 kg)   SpO2 96%   BMI 27.06 kg/m²        Physical Exam  PHYSICAL EXAM:  Gen: Pt sitting in chair, in NAD  Head: Normocephalic, atraumatic  Eyes: Sclera anicteric, EOM grossly intact,  Ears: TM's pearly with good light reflex b/l  CVS: Normal S1, S2, no m/r/g  Resp: coarse breath sounds, improved air movement compared to previous exam. No wheezing, rhonchi, rales   Neuro: Alert, oriented, appropriate          Assessment and orders:       ICD-10-CM ICD-9-CM    1. Bronchitis  J40 490 budesonide (PULMICORT) 0.5 mg/2 mL nbsp      2. COVID-19  U07.1 079.89 budesonide (PULMICORT) 0.5 mg/2 mL nbsp        Diagnoses and all orders for this visit:    1. Bronchitis  -     budesonide (PULMICORT) 0.5 mg/2 mL nbsp; 2 mL by Nebulization route two (2) times a day for 30 days. 2. COVID-19  -     budesonide (PULMICORT) 0.5 mg/2 mL nbsp; 2 mL by Nebulization route two (2) times a day for 30 days. Follow-up and Dispositions    Return in about 3 weeks (around 2/14/2023) for [facility]. the following changes in treatment are made: Add ICS to neb for next 2 weeks, continue duonebs prn and add mucinex. Patient verbalized understanding and accepts plan & risks. reviewed medications and side effects in detail    I have discussed the diagnosis with the patient and the intended plan as seen in the above orders. Social history, medical history, and labs were reviewed. The patient has received an after-visit summary and questions were answered concerning future plans. I have discussed medication side effects and warnings with the patient as well. Patient/guardian verbalized understanding and accepts plan & risks. Please note that this dictation was completed with [employer], the [employer] voice recognition software.   Quite often unanticipated grammatical, syntax, homophones, and other interpretive errors are inadvertently transcribed by the computer software. Please disregard these errors. Please excuse any errors that have escaped final proofreading. Thank you.      MD GABY Olivares & ROSA BANSAL [facility]  01/24/23

## 2023-01-26 ENCOUNTER — HOSPITAL ENCOUNTER (OUTPATIENT)
Dept: INFUSION THERAPY | Age: 83
Discharge: HOME | End: 2023-01-26
Payer: MEDICARE

## 2023-01-26 VITALS
RESPIRATION RATE: 16 BRPM | WEIGHT: 141.5 LBS | BODY MASS INDEX: 26.71 KG/M2 | TEMPERATURE: 97.8 F | HEIGHT: 61 IN | OXYGEN SATURATION: 96 % | SYSTOLIC BLOOD PRESSURE: 110 MMHG | HEART RATE: 93 BPM | DIASTOLIC BLOOD PRESSURE: 60 MMHG

## 2023-01-26 DIAGNOSIS — M81.0 AGE-RELATED OSTEOPOROSIS WITHOUT CURRENT PATHOLOGICAL FRACTURE: Primary | ICD-10-CM

## 2023-01-26 PROCEDURE — 74011250636 HC RX REV CODE- 250/636: Performed by: INTERNAL MEDICINE

## 2023-01-26 PROCEDURE — 96372 THER/PROPH/DIAG INJ SC/IM: CPT

## 2023-01-26 RX ORDER — ACETAMINOPHEN 325 MG/1
650 TABLET ORAL AS NEEDED
Start: 2023-06-18

## 2023-01-26 RX ORDER — DIPHENHYDRAMINE HYDROCHLORIDE 50 MG/ML
25 INJECTION, SOLUTION INTRAMUSCULAR; INTRAVENOUS AS NEEDED
Start: 2023-06-18

## 2023-01-26 RX ORDER — DIPHENHYDRAMINE HYDROCHLORIDE 50 MG/ML
50 INJECTION, SOLUTION INTRAMUSCULAR; INTRAVENOUS AS NEEDED
Start: 2023-06-18

## 2023-01-26 RX ORDER — ONDANSETRON 2 MG/ML
8 INJECTION INTRAMUSCULAR; INTRAVENOUS AS NEEDED
OUTPATIENT
Start: 2023-06-18

## 2023-01-26 RX ORDER — HYDROCORTISONE SODIUM SUCCINATE 100 MG/2ML
100 INJECTION, POWDER, FOR SOLUTION INTRAMUSCULAR; INTRAVENOUS AS NEEDED
OUTPATIENT
Start: 2023-06-18

## 2023-01-26 RX ORDER — ALBUTEROL SULFATE 0.83 MG/ML
2.5 SOLUTION RESPIRATORY (INHALATION) AS NEEDED
Start: 2023-06-18

## 2023-01-26 RX ORDER — EPINEPHRINE 1 MG/ML
0.3 INJECTION, SOLUTION, CONCENTRATE INTRAVENOUS AS NEEDED
OUTPATIENT
Start: 2023-06-18

## 2023-01-26 RX ADMIN — DENOSUMAB 60 MG: 60 INJECTION SUBCUTANEOUS at 14:52

## 2023-01-26 NOTE — PROGRESS NOTES
hospitals Progress Note    Date: 2023    Name: Ramona Sanderson    MRN: [id number]         : 1940    Ms. Fredis Knowles Arrived ambulatory and in no distress for  Prolia Injection. Assessment was completed, no acute issues at this time, no new complaints voiced. PT denies any recent dental work and states she has no dental work planned. Ms. Rueda's vitals were reviewed. Patient Vitals for the past 24 hrs:   Temp Pulse Resp BP SpO2   23 1441 97.8 °F (36.6 °C) 93 16 110/60 96 %        Medications: Prolia administered SQ in the upper Left Arm today. Medications Administered       denosumab (PROLIA) injection 60 mg       Admin Date  2023 Action  Given Dose  60 mg Route  SubCUTAneous Administered By  Quirino Khanna RN                       Ms. Fredis Knowles tolerated treatment well and was discharged from [place] [address] in stable condition. She is to return on  at 1400 for her next appointment.     Wale Tirado RN  2023

## 2023-01-29 DIAGNOSIS — U07.1 COVID-19: ICD-10-CM

## 2023-01-29 DIAGNOSIS — J40 BRONCHITIS: ICD-10-CM

## 2023-01-29 RX ORDER — IPRATROPIUM BROMIDE AND ALBUTEROL SULFATE 2.5; .5 MG/3ML; MG/3ML
SOLUTION RESPIRATORY (INHALATION)
Qty: 270 ML | Refills: 0 | Status: SHIPPED | OUTPATIENT
Start: 2023-01-29

## 2023-02-06 ENCOUNTER — OFFICE VISIT (OUTPATIENT)
Dept: RHEUMATOLOGY | Age: 83
End: 2023-02-06
Payer: MEDICARE

## 2023-02-06 ENCOUNTER — APPOINTMENT (OUTPATIENT)
Dept: CT IMAGING | Age: 83
DRG: 178 | End: 2023-02-06
Attending: STUDENT IN AN ORGANIZED HEALTH CARE EDUCATION/TRAINING PROGRAM
Payer: MEDICARE

## 2023-02-06 ENCOUNTER — HOSPITAL ENCOUNTER (INPATIENT)
Age: 83
LOS: 1 days | Discharge: HOME | DRG: 178 | End: 2023-02-07
Attending: EMERGENCY MEDICINE | Admitting: FAMILY MEDICINE
Payer: MEDICARE

## 2023-02-06 ENCOUNTER — APPOINTMENT (OUTPATIENT)
Dept: GENERAL RADIOLOGY | Age: 83
DRG: 178 | End: 2023-02-06
Attending: STUDENT IN AN ORGANIZED HEALTH CARE EDUCATION/TRAINING PROGRAM
Payer: MEDICARE

## 2023-02-06 VITALS
BODY MASS INDEX: 27.21 KG/M2 | HEART RATE: 109 BPM | TEMPERATURE: 98.4 F | RESPIRATION RATE: 16 BRPM | OXYGEN SATURATION: 93 % | WEIGHT: 144 LBS | SYSTOLIC BLOOD PRESSURE: 113 MMHG | DIASTOLIC BLOOD PRESSURE: 70 MMHG

## 2023-02-06 DIAGNOSIS — R06.02 SOB (SHORTNESS OF BREATH): ICD-10-CM

## 2023-02-06 DIAGNOSIS — R07.9 CHEST PAIN, UNSPECIFIED TYPE: ICD-10-CM

## 2023-02-06 DIAGNOSIS — N18.2 CKD (CHRONIC KIDNEY DISEASE) STAGE 2, GFR 60-89 ML/MIN: ICD-10-CM

## 2023-02-06 DIAGNOSIS — R06.09 DYSPNEA ON EXERTION: Primary | ICD-10-CM

## 2023-02-06 DIAGNOSIS — M06.09 SERONEGATIVE RHEUMATOID ARTHRITIS OF MULTIPLE SITES (HCC): ICD-10-CM

## 2023-02-06 DIAGNOSIS — E55.9 VITAMIN D DEFICIENCY: ICD-10-CM

## 2023-02-06 DIAGNOSIS — R09.89 BIBASILAR CRACKLES: ICD-10-CM

## 2023-02-06 DIAGNOSIS — R00.0 TACHYCARDIA: Primary | ICD-10-CM

## 2023-02-06 DIAGNOSIS — R00.2 PALPITATIONS: ICD-10-CM

## 2023-02-06 DIAGNOSIS — M81.0 AGE-RELATED OSTEOPOROSIS WITHOUT CURRENT PATHOLOGICAL FRACTURE: ICD-10-CM

## 2023-02-06 DIAGNOSIS — Z79.60 LONG-TERM USE OF IMMUNOSUPPRESSANT MEDICATION: ICD-10-CM

## 2023-02-06 LAB
ALBUMIN SERPL-MCNC: 3.4 G/DL (ref 3.5–5)
ALBUMIN/GLOB SERPL: 1 (ref 1.1–2.2)
ALP SERPL-CCNC: 116 U/L (ref 45–117)
ALT SERPL-CCNC: 32 U/L (ref 12–78)
ANION GAP SERPL CALC-SCNC: 4 MMOL/L (ref 5–15)
AST SERPL-CCNC: 41 U/L (ref 15–37)
BASOPHILS # BLD: 0.1 K/UL (ref 0–0.1)
BASOPHILS NFR BLD: 1 % (ref 0–1)
BILIRUB SERPL-MCNC: 0.6 MG/DL (ref 0.2–1)
BNP SERPL-MCNC: 598 PG/ML
BUN SERPL-MCNC: 21 MG/DL (ref 6–20)
BUN/CREAT SERPL: 24 (ref 12–20)
CALCIUM SERPL-MCNC: 8.1 MG/DL (ref 8.5–10.1)
CHLORIDE SERPL-SCNC: 106 MMOL/L (ref 97–108)
CO2 SERPL-SCNC: 24 MMOL/L (ref 21–32)
COMMENT, HOLDF: NORMAL
CREAT SERPL-MCNC: 0.86 MG/DL (ref 0.55–1.02)
DIFFERENTIAL METHOD BLD: ABNORMAL
EOSINOPHIL # BLD: 0.1 K/UL (ref 0–0.4)
EOSINOPHIL NFR BLD: 2 % (ref 0–7)
ERYTHROCYTE [DISTWIDTH] IN BLOOD BY AUTOMATED COUNT: 13.8 % (ref 11.5–14.5)
GLOBULIN SER CALC-MCNC: 3.3 G/DL (ref 2–4)
GLUCOSE SERPL-MCNC: 180 MG/DL (ref 65–100)
HCT VFR BLD AUTO: 37.1 % (ref 35–47)
HGB BLD-MCNC: 11.8 G/DL (ref 11.5–16)
IMM GRANULOCYTES # BLD AUTO: 0.1 K/UL (ref 0–0.04)
IMM GRANULOCYTES NFR BLD AUTO: 1 % (ref 0–0.5)
LYMPHOCYTES # BLD: 2.7 K/UL (ref 0.8–3.5)
LYMPHOCYTES NFR BLD: 36 % (ref 12–49)
MCH RBC QN AUTO: 31.6 PG (ref 26–34)
MCHC RBC AUTO-ENTMCNC: 31.8 G/DL (ref 30–36.5)
MCV RBC AUTO: 99.5 FL (ref 80–99)
MONOCYTES # BLD: 0.7 K/UL (ref 0–1)
MONOCYTES NFR BLD: 10 % (ref 5–13)
NEUTS SEG # BLD: 3.7 K/UL (ref 1.8–8)
NEUTS SEG NFR BLD: 50 % (ref 32–75)
NRBC # BLD: 0 K/UL (ref 0–0.01)
NRBC BLD-RTO: 0 PER 100 WBC
PLATELET # BLD AUTO: 219 K/UL (ref 150–400)
PMV BLD AUTO: 10.8 FL (ref 8.9–12.9)
POTASSIUM SERPL-SCNC: 5.2 MMOL/L (ref 3.5–5.1)
PROT SERPL-MCNC: 6.7 G/DL (ref 6.4–8.2)
RBC # BLD AUTO: 3.73 M/UL (ref 3.8–5.2)
SAMPLES BEING HELD,HOLD: NORMAL
SODIUM SERPL-SCNC: 134 MMOL/L (ref 136–145)
TROPONIN I SERPL HS-MCNC: 14 NG/L (ref 0–51)
TROPONIN I SERPL HS-MCNC: 17 NG/L (ref 0–51)
WBC # BLD AUTO: 7.3 K/UL (ref 3.6–11)

## 2023-02-06 PROCEDURE — G8536 NO DOC ELDER MAL SCRN: HCPCS | Performed by: INTERNAL MEDICINE

## 2023-02-06 PROCEDURE — 99285 EMERGENCY DEPT VISIT HI MDM: CPT

## 2023-02-06 PROCEDURE — 93005 ELECTROCARDIOGRAM TRACING: CPT

## 2023-02-06 PROCEDURE — 99215 OFFICE O/P EST HI 40 MIN: CPT | Performed by: INTERNAL MEDICINE

## 2023-02-06 PROCEDURE — G8417 CALC BMI ABV UP PARAM F/U: HCPCS | Performed by: INTERNAL MEDICINE

## 2023-02-06 PROCEDURE — 71275 CT ANGIOGRAPHY CHEST: CPT

## 2023-02-06 PROCEDURE — 74011000636 HC RX REV CODE- 636: Performed by: EMERGENCY MEDICINE

## 2023-02-06 PROCEDURE — 1101F PT FALLS ASSESS-DOCD LE1/YR: CPT | Performed by: INTERNAL MEDICINE

## 2023-02-06 PROCEDURE — 65270000029 HC RM PRIVATE

## 2023-02-06 PROCEDURE — 1123F ACP DISCUSS/DSCN MKR DOCD: CPT | Performed by: INTERNAL MEDICINE

## 2023-02-06 PROCEDURE — 36415 COLL VENOUS BLD VENIPUNCTURE: CPT

## 2023-02-06 PROCEDURE — 1090F PRES/ABSN URINE INCON ASSESS: CPT | Performed by: INTERNAL MEDICINE

## 2023-02-06 PROCEDURE — 83880 ASSAY OF NATRIURETIC PEPTIDE: CPT

## 2023-02-06 PROCEDURE — G8427 DOCREV CUR MEDS BY ELIG CLIN: HCPCS | Performed by: INTERNAL MEDICINE

## 2023-02-06 PROCEDURE — 84484 ASSAY OF TROPONIN QUANT: CPT

## 2023-02-06 PROCEDURE — 85025 COMPLETE CBC W/AUTO DIFF WBC: CPT

## 2023-02-06 PROCEDURE — 80053 COMPREHEN METABOLIC PANEL: CPT

## 2023-02-06 PROCEDURE — 3078F DIAST BP <80 MM HG: CPT | Performed by: INTERNAL MEDICINE

## 2023-02-06 PROCEDURE — 3074F SYST BP LT 130 MM HG: CPT | Performed by: INTERNAL MEDICINE

## 2023-02-06 PROCEDURE — G8510 SCR DEP NEG, NO PLAN REQD: HCPCS | Performed by: INTERNAL MEDICINE

## 2023-02-06 PROCEDURE — G0378 HOSPITAL OBSERVATION PER HR: HCPCS

## 2023-02-06 RX ORDER — ALBUTEROL SULFATE 90 UG/1
AEROSOL, METERED RESPIRATORY (INHALATION)
COMMUNITY
Start: 2023-01-07

## 2023-02-06 RX ORDER — LEFLUNOMIDE 20 MG/1
20 TABLET ORAL DAILY
Qty: 90 TABLET | Refills: 0 | Status: SHIPPED | OUTPATIENT
Start: 2023-02-06

## 2023-02-06 RX ADMIN — IOPAMIDOL 100 ML: 755 INJECTION, SOLUTION INTRAVENOUS at 18:43

## 2023-02-06 NOTE — ED PROVIDER NOTES
Patient is an 79year old female with history of anemia, CAD, diabetes, HLD, lupus, osteoporosis, skin cancer who presents to ED c/o palpitations. Patient reports she was diagnosed with COVID-19 6 weeks ago and since then has not been feeling well. Patient reports she has been having ongoing shortness of breath, cough, chest tightness, and dyspnea on exertion. Patient reports she was initially seen at an urgent care and given albuterol inhaler with only minor improvement of cough and shortness of breath. She then saw her PCP, Dr. Renato Sawyer, who prescribed her azithromycin and pulmicort. Patient reports she took zpack and had been using albuterol and pulmicort inhalers without improvement of symptoms. Reports OLIVEIRA is present when she walks short distances such as across a room. Patient reports seeing her rheumatologist who was concerned because she continued to be tachycardic in office. She denies any fever, chills, leg swelling, abdominal pain, N/V/D and urinary symptoms. Past Medical History:   Diagnosis Date    Anemia     Arthritis     CAD (coronary artery disease) 1997    [place]    Degenerative joint disease of right hip 09/14/2015     [facility] [place]/Dr. Kelvin May    Diabetes [facility])     Fracture     right shoulder; T6 compression    Gastritis     H/O Bell's palsy 7/9/2012    R-side of face. Dx >20 years ago.  Has persistent R-sided facial droop     Hypercholesterolemia     Hyperlipidemia    Lupus ([facility] [address] 1/9/2015    Osteoporosis, post-menopausal     vertebral fracture    Squamous cell cancer of skin of left cheek     Squamous cell skin cancer     Right knee       Past Surgical History:   Procedure Laterality Date    COLONOSCOPY N/A 10/9/2018    COLONOSCOPY performed by iLnda Marie MD at [facility] Bilateral     HX CATARACT REMOVAL Right 12/19/2019    HX CATARACT REMOVAL Left 01/07/2020    HX CHOLECYSTECTOMY      HX CORONARY STENT PLACEMENT  02/19/1997    LAD    HX GASTRIC BYPASS  2000    HX HERNIA REPAIR  2005    HX KYPHOPLASTY      t5    HX KYPHOPLASTY      L4    HX ORTHOPAEDIC      bilateral knees    HX ORTHOPAEDIC      r shoulder    HX ORTHOPAEDIC      Laminectomy    IN OPEN IMPLANTATION SIGIFREDO SACRAL NERVE      IN UNLISTED PROCEDURE CARDIAC SURGERY      Patient Denies         Family History:   Problem Relation Age of Onset    Diabetes Mother     Hypertension Mother     Heart Disease Mother     Diabetes Father     Hypertension Father     Heart Disease Father     Arthritis-rheumatoid Sister     Elevated Lipids Sister     Arthritis-rheumatoid Sister     Hypertension Sister     Diabetes Sister     Thyroid Disease Sister     Arthritis-rheumatoid Other        Social History     Socioeconomic History    Marital status:      Spouse name: Not on file    Number of children: Not on file    Years of education: Not on file    Highest education level: Not on file   Occupational History    Not on file   Tobacco Use    Smoking status: Former     Types: Cigarettes     Quit date: 1989     Years since quittin.1    Smokeless tobacco: Never   Vaping Use    Vaping Use: Never used   Substance and Sexual Activity    Alcohol use: No     Alcohol/week: 0.0 standard drinks     Comment: Occasional    Drug use: No    Sexual activity: Not Currently   Other Topics Concern    Not on file   Social History Narrative    Not on file     Social Determinants of Health     Financial Resource Strain: Low Risk     Difficulty of Paying Living Expenses: Not very hard   Food Insecurity: No Food Insecurity    Worried About Running Out of Food in the Last Year: Never true    Ran Out of Food in the Last Year: Never true   Transportation Needs: Not on file   Physical Activity: Not on file   Stress: Not on file   Social Connections: Not on file   Intimate Partner Violence: Not on file   Housing Stability: Not on file         ALLERGIES: Cephalosporins    Review of Systems   Constitutional:  Positive for fatigue.  Negative for activity change, appetite change, chills and fever. HENT:  Negative for congestion and sore throat. Eyes:  Negative for pain and visual disturbance. Respiratory:  Positive for cough, chest tightness and shortness of breath. Negative for wheezing. Cardiovascular:  Positive for chest pain and palpitations. Negative for leg swelling. Gastrointestinal:  Negative for abdominal distention, abdominal pain, constipation, diarrhea, nausea and vomiting. Genitourinary:  Negative for decreased urine volume, dysuria, flank pain, frequency and urgency. Musculoskeletal:  Negative for back pain and neck pain. Skin:  Negative for rash and wound. Allergic/Immunologic: Negative for immunocompromised state. Neurological:  Negative for dizziness, syncope, weakness, light-headedness, numbness and headaches. Psychiatric/Behavioral:  Negative for confusion. All other systems reviewed and are negative. There were no vitals filed for this visit. Physical Exam  Vitals and nursing note reviewed. Constitutional:       General: She is not in acute distress. Appearance: Normal appearance. She is well-developed. She is not toxic-appearing. HENT:      Head: Normocephalic and atraumatic. Nose: Nose normal.      Mouth/Throat:      Mouth: Mucous membranes are moist.   Eyes:      General: Lids are normal.      Extraocular Movements: Extraocular movements intact. Conjunctiva/sclera: Conjunctivae normal.   Cardiovascular:      Rate and Rhythm: Regular rhythm. Tachycardia present. Pulses: Normal pulses. Heart sounds: Normal heart sounds, S1 normal and S2 normal.   Pulmonary:      Effort: Pulmonary effort is normal. No accessory muscle usage. Breath sounds: Examination of the right-lower field reveals rhonchi and rales. Examination of the left-lower field reveals rhonchi and rales. Rhonchi and rales present. Abdominal:      Palpations: Abdomen is soft.    Musculoskeletal:         General: Normal range of motion. Cervical back: Normal range of motion and neck supple. Skin:     General: Skin is warm and dry. Capillary Refill: Capillary refill takes less than 2 seconds. Neurological:      General: No focal deficit present. Mental Status: She is alert and oriented to person, place, and time. Mental status is at baseline. Psychiatric:         Attention and Perception: Attention normal.         Mood and Affect: Mood and affect normal.         Speech: Speech normal.         Behavior: Behavior is cooperative. Thought Content: Thought content normal.         Cognition and Memory: Cognition normal.         Judgment: Judgment normal.        Medical Decision Making  Patient is an 79-year-old with history as described above who presents with palpitations. Since she was diagnosed with COVID 6 weeks ago she has had worsening chest pain, tightness, shortness of breath and dyspnea on exertion. She has difficulty ambulating short distances such as across the room without becoming short of breath. She was tachycardic on arrival today. NT proBNP elevated mildly at 598. Work-up was otherwise overall unremarkable. Patient continues to be dyspneic and had shortness of breath. Given patient's age will plan to admit for further evaluation and management. Perfect Serve Consult for Admission  8:08 PM    ED Room Number: ERF/F  Patient Name and age: Kenn Almendarez 79 y.o.  female  Working Diagnosis: Dyspnea on exertion  (primary encounter diagnosis)  Chest pain, unspecified type  SOB (shortness of breath)    COVID-19 Suspicion:  no  Sepsis present:  no  Reassessment needed: yes  Code Status:  Full Code  Readmission: no  Isolation Requirements:  no  Recommended Level of Care:  telemetry  Department: [facility] ED - [phone]  Admitting Provider: LUZMARIA Rios    Other:  79year old female with worsening dyspnea on exertion, chest pain and shortness of breath.     Amount and/or Complexity of Data Reviewed  Labs: ordered. Decision-making details documented in ED Course. Radiology: ordered. Risk  Prescription drug management. Decision regarding hospitalization. ED Course as of 02/06/23 1957 Mon Feb 06, 2023   1621   ED EKG interpretation:  Rhythm: Sinus tachycardia with first-degree AV block at a rate of 112. Axis: normal.  ST segment:  No concerning ST elevations or depressions. This EKG was interpreted by Yohana Gustafson MD,ED Provider. [JM]   1909 CBC WITH AUTOMATED DIFF(!):    WBC 7.3   RBC 3.73(!)   HGB 11.8   HCT 37.1   MCV 99.5(!)   MCH 31.6   MCHC 31.8   RDW 13.8   PLATELET 590   MPV 20.6   NRBC 0.0   ABSOLUTE NRBC 0.00   NEUTROPHILS 50   LYMPHOCYTES 36   MONOCYTES 10   EOSINOPHILS 2   BASOPHILS 1   IMMATURE GRANULOCYTES 1(!)   ABS. NEUTROPHILS 3.7   ABS. LYMPHOCYTES 2.7   ABS. MONOCYTES 0.7   ABS. EOSINOPHILS 0.1   ABS. BASOPHILS 0.1   ABS. IMM. GRANS. 0.1(!)   DF AUTOMATED [KG]   1909 COMPREHENSIVE METABOLIC PANEL(!):    Sodium 134(!)   Potassium 5.2(!)   Chloride 106   CO2 24   Anion gap 4(!)   Glucose 180(!)   BUN 21(!)   Creatinine 0.86   BUN/Creatinine ratio 24(!)   eGFR >60   Calcium 8.1(!)   Bilirubin, total 0.6   ALT 32   AST 41(!)   Alk.  phosphatase 116   Protein, total 6.7   Albumin 3.4(!)   Globulin 3.3   A-G Ratio 1.0(!) [KG]      ED Course User Index  [JM] Jasmina Cope MD  [KG] Sergio Kenny       Procedures

## 2023-02-06 NOTE — ED TRIAGE NOTES
Patient arrives with complaint of elevated heart rate. EKG performed and given to provider. Patient reports having Covid 6 weeks ago and has been taking albuterol inhaler and Pulmicort inhaler.

## 2023-02-06 NOTE — TELEPHONE ENCOUNTER
Patient requesting refill of leflunomide. Lab Results   Component Value Date/Time    Sodium 139 01/25/2023 10:25 AM    Potassium 4.3 01/25/2023 10:25 AM    Chloride 99 01/25/2023 10:25 AM    CO2 23 01/25/2023 10:25 AM    Anion gap 5 06/16/2022 02:50 PM    Glucose 129 (H) 01/25/2023 10:25 AM    BUN 19 01/25/2023 10:25 AM    Creatinine 0.90 01/25/2023 10:25 AM    BUN/Creatinine ratio 21 01/25/2023 10:25 AM    GFR est AA >60 06/16/2022 02:50 PM    GFR est non-AA >60 06/16/2022 02:50 PM    Calcium 8.7 01/25/2023 10:25 AM    Bilirubin, total 0.2 01/25/2023 10:25 AM    Alk.  phosphatase 122 (H) 01/25/2023 10:25 AM    Protein, total 6.0 01/25/2023 10:25 AM    Albumin 4.1 01/25/2023 10:25 AM    Globulin 3.5 06/16/2022 02:50 PM    A-G Ratio 2.2 01/25/2023 10:25 AM    ALT (SGPT) 25 01/25/2023 10:25 AM    AST (SGOT) 36 01/25/2023 10:25 AM     Lab Results   Component Value Date/Time    WBC 7.6 01/25/2023 10:25 AM    WBC 7.8 06/25/2012 10:44 AM    HGB 12.3 01/25/2023 10:25 AM    Hematocrit (POC) 40 03/05/2021 12:10 PM    HCT 38.1 01/25/2023 10:25 AM    PLATELET 016 19/12/0897 10:25 AM    MCV 97 01/25/2023 10:25 AM

## 2023-02-06 NOTE — PROGRESS NOTES
Chief Complaint   Patient presents with    Joint Pain     1. Have you been to the ER, urgent care clinic since your last visit? Hospitalized since your last visit? No    2. Have you seen or consulted any other health care providers outside of the [address] [place] [address] since your last visit? Include any pap smears or colon screening.  No

## 2023-02-06 NOTE — PATIENT INSTRUCTIONS
I am concerned that your heart rate is fast and somewhat irregular, and you have crackles in the bases of the lungs which may reflect fluid backup or remaining inflammation from your recent COVID infection. Please go to the ER at [facility] to have these issues worked up more urgently--they can determine if it is safe to pursue further management as an outpatient, or if you need to be admitted for medications like IV diuretics and medications to control the heart rate. I agree your arthritis is looking better, but it's possible the Plaquenil may be also increasing the tendency toward fast heart rates. Until you are treated and feeling better, hold the leflunomide and Plaquenil. Your ER doctors are welcome to call me directly if they would like to discuss any of this--my cell# is [phone]. Return in 2-4 weeks to determine next steps for your arthritis.

## 2023-02-06 NOTE — PROGRESS NOTES
REASON FOR VISIT    This is a follow-up visit for Ms. Rueda for     ICD-10-CM   1. Seronegative rheumatoid arthritis of multiple sites ([facility] M06.09   2.  Age-related osteoporosis without current pathological fracture M81.0      Inflammatory arthritis phenotype includes:  Anti-CCP positive: no  Rheumatoid factor positive: no  Erosive disease: yes  Extra-articular manifestations include: Raynaud's    Immunosuppression Screening (9/10/2020):  Quantiferon TB: negative   PPD:  Not performed  Hepatitis B: negative    Hepatitis C: negative     Therapy History includes:  Current DMARD therapy include: leflunomide 20 mg daily (2/13/2018 to 10/2019; 2/12/2020 to present),   Prior DMARD therapy include: methotrexate 12.5 mg subcutaneous,  hydroxychloroquine 300 mg daily, Humira (5/10/2018: 3 samples: cost), Simponi Aria every 6 weeks (12/07/2018 to 5/28/2021), Feliz Stai 11 mg XR daily (8/2021 to 7/22, recurrent skin cancers)  Discontinued DMARDs because of inefficacy: Simponi Aria  Discontinued DMARDs because of side effects: methotrexate (elevated LFTs), leflunomide (elevated LFTs)  Contra-Indicated DMARDs because of chronic kidney disease: methotrexate     Osteoporosis Historical Synopsis    Height loss since age 26 (at least two inches): 5  Fracture history includes: yes (T5-T6)  Family history of hip fracture: no  Fall Risk: no    Smoking history: no  Alcohol consumption: no  Prednisone history: no    Exercise: no    Previous work-up for osteoporosis includes the following:  DEXA Scan: 8/16/2019  Vitamin 25OH D level: 46.6 (21/3/2018)  PTH: 21 (21/3/2018)  TSH: 2.570 (21/3/2018)    Therapy History includes:  Current osteoporosis therapy includes: Prolia 60 mg every 6 months (3/29/2018 to present)  Prior osteoporosis therapy includes: Reclast (2 years)  The following osteoporosis therapy have been ineffective: Reclast  The following osteoporosis therapy were stopped because of side effects: none    HISTORY OF PRESENT ILLNESS    Ms. Dianna Brasher returns for a follow-up. Last visit 11/4/2022. Pt takes one pill of Leflunomide daily and 1.5 pills of Plaquenil daily. She takes one Prolia injection every 6 months wit good tolerance. She has taken a daily baby aspirin since her heart attack. Pt had a new skin spot on the R side of her forehead. She had a biopsy of the spot last week, and she has not gotten the results yet. She has not had any other problem skin spots besides this one, since stopping Iona Ravel. Pt says that she had Covid since last visit, diagnosed on workup of cough and \"bad cold\" without alexis fevers. She was treated with by description Paxlovid at Urgent care but didn't feel better, subsequently saw her PCP and prescrived doxycycline for 7 days from 1/17, and azithromycin dose pack, but she did not feel better. She is now using an Albuterol inhaler and a nebulizer to treat her chest tightness. She denies fevers or overt chest pain. Pt had pain in her middle L finger last week. Her finger pain has been better since she started to take Plaquenil. Pt daughter reports that it was hard for the pt to walk from the chair to the coffee table yesterday. She has had a very rough time since getting Covid about 6 weeks ago. Pt has neuropathy in her toes. It is stable and not moving further up her leg. Pt denies headaches. Pt denies weight changes in the past 2 weeks, though weight has still been downtrending over the last 3-6 months from (144#, from 151 in November). She is the lightest that she has ever been. REVIEW OF SYSTEMS    A comprehensive review of systems was performed and pertinent results are documented in the HPI, review of systems is otherwise non-contributory.     PAST MEDICAL HISTORY    She has a past medical history of Anemia, Arthritis, CAD (coronary artery disease) (1997), Degenerative joint disease of right hip (09/14/2015 ), Diabetes ([facility] Fracture, Gastritis, H/O Bell's palsy (7/9/2012), Hypercholesterolemia, Lupus ([facility] (1/9/2015), Osteoporosis, post-menopausal, Squamous cell cancer of skin of left cheek, and Squamous cell skin cancer. FAMILY HISTORY    Her family history includes Arthritis-rheumatoid in her sister, sister and another family member; Diabetes in her father, mother, and sister; Elevated Lipids in her sister; Heart Disease in her father and mother; Hypertension in her father, mother, and sister; Thyroid Disease in her sister. SOCIAL HISTORY    She reports that she quit smoking about 33 years ago. Her smoking use included cigarettes. She has never used smokeless tobacco. She reports that she does not drink alcohol and does not use drugs. MEDICATIONS    Current Outpatient Medications   Medication Sig    pantoprazole (PROTONIX) 40 mg tablet Take 1 Tablet by mouth daily. Attempt a 2 week trial off of this medication at least once a year to see if you still need it. HYDROcodone-acetaminophen (NORCO) 5-325 mg per tablet Take 1 Tablet by mouth every six (6) hours as needed for Pain for up to 90 days. albuterol-ipratropium (DUO-NEB) 2.5 mg-0.5 mg/3 ml nebu USE 3 ML VIA NEBULIZER EVERY 4 HOURS AS NEEDED FOR WHEEZING    budesonide (PULMICORT) 0.5 mg/2 mL nbsp 2 mL by Nebulization route two (2) times a day for 30 days. gabapentin (NEURONTIN) 800 mg tablet TAKE 1 TABLET BY MOUTH THREE TIMES DAILY. MAX DAILY AMOUNT: 2400 MG    azithromycin (ZITHROMAX) 250 mg tablet 2 tabs po on day 1, then 1 tab daily day 2-5    acitretin 25 mg capsule TAKE 1 CAPSULE BY MOUTH EVERY OTHER DAY    glucose blood VI test strips (Asc[employer]ia CONTOUR) strip Glucometer for 250.00 Pt. test blood sugar once daily (in morning)    cyanocobalamin (VITAMIN B12) 500 mcg tablet Take 2 Tablets by mouth daily. hydrOXYchloroQUINE (PLAQUENIL) 200 mg tablet Take 1.5 Tablets by mouth daily. leflunomide (ARAVA) 20 mg tablet Take 1 Tablet by mouth daily.     metFORMIN (GLUCOPHAGE) 500 mg tablet TAKE 1 TABLET BY MOUTH TWICE DAILY WITH MEALS    atorvastatin (LIPITOR) 40 mg tablet TAKE 1 TABLET BY MOUTH DAILY    nortriptyline (PAMELOR) 10 mg capsule TAKE 1 CAPSULE BY MOUTH EVERY NIGHT    cyclobenzaprine (FLEXERIL) 10 mg tablet Take 1 Tablet by mouth nightly as needed for Muscle Spasm(s). ferrous sulfate (FeroSuL) 325 mg (65 mg iron) tablet TAKE 1 TABLET BY MOUTH TWICE DAILY    nitroglycerin (NITROSTAT) 0.4 mg SL tablet DISSOLVE 1 TABLET UNDER THE TONGUE EVERY 5 MINUTES AS NEEDED FOR CHEST PAIN    denosumab (PROLIA) 60 mg/mL injection 1 mL by SubCUTAneous route every 6 months. acetaminophen (TYLENOL) 650 mg CR tablet Take 1,300 mg by mouth daily as needed for Pain. Blood-Glucose Meter (CONTOUR METER) monitoring kit Dx: 250. Check blood sugars daily. ZINC PO Take 50 mg by mouth daily. cholecalciferol, vitamin d3, (VITAMIN D3) 1,000 unit tablet Take 2,000 Units by mouth daily. calcium-vitamin D (OS-ROBERT +D3) 500 mg-200 unit per tablet Take 2 Tabs by mouth daily. aspirin 81 mg Tab Take 81 mg by mouth daily. MULTIVITAMINS (MULTIVITAMIN PO) Take 1 Tab by mouth daily. No current facility-administered medications for this visit. ALLERGIES    Allergies   Allergen Reactions    Cephalosporins Nausea and Vomiting     Severe vomiting  Other reaction(s): gi upset, unspecified       PHYSICAL EXAMINATION    Visit Vitals  /70 (BP 1 Location: Left upper arm, BP Patient Position: Sitting, BP Cuff Size: Adult)   Pulse (!) 109   Temp 98.4 °F (36.9 °C) (Temporal)   Resp 16   Wt 144 lb (65.3 kg)   SpO2 93%   BMI 27.21 kg/m²       General:  The patient is well developed, alert, and in no apparent distress. Eyes: Sclera are anicteric. No conjunctival injection. HEENT:  Oropharynx is clear. No oral ulcers. Adequate salivary pooling. No cervical or supraclavicular lymphadenopathy. Lungs:  Interval return of bibasilar crackles. Normal respiratory effort.  Good air movement in upper fields  Cor:  Tachy, frequent irregularity. No murmurs rubs or gallops. Abdomen: Soft, non-tender, without hepatomegaly or masses. Extremities: No calf tenderness or edema. Warm and well perfused. Skin:  No significant abnormalities. Neuro: Nonfocal, no foot or wrist drop. Subjectively decreased sensation to light touch below ankles. Musculoskeletal:    A comprehensive musculoskeletal exam was performed for all joints of each upper and lower extremity and assessed for swelling, tenderness and range of motion. Results are documented as below:  Stable bilateral Sharla and Heberden nodes with trace synovitis of left PIP3-4, right PIP2. Old surgical scar over right MCP2  Interval resolved rotational hip pain. Bilateral knee crepitus without effusion  No evidence of synovitis in the small joints of the hands, wrists, shoulders, elbows, hips, knees or ankles. DATA REVIEW    Laboratory     Recent laboratory results were reviewed, summarized, and discussed with the patient. 1/25/23: ESR 9, Cr 0.9, Tbili 0.2, AST 36, ALT 25, Alk phos 122, CBC WNL, CRP <1 mg/L  10/10/22: ESR 3, Cr 0.84, LFT WNL, CBC WNL, CRP <1 mg/L, Vit B12 233  6/16/22: Cr 0.78, AST 38, ALT 36, Alk phos 94, Tbili 0.4,   6/7/22: microalbumin urine random 0.92, Cr urine 18.8, LFT WNL microalbumin/Cr ratio 49  5/16/22: HGB A1c 5.8, Triglyceride 232, ESR 2, Cr 0.88, LFT WNL, WBC 7.1, HGB 12.7, Plt 191, CRp <1 mg/L  12/16/21: Cr 0.86, Tbili 0.3, AST 46, ALT 44, AlkP 131, Tprot 6.7, Alb 3.4  11/15/21: A1c 5.6  9/27/21: , NTproBNP 610  9/17/13: Hep B SAg neg, cAb neg; Hep C Ab neg    Imaging    Musculoskeletal Ultrasound    None    Radiographs    XR CHEST PA LAT (1/17/23): No acute changes    XR CHEST PA LAT (5/16/22): Clear lungs. Treated and untreated vertebral compression fractures    Bilateral Hand 2/13/2018: RIGHT: severe osteopenia without fracture. There are scattered degenerative changes. Progressive first Aia 16 joint. No new erosive changes.  Vascular calcifications. LEFT: severe osteopenia. Scattered degenerative changes as noted previously with progression of the index finger and middle finger DIP joints. There is a new erosion at the index finger proximal phalanx ulnar base without adjacent joint space loss. There are vascular calcifications. Calcification overlying the DRUJ is favored represent overlying vascular calcifications. Bilateral Foot 2/13/2018: RIGHT: no acute fracture or dislocation. Alignment is anatomic. Mild degenerative changes are seen in the left first MTP joint. Minimal degenerative changes are seen in the right first MTP joint. A possible erosion is seen in the head of the left first metatarsal. No erosive changes are seen in the right foot. Plantar calcaneal heel spurs are noted in both feet, as well as enthesophyte formation at the Achilles insertion site. Arterial vascular calcifications are also noted bilaterally. Mild soft tissue swelling surrounds the left first MTP joint. LEFT: no acute fracture or dislocation. Alignment is anatomic. Mild degenerative changes are seen in the left first MTP joint. Minimal degenerative changes are seen in the right first MTP joint. A possible erosion is seen in the head of the left first metatarsal. No erosive changes are seen in the right foot. Plantar calcaneal heel spurs are noted in both feet, as well as enthesophyte formation at the Achilles insertion site. Arterial vascular calcifications are also noted bilaterally. Mild soft tissue swelling surrounds the left first MTP joint. Thoracic Spine 11/03/2016: The posterior battery pack is again seen within the intrathecal catheter in stable position. Kyphoplasty material is present in a midthoracic vertebral body is stable mild chronic compression of the adjacent inferior vertebral body. The remaining vertebral body heights are maintained. Anterior osteophytes are noted. There is no abnormality in alignment.     Lumbar Spine 10/19/2016: significant disc space narrowing at L3-L4 with 14 mm anterolisthesis. There is facet arthropathy. The vertebral body heights are maintained. Spinal catheters are seen. There are atherosclerotic vascular calcifications. Bilateral Hips 8/31/2015: no fracture, dislocation or other acute abnormality. There osteoarthritic pattern change of both hips, more severe on the right. There is nonuniform joint space narrowing and marginal osteophytes. There is remodeling of the right femur head with subchondral cystic formation. Mild sacroiliac osteoarthritic change. Lower lumbar spondylosis is noted. Wire  leads are seen in the left lower lumbar spine likely reflective of spinal stimulator device. Bilateral Hand 3/01/2011: RIGHT: mild osteopenia. There are degenerative changes of the distal and proximal interphalangeal joints and the base of the thumb. There is no fracture or other acute abnormality. Scattered calcifications are noted in the radial and ulnar arteries. LEFT: mild osteopenia. There are degenerative changes of the distal and proximal interphalangeal joints and  at the base of the thumb. There is no fracture or acute abnormality. There are vascular calcifications of the radial and ulnar arteries. CT Imaging    None    MR Imaging    MRI Brain with without contrast 12/19/2016: There is sulcal and ventricular prominence. Confluent periventricular and scattered foci of increased T2 signal intensity in the corona radiata and centrum semiovale. Tiny remote lacunar infarction in the right cerebellum. There is no intracranial mass, hemorrhage or evidence of acute infarction. There is no Chiari or syrinx. The pituitary and infundibulum are grossly unremarkable. There is no skull base mass. Cerebellopontine angles are grossly unremarkable. The major intracranial vascular flow-voids are unremarkable. No evidence of demyelinating process. There is no abnormal parenchymal enhancement.  There is no abnormal meningeal enhancement. The cavernous sinuses are symmetric. Optic chiasm and infundibulum grossly unremarkable. Orbits are grossly symmetric. Dural venous sinuses are grossly patent. The brain architecture is normal. There is no evidence of midline shift or mass-effect. There are no extra-axial fluid collections. The mastoid air cells and paranasal sinuses are well pneumatized and clear. MRI Lumbar Spine with and without contrast 8/07/2013: There is transitional lumbosacral anatomy. In keeping with prior studies, lowest fully formed disc is labeled L5-S1. There has been prior  laminectomies at L3-5 with no change in grade 1 anterolisthesis at L3-4 which measures 5 mm. There is grade 1 anterolisthesis of L4-5 of approximately 2 mm, unchanged. Vertebral body heights are maintained. There is no marrow edema or compression fracture. There are reactive endplate changes at B2-8. The conus medullaris terminates at L1-2. There is no abnormal intraspinal enhancement. L1/2:  The spinal canal and foramina are widely patent. L2/3:  Diffuse disc bulge, facet hypertrophy and ligamentum flavum thickening cause mild spinal canal and moderate right foraminal stenosis. Left foramen is patent. L3/4: There is uncovering of disc material with facet hypertrophy resulting in mild thecal sac narrowing. There is severe bilateral foraminal stenosis similar to the prior study. L4/5:  There is diffuse disc bulge and facet hypertrophy, without significant spinal canal stenosis. There is mild right foraminal stenosis. L5/S1:  The spinal canal and foramina are widely patent. DXA     DXA 8/16/2019: (excluded Possible increased density in the L2 and T6 vertebral bodies) left femoral neck T score: -1.1 (0.881 g/cm2), left total hip T score: -0.9 (0.889 g/cm2), right femoral neck T score: -0.7 (0.945 g/cm2), right total hip T score: -0.5 (0.941 g/cm2), and distal one third left radius T score -2.4 (BMD 0.542 g/cm2).  FRAX score 21 % probability in 10 years for major osteoporotic fracture and 39 % 10 year probability of hip fracture. DXA 8/08/2017: (excluded Lumbar spine because of overlying wires and degenerative change and kyphoplasty of L4) showed left femoral neck T score: -1.4 (0.837 g/cm2), left total hip T score: -1.4 (0.830 g/cm2), right femoral neck T score: -0.9 (0.919 g/cm2), right total hip T score: -0.9 (0.900 g/cm2), and distal one third left radius T score -3.0 (BMD 0.615 g/cm2). FRAX score 16.8 % probability in 10 years for major osteoporotic fracture and 3.2 % 10 year probability of hip fracture. Nuclear Medicine    Nuclear Medicine Bone Scan 1/22/2013: There is intense radiotracer uptake in the upper thoracic spine, likely T5. There is uptake in the region of the sternoclavicular joints and left shoulder, likely degenerative. ASSESSMENT AND PLAN    This is a follow-up visit for Ms. Rueda for seronegative rheumatoid arthritis. Inflammatory arthritis has improved with addition of Plaquenil to leflunomide, and she feels the overall tempo of new skin lesions has slowed. However, today she has new resting tachycardia with some irregularity, and bibasilar crackles, raising concern for new atrial fibrillation and flash pulmonary edema driving dyspnea and crackles. Referring to ER for urgent cardiac risk assessment. As long as Plaquenil does not appear to be contributing to an arrhythmia, anticipate continuing leflunomide and Plaquenil. 1. Tachycardia  -Referring to ER for ?rhythm, rate control, rule out ischemia    2. Bibasilar crackles  -Clear chest xray last month, pending evaluation in ER    3. Seronegative rheumatoid arthritis of multiple sites ([facility]  -Holding leflunomide and Plaquenil pending rule out of infection and arrhythmia, anticipate restarting both once addressed in the short term    4. Age-related osteoporosis without current pathological fracture  -Cont Prolia q6mo, next 7/23.       Patient Instructions   I am concerned that your heart rate is fast and somewhat irregular, and you have crackles in the bases of the lungs which may reflect fluid backup or remaining inflammation from your recent COVID infection. Please go to the ER at [facility] to have these issues worked up more urgently--they can determine if it is safe to pursue further management as an outpatient, or if you need to be admitted for medications like IV diuretics and medications to control the heart rate. I agree your arthritis is looking better, but it's possible the Plaquenil may be also increasing the tendency toward fast heart rates. Until you are treated and feeling better, hold the leflunomide and Plaquenil. Your ER doctors are welcome to call me directly if they would like to discuss any of this--my cell# is [phone]. Return in 2-4 weeks to determine next steps for your arthritis. TODAY'S ORDERS    Orders Placed This Encounter    albuterol (PROVENTIL HFA, VENTOLIN HFA, PROAIR HFA) 90 mcg/actuation inhaler       Future Appointments   Date Time Provider Yocasta Cisneros   2/13/2023  1:40 PM Arash Lou MD [facility]   2/27/2023  2:20 PM Serafin Watson MD [facility]   5/22/2023  1:40 PM Arash Lou MD [facility]   7/27/2023  2:00 PM SS INF7 CH2 <1H [facility]     Face to face time: 24 minutes  Note preparation and records review day of service: 18 minutes  Total provider time day of service: 42 minutes    This was scribed by Tonia Hitchcock in the presence of Dr. Mu Real. The note was reviewed and amended personally, and I agree with the above information.     Ehsan Rodriguez MD    Adult Rheumatology   [facility]  A Part of [facility], [address] [place] [address] Road   Phone [phone]  Fax [phone]

## 2023-02-07 VITALS
WEIGHT: 144 LBS | DIASTOLIC BLOOD PRESSURE: 67 MMHG | HEIGHT: 61 IN | RESPIRATION RATE: 18 BRPM | OXYGEN SATURATION: 98 % | SYSTOLIC BLOOD PRESSURE: 108 MMHG | HEART RATE: 85 BPM | BODY MASS INDEX: 27.19 KG/M2 | TEMPERATURE: 98.1 F

## 2023-02-07 DIAGNOSIS — I10 BENIGN HYPERTENSION: ICD-10-CM

## 2023-02-07 DIAGNOSIS — E78.2 MIXED HYPERLIPIDEMIA: ICD-10-CM

## 2023-02-07 DIAGNOSIS — N18.2 CKD (CHRONIC KIDNEY DISEASE) STAGE 2, GFR 60-89 ML/MIN: ICD-10-CM

## 2023-02-07 DIAGNOSIS — R06.09 DYSPNEA ON EXERTION: Primary | ICD-10-CM

## 2023-02-07 PROBLEM — E78.49 OTHER HYPERLIPIDEMIA: Status: ACTIVE | Noted: 2023-02-07

## 2023-02-07 LAB
ANION GAP SERPL CALC-SCNC: 6 MMOL/L (ref 5–15)
ATRIAL RATE: 112 BPM
BASOPHILS # BLD: 0.1 K/UL (ref 0–0.1)
BASOPHILS NFR BLD: 1 % (ref 0–1)
BUN SERPL-MCNC: 17 MG/DL (ref 6–20)
BUN/CREAT SERPL: 22 (ref 12–20)
CALCIUM SERPL-MCNC: 7.7 MG/DL (ref 8.5–10.1)
CALCULATED P AXIS, ECG09: 85 DEGREES
CALCULATED R AXIS, ECG10: 0 DEGREES
CALCULATED T AXIS, ECG11: 71 DEGREES
CHLORIDE SERPL-SCNC: 108 MMOL/L (ref 97–108)
CO2 SERPL-SCNC: 28 MMOL/L (ref 21–32)
CREAT SERPL-MCNC: 0.77 MG/DL (ref 0.55–1.02)
DIAGNOSIS, 93000: NORMAL
DIFFERENTIAL METHOD BLD: ABNORMAL
EOSINOPHIL # BLD: 0.1 K/UL (ref 0–0.4)
EOSINOPHIL NFR BLD: 2 % (ref 0–7)
ERYTHROCYTE [DISTWIDTH] IN BLOOD BY AUTOMATED COUNT: 13.8 % (ref 11.5–14.5)
GLUCOSE BLD STRIP.AUTO-MCNC: 120 MG/DL (ref 65–117)
GLUCOSE BLD STRIP.AUTO-MCNC: 159 MG/DL (ref 65–117)
GLUCOSE SERPL-MCNC: 109 MG/DL (ref 65–100)
HCT VFR BLD AUTO: 35.5 % (ref 35–47)
HGB BLD-MCNC: 11.4 G/DL (ref 11.5–16)
IMM GRANULOCYTES # BLD AUTO: 0.1 K/UL (ref 0–0.04)
IMM GRANULOCYTES NFR BLD AUTO: 1 % (ref 0–0.5)
LYMPHOCYTES # BLD: 2.1 K/UL (ref 0.8–3.5)
LYMPHOCYTES NFR BLD: 38 % (ref 12–49)
MCH RBC QN AUTO: 31.9 PG (ref 26–34)
MCHC RBC AUTO-ENTMCNC: 32.1 G/DL (ref 30–36.5)
MCV RBC AUTO: 99.4 FL (ref 80–99)
MONOCYTES # BLD: 0.7 K/UL (ref 0–1)
MONOCYTES NFR BLD: 13 % (ref 5–13)
NEUTS SEG # BLD: 2.4 K/UL (ref 1.8–8)
NEUTS SEG NFR BLD: 45 % (ref 32–75)
NRBC # BLD: 0 K/UL (ref 0–0.01)
NRBC BLD-RTO: 0 PER 100 WBC
P-R INTERVAL, ECG05: 232 MS
PLATELET # BLD AUTO: 177 K/UL (ref 150–400)
PMV BLD AUTO: 10.5 FL (ref 8.9–12.9)
POTASSIUM SERPL-SCNC: 4.2 MMOL/L (ref 3.5–5.1)
Q-T INTERVAL, ECG07: 328 MS
QRS DURATION, ECG06: 70 MS
QTC CALCULATION (BEZET), ECG08: 447 MS
RBC # BLD AUTO: 3.57 M/UL (ref 3.8–5.2)
SERVICE CMNT-IMP: ABNORMAL
SERVICE CMNT-IMP: ABNORMAL
SODIUM SERPL-SCNC: 142 MMOL/L (ref 136–145)
VENTRICULAR RATE, ECG03: 112 BPM
WBC # BLD AUTO: 5.5 K/UL (ref 3.6–11)

## 2023-02-07 PROCEDURE — 74011000250 HC RX REV CODE- 250: Performed by: STUDENT IN AN ORGANIZED HEALTH CARE EDUCATION/TRAINING PROGRAM

## 2023-02-07 PROCEDURE — G0378 HOSPITAL OBSERVATION PER HR: HCPCS

## 2023-02-07 PROCEDURE — 94761 N-INVAS EAR/PLS OXIMETRY MLT: CPT

## 2023-02-07 PROCEDURE — 74011250637 HC RX REV CODE- 250/637: Performed by: STUDENT IN AN ORGANIZED HEALTH CARE EDUCATION/TRAINING PROGRAM

## 2023-02-07 PROCEDURE — 36415 COLL VENOUS BLD VENIPUNCTURE: CPT

## 2023-02-07 PROCEDURE — 99235 HOSP IP/OBS SAME DATE MOD 70: CPT | Performed by: FAMILY MEDICINE

## 2023-02-07 PROCEDURE — 80048 BASIC METABOLIC PNL TOTAL CA: CPT

## 2023-02-07 PROCEDURE — 94640 AIRWAY INHALATION TREATMENT: CPT

## 2023-02-07 PROCEDURE — 74011250637 HC RX REV CODE- 250/637

## 2023-02-07 PROCEDURE — 82962 GLUCOSE BLOOD TEST: CPT

## 2023-02-07 PROCEDURE — 97161 PT EVAL LOW COMPLEX 20 MIN: CPT

## 2023-02-07 PROCEDURE — 74011250636 HC RX REV CODE- 250/636

## 2023-02-07 PROCEDURE — 96372 THER/PROPH/DIAG INJ SC/IM: CPT

## 2023-02-07 PROCEDURE — 94664 DEMO&/EVAL PT USE INHALER: CPT

## 2023-02-07 PROCEDURE — 97116 GAIT TRAINING THERAPY: CPT

## 2023-02-07 PROCEDURE — 85025 COMPLETE CBC W/AUTO DIFF WBC: CPT

## 2023-02-07 RX ORDER — GUAIFENESIN 600 MG/1
600 TABLET, EXTENDED RELEASE ORAL
Qty: 30 TABLET | Refills: 0 | Status: SHIPPED | OUTPATIENT
Start: 2023-02-07

## 2023-02-07 RX ORDER — ACETAMINOPHEN 325 MG/1
650 TABLET ORAL
Status: DISCONTINUED | OUTPATIENT
Start: 2023-02-07 | End: 2023-02-07

## 2023-02-07 RX ORDER — LANOLIN ALCOHOL/MO/W.PET/CERES
1 CREAM (GRAM) TOPICAL
Status: DISCONTINUED | OUTPATIENT
Start: 2023-02-07 | End: 2023-02-07 | Stop reason: HOSPADM

## 2023-02-07 RX ORDER — DEXTROSE MONOHYDRATE 100 MG/ML
0-250 INJECTION, SOLUTION INTRAVENOUS AS NEEDED
Status: DISCONTINUED | OUTPATIENT
Start: 2023-02-07 | End: 2023-02-07 | Stop reason: HOSPADM

## 2023-02-07 RX ORDER — GUAIFENESIN 600 MG/1
600 TABLET, EXTENDED RELEASE ORAL EVERY 12 HOURS
Status: DISCONTINUED | OUTPATIENT
Start: 2023-02-07 | End: 2023-02-07 | Stop reason: HOSPADM

## 2023-02-07 RX ORDER — POLYETHYLENE GLYCOL 3350 17 G/17G
17 POWDER, FOR SOLUTION ORAL DAILY PRN
Status: DISCONTINUED | OUTPATIENT
Start: 2023-02-07 | End: 2023-02-07 | Stop reason: HOSPADM

## 2023-02-07 RX ORDER — NORTRIPTYLINE HYDROCHLORIDE 10 MG/1
10 CAPSULE ORAL
Status: DISCONTINUED | OUTPATIENT
Start: 2023-02-07 | End: 2023-02-07 | Stop reason: HOSPADM

## 2023-02-07 RX ORDER — SODIUM CHLORIDE 0.9 % (FLUSH) 0.9 %
5-40 SYRINGE (ML) INJECTION EVERY 8 HOURS
Status: DISCONTINUED | OUTPATIENT
Start: 2023-02-07 | End: 2023-02-07 | Stop reason: HOSPADM

## 2023-02-07 RX ORDER — ENOXAPARIN SODIUM 100 MG/ML
40 INJECTION SUBCUTANEOUS DAILY
Status: DISCONTINUED | OUTPATIENT
Start: 2023-02-07 | End: 2023-02-07 | Stop reason: HOSPADM

## 2023-02-07 RX ORDER — SODIUM CHLORIDE 0.9 % (FLUSH) 0.9 %
5-40 SYRINGE (ML) INJECTION AS NEEDED
Status: DISCONTINUED | OUTPATIENT
Start: 2023-02-07 | End: 2023-02-07 | Stop reason: HOSPADM

## 2023-02-07 RX ORDER — ACETAMINOPHEN 650 MG/1
650 SUPPOSITORY RECTAL
Status: DISCONTINUED | OUTPATIENT
Start: 2023-02-07 | End: 2023-02-07

## 2023-02-07 RX ORDER — HYDROCODONE BITARTRATE AND ACETAMINOPHEN 5; 325 MG/1; MG/1
1 TABLET ORAL
Status: DISCONTINUED | OUTPATIENT
Start: 2023-02-07 | End: 2023-02-07

## 2023-02-07 RX ORDER — IBUPROFEN 200 MG
4 TABLET ORAL AS NEEDED
Status: DISCONTINUED | OUTPATIENT
Start: 2023-02-07 | End: 2023-02-07 | Stop reason: HOSPADM

## 2023-02-07 RX ORDER — IPRATROPIUM BROMIDE AND ALBUTEROL SULFATE 2.5; .5 MG/3ML; MG/3ML
3 SOLUTION RESPIRATORY (INHALATION)
Status: DISCONTINUED | OUTPATIENT
Start: 2023-02-07 | End: 2023-02-07 | Stop reason: HOSPADM

## 2023-02-07 RX ORDER — PANTOPRAZOLE SODIUM 40 MG/1
40 TABLET, DELAYED RELEASE ORAL DAILY
Status: DISCONTINUED | OUTPATIENT
Start: 2023-02-07 | End: 2023-02-07 | Stop reason: HOSPADM

## 2023-02-07 RX ORDER — ATORVASTATIN CALCIUM 20 MG/1
40 TABLET, FILM COATED ORAL DAILY
Status: DISCONTINUED | OUTPATIENT
Start: 2023-02-07 | End: 2023-02-07 | Stop reason: HOSPADM

## 2023-02-07 RX ORDER — INSULIN LISPRO 100 [IU]/ML
INJECTION, SOLUTION INTRAVENOUS; SUBCUTANEOUS
Status: DISCONTINUED | OUTPATIENT
Start: 2023-02-07 | End: 2023-02-07 | Stop reason: HOSPADM

## 2023-02-07 RX ORDER — BUDESONIDE 0.5 MG/2ML
500 INHALANT ORAL
Status: DISCONTINUED | OUTPATIENT
Start: 2023-02-07 | End: 2023-02-07 | Stop reason: HOSPADM

## 2023-02-07 RX ADMIN — IPRATROPIUM BROMIDE AND ALBUTEROL SULFATE 3 ML: .5; 3 SOLUTION RESPIRATORY (INHALATION) at 07:40

## 2023-02-07 RX ADMIN — ATORVASTATIN CALCIUM 40 MG: 20 TABLET, FILM COATED ORAL at 08:48

## 2023-02-07 RX ADMIN — BUDESONIDE 500 MCG: 0.5 INHALANT RESPIRATORY (INHALATION) at 07:49

## 2023-02-07 RX ADMIN — GABAPENTIN 800 MG: 300 CAPSULE ORAL at 08:47

## 2023-02-07 RX ADMIN — ENOXAPARIN SODIUM 40 MG: 100 INJECTION SUBCUTANEOUS at 08:48

## 2023-02-07 RX ADMIN — PANTOPRAZOLE SODIUM 40 MG: 40 TABLET, DELAYED RELEASE ORAL at 08:48

## 2023-02-07 RX ADMIN — GUAIFENESIN 600 MG: 600 TABLET ORAL at 08:48

## 2023-02-07 RX ADMIN — FERROUS SULFATE TAB 325 MG (65 MG ELEMENTAL FE) 325 MG: 325 (65 FE) TAB at 08:48

## 2023-02-07 NOTE — PROGRESS NOTES
CARE MANAGEMENT INITIAL ASSESSEMENT      NAME:   Destiny Smith   :     1940   MRN:     [id number]       Emergency Contact:  Extended Emergency Contact Information  Primary Emergency Contact: Lauri Block SAMYPriyank Cullen. Phone: [phone]  Mobile Phone: [phone]  Relation: Daughter  Preferred language: ENGLISH   needed? No    Advance Directive:  Partial Code, has an advance directive. Copy not available. Lyndsay Live Healthcare Decision Maker:   Sukh Oneill- daughter- [phone]    Reason for Admission:  Ms. Kimi Corea is a 79 y.o. female with history that includes depression, CKD, RA, and COVID   who was emergently admitted for:  SOB    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    Neuropathy G62.9    Lupus erythematosus L93.0    Raynaud's disease I73.00    Benign hypertension I10    Blepharoconjunctivitis H10.509    Seronegative rheumatoid arthritis of multiple sites ([address] M06.09    Nuclear sclerotic cataract H25.10    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       RUR: 9%  Risk Level:  Low  Value-based purchasing:  Yes  Bundle patient:  No    Residency:  Private residence  Exterior Steps:   3  Interior Steps:  None    Lives With:  Adult children (daughter and son in law)    Prior functioning:  Independent. Patient requires assistance with:  N/A    Prior DME required:  Rollator and Nebulizer    DME available:  Same as above    Rehab history:  None    Discharge Concerns/Barriers Identified:  None identified      Insurer:  Payor: [facility] MEDICARE COMPLETE / Plan: [address] / Product Type: Managed Care Medicare /     PCP: Maylin Lopez MD   Name of Practice:   [facility]   Current patient: Yes   Approximate date of last visit: 1/24/23   Access to virtual PCP visits:  Unknown    Pharmacy:  [facility]. Financial/Difficulty affording medications:  None identified    COVID-19 vaccination status:  Yes    DC Transport:  Family      Transition of care plan:  Home with outpatient follow-up     Comments:   Pt admitted on 02/06/23 for SOB. CM met with Pt to complete initial assessment. Pt lives at home with daughter and son in law. Pt has no hx of HH or home O2. Pt has a rollator that she uses outside of the house. Pt also has a nebulizer. Pt is independent with ADLs and ambulation. Pt denies problems with either. Pt is retired. Pt is able to drive. Discharge plan is for Pt to return home. Family will transport Pt home.   _____________________________________  Doreen Tijerina [address] [facility]  Available via [address] [place] [address]  2/7/2023   12:58 PM      Care Management Interventions  PCP Verified by CM: Yes Zenia Olson MD)  Mode of Transport at Discharge:  Other (see comment) (family)  Transition of Care Consult (CM Consult): Discharge Planning  MyChart Signup: No  Discharge Durable Medical Equipment: No  Physical Therapy Consult: Yes  Occupational Therapy Consult: Yes  Speech Therapy Consult: No  Support Systems: Child(jean mraie), Other Family Member(s)  Confirm Follow Up Transport: Self  Discharge Location  Patient Expects to be Discharged to[de-identified] Home with family assistance

## 2023-02-07 NOTE — DISCHARGE SUMMARY
[address] [place] [address]   Office [phone]  Fax [phone]       Discharge / Transfer / Off-Service Note     Name: Kenneth Guardado MRN: [id number]  Sex: Female   YOB: 1940  Age: 79 y.o. PCP: Anna Clarke MD     Date of admission: 2/6/2023  Date of discharge/transfer: 2/7/2023    Attending physician at admission: Neal Washington Attending physician at discharge/transfer: Christel Kaiser MD  Resident physician at discharge/transfer: Bobo Lozano MD     Consultants during hospitalization  None     Admission diagnoses   Dyspnea on exertion [R06.09]    Recommended follow-up after discharge    1. Lazaro Schwarz MD     Things to follow up on with PCP:   - Moore  - Covid Infection   ----------------------------------------------------------------------------------------------------------------------------  The patient was well enough to be discharged from the hospital. However, because they were inpatient in a hospital, they are at greater risk of having been exposed to the coronavirus. PLEASE stay inside and self-quarantine for 14 days to prevent further spread of the coronavirus.  -------------------------------------------------------------------------------------------------------------  Medication Changes:  START: Mucinex 600 mg PO BID daily for 7 days. STOP: Azithromycin      CHANGES:  NONE     No other changes were made to your medications, please take all your other home medicines as previously prescribed. History of Present Illness    As per admitting provider:   Kenneth Guardado is a 79 y.o. female with PMH depression, rheumatoid arthritis, COVID-19 infection diagnosed 6 weeks ago, diabetes, osteoporosis, CKD Stage II, peripheral neuropathy, and chronic pain who presents to the ER complaining of SOB. She was diagnosed with COVID 6 weeks ago and says SOB has not improved since that time.  Now has dyspnea with even small exertion like walking from room to room in her home. She was given a Z pack, duonebs, and albuterol by  her PCP with no relief of symptoms. Was seen by rheumatology today and had irregular heart beat on exam, as well as tachycardia and was told to come to ED. Denies fever/chills, chest pain/tightness, nausea/vomiting, lethargy, lightheadedness/dizziness, palpitations. Endorsing productive cough and head confestion. Hospital course  Toshia Myles is a 79 y.o. female with PMH depression, rheumatoid arthritis, COVID-19 infection diagnosed 6 weeks ago, diabetes, osteoporosis, CKD Stage II, peripheral neuropathy, and chronic pain who was admitted for shortness of breath. SOB - Likely 2/2 COVID-19 infection. No O2 requirement, euvolemic on exam with BNP of 598 so CHF less likely. CTA chest showing no PE, no airspace disease, mild bibasilar atelectasis. EKG NSR with 1st degree AV block, unchanged from previous  - Follow up with PCP   - ECHO as outpatient      T2DM: Last HgA1C 5.7 in 11/2022.   - Continue Metformin 500mg BID     Peripheral Neuropathy- 2/2 T2DM. Take Gabapentin 800mg TID, Norco 1 tablet q6hr, pamelor 10mg. - Continue gabapentin, pamelor  - Discuss with PCP if it will be appropriate to resume [address] as it has not been filled since 9/2022     Seronegative rheumatoid arthritis - patient follows with Dr. Rae Kirk. On home plaquenil 300mg daily and leflunomide 20mg daily. - Will resume home medications in the absent of any infection during this hospital stay. - Follow up with Dr. Rae Kirk      Osteoporosis -   - on every 6 month prolia injections. HLD -   - Continue atorvastatin 40mg nightly. Physical exam at discharge:    Vitals Reviewed.  Patient Vitals for the past 12 hrs:   Temp Pulse Resp BP SpO2   02/07/23 1103 98.1 °F (36.7 °C) 85 18 108/67 98 %   02/07/23 1022 -- -- -- -- 98 %   02/07/23 1007 -- (!) 102 -- 107/71 90 %   02/07/23 0900 -- (!) 101 -- 107/69 91 %   02/07/23 0840 98 °F (36.7 °C) 91 14 125/67 97 %   02/07/23 0740 -- -- -- -- 97 %   02/07/23 0700 -- 89 -- -- --   02/07/23 0501 -- 86 -- 139/76 97 %   02/07/23 0401 -- 88 -- 130/77 96 %        General: No acute distress. Alert. Cooperative. Respiratory: CTAB. No w/r/r/c.  Cardiovascular: RRR. Normal S1,S2. No m/r/g.   GI: Nondistended.+ bowel sounds. Nontender. No rebound tenderness or guarding. Extremities: No LE edema. Distal pulses present. Musculoskeletal: Full ROM in all extremities. Skin: Warm, dry. No rashes. Neuro: Alert and oriented. Condition at discharge: Stable. Labs  Recent Labs     02/07/23  0411 02/06/23  1623   WBC 5.5 7.3   HGB 11.4* 11.8   HCT 35.5 37.1    219     Recent Labs     02/07/23  0411 02/06/23  1623    134*   K 4.2 5.2*    106   CO2 28 24   BUN 17 21*   CREA 0.77 0.86   * 180*   CA 7.7* 8.1*     Recent Labs     02/06/23  1623   ALT 32      TBILI 0.6   TP 6.7   ALB 3.4*   GLOB 3.3     Recent Labs     02/07/23  1108 02/07/23  0839   GLUCPOC 120* 159*       Micro:  Lab Results   Component Value Date/Time    Culture result: MIXED UROGENITAL CHRISTIE ISOLATED 06/25/2020 03:10 PM    Culture result: ESCHERICHIA COLI (A) 03/06/2020 05:01 PM    Culture result: MIXED SKIN CHRISTIE ISOLATED 08/03/2010 12:10 PM       Imaging:  XR CHEST PA LAT    Result Date: 1/17/2023  EXAM: XR CHEST PA LAT INDICATION: Acute cough COMPARISON: May 16, 2022 TECHNIQUE: PA and lateral chest views FINDINGS: The cardiac size is within normal limits. The pulmonary vasculature is within normal limits. The lungs and pleural spaces are clear. Prior kyphoplasty, chronic compressions, epidural catheter in place. No acute changes. CTA CHEST W OR W WO CONT    Result Date: 2/6/2023  EXAM:  CTA CHEST W OR W WO CONT INDICATION: Chest pain, tachycardia, recent Covid COMPARISON: None.  TECHNIQUE: Helical thin section chest CT following uneventful intravenous administration of nonionic contrast 100 mL of isovue 370 according to departmental PE protocol. Coronal and sagittal reformats were performed. 3D post processing was performed. CT dose reduction was achieved through the use of a standardized protocol tailored for this examination and automatic exposure control for dose modulation. FINDINGS: This is a good quality study for the evaluation of pulmonary embolism to the first subsegmental arterial level. There is no pulmonary embolism to this level. Central pulmonary arteries are distended, compatible with chronic pulmonary arterial hypertension. THYROID: No nodule. MEDIASTINUM: No mass or lymphadenopathy. JEAN: No mass or lymphadenopathy. THORACIC AORTA: No aneurysm. HEART: Cardiomegaly with coronary artery atherosclerosis ESOPHAGUS: No wall thickening or dilatation. TRACHEA/BRONCHI: Patent. PLEURA: No effusion or pneumothorax. LUNGS: No nodule, mass, or airspace disease. There is minimal bibasilar atelectasis. UPPER ABDOMEN: Partially imaged. No acute pathology. BONES: Kyphoplasty change in the midthoracic spine, with additional chronic appearing compression fractures. Stimulator device in the right back soft tissues. 1. No evidence of pulmonary embolism. 2. No acute airspace disease. 3. Minimal bibasilar atelectasis.          Chronic diagnoses   Problem List as of 2/7/2023 Date Reviewed: 2/6/2023            Codes Class Noted - Resolved    Other hyperlipidemia ICD-10-CM: E78.49  ICD-9-CM: 272.4  2/7/2023 - Present        * (Principal) Dyspnea on exertion ICD-10-CM: R06.09  ICD-9-CM: 786.09  2/6/2023 - Present        Controlled substance agreement signed ICD-10-CM: Z79.899  ICD-9-CM: V58.69  9/6/2022 - Present    Overview Signed 9/6/2022  8:36 AM by Nile Solis MD     5/2022 [place]             Primary Raynaud's phenomenon ICD-10-CM: I73.00  ICD-9-CM: 443.0  2/13/2018 - Present        CKD (chronic kidney disease) stage 2, GFR 60-89 ml/min ICD-10-CM: N18.2  ICD-9-CM: 585.2  2/13/2018 - Present        Obesity, morbid ([place] [address].) ICD-10-CM: E66.01  ICD-9-CM: 278.01  12/13/2017 - Present        Diabetes mellitus ([place] [address].) ICD-10-CM: E11.9  ICD-9-CM: 250.00  11/20/2017 - Present        Myocardial infarction ([place] [address].) ICD-10-CM: I21.9  ICD-9-CM: 410.90  11/20/2017 - Present        Anemia ICD-10-CM: D64.9  ICD-9-CM: 285.9  11/20/2017 - Present        Primary localized osteoarthritis of left knee ICD-10-CM: M17.12  ICD-9-CM: 715.16  8/18/2017 - Present        Blepharoconjunctivitis ICD-10-CM: H10.509  ICD-9-CM: 372.20  1/27/2016 - Present        Seronegative rheumatoid arthritis of multiple sites ([place] [address].) ICD-10-CM: M06.09  ICD-9-CM: 714.0  1/27/2016 - Present        Nuclear sclerotic cataract ICD-10-CM: H25.10  ICD-9-CM: 366.16  1/27/2016 - Present        Nevus of choroid ICD-10-CM: D31.30  ICD-9-CM: 224.6  1/27/2016 - Present        Raynaud's disease ICD-10-CM: I73.00  ICD-9-CM: 443.0  4/13/2015 - Present        Lupus erythematosus ICD-10-CM: L93.0  ICD-9-CM: 695.4  1/9/2015 - Present        Diabetic polyneuropathy ([place] [address].) ICD-10-CM: E11.42  ICD-9-CM: 250.60, 357.2  6/24/2013 - Present        Idiopathic progressive polyneuropathy ICD-10-CM: G60.3  ICD-9-CM: 356.4  6/24/2013 - Present        Neuropathy ICD-10-CM: G62.9  ICD-9-CM: 355.9  6/24/2013 - Present        Benign hypertension ICD-10-CM: I10  ICD-9-CM: 401.1  7/2/2012 - Present        Age-related osteoporosis without current pathological fracture ICD-10-CM: M81.0  ICD-9-CM: 733.01  6/25/2012 - Present        Pain in joint, multiple sites ICD-10-CM: M25.50  ICD-9-CM: 719.49  1/26/2012 - Present        Other and unspecified postsurgical nonabsorption ICD-10-CM: K91.2  ICD-9-CM: 579.3  1/26/2012 - Present        Long-term use of immunosuppressant medication ICD-10-CM: Z79.60  ICD-9-CM: V58.69  1/26/2012 - Present        Long-term use of Plaquenil ICD-10-CM: Z79.899  ICD-9-CM: V58.69  1/26/2012 - Present        Hypocalcemia ICD-10-CM: E83.51  ICD-9-CM: 275.41  1/26/2012 - Present        Low back pain ICD-10-CM: M54.50  ICD-9-CM: 724.2  1/26/2012 - Present        Arthritis ICD-10-CM: M19.90  ICD-9-CM: 716.90  5/17/2011 - Present        Coronary arteriosclerosis ICD-10-CM: I25.10  ICD-9-CM: 414.00  12/30/2009 - Present    Overview Addendum 9/24/2020  9:43 AM by Sheron Ribeiro LPN     Followed by Dr. Dontae Servin 5/14/2019: Normal myocardial perfusion. Stent in LAD on 2/19/1997             Mixed hyperlipidemia ICD-10-CM: E78.2  ICD-9-CM: 272.2  12/30/2009 - Present        Gastritis ICD-10-CM: K29.70  ICD-9-CM: 535.50  12/30/2009 - Present        Vitamin D deficiency ICD-10-CM: E55.9  ICD-9-CM: 268.9  12/30/2009 - Present        History of gastric bypass ICD-10-CM: Z98.84  ICD-9-CM: V45.86  12/30/2009 - Present        RESOLVED: H/O total knee replacement ICD-10-CM: Z96.659  ICD-9-CM: V43.65  8/18/2017 - 6/28/2019        RESOLVED: Diabetes mellitus type 2 without retinopathy ([facility] ICD-10-CM: E11.9  ICD-9-CM: 250.00  1/27/2016 - 12/29/2017        RESOLVED: Type II or unspecified type diabetes mellitus with neurological manifestations, not stated as uncontrolled(250.60) ([facility] ICD-10-CM: E11.49  ICD-9-CM: 250.60  4/21/2014 - 3/1/2018        RESOLVED: Diabetes mellitus ([facility] ICD-10-CM: E11.9  ICD-9-CM: 250.00  11/29/2012 - 12/29/2017        RESOLVED: H/O Bell's palsy ICD-10-CM: H57.39  ICD-9-CM: V12.49  7/9/2012 - 6/28/2019    Overview Signed 7/9/2012  4:02 PM by Baltazar Pérez MD     R-side of face. Dx >20 years ago. Has persistent R-sided facial droop             RESOLVED: Senile osteoporosis ICD-10-CM: M81.0  ICD-9-CM: 733.01  1/26/2012 - 10/20/2015        RESOLVED: Diabetes ([facility] ICD-10-CM: E11.9  ICD-9-CM: 250.00  12/30/2009 - 11/29/2012           Current Discharge Medication List        START taking these medications    Details   guaiFENesin ER (MUCINEX) 600 mg ER tablet Take 1 Tablet by mouth every twelve (12) hours as needed for Congestion.   Qty: 30 Tablet, Refills: 0  Start date: 2/7/2023 CONTINUE these medications which have NOT CHANGED    Details   albuterol (PROVENTIL HFA, VENTOLIN HFA, PROAIR HFA) 90 mcg/actuation inhaler INHALE 1 PUFF BY MOUTH EVERY 4 TO 6 HOURS AS NEEDED FOR SHORTNESS OF BREATH OR WHEEZING      leflunomide (ARAVA) 20 mg tablet Take 1 Tablet by mouth daily. Qty: 90 Tablet, Refills: 0  Start date: 2/6/2023    Associated Diagnoses: Age-related osteoporosis without current pathological fracture; Seronegative rheumatoid arthritis of multiple sites [facility]); CKD (chronic kidney disease) stage 2, GFR 60-89 ml/min; Vitamin D deficiency; Long-term use of immunosuppressant medication      pantoprazole (PROTONIX) 40 mg tablet Take 1 Tablet by mouth daily. Attempt a 2 week trial off of this medication at least once a year to see if you still need it. Qty: 90 Tablet, Refills: 1    Associated Diagnoses: Gastroesophageal reflux disease, unspecified whether esophagitis present      HYDROcodone-acetaminophen (NORCO) 5-325 mg per tablet Take 1 Tablet by mouth every six (6) hours as needed for Pain for up to 90 days. Qty: 60 Tablet, Refills: 0    Associated Diagnoses: Arthropathy; Myofascial muscle pain; Degenerative lumbar disc      albuterol-ipratropium (DUO-NEB) 2.5 mg-0.5 mg/3 ml nebu USE 3 ML VIA NEBULIZER EVERY 4 HOURS AS NEEDED FOR WHEEZING  Qty: 270 mL, Refills: 0    Associated Diagnoses: Bronchitis; COVID-19      budesonide (PULMICORT) 0.5 mg/2 mL nbsp 2 mL by Nebulization route two (2) times a day for 30 days. Qty: 1 Each, Refills: 5    Associated Diagnoses: Bronchitis; COVID-19      gabapentin (NEURONTIN) 800 mg tablet TAKE 1 TABLET BY MOUTH THREE TIMES DAILY.  MAX DAILY AMOUNT: 2400 MG  Qty: 90 Tablet, Refills: 2    Associated Diagnoses: Diabetic polyneuropathy associated with type 2 diabetes mellitus (HCC)      acitretin 25 mg capsule TAKE 1 CAPSULE BY MOUTH EVERY OTHER DAY      glucose blood VI test strips (Ascensia CONTOUR) strip Glucometer for 250.00 Pt. test blood sugar once daily (in morning)  Qty: 50 Strip, Refills: 11      cyanocobalamin (VITAMIN B12) 500 mcg tablet Take 2 Tablets by mouth daily. Qty: 180 Tablet, Refills: 1    Associated Diagnoses: B12 deficiency      hydrOXYchloroQUINE (PLAQUENIL) 200 mg tablet Take 1.5 Tablets by mouth daily. Qty: 135 Tablet, Refills: 1    Associated Diagnoses: Seronegative rheumatoid arthritis of multiple sites ([facility]      metFORMIN (GLUCOPHAGE) 500 mg tablet TAKE 1 TABLET BY MOUTH TWICE DAILY WITH MEALS  Qty: 180 Tablet, Refills: 1    Associated Diagnoses: Type 2 diabetes with nephropathy (HCC)      atorvastatin (LIPITOR) 40 mg tablet TAKE 1 TABLET BY MOUTH DAILY  Qty: 90 Tablet, Refills: 1    Associated Diagnoses: Mixed hyperlipidemia      nortriptyline (PAMELOR) 10 mg capsule TAKE 1 CAPSULE BY MOUTH EVERY NIGHT  Qty: 30 Capsule, Refills: 5      cyclobenzaprine (FLEXERIL) 10 mg tablet Take 1 Tablet by mouth nightly as needed for Muscle Spasm(s). Qty: 90 Tablet, Refills: 1    Associated Diagnoses: Muscle spasm      ferrous sulfate (FeroSuL) 325 mg (65 mg iron) tablet TAKE 1 TABLET BY MOUTH TWICE DAILY  Qty: 60 Tablet, Refills: 5    Associated Diagnoses: Pernicious anemia      nitroglycerin (NITROSTAT) 0.4 mg SL tablet DISSOLVE 1 TABLET UNDER THE TONGUE EVERY 5 MINUTES AS NEEDED FOR CHEST PAIN  Qty: 25 Tab, Refills: 0    Associated Diagnoses: Mixed hyperlipidemia; Coronary artery disease involving native heart without angina pectoris, unspecified vessel or lesion type      denosumab (PROLIA) 60 mg/mL injection 1 mL by SubCUTAneous route every 6 months. Associated Diagnoses: Osteoporosis, postmenopausal      acetaminophen (TYLENOL) 650 mg CR tablet Take 1,300 mg by mouth daily as needed for Pain. Blood-Glucose Meter (CONTOUR METER) monitoring kit Dx: 250. Check blood sugars daily. Qty: 1 kit, Refills: 11      ZINC PO Take 50 mg by mouth daily.       cholecalciferol, vitamin d3, (VITAMIN D3) 1,000 unit tablet Take 2,000 Units by mouth daily. Associated Diagnoses: Vitamin D deficiency      calcium-vitamin D (OS-ROBERT +D3) 500 mg-200 unit per tablet Take 2 Tabs by mouth daily. Associated Diagnoses: Vitamin D deficiency      aspirin 81 mg Tab Take 81 mg by mouth daily. Associated Diagnoses: CAD (coronary artery disease)      MULTIVITAMINS (MULTIVITAMIN PO) Take 1 Tab by mouth daily. Associated Diagnoses: Vitamin D deficiency; Status post gastric bypass for obesity           STOP taking these medications       azithromycin (ZITHROMAX) 250 mg tablet Comments:   Reason for Stopping:                Diet:  Diabetic diet.     Activity:  As tolerated     Disposition: Home or Self Care    Discharge instructions to patient/family  Please seek medical attention for any new or worsening symptoms particularly fever, chest pain, shortness of breath, abdominal pain, nausea, vomiting    Follow up plans/appointments  Follow-up Information       Follow up With Specialties Details Why Contact Info    Herlinda Glynn MD Family Medicine   [address] [place] [address]  [phone]               Ana Joshi MD  Family Medicine Resident       For Billing    Chief Complaint   Patient presents with    Palpitations       Hospital Problems  Date Reviewed: 2/6/2023            Codes Class Noted POA    Other hyperlipidemia ICD-10-CM: E78.49  ICD-9-CM: 272.4  2/7/2023 Unknown        * (Principal) Dyspnea on exertion ICD-10-CM: R06.09  ICD-9-CM: 786.09  2/6/2023 Unknown        Diabetes mellitus ([facility] ICD-10-CM: E11.9  ICD-9-CM: 250.00  11/20/2017 Yes        Seronegative rheumatoid arthritis of multiple sites [facility]) ICD-10-CM: M06.09  ICD-9-CM: 714.0  1/27/2016 Yes        Diabetic polyneuropathy ([facility] ICD-10-CM: E11.42  ICD-9-CM: 250.60, 357.2  6/24/2013 Yes        Pain in joint, multiple sites ICD-10-CM: M25.50  ICD-9-CM: 719.49  1/26/2012 Yes

## 2023-02-07 NOTE — DISCHARGE INSTRUCTIONS
HOME DISCHARGE INSTRUCTIONS    Jose Handley / [id number] : 1940    Admission date: 2023 Discharge date: 2023 12:39 PM     Please bring this form with you to show your care provider at your follow-up appointment. Primary care provider:  Fiona Leung MD    Discharging provider:  Emigdio De La Fuente MD  - Family Medicine Resident  David Brown MD - Family Medicine Attending      You have been admitted to the hospital with the following diagnoses:    ACUTE DIAGNOSES:  Dyspnea on exertion [R06.09]    MEDICATION CHANGES  START  - Mucinex every 12 hours as needed for congestion    No changes were made to your medications, please take all your other home medicines as previously prescribed. You can continue breathing treatments as needed as prescribed by your PCP. We do not belived you need more antibiotics at this time. . . . . . . . . . . . . . . . . . . . . . . . . . . . . . . . . . . . . . . . . . . . . . . . . . . . . . . . . . . . . . . . . . . . . . . . Wyatt Dunn FOLLOW-UP CARE RECOMMENDATIONS:    Appointments  Future Appointments   Date Time Provider Yocasta Burgeri   2023  1:40 PM Fiona Leung MD [facility]   2023 10:40 AM Corey Davies MD [facility]   2023  2:20 PM Josefina Costa MD [facility]   2023  1:40 PM Fiona Leung MD [facility]   2023  2:00 PM SS INF7 CH2 <1H [facility]      Follow-up tests needed: Please follow up with cardiology to have an echocardiogram performed    Pending test results: At the time of your discharge the following test results are still pending: None. Please make sure you review these results with your outpatient follow-up provider(s). DIET/what to eat:  Regular Diet    ACTIVITY:  Activity as tolerated    Specific symptoms to watch for: chest pain, shortness of breath, fever, chills, nausea, vomiting, diarrhea, change in mentation, falling, weakness, bleeding.      What to do if new or unexpected symptoms occur? If you experience any of the above symptoms (or should other concerns or questions arise after discharge) please call your primary care physician. Return to the emergency room if you cannot get hold of your doctor. It is very important that you keep your follow-up appointment(s). Please bring discharge papers, medication list (and/or medication bottles) to your follow-up appointments for review by your outpatient provider(s). Please check the list of medications and be sure it includes every medication (even non-prescription medications) that your provider wants you to take. It is important that you take the medication exactly as they are prescribed. Keep your medication in the bottles provided by the pharmacist and keep a list of the medication names, dosages, and times to be taken in your wallet. Do not take other medications without consulting your doctor. If you have any questions about your medications or other instructions, please talk to your nurse or care provider before you leave the hospital.     Information obtained by:     I understand that if any problems occur once I am at home I am to contact my physician. These instructions were explained to me and I had the opportunity to ask questions. I understand and acknowledge receipt of the instructions indicated above.                                                                                                                                                Physician's or R.N.'s Signature                                                                  Date/Time                                                                                                                                              Patient or Representative Signature                                                          Date/Time

## 2023-02-07 NOTE — PROGRESS NOTES
PHYSICAL THERAPY EVALUATION/DISCHARGE  Patient: Kvng Chappell (41 y.o. female)  Date: 2/7/2023  Primary Diagnosis: Dyspnea on exertion [R06.09]       Precautions:          ASSESSMENT  Based on the objective data described below, the patient presents with at or near baseline functional status with c/o OLIVEIRA but stable SpO2 on RA following admission for same. She recently had COVID (6 weeks) and reports continued congestion and OLIVEIRA. Initial SpO2 stable on RA and gait training completed x150' without DME with occasional path deviations and reaching for external support. No LOB throughout. Unable to obtain a second SpO2 reading after washing her hands d/t Raynaud's and cold extremities but no overt OLIVEIRA. Final SpO2 reading 99% on RA post intervention. She endorses no history of falls and owns a pulse ox, which has confirmed stable SpO2 prior to admission. The patient may benefit from OP PT for high level balance intervention but no acute or immediate post-op needs identified. Functional Outcome Measure: The patient scored 23/28 on the Tinetti outcome measure which is indicative of moderate fall risk. Further skilled acute physical therapy is not indicated at this time. PLAN :  Recommendation for discharge: (in order for the patient to meet his/her long term goals)  Outpatient physical therapy follow up recommended for high level balance ; Patient may defer for now but encouraged to discuss with her PCP with any further changes. This discharge recommendation:  Has been made in collaboration with the attending provider and/or case management    IF patient discharges home will need the following DME: patient owns DME required for discharge       SUBJECTIVE:   Patient stated I can't remember the last time I had a fall.     OBJECTIVE DATA SUMMARY:   HISTORY:    Past Medical History:   Diagnosis Date    Anemia     Arthritis     CAD (coronary artery disease) 1997    MI    Degenerative joint disease of right hip 09/14/2015     [facility] [place]/Dr. Shayne Goncalves    Diabetes [facility])     Fracture     right shoulder; T6 compression    Gastritis     H/O Bell's palsy 7/9/2012    R-side of face. Dx >20 years ago.  Has persistent R-sided facial droop     Hypercholesterolemia     Hyperlipidemia    Lupus ([address] 1/9/2015    Osteoporosis, post-menopausal     vertebral fracture    Squamous cell cancer of skin of left cheek     Squamous cell skin cancer     Right knee     Past Surgical History:   Procedure Laterality Date    COLONOSCOPY N/A 10/9/2018    COLONOSCOPY performed by Laura Alvarez MD at [address] [facility] Bilateral     HX CATARACT REMOVAL Right 12/19/2019    HX CATARACT REMOVAL Left 01/07/2020    HX CHOLECYSTECTOMY      HX CORONARY STENT PLACEMENT  02/19/1997    LAD    HX GASTRIC BYPASS  2000    HX HERNIA REPAIR  2005    HX KYPHOPLASTY      t5    HX KYPHOPLASTY      L4    HX ORTHOPAEDIC      bilateral knees    HX ORTHOPAEDIC      r shoulder    HX ORTHOPAEDIC      Laminectomy    MN OPEN IMPLANTATION SIGIFREDO SACRAL NERVE      MN UNLISTED PROCEDURE CARDIAC SURGERY      Patient Denies       Prior level of function: independent within the home, use of rollator for community mobility  Personal factors and/or comorbidities impacting plan of care:     Home Situation  Home Environment: Private residence  # Steps to Enter: 4  Rails to Enter: Yes  [employer] :  (?)  One/Two Story Residence: One story  Living Alone: No  Support Systems: Child(jean marie), Other Family Member(s)  Patient Expects to be Discharged to[de-identified] Home with family assistance  Current DME Used/Available at Home: Tonye Wingdale, rollator, Cane, straight, Shower chair, Raised toilet seat  Tub or Shower Type: Tub/Shower combination (walk in tub)    EXAMINATION/PRESENTATION/DECISION MAKING:   Critical Behavior:  Neurologic State: Alert  Orientation Level: Oriented X4  Cognition: Appropriate for age attention/concentration     Hearing:     Skin:    Edema:   Range Of Motion:  AROM: Generally decreased, functional                       Strength:    Strength: Generally decreased, functional                    Tone & Sensation:   Tone: Normal              Sensation: Impaired (peripheral neuropathy foot to mid shin)               Coordination:     Vision:      Functional Mobility:  Bed Mobility:     Supine to Sit: Supervision  Sit to Supine: Independent     Transfers:     Stand to Sit: Modified independent                       Balance:   Sitting: Intact  Standing: Impaired  Standing - Static: Good  Standing - Dynamic : Good  Ambulation/Gait Training:  Distance (ft): 150 Feet (ft)  Assistive Device: Gait belt  Ambulation - Level of Assistance: Independent     Gait Description (WDL): Exceptions to WDL  Gait Abnormalities: Path deviations        Base of Support: Widened     Speed/Denise: Slow       Therapeutic Exercises:       Functional Measure:  Tinetti test:    Sitting Balance: 1  Arises: 1  Attempts to Rise: 2  Immediate Standing Balance: 2  Standing Balance: 1  Nudged: 2  Eyes Closed: 1  Turn 360 Degrees - Continuous/Discontinuous: 1  Turn 360 Degrees - Steady/Unsteady: 1  Sitting Down: 1  Balance Score: 13 Balance total score  Indication of Gait: 1  R Step Length/Height: 1  L Step Length/Height: 1  R Foot Clearance: 1  L Foot Clearance: 1  Step Symmetry: 1  Step Continuity: 1  Path: 1  Trunk: 1  Walking Time: 1  Gait Score: 10 Gait total score  Total Score: 23/28 Overall total score         Tinetti Tool Score Risk of Falls  <19 = High Fall Risk  19-24 = Moderate Fall Risk  25-28 = Low Fall Risk  Tinetti ME. Performance-Oriented Assessment of Mobility Problems in Elderly Patients. Mane 66; [id number].  (Scoring Description: PT Bulletin Feb. 10, 1993)    Older adults: Abram Williamson et al, 2009; n = 1000 [facility] elderly evaluated with ABC, SHAHEEN, ADL, and IADL)  · Mean SHAHEEN score for males aged 69-68 years = 26.21(3.40)  · Mean SHAHEEN score for females age 69-68 years = 25.16(4.30)  · Mean SHAHEEN score for males over [de-identified] years = 23.29(6.02)  · Mean SHAHEEN score for females over 80 years = 17.20(8.32)            Physical Therapy Evaluation Charge Determination   History Examination Presentation Decision-Making   MEDIUM  Complexity : 1-2 comorbidities / personal factors will impact the outcome/ POC  LOW Complexity : 1-2 Standardized tests and measures addressing body structure, function, activity limitation and / or participation in recreation  LOW Complexity : Stable, uncomplicated  Other outcome measures Tinetti  MEDIUM      Based on the above components, the patient evaluation is determined to be of the following complexity level: LOW     Pain Rating:      Activity Tolerance:   Good      After treatment patient left in no apparent distress:   Supine in bed and Call bell within reach    COMMUNICATION/EDUCATION:   The patients plan of care was discussed with: Registered nurse. Fall prevention education was provided and the patient/caregiver indicated understanding., Patient/family have participated as able in goal setting and plan of care. , and Patient/family agree to work toward stated goals and plan of care.     Thank you for this referral.  Salinas Jung, PT, DPT   Time Calculation: 24 mins

## 2023-02-07 NOTE — PROGRESS NOTES
[facility] Medicine Service Daily Progress Note    SUBJECTIVE: Denies chest pain, nausea, vomiting, abdominal pain, dizziness. SOB is neither worsening nor improving. OBJECTIVE:      Vitals: Visit Vitals  BP (!) 119/105   Pulse 91   Temp 97.9 °F (36.6 °C)   Resp 14   Ht 5' 1\" (1.549 m)   Wt 144 lb (65.3 kg)   SpO2 96%   BMI 27.21 kg/m²     Physical Exam:  General: Patient sitting in hospital bed, in no apparent signs of acute respiratory distress. Respiratory: Lungs clear to auscultation bilaterally with no wheezing, rhonchi, or rales. Cardiovascular: Regular rate and rhythm with no murmurs, rubs, or gallops. GI: Nondistended. + bowel sounds. Nontender to palpation. Extremities: No LE edema. Skin: Warm, dry. Neuro: Alert and oriented x3.      I/O:      Inpatient Medications  Current Facility-Administered Medications   Medication Dose Route Frequency    sodium chloride (NS) flush 5-40 mL  5-40 mL IntraVENous Q8H    sodium chloride (NS) flush 5-40 mL  5-40 mL IntraVENous PRN    polyethylene glycol (MIRALAX) packet 17 g  17 g Oral DAILY PRN    enoxaparin (LOVENOX) injection 40 mg  40 mg SubCUTAneous DAILY    albuterol-ipratropium (DUO-NEB) 2.5 MG-0.5 MG/3 ML  3 mL Nebulization Q6H PRN    atorvastatin (LIPITOR) tablet 40 mg  40 mg Oral DAILY    budesonide (PULMICORT) 500 mcg/2 ml nebulizer suspension  500 mcg Nebulization BID RT    ferrous sulfate tablet 325 mg  1 Tablet Oral DAILY WITH BREAKFAST    nortriptyline (PAMELOR) capsule 10 mg  10 mg Oral QHS    pantoprazole (PROTONIX) tablet 40 mg  40 mg Oral DAILY    insulin lispro (HUMALOG) injection   SubCUTAneous AC&HS    glucose chewable tablet 16 g  4 Tablet Oral PRN    glucagon (GLUCAGEN) injection 1 mg  1 mg IntraMUSCular PRN    dextrose 10% infusion 0-250 mL  0-250 mL IntraVENous PRN    gabapentin (NEURONTIN) capsule 800 mg  800 mg Oral TID     Current Outpatient Medications   Medication Sig    albuterol (PROVENTIL HFA, VENTOLIN HFA, PROAIR HFA) 90 mcg/actuation inhaler INHALE 1 PUFF BY MOUTH EVERY 4 TO 6 HOURS AS NEEDED FOR SHORTNESS OF BREATH OR WHEEZING    leflunomide (ARAVA) 20 mg tablet Take 1 Tablet by mouth daily. pantoprazole (PROTONIX) 40 mg tablet Take 1 Tablet by mouth daily. Attempt a 2 week trial off of this medication at least once a year to see if you still need it. HYDROcodone-acetaminophen (NORCO) 5-325 mg per tablet Take 1 Tablet by mouth every six (6) hours as needed for Pain for up to 90 days. albuterol-ipratropium (DUO-NEB) 2.5 mg-0.5 mg/3 ml nebu USE 3 ML VIA NEBULIZER EVERY 4 HOURS AS NEEDED FOR WHEEZING    budesonide (PULMICORT) 0.5 mg/2 mL nbsp 2 mL by Nebulization route two (2) times a day for 30 days. gabapentin (NEURONTIN) 800 mg tablet TAKE 1 TABLET BY MOUTH THREE TIMES DAILY. MAX DAILY AMOUNT: 2400 MG    azithromycin (ZITHROMAX) 250 mg tablet 2 tabs po on day 1, then 1 tab daily day 2-5    acitretin 25 mg capsule TAKE 1 CAPSULE BY MOUTH EVERY OTHER DAY    glucose blood VI test strips (Ascensia CONTOUR) strip Glucometer for 250.00 Pt. test blood sugar once daily (in morning)    cyanocobalamin (VITAMIN B12) 500 mcg tablet Take 2 Tablets by mouth daily. hydrOXYchloroQUINE (PLAQUENIL) 200 mg tablet Take 1.5 Tablets by mouth daily. metFORMIN (GLUCOPHAGE) 500 mg tablet TAKE 1 TABLET BY MOUTH TWICE DAILY WITH MEALS    atorvastatin (LIPITOR) 40 mg tablet TAKE 1 TABLET BY MOUTH DAILY    nortriptyline (PAMELOR) 10 mg capsule TAKE 1 CAPSULE BY MOUTH EVERY NIGHT    cyclobenzaprine (FLEXERIL) 10 mg tablet Take 1 Tablet by mouth nightly as needed for Muscle Spasm(s). ferrous sulfate (FeroSuL) 325 mg (65 mg iron) tablet TAKE 1 TABLET BY MOUTH TWICE DAILY    nitroglycerin (NITROSTAT) 0.4 mg SL tablet DISSOLVE 1 TABLET UNDER THE TONGUE EVERY 5 MINUTES AS NEEDED FOR CHEST PAIN    denosumab (PROLIA) 60 mg/mL injection 1 mL by SubCUTAneous route every 6 months.     acetaminophen (TYLENOL) 650 mg CR tablet Take 1,300 mg by mouth daily as needed for Pain. Blood-Glucose Meter (CONTOUR METER) monitoring kit Dx: 250. Check blood sugars daily. ZINC PO Take 50 mg by mouth daily. cholecalciferol, vitamin d3, (VITAMIN D3) 1,000 unit tablet Take 2,000 Units by mouth daily. calcium-vitamin D (OS-ROBERT +D3) 500 mg-200 unit per tablet Take 2 Tabs by mouth daily. aspirin 81 mg Tab Take 81 mg by mouth daily. MULTIVITAMINS (MULTIVITAMIN PO) Take 1 Tab by mouth daily. Allergies  Allergies   Allergen Reactions    Cephalosporins Nausea and Vomiting     Severe vomiting  Other reaction(s): gi upset, unspecified       CBC:  Recent Labs     02/07/23  0411 02/06/23  1623   WBC 5.5 7.3   HGB 11.4* 11.8   HCT 35.5 37.1    259       Metabolic Panel:  Recent Labs     02/06/23  1623   *   K 5.2*      CO2 24   BUN 21*   CREA 0.86   *   CA 8.1*   ALB 3.4*   ALT 32            Assessment and Plan      Anali Carcamo is a 79 y.o. female with PMH depression, rheumatoid arthritis, COVID-19 infection diagnosed 6 weeks ago, diabetes, osteoporosis, CKD Stage II, peripheral neuropathy, and chronic pain who is admitted for shortness of breath. SOB - in setting of recent COVID-19 infection. Patient without new O2 requirement, euvolemic on exam with BNP of 598 so CHF less likely. CTA chest showing no PE, no airspace disease, mild bibasilar atelectasis. WBC 7.3, K 5.2 on initial labs. Not meeting sepsis criteria. EKG NSR with 1st degree AV block, unchanged from previous  - spot oximetry and prn O2  - ECHO in am to rule out new CHF diagnosis  - supportive care  - follow morning CBC, CMP  - consider discharge if ECHO nml     T2DM: Last HgA1C 5.7 in 11/2022. Lab Results   Component Value Date/Time     Hemoglobin A1c 5.7 (H) 11/21/2022 11:30 AM     Hemoglobin A1c (POC) 6.1 06/24/2015 04:12 PM   - Hold home metformin 500mg BID  - Insulin Sliding Scale normal sensitivity with AC&HS glucose checks.   - Hypoglycemia protocols ordered. Peripheral Neuropathy- related to T2DM. take gabapentin 800mg TID, Norco 1 tablet q6hr, pamelor 10mg. - continue gabapentin, pamelor  - HOLD norco as hasn't been filled since 09/2022     Seronegative rheumatoid arthritis - patient follows with Dr. Isaiah Joya. On home plaquenil 300mg daily and leflunomide 20mg daily.   - HOLD per rheumatologist in s/o acute illness  - will follow up outpatient for resumption of meds     Osteoporosis - on every 6 month prolia injections. HLD - on home atorvastatin 40mg nightly. Lab Results   Component Value Date/Time     Cholesterol, total 125 11/21/2022 11:30 AM     HDL Cholesterol 49 11/21/2022 11:30 AM     LDL, calculated 41.4 11/21/2022 11:30 AM     VLDL, calculated 34.6 11/21/2022 11:30 AM     Triglyceride 173 (H) 11/21/2022 11:30 AM     CHOL/HDL Ratio 2.6 11/21/2022 11:30 AM    - continue home atorvastatin     FEN/GI - Diabetic diet. Activity - Out of bed with assistance  DVT prophylaxis - Lovenox  GI prophylaxis - Not indicated at this time  Fall prophylaxis - ordered. Disposition - Admit to Telemetry. Plan to d/c to TBD. Consulting PT/OT/CM  Code Status - DNI, discussed with patient / caregivers.   Next of [address] (Daughter)   [phone] (Mobile)         Patient Jeremy Solorzano will be discussed with Dr. Laura Waldron MD.    Abbi Barnes MD  Family Medicine Resident       For Billing    Chief Complaint   Patient presents with    Palpitations       Hospital Problems  Date Reviewed: 2/6/2023            Codes Class Noted POA    Dyspnea on exertion ICD-10-CM: R06.09  ICD-9-CM: 786.09  2/6/2023 Unknown

## 2023-02-07 NOTE — ED NOTES
Bedside and Verbal shift change report given to Ruben Elder RN (oncoming nurse) by Chris Abel RN (offgoing nurse). Report included the following information SBAR, ED Summary, and MAR.

## 2023-02-07 NOTE — PROGRESS NOTES
Occupational therapy note:  Orders received, chart reviewed. Spoke with PT who reports patient managing ADLs and transfers independently. Patient currently does not require OT evaluation. Will complete current orders. Jade Vegas MS OTR/L

## 2023-02-07 NOTE — H&P
[facility] [address] [place] [address]   Office [phone]  Fax [phone]       Admission H&P     Name: Annmarie Gregg MRN: [id number]  Sex: Female   YOB: 1940  Age: 79 y.o. PCP: Roque Menjivar MD     Source of Information: patient, medical records    Chief complaint: shortness of breath    History of Present Illness  Annmarie Gregg is a 79 y.o. female with PMH depression, rheumatoid arthritis, COVID-19 infection diagnosed 6 weeks ago, diabetes, osteoporosis, CKD Stage II, peripheral neuropathy, and chronic pain who presents to the ER complaining of SOB. She was diagnosed with COVID 6 weeks ago and says SOB has not improved since that time. Now has dyspnea with even small exertion like walking from room to room in her home. She was given a Z pack, duonebs, and albuterol by  her PCP with no relief of symptoms. Was seen by rheumatology today and had irregular heart beat on exam, as well as tachycardia and was told to come to ED. Denies fever/chills, chest pain/tightness, nausea/vomiting, lethargy, lightheadedness/dizziness, palpitations. Endorsing productive cough and head confestion.      In the ER:      Vitals:  Patient Vitals for the past 8 hrs:   Temp Pulse Resp BP SpO2   02/07/23 0210 -- 91 14 (!) 119/105 96 %   02/07/23 0155 -- 89 13 118/68 97 %   02/07/23 0140 -- 89 16 134/82 96 %   02/07/23 0125 -- 90 14 123/68 97 %   02/07/23 0110 -- 88 16 131/76 96 %   02/07/23 0055 -- 89 17 123/74 96 %   02/07/23 0040 -- 87 13 125/73 97 %   02/07/23 0025 -- 93 24 131/72 97 %   02/07/23 0010 -- 87 19 126/70 98 %   02/06/23 2355 97.9 °F (36.6 °C) 89 13 (!) 143/74 98 %         Labs:   Recent Labs     02/07/23  0411 02/06/23  1623   WBC 5.5 7.3   HGB 11.4* 11.8   HCT 35.5 37.1    219     Recent Labs     02/06/23  1623   *   K 5.2*      CO2 24   BUN 21*   CREA 0.86   *   CA 8.1*     Recent Labs     02/06/23  1623      TP 6.7   ALB 3.4*   GLOB 3.3     No results for input(s): INR, PTP, APTT, INREXT, INREXT in the last 72 hours. No results for input(s): PHI, PCO2I, PO2I, FIO2I in the last 72 hours. No results for input(s): CPK, CKMB, TROIQ, BNPP in the last 72 hours. Imaging:   CXR:  CXR Results  (Last 48 hours)      None            CT: CT Results  (Last 48 hours)                 02/06/23 1856  CTA CHEST W OR W WO CONT Final result    Impression:      1. No evidence of pulmonary embolism. 2. No acute airspace disease. 3. Minimal bibasilar atelectasis. Narrative:  EXAM:  CTA CHEST W OR W WO CONT       INDICATION: Chest pain, tachycardia, recent Covid       COMPARISON: None. TECHNIQUE: Helical thin section chest CT following uneventful intravenous   administration of nonionic contrast 100 mL of isovue 370 according to   departmental PE protocol. Coronal and sagittal reformats were performed. 3D post   processing was performed. CT dose reduction was achieved through the use of a   standardized protocol tailored for this examination and automatic exposure   control for dose modulation. FINDINGS: This is a good quality study for the evaluation of pulmonary embolism   to the first subsegmental arterial level. There is no pulmonary embolism to this   level. Central pulmonary arteries are distended, compatible with chronic   pulmonary arterial hypertension. THYROID: No nodule. MEDIASTINUM: No mass or lymphadenopathy. JEAN: No mass or lymphadenopathy. THORACIC AORTA: No aneurysm. HEART: Cardiomegaly with coronary artery atherosclerosis   ESOPHAGUS: No wall thickening or dilatation. TRACHEA/BRONCHI: Patent. PLEURA: No effusion or pneumothorax. LUNGS: No nodule, mass, or airspace disease. There is minimal bibasilar   atelectasis. UPPER ABDOMEN: Partially imaged. No acute pathology. BONES: Kyphoplasty change in the midthoracic spine, with additional chronic   appearing compression fractures.    Stimulator device in the right back soft tissues. Treatment: none    EKG: sinus tachycardia with 1st degree AV block and PVCs      Review of Systems  Review of Systems   Constitutional:  Positive for fatigue. Negative for chills, diaphoresis and fever. HENT:  Positive for postnasal drip, rhinorrhea, sinus pressure and tinnitus. Negative for sore throat and trouble swallowing. Eyes:  Negative for photophobia and visual disturbance. Respiratory:  Positive for cough and shortness of breath. Negative for choking, chest tightness and wheezing. Cardiovascular:  Negative for chest pain, palpitations and leg swelling. Gastrointestinal:  Negative for abdominal pain, nausea and vomiting. Endocrine: Negative for polydipsia and polyuria. Genitourinary:  Negative for dysuria, hematuria and urgency. Musculoskeletal:  Negative for back pain, gait problem and neck pain. Neurological:  Negative for weakness, light-headedness, numbness and headaches. Home Medications   Prior to Admission medications    Medication Sig Start Date End Date Taking? Authorizing Provider   albuterol (PROVENTIL HFA, VENTOLIN HFA, PROAIR HFA) 90 mcg/actuation inhaler INHALE 1 PUFF BY MOUTH EVERY 4 TO 6 HOURS AS NEEDED FOR SHORTNESS OF BREATH OR WHEEZING 1/7/23   Provider, Michelle   leflunomide (ARAVA) 20 mg tablet Take 1 Tablet by mouth daily. 2/6/23   Lu Wright MD   pantoprazole (PROTONIX) 40 mg tablet Take 1 Tablet by mouth daily. Attempt a 2 week trial off of this medication at least once a year to see if you still need it. 1/31/23   Dwayne Arellano MD   HYDROcodone-acetaminophen (NORCO) 5-325 mg per tablet Take 1 Tablet by mouth every six (6) hours as needed for Pain for up to 90 days.  1/31/23 5/1/23  Dwayne Arellano MD   albuterol-ipratropium (DUO-NEB) 2.5 mg-0.5 mg/3 ml nebu USE 3 ML VIA NEBULIZER EVERY 4 HOURS AS NEEDED FOR WHEEZING 1/29/23   Kallie Mac MD   budesonide (PULMICORT) 0.5 mg/2 mL nbsp 2 mL by Nebulization route two (2) times a day for 30 days. 1/24/23 2/23/23  Saintclair Lias, MD   gabapentin (NEURONTIN) 800 mg tablet TAKE 1 TABLET BY MOUTH THREE TIMES DAILY. MAX DAILY AMOUNT: 2400 MG 1/20/23   Abdirizak Murguia MD   azithromycin (ZITHROMAX) 250 mg tablet 2 tabs po on day 1, then 1 tab daily day 2-5 1/17/23   Saintclair Lias, MD   acitretin 25 mg capsule TAKE 1 CAPSULE BY MOUTH EVERY OTHER DAY 12/1/22   Provider, Historical   glucose blood VI test strips (Ascensia CONTOUR) strip Glucometer for 250.00 Pt. test blood sugar once daily (in morning) 11/21/22   Abdirizak Murguia MD   cyanocobalamin (VITAMIN B12) 500 mcg tablet Take 2 Tablets by mouth daily. 11/21/22   Abdirizak Murguia MD   hydrOXYchloroQUINE (PLAQUENIL) 200 mg tablet Take 1.5 Tablets by mouth daily. 11/4/22   Paul Luis MD   metFORMIN (GLUCOPHAGE) 500 mg tablet TAKE 1 TABLET BY MOUTH TWICE DAILY WITH MEALS 10/28/22   Abdirizak Murguia MD   atorvastatin (LIPITOR) 40 mg tablet TAKE 1 TABLET BY MOUTH DAILY 10/4/22   Abdirizak Murguia MD   nortriptyline (PAMELOR) 10 mg capsule TAKE 1 CAPSULE BY MOUTH EVERY NIGHT 9/6/22   Abdirizak Murguia MD   cyclobenzaprine (FLEXERIL) 10 mg tablet Take 1 Tablet by mouth nightly as needed for Muscle Spasm(s). 9/6/22   Abdirizak Murguia MD   ferrous sulfate (FeroSuL) 325 mg (65 mg iron) tablet TAKE 1 TABLET BY MOUTH TWICE DAILY 8/19/22   Abdirizak Murguia MD   nitroglycerin (NITROSTAT) 0.4 mg SL tablet DISSOLVE 1 TABLET UNDER THE TONGUE EVERY 5 MINUTES AS NEEDED FOR CHEST PAIN 11/2/20   Abdirizak Murguia MD   denosumab (PROLIA) 60 mg/mL injection 1 mL by SubCUTAneous route every 6 months. 2/7/18   Kim Muñoz MD   acetaminophen (TYLENOL) 650 mg CR tablet Take 1,300 mg by mouth daily as needed for Pain. Provider, Historical   Blood-Glucose Meter (CONTOUR METER) monitoring kit Dx: 250. Check blood sugars daily. 12/17/14   Gali Bond MD   ZINC PO Take 50 mg by mouth daily.     Provider, Historical cholecalciferol, vitamin d3, (VITAMIN D3) 1,000 unit tablet Take 2,000 Units by mouth daily. Provider, Historical   calcium-vitamin D (OS-ROBERT +D3) 500 mg-200 unit per tablet Take 2 Tabs by mouth daily. 5/17/11   Provider, Historical   aspirin 81 mg Tab Take 81 mg by mouth daily. Provider, Historical   MULTIVITAMINS (MULTIVITAMIN PO) Take 1 Tab by mouth daily. Provider, Historical       Allergies  Allergies   Allergen Reactions    Cephalosporins Nausea and Vomiting     Severe vomiting  Other reaction(s): gi upset, unspecified       Past Medical History  Past Medical History:   Diagnosis Date    Anemia     Arthritis     CAD (coronary artery disease) 1997    MI    Degenerative joint disease of right hip 09/14/2015     [facility] [place]/Dr. Rubio Moder    Diabetes [facility])     Fracture     right shoulder; T6 compression    Gastritis     H/O Bell's palsy 7/9/2012    R-side of face. Dx >20 years ago.  Has persistent R-sided facial droop     Hypercholesterolemia     Hyperlipidemia    Lupus ([address] 1/9/2015    Osteoporosis, post-menopausal     vertebral fracture    Squamous cell cancer of skin of left cheek     Squamous cell skin cancer     Right knee        Previous Hospitalization(s)  Past Surgical History:   Procedure Laterality Date    COLONOSCOPY N/A 10/9/2018    COLONOSCOPY performed by Emmett Nicolas MD at [facility] Bilateral     HX CATARACT REMOVAL Right 12/19/2019    HX CATARACT REMOVAL Left 01/07/2020    HX CHOLECYSTECTOMY      HX CORONARY STENT PLACEMENT  02/19/1997    LAD    HX GASTRIC BYPASS  2000    HX HERNIA REPAIR  2005    HX KYPHOPLASTY      t5    HX KYPHOPLASTY      L4    HX ORTHOPAEDIC      bilateral knees    HX ORTHOPAEDIC      r shoulder    HX ORTHOPAEDIC      Laminectomy    IA OPEN IMPLANTATION SIGIFREDO SACRAL NERVE      IA UNLISTED PROCEDURE CARDIAC SURGERY      Patient Denies       Social History  Social History     Socioeconomic History    Marital status:      Spouse name: Not on file    Number of children: Not on file    Years of education: Not on file    Highest education level: Not on file   Occupational History    Not on file   Tobacco Use    Smoking status: Former     Types: Cigarettes     Quit date: 1989     Years since quittin.1    Smokeless tobacco: Never   Vaping Use    Vaping Use: Never used   Substance and Sexual Activity    Alcohol use: No     Alcohol/week: 0.0 standard drinks     Comment: Occasional    Drug use: No    Sexual activity: Not Currently   Other Topics Concern    Not on file   Social History Narrative    Not on file     Social Determinants of Health     Financial Resource Strain: Low Risk     Difficulty of Paying Living Expenses: Not very hard   Food Insecurity: No Food Insecurity    Worried About Running Out of Food in the Last Year: Never true    Ran Out of Food in the Last Year: Never true   Transportation Needs: Not on file   Physical Activity: Not on file   Stress: Not on file   Social Connections: Not on file   Intimate Partner Violence: Not on file   Housing Stability: Not on file         Patient resides    Independently    x  With family care      Assisted living      SNF      Ambulates    Independently    x  With cane       Assisted walker      Alcohol history   x  None     Social     Chronic     Smoking history    None   x  Former smoker     Current smoker     Drug history  x  None     Former drug user     Current drug user     Code status  x  Full code     DNR/DNI     Partial      Code status discussed with the patient/caregivers. Physical Exam  Physical Exam  HENT:      Nose: Nose normal.      Mouth/Throat:      Mouth: Mucous membranes are moist.      Pharynx: Oropharynx is clear. Eyes:      Extraocular Movements: Extraocular movements intact. Conjunctiva/sclera: Conjunctivae normal.      Pupils: Pupils are equal, round, and reactive to light. Cardiovascular:      Rate and Rhythm: Regular rhythm. Tachycardia present. Pulses: Normal pulses. Heart sounds: Normal heart sounds. Pulmonary:      Effort: Pulmonary effort is normal.      Breath sounds: Normal breath sounds. Abdominal:      General: Abdomen is flat. Bowel sounds are normal.      Palpations: Abdomen is soft. Skin:     General: Skin is warm and dry. Capillary Refill: Capillary refill takes less than 2 seconds. Neurological:      General: No focal deficit present. Mental Status: She is alert and oriented to person, place, and time. Psychiatric:         Mood and Affect: Mood normal.         Behavior: Behavior normal.         Assessment and Plan     Ana Lisa is a 79 y.o. female with PMH depression, rheumatoid arthritis, COVID-19 infection diagnosed 6 weeks ago, diabetes, osteoporosis, CKD Stage II, peripheral neuropathy, and chronic pain who is admitted for shortness of breath. SOB - in setting of recent COVID-19 infection. Patient without new O2 requirement, euvolemic on exam with BNP of 598 so CHF less likely. CTA chest showing no PE, no airspace disease, mild bibasilar atelectasis. WBC 7.3, K 5.2 on initial labs. Not meeting sepsis criteria. EKG NSR with 1st degree AV block, unchanged from previous  - admit to telemetry  - spot oximetry and prn O2  - vitals per unit routine  - ECHO in am to rule out new CHF diagnosis  - supportive care  - follow morning CBC, CMP  - consider discharge if ECHO nml    T2DM: Last HgA1C 5.7 in 11/2022. Lab Results   Component Value Date/Time    Hemoglobin A1c 5.7 (H) 11/21/2022 11:30 AM    Hemoglobin A1c (POC) 6.1 06/24/2015 04:12 PM   HgbA1C  - Hold home metformin 500mg BID  - Insulin Sliding Scale normal sensitivity with AC&HS glucose checks. - Hypoglycemia protocols ordered. Peripheral Neuropathy- related to T2DM. take gabapentin 800mg TID, Norco 1 tablet q6hr, pamelor 10mg.    - continue gabapentin, pamelor  - HOLD norco as hasn't been filled since 09/2022    Seronegative rheumatoid arthritis - patient follows with Dr. Massimo Mckeon. On home plaquenil 300mg daily and leflunomide 20mg daily.   - HOLD per rheumatologist in s/o acute illness  - will follow up outpatient for resumption of meds    Osteoporosis - on every 6 month prolia injections. HLD - on home atorvastatin 40mg nightly. Lab Results   Component Value Date/Time    Cholesterol, total 125 11/21/2022 11:30 AM    HDL Cholesterol 49 11/21/2022 11:30 AM    LDL, calculated 41.4 11/21/2022 11:30 AM    VLDL, calculated 34.6 11/21/2022 11:30 AM    Triglyceride 173 (H) 11/21/2022 11:30 AM    CHOL/HDL Ratio 2.6 11/21/2022 11:30 AM    - continue home atorvastatin      FEN/GI - Diabetic diet. Activity - Out of bed with assistance  DVT prophylaxis - Lovenox  GI prophylaxis - Not indicated at this time  Fall prophylaxis - ordered. Disposition - Admit to Telemetry. Plan to d/c to TBD. Consulting PT/OT/CM  Code Status - DNI, discussed with patient / caregivers.   Next of [facility] [address] [facility] (Daughter)   [phone] (Mobile)       Patient Yany Cordova will be discussed with Dr. Alice Benitez MD.    9:41 PM, 02/07/23  Ramón Lal MD  Family Medicine Resident       For Billing    Chief Complaint   Patient presents with    [facility] Problems  Date Reviewed: 2/6/2023            Codes Class Noted POA    Dyspnea on exertion ICD-10-CM: R06.09  ICD-9-CM: 786.09  2/6/2023 Unknown

## 2023-02-07 NOTE — PROGRESS NOTES
Senior Resident Admission Note     CC: SOB    HPI:  Jose Handley is a 79 y.o. female w/ a PMHX of osteoporosis, CKD II, RA, depresion, DM with neuropathy, and chronic pain who presents to the ER complaining of worsening SOB on exertion. Patient and daughter (whom she lives with) report worsening SOB on exertion since COVID diagnosis 6 weeks ago. No chest pain currently but reports she was dealing with chest tightness that was alleviated by duo-nebs prescribed by her PCP. Patient was seen by her rheumatologist today who was worried given SOB, crackles on his exam, tachycardia w/ irregular rhythm. Due to this she was directed to go to ED. Chart reviewed. Patient seen, examined, and discussed with Dr. Edith Phoenix  (PGY-1). See H&P for more details. Physical exam:  Cardiac: Regular rhythm and rate, no murmurs   Respiratory: CTAB, no w/r/r  GI: No TTP, normal bowel sounds throughout  MSK: No LE edema or TTP    A/P: Admit to telemetry. Code status: DNI. SOB: Likely de-conditioning from recent COVID infection. Trop down-trended, CTA chest with out PE or pneumonia or pleural effusions. Mild bibasilar atelectasis noted. No SIRS criteria. EKG unchanged from previous with 1st degree AV block. No new O2 requirement. Will monitor overnight and obtain an echo to ensure no worsening EF but unlikely related to HF as BNP not significantly elevated based on age. Duo nebs as needed, continue home pulmicort. PT/OT consulted to assess home needs. I agree with remaining assessment and plan as documented in Dr. Otto Coronel Full H&P. Pt discussed with Dr. Sofia Miller  (on-call attending physician).     Luis M Arzola DO  Family Medicine Resident, PGY-2

## 2023-02-13 ENCOUNTER — OFFICE VISIT (OUTPATIENT)
Dept: FAMILY MEDICINE CLINIC | Age: 83
End: 2023-02-13
Payer: MEDICARE

## 2023-02-13 VITALS
RESPIRATION RATE: 20 BRPM | SYSTOLIC BLOOD PRESSURE: 111 MMHG | TEMPERATURE: 98.4 F | HEART RATE: 51 BPM | OXYGEN SATURATION: 96 % | WEIGHT: 143 LBS | BODY MASS INDEX: 27 KG/M2 | DIASTOLIC BLOOD PRESSURE: 65 MMHG | HEIGHT: 61 IN

## 2023-02-13 DIAGNOSIS — I10 PRIMARY HYPERTENSION: ICD-10-CM

## 2023-02-13 DIAGNOSIS — J40 BRONCHITIS: ICD-10-CM

## 2023-02-13 DIAGNOSIS — H61.23 BILATERAL IMPACTED CERUMEN: ICD-10-CM

## 2023-02-13 DIAGNOSIS — Z09 HOSPITAL DISCHARGE FOLLOW-UP: Primary | ICD-10-CM

## 2023-02-13 DIAGNOSIS — R06.02 SHORTNESS OF BREATH: ICD-10-CM

## 2023-02-13 DIAGNOSIS — U07.1 COVID-19: ICD-10-CM

## 2023-02-13 PROCEDURE — G8536 NO DOC ELDER MAL SCRN: HCPCS | Performed by: FAMILY MEDICINE

## 2023-02-13 PROCEDURE — 1123F ACP DISCUSS/DSCN MKR DOCD: CPT | Performed by: FAMILY MEDICINE

## 2023-02-13 PROCEDURE — 1090F PRES/ABSN URINE INCON ASSESS: CPT | Performed by: FAMILY MEDICINE

## 2023-02-13 PROCEDURE — G8427 DOCREV CUR MEDS BY ELIG CLIN: HCPCS | Performed by: FAMILY MEDICINE

## 2023-02-13 PROCEDURE — 1101F PT FALLS ASSESS-DOCD LE1/YR: CPT | Performed by: FAMILY MEDICINE

## 2023-02-13 PROCEDURE — 3078F DIAST BP <80 MM HG: CPT | Performed by: FAMILY MEDICINE

## 2023-02-13 PROCEDURE — 3074F SYST BP LT 130 MM HG: CPT | Performed by: FAMILY MEDICINE

## 2023-02-13 PROCEDURE — G8417 CALC BMI ABV UP PARAM F/U: HCPCS | Performed by: FAMILY MEDICINE

## 2023-02-13 PROCEDURE — 69209 REMOVE IMPACTED EAR WAX UNI: CPT | Performed by: FAMILY MEDICINE

## 2023-02-13 PROCEDURE — 1111F DSCHRG MED/CURRENT MED MERGE: CPT | Performed by: FAMILY MEDICINE

## 2023-02-13 PROCEDURE — 99214 OFFICE O/P EST MOD 30 MIN: CPT | Performed by: FAMILY MEDICINE

## 2023-02-13 PROCEDURE — G8432 DEP SCR NOT DOC, RNG: HCPCS | Performed by: FAMILY MEDICINE

## 2023-02-13 RX ORDER — BUDESONIDE 0.5 MG/2ML
1000 INHALANT ORAL 2 TIMES DAILY
Qty: 1 EACH | Refills: 5
Start: 2023-02-13 | End: 2023-03-15

## 2023-02-13 NOTE — PROGRESS NOTES
1. Have you been to the ER, urgent care clinic since your last visit? Hospitalized since your last visit? No    2. Have you seen or consulted any other health care providers outside of the [address] [place] [address] since your last visit? Include any pap smears or colon screening. No  Reviewed record in preparation for visit and have necessary documentation  Pt did not bring medication to office visit for review  opportunity was given for questions      3. For patients aged 39-70: Has the patient had a colonoscopy / FIT/ Cologuard? NA - based on age      If the patient is female:    4. For patients aged 41-77: Has the patient had a mammogram within the past 2 years? NA - based on age or sex      11. For patients aged 21-65: Has the patient had a pap smear?  NA - based on age or sex      Goals that were addressed and/or need to be completed during or after this appointment include   Health Maintenance Due   Topic Date Due    Shingles Vaccine (1 of 2) Never done    COVID-19 Vaccine (4 - Booster for [employer] series) 01/10/2022    Flu Vaccine (1) 08/01/2022    Eye Exam Retinal or Dilated  10/21/2022

## 2023-02-13 NOTE — PROGRESS NOTES
CC: Hospital follow-up    HPI: Pt is a 79 y.o. female who presents for hospital follow-up. She was sent to the ER from her Rheumatologist's office for concern of tachycardia/irregular HR in combination with continued SOB for the past 6 weeks since she had been diagnosed with COVID. Her EKG in the ER showed first degree AV block but was sinus rhythm. The Cardiologist noted occasional PAC's. For the SOB she had a CTA that showed bibasilar atelectasis but no other acute process and it was felt the SOB was related to deconditioning and recent COVID infection. Her pro-BNP was mildly elevated, but not to the cutoff for her age, and it was recommended she have an echo done as an outpatient. She was discharged the next morning. Since getting home from the hospital her breathing has improved a little but still not back to her baseline. She is taking Pulmicort through a nebulizer BID at the 0.5mg dose, and using albuterol as needed, but has not been using it much because she hasn't been wheezing. She still has a lot of cough and feels like there is congestion in her chest, but Mucinex is helping. Past Medical History:   Diagnosis Date    Anemia     Arthritis     CAD (coronary artery disease) 1997    MI    Degenerative joint disease of right hip 09/14/2015     [facility] [place]/Dr. Mariajose Chacko    Diabetes [facility])     Fracture     right shoulder; T6 compression    Gastritis     H/O Bell's palsy 7/9/2012    R-side of face. Dx >20 years ago.  Has persistent R-sided facial droop     Hypercholesterolemia     Hyperlipidemia    Lupus ([address] 1/9/2015    Osteoporosis, post-menopausal     vertebral fracture    Squamous cell cancer of skin of left cheek     Squamous cell skin cancer     Right knee       Family History   Problem Relation Age of Onset    Diabetes Mother     Hypertension Mother     Heart Disease Mother     Diabetes Father     Hypertension Father     Heart Disease Father     Arthritis-rheumatoid Sister     Elevated Lipids Sister Arthritis-rheumatoid Sister     Hypertension Sister     Diabetes Sister     Thyroid Disease Sister     Arthritis-rheumatoid Other        Social History     Tobacco Use    Smoking status: Former     Types: Cigarettes     Quit date: 1989     Years since quittin.1    Smokeless tobacco: Never   Vaping Use    Vaping Use: Never used   Substance Use Topics    Alcohol use: No     Alcohol/week: 0.0 standard drinks     Comment: Occasional    Drug use: No       ROS:  Per HPI    PE:  Visit Vitals  /65   Pulse (!) 51   Temp 98.4 °F (36.9 °C)   Resp 20   Ht 5' 1\" (1.549 m)   Wt 143 lb (64.9 kg)   SpO2 96%   BMI 27.02 kg/m²     Gen: Pt sitting in chair, in NAD  Head: Normocephalic, atraumatic  Eyes: Sclera anicteric, EOM grossly intact, PERRL  Ears: TM's obscured with hard cerumen b/l  Nose: Normal nasal mucosa  Throat: MMM, normal lips, tongue and gums  Neck: Supple  CVS: Normal S1, S2, no m/r/g  Resp: CTAB, no wheezes or rales. Good air movement throughout. Extrem: Atraumatic, no cyanosis or edema  Pulses: 2+   Skin: Warm, dry  Neuro: Alert, appropriate      A/P:   Encounter Diagnoses     ICD-10-CM ICD-9-CM   1. Hospital discharge follow-up  Z09 V67.59   2. Shortness of breath  R06.02 786.05   3. Bronchitis  J40 490   4. COVID-19  U07.1 079.89   5. Bilateral impacted cerumen  H61.23 380.4   6. Primary hypertension  I10 401.9     1. Hospital discharge follow-up: Records reviewed and medications reconciled. 2. Shortness of breath: Slowly improving. Pt still significantly out of breath by the time she got down the banks. Likely 2/2 deconditioning and residual symptoms of COVID. Will increase dose of Pulmicort to 1mg BID and pt instructed on appropriate use of albuterol - not just for wheezing, but SOB or coughing fits. Pt to call or send message in 2 weeks to let us know how she is doing and if not significantly improved at that time will send to [facility] for further evaluation.  Will also get echo as recommended by hospital team.   - ECHO ADULT COMPLETE; Future    3. Bronchitis  - budesonide (PULMICORT) 0.5 mg/2 mL nbsp; 4 mL by Nebulization route two (2) times a day for 30 days. Dispense: 1 Each; Refill: 5    4. COVID-19  - budesonide (PULMICORT) 0.5 mg/2 mL nbsp; 4 mL by Nebulization route two (2) times a day for 30 days. Dispense: 1 Each; Refill: 5    5. Bilateral impacted cerumen: Seen on exam. Attempted disimpaction with flushing and then ear pick, but was not able to get a sufficient amount out, so rx sent for Debrox. Pt to follow-up as needed. - carbamide peroxide (DEBROX) 6.5 % otic solution; Administer 5 Drops into each ear two (2) times a day. Dispense: 14.79 mL; Refill: 0    6. Primary hypertension: BP initially elevated after walking down long banks, but much improved on recheck after she sat for a few minutes. Continue current regimen. RTC in 3 months for f/u chronic conditions, or sooner prn if breathing does not improve as expected    Discussed diagnoses in detail with patient. Medication risks/benefits/side effects discussed with patient. All of the patient's questions were addressed. The patient understands and agrees with our plan of care. The patient knows to call back if they are unsure of or forget any changes we discussed today or if the symptoms change. The patient received an After-Visit Summary which contains VS, orders, medication list and allergy list. This can be used as a \"mini-medical record\" should they have to seek medical care while out of town. Current Outpatient Medications on File Prior to Visit   Medication Sig Dispense Refill    guaiFENesin ER (MUCINEX) 600 mg ER tablet Take 1 Tablet by mouth every twelve (12) hours as needed for Congestion.  30 Tablet 0    albuterol (PROVENTIL HFA, VENTOLIN HFA, PROAIR HFA) 90 mcg/actuation inhaler INHALE 1 PUFF BY MOUTH EVERY 4 TO 6 HOURS AS NEEDED FOR SHORTNESS OF BREATH OR WHEEZING      leflunomide (ARAVA) 20 mg tablet Take 1 Tablet by mouth daily. 90 Tablet 0    pantoprazole (PROTONIX) 40 mg tablet Take 1 Tablet by mouth daily. Attempt a 2 week trial off of this medication at least once a year to see if you still need it. 90 Tablet 1    HYDROcodone-acetaminophen (NORCO) 5-325 mg per tablet Take 1 Tablet by mouth every six (6) hours as needed for Pain for up to 90 days. 60 Tablet 0    albuterol-ipratropium (DUO-NEB) 2.5 mg-0.5 mg/3 ml nebu USE 3 ML VIA NEBULIZER EVERY 4 HOURS AS NEEDED FOR WHEEZING 270 mL 0    gabapentin (NEURONTIN) 800 mg tablet TAKE 1 TABLET BY MOUTH THREE TIMES DAILY. MAX DAILY AMOUNT: 2400 MG 90 Tablet 2    acitretin 25 mg capsule TAKE 1 CAPSULE BY MOUTH EVERY OTHER DAY      glucose blood VI test strips (Ascensia CONTOUR) strip Glucometer for 250.00 Pt. test blood sugar once daily (in morning) 50 Strip 11    cyanocobalamin (VITAMIN B12) 500 mcg tablet Take 2 Tablets by mouth daily. 180 Tablet 1    hydrOXYchloroQUINE (PLAQUENIL) 200 mg tablet Take 1.5 Tablets by mouth daily. 135 Tablet 1    metFORMIN (GLUCOPHAGE) 500 mg tablet TAKE 1 TABLET BY MOUTH TWICE DAILY WITH MEALS 180 Tablet 1    atorvastatin (LIPITOR) 40 mg tablet TAKE 1 TABLET BY MOUTH DAILY 90 Tablet 1    nortriptyline (PAMELOR) 10 mg capsule TAKE 1 CAPSULE BY MOUTH EVERY NIGHT 30 Capsule 5    cyclobenzaprine (FLEXERIL) 10 mg tablet Take 1 Tablet by mouth nightly as needed for Muscle Spasm(s). 90 Tablet 1    ferrous sulfate (FeroSuL) 325 mg (65 mg iron) tablet TAKE 1 TABLET BY MOUTH TWICE DAILY 60 Tablet 5    nitroglycerin (NITROSTAT) 0.4 mg SL tablet DISSOLVE 1 TABLET UNDER THE TONGUE EVERY 5 MINUTES AS NEEDED FOR CHEST PAIN 25 Tab 0    denosumab (PROLIA) 60 mg/mL injection 1 mL by SubCUTAneous route every 6 months. acetaminophen (TYLENOL) 650 mg CR tablet Take 1,300 mg by mouth daily as needed for Pain. Blood-Glucose Meter (CONTOUR METER) monitoring kit Dx: 250. Check blood sugars daily. 1 kit 11    ZINC PO Take 50 mg by mouth daily. cholecalciferol, vitamin d3, (VITAMIN D3) 1,000 unit tablet Take 2,000 Units by mouth daily. calcium-vitamin D (OS-ROBERT +D3) 500 mg-200 unit per tablet Take 2 Tabs by mouth daily. aspirin 81 mg Tab Take 81 mg by mouth daily. MULTIVITAMINS (MULTIVITAMIN PO) Take 1 Tab by mouth daily. No current facility-administered medications on file prior to visit.

## 2023-02-27 ENCOUNTER — OFFICE VISIT (OUTPATIENT)
Dept: RHEUMATOLOGY | Age: 83
End: 2023-02-27
Payer: MEDICARE

## 2023-02-27 VITALS
RESPIRATION RATE: 16 BRPM | TEMPERATURE: 98.6 F | BODY MASS INDEX: 27.02 KG/M2 | HEART RATE: 96 BPM | WEIGHT: 143 LBS | SYSTOLIC BLOOD PRESSURE: 109 MMHG | OXYGEN SATURATION: 94 % | DIASTOLIC BLOOD PRESSURE: 69 MMHG

## 2023-02-27 DIAGNOSIS — M81.0 AGE-RELATED OSTEOPOROSIS WITHOUT CURRENT PATHOLOGICAL FRACTURE: ICD-10-CM

## 2023-02-27 DIAGNOSIS — M06.09 SERONEGATIVE RHEUMATOID ARTHRITIS OF MULTIPLE SITES (HCC): Primary | ICD-10-CM

## 2023-02-27 DIAGNOSIS — Z79.60 LONG-TERM USE OF IMMUNOSUPPRESSANT MEDICATION: ICD-10-CM

## 2023-02-27 PROCEDURE — 1123F ACP DISCUSS/DSCN MKR DOCD: CPT | Performed by: INTERNAL MEDICINE

## 2023-02-27 PROCEDURE — G8427 DOCREV CUR MEDS BY ELIG CLIN: HCPCS | Performed by: INTERNAL MEDICINE

## 2023-02-27 PROCEDURE — G8536 NO DOC ELDER MAL SCRN: HCPCS | Performed by: INTERNAL MEDICINE

## 2023-02-27 PROCEDURE — 1090F PRES/ABSN URINE INCON ASSESS: CPT | Performed by: INTERNAL MEDICINE

## 2023-02-27 PROCEDURE — 3074F SYST BP LT 130 MM HG: CPT | Performed by: INTERNAL MEDICINE

## 2023-02-27 PROCEDURE — 3078F DIAST BP <80 MM HG: CPT | Performed by: INTERNAL MEDICINE

## 2023-02-27 PROCEDURE — G8417 CALC BMI ABV UP PARAM F/U: HCPCS | Performed by: INTERNAL MEDICINE

## 2023-02-27 PROCEDURE — G8510 SCR DEP NEG, NO PLAN REQD: HCPCS | Performed by: INTERNAL MEDICINE

## 2023-02-27 PROCEDURE — 99214 OFFICE O/P EST MOD 30 MIN: CPT | Performed by: INTERNAL MEDICINE

## 2023-02-27 PROCEDURE — 1101F PT FALLS ASSESS-DOCD LE1/YR: CPT | Performed by: INTERNAL MEDICINE

## 2023-02-27 NOTE — PATIENT INSTRUCTIONS
1) Continue to take one pill of Leflunomide daily. 2) Continue 1.5 pills of plaquenil per day. Make sure you see an ophthalmologist once per year as long as you are on this medicine. The chances are 1 in 5000 after 5 years that you could develop visual changes. 3) Continue to get your Prolia shot every 6 months as scheduled. 4) Check labs in 3 months. 5) Follow up when you get your Prolia shot on 7/27. If you have any concerns in the meantime or want to move you appointment up let me know.

## 2023-02-27 NOTE — PROGRESS NOTES
REASON FOR VISIT    This is a follow-up visit for Ms. Rueda for     ICD-10-CM   1. Seronegative rheumatoid arthritis of multiple sites ([facility] M06.09   2.  Age-related osteoporosis without current pathological fracture M81.0      Inflammatory arthritis phenotype includes:  Anti-CCP positive: no  Rheumatoid factor positive: no  Erosive disease: yes  Extra-articular manifestations include: Raynaud's    Immunosuppression Screening (9/10/2020):  Quantiferon TB: negative   PPD:  Not performed  Hepatitis B: negative    Hepatitis C: negative     Therapy History includes:  Current DMARD therapy include: leflunomide 20 mg daily (2/13/2018 to 10/2019; 2/12/2020 to present),   Prior DMARD therapy include: methotrexate 12.5 mg subcutaneous,  hydroxychloroquine 300 mg daily, Humira (5/10/2018: 3 samples: cost), Simponi Aria every 6 weeks (12/07/2018 to 5/28/2021), Oly Leila 11 mg XR daily (8/2021 to 7/22, recurrent skin cancers)  Discontinued DMARDs because of inefficacy: Simponi Aria  Discontinued DMARDs because of side effects: methotrexate (elevated LFTs), leflunomide (elevated LFTs)  Contra-Indicated DMARDs because of chronic kidney disease: methotrexate     Osteoporosis Historical Synopsis    Height loss since age 26 (at least two inches): 5  Fracture history includes: yes (T5-T6)  Family history of hip fracture: no  Fall Risk: no    Smoking history: no  Alcohol consumption: no  Prednisone history: no    Exercise: no    Previous work-up for osteoporosis includes the following:  DEXA Scan: 8/16/2019  Vitamin 25OH D level: 46.6 (21/3/2018)  PTH: 21 (21/3/2018)  TSH: 2.570 (21/3/2018)    Therapy History includes:  Current osteoporosis therapy includes: Prolia 60 mg every 6 months (3/29/2018 to present)  Prior osteoporosis therapy includes: Reclast (2 years)  The following osteoporosis therapy have been ineffective: Reclast  The following osteoporosis therapy were stopped because of side effects: none    HISTORY OF PRESENT ILLNESS    Ms. Shawna Rodarte returns for a follow-up. Last visit 2/6/2023. Pt is still taking one pill of Leflunomide and 1.5 pills of Plaquenil daily. She says that her hands are less stiff and painful than they were when she was off of Plaquenil. She does not have joint pain outside of her hands. She last saw the eye doctor about a year ago. Pt still takes one Prolia injectio every 6 months. Pt says that she feels better today then she did last visit. She notes that her pulmonologist increased her Pulmicort and she has been breathing better. Pt says that she is having problems with her head. She notes that when people talk she is not able to tell what they are saying. Her tinnitus worsened. She notes that it sounds like \"crickets. \" She reports that she did not have these symptoms before her last Covid infection at the end of December. Pt notes that she has an echocardiogram scheduled. Pt has diarrhea that comes and goes since she had a gastric bypass. REVIEW OF SYSTEMS    A comprehensive review of systems was performed and pertinent results are documented in the HPI, review of systems is otherwise non-contributory. PAST MEDICAL HISTORY    She has a past medical history of Anemia, Arthritis, CAD (coronary artery disease) (1997), Degenerative joint disease of right hip (09/14/2015 ), Diabetes ([address] Fracture, Gastritis, H/O Bell's palsy (7/9/2012), Hypercholesterolemia, Lupus ([address] (1/9/2015), Osteoporosis, post-menopausal, Squamous cell cancer of skin of left cheek, and Squamous cell skin cancer. FAMILY HISTORY    Her family history includes Arthritis-rheumatoid in her sister, sister and another family member; Diabetes in her father, mother, and sister; Elevated Lipids in her sister; Heart Disease in her father and mother; Hypertension in her father, mother, and sister; Thyroid Disease in her sister. SOCIAL HISTORY    She reports that she quit smoking about 33 years ago.  Her smoking use included cigarettes. She has never used smokeless tobacco. She reports that she does not drink alcohol and does not use drugs. MEDICATIONS    Current Outpatient Medications   Medication Sig    budesonide (PULMICORT) 0.5 mg/2 mL nbsp 4 mL by Nebulization route two (2) times a day for 30 days. carbamide peroxide (DEBROX) 6.5 % otic solution Administer 5 Drops into each ear two (2) times a day. guaiFENesin ER (MUCINEX) 600 mg ER tablet Take 1 Tablet by mouth every twelve (12) hours as needed for Congestion. albuterol (PROVENTIL HFA, VENTOLIN HFA, PROAIR HFA) 90 mcg/actuation inhaler INHALE 1 PUFF BY MOUTH EVERY 4 TO 6 HOURS AS NEEDED FOR SHORTNESS OF BREATH OR WHEEZING    leflunomide (ARAVA) 20 mg tablet Take 1 Tablet by mouth daily. pantoprazole (PROTONIX) 40 mg tablet Take 1 Tablet by mouth daily. Attempt a 2 week trial off of this medication at least once a year to see if you still need it. HYDROcodone-acetaminophen (NORCO) 5-325 mg per tablet Take 1 Tablet by mouth every six (6) hours as needed for Pain for up to 90 days. albuterol-ipratropium (DUO-NEB) 2.5 mg-0.5 mg/3 ml nebu USE 3 ML VIA NEBULIZER EVERY 4 HOURS AS NEEDED FOR WHEEZING    gabapentin (NEURONTIN) 800 mg tablet TAKE 1 TABLET BY MOUTH THREE TIMES DAILY. MAX DAILY AMOUNT: 2400 MG    acitretin 25 mg capsule TAKE 1 CAPSULE BY MOUTH EVERY OTHER DAY    glucose blood VI test strips (Ascensia CONTOUR) strip Glucometer for 250.00 Pt. test blood sugar once daily (in morning)    cyanocobalamin (VITAMIN B12) 500 mcg tablet Take 2 Tablets by mouth daily. hydrOXYchloroQUINE (PLAQUENIL) 200 mg tablet Take 1.5 Tablets by mouth daily.     metFORMIN (GLUCOPHAGE) 500 mg tablet TAKE 1 TABLET BY MOUTH TWICE DAILY WITH MEALS    atorvastatin (LIPITOR) 40 mg tablet TAKE 1 TABLET BY MOUTH DAILY    nortriptyline (PAMELOR) 10 mg capsule TAKE 1 CAPSULE BY MOUTH EVERY NIGHT    cyclobenzaprine (FLEXERIL) 10 mg tablet Take 1 Tablet by mouth nightly as needed for Muscle Spasm(s). ferrous sulfate (FeroSuL) 325 mg (65 mg iron) tablet TAKE 1 TABLET BY MOUTH TWICE DAILY    nitroglycerin (NITROSTAT) 0.4 mg SL tablet DISSOLVE 1 TABLET UNDER THE TONGUE EVERY 5 MINUTES AS NEEDED FOR CHEST PAIN    denosumab (PROLIA) 60 mg/mL injection 1 mL by SubCUTAneous route every 6 months. acetaminophen (TYLENOL) 650 mg CR tablet Take 1,300 mg by mouth daily as needed for Pain. Blood-Glucose Meter (CONTOUR METER) monitoring kit Dx: 250. Check blood sugars daily. ZINC PO Take 50 mg by mouth daily. cholecalciferol, vitamin d3, (VITAMIN D3) 1,000 unit tablet Take 2,000 Units by mouth daily. calcium-vitamin D (OS-ROBERT +D3) 500 mg-200 unit per tablet Take 2 Tabs by mouth daily. aspirin 81 mg Tab Take 81 mg by mouth daily. MULTIVITAMINS (MULTIVITAMIN PO) Take 1 Tab by mouth daily. No current facility-administered medications for this visit. ALLERGIES    Allergies   Allergen Reactions    Cephalosporins Nausea and Vomiting     Severe vomiting  Other reaction(s): gi upset, unspecified       PHYSICAL EXAMINATION    There were no vitals taken for this visit. General:  The patient is well developed, well nourished, alert, and in no apparent distress. Eyes: Sclera are anicteric. No conjunctival injection. HEENT:  Oropharynx is clear. No oral ulcers. Adequate salivary pooling. No cervical or supraclavicular lymphadenopathy. Lungs:  Clear to auscultation bilaterally, without wheeze or stridor. Normal respiratory effort. Cor:  Regular rate and rhythm. No murmur rub or gallop. Abdomen: Soft, non-tender, without hepatomegaly or masses. Extremities: No calf tenderness or edema. Warm and well perfused. Skin:  No significant abnormalities. Neuro: Nonfocal  Musculoskeletal:    A comprehensive musculoskeletal exam was performed for all joints of each upper and lower extremity and assessed for swelling, tenderness and range of motion.  Results are documented as below:  No evidence of synovitis in the small joints of the hands, wrists, shoulders, elbows, hips, knees or ankles. ___  General:  The patient is well developed, alert, and in no apparent distress. Eyes: Sclera are anicteric. No conjunctival injection. HEENT:  Oropharynx is clear. No oral ulcers. Adequate salivary pooling. No cervical or supraclavicular lymphadenopathy. Lungs:  Interval return of bibasilar crackles. Normal respiratory effort. Good air movement in upper fields  Cor:  Tachy, frequent irregularity. No murmurs rubs or gallops. Abdomen: Soft, non-tender, without hepatomegaly or masses. Extremities: No calf tenderness or edema. Warm and well perfused. Skin:  No significant abnormalities. Neuro: Nonfocal, no foot or wrist drop. Subjectively decreased sensation to light touch below ankles. Musculoskeletal:    A comprehensive musculoskeletal exam was performed for all joints of each upper and lower extremity and assessed for swelling, tenderness and range of motion. Results are documented as below:  Stable bilateral Sharla and Heberden nodes with trace synovitis of left PIP3-4, right PIP2. Old surgical scar over right MCP2  Interval resolved rotational hip pain. Bilateral knee crepitus without effusion  No evidence of synovitis in the small joints of the hands, wrists, shoulders, elbows, hips, knees or ankles. DATA REVIEW    Laboratory     Recent laboratory results were reviewed, summarized, and discussed with the patient.   2/7/23: WBC 5.5, HGB 11.4, Plt 177, Cr 0.77  2/6/23: Troponin 17, T pro-, Cr 0.86, LFT WNL, CBC WNL  1/25/23: ESR 9, Cr 0.9, Tbili 0.2, AST 36, ALT 25, Alk phos 122, CBC WNL, CRP <1 mg/L  10/10/22: ESR 3, Cr 0.84, LFT WNL, CBC WNL, CRP <1 mg/L, Vit B12 233  6/16/22: Cr 0.78, AST 38, ALT 36, Alk phos 94, Tbili 0.4,   6/7/22: microalbumin urine random 0.92, Cr urine 18.8, LFT WNL microalbumin/Cr ratio 49  5/16/22: HGB A1c 5.8, Triglyceride 232, ESR 2, Cr 0.88, LFT WNL, WBC 7.1, HGB 12.7, Plt 191, CRp <1 mg/L  12/16/21: Cr 0.86, Tbili 0.3, AST 46, ALT 44, AlkP 131, Tprot 6.7, Alb 3.4  11/15/21: A1c 5.6  9/27/21: , NTproBNP 610  9/17/13: Hep B SAg neg, cAb neg; Hep C Ab neg    Imaging    Musculoskeletal Ultrasound    None    Radiographs    XR CHEST PA LAT (1/17/23): No acute changes    XR CHEST PA LAT (5/16/22): Clear lungs. Treated and untreated vertebral compression fractures    Bilateral Hand 2/13/2018: RIGHT: severe osteopenia without fracture. There are scattered degenerative changes. Progressive first [facility] joint. No new erosive changes. Vascular calcifications. LEFT: severe osteopenia. Scattered degenerative changes as noted previously with progression of the index finger and middle finger DIP joints. There is a new erosion at the index finger proximal phalanx ulnar base without adjacent joint space loss. There are vascular calcifications. Calcification overlying the DRUJ is favored represent overlying vascular calcifications. Bilateral Foot 2/13/2018: RIGHT: no acute fracture or dislocation. Alignment is anatomic. Mild degenerative changes are seen in the left first MTP joint. Minimal degenerative changes are seen in the right first MTP joint. A possible erosion is seen in the head of the left first metatarsal. No erosive changes are seen in the right foot. Plantar calcaneal heel spurs are noted in both feet, as well as enthesophyte formation at the Achilles insertion site. Arterial vascular calcifications are also noted bilaterally. Mild soft tissue swelling surrounds the left first MTP joint. LEFT: no acute fracture or dislocation. Alignment is anatomic. Mild degenerative changes are seen in the left first MTP joint. Minimal degenerative changes are seen in the right first MTP joint. A possible erosion is seen in the head of the left first metatarsal. No erosive changes are seen in the right foot.  Plantar calcaneal heel spurs are noted in both feet, as well as enthesophyte formation at the Achilles insertion site. Arterial vascular calcifications are also noted bilaterally. Mild soft tissue swelling surrounds the left first MTP joint. Thoracic Spine 11/03/2016: The posterior battery pack is again seen within the intrathecal catheter in stable position. Kyphoplasty material is present in a midthoracic vertebral body is stable mild chronic compression of the adjacent inferior vertebral body. The remaining vertebral body heights are maintained. Anterior osteophytes are noted. There is no abnormality in alignment. Lumbar Spine 10/19/2016: significant disc space narrowing at L3-L4 with 14 mm anterolisthesis. There is facet arthropathy. The vertebral body heights are maintained. Spinal catheters are seen. There are atherosclerotic vascular calcifications. Bilateral Hips 8/31/2015: no fracture, dislocation or other acute abnormality. There osteoarthritic pattern change of both hips, more severe on the right. There is nonuniform joint space narrowing and marginal osteophytes. There is remodeling of the right femur head with subchondral cystic formation. Mild sacroiliac osteoarthritic change. Lower lumbar spondylosis is noted. Wire  leads are seen in the left lower lumbar spine likely reflective of spinal stimulator device. Bilateral Hand 3/01/2011: RIGHT: mild osteopenia. There are degenerative changes of the distal and proximal interphalangeal joints and the base of the thumb. There is no fracture or other acute abnormality. Scattered calcifications are noted in the radial and ulnar arteries. LEFT: mild osteopenia. There are degenerative changes of the distal and proximal interphalangeal joints and  at the base of the thumb. There is no fracture or acute abnormality. There are vascular calcifications of the radial and ulnar arteries. CT Imaging     CTA CHEST W OR W WO CONT (2/6/23):   1. No evidence of pulmonary embolism.   2. No acute airspace disease. 3. Minimal bibasilar atelectasis. MR Imaging    MRI Brain with without contrast 12/19/2016: There is sulcal and ventricular prominence. Confluent periventricular and scattered foci of increased T2 signal intensity in the corona radiata and centrum semiovale. Tiny remote lacunar infarction in the right cerebellum. There is no intracranial mass, hemorrhage or evidence of acute infarction. There is no Chiari or syrinx. The pituitary and infundibulum are grossly unremarkable. There is no skull base mass. Cerebellopontine angles are grossly unremarkable. The major intracranial vascular flow-voids are unremarkable. No evidence of demyelinating process. There is no abnormal parenchymal enhancement. There is no abnormal meningeal enhancement. The cavernous sinuses are symmetric. Optic chiasm and infundibulum grossly unremarkable. Orbits are grossly symmetric. Dural venous sinuses are grossly patent. The brain architecture is normal. There is no evidence of midline shift or mass-effect. There are no extra-axial fluid collections. The mastoid air cells and paranasal sinuses are well pneumatized and clear. MRI Lumbar Spine with and without contrast 8/07/2013: There is transitional lumbosacral anatomy. In keeping with prior studies, lowest fully formed disc is labeled L5-S1. There has been prior  laminectomies at L3-5 with no change in grade 1 anterolisthesis at L3-4 which measures 5 mm. There is grade 1 anterolisthesis of L4-5 of approximately 2 mm, unchanged. Vertebral body heights are maintained. There is no marrow edema or compression fracture. There are reactive endplate changes at L4-4. The conus medullaris terminates at L1-2. There is no abnormal intraspinal enhancement. L1/2:  The spinal canal and foramina are widely patent. L2/3:  Diffuse disc bulge, facet hypertrophy and ligamentum flavum thickening cause mild spinal canal and moderate right foraminal stenosis. Left foramen is patent.  L3/4: There is uncovering of disc material with facet hypertrophy resulting in mild thecal sac narrowing. There is severe bilateral foraminal stenosis similar to the prior study. L4/5:  There is diffuse disc bulge and facet hypertrophy, without significant spinal canal stenosis. There is mild right foraminal stenosis. L5/S1:  The spinal canal and foramina are widely patent. DXA     DXA 8/16/2019: (excluded Possible increased density in the L2 and T6 vertebral bodies) left femoral neck T score: -1.1 (0.881 g/cm2), left total hip T score: -0.9 (0.889 g/cm2), right femoral neck T score: -0.7 (0.945 g/cm2), right total hip T score: -0.5 (0.941 g/cm2), and distal one third left radius T score -2.4 (BMD 0.542 g/cm2). FRAX score 21 % probability in 10 years for major osteoporotic fracture and 39 % 10 year probability of hip fracture. DXA 8/08/2017: (excluded Lumbar spine because of overlying wires and degenerative change and kyphoplasty of L4) showed left femoral neck T score: -1.4 (0.837 g/cm2), left total hip T score: -1.4 (0.830 g/cm2), right femoral neck T score: -0.9 (0.919 g/cm2), right total hip T score: -0.9 (0.900 g/cm2), and distal one third left radius T score -3.0 (BMD 0.615 g/cm2). FRAX score 16.8 % probability in 10 years for major osteoporotic fracture and 3.2 % 10 year probability of hip fracture. Nuclear Medicine    Nuclear Medicine Bone Scan 1/22/2013: There is intense radiotracer uptake in the upper thoracic spine, likely T5. There is uptake in the region of the sternoclavicular joints and left shoulder, likely degenerative. ASSESSMENT AND PLAN    This is a follow-up visit for Ms. Rueda for seronegative rheumatoid arthritis. Inflammatory arthritis has improved with addition of Plaquenil to leflunomide, and she feels the overall tempo of new skin lesions has slowed.  However, today she has new resting tachycardia with some irregularity, and bibasilar crackles, raising concern for new atrial fibrillation and flash pulmonary edema driving dyspnea and crackles. Referring to ER for urgent cardiac risk assessment. As long as Plaquenil does not appear to be contributing to an arrhythmia, anticipate continuing leflunomide and Plaquenil. 1. Tachycardia  -Referring to ER for ?rhythm, rate control, rule out ischemia    2. Bibasilar crackles  -Clear chest xray last month, pending evaluation in ER    3. Seronegative rheumatoid arthritis of multiple sites ([facility]  -Holding leflunomide and Plaquenil pending rule out of infection and arrhythmia, anticipate restarting both once addressed in the short term    4. Age-related osteoporosis without current pathological fracture  -Cont Prolia q6mo, next 7/23. There are no Patient Instructions on file for this visit. TODAY'S ORDERS    No orders of the defined types were placed in this encounter.       Future Appointments   Date Time Provider Yocasta Cisneros   2/27/2023  2:20 PM Blaise Mayo MD AO[facility]   5/22/2023  1:40 PM [facility]   7/27/2023  2:00 PM [id number] [facility]     Face to face time: minutes  Note preparation and records review day of service: minutes  Total provider time day of service: minutes

## 2023-03-17 ENCOUNTER — OFFICE VISIT (OUTPATIENT)
Dept: ENT CLINIC | Age: 83
End: 2023-03-17

## 2023-03-17 VITALS
SYSTOLIC BLOOD PRESSURE: 128 MMHG | BODY MASS INDEX: 27 KG/M2 | OXYGEN SATURATION: 98 % | RESPIRATION RATE: 18 BRPM | HEIGHT: 61 IN | HEART RATE: 107 BPM | DIASTOLIC BLOOD PRESSURE: 80 MMHG | WEIGHT: 143 LBS

## 2023-03-17 DIAGNOSIS — H69.83 EUSTACHIAN TUBE DYSFUNCTION, BILATERAL: Primary | ICD-10-CM

## 2023-03-17 DIAGNOSIS — H61.309 STENOSIS OF EXTERNAL EAR CANAL: ICD-10-CM

## 2023-03-17 RX ORDER — METHYLPREDNISOLONE 4 MG/1
4 TABLET ORAL
Qty: 1 DOSE PACK | Refills: 0 | Status: SHIPPED | OUTPATIENT
Start: 2023-03-17

## 2023-03-17 NOTE — PROGRESS NOTES
Chief Complaint   Patient presents with    New Patient    Hearing Problem     2 months + since covid had hearing issues and ear fullness      Visit Vitals  /80 (BP 1 Location: Right upper arm, BP Patient Position: Sitting, BP Cuff Size: Adult)   Pulse (!) 107   Resp 18   Ht 5' 1\" (1.549 m)   Wt 143 lb (64.9 kg)   SpO2 98%   BMI 27.02 kg/m²

## 2023-03-17 NOTE — PROGRESS NOTES
Subjective: Alonzo Toussaint   79 y.o.   1940     New Patient Visit  This is a 79 y.o. female who is here in the office today to discuss her ear issues. She had COVID-19 about 2 months ago, and since she has had a sensation of feeling as though she is in a plane. She has fulness in the ears, she can hear herself talking and hear herself eating. She also has problems hearing, she states she can hear people speaking but cannot make out exactly what they are saying. She was referred by her rheumatologist. She denies otalgia or otorrhea. She denies dizziness. She states she feels her balance is slightly off. Review of Systems  Review of Systems   Constitutional: Negative. HENT:  Positive for hearing loss. Negative for congestion, ear discharge, ear pain, nosebleeds, sinus pain, sore throat and tinnitus. Eyes: Negative. Respiratory: Negative. Negative for stridor. Cardiovascular: Negative. Gastrointestinal: Negative. Genitourinary: Negative. Musculoskeletal: Negative. Skin: Negative. Neurological: Negative. Endo/Heme/Allergies: Negative. Psychiatric/Behavioral: Negative. Past Medical History:   Diagnosis Date    Anemia     Arthritis     CAD (coronary artery disease) 1997    MI    Cataract 12/2019 rt.eye  01/2020 lt.eye    Degenerative joint disease of right hip 09/14/2015    [facility] [place]/Dr. Lenny Byers    Diabetes [facility])     Fracture     right shoulder; T6 compression    Gastritis     H/O Bell's palsy 07/09/2012    R-side of face. Dx >20 years ago.  Has persistent R-sided facial droop     History of multiple allergies Pollen,cyclosporin    Hypercholesterolemia     Hyperlipidemia    Lupus ([facility] [address] 01/09/2015    Osteoporosis, post-menopausal     vertebral fracture    Squamous cell cancer of skin of left cheek     Squamous cell skin cancer     Right knee    Tinnitus 2019     Past Surgical History:   Procedure Laterality Date    COLONOSCOPY N/A 10/9/2018    COLONOSCOPY performed by Cecilia Monsalve MD at [facility] ENDOSCOPY    HX CATARACT REMOVAL Bilateral     HX CATARACT REMOVAL Right 2019    HX CATARACT REMOVAL Left 2020    HX CHOLECYSTECTOMY      HX CORONARY STENT PLACEMENT  1997    LAD    HX GASTRIC BYPASS  2000    HX HERNIA REPAIR  2005    HX KYPHOPLASTY      t5    HX KYPHOPLASTY      L4    HX ORTHOPAEDIC      bilateral knees    HX ORTHOPAEDIC      r shoulder    HX ORTHOPAEDIC      Laminectomy    OR OPEN IMPLANTATION SIGIFREDO SACRAL NERVE      OR UNLISTED PROCEDURE CARDIAC SURGERY      Patient Denies      Family History   Problem Relation Age of Onset    Diabetes Mother     Hypertension Mother     Heart Disease Mother     Diabetes Father     Hypertension Father     Heart Disease Father     Arthritis-rheumatoid Sister     Elevated Lipids Sister     Arthritis-rheumatoid Sister     Hypertension Sister     Diabetes Sister     Thyroid Disease Sister     Arthritis-rheumatoid Other      Social History     Tobacco Use    Smoking status: Former     Packs/day: 0.50     Years: 20.00     Pack years: 10.00     Types: Cigarettes     Quit date: 2003     Years since quittin.0    Smokeless tobacco: Never   Substance Use Topics    Alcohol use: No     Comment: Occasional      Prior to Admission medications    Medication Sig Start Date End Date Taking? Authorizing Provider   nortriptyline (PAMELOR) 10 mg capsule TAKE 1 CAPSULE BY MOUTH EVERY NIGHT 3/10/23   Danny Damian MD   cyclobenzaprine (FLEXERIL) 10 mg tablet Take 1 Tablet by mouth nightly as needed for Muscle Spasm(s). 3/10/23   Danny Damian MD   HYDROcodone-acetaminophen (NORCO) 5-325 mg per tablet Take 1 Tablet by mouth every six (6) hours as needed for Pain for up to 90 days. 3/10/23 6/8/23  Danny Damian MD   carbamide peroxide (DEBROX) 6.5 % otic solution Administer 5 Drops into each ear two (2) times a day.  23   Danny Damian MD   guaiFENesin ER (MUCINEX) 600 mg ER tablet Take 1 Tablet by mouth every twelve (12) hours as needed for Congestion. 2/7/23   Jeannette Fraire,    albuterol (PROVENTIL HFA, VENTOLIN HFA, PROAIR HFA) 90 mcg/actuation inhaler INHALE 1 PUFF BY MOUTH EVERY 4 TO 6 HOURS AS NEEDED FOR SHORTNESS OF BREATH OR WHEEZING 1/7/23   Provider, Historical   leflunomide (ARAVA) 20 mg tablet Take 1 Tablet by mouth daily. 2/6/23   Bernardo Posey MD   pantoprazole (PROTONIX) 40 mg tablet Take 1 Tablet by mouth daily. Attempt a 2 week trial off of this medication at least once a year to see if you still need it. 1/31/23   Raina Perez MD   albuterol-ipratropium (DUO-NEB) 2.5 mg-0.5 mg/3 ml nebu USE 3 ML VIA NEBULIZER EVERY 4 HOURS AS NEEDED FOR WHEEZING 1/29/23   Sukhi Foley MD   gabapentin (NEURONTIN) 800 mg tablet TAKE 1 TABLET BY MOUTH THREE TIMES DAILY. MAX DAILY AMOUNT: 2400 MG 1/20/23   Raina Perez MD   acitretin 25 mg capsule TAKE 1 CAPSULE BY MOUTH EVERY OTHER DAY 12/1/22   Provider, Historical   glucose blood VI test strips (Ascensia CONTOUR) strip Glucometer for 250.00 Pt. test blood sugar once daily (in morning) 11/21/22   Chanell Villafana MD   cyanocobalamin (VITAMIN B12) 500 mcg tablet Take 2 Tablets by mouth daily. 11/21/22   Raina Perez MD   hydrOXYchloroQUINE (PLAQUENIL) 200 mg tablet Take 1.5 Tablets by mouth daily. 11/4/22   Bernardo Posey MD   metFORMIN (GLUCOPHAGE) 500 mg tablet TAKE 1 TABLET BY MOUTH TWICE DAILY WITH MEALS 10/28/22   Chanell Villafana MD   atorvastatin (LIPITOR) 40 mg tablet TAKE 1 TABLET BY MOUTH DAILY 10/4/22   Raina Perez MD   ferrous sulfate (FeroSuL) 325 mg (65 mg iron) tablet TAKE 1 TABLET BY MOUTH TWICE DAILY 8/19/22   Raina Perez MD   nitroglycerin (NITROSTAT) 0.4 mg SL tablet DISSOLVE 1 TABLET UNDER THE TONGUE EVERY 5 MINUTES AS NEEDED FOR CHEST PAIN 11/2/20   Raina Perez MD   denosumab (PROLIA) 60 mg/mL injection 1 mL by SubCUTAneous route every 6 months.  2/7/18   George Muñoz MD   acetaminophen (TYLENOL) 650 mg CR tablet Take 1,300 mg by mouth daily as needed for Pain. Provider, Historical   Blood-Glucose Meter (CONTOUR METER) monitoring kit Dx: 250. Check blood sugars daily. 12/17/14   Arnold De La Rosa MD   ZINC PO Take 50 mg by mouth daily. Provider, Historical   cholecalciferol, vitamin d3, (VITAMIN D3) 1,000 unit tablet Take 2,000 Units by mouth daily. Provider, Historical   calcium-vitamin D (OS-ROBERT +D3) 500 mg-200 unit per tablet Take 2 Tabs by mouth daily. 5/17/11   Provider, Historical   aspirin 81 mg Tab Take 81 mg by mouth daily. Provider, Historical   MULTIVITAMINS (MULTIVITAMIN PO) Take 1 Tab by mouth daily. Provider, Historical        Allergies   Allergen Reactions    Cephalosporins Nausea and Vomiting     Severe vomiting  Other reaction(s): gi upset, unspecified         Objective:     Visit Vitals  Ht 5' 1\" (1.549 m)   Wt 143 lb (64.9 kg)   BMI 27.02 kg/m²        Physical Exam  Constitutional:       General: She is not in acute distress. Appearance: Normal appearance. She is normal weight. She is not ill-appearing, toxic-appearing or diaphoretic. HENT:      Head: Normocephalic and atraumatic. Right Ear: Ear canal and external ear normal. A middle ear effusion is present. Left Ear: Ear canal and external ear normal. A middle ear effusion is present. Ears:        Comments: Hematoma      Nose: Nose normal.   Eyes:      General: No scleral icterus. Right eye: No discharge. Left eye: No discharge. Extraocular Movements: Extraocular movements intact. Conjunctiva/sclera: Conjunctivae normal.      Pupils: Pupils are equal, round, and reactive to light. Cardiovascular:      Rate and Rhythm: Normal rate and regular rhythm. Pulses: Normal pulses. Heart sounds: Normal heart sounds. Pulmonary:      Effort: Pulmonary effort is normal.      Breath sounds: Normal breath sounds. Abdominal:      General: Abdomen is flat.       Palpations: Abdomen is soft.   Musculoskeletal:         General: No swelling, tenderness, deformity or signs of injury. Normal range of motion. Cervical back: Normal range of motion and neck supple. Right lower leg: No edema. Left lower leg: No edema. Skin:     General: Skin is warm and dry. Capillary Refill: Capillary refill takes less than 2 seconds. Coloration: Skin is not jaundiced or pale. Findings: No bruising, erythema, lesion or rash. Neurological:      General: No focal deficit present. Mental Status: She is alert and oriented to person, place, and time. Mental status is at baseline. Cranial Nerves: No cranial nerve deficit. Sensory: No sensory deficit. Motor: No weakness. Coordination: Coordination normal.      Gait: Gait normal.      Deep Tendon Reflexes: Reflexes normal.   Psychiatric:         Mood and Affect: Mood normal.         Behavior: Behavior normal.         Thought Content: Thought content normal.         Judgment: Judgment normal.       Assessment/Plan:     Encounter Diagnoses   Name Primary? Eustachian tube dysfunction, bilateral Yes    Stenosis of external ear canal      No orders of the defined types were placed in this encounter.   Eustachian tube dysfunction  - Likely in the setting of post COVID inflammation  - Will treat with methylprednisolone for 7 days   - Nonpurulent effusion  - Follow up in 3-4 weeks for recheck   - Left TM hematoma        Thank you for referring this patient,    Antolin Gusman [facility]     [address] [facility]  [phone]

## 2023-03-22 DIAGNOSIS — D51.0 PERNICIOUS ANEMIA: ICD-10-CM

## 2023-03-22 RX ORDER — LANOLIN ALCOHOL/MO/W.PET/CERES
CREAM (GRAM) TOPICAL
Qty: 60 TABLET | Refills: 5 | Status: SHIPPED | OUTPATIENT
Start: 2023-03-22

## 2023-04-02 DIAGNOSIS — E78.2 MIXED HYPERLIPIDEMIA: ICD-10-CM

## 2023-04-03 RX ORDER — ATORVASTATIN CALCIUM 40 MG/1
TABLET, FILM COATED ORAL
Qty: 90 TABLET | Refills: 1 | Status: SHIPPED | OUTPATIENT
Start: 2023-04-03

## 2023-04-19 DIAGNOSIS — E11.42 DIABETIC POLYNEUROPATHY ASSOCIATED WITH TYPE 2 DIABETES MELLITUS (HCC): ICD-10-CM

## 2023-04-19 RX ORDER — GABAPENTIN 800 MG/1
TABLET ORAL
Qty: 90 TABLET | Refills: 2 | Status: CANCELLED | OUTPATIENT
Start: 2023-04-19

## 2023-04-24 DIAGNOSIS — E11.21 TYPE 2 DIABETES WITH NEPHROPATHY (HCC): ICD-10-CM

## 2023-04-25 RX ORDER — METFORMIN HYDROCHLORIDE 500 MG/1
TABLET ORAL
Qty: 180 TABLET | Refills: 1 | Status: SHIPPED | OUTPATIENT
Start: 2023-04-25

## 2023-05-01 DIAGNOSIS — M06.09 SERONEGATIVE RHEUMATOID ARTHRITIS OF MULTIPLE SITES (HCC): ICD-10-CM

## 2023-05-02 RX ORDER — HYDROXYCHLOROQUINE SULFATE 200 MG/1
TABLET, FILM COATED ORAL
Qty: 135 TABLET | Refills: 1 | Status: SHIPPED | OUTPATIENT
Start: 2023-05-02

## 2023-05-05 DIAGNOSIS — N18.2 CKD (CHRONIC KIDNEY DISEASE) STAGE 2, GFR 60-89 ML/MIN: ICD-10-CM

## 2023-05-05 DIAGNOSIS — M81.0 AGE-RELATED OSTEOPOROSIS WITHOUT CURRENT PATHOLOGICAL FRACTURE: ICD-10-CM

## 2023-05-05 DIAGNOSIS — Z79.60 LONG-TERM USE OF IMMUNOSUPPRESSANT MEDICATION: ICD-10-CM

## 2023-05-05 DIAGNOSIS — E55.9 VITAMIN D DEFICIENCY: ICD-10-CM

## 2023-05-05 DIAGNOSIS — M06.09 SERONEGATIVE RHEUMATOID ARTHRITIS OF MULTIPLE SITES (HCC): ICD-10-CM

## 2023-05-05 RX ORDER — LEFLUNOMIDE 20 MG/1
20 TABLET ORAL DAILY
Qty: 30 TABLET | Refills: 0 | Status: SHIPPED | OUTPATIENT
Start: 2023-05-05

## 2023-05-26 ENCOUNTER — APPOINTMENT (OUTPATIENT)
Facility: HOSPITAL | Age: 83
End: 2023-05-26
Payer: MEDICARE

## 2023-05-26 ENCOUNTER — HOSPITAL ENCOUNTER (EMERGENCY)
Facility: HOSPITAL | Age: 83
Discharge: HOME | End: 2023-05-26
Attending: EMERGENCY MEDICINE
Payer: MEDICARE

## 2023-05-26 VITALS
BODY MASS INDEX: 26.43 KG/M2 | RESPIRATION RATE: 17 BRPM | TEMPERATURE: 98.1 F | DIASTOLIC BLOOD PRESSURE: 80 MMHG | HEART RATE: 86 BPM | OXYGEN SATURATION: 98 % | SYSTOLIC BLOOD PRESSURE: 126 MMHG | WEIGHT: 140 LBS | HEIGHT: 61 IN

## 2023-05-26 DIAGNOSIS — S70.02XA CONTUSION OF LEFT HIP, INITIAL ENCOUNTER: ICD-10-CM
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
## 2023-05-26 PROCEDURE — 73502 X-RAY EXAM HIP UNI 2-3 VIEWS: CPT

## 2023-05-26 PROCEDURE — 99283 EMERGENCY DEPT VISIT LOW MDM: CPT

## 2023-05-26 ASSESSMENT — LIFESTYLE VARIABLES
HOW MANY STANDARD DRINKS CONTAINING ALCOHOL DO YOU HAVE ON A TYPICAL DAY: PATIENT DOES NOT DRINK
HOW OFTEN DO YOU HAVE A DRINK CONTAINING ALCOHOL: NEVER

## 2023-05-26 ASSESSMENT — PAIN DESCRIPTION - ONSET: ONSET: GRADUAL

## 2023-05-26 ASSESSMENT — PAIN - FUNCTIONAL ASSESSMENT
PAIN_FUNCTIONAL_ASSESSMENT: PREVENTS OR INTERFERES SOME ACTIVE ACTIVITIES AND ADLS
PAIN_FUNCTIONAL_ASSESSMENT: 0-10

## 2023-05-26 ASSESSMENT — PAIN DESCRIPTION - FREQUENCY: FREQUENCY: CONTINUOUS

## 2023-05-26 ASSESSMENT — PAIN DESCRIPTION - LOCATION: LOCATION: BUTTOCKS

## 2023-05-26 ASSESSMENT — PAIN SCALES - GENERAL: PAINLEVEL_OUTOF10: 6

## 2023-05-26 ASSESSMENT — PAIN DESCRIPTION - DESCRIPTORS: DESCRIPTORS: ACHING;SORE

## 2023-05-26 ASSESSMENT — PAIN DESCRIPTION - PAIN TYPE: TYPE: ACUTE PAIN

## 2023-05-26 ASSESSMENT — PAIN DESCRIPTION - ORIENTATION: ORIENTATION: LEFT

## 2023-05-26 NOTE — ED PROVIDER NOTES
[facility] EMERGENCY DEPARTMENT  EMERGENCY DEPARTMENT HISTORY AND PHYSICAL EXAM      Date: 5/26/2023  Patient Name: Lesly Schwab  MRN: [id number]  [place] 1940  Date of evaluation: 5/26/2023  Provider: Beryle Fontan, DO   Note Started: 11:48 AM EDT 5/26/23    HISTORY OF PRESENT ILLNESS     Chief Complaint   Patient presents with    Fall     History Provided By: Patient    HPI: Lesly Schwab is a 79 y.o. female with past medical history of CAD, arthritis, anemia, gastritis, and lupus who presents with fall and left hip pain. Patient states she fell 5 days ago. She was gardening. She fell backwards and landed on her buttocks. She did not hit her head. She is having left hip, sacral pain. Pain is worse with sitting and movement. She is able to ambulate. Normal strength. No numbness or tingling. PAST MEDICAL HISTORY   Past Medical History:  Past Medical History:   Diagnosis Date    Anemia     Arthritis     CAD (coronary artery disease) 1997    MI    Cataract 12/2019 rt.eye  01/2020 lt.eye    Degenerative joint disease of right hip 09/14/2015    [facility] [place]/Dr. Sparrow [facility]    Diabetes [facility])     Fracture     right shoulder; T6 compression    Gastritis     H/O Bell's palsy 07/09/2012    R-side of face. Dx >20 years ago.  Has persistent R-sided facial droop     History of multiple allergies Pollen,cyclosporin    Hypercholesterolemia     Hyperlipidemia    Lupus ([facility] [address] 01/09/2015    Osteoporosis, post-menopausal     vertebral fracture    Squamous cell cancer of skin of left cheek     Squamous cell skin cancer     Right knee    Tinnitus 2019       Past Surgical History:  Past Surgical History:   Procedure Laterality Date    CATARACT REMOVAL Left 01/07/2020    CATARACT REMOVAL Right 12/19/2019    CATARACT REMOVAL Bilateral     CHOLECYSTECTOMY      COLONOSCOPY N/A 10/9/2018    COLONOSCOPY performed by Sarita Ken MD at [facility] 14  02/19/1997    LAD    FIXATION

## 2023-05-26 NOTE — ED TRIAGE NOTES
Pt reports fall from a small step when she mistepped and she fell backwards into a brick wall. Pt is now with pain to her buttocks. Injury occurred 5 days PTA. Pt is ambulatory without assistance. Pt took ibuprofen and hydrocodone today at approx 0930.

## 2023-06-15 PROBLEM — Z96.659 H/O TOTAL KNEE REPLACEMENT: Status: ACTIVE | Noted: 2017-08-18

## 2023-06-19 ENCOUNTER — PATIENT MESSAGE (OUTPATIENT)
Facility: CLINIC | Age: 83
End: 2023-06-19

## 2023-06-20 ENCOUNTER — HOSPITAL ENCOUNTER (OUTPATIENT)
Facility: HOSPITAL | Age: 83
Discharge: HOME | End: 2023-06-23
Attending: FAMILY MEDICINE
Payer: MEDICARE

## 2023-06-20 DIAGNOSIS — M54.89 MIDLINE BACK PAIN, UNSPECIFIED BACK LOCATION, UNSPECIFIED CHRONICITY: ICD-10-CM

## 2023-06-20 DIAGNOSIS — M54.6 ACUTE MIDLINE THORACIC BACK PAIN: ICD-10-CM

## 2023-06-20 DIAGNOSIS — M53.3 PAIN IN SACRUM: ICD-10-CM

## 2023-06-20 PROCEDURE — 72220 X-RAY EXAM SACRUM TAILBONE: CPT

## 2023-06-20 PROCEDURE — 72100 X-RAY EXAM L-S SPINE 2/3 VWS: CPT

## 2023-06-20 PROCEDURE — 72070 X-RAY EXAM THORAC SPINE 2VWS: CPT

## 2023-06-22 ENCOUNTER — TELEPHONE (OUTPATIENT)
Facility: CLINIC | Age: 83
End: 2023-06-22

## 2023-06-23 DIAGNOSIS — M06.09 RHEUMATOID ARTHRITIS WITHOUT RHEUMATOID FACTOR, MULTIPLE SITES (HCC): Primary | ICD-10-CM

## 2023-06-23 DIAGNOSIS — M06.09 RHEUMATOID ARTHRITIS WITHOUT RHEUMATOID FACTOR, MULTIPLE SITES (HCC): ICD-10-CM

## 2023-06-23 DIAGNOSIS — R74.01 TRANSAMINITIS: ICD-10-CM

## 2023-06-23 DIAGNOSIS — Z79.60 LONG TERM (CURRENT) USE OF UNSPECIFIED IMMUNOMODULATORS AND IMMUNOSUPPRESSANTS: ICD-10-CM

## 2023-06-23 RX ORDER — LEFLUNOMIDE 20 MG/1
20 TABLET ORAL DAILY
Qty: 30 TABLET | Refills: 0 | Status: SHIPPED | OUTPATIENT
Start: 2023-06-23

## 2023-06-26 RX ORDER — LEFLUNOMIDE 20 MG/1
20 TABLET ORAL DAILY
Qty: 90 TABLET | OUTPATIENT
Start: 2023-06-26

## 2023-06-30 DIAGNOSIS — G60.3 IDIOPATHIC PROGRESSIVE POLYNEUROPATHY: Primary | ICD-10-CM

## 2023-06-30 RX ORDER — GABAPENTIN 800 MG/1
TABLET ORAL
Qty: 90 TABLET | Refills: 0 | Status: SHIPPED | OUTPATIENT
Start: 2023-06-30 | End: 2023-07-30

## 2023-07-13 ENCOUNTER — OFFICE VISIT (OUTPATIENT)
Facility: CLINIC | Age: 83
End: 2023-07-13

## 2023-07-13 VITALS
OXYGEN SATURATION: 98 % | BODY MASS INDEX: 25.9 KG/M2 | SYSTOLIC BLOOD PRESSURE: 119 MMHG | DIASTOLIC BLOOD PRESSURE: 81 MMHG | WEIGHT: 137.2 LBS | TEMPERATURE: 97.5 F | HEIGHT: 61 IN | RESPIRATION RATE: 14 BRPM | HEART RATE: 101 BPM

## 2023-07-13 DIAGNOSIS — G89.29 CHRONIC BILATERAL LOW BACK PAIN WITHOUT SCIATICA: Primary | ICD-10-CM

## 2023-07-13 DIAGNOSIS — Z79.899 CONTROLLED SUBSTANCE AGREEMENT SIGNED: ICD-10-CM

## 2023-07-13 DIAGNOSIS — M54.50 CHRONIC BILATERAL LOW BACK PAIN WITHOUT SCIATICA: Primary | ICD-10-CM

## 2023-07-13 ASSESSMENT — ENCOUNTER SYMPTOMS
BACK PAIN: 1
WHEEZING: 0
SHORTNESS OF BREATH: 0
COUGH: 0

## 2023-07-13 NOTE — PROGRESS NOTES
1. \"Have you been to the ER, urgent care clinic since your last visit? Hospitalized since your last visit? \" NO    2. \"Have you seen or consulted any other health care providers outside of the [address] [place] [address] since your last visit? \" no    3. For patients aged 43-73: Has the patient had a colonoscopy / FIT/ Cologuard? N/A      If the patient is female:    4. For patients aged 43-66: Has the patient had a mammogram within the past 2 years? N/A      5. For patients aged 21-65: Has the patient had a pap smear?  N/A    Health Maintenance Due   Topic Date Due    Shingles vaccine (1 of 2) Never done    Diabetic retinal exam  10/21/2021    COVID-19 Vaccine (4 - Booster for [place] series) 01/10/2022

## 2023-07-27 ENCOUNTER — APPOINTMENT (OUTPATIENT)
Dept: INFUSION THERAPY | Age: 83
End: 2023-07-27

## 2023-08-02 DIAGNOSIS — G60.3 IDIOPATHIC PROGRESSIVE POLYNEUROPATHY: ICD-10-CM

## 2023-08-02 RX ORDER — GABAPENTIN 800 MG/1
TABLET ORAL
Qty: 90 TABLET | Refills: 0 | OUTPATIENT
Start: 2023-08-02 | End: 2023-09-01

## 2023-08-02 RX ORDER — PANTOPRAZOLE SODIUM 40 MG/1
40 TABLET, DELAYED RELEASE ORAL DAILY
Qty: 90 TABLET | Refills: 1 | Status: SHIPPED | OUTPATIENT
Start: 2023-08-02

## 2023-08-02 RX ORDER — GABAPENTIN 800 MG/1
TABLET ORAL
Qty: 90 TABLET | Refills: 0 | Status: SHIPPED | OUTPATIENT
Start: 2023-08-02 | End: 2023-09-01

## 2023-08-04 ENCOUNTER — HOSPITAL ENCOUNTER (OUTPATIENT)
Facility: HOSPITAL | Age: 83
Setting detail: INFUSION SERIES
End: 2023-08-04

## 2023-09-05 DIAGNOSIS — G60.3 IDIOPATHIC PROGRESSIVE POLYNEUROPATHY: ICD-10-CM

## 2023-09-05 RX ORDER — GABAPENTIN 800 MG/1
TABLET ORAL
Qty: 90 TABLET | Refills: 2 | Status: SHIPPED | OUTPATIENT
Start: 2023-09-05 | End: 2023-12-05

## 2023-09-21 RX ORDER — ATORVASTATIN CALCIUM 40 MG/1
TABLET, FILM COATED ORAL DAILY
Qty: 90 TABLET | Refills: 1 | Status: SHIPPED | OUTPATIENT
Start: 2023-09-21

## 2023-09-22 ENCOUNTER — TELEPHONE (OUTPATIENT)
Facility: CLINIC | Age: 83
End: 2023-09-22

## 2023-09-23 RX ORDER — CYCLOBENZAPRINE HCL 10 MG
10 TABLET ORAL NIGHTLY PRN
Qty: 90 TABLET | Refills: 0 | OUTPATIENT
Start: 2023-09-23

## 2023-09-27 NOTE — TELEPHONE ENCOUNTER
Patient called, no answer. Message left for return call. Received fax rx request for flexeril. Per Dr. Mary Gentile, \"Flexeril not recommended due to age and increased risk of falls. \"    Fullscreent message sent.

## 2023-10-12 NOTE — TELEPHONE ENCOUNTER
Celestina Mackay is a 16 year old female presenting to the Urgent Care today with Mom  for Sorethroat and headache started last night.No GI symptoms reported.    OTC use: Ibuprofen at 6am     Allergies and Medications were reviewed and updated if necessary.    Pharmacy reviewed and updated to Patient's preference.    Swab specimens were collected during the rooming process.    Patient would like communication of their results via:        Cell Phone:   Telephone Information:   Mobile [phone]     Okay to leave a message containing results? Yes    Patient advised staff will contact with positive results only. Patient agrees. Patient instructed can also view results on [employer].   From: Vale Bennett  To: Nicole Singh MD  Sent: 4/10/2020 4:47 PM EDT  Subject: Prescription Question    I also need my lipitor refilled. Medication refill said it could not be refilled at this time thru my chart.  Thanks Danilo Norman

## 2023-10-16 ENCOUNTER — OFFICE VISIT (OUTPATIENT)
Facility: CLINIC | Age: 83
End: 2023-10-16
Payer: MEDICARE

## 2023-10-16 VITALS
OXYGEN SATURATION: 97 % | HEIGHT: 61 IN | HEART RATE: 94 BPM | DIASTOLIC BLOOD PRESSURE: 68 MMHG | WEIGHT: 137 LBS | BODY MASS INDEX: 25.86 KG/M2 | SYSTOLIC BLOOD PRESSURE: 113 MMHG | TEMPERATURE: 97.6 F | RESPIRATION RATE: 16 BRPM

## 2023-10-16 DIAGNOSIS — E11.42 DIABETIC POLYNEUROPATHY ASSOCIATED WITH TYPE 2 DIABETES MELLITUS (HCC): Primary | ICD-10-CM

## 2023-10-16 DIAGNOSIS — Z79.899 CONTROLLED SUBSTANCE AGREEMENT SIGNED: ICD-10-CM

## 2023-10-16 DIAGNOSIS — Z23 ENCOUNTER FOR IMMUNIZATION: ICD-10-CM

## 2023-10-16 PROBLEM — R06.09 DYSPNEA ON EXERTION: Status: RESOLVED | Noted: 2023-02-06 | Resolved: 2023-10-16

## 2023-10-16 PROBLEM — E78.49 OTHER HYPERLIPIDEMIA: Status: RESOLVED | Noted: 2023-02-07 | Resolved: 2023-10-16

## 2023-10-16 PROBLEM — Z96.659 H/O TOTAL KNEE REPLACEMENT: Status: RESOLVED | Noted: 2017-08-18 | Resolved: 2023-10-16

## 2023-10-16 PROCEDURE — G0008 ADMIN INFLUENZA VIRUS VAC: HCPCS | Performed by: FAMILY MEDICINE

## 2023-10-16 PROCEDURE — 3078F DIAST BP <80 MM HG: CPT | Performed by: FAMILY MEDICINE

## 2023-10-16 PROCEDURE — 90694 VACC AIIV4 NO PRSRV 0.5ML IM: CPT | Performed by: FAMILY MEDICINE

## 2023-10-16 PROCEDURE — 1123F ACP DISCUSS/DSCN MKR DOCD: CPT | Performed by: FAMILY MEDICINE

## 2023-10-16 PROCEDURE — 99214 OFFICE O/P EST MOD 30 MIN: CPT | Performed by: FAMILY MEDICINE

## 2023-10-16 PROCEDURE — 3074F SYST BP LT 130 MM HG: CPT | Performed by: FAMILY MEDICINE

## 2023-10-16 RX ORDER — GABAPENTIN 800 MG/1
TABLET ORAL
Qty: 90 TABLET | Refills: 2 | Status: SHIPPED | OUTPATIENT
Start: 2023-10-16 | End: 2024-01-15

## 2023-10-16 ASSESSMENT — ENCOUNTER SYMPTOMS
COUGH: 0
BACK PAIN: 1
SHORTNESS OF BREATH: 0

## 2023-10-16 NOTE — PROGRESS NOTES
1. \"Have you been to the ER, urgent care clinic since your last visit? Hospitalized since your last visit? \" NO    2. \"Have you seen or consulted any other health care providers outside of the [address] [place] [address] since your last visit? \" no    3. For patients aged 43-73: Has the patient had a colonoscopy / FIT/ Cologuard? N/A      If the patient is female:    4. For patients aged 43-66: Has the patient had a mammogram within the past 2 years? N/A      5. For patients aged 21-65: Has the patient had a pap smear?  N/A    Health Maintenance Due   Topic Date Due    Hepatitis B vaccine (1 of 3 - Risk 3-dose series) Never done    Diabetic retinal exam  10/21/2021    COVID-19 Vaccine (4 - Pfizer series) 01/10/2022    Shingles vaccine (2 of 2) 01/16/2023    Flu vaccine (1) 08/01/2023
mL  3. Controlled substance agreement signed    the following changes in treatment are made   Stop pantoprazole, monitor for worsening symptoms. lab results and schedule of future lab studies reviewed with patient, and reviewed medications and side effects in detail  Consider duloxetine in future. Justification for level of billing, time spent: 33 min with patient, reviewing chart and face to face exam, clinical documentation. Time prior to the visit was spent reviewing external notes results and imaging reports. As well during the visit, time included evaluating the patient, discussing results and plans with the patient, and coordinating care. As well, after the visit additional time spent documenting clinical care, interpreting results, and coordinating care. This time was all spent during the date of service. Return in about 3 months (around 1/16/2024) for controlled substance. I have discussed the diagnosis with the patient and the intended plan as seen in the above orders. Social history, medical history, and labs were reviewed. The patient has received an after-visit summary and questions were answered concerning future plans. I have discussed medication side effects and warnings with the patient as well. Patient verbalized understanding and accepts plan & risks.       MD ABA Mcclellan & PETR MACK [facility]  10/16/23

## 2023-10-24 ENCOUNTER — HOSPITAL ENCOUNTER (OUTPATIENT)
Facility: HOSPITAL | Age: 83
Discharge: HOME | End: 2023-10-27
Payer: MEDICARE

## 2023-10-24 DIAGNOSIS — M81.0 SENILE OSTEOPOROSIS: ICD-10-CM

## 2023-10-24 PROCEDURE — 77080 DXA BONE DENSITY AXIAL: CPT

## 2023-10-26 RX ORDER — FERROUS SULFATE 325(65) MG
1 TABLET ORAL 2 TIMES DAILY
Qty: 30 TABLET | Refills: 5 | Status: SHIPPED | OUTPATIENT
Start: 2023-10-26

## 2024-01-23 RX ORDER — PANTOPRAZOLE SODIUM 40 MG/1
40 TABLET, DELAYED RELEASE ORAL DAILY
Qty: 90 TABLET | Refills: 1 | OUTPATIENT
Start: 2024-01-23

## 2024-02-06 ENCOUNTER — OFFICE VISIT (OUTPATIENT)
Facility: CLINIC | Age: 84
End: 2024-02-06
Payer: MEDICARE

## 2024-02-06 VITALS
SYSTOLIC BLOOD PRESSURE: 118 MMHG | BODY MASS INDEX: 25.86 KG/M2 | DIASTOLIC BLOOD PRESSURE: 64 MMHG | TEMPERATURE: 97.1 F | WEIGHT: 137 LBS | HEART RATE: 68 BPM | HEIGHT: 61 IN | OXYGEN SATURATION: 97 %

## 2024-02-06 DIAGNOSIS — E11.9 TYPE 2 DIABETES MELLITUS WITHOUT COMPLICATION, WITHOUT LONG-TERM CURRENT USE OF INSULIN (HCC): Primary | ICD-10-CM

## 2024-02-06 DIAGNOSIS — E78.2 MIXED HYPERLIPIDEMIA: ICD-10-CM

## 2024-02-06 DIAGNOSIS — I10 BENIGN HYPERTENSION: ICD-10-CM

## 2024-02-06 DIAGNOSIS — D64.9 ANEMIA, UNSPECIFIED TYPE: ICD-10-CM

## 2024-02-06 PROCEDURE — 1123F ACP DISCUSS/DSCN MKR DOCD: CPT | Performed by: FAMILY MEDICINE

## 2024-02-06 PROCEDURE — 3078F DIAST BP <80 MM HG: CPT | Performed by: FAMILY MEDICINE

## 2024-02-06 PROCEDURE — 3074F SYST BP LT 130 MM HG: CPT | Performed by: FAMILY MEDICINE

## 2024-02-06 PROCEDURE — 99214 OFFICE O/P EST MOD 30 MIN: CPT | Performed by: FAMILY MEDICINE

## 2024-02-06 RX ORDER — GLUCOSAMINE HCL/CHONDROITIN SU 500-400 MG
CAPSULE ORAL
Qty: 100 STRIP | Refills: 3 | Status: SHIPPED | OUTPATIENT
Start: 2024-02-06

## 2024-02-06 RX ORDER — CYCLOBENZAPRINE HCL 5 MG
5 TABLET ORAL NIGHTLY
COMMUNITY
Start: 2024-01-23

## 2024-02-06 SDOH — ECONOMIC STABILITY: INCOME INSECURITY: HOW HARD IS IT FOR YOU TO PAY FOR THE VERY BASICS LIKE FOOD, HOUSING, MEDICAL CARE, AND HEATING?: NOT HARD AT ALL

## 2024-02-06 SDOH — ECONOMIC STABILITY: FOOD INSECURITY: WITHIN THE PAST 12 MONTHS, THE FOOD YOU BOUGHT JUST DIDN'T LAST AND YOU DIDN'T HAVE MONEY TO GET MORE.: NEVER TRUE

## 2024-02-06 SDOH — ECONOMIC STABILITY: HOUSING INSECURITY
IN THE LAST 12 MONTHS, WAS THERE A TIME WHEN YOU DID NOT HAVE A STEADY PLACE TO SLEEP OR SLEPT IN A SHELTER (INCLUDING NOW)?: NO

## 2024-02-06 SDOH — ECONOMIC STABILITY: FOOD INSECURITY: WITHIN THE PAST 12 MONTHS, YOU WORRIED THAT YOUR FOOD WOULD RUN OUT BEFORE YOU GOT MONEY TO BUY MORE.: NEVER TRUE

## 2024-02-06 ASSESSMENT — PATIENT HEALTH QUESTIONNAIRE - PHQ9
SUM OF ALL RESPONSES TO PHQ QUESTIONS 1-9: 0
1. LITTLE INTEREST OR PLEASURE IN DOING THINGS: 0
SUM OF ALL RESPONSES TO PHQ QUESTIONS 1-9: 0
2. FEELING DOWN, DEPRESSED OR HOPELESS: 0
SUM OF ALL RESPONSES TO PHQ9 QUESTIONS 1 & 2: 0

## 2024-02-06 NOTE — PROGRESS NOTES
[facility]      HPI: Pt is a 83 y.o. female who presents for follow-up.    Joint pain: She has had this chronically. She is having more pain lately in her R hip but it is not to the point where she is interested in seeing a specialist for an injection.     DM: She ran out of strips so has not been checking. She had one episode of hypoglycemia the other day when she waited longer than normal to eat. She takes gabapentin for neuropathy and has been stable for many years. She has started to notice some numbness/tingling in her hands now when they are in very hot water.     HLD: Taking atorvastatin without problems.     Past Medical History:   Diagnosis Date    Anemia     Arthritis     CAD (coronary artery disease) 1997    MI    Cataract 12/2019 rt.eye  01/2020 lt.eye    Degenerative joint disease of right hip 09/14/2015    [facility] [place]/Dr. Tamez    Diabetes ([facility])     Fracture     right shoulder; T6 compression    Gastritis     H/O Bell's palsy 07/09/2012    R-side of face. Dx >20 years ago. Has persistent R-sided facial droop     H/O total knee replacement 8/18/2017    History of multiple allergies Pollen,cyclosporin    Hypercholesterolemia     Hyperlipidemia    Lupus ([facility]) 01/09/2015    Neuropathy     Osteoporosis, post-menopausal     vertebral fracture    Rheumatoid arthritis ([facility])     Squamous cell cancer of skin of left cheek     Squamous cell skin cancer     Right knee    Tinnitus 2019       Family History   Problem Relation Age of Onset    Rheum Arthritis Other     Diabetes Mother     Hypertension Mother     Heart Disease Mother     Diabetes Father     Hypertension Father     Heart Disease Father     Rheum Arthritis Sister     Elevated Lipids Sister     Thyroid Disease Sister     Diabetes Sister     Hypertension Sister     Rheum Arthritis Sister        Social History     Tobacco Use    Smoking status: Former     Current packs/day: 0.00     Types: Cigarettes     Quit date: 2/25/2003     Years

## 2024-02-06 NOTE — PROGRESS NOTES
Chief Complaint   Patient presents with    Follow-up Chronic Condition       \"Have you been to the ER, urgent care clinic since your last visit?  Hospitalized since your last visit?\"    NO    “Have you seen or consulted any other health care providers outside of [facility] since your last visit?”    YES - When: approximately 1  weeks ago.  Where and Why: Rheumatology (Dr. Shari Banuelos).              PHQ-9 Total Score: 0 (2/6/2024  2:09 PM)

## 2024-02-07 LAB
ALBUMIN SERPL-MCNC: 4.2 G/DL (ref 3.7–4.7)
ALBUMIN/GLOB SERPL: 2 {RATIO} (ref 1.2–2.2)
ALP SERPL-CCNC: 89 IU/L (ref 44–121)
ALT SERPL-CCNC: 37 IU/L (ref 0–32)
AST SERPL-CCNC: 50 IU/L (ref 0–40)
BASOPHILS # BLD AUTO: 0.1 X10E3/UL (ref 0–0.2)
BASOPHILS NFR BLD AUTO: 1 %
BILIRUB SERPL-MCNC: 0.3 MG/DL (ref 0–1.2)
BUN SERPL-MCNC: 16 MG/DL (ref 8–27)
BUN/CREAT SERPL: 20 (ref 12–28)
CALCIUM SERPL-MCNC: 9 MG/DL (ref 8.7–10.3)
CHLORIDE SERPL-SCNC: 101 MMOL/L (ref 96–106)
CHOLEST SERPL-MCNC: 113 MG/DL (ref 100–199)
CO2 SERPL-SCNC: 23 MMOL/L (ref 20–29)
CREAT SERPL-MCNC: 0.8 MG/DL (ref 0.57–1)
EGFRCR SERPLBLD CKD-EPI 2021: 73 ML/MIN/1.73
EOSINOPHIL # BLD AUTO: 0.1 X10E3/UL (ref 0–0.4)
EOSINOPHIL NFR BLD AUTO: 1 %
ERYTHROCYTE [DISTWIDTH] IN BLOOD BY AUTOMATED COUNT: 13 % (ref 11.7–15.4)
GLOBULIN SER CALC-MCNC: 2.1 G/DL (ref 1.5–4.5)
GLUCOSE SERPL-MCNC: 80 MG/DL (ref 70–99)
HBA1C MFR BLD: 6.3 % (ref 4.8–5.6)
HCT VFR BLD AUTO: 37.3 % (ref 34–46.6)
HDLC SERPL-MCNC: 56 MG/DL
HGB BLD-MCNC: 12.5 G/DL (ref 11.1–15.9)
IMM GRANULOCYTES # BLD AUTO: 0.1 X10E3/UL (ref 0–0.1)
IMM GRANULOCYTES NFR BLD AUTO: 1 %
LDLC SERPL CALC-MCNC: 42 MG/DL (ref 0–99)
LYMPHOCYTES # BLD AUTO: 2.6 X10E3/UL (ref 0.7–3.1)
LYMPHOCYTES NFR BLD AUTO: 36 %
MCH RBC QN AUTO: 31.9 PG (ref 26.6–33)
MCHC RBC AUTO-ENTMCNC: 33.5 G/DL (ref 31.5–35.7)
MCV RBC AUTO: 95 FL (ref 79–97)
MONOCYTES # BLD AUTO: 0.6 X10E3/UL (ref 0.1–0.9)
MONOCYTES NFR BLD AUTO: 9 %
NEUTROPHILS # BLD AUTO: 3.7 X10E3/UL (ref 1.4–7)
NEUTROPHILS NFR BLD AUTO: 52 %
PLATELET # BLD AUTO: 213 X10E3/UL (ref 150–450)
POTASSIUM SERPL-SCNC: 4.7 MMOL/L (ref 3.5–5.2)
PROT SERPL-MCNC: 6.3 G/DL (ref 6–8.5)
RBC # BLD AUTO: 3.92 X10E6/UL (ref 3.77–5.28)
SODIUM SERPL-SCNC: 140 MMOL/L (ref 134–144)
TRIGL SERPL-MCNC: 73 MG/DL (ref 0–149)
VLDLC SERPL CALC-MCNC: 15 MG/DL (ref 5–40)
WBC # BLD AUTO: 7.1 X10E3/UL (ref 3.4–10.8)

## 2024-02-07 RX ORDER — FERROUS SULFATE 325(65) MG
1 TABLET ORAL 2 TIMES DAILY
Qty: 30 TABLET | Refills: 5 | Status: SHIPPED | OUTPATIENT
Start: 2024-02-07

## 2024-02-08 LAB — VIT B12 SERPL-MCNC: 1251 PG/ML (ref 232–1245)

## 2024-03-08 DIAGNOSIS — E11.42 DIABETIC POLYNEUROPATHY ASSOCIATED WITH TYPE 2 DIABETES MELLITUS (HCC): ICD-10-CM

## 2024-03-08 DIAGNOSIS — D50.8 IRON DEFICIENCY ANEMIA SECONDARY TO INADEQUATE DIETARY IRON INTAKE: Primary | ICD-10-CM

## 2024-03-08 RX ORDER — ATORVASTATIN CALCIUM 40 MG/1
40 TABLET, FILM COATED ORAL DAILY
Qty: 90 TABLET | Refills: 1 | Status: SHIPPED | OUTPATIENT
Start: 2024-03-08

## 2024-03-08 RX ORDER — GABAPENTIN 800 MG/1
TABLET ORAL
Qty: 90 TABLET | Refills: 2 | Status: SHIPPED | OUTPATIENT
Start: 2024-03-08 | End: 2024-06-29

## 2024-03-08 RX ORDER — FERROUS SULFATE 325(65) MG
1 TABLET ORAL 2 TIMES DAILY
Qty: 180 TABLET | Refills: 1 | Status: SHIPPED | OUTPATIENT
Start: 2024-03-08

## 2024-05-21 NOTE — PROCEDURES
Toni Rivas M.D.  [phone]            10/9/2018          Colonoscopy Operative Report  Paul Freed  :  1940  [place] Medical Record Number:  [id number]      Indications:    Personal history of colon polyps (screening only)     :  Velma Arriaza MD    Referring Provider: Steve Baez MD    Sedation:  MAC anesthesia    Pre-Procedural Exam:      Airway: clear,  No airway problems anticipated  Heart: RRR, without gallops or rubs  Lungs: clear bilaterally without wheezes, crackles, or rhonchi  Abdomen: soft, nontender, nondistended, bowel sounds present  Mental Status: awake, alert and oriented to person, place and time     Procedure Details:  After informed consent was obtained with all risks and benefits of procedure explained and preoperative exam completed, the patient was taken to the endoscopy suite and placed in the left lateral decubitus position. Upon sequential sedation as per above, a digital rectal exam was performed. The Olympus videocolonoscope  was inserted in the rectum and carefully advanced to the cecum, which was identified by the ileocecal valve and appendiceal orifice. The quality of preparation was good. The colonoscope was slowly withdrawn with careful inspection and evaluation between folds. Retroflexion in the rectum was performed. Findings:   Terminal Ileum: not intubated  Cecum: normal  Ascending Colon: normal  Transverse Colon: normal  Descending Colon: normal  Sigmoid: no mucosal lesion appreciated  mild diverticulosis; Rectum: no mucosal lesion appreciated  Grade 1 internal hemorrhoid(s); Interventions:  none    Specimen Removed:  none    Complications: None. EBL:  None. Impression:  Mild sigmoid diverticulosis and small internal hemorrhoids, otherwise mucosa within normal throughout the colon. Recommendations:  -High fiber diet.    -Resume normal medication(s).    -No need for surveillance colonoscopy in the future based on age    Discharge Disposition:  Home in the company of a  when able to ambulate.     Dillon Oconnor MD  10/9/2018  2:01 PM Statement Selected

## 2024-05-31 DIAGNOSIS — D50.8 IRON DEFICIENCY ANEMIA SECONDARY TO INADEQUATE DIETARY IRON INTAKE: ICD-10-CM

## 2024-05-31 DIAGNOSIS — E11.42 DIABETIC POLYNEUROPATHY ASSOCIATED WITH TYPE 2 DIABETES MELLITUS (HCC): ICD-10-CM

## 2024-06-03 RX ORDER — GABAPENTIN 800 MG/1
TABLET ORAL
Qty: 90 TABLET | Refills: 1 | Status: SHIPPED | OUTPATIENT
Start: 2024-06-14 | End: 2024-09-21

## 2024-06-03 RX ORDER — FERROUS SULFATE 325(65) MG
1 TABLET ORAL 2 TIMES DAILY
Qty: 180 TABLET | Refills: 1 | Status: SHIPPED | OUTPATIENT
Start: 2024-06-03

## 2024-06-04 NOTE — TELEPHONE ENCOUNTER
Rx request for gabapentin. Pt with recent appt and upcoming appt scheduled.  reviewed and appropriate. Rx sent.

## 2024-07-09 DIAGNOSIS — E11.42 DIABETIC POLYNEUROPATHY ASSOCIATED WITH TYPE 2 DIABETES MELLITUS (HCC): ICD-10-CM

## 2024-07-09 RX ORDER — GABAPENTIN 800 MG/1
TABLET ORAL
Qty: 90 TABLET | Refills: 1 | Status: SHIPPED | OUTPATIENT
Start: 2024-07-12 | End: 2024-10-16

## 2024-08-05 ENCOUNTER — OFFICE VISIT (OUTPATIENT)
Facility: CLINIC | Age: 84
End: 2024-08-05
Payer: MEDICARE

## 2024-08-05 VITALS
SYSTOLIC BLOOD PRESSURE: 118 MMHG | HEIGHT: 61 IN | WEIGHT: 132 LBS | HEART RATE: 87 BPM | BODY MASS INDEX: 24.92 KG/M2 | TEMPERATURE: 97.3 F | OXYGEN SATURATION: 97 % | DIASTOLIC BLOOD PRESSURE: 74 MMHG

## 2024-08-05 DIAGNOSIS — G89.29 CHRONIC RIGHT SHOULDER PAIN: ICD-10-CM

## 2024-08-05 DIAGNOSIS — M25.511 CHRONIC RIGHT SHOULDER PAIN: ICD-10-CM

## 2024-08-05 DIAGNOSIS — R26.81 GAIT INSTABILITY: ICD-10-CM

## 2024-08-05 DIAGNOSIS — Z00.00 MEDICARE ANNUAL WELLNESS VISIT, SUBSEQUENT: Primary | ICD-10-CM

## 2024-08-05 DIAGNOSIS — K21.9 GASTROESOPHAGEAL REFLUX DISEASE, UNSPECIFIED WHETHER ESOPHAGITIS PRESENT: ICD-10-CM

## 2024-08-05 DIAGNOSIS — M06.09 RHEUMATOID ARTHRITIS WITHOUT RHEUMATOID FACTOR, MULTIPLE SITES (HCC): ICD-10-CM

## 2024-08-05 DIAGNOSIS — E11.42 DIABETIC POLYNEUROPATHY ASSOCIATED WITH TYPE 2 DIABETES MELLITUS (HCC): ICD-10-CM

## 2024-08-05 LAB — HBA1C MFR BLD: 6 %

## 2024-08-05 PROCEDURE — 1123F ACP DISCUSS/DSCN MKR DOCD: CPT | Performed by: FAMILY MEDICINE

## 2024-08-05 PROCEDURE — 3074F SYST BP LT 130 MM HG: CPT | Performed by: FAMILY MEDICINE

## 2024-08-05 PROCEDURE — G0439 PPPS, SUBSEQ VISIT: HCPCS | Performed by: FAMILY MEDICINE

## 2024-08-05 PROCEDURE — 99214 OFFICE O/P EST MOD 30 MIN: CPT | Performed by: FAMILY MEDICINE

## 2024-08-05 PROCEDURE — 3044F HG A1C LEVEL LT 7.0%: CPT | Performed by: FAMILY MEDICINE

## 2024-08-05 PROCEDURE — 3078F DIAST BP <80 MM HG: CPT | Performed by: FAMILY MEDICINE

## 2024-08-05 PROCEDURE — 83036 HEMOGLOBIN GLYCOSYLATED A1C: CPT | Performed by: FAMILY MEDICINE

## 2024-08-05 RX ORDER — FAMOTIDINE 20 MG/1
20 TABLET, FILM COATED ORAL 2 TIMES DAILY
Qty: 60 TABLET | Refills: 3 | Status: SHIPPED | OUTPATIENT
Start: 2024-08-05

## 2024-08-05 RX ORDER — CALCIUM CARBONATE 160(400)MG
TABLET,CHEWABLE ORAL
Qty: 1 EACH | Refills: 0 | Status: SHIPPED | OUTPATIENT
Start: 2024-08-05

## 2024-08-05 ASSESSMENT — PATIENT HEALTH QUESTIONNAIRE - PHQ9
SUM OF ALL RESPONSES TO PHQ QUESTIONS 1-9: 0
1. LITTLE INTEREST OR PLEASURE IN DOING THINGS: NOT AT ALL
2. FEELING DOWN, DEPRESSED OR HOPELESS: NOT AT ALL
SUM OF ALL RESPONSES TO PHQ QUESTIONS 1-9: 0
SUM OF ALL RESPONSES TO PHQ9 QUESTIONS 1 & 2: 0
SUM OF ALL RESPONSES TO PHQ QUESTIONS 1-9: 0
SUM OF ALL RESPONSES TO PHQ QUESTIONS 1-9: 0

## 2024-08-05 ASSESSMENT — LIFESTYLE VARIABLES
HOW OFTEN DO YOU HAVE A DRINK CONTAINING ALCOHOL: NEVER
HOW MANY STANDARD DRINKS CONTAINING ALCOHOL DO YOU HAVE ON A TYPICAL DAY: PATIENT DOES NOT DRINK

## 2024-08-05 NOTE — PROGRESS NOTES
Chief Complaint   Patient presents with    Medicare AWV       \"Have you been to the ER, urgent care clinic since your last visit?  Hospitalized since your last visit?\"    YES - When: approximately 4 months ago.  Where and Why: UC for UTI.    “Have you seen or consulted any other health care providers outside of [facility] since your last visit?”    NO            Click Here for Release of Records Request    PHQ-9 Total Score: 0 (8/5/2024  1:35 PM)           8/5/2024     1:32 PM   Amb Fall Risk Assessment and TUG Test   Do you feel unsteady or are you worried about falling?  yes   2 or more falls in past year? no   Fall with injury in past year? no       
Medicare Annual Wellness Visit    Jennifer Anderson is here for Medicare AWV    Assessment & Plan   Medicare annual wellness visit, subsequent  Gait instability  -     Misc. Devices (ROLLATOR ULTRA-LIGHT) MISC; Disp-1 each, R-0, PrintUse for gait instability.  Rheumatoid arthritis without rheumatoid factor, multiple sites (HCC)  Diabetic polyneuropathy associated with type 2 diabetes mellitus (HCC)  -     AMB POC HEMOGLOBIN A1C  Gastroesophageal reflux disease, unspecified whether esophagitis present  -     famotidine (PEPCID) 20 MG tablet; Take 1 tablet by mouth 2 times daily, Disp-60 tablet, R-3Normal  Chronic right shoulder pain  -     External Referral To Orthopedic Surgery  Recommendations for Preventive Services Due: see orders and patient instructions/AVS.  Recommended screening schedule for the next 5-10 years is provided to the patient in written form: see Patient Instructions/AVS.     Return in about 6 months (around 2/5/2025) for follow-up chronic conditions.     Subjective     Patient's complete Health Risk Assessment and screening values have been reviewed and are found in Flowsheets. The following problems were reviewed today and where indicated follow up appointments were made and/or referrals ordered.    Positive Risk Factor Screenings with Interventions:    Fall Risk:  Do you feel unsteady or are you worried about falling? : (!) yes (Feels unsteady believes it is due to her neuropathy)  2 or more falls in past year?: no  Fall with injury in past year?: no     Interventions:    Patient comments: She feels unsteady at times and thinks her neuropathy is advancing. She has a cane but would benefit from a rollator.                 Hearing Screen:  Do you or your family notice any trouble with your hearing that hasn't been managed with hearing aids?: (!) Yes (Trouble with L ear, has seen audiologist.)    Interventions:  Patient comments: Follows with Audiology         HM:  RSV vaccine: Pt to think about it 
tobacco: Never   Substance Use Topics    Alcohol use: No    Drug use: No       ROS:  Per HPI    PE:  /74 (Site: Left Upper Arm, Position: Sitting)   Pulse 87   Temp 97.3 °F (36.3 °C) (Temporal)   Ht 1.549 m (5' 1\")   Wt 59.9 kg (132 lb)   SpO2 97%   BMI 24.94 kg/m²   Gen: Pt sitting in chair, in NAD  Head: Normocephalic, atraumatic  Eyes: Sclera anicteric, EOM grossly intact, PERRL  Ears: TM's pearly with good light reflex b/l  Nose: Normal nasal mucosa  Throat: MMM, normal lips, tongue, teeth and gums  Neck: Supple, no LAD, no thyromegaly or carotid bruits  CVS: Normal S1, S2  Resp: CTAB, no wheezes or rales  Extrem: Atraumatic, no cyanosis or edema  Pulses: 2+   Skin: Warm, dry  Neuro: Alert, oriented, appropriate      A/P:     ICD-10-CM    1. Medicare annual wellness visit, subsequent  Z00.00       2. Gait instability  R26.81 Misc. Devices (ROLLATOR ULTRA-LIGHT) MISC      3. Rheumatoid arthritis without rheumatoid factor, multiple sites ([facility])  M06.09       4. Diabetic polyneuropathy associated with type 2 diabetes mellitus ([facility])  E11.42 AMB POC HEMOGLOBIN A1C      5. Gastroesophageal reflux disease, unspecified whether esophagitis present  K21.9 famotidine (PEPCID) 20 MG tablet      6. Chronic right shoulder pain  M25.511 External Referral To Orthopedic Surgery    G89.29          1. Medicare annual wellness visit, subsequent    2. Gait instability: She would benefit from a rollator to reduce risk of falls.   - Misc. Devices (ROLLATOR ULTRA-LIGHT) MISC; Use for gait instability.  Dispense: 1 each; Refill: 0    3. Rheumatoid arthritis without rheumatoid factor, multiple sites ([facility]): Follows with Rheumatology and recently had labs done. Will request results.     4. Diabetic polyneuropathy associated with type 2 diabetes mellitus ([facility]): Stable on current medication. Continue metformin  - AMB POC HEMOGLOBIN A1C    5. Gastroesophageal reflux disease, unspecified whether esophagitis present: Discussed

## 2024-09-12 DIAGNOSIS — E11.42 DIABETIC POLYNEUROPATHY ASSOCIATED WITH TYPE 2 DIABETES MELLITUS (HCC): ICD-10-CM

## 2024-09-12 RX ORDER — GABAPENTIN 800 MG/1
TABLET ORAL
Qty: 90 TABLET | Refills: 2 | Status: SHIPPED | OUTPATIENT
Start: 2024-09-12 | End: 2024-12-17

## 2024-09-17 RX ORDER — ATORVASTATIN CALCIUM 40 MG/1
40 TABLET, FILM COATED ORAL DAILY
Qty: 90 TABLET | Refills: 1 | Status: SHIPPED | OUTPATIENT
Start: 2024-09-17

## 2024-10-16 DIAGNOSIS — E11.42 DIABETIC POLYNEUROPATHY ASSOCIATED WITH TYPE 2 DIABETES MELLITUS (HCC): ICD-10-CM

## 2024-10-16 RX ORDER — GABAPENTIN 800 MG/1
TABLET ORAL
Qty: 90 TABLET | Refills: 1 | Status: SHIPPED | OUTPATIENT
Start: 2024-10-16 | End: 2025-01-20

## 2024-10-17 NOTE — TELEPHONE ENCOUNTER
Rx request for gabapentin. Pt with recent appt and upcoming appt scheduled.  reviewed and appropriate. Rx sent.     Can we call to get her rescheduled with another provider for the Feb visit since I won't be here anymore? I don't want her to run out. Thanks!

## 2024-12-04 DIAGNOSIS — K21.9 GASTROESOPHAGEAL REFLUX DISEASE, UNSPECIFIED WHETHER ESOPHAGITIS PRESENT: ICD-10-CM

## 2024-12-04 RX ORDER — FAMOTIDINE 20 MG/1
20 TABLET, FILM COATED ORAL 2 TIMES DAILY
Qty: 60 TABLET | Refills: 5 | Status: SHIPPED | OUTPATIENT
Start: 2024-12-04

## 2024-12-15 DIAGNOSIS — E11.42 DIABETIC POLYNEUROPATHY ASSOCIATED WITH TYPE 2 DIABETES MELLITUS (HCC): ICD-10-CM

## 2024-12-22 RX ORDER — GABAPENTIN 800 MG/1
TABLET ORAL
Qty: 90 TABLET | Refills: 1 | Status: SHIPPED | OUTPATIENT
Start: 2024-12-22 | End: 2025-03-21

## 2024-12-22 RX ORDER — ATORVASTATIN CALCIUM 40 MG/1
40 TABLET, FILM COATED ORAL DAILY
Qty: 90 TABLET | Refills: 1 | Status: SHIPPED | OUTPATIENT
Start: 2024-12-22

## 2024-12-23 DIAGNOSIS — E11.42 DIABETIC POLYNEUROPATHY ASSOCIATED WITH TYPE 2 DIABETES MELLITUS (HCC): ICD-10-CM

## 2024-12-23 RX ORDER — GABAPENTIN 800 MG/1
TABLET ORAL
Qty: 90 TABLET | OUTPATIENT
Start: 2024-12-23

## 2025-01-09 DIAGNOSIS — D50.8 IRON DEFICIENCY ANEMIA SECONDARY TO INADEQUATE DIETARY IRON INTAKE: ICD-10-CM

## 2025-01-09 RX ORDER — FERROUS SULFATE 325(65) MG
TABLET ORAL
Qty: 180 TABLET | Refills: 1 | OUTPATIENT
Start: 2025-01-09

## 2025-01-16 ENCOUNTER — TELEPHONE (OUTPATIENT)
Facility: CLINIC | Age: 85
End: 2025-01-16

## 2025-01-16 DIAGNOSIS — M17.12 PRIMARY LOCALIZED OSTEOARTHRITIS OF LEFT KNEE: Primary | ICD-10-CM

## 2025-01-16 DIAGNOSIS — D50.8 IRON DEFICIENCY ANEMIA SECONDARY TO INADEQUATE DIETARY IRON INTAKE: ICD-10-CM

## 2025-01-16 DIAGNOSIS — M06.09 SERONEGATIVE RHEUMATOID ARTHRITIS OF MULTIPLE SITES (HCC): ICD-10-CM

## 2025-01-16 RX ORDER — FERROUS SULFATE 325(65) MG
1 TABLET ORAL 2 TIMES DAILY
Qty: 180 TABLET | Refills: 1 | Status: SHIPPED | OUTPATIENT
Start: 2025-01-16

## 2025-01-16 RX ORDER — CYCLOBENZAPRINE HCL 5 MG
5 TABLET ORAL NIGHTLY PRN
Qty: 30 TABLET | Refills: 2 | Status: SHIPPED | OUTPATIENT
Start: 2025-01-16

## 2025-01-16 NOTE — TELEPHONE ENCOUNTER
Patient is requesting a refill on her Ferrous Sulfate (Iron 325) and her Cyclobenzaprine (Flexeril) 5 mg. She would like for it to be sent to the [facility] on [address].

## 2025-02-18 DIAGNOSIS — E11.42 DIABETIC POLYNEUROPATHY ASSOCIATED WITH TYPE 2 DIABETES MELLITUS (HCC): ICD-10-CM

## 2025-02-18 RX ORDER — GABAPENTIN 800 MG/1
TABLET ORAL
Qty: 90 TABLET | Refills: 1 | Status: SHIPPED | OUTPATIENT
Start: 2025-02-18 | End: 2025-05-18

## 2025-03-15 SDOH — ECONOMIC STABILITY: INCOME INSECURITY: IN THE LAST 12 MONTHS, WAS THERE A TIME WHEN YOU WERE NOT ABLE TO PAY THE MORTGAGE OR RENT ON TIME?: NO

## 2025-03-15 SDOH — ECONOMIC STABILITY: FOOD INSECURITY: WITHIN THE PAST 12 MONTHS, YOU WORRIED THAT YOUR FOOD WOULD RUN OUT BEFORE YOU GOT MONEY TO BUY MORE.: NEVER TRUE

## 2025-03-15 SDOH — ECONOMIC STABILITY: FOOD INSECURITY: WITHIN THE PAST 12 MONTHS, THE FOOD YOU BOUGHT JUST DIDN'T LAST AND YOU DIDN'T HAVE MONEY TO GET MORE.: NEVER TRUE

## 2025-03-15 SDOH — ECONOMIC STABILITY: TRANSPORTATION INSECURITY
IN THE PAST 12 MONTHS, HAS THE LACK OF TRANSPORTATION KEPT YOU FROM MEDICAL APPOINTMENTS OR FROM GETTING MEDICATIONS?: NO

## 2025-03-15 SDOH — ECONOMIC STABILITY: TRANSPORTATION INSECURITY
IN THE PAST 12 MONTHS, HAS LACK OF TRANSPORTATION KEPT YOU FROM MEETINGS, WORK, OR FROM GETTING THINGS NEEDED FOR DAILY LIVING?: NO

## 2025-03-17 ENCOUNTER — OFFICE VISIT (OUTPATIENT)
Facility: CLINIC | Age: 85
End: 2025-03-17
Payer: MEDICARE

## 2025-03-17 VITALS
WEIGHT: 134 LBS | BODY MASS INDEX: 25.3 KG/M2 | DIASTOLIC BLOOD PRESSURE: 82 MMHG | HEIGHT: 61 IN | TEMPERATURE: 97.6 F | SYSTOLIC BLOOD PRESSURE: 120 MMHG | HEART RATE: 95 BPM | OXYGEN SATURATION: 98 %

## 2025-03-17 DIAGNOSIS — I25.10 CORONARY ARTERY DISEASE INVOLVING NATIVE HEART WITHOUT ANGINA PECTORIS, UNSPECIFIED VESSEL OR LESION TYPE: ICD-10-CM

## 2025-03-17 DIAGNOSIS — I10 BENIGN HYPERTENSION: ICD-10-CM

## 2025-03-17 DIAGNOSIS — E11.42 DIABETIC POLYNEUROPATHY ASSOCIATED WITH TYPE 2 DIABETES MELLITUS: ICD-10-CM

## 2025-03-17 DIAGNOSIS — E11.9 TYPE 2 DIABETES MELLITUS WITHOUT COMPLICATION, WITHOUT LONG-TERM CURRENT USE OF INSULIN: Primary | ICD-10-CM

## 2025-03-17 DIAGNOSIS — E78.2 MIXED HYPERLIPIDEMIA: ICD-10-CM

## 2025-03-17 PROCEDURE — 3079F DIAST BP 80-89 MM HG: CPT | Performed by: NURSE PRACTITIONER

## 2025-03-17 PROCEDURE — 1123F ACP DISCUSS/DSCN MKR DOCD: CPT | Performed by: NURSE PRACTITIONER

## 2025-03-17 PROCEDURE — 1125F AMNT PAIN NOTED PAIN PRSNT: CPT | Performed by: NURSE PRACTITIONER

## 2025-03-17 PROCEDURE — 1159F MED LIST DOCD IN RCRD: CPT | Performed by: NURSE PRACTITIONER

## 2025-03-17 PROCEDURE — 99214 OFFICE O/P EST MOD 30 MIN: CPT | Performed by: NURSE PRACTITIONER

## 2025-03-17 PROCEDURE — 3074F SYST BP LT 130 MM HG: CPT | Performed by: NURSE PRACTITIONER

## 2025-03-17 RX ORDER — GABAPENTIN 800 MG/1
TABLET ORAL
Qty: 90 TABLET | Refills: 5 | Status: SHIPPED | OUTPATIENT
Start: 2025-03-17 | End: 2025-06-14

## 2025-03-17 NOTE — PROGRESS NOTES
Chief Complaint   Patient presents with    Follow-up Chronic Condition     \"Have you been to the ER, urgent care clinic since your last visit?  Hospitalized since your last visit?\"    NO    “Have you seen or consulted any other health care providers outside of [facility] since your last visit?”    YES - When: approximately 3 days ago.  Where and Why: Ortho Dr. Garcia.     /82 (BP Site: Left Upper Arm, Patient Position: Sitting)   Pulse 95   Temp 97.6 °F (36.4 °C) (Temporal)   Ht 1.549 m (5' 1\")   Wt 60.8 kg (134 lb)   SpO2 98%   BMI 25.32 kg/m²      No data recorded     [facility] Social Drivers Of Health ([facility]) Screening Questionnaire       Question 3/15/2025  5:20 PM EDT - Filed by Patient    Within the past 12 months, you worried that your food would run out before you got the money to buy more. Never true    Within the past 12 months, the food you bought just didn't last and you didn't have money to get more. Never true    In the past 12 months, has lack of transportation kept you from medical appointments or from getting medications? No    In the past 12 months, has lack of transportation kept you from meetings, work, or from getting things needed for daily living? No    In the last 12 months, was there a time when you were not able to pay the mortgage or rent on time? No    In the past 12 months, how many times have you moved where you were living? 0    At any time in the past 12 months, were you homeless or living in a shelter (including now)? No    In the past 12 months has the electric, gas, oil, or water company threatened to shut off services in your home? No    Would you like resources for any of these topics? No, thank you               “Have you had a diabetic eye exam?”    NO     Date of last diabetic eye exam: 2023           Click Here for Release of Records Request     Identified Patient with 2 Patient Identifiers-Name and   
follow-up since his [facility]. She is aware that she will require cardiac clearance prior to her shoulder replacement surgery.    SOCIAL HISTORY  She worked as a nurse for 47 years.    MEDICATIONS  Current: Gabapentin, metformin, Lipitor.  Discontinued: Plaquenil.    Review of Systems   A comprehensive review of system was obtained and negative except findings in the HPI      Objective   Blood pressure 120/82, pulse 95, temperature 97.6 °F (36.4 °C), temperature source Temporal, height 1.549 m (5' 1\"), weight 60.8 kg (134 lb), SpO2 98%.  Physical Exam  Physical Examination:   GENERAL ASSESSMENT: well developed and well nourished  CHEST: normal air exchange, no rales, no rhonchi, no wheezes, respiratory effort normal with no retractions  HEART: regular rate and rhythm, normal S1/S2, no murmurs             The patient (or guardian, if applicable) and other individuals in attendance with the patient were advised that Artificial Intelligence will be utilized during this visit to record, process the conversation to generate a clinical note and to support improvement of the AI technology. The patient (or guardian, if applicable) and other individuals in attendance at the appointment consented to the use of AI, including the recording.      An electronic signature was used to authenticate this note.    --CHRISTOPHER Last NP

## 2025-03-18 ENCOUNTER — RESULTS FOLLOW-UP (OUTPATIENT)
Facility: CLINIC | Age: 85
End: 2025-03-18

## 2025-03-18 LAB
ALBUMIN SERPL-MCNC: 4 G/DL (ref 3.7–4.7)
ALP SERPL-CCNC: 93 IU/L (ref 44–121)
ALT SERPL-CCNC: 28 IU/L (ref 0–32)
AST SERPL-CCNC: 34 IU/L (ref 0–40)
BASOPHILS # BLD AUTO: 0.1 X10E3/UL (ref 0–0.2)
BASOPHILS NFR BLD AUTO: 1 %
BILIRUB SERPL-MCNC: 0.4 MG/DL (ref 0–1.2)
BUN SERPL-MCNC: 17 MG/DL (ref 8–27)
BUN/CREAT SERPL: 25 (ref 12–28)
CALCIUM SERPL-MCNC: 8.7 MG/DL (ref 8.7–10.3)
CHLORIDE SERPL-SCNC: 103 MMOL/L (ref 96–106)
CHOLEST SERPL-MCNC: 125 MG/DL (ref 100–199)
CO2 SERPL-SCNC: 24 MMOL/L (ref 20–29)
CREAT SERPL-MCNC: 0.69 MG/DL (ref 0.57–1)
EGFRCR SERPLBLD CKD-EPI 2021: 85 ML/MIN/1.73
EOSINOPHIL # BLD AUTO: 0.2 X10E3/UL (ref 0–0.4)
EOSINOPHIL NFR BLD AUTO: 2 %
ERYTHROCYTE [DISTWIDTH] IN BLOOD BY AUTOMATED COUNT: 12.3 % (ref 11.7–15.4)
GLOBULIN SER CALC-MCNC: 2.2 G/DL (ref 1.5–4.5)
GLUCOSE SERPL-MCNC: 105 MG/DL (ref 70–99)
HBA1C MFR BLD: 6.4 % (ref 4.8–5.6)
HCT VFR BLD AUTO: 38.9 % (ref 34–46.6)
HDLC SERPL-MCNC: 53 MG/DL
HGB BLD-MCNC: 12.7 G/DL (ref 11.1–15.9)
IMM GRANULOCYTES # BLD AUTO: 0.1 X10E3/UL (ref 0–0.1)
IMM GRANULOCYTES NFR BLD AUTO: 1 %
LDLC SERPL CALC-MCNC: 56 MG/DL (ref 0–99)
LYMPHOCYTES # BLD AUTO: 3 X10E3/UL (ref 0.7–3.1)
LYMPHOCYTES NFR BLD AUTO: 38 %
MCH RBC QN AUTO: 33.7 PG (ref 26.6–33)
MCHC RBC AUTO-ENTMCNC: 32.6 G/DL (ref 31.5–35.7)
MCV RBC AUTO: 103 FL (ref 79–97)
MONOCYTES # BLD AUTO: 0.7 X10E3/UL (ref 0.1–0.9)
MONOCYTES NFR BLD AUTO: 9 %
NEUTROPHILS # BLD AUTO: 3.9 X10E3/UL (ref 1.4–7)
NEUTROPHILS NFR BLD AUTO: 49 %
PLATELET # BLD AUTO: 224 X10E3/UL (ref 150–450)
POTASSIUM SERPL-SCNC: 4.5 MMOL/L (ref 3.5–5.2)
PROT SERPL-MCNC: 6.2 G/DL (ref 6–8.5)
RBC # BLD AUTO: 3.77 X10E6/UL (ref 3.77–5.28)
SODIUM SERPL-SCNC: 142 MMOL/L (ref 134–144)
TRIGL SERPL-MCNC: 81 MG/DL (ref 0–149)
VLDLC SERPL CALC-MCNC: 16 MG/DL (ref 5–40)
WBC # BLD AUTO: 7.9 X10E3/UL (ref 3.4–10.8)

## 2025-03-26 ENCOUNTER — TRANSCRIBE ORDERS (OUTPATIENT)
Facility: HOSPITAL | Age: 85
End: 2025-03-26

## 2025-03-26 DIAGNOSIS — M19.011 OSTEOARTHRITIS OF RIGHT GLENOHUMERAL JOINT: Primary | ICD-10-CM

## 2025-04-07 ENCOUNTER — HOSPITAL ENCOUNTER (OUTPATIENT)
Facility: HOSPITAL | Age: 85
Discharge: HOME | End: 2025-04-10
Payer: MEDICARE

## 2025-04-07 DIAGNOSIS — M19.011 OSTEOARTHRITIS OF RIGHT GLENOHUMERAL JOINT: ICD-10-CM

## 2025-04-07 PROCEDURE — 73200 CT UPPER EXTREMITY W/O DYE: CPT

## 2025-05-17 DIAGNOSIS — M06.09 SERONEGATIVE RHEUMATOID ARTHRITIS OF MULTIPLE SITES (HCC): ICD-10-CM

## 2025-05-19 RX ORDER — CYCLOBENZAPRINE HCL 5 MG
TABLET ORAL
Qty: 30 TABLET | Refills: 2 | Status: SHIPPED | OUTPATIENT
Start: 2025-05-19

## 2025-05-22 NOTE — H&P (VIEW-ONLY)
This 84 y.o. year old female presents with complaints of  chronic right shoulder pain.          Conservative measures have been exhausted prior to the decision for surgery. Patient has discussed the risks, alternatives, and benefits of the surgery with surgeon and has elected to proceed with surgical intervention.    Type of surgery : Procedure(s) (LRB):  RIGHT REVERSE SHOULDER  ARTHROPLASTY WITH BICEPS TENODESIS AND AXILLARY NERVE DISSECTION (GEN W/EXPAREL REGIONAL NERVE BLOCK) (Right)  Date of procedure:  6/23/25  Surgeon: Primary: Justin Ba MD     PCP: Adriana Camacho APRN - NP   Specialists: None       Has not followed up with Cardiology recently. No cardiac testing has been performed. Scheduled to see Dr. Crockett (Cardiology) for the first time on 6/9.       LMP (If applicable): N/A      Hx of Psychological Disorders: No    Hx of Seizure: No      Hx of Cancer: Yes    Hx of Autoimmune Disorders: Yes    Hx of Anemias: Yes    Hx of Blood Transfusions: No    Personal Hx of Blood Clotting Disorder/DVT/PE: No    Family Hx of Blood Clotting Disorder/DVT/PE: No    Hx of Hormone Disorders: No    Hx of DM: Yes     Hx of CVA/TIA: No     Hearing Aids: No     Implantable Devices (Defibrillator, Pacemaker, nerve stimulator, portacath, insulin pump, continuous glucose monitor, etc): Yes     Nerve Stimulator      AICD/Pacemaker (If so, has it been interrogated and/or battery change within the last 6 months?)? No        Coronary Artery Stent Placement within last 6 months? No       Hx of Rheumatic Fever/HTN/HLD/CAD/CHF/Cardiomyopathy/Arrhythmia/Heart Valve Abnormalities/Aneurysm: No    Hx of TB/Asthma/COPD/TOÑO: No    Hx of GERD: Yes     Hx of Liver Disease: Yes     Known Kidney Disease (including dialysis recipient)? Yes      Frequent UTIs: No     Abx used in the past: N/A     Requires Diflucan after abx: No     History of MRSA/MSSA: No    Recent ED/Hospitalizations/Urgent Care visits: No        Acute  head with mild flattening of the humeral head. There is cupping  of the glenoid.    Glenoid bone depth in the transverse plane: 2.6 cm. No significant retroversion.    Bones: Bones are osteopenic. No fracture, dislocation, or periosteal reaction.    Joint fluid:  No significant joint effusion. Multiple loose bodies are seen in  the joint space.    Rotator cuff tendons: No massive cuff tear identified given the limitations of  CT.    Acromioclavicular joint: Intact. Acromion process type: 2    Muscles: No atrophy.    Other: No soft tissue mass. Partially imaged lung is clear.  Impression: Severe right glenohumeral osteoarthritis with multiple loose bodies.    Electronically signed by RENY LINDA       A thorough patient chart review was completed during this visit, including-but-not-limited-to a review of all pertinent previous PCP/Specialist Notes on file.     Past Medical History:   Diagnosis Date    Anemia     Arthritis of right shoulder region     CAD (coronary artery disease)     Cataract     Degenerative joint disease of right hip     Diabetes (HCC)     Diabetic nephropathy (HCC)     Diabetic polyneuropathy associated with type 2 diabetes mellitus (HCC)     Gastritis     H/O Bell's palsy 07/09/2012    Hearing loss     History of heart artery stent 1997    x1    HLD (hyperlipidemia)     MI (myocardial infarction) (HCC) 1998    NAFLD (nonalcoholic fatty liver disease)     Neuropathy     Obesity     Osteoporosis, post-menopausal     Rheumatoid arthritis (HCC)     SLE (systemic lupus erythematosus) (HCC)     Squamous cell cancer of skin of left cheek     Type 2 diabetes mellitus without complication (HCC)        Past Surgical History:   Procedure Laterality Date    CARDIAC CATHETERIZATION  1997    Stent placement. LAD    CATARACT REMOVAL Left 01/07/2020    CATARACT REMOVAL Right 12/19/2019    CATARACT REMOVAL Bilateral     CHOLECYSTECTOMY      COLONOSCOPY N/A 10/9/2018    COLONOSCOPY performed by Darius Eugene MD

## 2025-05-22 NOTE — H&P
This 84 y.o. year old female presents with complaints of  chronic right shoulder pain.          Conservative measures have been exhausted prior to the decision for surgery. Patient has discussed the risks, alternatives, and benefits of the surgery with surgeon and has elected to proceed with surgical intervention.    Type of surgery : Procedure(s) (LRB):  RIGHT REVERSE SHOULDER  ARTHROPLASTY WITH BICEPS TENODESIS AND AXILLARY NERVE DISSECTION (GEN W/EXPAREL REGIONAL NERVE BLOCK) (Right)  Date of procedure:  6/23/25  Surgeon: Primary: Justin Ba MD     PCP: Adriana Camacho APRN - NP   Specialists: None       Has not followed up with Cardiology recently. No cardiac testing has been performed. Scheduled to see Dr. Crockett (Cardiology) for the first time on 6/9.       LMP (If applicable): N/A      Hx of Psychological Disorders: No    Hx of Seizure: No      Hx of Cancer: Yes    Hx of Autoimmune Disorders: Yes    Hx of Anemias: Yes    Hx of Blood Transfusions: No    Personal Hx of Blood Clotting Disorder/DVT/PE: No    Family Hx of Blood Clotting Disorder/DVT/PE: No    Hx of Hormone Disorders: No    Hx of DM: Yes     Hx of CVA/TIA: No     Hearing Aids: No     Implantable Devices (Defibrillator, Pacemaker, nerve stimulator, portacath, insulin pump, continuous glucose monitor, etc): Yes     Nerve Stimulator      AICD/Pacemaker (If so, has it been interrogated and/or battery change within the last 6 months?)? No        Coronary Artery Stent Placement within last 6 months? No       Hx of Rheumatic Fever/HTN/HLD/CAD/CHF/Cardiomyopathy/Arrhythmia/Heart Valve Abnormalities/Aneurysm: No    Hx of TB/Asthma/COPD/TOÑO: No    Hx of GERD: Yes     Hx of Liver Disease: Yes     Known Kidney Disease (including dialysis recipient)? Yes      Frequent UTIs: No     Abx used in the past: N/A     Requires Diflucan after abx: No     History of MRSA/MSSA: No    Recent ED/Hospitalizations/Urgent Care visits: No        Acute

## 2025-06-04 ENCOUNTER — HOSPITAL ENCOUNTER (OUTPATIENT)
Facility: HOSPITAL | Age: 85
Discharge: HOME | End: 2025-06-07
Payer: MEDICARE

## 2025-06-04 VITALS
RESPIRATION RATE: 16 BRPM | HEIGHT: 60 IN | TEMPERATURE: 97.1 F | OXYGEN SATURATION: 100 % | SYSTOLIC BLOOD PRESSURE: 130 MMHG | WEIGHT: 132.5 LBS | BODY MASS INDEX: 26.01 KG/M2 | HEART RATE: 69 BPM | DIASTOLIC BLOOD PRESSURE: 60 MMHG

## 2025-06-04 LAB
ABO + RH BLD: NORMAL
ALBUMIN SERPL-MCNC: 4.1 G/DL (ref 3.5–5)
ALBUMIN/GLOB SERPL: 1.4 (ref 1.1–2.2)
ALP SERPL-CCNC: 80 U/L (ref 45–117)
ALT SERPL-CCNC: 44 U/L (ref 12–78)
ANION GAP SERPL CALC-SCNC: 6 MMOL/L (ref 2–12)
APPEARANCE UR: ABNORMAL
APTT PPP: 25.9 SEC (ref 22.1–31)
AST SERPL-CCNC: 45 U/L (ref 15–37)
BACTERIA URNS QL MICRO: ABNORMAL /HPF
BASOPHILS # BLD: 0.08 K/UL (ref 0–0.1)
BASOPHILS NFR BLD: 1.2 % (ref 0–1)
BILIRUB SERPL-MCNC: 0.8 MG/DL (ref 0.2–1)
BILIRUB UR QL: NEGATIVE
BLOOD GROUP ANTIBODIES SERPL: NORMAL
BUN SERPL-MCNC: 24 MG/DL (ref 6–20)
BUN/CREAT SERPL: 34 (ref 12–20)
CALCIUM SERPL-MCNC: 9.2 MG/DL (ref 8.5–10.1)
CHLORIDE SERPL-SCNC: 106 MMOL/L (ref 97–108)
CO2 SERPL-SCNC: 28 MMOL/L (ref 21–32)
COLOR UR: ABNORMAL
CREAT SERPL-MCNC: 0.7 MG/DL (ref 0.55–1.02)
DIFFERENTIAL METHOD BLD: ABNORMAL
EOSINOPHIL # BLD: 0.15 K/UL (ref 0–0.4)
EOSINOPHIL NFR BLD: 2.2 % (ref 0–7)
EPITH CASTS URNS QL MICRO: ABNORMAL /LPF
ERYTHROCYTE [DISTWIDTH] IN BLOOD BY AUTOMATED COUNT: 12.9 % (ref 11.5–14.5)
EST. AVERAGE GLUCOSE BLD GHB EST-MCNC: 134 MG/DL
GLOBULIN SER CALC-MCNC: 2.9 G/DL (ref 2–4)
GLUCOSE SERPL-MCNC: 102 MG/DL (ref 65–100)
GLUCOSE UR STRIP.AUTO-MCNC: NEGATIVE MG/DL
HBA1C MFR BLD: 6.3 % (ref 4–5.6)
HCT VFR BLD AUTO: 40.9 % (ref 35–47)
HGB BLD-MCNC: 13.2 G/DL (ref 11.5–16)
HGB UR QL STRIP: NEGATIVE
HYALINE CASTS URNS QL MICRO: ABNORMAL /LPF (ref 0–2)
IMM GRANULOCYTES # BLD AUTO: 0.06 K/UL (ref 0–0.04)
IMM GRANULOCYTES NFR BLD AUTO: 0.9 % (ref 0–0.5)
INR PPP: 1 (ref 0.9–1.1)
KETONES UR QL STRIP.AUTO: NEGATIVE MG/DL
LEUKOCYTE ESTERASE UR QL STRIP.AUTO: ABNORMAL
LYMPHOCYTES # BLD: 2.69 K/UL (ref 0.8–3.5)
LYMPHOCYTES NFR BLD: 39 % (ref 12–49)
MCH RBC QN AUTO: 33.2 PG (ref 26–34)
MCHC RBC AUTO-ENTMCNC: 32.3 G/DL (ref 30–36.5)
MCV RBC AUTO: 102.8 FL (ref 80–99)
MONOCYTES # BLD: 0.57 K/UL (ref 0–1)
MONOCYTES NFR BLD: 8.3 % (ref 5–13)
NEUTS SEG # BLD: 3.35 K/UL (ref 1.8–8)
NEUTS SEG NFR BLD: 48.4 % (ref 32–75)
NITRITE UR QL STRIP.AUTO: NEGATIVE
NRBC # BLD: 0 K/UL (ref 0–0.01)
NRBC BLD-RTO: 0 PER 100 WBC
PH UR STRIP: 5.5 (ref 5–8)
PLATELET # BLD AUTO: 203 K/UL (ref 150–400)
PMV BLD AUTO: 10.4 FL (ref 8.9–12.9)
POTASSIUM SERPL-SCNC: 4.1 MMOL/L (ref 3.5–5.1)
PROT SERPL-MCNC: 7 G/DL (ref 6.4–8.2)
PROT UR STRIP-MCNC: NEGATIVE MG/DL
PROTHROMBIN TIME: 10.7 SEC (ref 9.2–11.2)
RBC # BLD AUTO: 3.98 M/UL (ref 3.8–5.2)
RBC #/AREA URNS HPF: ABNORMAL /HPF (ref 0–5)
SODIUM SERPL-SCNC: 140 MMOL/L (ref 136–145)
SP GR UR REFRACTOMETRY: 1.02 (ref 1–1.03)
SPECIMEN EXP DATE BLD: NORMAL
THERAPEUTIC RANGE: NORMAL SECS (ref 58–77)
URINE CULTURE IF INDICATED: ABNORMAL
UROBILINOGEN UR QL STRIP.AUTO: 1 EU/DL (ref 0.2–1)
WBC # BLD AUTO: 6.9 K/UL (ref 3.6–11)
WBC URNS QL MICRO: ABNORMAL /HPF (ref 0–4)

## 2025-06-04 PROCEDURE — 87086 URINE CULTURE/COLONY COUNT: CPT

## 2025-06-04 PROCEDURE — 36415 COLL VENOUS BLD VENIPUNCTURE: CPT

## 2025-06-04 PROCEDURE — 87077 CULTURE AEROBIC IDENTIFY: CPT

## 2025-06-04 PROCEDURE — 85025 COMPLETE CBC W/AUTO DIFF WBC: CPT

## 2025-06-04 PROCEDURE — 86850 RBC ANTIBODY SCREEN: CPT

## 2025-06-04 PROCEDURE — 87186 SC STD MICRODIL/AGAR DIL: CPT

## 2025-06-04 PROCEDURE — 86900 BLOOD TYPING SEROLOGIC ABO: CPT

## 2025-06-04 PROCEDURE — 85610 PROTHROMBIN TIME: CPT

## 2025-06-04 PROCEDURE — 80053 COMPREHEN METABOLIC PANEL: CPT

## 2025-06-04 PROCEDURE — 83036 HEMOGLOBIN GLYCOSYLATED A1C: CPT

## 2025-06-04 PROCEDURE — 86901 BLOOD TYPING SEROLOGIC RH(D): CPT

## 2025-06-04 PROCEDURE — 93005 ELECTROCARDIOGRAM TRACING: CPT | Performed by: ORTHOPAEDIC SURGERY

## 2025-06-04 PROCEDURE — 81001 URINALYSIS AUTO W/SCOPE: CPT

## 2025-06-04 PROCEDURE — 85730 THROMBOPLASTIN TIME PARTIAL: CPT

## 2025-06-04 RX ORDER — LANOLIN ALCOHOL/MO/W.PET/CERES
1000 CREAM (GRAM) TOPICAL DAILY
COMMUNITY

## 2025-06-04 RX ORDER — ACETAMINOPHEN 160 MG
2000 TABLET,DISINTEGRATING ORAL DAILY
COMMUNITY

## 2025-06-04 RX ORDER — ASPIRIN 81 MG/1
81 TABLET ORAL DAILY
COMMUNITY

## 2025-06-04 RX ORDER — GINGER ROOT/GINGER ROOT EXT 262.5 MG
1 CAPSULE ORAL DAILY
COMMUNITY

## 2025-06-04 ASSESSMENT — ENCOUNTER SYMPTOMS
SORE THROAT: 0
VOICE CHANGE: 0
STRIDOR: 0
CHOKING: 0
CONSTIPATION: 0
SINUS PAIN: 0
NAUSEA: 0
COUGH: 0
COLOR CHANGE: 0
SHORTNESS OF BREATH: 0
APNEA: 0
WHEEZING: 0
CHEST TIGHTNESS: 0
RHINORRHEA: 0
SINUS PRESSURE: 0
TROUBLE SWALLOWING: 0

## 2025-06-04 NOTE — DISCHARGE INSTRUCTIONS
[facility]                   [address] [place] [address]   MAIN OR                                  [phone]   MAIN PRE OP                          [phone]                                                                                AMBULATORY PRE OP          [phone]  PRE-ADMISSION TESTING    [phone]   Surgery Date:  6/23/25 Monday       Is surgery arrival time given by surgeon?  YES  NO  If “NO”, [facility] staff will call you between 3 and 7pm the day before your surgery with your arrival time. (If your surgery is on a Monday, we will call you the Friday before.)    Call [phone] after 7pm Monday-Friday if you did not receive this call.    INSTRUCTIONS BEFORE YOUR SURGERY   When You  Arrive Arrive at the 2nd Floor Admitting Desk on the day of your surgery  Have your insurance card, photo ID, and any copayment (if needed)   Food   and   Drink No food or drink (gum , mints, coffee, juice, etc)after midnight the night before surgery.   You may drink WATER ONLY up until 2 hours prior to your surgery time. Please do not add anything to your water as this may result in your surgery being postponed.    No alcohol (beer, wine, liquor) 24 hours before and after surgery   Medications to   TAKE   Morning of Surgery MEDICATIONS TO TAKE THE MORNING OF SURGERY WITH A SIP OF WATER:   Famotidine    Medications  To  STOP      7 days before surgery Non-Steroidal anti-inflammatory Drugs (NSAID's): for example, Ibuprofen (Advil, Motrin), Naproxen (Aleve)  Aspirin, if taking for pain   Herbal supplements, vitamins, and fish oil  Other:  (Pain medications not listed above, including Tylenol may be taken)   Blood  Thinners If you take  Aspirin, Plavix, Coumadin, or any blood-thinning or anti-blood clot medicine, talk to the doctor who prescribed the medications for pre-operative instructions.   Bathing Clothing  Jewelry  Valuables     If you shower the morning of

## 2025-06-05 LAB
BACTERIA SPEC CULT: NORMAL
BACTERIA SPEC CULT: NORMAL
EKG ATRIAL RATE: 81 BPM
EKG DIAGNOSIS: NORMAL
EKG P AXIS: 78 DEGREES
EKG P-R INTERVAL: 276 MS
EKG Q-T INTERVAL: 376 MS
EKG QRS DURATION: 74 MS
EKG QTC CALCULATION (BAZETT): 436 MS
EKG R AXIS: -30 DEGREES
EKG T AXIS: 53 DEGREES
EKG VENTRICULAR RATE: 81 BPM
SERVICE CMNT-IMP: NORMAL

## 2025-06-05 PROCEDURE — 93010 ELECTROCARDIOGRAM REPORT: CPT | Performed by: SPECIALIST

## 2025-06-07 LAB
BACTERIA SPEC CULT: ABNORMAL
BACTERIA SPEC CULT: ABNORMAL
CC UR VC: ABNORMAL
SERVICE CMNT-IMP: ABNORMAL

## 2025-06-09 ENCOUNTER — OFFICE VISIT (OUTPATIENT)
Age: 85
End: 2025-06-09
Payer: COMMERCIAL

## 2025-06-09 VITALS
SYSTOLIC BLOOD PRESSURE: 118 MMHG | DIASTOLIC BLOOD PRESSURE: 64 MMHG | OXYGEN SATURATION: 98 % | HEIGHT: 60 IN | HEART RATE: 76 BPM | RESPIRATION RATE: 16 BRPM | BODY MASS INDEX: 28.27 KG/M2 | WEIGHT: 144 LBS

## 2025-06-09 DIAGNOSIS — E78.2 MIXED HYPERLIPIDEMIA: ICD-10-CM

## 2025-06-09 DIAGNOSIS — E11.9 TYPE 2 DIABETES MELLITUS WITHOUT COMPLICATION, WITHOUT LONG-TERM CURRENT USE OF INSULIN (HCC): ICD-10-CM

## 2025-06-09 DIAGNOSIS — I25.10 CORONARY ARTERY DISEASE INVOLVING NATIVE CORONARY ARTERY OF NATIVE HEART WITHOUT ANGINA PECTORIS: Primary | ICD-10-CM

## 2025-06-09 DIAGNOSIS — Z01.810 PREOP CARDIOVASCULAR EXAM: ICD-10-CM

## 2025-06-09 DIAGNOSIS — I10 BENIGN HYPERTENSION: ICD-10-CM

## 2025-06-09 PROCEDURE — 1036F TOBACCO NON-USER: CPT | Performed by: INTERNAL MEDICINE

## 2025-06-09 PROCEDURE — 3044F HG A1C LEVEL LT 7.0%: CPT | Performed by: INTERNAL MEDICINE

## 2025-06-09 PROCEDURE — G8419 CALC BMI OUT NRM PARAM NOF/U: HCPCS | Performed by: INTERNAL MEDICINE

## 2025-06-09 PROCEDURE — 3074F SYST BP LT 130 MM HG: CPT | Performed by: INTERNAL MEDICINE

## 2025-06-09 PROCEDURE — 99204 OFFICE O/P NEW MOD 45 MIN: CPT | Performed by: INTERNAL MEDICINE

## 2025-06-09 PROCEDURE — 1123F ACP DISCUSS/DSCN MKR DOCD: CPT | Performed by: INTERNAL MEDICINE

## 2025-06-09 PROCEDURE — G8427 DOCREV CUR MEDS BY ELIG CLIN: HCPCS | Performed by: INTERNAL MEDICINE

## 2025-06-09 PROCEDURE — 3078F DIAST BP <80 MM HG: CPT | Performed by: INTERNAL MEDICINE

## 2025-06-09 PROCEDURE — G8399 PT W/DXA RESULTS DOCUMENT: HCPCS | Performed by: INTERNAL MEDICINE

## 2025-06-09 PROCEDURE — 1090F PRES/ABSN URINE INCON ASSESS: CPT | Performed by: INTERNAL MEDICINE

## 2025-06-09 NOTE — PROGRESS NOTES
Patient: Jnenifer Anderson  : 1940    Primary Cardiologist:Dr. OMA Crockett  EP Cardiologist:NONE   Primary [facility] cardiologist:   PCP: Adriana Camacho APRN - NP    Today's Date: 2025      ASSESSMENT AND PLAN:     Assessment and Plan:   Preop CV assessment  Type of surgery : Procedure(s) (LRB):  RIGHT REVERSE SHOULDER  ARTHROPLASTY WITH BICEPS TENODESIS AND AXILLARY NERVE DISSECTION (GEN W/EXPAREL REGIONAL NERVE BLOCK) (Right)  Date of procedure:  25  Surgeon: Primary: Justin Ba MD     Denies symptoms  EKG SR first degree AVB single PVCs  Suggested updated echo the proceed    CAD  PCI LAD  FL  Stress tests with Dr. Reyes.  Echocardiogram  ASA, statin  LDL at goal  Lab Results   Component Value Date    LDL 56 2025     DM2    A1C 6.3    RAOFf meds it seems    Hx infrequent PVC    Sp gastric bypass UF 25 years        Follow up with Dr. OMA Crockett one year.      No diagnosis found.    HISTORY OF PRESENT ILLNESS:     History of Present Illness:  Jennifer Anderson is a 84 y.o. female referred for preop assessment.    Dr. Reyes [facility] - CAD sp PCI LAD  Dr. Dean Malhotra.    See above outline and discussion.    Denies chest pain, edema, syncope, shortness of breath at rest, dyspnea on exertion, PND or orthopnea.  Has no tachycardia, palpitations or sense of arrhythmia.     PAST MEDICAL HISTORY:     Past Medical History:   Diagnosis Date    Anemia     Arthritis of right shoulder region     CAD (coronary artery disease)     Cataract     Degenerative joint disease of right hip     Diabetes (HCC)     Diabetic nephropathy (HCC)     Diabetic polyneuropathy associated with type 2 diabetes mellitus (HCC)     Gastritis     GERD (gastroesophageal reflux disease)     H/O Bell's palsy 2012    Hearing loss     History of heart artery stent 1998    x1    HLD (hyperlipidemia)     MI (myocardial infarction) (HCC) 1998    NAFLD (nonalcoholic fatty liver disease)     Neuropathy     Obesity

## 2025-06-09 NOTE — PROGRESS NOTES
had concerns including Hyperlipidemia, Hypertension, and New Patient.    Vitals:    06/09/25 0957   BP: 118/64   BP Site: Left Upper Arm   Patient Position: Sitting   Pulse: 76   Resp: 16   SpO2: 98%   Weight: 65.3 kg (144 lb)   Height: 1.524 m (5')        Chest pain No    Refills No        1. Have you been to the ER, urgent care clinic since your last visit? No       Hospitalized since your last visit? No       Where?        Date?

## 2025-06-10 ENCOUNTER — TELEPHONE (OUTPATIENT)
Facility: HOSPITAL | Age: 85
End: 2025-06-10

## 2025-06-10 DIAGNOSIS — N39.0 E. COLI UTI: Primary | ICD-10-CM

## 2025-06-10 DIAGNOSIS — B96.20 E. COLI UTI: Primary | ICD-10-CM

## 2025-06-10 RX ORDER — NITROFURANTOIN 25; 75 MG/1; MG/1
100 CAPSULE ORAL 2 TIMES DAILY
Qty: 14 CAPSULE | Refills: 0 | Status: SHIPPED | OUTPATIENT
Start: 2025-06-10 | End: 2025-06-17

## 2025-06-10 NOTE — TELEPHONE ENCOUNTER
Urinalysis indicative of UTI and culture sent. Culture positive for E Coli. E-script for Macrobid 100 mg twice a day for 7 days starting today sent to pharmacy on file.     Contacted the patient via phone number ending in [phone]. Had to leave a VM.      PCP and surgeon notified of treatment. PTA med list updated.    CHRISTOPHER Meehan - NP

## 2025-06-12 NOTE — TELEPHONE ENCOUNTER
Received a refill request by fax from [facility] at [address] for Metformin 500 MG tablets qty of 60.     Last seen: 03/17/2025  Next appointment:  10/20/2025

## 2025-06-13 LAB
BACTERIA ISLT: ABNORMAL
OTHER ANTIBIOTIC SUSC ISLT: ABNORMAL
OTHER ANTIBIOTIC SUSC ISLT: ABNORMAL
SOURCE: ABNORMAL
SPECIMEN SOURCE: ABNORMAL

## 2025-06-16 ENCOUNTER — CLINICAL DOCUMENTATION (OUTPATIENT)
Age: 85
End: 2025-06-16

## 2025-06-16 NOTE — PROGRESS NOTES
PAT form received, completed and return faxed on 06/13/25 6/23/25  Dr. Ba   R- Reverse Shoulder   General Anesthesia     Med hold instructions requested:Aspirin- may hold 5-7 days and resume when safe post procedurally

## 2025-06-17 ENCOUNTER — TELEPHONE (OUTPATIENT)
Age: 85
End: 2025-06-17

## 2025-06-17 NOTE — TELEPHONE ENCOUNTER
Dr Lewis office is calling to get cardiac clearance for a right reverse total shoulder surgery. Patient surgery scheduled 6/23. Cardiac clearance forms were faxed.    # [phone]  Fax # [phone]

## 2025-06-17 NOTE — TELEPHONE ENCOUNTER
LVM w/ PAT to notify additional form completed and faxed.   Additional form asked if clear for surgery, she has been cleared.

## 2025-06-18 ENCOUNTER — HOSPITAL ENCOUNTER (OUTPATIENT)
Facility: HOSPITAL | Age: 85
Discharge: HOME | End: 2025-06-21
Payer: MEDICARE

## 2025-06-18 LAB
APPEARANCE UR: CLEAR
BACTERIA URNS QL MICRO: NEGATIVE /HPF
BILIRUB UR QL: NEGATIVE
COLOR UR: ABNORMAL
EPITH CASTS URNS QL MICRO: ABNORMAL /LPF
GLUCOSE UR STRIP.AUTO-MCNC: 250 MG/DL
HGB UR QL STRIP: NEGATIVE
KETONES UR QL STRIP.AUTO: ABNORMAL MG/DL
LEUKOCYTE ESTERASE UR QL STRIP.AUTO: ABNORMAL
NITRITE UR QL STRIP.AUTO: NEGATIVE
PH UR STRIP: 5.5 (ref 5–8)
PROT UR STRIP-MCNC: 30 MG/DL
RBC #/AREA URNS HPF: ABNORMAL /HPF (ref 0–5)
SP GR UR REFRACTOMETRY: 1.02 (ref 1–1.03)
URINE CULTURE IF INDICATED: ABNORMAL
UROBILINOGEN UR QL STRIP.AUTO: 1 EU/DL (ref 0.2–1)
WBC URNS QL MICRO: ABNORMAL /HPF (ref 0–4)

## 2025-06-18 PROCEDURE — 81001 URINALYSIS AUTO W/SCOPE: CPT

## 2025-06-20 ENCOUNTER — ANESTHESIA EVENT (OUTPATIENT)
Facility: HOSPITAL | Age: 85
End: 2025-06-20
Payer: MEDICARE

## 2025-06-20 RX ORDER — OXYCODONE HYDROCHLORIDE 5 MG/1
5 TABLET ORAL
Refills: 0 | Status: CANCELLED | OUTPATIENT
Start: 2025-06-20

## 2025-06-20 RX ORDER — NALOXONE HYDROCHLORIDE 0.4 MG/ML
INJECTION, SOLUTION INTRAMUSCULAR; INTRAVENOUS; SUBCUTANEOUS PRN
Status: CANCELLED | OUTPATIENT
Start: 2025-06-20

## 2025-06-20 RX ORDER — SODIUM CHLORIDE, SODIUM LACTATE, POTASSIUM CHLORIDE, CALCIUM CHLORIDE 600; 310; 30; 20 MG/100ML; MG/100ML; MG/100ML; MG/100ML
INJECTION, SOLUTION INTRAVENOUS CONTINUOUS
Status: CANCELLED | OUTPATIENT
Start: 2025-06-20

## 2025-06-20 RX ORDER — ONDANSETRON 2 MG/ML
4 INJECTION INTRAMUSCULAR; INTRAVENOUS
Status: CANCELLED | OUTPATIENT
Start: 2025-06-20

## 2025-06-20 RX ORDER — KETOROLAC TROMETHAMINE 15 MG/ML
15 INJECTION, SOLUTION INTRAMUSCULAR; INTRAVENOUS
Status: CANCELLED | OUTPATIENT
Start: 2025-06-20

## 2025-06-20 RX ORDER — FENTANYL CITRATE 50 UG/ML
25 INJECTION, SOLUTION INTRAMUSCULAR; INTRAVENOUS EVERY 5 MIN PRN
Refills: 0 | Status: CANCELLED | OUTPATIENT
Start: 2025-06-20

## 2025-06-20 RX ORDER — DIPHENHYDRAMINE HYDROCHLORIDE 50 MG/ML
12.5 INJECTION, SOLUTION INTRAMUSCULAR; INTRAVENOUS
Status: CANCELLED | OUTPATIENT
Start: 2025-06-20

## 2025-06-20 NOTE — DISCHARGE INSTRUCTIONS
Shoulder Surgery Discharge Instructions  Dr. Justin Ba    Please take the time to review the following instructions before you leave the hospital and use them as guidelines during your recovery from surgery.     If you have any questions, you may contact my staff directly at [phone].    We are in clinic every morning and will be begin answering voicemails after 12:30pm.  If you need immediate assistance, call the main office number [phone].  The most efficient means of communication with the office is through [employer].      Wound Care / Dressing Change    Two days after surgery you should remove the big, bulky white dressing.  Do NOT remove the clear, plastic dressing against your skin. Keep the clear, plastic dressing intact until your follow up in the office.    Showering / Bathing    If the clear plastic dressing is intact and there is no drainage, you may shower.  If the dressing is loose or water gets under it please notify our office.  If there is drainage, please call the office immediately.     Sling    Keep your arm in the immobilizer/sling at all times except when showering, changing your clothes, and doing the exercises shown to you by Dr. Ba or your physical/occupational therapist prior to your discharge from the hospital.  Keep your arm at your side when changing your clothes and showering. Do not move it away from your body.     Ice and Elevation    Ice therapy should be used consistently for 48 hours after surgery.  Subsequently, you should ice 3 times per day for 20 minutes at a time.  After the first week, you may use ice as needed for pain and swelling.  Please ensure there is protective barrier between your skin and the ice.      Diet    You may advance your regular diet as tolerated. Increase your clear liquid intake for the next 2-3 days.         Medications  Your prescriptions were sent to the pharmacy on file.  We are unable  “numb up” or “block.”  What this means is that for the next few hours, you should expect to have a numb limb.    Below are some things we wish for you to read and be familiar with concerning your anesthetized limb.    Caring For a Blocked Body Part    General Considerations:  The numbness may last up to 24 hours  You must protect your arm or leg.  The blocked extremity is numb, weak, and difficult for your brain to locate and communicate with.  To do this you should:  Pay attention to the position of the blocked limb at all times.  Be very careful when placing hot or cod items on a numb limb.  You could cause tissue damage like burns or frostbite if you are unable to feel temperature.  Carefully follow your discharge instructions regarding the use of these therapies in you post-operative care.  Carefully pad the affected limb.  Normally we continually move and adjust the position of our bodies without thinking about it.  This movement and continuous repositioning helps to prevent injuries from immobility.  When a limb is numb it still requires this care  Be extremely careful not to bump or hit the numb body part.  This can result in an unrecognized injury, at lease until the blocked limb wakes up.  It can also result in worse pain of your already post-surgical limb.    Arm/Shoulder Blocks:  You may experience a droopy eyelid, nasal stuffiness, and redness of the eye after receiving an arm/shoulder block.  This is called Jesu’s Syndrome, and is very common.  There is no need for concern.  You may also experience mild hoarseness, but all of these symptoms should resolve within 24 hours.  Some patients may experience mild shortness of breath.  A sitting position will help alleviate this and it should resolve within 24 hours.  If you experience significant or progressive worsening of the shortness of breath, seek medical attention immediately.    Knee/Foot Blocks:  DO NOT use the blocked leg to walk, balance or

## 2025-06-23 ENCOUNTER — ANESTHESIA (OUTPATIENT)
Facility: HOSPITAL | Age: 85
End: 2025-06-23
Payer: MEDICARE

## 2025-06-23 ENCOUNTER — HOSPITAL ENCOUNTER (OUTPATIENT)
Facility: HOSPITAL | Age: 85
Setting detail: OUTPATIENT SURGERY
Discharge: HOME | End: 2025-06-23
Attending: ORTHOPAEDIC SURGERY | Admitting: ORTHOPAEDIC SURGERY
Payer: MEDICARE

## 2025-06-23 ENCOUNTER — APPOINTMENT (OUTPATIENT)
Facility: HOSPITAL | Age: 85
End: 2025-06-23
Attending: ORTHOPAEDIC SURGERY
Payer: MEDICARE

## 2025-06-23 VITALS
DIASTOLIC BLOOD PRESSURE: 51 MMHG | WEIGHT: 132.5 LBS | TEMPERATURE: 97.6 F | HEART RATE: 77 BPM | HEIGHT: 60 IN | RESPIRATION RATE: 21 BRPM | SYSTOLIC BLOOD PRESSURE: 136 MMHG | BODY MASS INDEX: 26.01 KG/M2 | OXYGEN SATURATION: 100 %

## 2025-06-23 DIAGNOSIS — G89.18 POST-OP PAIN: Primary | ICD-10-CM

## 2025-06-23 LAB
ABO + RH BLD: NORMAL
BLOOD GROUP ANTIBODIES SERPL: NORMAL
GLUCOSE BLD STRIP.AUTO-MCNC: 113 MG/DL (ref 65–117)
GLUCOSE BLD STRIP.AUTO-MCNC: 132 MG/DL (ref 65–117)
SERVICE CMNT-IMP: ABNORMAL
SERVICE CMNT-IMP: NORMAL
SPECIMEN EXP DATE BLD: NORMAL

## 2025-06-23 PROCEDURE — 7100000001 HC PACU RECOVERY - ADDTL 15 MIN: Performed by: ORTHOPAEDIC SURGERY

## 2025-06-23 PROCEDURE — C1769 GUIDE WIRE: HCPCS | Performed by: ORTHOPAEDIC SURGERY

## 2025-06-23 PROCEDURE — 2500000003 HC RX 250 WO HCPCS: Performed by: ORTHOPAEDIC SURGERY

## 2025-06-23 PROCEDURE — 7100000011 HC PHASE II RECOVERY - ADDTL 15 MIN: Performed by: ORTHOPAEDIC SURGERY

## 2025-06-23 PROCEDURE — 7100000010 HC PHASE II RECOVERY - FIRST 15 MIN: Performed by: ORTHOPAEDIC SURGERY

## 2025-06-23 PROCEDURE — 86900 BLOOD TYPING SEROLOGIC ABO: CPT

## 2025-06-23 PROCEDURE — 6360000002 HC RX W HCPCS: Performed by: ORTHOPAEDIC SURGERY

## 2025-06-23 PROCEDURE — 3600000014 HC SURGERY LEVEL 4 ADDTL 15MIN: Performed by: ORTHOPAEDIC SURGERY

## 2025-06-23 PROCEDURE — 6360000002 HC RX W HCPCS: Performed by: NURSE ANESTHETIST, CERTIFIED REGISTERED

## 2025-06-23 PROCEDURE — 3700000000 HC ANESTHESIA ATTENDED CARE: Performed by: ORTHOPAEDIC SURGERY

## 2025-06-23 PROCEDURE — 2500000003 HC RX 250 WO HCPCS: Performed by: NURSE ANESTHETIST, CERTIFIED REGISTERED

## 2025-06-23 PROCEDURE — 73020 X-RAY EXAM OF SHOULDER: CPT

## 2025-06-23 PROCEDURE — 86850 RBC ANTIBODY SCREEN: CPT

## 2025-06-23 PROCEDURE — 82962 GLUCOSE BLOOD TEST: CPT

## 2025-06-23 PROCEDURE — 64415 NJX AA&/STRD BRCH PLXS IMG: CPT | Performed by: ANESTHESIOLOGY

## 2025-06-23 PROCEDURE — 3700000001 HC ADD 15 MINUTES (ANESTHESIA): Performed by: ORTHOPAEDIC SURGERY

## 2025-06-23 PROCEDURE — 2580000003 HC RX 258: Performed by: ANESTHESIOLOGY

## 2025-06-23 PROCEDURE — 3600000004 HC SURGERY LEVEL 4 BASE: Performed by: ORTHOPAEDIC SURGERY

## 2025-06-23 PROCEDURE — 36415 COLL VENOUS BLD VENIPUNCTURE: CPT

## 2025-06-23 PROCEDURE — C1713 ANCHOR/SCREW BN/BN,TIS/BN: HCPCS | Performed by: ORTHOPAEDIC SURGERY

## 2025-06-23 PROCEDURE — 6360000002 HC RX W HCPCS: Performed by: ANESTHESIOLOGY

## 2025-06-23 PROCEDURE — 2709999900 HC NON-CHARGEABLE SUPPLY: Performed by: ORTHOPAEDIC SURGERY

## 2025-06-23 PROCEDURE — 86901 BLOOD TYPING SEROLOGIC RH(D): CPT

## 2025-06-23 PROCEDURE — C1776 JOINT DEVICE (IMPLANTABLE): HCPCS | Performed by: ORTHOPAEDIC SURGERY

## 2025-06-23 PROCEDURE — 7100000000 HC PACU RECOVERY - FIRST 15 MIN: Performed by: ORTHOPAEDIC SURGERY

## 2025-06-23 DEVICE — COMPONENT SHLDR S3: Type: IMPLANTABLE DEVICE | Site: SHOULDER | Status: FUNCTIONAL

## 2025-06-23 DEVICE — IMPLANTABLE DEVICE: Type: IMPLANTABLE DEVICE | Site: SHOULDER | Status: FUNCTIONAL

## 2025-06-23 DEVICE — SHOULDER INNOVATIONS INSET REVERSE TOTAL SHOULDER - HUMERAL TRAY. 38MM X 0MM.  TITANIUM
Type: IMPLANTABLE DEVICE | Site: SHOULDER | Status: FUNCTIONAL
Brand: SHOULDER INNOVATIONS INSET REVERSE TOTAL SHOULDER

## 2025-06-23 DEVICE — SCREW BNE LCK 4.5X15 MM RVS BASEPLT PERPENDICULAR NS INSET: Type: IMPLANTABLE DEVICE | Site: SHOULDER | Status: FUNCTIONAL

## 2025-06-23 RX ORDER — SULFAMETHOXAZOLE AND TRIMETHOPRIM 800; 160 MG/1; MG/1
1 TABLET ORAL 2 TIMES DAILY
Qty: 6 TABLET | Refills: 0
Start: 2025-06-23 | End: 2025-06-26

## 2025-06-23 RX ORDER — DEXAMETHASONE SODIUM PHOSPHATE 4 MG/ML
INJECTION, SOLUTION INTRA-ARTICULAR; INTRALESIONAL; INTRAMUSCULAR; INTRAVENOUS; SOFT TISSUE
Status: DISCONTINUED | OUTPATIENT
Start: 2025-06-23 | End: 2025-06-23 | Stop reason: SDUPTHER

## 2025-06-23 RX ORDER — VANCOMYCIN HYDROCHLORIDE 1 G/20ML
INJECTION, POWDER, LYOPHILIZED, FOR SOLUTION INTRAVENOUS PRN
Status: DISCONTINUED | OUTPATIENT
Start: 2025-06-23 | End: 2025-06-23 | Stop reason: ALTCHOICE

## 2025-06-23 RX ORDER — LIDOCAINE HYDROCHLORIDE 10 MG/ML
1 INJECTION, SOLUTION EPIDURAL; INFILTRATION; INTRACAUDAL; PERINEURAL
Status: DISCONTINUED | OUTPATIENT
Start: 2025-06-23 | End: 2025-06-23 | Stop reason: HOSPADM

## 2025-06-23 RX ORDER — CELECOXIB 100 MG/1
100 CAPSULE ORAL ONCE
Status: DISCONTINUED | OUTPATIENT
Start: 2025-06-23 | End: 2025-06-23 | Stop reason: HOSPADM

## 2025-06-23 RX ORDER — HYDROCODONE BITARTRATE AND ACETAMINOPHEN 5; 325 MG/1; MG/1
1 TABLET ORAL EVERY 4 HOURS PRN
Qty: 42 TABLET | Refills: 0
Start: 2025-06-23 | End: 2025-06-30

## 2025-06-23 RX ORDER — SODIUM CHLORIDE, SODIUM LACTATE, POTASSIUM CHLORIDE, CALCIUM CHLORIDE 600; 310; 30; 20 MG/100ML; MG/100ML; MG/100ML; MG/100ML
INJECTION, SOLUTION INTRAVENOUS CONTINUOUS
Status: DISCONTINUED | OUTPATIENT
Start: 2025-06-23 | End: 2025-06-23 | Stop reason: HOSPADM

## 2025-06-23 RX ORDER — FENTANYL CITRATE 50 UG/ML
100 INJECTION, SOLUTION INTRAMUSCULAR; INTRAVENOUS
Status: DISCONTINUED | OUTPATIENT
Start: 2025-06-23 | End: 2025-06-23 | Stop reason: HOSPADM

## 2025-06-23 RX ORDER — EPHEDRINE SULFATE/0.9% NACL/PF 50 MG/5 ML
SYRINGE (ML) INTRAVENOUS
Status: DISCONTINUED | OUTPATIENT
Start: 2025-06-23 | End: 2025-06-23 | Stop reason: SDUPTHER

## 2025-06-23 RX ORDER — PHENYLEPHRINE HCL IN 0.9% NACL 0.4MG/10ML
SYRINGE (ML) INTRAVENOUS
Status: DISCONTINUED | OUTPATIENT
Start: 2025-06-23 | End: 2025-06-23 | Stop reason: SDUPTHER

## 2025-06-23 RX ORDER — BUPIVACAINE HYDROCHLORIDE 2.5 MG/ML
INJECTION, SOLUTION EPIDURAL; INFILTRATION; INTRACAUDAL; PERINEURAL
Status: DISCONTINUED | OUTPATIENT
Start: 2025-06-23 | End: 2025-06-23 | Stop reason: SDUPTHER

## 2025-06-23 RX ORDER — MIDAZOLAM HYDROCHLORIDE 2 MG/2ML
2 INJECTION, SOLUTION INTRAMUSCULAR; INTRAVENOUS
Status: DISCONTINUED | OUTPATIENT
Start: 2025-06-23 | End: 2025-06-23 | Stop reason: HOSPADM

## 2025-06-23 RX ORDER — ACETAMINOPHEN 325 MG/1
975 TABLET ORAL ONCE
Status: DISCONTINUED | OUTPATIENT
Start: 2025-06-23 | End: 2025-06-23 | Stop reason: HOSPADM

## 2025-06-23 RX ORDER — FENTANYL CITRATE 50 UG/ML
INJECTION, SOLUTION INTRAMUSCULAR; INTRAVENOUS
Status: DISCONTINUED | OUTPATIENT
Start: 2025-06-23 | End: 2025-06-23 | Stop reason: SDUPTHER

## 2025-06-23 RX ORDER — ONDANSETRON 2 MG/ML
INJECTION INTRAMUSCULAR; INTRAVENOUS
Status: DISCONTINUED | OUTPATIENT
Start: 2025-06-23 | End: 2025-06-23 | Stop reason: SDUPTHER

## 2025-06-23 RX ORDER — ROCURONIUM BROMIDE 10 MG/ML
INJECTION, SOLUTION INTRAVENOUS
Status: DISCONTINUED | OUTPATIENT
Start: 2025-06-23 | End: 2025-06-23 | Stop reason: SDUPTHER

## 2025-06-23 RX ORDER — TRANEXAMIC ACID 100 MG/ML
INJECTION, SOLUTION INTRAVENOUS
Status: DISCONTINUED | OUTPATIENT
Start: 2025-06-23 | End: 2025-06-23 | Stop reason: SDUPTHER

## 2025-06-23 RX ORDER — PROPOFOL 10 MG/ML
INJECTION, EMULSION INTRAVENOUS
Status: DISCONTINUED | OUTPATIENT
Start: 2025-06-23 | End: 2025-06-23 | Stop reason: SDUPTHER

## 2025-06-23 RX ADMIN — TRANEXAMIC ACID 1000 MG: 100 INJECTION, SOLUTION INTRAVENOUS at 11:21

## 2025-06-23 RX ADMIN — BUPIVACAINE HYDROCHLORIDE 10 ML: 2.5 INJECTION, SOLUTION EPIDURAL; INFILTRATION; INTRACAUDAL; PERINEURAL at 11:14

## 2025-06-23 RX ADMIN — SODIUM CHLORIDE, POTASSIUM CHLORIDE, SODIUM LACTATE AND CALCIUM CHLORIDE: 600; 310; 30; 20 INJECTION, SOLUTION INTRAVENOUS at 11:20

## 2025-06-23 RX ADMIN — Medication 40 MCG: at 12:50

## 2025-06-23 RX ADMIN — PROPOFOL 120 MG: 10 INJECTION, EMULSION INTRAVENOUS at 11:31

## 2025-06-23 RX ADMIN — Medication 120 MCG: at 12:23

## 2025-06-23 RX ADMIN — Medication 80 MCG: at 12:34

## 2025-06-23 RX ADMIN — ONDANSETRON 4 MG: 2 INJECTION, SOLUTION INTRAMUSCULAR; INTRAVENOUS at 12:51

## 2025-06-23 RX ADMIN — SUGAMMADEX 100 MG: 100 INJECTION, SOLUTION INTRAVENOUS at 12:56

## 2025-06-23 RX ADMIN — FENTANYL CITRATE 100 MCG: 50 INJECTION, SOLUTION INTRAMUSCULAR; INTRAVENOUS at 11:09

## 2025-06-23 RX ADMIN — Medication 5 MG: at 12:20

## 2025-06-23 RX ADMIN — DEXAMETHASONE SODIUM PHOSPHATE 8 MG: 4 INJECTION INTRA-ARTICULAR; INTRALESIONAL; INTRAMUSCULAR; INTRAVENOUS; SOFT TISSUE at 11:21

## 2025-06-23 RX ADMIN — CEFAZOLIN SODIUM 2000 MG: 1 POWDER, FOR SOLUTION INTRAMUSCULAR; INTRAVENOUS at 11:27

## 2025-06-23 RX ADMIN — ROCURONIUM BROMIDE 10 MG: 10 INJECTION INTRAVENOUS at 11:58

## 2025-06-23 RX ADMIN — SODIUM CHLORIDE, POTASSIUM CHLORIDE, SODIUM LACTATE AND CALCIUM CHLORIDE: 600; 310; 30; 20 INJECTION, SOLUTION INTRAVENOUS at 10:11

## 2025-06-23 RX ADMIN — ROCURONIUM BROMIDE 40 MG: 10 INJECTION INTRAVENOUS at 11:31

## 2025-06-23 RX ADMIN — BUPIVACAINE 10 ML: 13.3 INJECTION, SUSPENSION, LIPOSOMAL INFILTRATION at 11:14

## 2025-06-23 ASSESSMENT — PAIN SCALES - GENERAL: PAINLEVEL_OUTOF10: 0

## 2025-06-23 ASSESSMENT — PAIN DESCRIPTION - DESCRIPTORS: DESCRIPTORS: DISCOMFORT;DULL;SORE

## 2025-06-23 ASSESSMENT — PAIN DESCRIPTION - LOCATION: LOCATION: SHOULDER

## 2025-06-23 ASSESSMENT — PAIN - FUNCTIONAL ASSESSMENT: PAIN_FUNCTIONAL_ASSESSMENT: 0-10

## 2025-06-23 NOTE — ANESTHESIA PRE PROCEDURE
Department of Anesthesiology  Preprocedure Note       Name:  Jennifer Anderson   Age:  85 y.o.  :  1940                                          MRN:  [id number]         Date:  2025      Surgeon: Surgeon(s):  Justin Ba MD    Procedure: Procedure(s):  RIGHT REVERSE SHOULDER  ARTHROPLASTY WITH BICEPS TENODESIS AND AXILLARY NERVE DISSECTION (GEN W/EXPAREL REGIONAL NERVE BLOCK)    Medications prior to admission:   Prior to Admission medications    Medication Sig Start Date End Date Taking? Authorizing Provider   HYDROcodone-acetaminophen (NORCO) 5-325 MG per tablet Take 1 tablet by mouth every 4 hours as needed for Pain for up to 7 days. Max Daily Amount: 6 tablets 25 Yes Justin Ba MD   sulfamethoxazole-trimethoprim (BACTRIM DS;SEPTRA DS) 800-160 MG per tablet Take 1 tablet by mouth 2 times daily for 3 days 25 Yes Justin Ba MD   metFORMIN (GLUCOPHAGE) 500 MG tablet Take 1 tablet by mouth 2 times daily (with meals) 25  Yes Adriana Camacho APRN - NP   calcium carb-cholecalciferol 600-20 MG-MCG TABS Take 1 tablet by mouth daily   Yes ProviderFrancisco MD   NONFORMULARY 1 tablet daily Neuriva plus   Yes Francisco Starr MD   vitamin B-12 (CYANOCOBALAMIN) 1000 MCG tablet Take 1 tablet by mouth daily   Yes Francisco Starr MD   Pyridoxine HCl (VITAMIN B-6 PO) Take 1 tablet by mouth daily   Yes Francisco Starr MD   Iron Combinations (IRON COMPLEX PO) Take 25 mg by mouth daily   Yes Francisco Starr MD   vitamin D (VITAMIN D3) 50 MCG (2000) CAPS capsule Take 1 capsule by mouth daily   Yes Francisco Starr MD   Niacinamide (VITAMIN B3 PO) Take 1 tablet by mouth Daily   Yes Francisco Starr MD   cyclobenzaprine (FLEXERIL) 5 MG tablet TAKE 1 TABLET BY MOUTH EVERY NIGHT AT BEDTIME AS NEEDED FOR MUSCLE SPASMS OR MUSCLE CRAMPS 25  Yes Adriana Camacho APRN - NP   gabapentin (NEURONTIN) 800 MG tablet TAKE 1 TABLET BY

## 2025-06-23 NOTE — INTERVAL H&P NOTE
Date of Surgery Update:  Jennifer Anderson was seen and examined.  History and physical has been reviewed. The patient has been examined. There have been no significant clinical changes since the completion of the originally dated History and Physical.    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  Previous non-operative treatments have been tried including rest, ice, activity modifications, pain medications, and injectable treatments.  The pain and impairment have progressively worsened now limiting ADLS and having a negative impact on quality of life.  The risks, benefits and potential complications of the procedure have been discussed with the patient and all questions have been answered satisfactorily.      The patient was counseled at length about the risks of sue Covid-19 during their perioperative period and any recovery window from their procedure.  The patient was made aware that sue Covid-19  may worsen their prognosis for recovering from their procedure and lend to a higher morbidity and/or mortality risk.  All material risks, benefits, and reasonable alternatives including postponing the procedure were discussed. The patient does wish to proceed with the procedure at this time.      Signed By: Justin Ba MD     June 23, 2025 9:12 AM

## 2025-06-23 NOTE — ANESTHESIA PROCEDURE NOTES
Peripheral Block    Patient location during procedure: pre-op  Reason for block: procedure for pain, post-op pain management, primary anesthetic and at surgeon's request  Start time: 6/23/2025 11:09 AM  End time: 6/23/2025 11:14 AM  Staffing  Performed: anesthesiologist   Anesthesiologist: Jorge Sumner MD  Performed by: Jorge Sumner MD  Authorized by: Jorge Sumner MD    Preanesthetic Checklist  Completed: patient identified, IV checked, site marked, risks and benefits discussed, surgical/procedural consents, pre-op evaluation, timeout performed, anesthesia consent given, oxygen available and monitors applied/VS acknowledged  Peripheral Block   Patient position: supine  Prep: ChloraPrep  Provider prep: mask and sterile gloves  Patient monitoring: cardiac monitor, continuous pulse ox, continuous capnometry, frequent blood pressure checks, IV access, oxygen and responsive to questions  Block type: Brachial plexus  Interscalene  Laterality: right  Injection technique: single-shot  Guidance: ultrasound guided    Needle   Needle type: Other   Needle gauge: 22 G  Needle localization: ultrasound guidance  Needle length: 5 cmOther needle type: STIMUPLEX  Assessment   Injection assessment: negative aspiration for heme, no paresthesia on injection, local visualized surrounding nerve on ultrasound and no intravascular symptoms  Hemodynamics: stable  Outcomes: patient tolerated procedure well    Additional Notes  Marshall RN witnessed timeout and block written on correct side   Medications Administered  BUPivacaine liposome (EXPAREL) injection 1.3% - Perineural   10 mL - 6/23/2025 11:14:00 AM

## 2025-06-23 NOTE — ANESTHESIA POSTPROCEDURE EVALUATION
Department of Anesthesiology  Postprocedure Note    Patient: Jennifer Anderson  MRN: [id number]  YOB: 1940  Date of evaluation: 6/23/2025    Procedure Summary       Date: 06/23/25 Room / Location: [facility] MAIN OR  / [facility] MAIN OR    Anesthesia Start: 1121 Anesthesia Stop: 1324    Procedure: RIGHT REVERSE SHOULDER  ARTHROPLASTY WITH BICEPS TENODESIS AND AXILLARY NERVE DISSECTION (GEN W/EXPAREL REGIONAL NERVE BLOCK) (Right: Shoulder) Diagnosis:       Arthritis of right shoulder region      (Arthritis of right shoulder region [M19.011])    Surgeons: Justin Ba MD Responsible Provider: Jorge Sumner MD    Anesthesia Type: General, Regional ASA Status: 3            Anesthesia Type: General, Regional    Olive Phase I: Olive Score: 10    Olive Phase II:      Anesthesia Post Evaluation    Patient location during evaluation: PACU  Patient participation: complete - patient participated  Level of consciousness: awake  Airway patency: patent  Nausea & Vomiting: no vomiting and no nausea  Cardiovascular status: hemodynamically stable  Respiratory status: acceptable  Hydration status: stable  Pain management: adequate    No notable events documented.

## 2025-07-02 DIAGNOSIS — K21.9 GASTROESOPHAGEAL REFLUX DISEASE, UNSPECIFIED WHETHER ESOPHAGITIS PRESENT: ICD-10-CM

## 2025-07-02 RX ORDER — FAMOTIDINE 20 MG/1
20 TABLET, FILM COATED ORAL 2 TIMES DAILY
Qty: 60 TABLET | Refills: 5 | Status: SHIPPED | OUTPATIENT
Start: 2025-07-02

## 2025-07-02 NOTE — TELEPHONE ENCOUNTER
Received a fax requesting a refill of famotidine (PEPCID) 20 MG tablet to be sent to the [facility] on [address]    Last appt: 3/17/25  Next appt: 10/20/25

## 2025-07-09 PROBLEM — Z01.810 PREOP CARDIOVASCULAR EXAM: Status: RESOLVED | Noted: 2025-06-09 | Resolved: 2025-07-09

## 2025-08-15 DIAGNOSIS — M06.09 SERONEGATIVE RHEUMATOID ARTHRITIS OF MULTIPLE SITES (HCC): ICD-10-CM

## 2025-08-16 RX ORDER — CYCLOBENZAPRINE HCL 5 MG
TABLET ORAL
Qty: 30 TABLET | Refills: 2 | Status: SHIPPED | OUTPATIENT
Start: 2025-08-16

## (undated) DEVICE — SET ADMIN 16ML TBNG L100IN 2 Y INJ SITE IV PIGGY BK DISP

## (undated) DEVICE — BAG BELONG PT PERS CLEAR HANDL

## (undated) DEVICE — Device

## (undated) DEVICE — SUTURE VICRYL + SZ 2-0 L27IN ABSRB UD CP-1 1/2 CIR REV CUT VCP266H

## (undated) DEVICE — DRAPE,REIN 53X77,STERILE: Brand: MEDLINE

## (undated) DEVICE — STRYKER PERFORMANCE SERIES SAGITTAL BLADE: Brand: STRYKER PERFORMANCE SERIES

## (undated) DEVICE — CATH IV AUTOGRD BC BLU 22GA 25 -- INSYTE

## (undated) DEVICE — CONTAINER SPEC 20 ML LID NEUT BUFF FORMALIN 10 % POLYPR STS

## (undated) DEVICE — BAG SPEC BIOHZRD 10 X 10 IN --

## (undated) DEVICE — APPLIER LIG CLP M L11IN TI STR RNG HNDL FOR 20 CLP DISP

## (undated) DEVICE — SHEET, DRAPE, SPLIT, STERILE: Brand: MEDLINE

## (undated) DEVICE — CANNULA CUSH AD W/ 14FT TBG

## (undated) DEVICE — GLOVE SURG SZ 7 L12IN FNGR THK79MIL GRN LTX FREE

## (undated) DEVICE — ADULT SPO2 SENSOR: Brand: NELLCOR

## (undated) DEVICE — TOTAL JOINT-SFMC: Brand: MEDLINE INDUSTRIES, INC.

## (undated) DEVICE — NDL PRT INJ NSAF BLNT 18GX1.5 --

## (undated) DEVICE — SNARE ENDOSCP M L240CM W27MM SHTH DIA2.4MM CHN 2.8MM OVL

## (undated) DEVICE — CATH IV AUTOGRD BC PNK 20GA 25 -- INSYTE

## (undated) DEVICE — 1200 GUARD II KIT W/5MM TUBE W/O VAC TUBE: Brand: GUARDIAN

## (undated) DEVICE — CANN NASAL O2 CAPNOGRAPHY AD -- FILTERLINE

## (undated) DEVICE — SOLIDIFIER MEDC 1200ML -- CONVERT TO 356117

## (undated) DEVICE — YANKAUER,OPEN TIP,W/O VENT,STERILE: Brand: MEDLINE INDUSTRIES, INC.

## (undated) DEVICE — YANKAUER,SMOOTH HANDLE,HIGH CAPACITY: Brand: MEDLINE INDUSTRIES, INC.

## (undated) DEVICE — TUBING ADMIN SET INTRAV ARTERI -- CONVERT TO ITEM 340436

## (undated) DEVICE — ELECTRODE ELECSURG L 10.2 CM PTFE COAT MONOPOLAR BLADE NSTK

## (undated) DEVICE — GLOVE SURG SZ 65 L12IN FNGR THK126MIL CRM LTX FREE

## (undated) DEVICE — SUTURE VICRYL ABSRB BRAID COAT UD CT NO 1 36IN  J959H

## (undated) DEVICE — PENCIL SMK EVAC ALL IN 1 DSGN ENH VISIBILITY IMPROVED AIR

## (undated) DEVICE — 3M™ CUROS™ DISINFECTING CAP FOR NEEDLELESS CONNECTORS 270/CARTON 20 CARTONS/CASE CFF1-270: Brand: CUROS™

## (undated) DEVICE — CENTER SCREW DRILL

## (undated) DEVICE — SUTURE FIBERWIRE SZ 2 W/ TAPERED NEEDLE BLUE L38IN NONABSORB BLU L26.5MM 1/2 CIRCLE AR7200

## (undated) DEVICE — PAD,ABDOMINAL,5"X9",ST,LF,25/BX: Brand: MEDLINE INDUSTRIES, INC.

## (undated) DEVICE — 3M™ TEGADERM™ TRANSPARENT FILM DRESSING FRAME STYLE, 1628, 6 IN X 8 IN (15 CM X 20 CM), 10/CT 8CT/CASE: Brand: 3M™ TEGADERM™

## (undated) DEVICE — KENDALL RADIOLUCENT FOAM MONITORING ELECTRODE -RECTANGULAR SHAPE: Brand: KENDALL

## (undated) DEVICE — SET GRAV CK VLV NEEDLESS ST 3 GANGED 4WAY STPCOCK HI FLO 10

## (undated) DEVICE — DRAPE,U/ SHT,SPLIT,PLAS,STERIL: Brand: MEDLINE

## (undated) DEVICE — BASIN EMSIS 16OZ GRAPHITE PLAS KID SHP MOLD GRAD FOR ORAL

## (undated) DEVICE — T-MAX DISPOSABLE FACE MASK 8 PER BOX

## (undated) DEVICE — COVER,MAYO STAND,STERILE: Brand: MEDLINE

## (undated) DEVICE — PAD,NON-ADHERENT,3X8,STERILE,LF,1/PK: Brand: MEDLINE

## (undated) DEVICE — KIT COLON W/ 1.1OZ LUB AND 2 END

## (undated) DEVICE — GOWN,BREATHABLE,IMP SLV 3XL AURORA: Brand: MEDLINE

## (undated) DEVICE — BITEBLOCK ENDOSCP 60FR MAXI WHT POLYETH STURDY W/ VELC WVN

## (undated) DEVICE — 3M™ IOBAN™ 2 ANTIMICROBIAL INCISE DRAPE 6651EZ: Brand: IOBAN™ 2

## (undated) DEVICE — FORCEPS BX L240CM JAW DIA2.8MM L CAP W/ NDL MIC MESH TOOTH

## (undated) DEVICE — HOOD SURG T7

## (undated) DEVICE — PERIPHERAL SCREW DRILL

## (undated) DEVICE — REM POLYHESIVE ADULT PATIENT RETURN ELECTRODE: Brand: VALLEYLAB

## (undated) DEVICE — 450 ML BOTTLE OF 0.05% CHLORHEXIDINE GLUCONATE IN 99.95% STERILE WATER FOR IRRIGATION, USP AND APPLICATOR.: Brand: IRRISEPT ANTIMICROBIAL WOUND LAVAGE

## (undated) DEVICE — SYR 5ML 1/5 GRAD LL NSAF LF --

## (undated) DEVICE — BOOT ORTH XL KNEE GRN TYVEK HI CVR SKID RESIST HEAT SEAL E

## (undated) DEVICE — SOL IRR SOD CHL 0.9% TITAN XL CNTNR 3000ML

## (undated) DEVICE — SUTURE MONOCRYL SZ 3-0 L27IN ABSRB UD L24MM PS-1 3/8 CIR PRIM Y936H

## (undated) DEVICE — SUTURE FIBERWIRE SZ 5 L38IN BLU L48MM 1/2 CIR CONVENTIONAL AR7213

## (undated) DEVICE — BASIN EMESIS 500CC ROSE 250/CS 60/PLT: Brand: MEDEGEN MEDICAL PRODUCTS, LLC

## (undated) DEVICE — SYR 3ML LL TIP 1/10ML GRAD --

## (undated) DEVICE — SUTURE PROL SZ 5-0 L36IN NONABSORBABLE BLU L17MM RB-1 1/2 8556H

## (undated) DEVICE — BRUSH CYTO BRONCHSCP 1.5/140MM -- CELLEBRITY

## (undated) DEVICE — 4-PORT MANIFOLD: Brand: NEPTUNE 2

## (undated) DEVICE — NDL FLTR TIP 5 MIC 18GX1.5IN --

## (undated) DEVICE — TRAP SUC MUCOUS 70ML -- MEDICHOICE MEDLINE

## (undated) DEVICE — SOLUTION IRRIG 1000ML H2O PIC PLAS SHATTERPROOF CONTAINER

## (undated) DEVICE — LAMINECTOMY ARM CRADLES - COMPRESSED: Brand: SOULE MEDICAL

## (undated) DEVICE — PAD,ABDOMINAL,10X30,MULTI-TRAUMA,STRL: Brand: MEDLINE INDUSTRIES, INC.

## (undated) DEVICE — GLOVE SURG SZ 9 THK91MIL LTX FREE SYN POLYISOPRENE ANTI

## (undated) DEVICE — GLOVE SURG SZ 9 L12IN FNGR THK79MIL GRN LTX FREE

## (undated) DEVICE — SPONGE GZ W4XL4IN COT 12 PLY TYP VII WVN C FLD DSGN STERILE